# Patient Record
Sex: MALE | Race: WHITE | Employment: FULL TIME | URBAN - METROPOLITAN AREA
[De-identification: names, ages, dates, MRNs, and addresses within clinical notes are randomized per-mention and may not be internally consistent; named-entity substitution may affect disease eponyms.]

---

## 2017-01-19 RX ORDER — INSULIN DETEMIR 100 [IU]/ML
INJECTION, SOLUTION SUBCUTANEOUS
Qty: 15 ML | Refills: 0 | OUTPATIENT
Start: 2017-01-19

## 2017-01-24 ENCOUNTER — PATIENT OUTREACH (OUTPATIENT)
Dept: FAMILY MEDICINE CLINIC | Facility: CLINIC | Age: 37
End: 2017-01-24

## 2017-01-24 DIAGNOSIS — E11.9 CONTROLLED TYPE 2 DIABETES MELLITUS WITHOUT COMPLICATION, WITH LONG-TERM CURRENT USE OF INSULIN (HCC): Primary | ICD-10-CM

## 2017-01-24 DIAGNOSIS — Z79.4 CONTROLLED TYPE 2 DIABETES MELLITUS WITHOUT COMPLICATION, WITH LONG-TERM CURRENT USE OF INSULIN (HCC): Primary | ICD-10-CM

## 2017-07-05 ENCOUNTER — APPOINTMENT (OUTPATIENT)
Dept: LAB | Age: 37
End: 2017-07-05
Attending: EMERGENCY MEDICINE
Payer: COMMERCIAL

## 2017-07-05 DIAGNOSIS — E11.9 CONTROLLED TYPE 2 DIABETES MELLITUS WITHOUT COMPLICATION, WITH LONG-TERM CURRENT USE OF INSULIN (HCC): ICD-10-CM

## 2017-07-05 DIAGNOSIS — Z79.4 CONTROLLED TYPE 2 DIABETES MELLITUS WITHOUT COMPLICATION, WITH LONG-TERM CURRENT USE OF INSULIN (HCC): ICD-10-CM

## 2017-07-05 LAB
ALBUMIN SERPL-MCNC: 4.2 G/DL (ref 3.5–4.8)
ALP LIVER SERPL-CCNC: 85 U/L (ref 45–117)
ALT SERPL-CCNC: 26 U/L (ref 17–63)
AST SERPL-CCNC: 28 U/L (ref 15–41)
BILIRUB SERPL-MCNC: 1.1 MG/DL (ref 0.1–2)
BUN BLD-MCNC: 18 MG/DL (ref 8–20)
CALCIUM BLD-MCNC: 9.2 MG/DL (ref 8.3–10.3)
CHLORIDE: 98 MMOL/L (ref 101–111)
CHOLEST SMN-MCNC: 248 MG/DL (ref ?–200)
CO2: 30 MMOL/L (ref 22–32)
CREAT BLD-MCNC: 1.12 MG/DL (ref 0.7–1.3)
CREAT UR-SCNC: 115 MG/DL
EST. AVERAGE GLUCOSE BLD GHB EST-MCNC: 346 MG/DL (ref 68–126)
GLUCOSE BLD-MCNC: 389 MG/DL (ref 70–99)
HBA1C MFR BLD HPLC: 13.7 % (ref ?–5.7)
HDLC SERPL-MCNC: 41 MG/DL (ref 45–?)
HDLC SERPL: 6.05 {RATIO} (ref ?–4.97)
LDLC SERPL CALC-MCNC: 163 MG/DL (ref ?–130)
M PROTEIN MFR SERPL ELPH: 8.1 G/DL (ref 6.1–8.3)
MICROALBUMIN UR-MCNC: 12.6 MG/DL
MICROALBUMIN/CREAT 24H UR-RTO: 109.6 UG/MG (ref ?–30)
NONHDLC SERPL-MCNC: 207 MG/DL (ref ?–130)
POTASSIUM SERPL-SCNC: 4.8 MMOL/L (ref 3.6–5.1)
SODIUM SERPL-SCNC: 134 MMOL/L (ref 136–144)
TRIGLYCERIDES: 220 MG/DL (ref ?–150)
VLDL: 44 MG/DL (ref 5–40)

## 2017-07-05 PROCEDURE — 82043 UR ALBUMIN QUANTITATIVE: CPT | Performed by: EMERGENCY MEDICINE

## 2017-07-05 PROCEDURE — 82570 ASSAY OF URINE CREATININE: CPT | Performed by: EMERGENCY MEDICINE

## 2017-07-05 PROCEDURE — 80061 LIPID PANEL: CPT | Performed by: EMERGENCY MEDICINE

## 2017-07-05 PROCEDURE — 36415 COLL VENOUS BLD VENIPUNCTURE: CPT | Performed by: EMERGENCY MEDICINE

## 2017-07-05 PROCEDURE — 83036 HEMOGLOBIN GLYCOSYLATED A1C: CPT | Performed by: EMERGENCY MEDICINE

## 2017-07-05 PROCEDURE — 80053 COMPREHEN METABOLIC PANEL: CPT | Performed by: EMERGENCY MEDICINE

## 2017-07-07 ENCOUNTER — TELEPHONE (OUTPATIENT)
Dept: FAMILY MEDICINE CLINIC | Facility: CLINIC | Age: 37
End: 2017-07-07

## 2017-07-07 NOTE — TELEPHONE ENCOUNTER
Left message of high sugar values and need for OV asap. PSR:  Please schedule office visit if patient calls back. Reason is uncontrolled DM.

## 2017-07-07 NOTE — TELEPHONE ENCOUNTER
----- Message from Daniel Garcia MD sent at 7/6/2017  2:01 PM CDT -----  Patient needs OV ASAP for elevated sugars and uncontrolled DM  Pls assist patient with scheduling

## 2017-07-11 NOTE — TELEPHONE ENCOUNTER
I called pt at [phone] and verified 2 patient identifiers: name & . I discussed uncontrolled diabetes. Pt made appt for Mon. I offered sooner appts (we have openings today even) but pt is out of town.

## 2017-07-17 ENCOUNTER — OFFICE VISIT (OUTPATIENT)
Dept: FAMILY MEDICINE CLINIC | Facility: CLINIC | Age: 37
End: 2017-07-17

## 2017-07-17 VITALS
SYSTOLIC BLOOD PRESSURE: 114 MMHG | HEIGHT: 68 IN | DIASTOLIC BLOOD PRESSURE: 92 MMHG | BODY MASS INDEX: 29.25 KG/M2 | RESPIRATION RATE: 14 BRPM | HEART RATE: 106 BPM | TEMPERATURE: 99 F | OXYGEN SATURATION: 99 % | WEIGHT: 193 LBS

## 2017-07-17 DIAGNOSIS — IMO0001 UNCONTROLLED TYPE 2 DIABETES MELLITUS WITHOUT COMPLICATION, WITH LONG-TERM CURRENT USE OF INSULIN: Primary | ICD-10-CM

## 2017-07-17 DIAGNOSIS — Z12.5 PROSTATE CANCER SCREENING: ICD-10-CM

## 2017-07-17 DIAGNOSIS — E78.5 DYSLIPIDEMIA ASSOCIATED WITH TYPE 2 DIABETES MELLITUS (HCC): ICD-10-CM

## 2017-07-17 DIAGNOSIS — E11.69 DYSLIPIDEMIA ASSOCIATED WITH TYPE 2 DIABETES MELLITUS (HCC): ICD-10-CM

## 2017-07-17 LAB
GLUCOSE (URINE DIPSTICK): 500 MG/DL
GLUCOSE BLOOD: 347
MULTISTIX LOT#: ABNORMAL NUMERIC
PH, URINE: 5.5 (ref 4.5–8)
SPECIFIC GRAVITY: 1.01 (ref 1–1.03)
TEST STRIP LOT #: NORMAL NUMERIC
UROBILINOGEN,SEMI-QN: 0.2 MG/DL (ref 0–1.9)

## 2017-07-17 PROCEDURE — 82962 GLUCOSE BLOOD TEST: CPT | Performed by: EMERGENCY MEDICINE

## 2017-07-17 PROCEDURE — 81003 URINALYSIS AUTO W/O SCOPE: CPT | Performed by: EMERGENCY MEDICINE

## 2017-07-17 PROCEDURE — 99214 OFFICE O/P EST MOD 30 MIN: CPT | Performed by: EMERGENCY MEDICINE

## 2017-07-17 RX ORDER — VALSARTAN 80 MG/1
80 TABLET ORAL DAILY
Qty: 30 TABLET | Refills: 1 | Status: SHIPPED | OUTPATIENT
Start: 2017-07-17 | End: 2017-11-17

## 2017-07-17 RX ORDER — EZETIMIBE AND SIMVASTATIN 10; 80 MG/1; MG/1
1 TABLET ORAL
Qty: 30 TABLET | Refills: 1 | Status: SHIPPED | OUTPATIENT
Start: 2017-07-17 | End: 2017-11-17

## 2017-07-17 RX ORDER — BLOOD-GLUCOSE METER
1 EACH MISCELLANEOUS 2 TIMES DAILY
Qty: 1 KIT | Refills: 0 | Status: SHIPPED | OUTPATIENT
Start: 2017-07-17 | End: 2018-02-12

## 2017-07-17 NOTE — PATIENT INSTRUCTIONS
Thank you for choosing [facility]  To Do:  FOR GAGAN Goodman  1. Restart Levimir 10 at bedtime, increase by 2 units every 2-3 days till with morning sugar below 100-150  2. Start Invokanna  3. Follow up in 2 weeks  4.  Blood sugar testing 3 function leading to erectile dysfunction in men and sexual discomfort in women   · Kidney disease (nephropathy) can eventually lead to kidney failure, which may require dialysis or kidney transplant   · Nerve problems (neuropathy), causing pain or loss of

## 2017-07-17 NOTE — PROGRESS NOTES
CHIEF COMPLAINT   Patient presents with: Follow - Up: F/U DM    HISTORY OF PRESENT ILLNESS     Ange Browning is a 42year old year old who presents for recheck of diabetes. Has not been taking Levimir for the past 2-3 months.  Currently on no me lb  08/08/16 : 210 lb  07/21/16 : 212 lb  02/26/15 : 206 lb  12/12/14 : 203 lb  05/19/14 : 198 lb      Current medications:   Current Outpatient Prescriptions:   •  insulin detemir (LEVEMIR FLEXTOUCH) 100 UNIT/ML Subcutaneous Solution Pen-injector, Inject (SGOT) (international units per liter)   Date Value   04/21/2014 20   11/16/2012 33   05/14/2012 44 (H)   ----------  AST (U/L)   Date Value   07/05/2017 28   07/21/2016 28   05/11/2014 18   ----------  ALT (SGPT) (units per liter)   Date Value   04/21/201 type 2 in obese (HCC)     Obesity (BMI 30.0-34. 9)     Vitamin D deficiency     Elevated liver enzymes     Elevated serum GGT level        Current Outpatient Prescriptions on File Prior to Visit:  Insulin Pen Needle (BD PEN NEEDLE ODALIS U/F) 32G X 4 MM Does Pulmonary/Chest: Effort normal and breath sounds normal. No respiratory distress. No wheezes, rhonchi or rales  Abdominal: Soft. Bowel sounds are normal. Non tender, no masses, no organomegaly or hernias. Musculoskeletal: Normal range of motion.  No césar uncontrolled DM and progression of disease.  Explained high risk of complications including CVA, CAD, PVD, ESRD etc.  Will refer to DM clinic  May follow up here in clinic in 2 weeks pending ff up with DM clinic  Start ASA 81 mg daily  DM eye check due  Res Take at appropriate times, Take prescribed dose  Healthy Eating: Evenly spaced carb balanced meals, No skipped meals, Increase fiber, fruits and veggies       LDL:  >100   ACE INH: valsartan   EYE EXAM:  Due, pt encouraged   ASPIRIN Start 81 mg daily   FLU

## 2017-07-19 ENCOUNTER — TELEPHONE (OUTPATIENT)
Dept: FAMILY MEDICINE CLINIC | Facility: CLINIC | Age: 37
End: 2017-07-19

## 2017-07-19 NOTE — TELEPHONE ENCOUNTER
Patient presented to the clinic with a wellness screening form for his insurance, that needs to be completed by his doctor. Copy made, original given to triage. Our copy was placed in fax room in black file box.  LITA was signed by the patient and given to tr

## 2017-08-07 ENCOUNTER — TELEPHONE (OUTPATIENT)
Dept: FAMILY MEDICINE CLINIC | Facility: CLINIC | Age: 37
End: 2017-08-07

## 2017-08-07 NOTE — TELEPHONE ENCOUNTER
Received and completed PA for Invokana rx, via Covermymeds. PA has been approved, case ID # [id number]. Pharmacy has been notified and will notify the patient.    30 DAY SUPPLY WILL COST $50 OUT OF POCKET FOR PT.

## 2017-11-17 ENCOUNTER — OFFICE VISIT (OUTPATIENT)
Dept: FAMILY MEDICINE CLINIC | Facility: CLINIC | Age: 37
End: 2017-11-17

## 2017-11-17 ENCOUNTER — TELEPHONE (OUTPATIENT)
Dept: FAMILY MEDICINE CLINIC | Facility: CLINIC | Age: 37
End: 2017-11-17

## 2017-11-17 VITALS
BODY MASS INDEX: 29.55 KG/M2 | HEART RATE: 100 BPM | RESPIRATION RATE: 16 BRPM | TEMPERATURE: 98 F | WEIGHT: 195 LBS | SYSTOLIC BLOOD PRESSURE: 128 MMHG | DIASTOLIC BLOOD PRESSURE: 76 MMHG | HEIGHT: 68 IN

## 2017-11-17 DIAGNOSIS — Z13.29 SCREENING FOR ENDOCRINE, NUTRITIONAL, METABOLIC AND IMMUNITY DISORDER: ICD-10-CM

## 2017-11-17 DIAGNOSIS — E78.5 DYSLIPIDEMIA ASSOCIATED WITH TYPE 2 DIABETES MELLITUS (HCC): ICD-10-CM

## 2017-11-17 DIAGNOSIS — E78.1 PURE HYPERGLYCERIDEMIA: ICD-10-CM

## 2017-11-17 DIAGNOSIS — Z13.228 SCREENING FOR ENDOCRINE, NUTRITIONAL, METABOLIC AND IMMUNITY DISORDER: ICD-10-CM

## 2017-11-17 DIAGNOSIS — IMO0001 UNCONTROLLED TYPE 2 DIABETES MELLITUS WITHOUT COMPLICATION, WITH LONG-TERM CURRENT USE OF INSULIN: ICD-10-CM

## 2017-11-17 DIAGNOSIS — Z13.0 SCREENING FOR ENDOCRINE, NUTRITIONAL, METABOLIC AND IMMUNITY DISORDER: ICD-10-CM

## 2017-11-17 DIAGNOSIS — Z00.00 ENCOUNTER FOR ANNUAL PHYSICAL EXAM: ICD-10-CM

## 2017-11-17 DIAGNOSIS — Z23 NEED FOR TDAP VACCINATION: Primary | ICD-10-CM

## 2017-11-17 DIAGNOSIS — N52.9 ERECTILE DYSFUNCTION, UNSPECIFIED ERECTILE DYSFUNCTION TYPE: ICD-10-CM

## 2017-11-17 DIAGNOSIS — Z13.21 SCREENING FOR ENDOCRINE, NUTRITIONAL, METABOLIC AND IMMUNITY DISORDER: ICD-10-CM

## 2017-11-17 DIAGNOSIS — E11.69 DYSLIPIDEMIA ASSOCIATED WITH TYPE 2 DIABETES MELLITUS (HCC): ICD-10-CM

## 2017-11-17 DIAGNOSIS — Z23 NEED FOR PROPHYLACTIC VACCINATION AND INOCULATION AGAINST INFLUENZA: ICD-10-CM

## 2017-11-17 PROCEDURE — 90471 IMMUNIZATION ADMIN: CPT | Performed by: EMERGENCY MEDICINE

## 2017-11-17 PROCEDURE — 90472 IMMUNIZATION ADMIN EACH ADD: CPT | Performed by: EMERGENCY MEDICINE

## 2017-11-17 PROCEDURE — 90715 TDAP VACCINE 7 YRS/> IM: CPT | Performed by: EMERGENCY MEDICINE

## 2017-11-17 PROCEDURE — 99395 PREV VISIT EST AGE 18-39: CPT | Performed by: EMERGENCY MEDICINE

## 2017-11-17 PROCEDURE — 90686 IIV4 VACC NO PRSV 0.5 ML IM: CPT | Performed by: EMERGENCY MEDICINE

## 2017-11-17 PROCEDURE — 99213 OFFICE O/P EST LOW 20 MIN: CPT | Performed by: EMERGENCY MEDICINE

## 2017-11-17 RX ORDER — SILDENAFIL 25 MG/1
25 TABLET, FILM COATED ORAL
Qty: 20 TABLET | Refills: 0 | Status: SHIPPED | OUTPATIENT
Start: 2017-11-17 | End: 2017-11-17 | Stop reason: DRUGHIGH

## 2017-11-17 RX ORDER — EZETIMIBE AND SIMVASTATIN 10; 80 MG/1; MG/1
1 TABLET ORAL
Qty: 90 TABLET | Refills: 0 | Status: SHIPPED | OUTPATIENT
Start: 2017-11-17 | End: 2018-10-31

## 2017-11-17 RX ORDER — VALSARTAN 80 MG/1
80 TABLET ORAL DAILY
Qty: 90 TABLET | Refills: 1 | Status: SHIPPED | OUTPATIENT
Start: 2017-11-17 | End: 2018-03-12 | Stop reason: ALTCHOICE

## 2017-11-17 RX ORDER — SILDENAFIL 50 MG/1
50 TABLET, FILM COATED ORAL
Qty: 20 TABLET | Refills: 0 | Status: SHIPPED | OUTPATIENT
Start: 2017-11-17 | End: 2018-12-27

## 2017-11-17 NOTE — TELEPHONE ENCOUNTER
Pt states he was on Viagra 100mg from a previous Dr about a year ago. Dr Purvi Melara prescribed 25mg. He wants to know if he can have a 100mg, or maybe 50mg if not 100mg? Pt states if not that's ok.   No matter what dose we do, he will need a PA so we will

## 2017-11-17 NOTE — TELEPHONE ENCOUNTER
Ok to initiate PA    Ok to Rx Viagra 50 mg once a day As needed, #20 tabs no refills  Pls D/C Viagra 25    If there are samples, pls give him some

## 2017-11-17 NOTE — PATIENT INSTRUCTIONS
Thank you for choosing [facility]  To Do:  FOR GAGAN Mcgrath  1. Follow up on Dec 8  2. Start Trulicity once a week  3. Blood sugar checks 3 times a day before meals, record and bring  4. Restart JMetformin valsartan and Vytorin  5.  Have b least 4 weeks after the first dose   Hepatitis A Men at increased risk for infection – talk with your healthcare provider 2 doses given at least 6 months apart   Hepatitis B Men at increased risk for infection – talk with your healthcare provider 3 doses o who are not up-to-date on their childhood immunizations, should receive all appropriate catch-up vaccines recommended by the CDC. Date Last Reviewed: 2/1/2017  © 4524-5843 [employer]. [address] [facility], [address] [place] [address].  All rights re

## 2017-11-17 NOTE — TELEPHONE ENCOUNTER
New RX sent for 50mg sent and 25mg cancelled. No samples available. Will initiate PA on Mon. Pt aware the process can take days.

## 2017-11-17 NOTE — PROGRESS NOTES
Chief Complaint:   Patient presents with:  School Physical    HPI:   This is a 42year old male who present for a yearly annual exam and DM follow up  DIABETES  Ran out of medication 1 1/2 month ago  Does not do blood sugars  Previously on [employer] week:             Time/duration ________ minutes             Exertion:       stroll      mild      heavy    c. Do you always wear seat belts? YES    d. If over 27years old, have you had your cholesterol level checked in the past five years?  YES        e. differently: 2 CAPSULES DAILY) Disp: 360 Cap Rfl: 1   [DISCONTINUED] valsartan (DIOVAN) 80 MG Oral Tab Take 1 tablet (80 mg total) by mouth daily.  Disp: 30 tablet Rfl: 1   [DISCONTINUED] Ezetimibe-Simvastatin (VYTORIN) 10-80 MG Oral Tab Take 1 tablet by mo diet recommended.    Routine exercise recommended most days during the week. Wear sunscreen - SPF 15 or higher and reapply every 2 hours as needed. Wear seat belts and drive safely.   Schedule regular appointments with dentist.  Schedule yearly eye exam i VACCINE QUAD PRESERVATIVE FREE 0.5 ML    Screening for endocrine, nutritional, metabolic and immunity disorder  -     CBC WITH DIFFERENTIAL WITH PLATELET; Future  -     COMP METABOLIC PANEL (14); Future  -     LIPID PANEL;  Future  -     TSH W REFLEX TO SHANEKA

## 2017-11-20 NOTE — TELEPHONE ENCOUNTER
PA completed on Cover My Meds for patient. SOSA:[id number];KHXYMHE Name:ST: Viagra (sildenafil citrate) PA- AURELIANO;Status:Approved; Coverage Start Date:10/21/2017;Coverage End Date:11/20/2018  Called [facility] and spoke with [facility].   Advised him of above info

## 2017-11-22 ENCOUNTER — LAB ENCOUNTER (OUTPATIENT)
Dept: LAB | Age: 37
End: 2017-11-22
Attending: EMERGENCY MEDICINE
Payer: COMMERCIAL

## 2017-11-22 DIAGNOSIS — E78.1 PURE HYPERGLYCERIDEMIA: ICD-10-CM

## 2017-11-22 DIAGNOSIS — Z13.228 SCREENING FOR ENDOCRINE, NUTRITIONAL, METABOLIC AND IMMUNITY DISORDER: ICD-10-CM

## 2017-11-22 DIAGNOSIS — Z12.5 PROSTATE CANCER SCREENING: ICD-10-CM

## 2017-11-22 DIAGNOSIS — IMO0001 UNCONTROLLED TYPE 2 DIABETES MELLITUS WITHOUT COMPLICATION, WITH LONG-TERM CURRENT USE OF INSULIN: ICD-10-CM

## 2017-11-22 DIAGNOSIS — Z13.21 SCREENING FOR ENDOCRINE, NUTRITIONAL, METABOLIC AND IMMUNITY DISORDER: ICD-10-CM

## 2017-11-22 DIAGNOSIS — Z13.0 SCREENING FOR ENDOCRINE, NUTRITIONAL, METABOLIC AND IMMUNITY DISORDER: ICD-10-CM

## 2017-11-22 DIAGNOSIS — Z13.29 SCREENING FOR ENDOCRINE, NUTRITIONAL, METABOLIC AND IMMUNITY DISORDER: ICD-10-CM

## 2017-11-22 PROCEDURE — 83036 HEMOGLOBIN GLYCOSYLATED A1C: CPT | Performed by: EMERGENCY MEDICINE

## 2017-11-22 PROCEDURE — 80061 LIPID PANEL: CPT | Performed by: EMERGENCY MEDICINE

## 2017-11-22 PROCEDURE — 36415 COLL VENOUS BLD VENIPUNCTURE: CPT | Performed by: EMERGENCY MEDICINE

## 2017-11-22 PROCEDURE — 80050 GENERAL HEALTH PANEL: CPT | Performed by: EMERGENCY MEDICINE

## 2017-11-22 PROCEDURE — 84153 ASSAY OF PSA TOTAL: CPT | Performed by: EMERGENCY MEDICINE

## 2017-11-22 PROCEDURE — 84681 ASSAY OF C-PEPTIDE: CPT | Performed by: EMERGENCY MEDICINE

## 2017-11-29 ENCOUNTER — TELEPHONE (OUTPATIENT)
Dept: FAMILY MEDICINE CLINIC | Facility: CLINIC | Age: 37
End: 2017-11-29

## 2017-11-29 NOTE — TELEPHONE ENCOUNTER
----- Message from Kelly Morfin MD sent at 11/27/2017  6:33 PM CST -----  Hemoglobin A1C is elevated as expected continue present management follow-up in clinic in 2 weeks as directed    Lipids stable, continue with Vytorin  Rest of labs stable.

## 2017-12-08 ENCOUNTER — OFFICE VISIT (OUTPATIENT)
Dept: FAMILY MEDICINE CLINIC | Facility: CLINIC | Age: 37
End: 2017-12-08

## 2017-12-08 VITALS
OXYGEN SATURATION: 98 % | BODY MASS INDEX: 29.86 KG/M2 | DIASTOLIC BLOOD PRESSURE: 82 MMHG | TEMPERATURE: 98 F | SYSTOLIC BLOOD PRESSURE: 114 MMHG | HEART RATE: 104 BPM | RESPIRATION RATE: 12 BRPM | WEIGHT: 197 LBS | HEIGHT: 68 IN

## 2017-12-08 DIAGNOSIS — E78.5 DYSLIPIDEMIA ASSOCIATED WITH TYPE 2 DIABETES MELLITUS (HCC): ICD-10-CM

## 2017-12-08 DIAGNOSIS — IMO0001 UNCONTROLLED TYPE 2 DIABETES MELLITUS WITHOUT COMPLICATION, WITHOUT LONG-TERM CURRENT USE OF INSULIN: Primary | ICD-10-CM

## 2017-12-08 DIAGNOSIS — Z23 NEED FOR PNEUMOCOCCAL VACCINATION: ICD-10-CM

## 2017-12-08 DIAGNOSIS — E11.69 DYSLIPIDEMIA ASSOCIATED WITH TYPE 2 DIABETES MELLITUS (HCC): ICD-10-CM

## 2017-12-08 PROCEDURE — 90471 IMMUNIZATION ADMIN: CPT | Performed by: EMERGENCY MEDICINE

## 2017-12-08 PROCEDURE — 99214 OFFICE O/P EST MOD 30 MIN: CPT | Performed by: EMERGENCY MEDICINE

## 2017-12-08 PROCEDURE — 90732 PPSV23 VACC 2 YRS+ SUBQ/IM: CPT | Performed by: EMERGENCY MEDICINE

## 2017-12-08 NOTE — PATIENT INSTRUCTIONS
Thank you for choosing [address] [place] [address] Group  To Do:  FOR GAGAN Camilo Minium  1. Follow up in 1 month for recheck  2. Need to ff up with a diabetic educator Nick Stewart  3. dont forget about diabetic eye exam, pls have them fax over the report  4.  Incr

## 2017-12-08 NOTE — PROGRESS NOTES
Chief Complaint:   Patient presents with:  Diabetes: f/u dm     HPI:   This is a 42year old male       DIABETES  Started on Trulicity 6.92 once a week. Also on Metformin. Has been doing blood sugars and this AM blood sugar was 166.  Has noticed that blood strip Rfl: 2   KEV MICROLET LANCETS Does not apply Misc 1 lancet by Finger stick route 3 (three) times daily. Use as directed. Disp: 1 Box Rfl: 1     No current facility-administered medications on file prior to visit.     Counseling given: Not Answered Collection Time: 11/22/17  9:26 AM   Result Value Ref Range   Cholesterol, Total 169 <200 mg/dL   Triglycerides 107 <150 mg/dL   HDL Cholesterol 40 (L) >45 mg/dL   LDL Cholesterol 108 <130 mg/dL   VLDL 21 5 - 40 mg/dL   Chol/HDL Ratio 4.23 <4.97   Non HD checks. Encouraged follow-up with diabetic educator for diabetic education.   Will consider adding guardians in the next 2-3 months after we will maximize current medications and after repeat   -     Dulaglutide (TRULICITY) 1.5 IB/2.2GZ Subcutaneous Soluti

## 2018-01-31 ENCOUNTER — PATIENT OUTREACH (OUTPATIENT)
Dept: FAMILY MEDICINE CLINIC | Facility: CLINIC | Age: 38
End: 2018-01-31

## 2018-02-12 ENCOUNTER — APPOINTMENT (OUTPATIENT)
Dept: GENERAL RADIOLOGY | Facility: HOSPITAL | Age: 38
DRG: 623 | End: 2018-02-12
Attending: EMERGENCY MEDICINE
Payer: COMMERCIAL

## 2018-02-12 ENCOUNTER — OFFICE VISIT (OUTPATIENT)
Dept: FAMILY MEDICINE CLINIC | Facility: CLINIC | Age: 38
End: 2018-02-12

## 2018-02-12 ENCOUNTER — HOSPITAL ENCOUNTER (INPATIENT)
Facility: HOSPITAL | Age: 38
LOS: 8 days | Discharge: HOME HEALTH | DRG: 623 | End: 2018-02-20
Attending: EMERGENCY MEDICINE | Admitting: HOSPITALIST
Payer: COMMERCIAL

## 2018-02-12 VITALS
SYSTOLIC BLOOD PRESSURE: 118 MMHG | RESPIRATION RATE: 16 BRPM | WEIGHT: 199 LBS | HEIGHT: 68 IN | HEART RATE: 118 BPM | TEMPERATURE: 98 F | BODY MASS INDEX: 30.16 KG/M2 | DIASTOLIC BLOOD PRESSURE: 72 MMHG

## 2018-02-12 DIAGNOSIS — R73.9 HYPERGLYCEMIA: ICD-10-CM

## 2018-02-12 DIAGNOSIS — L08.9 DIABETIC INFECTION OF LEFT FOOT (HCC): Primary | ICD-10-CM

## 2018-02-12 DIAGNOSIS — Z79.4 TYPE 2 DIABETES MELLITUS WITH COMPLICATION, WITH LONG-TERM CURRENT USE OF INSULIN (HCC): ICD-10-CM

## 2018-02-12 DIAGNOSIS — E11.69 DIABETES MELLITUS TYPE 2 IN OBESE (HCC): Primary | ICD-10-CM

## 2018-02-12 DIAGNOSIS — E66.9 DIABETES MELLITUS TYPE 2 IN OBESE (HCC): Primary | ICD-10-CM

## 2018-02-12 DIAGNOSIS — E66.9 DIABETES MELLITUS TYPE 2 IN OBESE (HCC): ICD-10-CM

## 2018-02-12 DIAGNOSIS — E11.69 DIABETES MELLITUS TYPE 2 IN OBESE (HCC): ICD-10-CM

## 2018-02-12 DIAGNOSIS — E11.8 TYPE 2 DIABETES MELLITUS WITH COMPLICATION, WITH LONG-TERM CURRENT USE OF INSULIN (HCC): ICD-10-CM

## 2018-02-12 DIAGNOSIS — E11.628 DIABETIC INFECTION OF LEFT FOOT (HCC): Primary | ICD-10-CM

## 2018-02-12 DIAGNOSIS — E11.8 TYPE 2 DIABETES MELLITUS WITH COMPLICATION, WITHOUT LONG-TERM CURRENT USE OF INSULIN (HCC): ICD-10-CM

## 2018-02-12 LAB
ALBUMIN SERPL-MCNC: 3.4 G/DL (ref 3.5–4.8)
ALP LIVER SERPL-CCNC: 186 U/L (ref 45–117)
ALT SERPL-CCNC: 35 U/L (ref 17–63)
AST SERPL-CCNC: 41 U/L (ref 15–41)
BASOPHILS # BLD AUTO: 0.02 X10(3) UL (ref 0–0.1)
BASOPHILS NFR BLD AUTO: 0.2 %
BILIRUB SERPL-MCNC: 2.1 MG/DL (ref 0.1–2)
BILIRUB UR QL STRIP.AUTO: NEGATIVE
BUN BLD-MCNC: 15 MG/DL (ref 8–20)
C-REACTIVE PROTEIN: 11 MG/DL (ref ?–1)
CALCIUM BLD-MCNC: 9.2 MG/DL (ref 8.3–10.3)
CARTRIDGE LOT#: 769 NUMERIC
CHLORIDE: 97 MMOL/L (ref 101–111)
CLARITY UR REFRACT.AUTO: CLEAR
CO2: 28 MMOL/L (ref 22–32)
COLOR UR AUTO: YELLOW
CREAT BLD-MCNC: 1.23 MG/DL (ref 0.7–1.3)
EOSINOPHIL # BLD AUTO: 0.03 X10(3) UL (ref 0–0.3)
EOSINOPHIL NFR BLD AUTO: 0.3 %
ERYTHROCYTE [DISTWIDTH] IN BLOOD BY AUTOMATED COUNT: 12.6 % (ref 11.5–16)
EST. AVERAGE GLUCOSE BLD GHB EST-MCNC: 309 MG/DL (ref 68–126)
GLUCOSE BLD-MCNC: 172 MG/DL (ref 65–99)
GLUCOSE BLD-MCNC: 226 MG/DL (ref 65–99)
GLUCOSE BLD-MCNC: 233 MG/DL (ref 65–99)
GLUCOSE BLD-MCNC: 460 MG/DL (ref 65–99)
GLUCOSE BLD-MCNC: 495 MG/DL (ref 70–99)
GLUCOSE BLOOD: 457
GLUCOSE UR STRIP.AUTO-MCNC: >=500 MG/DL
HBA1C MFR BLD HPLC: 12.4 % (ref ?–5.7)
HCT VFR BLD AUTO: 38 % (ref 37–53)
HEMOGLOBIN A1C: 14 % (ref 4.3–5.6)
HGB BLD-MCNC: 13.1 G/DL (ref 13–17)
IMMATURE GRANULOCYTE COUNT: 0.02 X10(3) UL (ref 0–1)
IMMATURE GRANULOCYTE RATIO %: 0.2 %
LACTIC ACID: 1.5 MMOL/L (ref 0.5–2)
LEUKOCYTE ESTERASE UR QL STRIP.AUTO: NEGATIVE
LYMPHOCYTES # BLD AUTO: 1.68 X10(3) UL (ref 0.9–4)
LYMPHOCYTES NFR BLD AUTO: 19.5 %
M PROTEIN MFR SERPL ELPH: 7.9 G/DL (ref 6.1–8.3)
MCH RBC QN AUTO: 30.2 PG (ref 27–33.2)
MCHC RBC AUTO-ENTMCNC: 34.5 G/DL (ref 31–37)
MCV RBC AUTO: 87.6 FL (ref 80–99)
MONOCYTES # BLD AUTO: 0.87 X10(3) UL (ref 0.1–1)
MONOCYTES NFR BLD AUTO: 10.1 %
NEUTROPHIL ABS PRELIM: 5.99 X10 (3) UL (ref 1.3–6.7)
NEUTROPHILS # BLD AUTO: 5.99 X10(3) UL (ref 1.3–6.7)
NEUTROPHILS NFR BLD AUTO: 69.7 %
NITRITE UR QL STRIP.AUTO: NEGATIVE
PH UR STRIP.AUTO: 6 [PH] (ref 4.5–8)
PLATELET # BLD AUTO: 212 10(3)UL (ref 150–450)
POTASSIUM SERPL-SCNC: 4.1 MMOL/L (ref 3.6–5.1)
PROT UR STRIP.AUTO-MCNC: NEGATIVE MG/DL
RBC # BLD AUTO: 4.34 X10(6)UL (ref 4.3–5.7)
RBC UR QL AUTO: NEGATIVE
RED CELL DISTRIBUTION WIDTH-SD: 40.1 FL (ref 35.1–46.3)
SODIUM SERPL-SCNC: 133 MMOL/L (ref 136–144)
SP GR UR STRIP.AUTO: 1.03 (ref 1–1.03)
TEST STRIP LOT #: ABNORMAL NUMERIC
UROBILINOGEN UR STRIP.AUTO-MCNC: 4 MG/DL
VENOUS BASE EXCESS: 1.7
VENOUS BLOOD GAS HCO3: 28.6 MEQ/L (ref 23–27)
VENOUS O2 SAT CALC: 47 % (ref 72–78)
VENOUS O2 SATURATION: 51 % (ref 72–78)
VENOUS PCO2: 54 MM HG (ref 38–50)
VENOUS PH: 7.33 (ref 7.33–7.43)
VENOUS PO2: 28 MM HG (ref 30–50)
WBC # BLD AUTO: 8.6 X10(3) UL (ref 4–13)

## 2018-02-12 PROCEDURE — 82962 GLUCOSE BLOOD TEST: CPT | Performed by: EMERGENCY MEDICINE

## 2018-02-12 PROCEDURE — 99214 OFFICE O/P EST MOD 30 MIN: CPT | Performed by: EMERGENCY MEDICINE

## 2018-02-12 PROCEDURE — 83036 HEMOGLOBIN GLYCOSYLATED A1C: CPT | Performed by: EMERGENCY MEDICINE

## 2018-02-12 PROCEDURE — 73650 X-RAY EXAM OF HEEL: CPT | Performed by: EMERGENCY MEDICINE

## 2018-02-12 PROCEDURE — 99223 1ST HOSP IP/OBS HIGH 75: CPT | Performed by: HOSPITALIST

## 2018-02-12 RX ORDER — LOSARTAN POTASSIUM 50 MG/1
50 TABLET ORAL DAILY
Status: DISCONTINUED | OUTPATIENT
Start: 2018-02-12 | End: 2018-02-20

## 2018-02-12 RX ORDER — INSULIN ASPART 100 [IU]/ML
0.2 INJECTION, SOLUTION INTRAVENOUS; SUBCUTANEOUS ONCE
Status: COMPLETED | OUTPATIENT
Start: 2018-02-12 | End: 2018-02-12

## 2018-02-12 RX ORDER — ENOXAPARIN SODIUM 100 MG/ML
40 INJECTION SUBCUTANEOUS DAILY
Status: DISCONTINUED | OUTPATIENT
Start: 2018-02-12 | End: 2018-02-20

## 2018-02-12 RX ORDER — SODIUM CHLORIDE 9 MG/ML
INJECTION, SOLUTION INTRAVENOUS CONTINUOUS
Status: ACTIVE | OUTPATIENT
Start: 2018-02-12 | End: 2018-02-12

## 2018-02-12 RX ORDER — DEXTROSE MONOHYDRATE 25 G/50ML
50 INJECTION, SOLUTION INTRAVENOUS
Status: DISCONTINUED | OUTPATIENT
Start: 2018-02-12 | End: 2018-02-20

## 2018-02-12 RX ORDER — SODIUM CHLORIDE 9 MG/ML
INJECTION, SOLUTION INTRAVENOUS CONTINUOUS
Status: DISCONTINUED | OUTPATIENT
Start: 2018-02-12 | End: 2018-02-14

## 2018-02-12 RX ORDER — ONDANSETRON 2 MG/ML
4 INJECTION INTRAMUSCULAR; INTRAVENOUS EVERY 4 HOURS PRN
Status: DISCONTINUED | OUTPATIENT
Start: 2018-02-12 | End: 2018-02-20

## 2018-02-12 RX ORDER — ACETAMINOPHEN 325 MG/1
650 TABLET ORAL EVERY 6 HOURS PRN
Status: DISCONTINUED | OUTPATIENT
Start: 2018-02-12 | End: 2018-02-20

## 2018-02-12 NOTE — PLAN OF CARE
Diabetes/Glucose Control    • Glucose maintained within prescribed range Progressing        Patient/Family Goals    • Patient/Family Long Term Goal Progressing    • Patient/Family Short Term Goal Progressing        Admission navigator completed. A/Ox4.  No

## 2018-02-12 NOTE — PROGRESS NOTES
Pharmacy Note: Renal dose adjustment of Unasyn    Octavia Aguilar is a 42year old male who has been prescribed Unasyn 1.5 g IV every 6 hrs.   CrCl is 78 ml/min so the dose has been adjusted  to Unasyn 3 gram IV every 8 hrs (extended interval infusion)

## 2018-02-12 NOTE — PROGRESS NOTES
Chief Complaint:   Patient presents with:  Viral Syndrome: Symptoms started on Saturday     HPI:   This is a 42year old male         NOT FEELING WELL  Started X 3 days. + body aches and fatigue. NO fever. No cough or congestion. Appetite low.  Urine is lloyd 1 tablet (50 mg total) by mouth daily as needed for Erectile Dysfunction.  Disp: 20 tablet Rfl: 0      Counseling given: Not Answered         PROBLEM LIST     Patient Active Problem List:     Encounter for long-term (current) use of other medications     Pu -HEMOGLOBIN A1C   Collection Time: 02/12/18  9:40 AM   Result Value Ref Range   HEMOGLOBIN A1C 14 (A) 4.3 - 5.6 %   Cartridge Lot# [id number] Numeric   Cartridge Expiration Date 8-2,019 Date       ASSESSMENT AND PLAN:   Diagnoses and all orders for this visit:

## 2018-02-12 NOTE — PATIENT INSTRUCTIONS
Thank you for choosing [address] [place] [address] Group  To Do:  FOR GAGAN Manning  1.  Proceed to the ER for further evaluation and work up, R/O DKA           Diabetic Ketoacidosis  Diabetic ketoacidosis (DKA) is a serious problem that can happen in people with ketones)  Over time, these symptoms may happen:  · Dry or flushed skin  · Nausea and vomiting  · Loss of appetite  · Weight loss  · Belly pain  · Trouble breathing  · Trouble thinking or confusion  · Feeling very tired or weak. This can lead to coma.   How DKA  · Have very high blood glucose levels  · Are getting sick with another illness   Date Last Reviewed: 7/1/2016  © 1907-7304 [employer]. [address] [facility], [address] [place] [address]. All rights reserved.  This information is not intended as a

## 2018-02-12 NOTE — ED PROVIDER NOTES
Patient Seen in: [facility] Emergency Department    History   Patient presents with:  Hyperglycemia (metabolic)    Stated Complaint: diabetic, hyperglycemia    HPI    Patient is a 75-year-old male who has a history of diabetes for 10 years.   Patient 16   Ht 172.7 cm (5' 8\")   Wt 88.5 kg   SpO2 97%   BMI 29.65 kg/m²         Physical Exam  GENERAL: Patient resting comfortably on the cart in no acute distress. HEENT: Extraocular muscles intact, pupils equal round reactive to light and accommodation.   Carlos Arce DIFFERENTIAL WITH PLATELET.   Procedure                               Abnormality         Status                     ---------                               -----------         ------                     CBC W/ DIFFERENTIAL[[id number]]

## 2018-02-12 NOTE — H&P
[facility]  History and Physical     Viji Tobin Patient Status:  Inpatient    1980 MRN [id number]   [facility] 5NW-A Attending Inocente Homans, MD   Hosp Day # 0 PCP Gil Johnson MD     Chief Complaint: infection tablet by mouth once daily. Disp: 90 tablet Rfl: 0   MetFORMIN HCl 1000 MG Oral Tab Take 1 tablet (1,000 mg total) by mouth 2 (two) times daily with meals.  Disp: 180 tablet Rfl: 0   Sildenafil Citrate (VIAGRA) 50 MG Oral Tab Take 1 tablet (50 mg total) by uncontrolled DM  3. DM II- hyperglycemic protocol  4. HTN- losartan   5.  DL- cont home meds    Quality:  · DVT Prophylaxis: lovenox   · CODE status: full  · Gibbons: no  Plan of care discussed with patient     Pamela Buck MD  2/12/2018

## 2018-02-12 NOTE — ED NOTES
Into room to collect urine, pt's L foot on chux with bloody bandaid removed, pt stating that he has a wound to l heel after shoveling snow, scabbed wound noted measuring 2cm long by 1cm wide in center area, eye shaped in nature, scabbed ,scaling surroundin

## 2018-02-13 LAB
GLUCOSE BLD-MCNC: 144 MG/DL (ref 65–99)
GLUCOSE BLD-MCNC: 150 MG/DL (ref 65–99)
GLUCOSE BLD-MCNC: 196 MG/DL (ref 65–99)
GLUCOSE BLD-MCNC: 217 MG/DL (ref 65–99)

## 2018-02-13 PROCEDURE — 99232 SBSQ HOSP IP/OBS MODERATE 35: CPT | Performed by: HOSPITALIST

## 2018-02-13 RX ORDER — AMMONIUM LACTATE 12 G/100G
LOTION TOPICAL AS NEEDED
Status: DISCONTINUED | OUTPATIENT
Start: 2018-02-13 | End: 2018-02-20

## 2018-02-13 RX ORDER — AMOXICILLIN AND CLAVULANATE POTASSIUM 875; 125 MG/1; MG/1
1 TABLET, FILM COATED ORAL 2 TIMES DAILY
Qty: 20 TABLET | Refills: 0 | Status: SHIPPED | OUTPATIENT
Start: 2018-02-13 | End: 2018-02-16

## 2018-02-13 NOTE — PLAN OF CARE
AxO x4, glasses. Room air, lungs clear bilaterally, IS. Had fever last NOC of 101.1, tylenol given with relief. Afebrile so far today, will continue to monitor. C/o mild headache this AM, tylenol given with relief. IVF and IV abx. Lovenox SQ.  Left lateral

## 2018-02-13 NOTE — PAYOR COMM NOTE
--------------  ADMISSION REVIEW     Payor: [address]  Subscriber #:  [id number]  Authorization Number: N/A    Admit date: 2/12/18  Admit time: 1       Admitting Physician: Prosper Freire MD  Attending Physician:  Tricia Bhandari MD  Pr are as noted in HPI. Constitutional and vital signs reviewed. All other systems reviewed and negative except as noted above. Physical Exam[MB. 1]   ED Triage Vitals [02/12/18 1040]  BP: 105/68  Pulse: 64  Resp: 16  Temp: (!) 97.4 °F (36.3 °C)  Temp s other components within normal limits   C-REACTIVE PROTEIN - Abnormal; Notable for the following:     C-Reactive Protein 11.00 (*)     All other components within normal limits   POCT GLUCOSE - Abnormal; Notable for the following:     POC Glucose 460 (*) infection    History of Present Illness: Rossy Noel is a 42year old male presents with[MD.1] h/o DM comes in with uncontrolled DM. He also mentions a left foot wound that has been progressing. He denies CP or SOB.  No adb pain, fevers or chills Musculoskeletal: Moves all extremities. Extremities: No edema. No cyanosis. Integument:[MD.1] Left lateral aspect with open wound lesion with erythema, no purulent discharge. [MD.2]   Psychiatric: Appropriate mood and affect.     Diagnostic Data:    Labs Reshma Gray RN      Enoxaparin Sodium (LOVENOX) 40 MG/0.4ML injection 40 mg     Date Action Dose Route User    2/13/2018 0827 Given 40 mg Subcutaneous (Left Lower Abdomen) Jason Claudio RN    2/12/2018 1537 Given 40 mg Subcutaneous (Left Upper Abdomen) Gary CERTIFIED ALONG W/NRD

## 2018-02-13 NOTE — PLAN OF CARE
Diabetes/Glucose Control    • Glucose maintained within prescribed range Progressing        Patient/Family Goals    • Patient/Family Short Term Goal Progressing            Pt received alert and oriented x4, denies any pain, dressing to left lower extremity

## 2018-02-13 NOTE — CONSULTS
[facility]  Report of Inpatient Wound Care Consultation     Mali Tobin Patient Status:  Inpatient    1980 MRN [id number]   [facility] 5NW-A Attending Chrystal Oppenheim, MD   Hosp Day # 1 PCP Rosa Madsen MD     [facility] CREATSERUM  1.23   --    --    --    --    BUN  15   --    --    --    --    GLU  495*   --    --    --    --    CA  9.2   --    --    --    --    ALB  3.4*   --    --    --    --    TP  7.9   --    --    --    --    CRP  11.00*   --    --    --    -- at [facility]. WOUND CARE DISCHARGE RECOMMENDATIONS:   Noted above. Thank you for this consultation and for allowing me to participate in the care of your patient.   Please [place] me at [phone] or page me at [phone] if you have any questio

## 2018-02-13 NOTE — CM/SW NOTE
02/13/18 1000   CM/SW Screening   Referral [address] [facility] staff; Chart review;Nursing rounds   Patient's Mental Status Alert;Oriented   Patient lives with Spouse   Patient Status Prior to Admission   Independent with ADLs and M

## 2018-02-13 NOTE — DIETARY NOTE
Nutrition Short Note    Dietitian consult received for diabetes education. Noted pt hemoglobin A1C 12.4. Reviewed and provided handouts on carbohydrate counting/label reading.  Discussed the role of CHO/protein and fat in BS control, reviewed importance of

## 2018-02-13 NOTE — PROGRESS NOTES
[facility]  Progress Note     Martínez Tobin Patient Status:  Inpatient    1980 MRN [id number]   [facility] 5NW-A Attending Jewel Hayes MD   Hosp Day # 1 PCP Yayo Kapoor MD     Chief Complaint: infection  S: insulin detemir  15 Units Subcutaneous [email or web address]   • ampicillin-sulbactam  3 g Intravenous Q8H     ASSESSMENT / PLAN:   1. Diabetic LE wound infection- low suspicion for osteomyelitis. Xrays unremarkable. Fever 101 last night  1.  IVF  2. IV abx and f/u

## 2018-02-14 LAB
BASOPHILS # BLD AUTO: 0.01 X10(3) UL (ref 0–0.1)
BASOPHILS NFR BLD AUTO: 0.1 %
BUN BLD-MCNC: 7 MG/DL (ref 8–20)
CALCIUM BLD-MCNC: 8.4 MG/DL (ref 8.3–10.3)
CHLORIDE: 106 MMOL/L (ref 101–111)
CO2: 28 MMOL/L (ref 22–32)
CREAT BLD-MCNC: 0.66 MG/DL (ref 0.7–1.3)
EOSINOPHIL # BLD AUTO: 0.07 X10(3) UL (ref 0–0.3)
EOSINOPHIL NFR BLD AUTO: 1 %
ERYTHROCYTE [DISTWIDTH] IN BLOOD BY AUTOMATED COUNT: 12.7 % (ref 11.5–16)
GLUCOSE BLD-MCNC: 123 MG/DL (ref 65–99)
GLUCOSE BLD-MCNC: 126 MG/DL (ref 70–99)
GLUCOSE BLD-MCNC: 127 MG/DL (ref 65–99)
GLUCOSE BLD-MCNC: 146 MG/DL (ref 65–99)
GLUCOSE BLD-MCNC: 175 MG/DL (ref 65–99)
HAV IGM SER QL: 1.8 MG/DL (ref 1.7–3)
HCT VFR BLD AUTO: 32.3 % (ref 37–53)
HGB BLD-MCNC: 11.2 G/DL (ref 13–17)
IMMATURE GRANULOCYTE COUNT: 0.03 X10(3) UL (ref 0–1)
IMMATURE GRANULOCYTE RATIO %: 0.4 %
LYMPHOCYTES # BLD AUTO: 1.29 X10(3) UL (ref 0.9–4)
LYMPHOCYTES NFR BLD AUTO: 18.6 %
MCH RBC QN AUTO: 30.4 PG (ref 27–33.2)
MCHC RBC AUTO-ENTMCNC: 34.7 G/DL (ref 31–37)
MCV RBC AUTO: 87.8 FL (ref 80–99)
MONOCYTES # BLD AUTO: 0.81 X10(3) UL (ref 0.1–1)
MONOCYTES NFR BLD AUTO: 11.7 %
NEUTROPHIL ABS PRELIM: 4.74 X10 (3) UL (ref 1.3–6.7)
NEUTROPHILS # BLD AUTO: 4.74 X10(3) UL (ref 1.3–6.7)
NEUTROPHILS NFR BLD AUTO: 68.2 %
PLATELET # BLD AUTO: 219 10(3)UL (ref 150–450)
POTASSIUM SERPL-SCNC: 3.9 MMOL/L (ref 3.6–5.1)
RBC # BLD AUTO: 3.68 X10(6)UL (ref 4.3–5.7)
RED CELL DISTRIBUTION WIDTH-SD: 40.5 FL (ref 35.1–46.3)
SODIUM SERPL-SCNC: 140 MMOL/L (ref 136–144)
WBC # BLD AUTO: 7 X10(3) UL (ref 4–13)

## 2018-02-14 PROCEDURE — 0JBR0ZZ EXCISION OF LEFT FOOT SUBCUTANEOUS TISSUE AND FASCIA, OPEN APPROACH: ICD-10-PCS | Performed by: PODIATRIST

## 2018-02-14 PROCEDURE — 0QBM0ZX EXCISION OF LEFT TARSAL, OPEN APPROACH, DIAGNOSTIC: ICD-10-PCS | Performed by: PODIATRIST

## 2018-02-14 PROCEDURE — 99233 SBSQ HOSP IP/OBS HIGH 50: CPT | Performed by: HOSPITALIST

## 2018-02-14 RX ORDER — MAGNESIUM OXIDE 400 MG (241.3 MG MAGNESIUM) TABLET
400 TABLET ONCE
Status: COMPLETED | OUTPATIENT
Start: 2018-02-14 | End: 2018-02-14

## 2018-02-14 NOTE — CONSULTS
[facility]  Report of Consultation    Dewey Tobin Patient Status:  Inpatient    1980 MRN [id number]   [facility] 5NW-A Attending Kassy Drake MD   Hosp Day # 2 PCP Nabila Cavanaugh MD     Date of Admission:  2018 mg, 4 mg, Intravenous, Q4H PRN  •  glucose (DEX4) oral liquid 15 g, 15 g, Oral, Q15 Min PRN **OR** Glucose-Vitamin C (DEX-4) 4-0.006 g chewable tab 4 tablet, 4 tablet, Oral, Q15 Min PRN **OR** dextrose 50% injection 50 mL, 50 mL, Intravenous, Q15 Min PRN * medications     Pure hyperglyceridemia     Other and unspecified hyperlipidemia     Type II or unspecified type diabetes mellitus without mention of complication, uncontrolled     Diabetes mellitus type 2 in obese (HCC)     Obesity (BMI 30.0-34. 9)     Berna procedure well with minimal discomfort. He does however have pain on direct palpation of the wound but I did not notice anything that would make me suspicious for an abscess at this time.

## 2018-02-14 NOTE — PLAN OF CARE
AxO x4. Room air, lungs clear. Left lateral foot ulcer, debrided at bedside per podiatry today. No c/o pain except slight headache, tylenol given with relief. Voids. BM today. Up self. Multidisciplinary Discharge Rounds held 2/14/2018.     Treatment team

## 2018-02-14 NOTE — PLAN OF CARE
Diabetes/Glucose Control    • Glucose maintained within prescribed range Progressing        PAIN - ADULT    • Verbalizes/displays adequate comfort level or patient's stated pain goal Progressing        Patient/Family Goals    • Patient/Family [facility]

## 2018-02-14 NOTE — PROGRESS NOTES
[facility]  Progress Note     Arina Tobin Patient Status:  Inpatient    1980 MRN [id number]   [facility] 5NW-A Attending Hazel Ramirez MD   Hosp Day # 2 PCP Maria G George MD     Chief Complaint: Foot ulcer, Neena Units Subcutaneous TID AC and HS   • Insulin Aspart Pen  1-68 Units Subcutaneous TID CC   • insulin detemir  15 Units [email or web address]   • ampicillin-sulbactam  3 g Intravenous Q8H     ASSESSMENT / PLAN:   1.  Left heel diabetic foot ulcer with ce

## 2018-02-14 NOTE — CONSULTS
INFECTIOUS DISEASE CONSULTATION    Betsy Roberts Ervinlizeth Patient Status:  Inpatient    1980 MRN [id number]   [facility] 5NW-A Attending Dalila Ahumada, MD   [facility] Day # 2 PCP Ester Latham 4-0.006 g chewable tab 4 tablet, 4 tablet, Oral, Q15 Min PRN **OR** dextrose 50% injection 50 mL, 50 mL, Intravenous, Q15 Min PRN **OR** glucose (DEX4) oral liquid 30 g, 30 g, Oral, Q15 Min PRN **OR** Glucose-Vitamin C (DEX-4) 4-0.006 g chewable tab 8 tabl --    AST  41   --    ALT  35   --    BILT  2.1*   --    TP  7.9   --            Lab Results  Component Value Date   MG 1.8 02/14/2018   PGLU 123 02/14/2018         Microbiologic Data:   none      Imaging:  Xray noted    Problem list reviewed:  Patient Ac

## 2018-02-15 LAB
GLUCOSE BLD-MCNC: 130 MG/DL (ref 65–99)
GLUCOSE BLD-MCNC: 163 MG/DL (ref 65–99)
GLUCOSE BLD-MCNC: 177 MG/DL (ref 65–99)
GLUCOSE BLD-MCNC: 222 MG/DL (ref 65–99)
HAV IGM SER QL: 2 MG/DL (ref 1.7–3)

## 2018-02-15 PROCEDURE — 99232 SBSQ HOSP IP/OBS MODERATE 35: CPT | Performed by: HOSPITALIST

## 2018-02-15 NOTE — PLAN OF CARE
Diabetes/Glucose Control    • Glucose maintained within prescribed range Progressing        PAIN - ADULT    • Verbalizes/displays adequate comfort level or patient's stated pain goal Progressing        Patient/Family Goals    • Patient/Family [facility]

## 2018-02-15 NOTE — PROGRESS NOTES
02/15/18  1203   BP: 114/82   Pulse:    Resp:    Temp: 98.2 °F (36.8 °C)   Patient was seen resting comfortably in bed heel pain is diminishing.     Objective: Tissue cultures growing out 3+ gram-positive cocci in pairs and 1+ gram-positive rods still has

## 2018-02-15 NOTE — PROGRESS NOTES
[facility]                INFECTIOUS DISEASE PROGRESS NOTE    Ciara Tobin Patient Status:  Inpatient    1980 MRN [id number]   [facility] 5NW-A Attending Johnny Toussaint MD   Hosp Day # 3 PCP Leanna Ordoñez MD Completed Lab Status: Preliminary result Updated: 02/15/18 9265   Specimen: Other from Heel, left     Aerobic Culture Result 2+ growth Staphylococcus aureus (A)     2+ growth Streptococcus agalactiae (Group B beta strep) (A)    Aerobic Smear No WBCs seen

## 2018-02-15 NOTE — CM/SW NOTE
New plan for O/P IV ABX p d/c. Pend cultures. Patient aware of plan, states first choice would be in-home infusion. [facility]. Pend final orders.

## 2018-02-15 NOTE — PROGRESS NOTES
Pt is alert and oriented, left foot is wrapped and boot is on. Pt remains on IV unasyn, encouraged pt to watch diabetes education on-line. Pt denies pain. Lovonex given.  Will continue to monitor

## 2018-02-15 NOTE — PROGRESS NOTES
[facility]  Progress Note     Jenise Tobin Patient Status:  Inpatient    1980 MRN [id number]   [facility] 5NW-A Attending Arturo Gorman MD   Hosp Day # 3 PCP Evie Dorantes MD     Chief Complaint: Foot ulcer, Neena ASSESSMENT / PLAN:   1. Left heel diabetic foot ulcer with cellulitis sp debridement - clinically not felt to have OM or abscess  1. IV abx  2. Follow-up cultures - currently with GPC pairs, GPR  3. ID & Podiatry consult  2.  Diabetes mellitus, type 2,

## 2018-02-16 ENCOUNTER — ANESTHESIA EVENT (OUTPATIENT)
Dept: SURGERY | Facility: HOSPITAL | Age: 38
End: 2018-02-16

## 2018-02-16 LAB
GLUCOSE BLD-MCNC: 138 MG/DL (ref 65–99)
GLUCOSE BLD-MCNC: 159 MG/DL (ref 65–99)
GLUCOSE BLD-MCNC: 189 MG/DL (ref 65–99)
GLUCOSE BLD-MCNC: 203 MG/DL (ref 65–99)

## 2018-02-16 PROCEDURE — 99232 SBSQ HOSP IP/OBS MODERATE 35: CPT | Performed by: HOSPITALIST

## 2018-02-16 RX ORDER — LANCETS
EACH MISCELLANEOUS
Qty: 50 EACH | Refills: 6 | Status: SHIPPED | OUTPATIENT
Start: 2018-02-16 | End: 2018-02-22 | Stop reason: ALTCHOICE

## 2018-02-16 RX ORDER — INSULIN LISPRO 100 [IU]/ML
5 INJECTION, SOLUTION INTRAVENOUS; SUBCUTANEOUS
Qty: 3 ML | Refills: 3 | Status: SHIPPED | OUTPATIENT
Start: 2018-02-16 | End: 2018-03-21

## 2018-02-16 RX ORDER — BLOOD-GLUCOSE METER
EACH MISCELLANEOUS
Qty: 1 KIT | Refills: 0 | Status: SHIPPED | OUTPATIENT
Start: 2018-02-16 | End: 2018-02-22 | Stop reason: ALTCHOICE

## 2018-02-16 NOTE — PROGRESS NOTES
02/16/18  1057   BP: 134/85   Pulse: 96   Resp: 16   Temp: 98.2 °F (36.8 °C)   Patient was seen resting comfortably in bed. I had spoken to the wound care team in particular [facility] and reviewed her note.   There were concerns over increasing erythema

## 2018-02-16 NOTE — PROGRESS NOTES
[facility]                INFECTIOUS DISEASE PROGRESS NOTE    Drea Tobin Patient Status:  Inpatient    1980 MRN [id number]   [facility] 5NW-A Attending Darin Hardy MD   Hosp Day # 4 PCP Sandhya Cordon MD Order Status: Completed Lab Status: Preliminary result Updated: 02/15/18 1430   Specimen: Other from Heel, left     Aerobic Culture Result 2+ growth Staphylococcus aureus (A)     2+ growth Streptococcus agalactiae (Group B beta strep) (A)    Aerobic Smea

## 2018-02-16 NOTE — PROGRESS NOTES
[facility]  Progress Note     Jose Alejandroreld Sadi Tobin Patient Status:  Inpatient    1980 MRN [id number]   [facility] 5NW-A Attending Chay Salter MD   Hosp Day # 4 PCP David Dillon MD     Chief Complaint: Foot ulcer, Neena consult  2. Diabetes mellitus, type 2, was on insulin, started on Trulicity and Metformin January 2018  1. Levemir 15 BID  2. Novolog  5 with meals  3. Correctional scale  4. Accuchecks  5. Outpatient follow-up  3.  Essential hypertension, goal < 130/80, st

## 2018-02-16 NOTE — PLAN OF CARE
Diabetes/Glucose Control    • Glucose maintained within prescribed range Progressing        PAIN - ADULT    • Verbalizes/displays adequate comfort level or patient's stated pain goal Progressing        Patient/Family Goals    • Patient/Family [facility]

## 2018-02-16 NOTE — WOUND PROGRESS NOTE
[facility]  Report of Inpatient Wound Care Progress Note    Alondra Tobin Patient Status:  Inpatient    1980 MRN [id number]   [facility] 5NW-A Attending Mani Godoy MD   Hosp Day # 4 PCP Elias Jimenez MD       SUBJE noted.  Upon removal of the dressing, there are no more bullae noted, wound now with black eschar with surrounding erythremia and warmth, pain with probing to the wound. RN here to observe, she did see the wound yesterday, no erthemia noted.  With leg elev

## 2018-02-16 NOTE — ANESTHESIA PREPROCEDURE EVALUATION
PRE-OP EVALUATION    Patient Name: Nova Tobin    Pre-op Diagnosis: INPT      Procedure(s):  LEFT HEEL DEBRIDEMENT     Surgeon(s) and Role:     * Alexa Alvarez DPM - Primary    Pre-op vitals reviewed.   Temp: 98.2 °F (36.8 °C)  Pulse: 96  Res 100 UNIT/ML flextouch 15 Units 15 Units Subcutaneous [email or web address]   Ampicillin-Sulbactam Sodium (UNASYN) 3 g in sodium chloride 0.9 % 100 mL MBP/ADD-vantage 3 g Intravenous Q8H       Outpatient Medications:    Insulin Lispro (HUMALOG) 100 UNIT/ML Subcutan MG Oral Tab Take 1 tablet (80 mg total) by mouth daily. Disp: 90 tablet Rfl: 1   Ezetimibe-Simvastatin (VYTORIN) 10-80 MG Oral Tab Take 1 tablet by mouth once daily.  Disp: 90 tablet Rfl: 0   MetFORMIN HCl 1000 MG Oral Tab Take 1 tablet (1,000 mg total) by Pulmonary    Pulmonary exam normal.  Breath sounds clear to auscultation bilaterally. Other findings            ASA: 2   Plan: general  NPO status verified and patient meets guidelines. Patient has not taken beta blockers in last 24 hours.

## 2018-02-16 NOTE — PROGRESS NOTES
Pt A/O x 4. Pt on RA, Dressing to left foot C/D/I. IV abts. Boot to L foot, QID accu check, denies pain.  Will monitor

## 2018-02-17 ENCOUNTER — ANESTHESIA (OUTPATIENT)
Dept: SURGERY | Facility: HOSPITAL | Age: 38
End: 2018-02-17

## 2018-02-17 ENCOUNTER — SURGERY (OUTPATIENT)
Age: 38
End: 2018-02-17

## 2018-02-17 LAB
GLUCOSE BLD-MCNC: 137 MG/DL (ref 65–99)
GLUCOSE BLD-MCNC: 147 MG/DL (ref 65–99)
GLUCOSE BLD-MCNC: 152 MG/DL (ref 65–99)
GLUCOSE BLD-MCNC: 179 MG/DL (ref 65–99)

## 2018-02-17 PROCEDURE — 99233 SBSQ HOSP IP/OBS HIGH 50: CPT | Performed by: HOSPITALIST

## 2018-02-17 RX ORDER — BUPIVACAINE HYDROCHLORIDE 5 MG/ML
INJECTION, SOLUTION EPIDURAL; INTRACAUDAL AS NEEDED
Status: DISCONTINUED | OUTPATIENT
Start: 2018-02-17 | End: 2018-02-17 | Stop reason: HOSPADM

## 2018-02-17 RX ORDER — MORPHINE SULFATE 2 MG/ML
2 INJECTION, SOLUTION INTRAMUSCULAR; INTRAVENOUS EVERY 5 MIN PRN
Status: DISCONTINUED | OUTPATIENT
Start: 2018-02-17 | End: 2018-02-17 | Stop reason: HOSPADM

## 2018-02-17 RX ORDER — DEXTROSE MONOHYDRATE 25 G/50ML
50 INJECTION, SOLUTION INTRAVENOUS
Status: DISCONTINUED | OUTPATIENT
Start: 2018-02-17 | End: 2018-02-17 | Stop reason: HOSPADM

## 2018-02-17 RX ORDER — SODIUM CHLORIDE, SODIUM LACTATE, POTASSIUM CHLORIDE, CALCIUM CHLORIDE 600; 310; 30; 20 MG/100ML; MG/100ML; MG/100ML; MG/100ML
INJECTION, SOLUTION INTRAVENOUS CONTINUOUS
Status: DISCONTINUED | OUTPATIENT
Start: 2018-02-17 | End: 2018-02-17 | Stop reason: HOSPADM

## 2018-02-17 RX ORDER — MEPERIDINE HYDROCHLORIDE 25 MG/ML
12.5 INJECTION INTRAMUSCULAR; INTRAVENOUS; SUBCUTANEOUS AS NEEDED
Status: DISCONTINUED | OUTPATIENT
Start: 2018-02-17 | End: 2018-02-17 | Stop reason: HOSPADM

## 2018-02-17 RX ORDER — DIPHENHYDRAMINE HYDROCHLORIDE 50 MG/ML
12.5 INJECTION INTRAMUSCULAR; INTRAVENOUS AS NEEDED
Status: DISCONTINUED | OUTPATIENT
Start: 2018-02-17 | End: 2018-02-17 | Stop reason: HOSPADM

## 2018-02-17 RX ORDER — NALOXONE HYDROCHLORIDE 0.4 MG/ML
80 INJECTION, SOLUTION INTRAMUSCULAR; INTRAVENOUS; SUBCUTANEOUS AS NEEDED
Status: DISCONTINUED | OUTPATIENT
Start: 2018-02-17 | End: 2018-02-17 | Stop reason: HOSPADM

## 2018-02-17 NOTE — BRIEF OP NOTE
Pre-Operative Diagnosis: Necrotic left heel wound with cellulitis     Post-Operative Diagnosis: Necrotic left heel wound with cellulitis possible osteomyelitis lateral plantar calcaneus   Procedure Performed:   Procedure(s):  LEFT HEEL DEBRIDEMENT, bone

## 2018-02-17 NOTE — PROGRESS NOTES
[facility]  Progress Note     Leoncio Tobin Patient Status:  Inpatient    1980 MRN [id number]   [facility] 5NW-A Attending Mook Mcmahon MD   Hosp Day # 5 PCP Hemant Posadas MD     Chief Complaint: Foot ulcer, Neena debridement of necrotic tissue at bedside 2/14 and again 2/17; MSSA and Strep  1. IV abx  2. Await initial sensitivities and repeat cultures  3. Await bone biopsy results  4. ID & Podiatry consult  2.  Diabetes mellitus, type 2, was on insulin, started on T

## 2018-02-17 NOTE — ANESTHESIA POSTPROCEDURE EVALUATION
[address] [place] [address] Patient Status:  Inpatient   Age/Gender 42year old male MRN [id number]   [facility] 5NW-A Attending Stevan Jones MD   Hosp Day # 5 PCP David Dillon MD       Anesthesia Post-op Note    Procedure(

## 2018-02-17 NOTE — PLAN OF CARE
Diabetes/Glucose Control    • Glucose maintained within prescribed range Progressing        PAIN - ADULT    • Verbalizes/displays adequate comfort level or patient's stated pain goal Progressing        Patient/Family Goals    • Patient/Family [facility]

## 2018-02-17 NOTE — PLAN OF CARE
Diabetes/Glucose Control    • Glucose maintained within prescribed range Progressing        PAIN - ADULT    • Verbalizes/displays adequate comfort level or patient's stated pain goal Progressing        Patient/Family Goals    • Patient/Family [facility]

## 2018-02-17 NOTE — PROGRESS NOTES
[facility]                INFECTIOUS DISEASE PROGRESS NOTE    Yahir Tobin Patient Status:  Inpatient    1980 MRN [id number]   [facility] 5NW-A Attending Krista Alvarez MD   Hosp Day # 5 PCP Jimmy Erazo MD Aerobic Bacterial Culture Once [[id number]] (Abnormal) Collected: 02/14/18 1240   Order Status: Completed Lab Status: Final result Updated: 02/17/18 1202   Specimen: Other from Heel, left     Aerobic Culture Result 2+ growth Staphylococcus aureus (A)    Com Specimen: Tissue from Bone    Tissue Aerobic Culture [[id number]] Collected: 02/17/18 7958   Order Status: Sent Lab Status: In process Updated: 02/17/18 1033   Specimen: Tissue from Bone        ASSESSMENT/PLAN:    1.  Infected left heel wound with cellulitis

## 2018-02-18 LAB
BASOPHILS # BLD AUTO: 0.02 X10(3) UL (ref 0–0.1)
BASOPHILS NFR BLD AUTO: 0.3 %
BUN BLD-MCNC: 12 MG/DL (ref 8–20)
CALCIUM BLD-MCNC: 9.2 MG/DL (ref 8.3–10.3)
CHLORIDE: 103 MMOL/L (ref 101–111)
CO2: 30 MMOL/L (ref 22–32)
CREAT BLD-MCNC: 0.84 MG/DL (ref 0.7–1.3)
EOSINOPHIL # BLD AUTO: 0.15 X10(3) UL (ref 0–0.3)
EOSINOPHIL NFR BLD AUTO: 2.3 %
ERYTHROCYTE [DISTWIDTH] IN BLOOD BY AUTOMATED COUNT: 12.5 % (ref 11.5–16)
GLUCOSE BLD-MCNC: 118 MG/DL (ref 65–99)
GLUCOSE BLD-MCNC: 158 MG/DL (ref 65–99)
GLUCOSE BLD-MCNC: 171 MG/DL (ref 65–99)
GLUCOSE BLD-MCNC: 173 MG/DL (ref 70–99)
GLUCOSE BLD-MCNC: 202 MG/DL (ref 65–99)
HCT VFR BLD AUTO: 36.3 % (ref 37–53)
HGB BLD-MCNC: 11.8 G/DL (ref 13–17)
IMMATURE GRANULOCYTE COUNT: 0.06 X10(3) UL (ref 0–1)
IMMATURE GRANULOCYTE RATIO %: 0.9 %
LYMPHOCYTES # BLD AUTO: 1.94 X10(3) UL (ref 0.9–4)
LYMPHOCYTES NFR BLD AUTO: 29.2 %
MCH RBC QN AUTO: 29.5 PG (ref 27–33.2)
MCHC RBC AUTO-ENTMCNC: 32.5 G/DL (ref 31–37)
MCV RBC AUTO: 90.8 FL (ref 80–99)
MONOCYTES # BLD AUTO: 0.79 X10(3) UL (ref 0.1–1)
MONOCYTES NFR BLD AUTO: 11.9 %
NEUTROPHIL ABS PRELIM: 3.69 X10 (3) UL (ref 1.3–6.7)
NEUTROPHILS # BLD AUTO: 3.69 X10(3) UL (ref 1.3–6.7)
NEUTROPHILS NFR BLD AUTO: 55.4 %
PLATELET # BLD AUTO: 282 10(3)UL (ref 150–450)
POTASSIUM SERPL-SCNC: 4.1 MMOL/L (ref 3.6–5.1)
RBC # BLD AUTO: 4 X10(6)UL (ref 4.3–5.7)
RED CELL DISTRIBUTION WIDTH-SD: 41 FL (ref 35.1–46.3)
SODIUM SERPL-SCNC: 139 MMOL/L (ref 136–144)
VANCOMYCIN TROUGH: 3.1 UG/ML (ref 10–20)
WBC # BLD AUTO: 6.7 X10(3) UL (ref 4–13)

## 2018-02-18 PROCEDURE — 99231 SBSQ HOSP IP/OBS SF/LOW 25: CPT | Performed by: HOSPITALIST

## 2018-02-18 NOTE — OPERATIVE REPORT
[address]    PATIENT'S NAME: Nelida Viveros   ATTENDING PHYSICIAN: Jesse Haley. SERGEY Brown OPERATING PHYSICIAN: Julita Nguyen D.P.M.    PATIENT ACCOUNT#:   [de-identified]    LOCATION:  [address] [facility]  MEDICAL RECORD #:   [id number]       DATE Jason Eli the lateral decubitus position right side down. The left foot was then prepped and draped using the usual aseptic technique.   The pneumatic ankle tourniquet, although in place, was not inflated during the course of this procedure so that healthy bleeding

## 2018-02-18 NOTE — PROGRESS NOTES
[facility]  Progress Note     Betsy Tobin Patient Status:  Inpatient    1980 MRN [id number]   [facility] 5NW-A Attending Honey Ramos MD   Hosp Day # 6 PCP Sanya Huang MD     Chief Complaint: Foot ulcer, Neena and Strep  1. IV abx  2. Follow-up cultures and bone biopsy results  3. ID & Podiatry consult  2. Diabetes mellitus, type 2, was on insulin, started on Trulicity and Metformin January 2018  1. Levemir 15 BID  2. Novolog  5 with meals  3.  Correctional scale

## 2018-02-18 NOTE — PLAN OF CARE
Diabetes/Glucose Control    • Glucose maintained within prescribed range Progressing        PAIN - ADULT    • Verbalizes/displays adequate comfort level or patient's stated pain goal Progressing        Patient/Family Goals    • Patient/Family [facility]

## 2018-02-18 NOTE — PLAN OF CARE
AxO x4. Room air, lungs clear bilaterally. Voids. No c/o pain. Afebrile. VSS. IV abx, awaiting tissue cultures from left foot debridement 2/17. Left foot ulcer with debridement done by podiatry 2/17.  Surgical dressing in place, no drainage, no c/o pain, pe

## 2018-02-18 NOTE — PROGRESS NOTES
02/18/18  0432   BP: 131/84   Pulse: 89   Resp: 16   Temp: 98.2 °F (36.8 °C)   Patient was seen resting comfortably in bed. He is now one day status post debridement he has no complaints of pain.     Objective: Dressing is clean and dry there is no strike

## 2018-02-19 LAB
GLUCOSE BLD-MCNC: 172 MG/DL (ref 65–99)
GLUCOSE BLD-MCNC: 182 MG/DL (ref 65–99)
GLUCOSE BLD-MCNC: 203 MG/DL (ref 65–99)
GLUCOSE BLD-MCNC: 244 MG/DL (ref 65–99)

## 2018-02-19 PROCEDURE — B548ZZA ULTRASONOGRAPHY OF SUPERIOR VENA CAVA, GUIDANCE: ICD-10-PCS | Performed by: HOSPITALIST

## 2018-02-19 PROCEDURE — 02HV33Z INSERTION OF INFUSION DEVICE INTO SUPERIOR VENA CAVA, PERCUTANEOUS APPROACH: ICD-10-PCS | Performed by: HOSPITALIST

## 2018-02-19 PROCEDURE — 99233 SBSQ HOSP IP/OBS HIGH 50: CPT | Performed by: HOSPITALIST

## 2018-02-19 PROCEDURE — 99233 SBSQ HOSP IP/OBS HIGH 50: CPT | Performed by: CLINICAL NURSE SPECIALIST

## 2018-02-19 RX ORDER — CEFAZOLIN SODIUM/WATER 2 G/20 ML
2 SYRINGE (ML) INTRAVENOUS EVERY 8 HOURS
Status: DISCONTINUED | OUTPATIENT
Start: 2018-02-19 | End: 2018-02-20

## 2018-02-19 RX ORDER — SODIUM CHLORIDE 0.9 % (FLUSH) 0.9 %
10 SYRINGE (ML) INJECTION AS NEEDED
Status: DISCONTINUED | OUTPATIENT
Start: 2018-02-19 | End: 2018-02-20

## 2018-02-19 NOTE — CM/SW NOTE
Plan for patient to go home with IV ABX per [facility] and [facility] and [facility] Wound Vac order completed.

## 2018-02-19 NOTE — PROGRESS NOTES
Patient was seen resting comfortably in bed no complaints of pain. Patient's vital signs show temperature 97.4, pulse 94 respirations 16, blood pressure 107/84 and SPO2 is 98% the patient's vital signs show that he is stable and afebrile. Objective:  Rin Jarquin

## 2018-02-19 NOTE — PROGRESS NOTES
[facility]  Progress Note     Daniel Tobin Patient Status:  Inpatient    1980 MRN [id number]   [facility] 5NW-A Attending Umberto Mac MD   Hosp Day # 7 PCP Purvi Shi MD     Chief Complaint: Foot ulcer, Neena at bedside 2/14 and again 2/17; MSSA and Strep; Strep in deep culture  1. IV abx per ID - discussed with Dr. Tani Presley, patient will require 6 weeks ov IV abx in setting of exposed bone. Additionally, patient will need wound vac.  Will place order for PICC an

## 2018-02-19 NOTE — PLAN OF CARE
Diabetes/Glucose Control    • Glucose maintained within prescribed range Progressing        PAIN - ADULT    • Verbalizes/displays adequate comfort level or patient's stated pain goal Progressing        Patient/Family Goals    • Patient/Family [facility]

## 2018-02-19 NOTE — WOUND PROGRESS NOTE
[facility]  Report of Inpatient Wound Care Progress Note    Buzz Tobin Patient Status:  Inpatient    1980 MRN [id number]   [facility] 5NW-A Attending Sujata Weinberg MD   Hosp Day # 7 PCP Xiomara Guardado MD       SUBJE concentrated  urine, left heel pain, no injury. FINDINGS:    BONES:  No significant arthropathy or acute abnormality. SOFT TISSUES:  Vascular calcifications. No visible soft tissue swelling.   EFFUSION:  None visible.     =====  CONCLUSION: allowing me to participate in the care of your patient. Please call me at [phone] or page me at [phone] if you have any questions about this consultation and plan of care. If unable to reach me at these, please call the Inpatient Wound Care pager at #[phone].

## 2018-02-19 NOTE — PROGRESS NOTES
[facility]                INFECTIOUS DISEASE PROGRESS NOTE    Georgi Tobin Patient Status:  Inpatient    1980 MRN [id number]   [facility] 5NW-A Attending Luciana Epley, MD   Hosp Day # 7 PCP Abena Trotter MD susceptible Staphylococcus aureus)     Streptococcal infection      ASSESSMENT/PLAN:  1.  SP I/D left heal wound -down to bone  Large open wound, site clean appearing  cx from I/D - staph auureus ox s and group B strep, no anaerobes  Home with Cefaozlin IV

## 2018-02-19 NOTE — HOME CARE LIAISON
Met with pt at the bedside. He is agreeable to [facility] services. Brochure given and pt questions answered. Will follow.  Thank you

## 2018-02-19 NOTE — PHYSICAL THERAPY NOTE
PHYSICAL THERAPY EVALUATION - INPATIENT     Room Number: 522/522-A  Evaluation Date: 2/19/2018  Type of Evaluation: Initial  Physician Order: PT Eval and Treat    Presenting Problem: Diabetic infection of L foot  Reason for Therapy: Mobility Dysfunct within functional limits  · Following Commands:  follows multistep commands consistently  · Motor Planning: intact  · Safety Judgement:  good awareness of safety precautions    RANGE OF MOTION AND STRENGTH ASSESSMENT  Upper extremity ROM and strength are w following stairs and period standing rest breaks during ambulation due to fatigue. Pt returned to bed following session. Discussed use of RW vs crutches, recommending RW at this time for ambulation.    Recommend pt stay on first floor of home, pt report of Visits to Meet Established Goals: 3      CURRENT GOALS        Goal #2 Patient is able to demonstrate transfers EOB to/from Chair/Wheelchair at assistance level: modified independent     Goal #3 Patient is able to ambulate 150' feet with assist device: w

## 2018-02-19 NOTE — CONSULTS
[facility]  Diabetes Clinical Nurse Specialist Consult Note    Alondra Tobin Patient Status:  Inpatient    1980 MRN [id number]   [facility] 5NW-A Attending Mani Godoy MD   Hosp Day # 7 PCP MD Mj Bales they suspect he does have neuropathy, although patient denies it. Currently on 15 units Levemir BID and Novolog 5 units plus correction.  He had been taken off insulin recently by his PCP (\"to try something different\") and was on metformin and Trulici Autoimmune Diabetes of Adults). Dr. Danna Perrin was agreeable, I entered order.     PRESCRIPTION RECOMMENDATIONS:    · [employer] [place]  ·  Levemir and Novolog  · Accu-Chek test strips and lancets    PROVIDER F/U RECOMMENDATIONS:    · Patient's current

## 2018-02-20 VITALS
DIASTOLIC BLOOD PRESSURE: 75 MMHG | OXYGEN SATURATION: 97 % | WEIGHT: 195 LBS | TEMPERATURE: 98 F | SYSTOLIC BLOOD PRESSURE: 116 MMHG | HEIGHT: 68 IN | RESPIRATION RATE: 16 BRPM | HEART RATE: 89 BPM | BODY MASS INDEX: 29.55 KG/M2

## 2018-02-20 LAB
GLUCOSE BLD-MCNC: 177 MG/DL (ref 65–99)
GLUCOSE BLD-MCNC: 203 MG/DL (ref 65–99)

## 2018-02-20 PROCEDURE — 99239 HOSP IP/OBS DSCHRG MGMT >30: CPT | Performed by: HOSPITALIST

## 2018-02-20 NOTE — PROGRESS NOTES
[facility]  Progress Note     Alondra Romaine Muellerel Patient Status:  Inpatient    1980 MRN [id number]   [facility] 5NW-A Attending Radha Milner MD   Hosp Day # 8 PCP Elias Jimenez MD     Chief Complaint: Foot ulcer, Neena necrotic tissue at bedside 2/14 and again 2/17; MSSA and Strep; Strep in deep culture  1. PICC  2. IV abx per ID  3. Wound care/Vac  4. ID & Podiatry consult  2. Diabetes mellitus, type 2, was on insulin, started on Trulicity and Metformin January 2018  1.

## 2018-02-20 NOTE — PHYSICAL THERAPY NOTE
PHYSICAL THERAPY TREATMENT NOTE - INPATIENT    Room Number: 522/522-A     Session: 1   Number of Visits to Meet Established Goals: 3    Presenting Problem: Diabetic infection of L foot     History related to current admission: Pt was admitted from home on lying on back to sitting on the side of the bed?: None   How much help from another person does the patient currently need. ..   -   Moving to and from a bed to a chair (including a wheelchair)?: A Little   -   Need to walk in hospital room?: A Little   - highest functional independence/return to baseline. Pt reports he is planning to remain on main level upon return home.   Recommend home c HHPT  upon [facility] d/c.       DISCHARGE RECOMMENDATIONS  PT Discharge Recommendations: Home with home health P

## 2018-02-20 NOTE — CM/SW NOTE
Discharge home anticipated today after afternoon ABX infusion. FLORIDALMA confirmed with Jillian/[facility] ([phone])  That clinical to be sent and IVABX on track for delivery to pt's home this evening.  FLORIDALMA confirmed with [facility] liaison of discharge today and anticipated

## 2018-02-20 NOTE — PROGRESS NOTES
[facility]                INFECTIOUS DISEASE PROGRESS NOTE    Arina Tobin Patient Status:  Inpatient    1980 MRN [id number]   [facility] 5NW-A Attending Yanira Lugo MD   Hosp Day # 8 PCP Maria G George MD Staphylococcus aureus)     Streptococcal infection      ASSESSMENT/PLAN:  1.  SP I/D left heal wound -down to bone  Large open wound, site clean appearing  cx from I/D - staph auureus ox s and group B strep, no anaerobes  Wound vac in place  picc placed, ho

## 2018-02-20 NOTE — PROGRESS NOTES
NURSING DISCHARGE NOTE    Discharged Home via Wheelchair. Accompanied by Support staff  Belongings Taken by patient/family. Pt discharged home with home wound vac on and was placed by Mook Fan. Discharge instructions given.  IV abx will be delivere

## 2018-02-21 ENCOUNTER — TELEPHONE (OUTPATIENT)
Dept: FAMILY MEDICINE CLINIC | Facility: CLINIC | Age: 38
End: 2018-02-21

## 2018-02-21 ENCOUNTER — PATIENT MESSAGE (OUTPATIENT)
Dept: CASE MANAGEMENT | Age: 38
End: 2018-02-21

## 2018-02-21 ENCOUNTER — PATIENT OUTREACH (OUTPATIENT)
Dept: CASE MANAGEMENT | Age: 38
End: 2018-02-21

## 2018-02-21 DIAGNOSIS — Z02.9 ENCOUNTERS FOR ADMINISTRATIVE PURPOSE: ICD-10-CM

## 2018-02-21 LAB — GLUTAMIC ACID DECARBOXYLASE AB: <5 IU/ML

## 2018-02-21 RX ORDER — BLOOD-GLUCOSE METER
1 EACH MISCELLANEOUS AS NEEDED
Status: ON HOLD | COMMUNITY
End: 2021-01-26

## 2018-02-21 NOTE — CM/SW NOTE
Patient discharged on 2/20/18 as previously planned. 02/21/18 0800   Discharge disposition   Discharged to: Home-Health   Name of [facility] services after discharge DME;Skilled home care; Other (comment)  (Home inf

## 2018-02-21 NOTE — TELEPHONE ENCOUNTER
Luna with [facility] wants to know if Dr. Ann Monroy will be signing orders for the patient for PT and RN.  Please call at [phone]

## 2018-02-22 ENCOUNTER — OFFICE VISIT (OUTPATIENT)
Dept: FAMILY MEDICINE CLINIC | Facility: CLINIC | Age: 38
End: 2018-02-22

## 2018-02-22 ENCOUNTER — HOSPITAL ENCOUNTER (EMERGENCY)
Age: 38
Discharge: HOME | End: 2018-02-22
Attending: EMERGENCY MEDICINE
Payer: COMMERCIAL

## 2018-02-22 VITALS
SYSTOLIC BLOOD PRESSURE: 82 MMHG | DIASTOLIC BLOOD PRESSURE: 60 MMHG | OXYGEN SATURATION: 96 % | RESPIRATION RATE: 15 BRPM | TEMPERATURE: 98 F | HEART RATE: 112 BPM

## 2018-02-22 VITALS
OXYGEN SATURATION: 99 % | SYSTOLIC BLOOD PRESSURE: 127 MMHG | HEIGHT: 68 IN | BODY MASS INDEX: 29.55 KG/M2 | TEMPERATURE: 97 F | HEART RATE: 99 BPM | WEIGHT: 195 LBS | RESPIRATION RATE: 16 BRPM | DIASTOLIC BLOOD PRESSURE: 80 MMHG

## 2018-02-22 DIAGNOSIS — E11.8 TYPE 2 DIABETES MELLITUS WITH COMPLICATION, WITH LONG-TERM CURRENT USE OF INSULIN (HCC): ICD-10-CM

## 2018-02-22 DIAGNOSIS — E10.621 DIABETIC ULCER OF LEFT FOOT ASSOCIATED WITH TYPE 1 DIABETES MELLITUS, UNSPECIFIED PART OF FOOT, UNSPECIFIED ULCER STAGE (HCC): ICD-10-CM

## 2018-02-22 DIAGNOSIS — E11.628 DIABETIC INFECTION OF LEFT FOOT (HCC): ICD-10-CM

## 2018-02-22 DIAGNOSIS — L08.9 DIABETIC INFECTION OF LEFT FOOT (HCC): ICD-10-CM

## 2018-02-22 DIAGNOSIS — L97.529 DIABETIC ULCER OF LEFT FOOT ASSOCIATED WITH TYPE 1 DIABETES MELLITUS, UNSPECIFIED PART OF FOOT, UNSPECIFIED ULCER STAGE (HCC): ICD-10-CM

## 2018-02-22 DIAGNOSIS — I95.9 HYPOTENSION, UNSPECIFIED HYPOTENSION TYPE: Primary | ICD-10-CM

## 2018-02-22 DIAGNOSIS — Z79.4 TYPE 2 DIABETES MELLITUS WITH COMPLICATION, WITH LONG-TERM CURRENT USE OF INSULIN (HCC): ICD-10-CM

## 2018-02-22 LAB
ALBUMIN SERPL-MCNC: 3.3 G/DL (ref 3.5–4.8)
ALP LIVER SERPL-CCNC: 152 U/L (ref 45–117)
ALT SERPL-CCNC: 9 U/L (ref 17–63)
AST SERPL-CCNC: 29 U/L (ref 15–41)
BASOPHILS # BLD AUTO: 0.05 X10(3) UL (ref 0–0.1)
BASOPHILS NFR BLD AUTO: 0.6 %
BILIRUB SERPL-MCNC: 0.6 MG/DL (ref 0.1–2)
BUN BLD-MCNC: 16 MG/DL (ref 8–20)
CALCIUM BLD-MCNC: 9 MG/DL (ref 8.3–10.3)
CHLORIDE: 102 MMOL/L (ref 101–111)
CO2: 28 MMOL/L (ref 22–32)
CREAT BLD-MCNC: 1.01 MG/DL (ref 0.7–1.3)
EOSINOPHIL # BLD AUTO: 0.06 X10(3) UL (ref 0–0.3)
EOSINOPHIL NFR BLD AUTO: 0.7 %
ERYTHROCYTE [DISTWIDTH] IN BLOOD BY AUTOMATED COUNT: 12.8 % (ref 11.5–16)
GLUCOSE BLD-MCNC: 229 MG/DL (ref 70–99)
HCT VFR BLD AUTO: 37.3 % (ref 37–53)
HGB BLD-MCNC: 12.6 G/DL (ref 13–17)
IMMATURE GRANULOCYTE COUNT: 0.03 X10(3) UL (ref 0–1)
IMMATURE GRANULOCYTE RATIO %: 0.4 %
LYMPHOCYTES # BLD AUTO: 2.41 X10(3) UL (ref 0.9–4)
LYMPHOCYTES NFR BLD AUTO: 28.4 %
M PROTEIN MFR SERPL ELPH: 8.8 G/DL (ref 6.1–8.3)
MCH RBC QN AUTO: 30 PG (ref 27–33.2)
MCHC RBC AUTO-ENTMCNC: 33.8 G/DL (ref 31–37)
MCV RBC AUTO: 88.8 FL (ref 80–99)
MONOCYTES # BLD AUTO: 0.57 X10(3) UL (ref 0.1–1)
MONOCYTES NFR BLD AUTO: 6.7 %
NEUTROPHIL ABS PRELIM: 5.38 X10 (3) UL (ref 1.3–6.7)
NEUTROPHILS # BLD AUTO: 5.38 X10(3) UL (ref 1.3–6.7)
NEUTROPHILS NFR BLD AUTO: 63.2 %
PLATELET # BLD AUTO: 355 10(3)UL (ref 150–450)
POTASSIUM SERPL-SCNC: 4.2 MMOL/L (ref 3.6–5.1)
RBC # BLD AUTO: 4.2 X10(6)UL (ref 4.3–5.7)
RED CELL DISTRIBUTION WIDTH-SD: 41.8 FL (ref 35.1–46.3)
SODIUM SERPL-SCNC: 137 MMOL/L (ref 136–144)
WBC # BLD AUTO: 8.5 X10(3) UL (ref 4–13)

## 2018-02-22 PROCEDURE — 85025 COMPLETE CBC W/AUTO DIFF WBC: CPT | Performed by: EMERGENCY MEDICINE

## 2018-02-22 PROCEDURE — 96360 HYDRATION IV INFUSION INIT: CPT

## 2018-02-22 PROCEDURE — 99495 TRANSJ CARE MGMT MOD F2F 14D: CPT | Performed by: EMERGENCY MEDICINE

## 2018-02-22 PROCEDURE — 99284 EMERGENCY DEPT VISIT MOD MDM: CPT

## 2018-02-22 PROCEDURE — 99283 EMERGENCY DEPT VISIT LOW MDM: CPT

## 2018-02-22 PROCEDURE — 80053 COMPREHEN METABOLIC PANEL: CPT | Performed by: EMERGENCY MEDICINE

## 2018-02-22 RX ORDER — INSULIN DETEMIR 100 [IU]/ML
INJECTION, SOLUTION SUBCUTANEOUS
Refills: 3 | COMMUNITY
Start: 2018-02-20 | End: 2018-03-02

## 2018-02-22 NOTE — PROGRESS NOTES
HPI:    Mireya Mercado is a 42year old male here today for hospital follow up. No new symptoms since hospital admission. No new signs or symptoms of infection in left foot. Denies pain and fever.   Has been on home course of antibiotics, has been Does not apply route as needed. Glucose Blood (ONETOUCH TEST) In Vitro Strip 1 strip by In Vitro route 4 (four) times daily. CeFAZolin Sodium Intravenous Solution Inject 2 g into the vein every 8 (eight) hours.    Insulin Lispro (HUMALOG) 100 UNIT/ML [place] stable, energy stable, no sweating  SKIN: left foot  wound  EYES: denies blurred vision or double vision  HEENT: denies nasal congestion, sinus pain or ST  LUNGS: denies shortness of breath with exertion  CARDIOVASCULAR: denies chest pain on exertion or pa with long-term current use of insulin ([facility] [address]   Currently on Levimir. patient to follow-up with diabetic clinic. No orders of the defined types were placed in this encounter.       Meds & Refills for this Visit:    No prescriptions requested or ordered

## 2018-02-22 NOTE — ED PROVIDER NOTES
Patient Seen in: THE [facility] Emergency Department In [place]    History   Patient presents with:  Hypotension (cardiovascular)    Stated Complaint:     HPI    Patient is a 78-year-old male history of diabetes and left foot ulcer comes in emergency room for Device: None (Room air)    Current:/80   Pulse 99   Temp (!) 97.4 °F (36.3 °C) (Oral)   Resp 16   Ht 172.7 cm (5' 8\")   Wt 88.5 kg   SpO2 99%   BMI 29.65 kg/m²         Physical Exam    GENERAL: No acute distress, well appearing and non-toxic, Alert Please view results for these tests on the individual orders.    RAINBOW DRAW BLUE   RAINBOW DRAW LAVENDER   RAINBOW DRAW LIGHT GREEN       ED Course as of Feb 22 1731  ------------------------------------------------------------       MDM

## 2018-02-22 NOTE — PATIENT INSTRUCTIONS
Thank you for choosing [facility]  To Do:  FOR GAGAN Ba  1. Proceed to the ER for further evaluation and treatment for low blood pressure  2. Contoinue follow up with wound clinic  3.  Continue follow up with DM specialist, care  Follow up with your healthcare provider, or as advised.   When to seek medical advice  Call your healthcare provider right away if any of these occur:  · Dizziness, lightheadedness, or fainting  · Black or red color in your stools or vomit  · Shortnes pressure. · Limit your alcohol intake to no more than 2 drinks a day for men and 1 drink a day for women. Alcohol can dehydrate you even further. It can also interfere with the effectiveness of medicines.   · Prevent dehydration by drinking plenty of fluid

## 2018-02-22 NOTE — TELEPHONE ENCOUNTER
Per Dr. Hubert York she will sign orders for home health. Luna with [facility] informed and states understanding.

## 2018-02-22 NOTE — ED INITIAL ASSESSMENT (HPI)
Pt was at a follow up appt for cellulitis next door at dr office and bp was lower than normal. Pt states he is not lightheaded or sob. No chest pain.  bp at dr office was 80/60

## 2018-02-23 ENCOUNTER — TELEPHONE (OUTPATIENT)
Dept: FAMILY MEDICINE CLINIC | Facility: CLINIC | Age: 38
End: 2018-02-23

## 2018-02-23 NOTE — TELEPHONE ENCOUNTER
Paperwork received to be completed for patient's short term disability. Copy to 's folder and copy for triage file.

## 2018-02-26 ENCOUNTER — OFFICE VISIT (OUTPATIENT)
Dept: WOUND CARE | Facility: HOSPITAL | Age: 38
End: 2018-02-26
Attending: PODIATRIST
Payer: COMMERCIAL

## 2018-02-26 DIAGNOSIS — E11.69 DIABETES MELLITUS TYPE 2 IN OBESE (HCC): Primary | ICD-10-CM

## 2018-02-26 DIAGNOSIS — E11.8 DIABETES MELLITUS WITH COMPLICATION (HCC): ICD-10-CM

## 2018-02-26 DIAGNOSIS — E66.9 DIABETES MELLITUS TYPE 2 IN OBESE (HCC): Primary | ICD-10-CM

## 2018-02-26 DIAGNOSIS — L08.9 INFECTION OF SKIN AND SUBCUTANEOUS TISSUE: ICD-10-CM

## 2018-02-26 PROCEDURE — 99214 OFFICE O/P EST MOD 30 MIN: CPT

## 2018-02-26 PROCEDURE — 11042 DBRDMT SUBQ TIS 1ST 20SQCM/<: CPT

## 2018-02-26 PROCEDURE — 97605 NEG PRS WND THER DME<=50SQCM: CPT

## 2018-02-27 NOTE — PAYOR COMM NOTE
--------------  DISCHARGE REVIEW    Payor: [address] [facility]  Subscriber #:  [id number]  Authorization Number: [id number]    Admit date: 2/12/18  Admit time:  8832  Discharge Date: 2/20/2018  4:02 PM     Admitting Physician: Naif Salazar MD  Attend 2. Monitor blood pressure and heart rate  4. Dyslipidemia, goal LDL < 100    History of Present Illness: Fede Caceres is a 42year old male presents with h/o DM comes in with ucontrolled DM.   He also mentions a left foot wound that has been progr glucose once a day in the morning before breakfast If on insulin, test blood glucose 3 times per day before meals  Non-Medicare:  Test blood glucose 4-5 times per day before meals, bedtime and for signs, symptoms of hypoglycemia or as ordered by physician MG/0.5ML Sopn  Commonly known as:  TRULICITY              Where to Get Your Medications      Please  your prescriptions at the location directed by your doctor or nurse    Bring a paper prescription for each of these medications  · ACCU-CHEK FASTCLI

## 2018-03-01 ENCOUNTER — LAB REQUISITION (OUTPATIENT)
Dept: LAB | Facility: HOSPITAL | Age: 38
End: 2018-03-01
Payer: COMMERCIAL

## 2018-03-01 DIAGNOSIS — B95.1 STREPTOCOCCUS, GROUP B, AS THE CAUSE OF DISEASES CLASSIFIED ELSEWHERE: ICD-10-CM

## 2018-03-01 LAB
ALBUMIN SERPL-MCNC: 3.7 G/DL (ref 3.5–4.8)
ALP LIVER SERPL-CCNC: 118 U/L (ref 45–117)
ALT SERPL-CCNC: <6 U/L (ref 17–63)
AST SERPL-CCNC: 28 U/L (ref 15–41)
BASOPHILS # BLD AUTO: 0.03 X10(3) UL (ref 0–0.1)
BASOPHILS NFR BLD AUTO: 0.4 %
BILIRUB SERPL-MCNC: 0.5 MG/DL (ref 0.1–2)
BUN BLD-MCNC: 20 MG/DL (ref 8–20)
C-REACTIVE PROTEIN: <0.29 MG/DL (ref ?–1)
CALCIUM BLD-MCNC: 9.8 MG/DL (ref 8.3–10.3)
CHLORIDE: 103 MMOL/L (ref 101–111)
CO2: 27 MMOL/L (ref 22–32)
CREAT BLD-MCNC: 1.15 MG/DL (ref 0.7–1.3)
EOSINOPHIL # BLD AUTO: 0.08 X10(3) UL (ref 0–0.3)
EOSINOPHIL NFR BLD AUTO: 1.2 %
ERYTHROCYTE [DISTWIDTH] IN BLOOD BY AUTOMATED COUNT: 12.6 % (ref 11.5–16)
GLUCOSE BLD-MCNC: 120 MG/DL (ref 70–99)
HCT VFR BLD AUTO: 38.8 % (ref 37–53)
HGB BLD-MCNC: 13.3 G/DL (ref 13–17)
IMMATURE GRANULOCYTE COUNT: 0.03 X10(3) UL (ref 0–1)
IMMATURE GRANULOCYTE RATIO %: 0.4 %
LYMPHOCYTES # BLD AUTO: 2.31 X10(3) UL (ref 0.9–4)
LYMPHOCYTES NFR BLD AUTO: 33.7 %
M PROTEIN MFR SERPL ELPH: 8.5 G/DL (ref 6.1–8.3)
MCH RBC QN AUTO: 30.7 PG (ref 27–33.2)
MCHC RBC AUTO-ENTMCNC: 34.3 G/DL (ref 31–37)
MCV RBC AUTO: 89.6 FL (ref 80–99)
MONOCYTES # BLD AUTO: 0.44 X10(3) UL (ref 0.1–1)
MONOCYTES NFR BLD AUTO: 6.4 %
NEUTROPHIL ABS PRELIM: 3.96 X10 (3) UL (ref 1.3–6.7)
NEUTROPHILS # BLD AUTO: 3.96 X10(3) UL (ref 1.3–6.7)
NEUTROPHILS NFR BLD AUTO: 57.9 %
PLATELET # BLD AUTO: 255 10(3)UL (ref 150–450)
POTASSIUM SERPL-SCNC: 4.4 MMOL/L (ref 3.6–5.1)
RBC # BLD AUTO: 4.33 X10(6)UL (ref 4.3–5.7)
RED CELL DISTRIBUTION WIDTH-SD: 40.9 FL (ref 35.1–46.3)
SED RATE-ML: 51 MM/HR (ref 0–12)
SODIUM SERPL-SCNC: 139 MMOL/L (ref 136–144)
WBC # BLD AUTO: 6.9 X10(3) UL (ref 4–13)

## 2018-03-01 PROCEDURE — 80053 COMPREHEN METABOLIC PANEL: CPT | Performed by: EMERGENCY MEDICINE

## 2018-03-01 PROCEDURE — 86140 C-REACTIVE PROTEIN: CPT | Performed by: EMERGENCY MEDICINE

## 2018-03-01 PROCEDURE — 85652 RBC SED RATE AUTOMATED: CPT | Performed by: EMERGENCY MEDICINE

## 2018-03-01 PROCEDURE — 85025 COMPLETE CBC W/AUTO DIFF WBC: CPT | Performed by: EMERGENCY MEDICINE

## 2018-03-01 NOTE — PAYOR COMM NOTE
2/19    Filed: 2/19/2018  9:48 AM Date of Service: 2/19/2018  9:45 AM Status: Signed   : Jennifer Gutiérrez MD (Physician)   []Manual[]Template  []Copied     [facility]            Jadon Tobin Patient Status:  Inpatien • ampicillin-sulbactam  3 g Intravenous Q8H      ASSESSMENT / PLAN: 1.    Left heel diabetic foot ulcer with cellulitis and possible osteomyelitis (exposed bone) sp debridement of necrotic tissue at bedside 2/14 and again 2/17; MSSA and Strep; Strep in deep 1. PICC  2. IV abx per ID  3. Wound care/Vac  4. ID & Podiatry consult  2. Diabetes mellitus, type 2, was on insulin, started on Trulicity and Metformin January 2018  1. Change to Levemir 20 BID  2. Novolog  5 with meals  3. Outpatient follow-up  3.  Albino

## 2018-03-01 NOTE — PAYOR COMM NOTE
2/14/ 2018      Filed: 2/14/2018  9:06 AM Date of Service: 2/14/2018  8:58 AM Status: Signed   : Alexia Curling, MD (Physician)   []Manual[]Template  []Copied     [facility]  [facility]            Abby Tobin Patient Status:   In Medications:   • ezetimibe-atorvastatin (VYTORIN 10/20) combination tablet   Oral Nightly   • Losartan Potassium  50 mg Oral Daily   • enoxaparin  40 mg Subcutaneous Daily   • Insulin Aspart Pen  1-10 Units Subcutaneous TID AC and HS   • Insulin Aspart Pen HEENT: Moist mucous membranes. Extraocular muscles are intact. Neck: supple no masses  Respiratory: Clear to auscultation bilaterally. No wheezes. No rhonchi. Cardiovascular: S1, S2.  Regular rate and rhythm. No murmurs.   Abdomen: Soft, nontender, nond Obesity (BMI 30.0-34. 9)     Vitamin D deficiency     Elevated liver enzymes     Elevated serum GGT level     Diabetic infection of left foot (HCC)     Hyperglycemia     Essential hypertension     Dyslipidemia     Hyponatremia        ASSESSMENT/PLAN:  1. Order Status: Completed Lab Status: Preliminary result Updated: 02/14/18 2200   Specimen: Blood from Blood,peripheral       Blood Culture Result No Growth 2 Days   Aerobic Bacterial Culture Once [[id number]] (Abnormal) Collected: 02/14/18 1240   Order Statu Resp:  [15-20] 15  BP: (105-144)/(73-98) 106/73     Physical Exam:    General: No acute distress. Respiratory: Clear to auscultation bilaterally. Cardiovascular: S1, S2. Regular rhythm, regular rate. Abdomen: Soft, nontender, nondistended.   Positive cas 4. Dyslipidemia, goal LDL < 100        2/18    CONTINUING IV ABX    Filed: 2/18/2018  7:42 AM Date of Service: 2/18/2018  7:40 AM Status: Signed   : Elizabeth Snow DPM (Podiatrist)   []Manual[]Template  []Copied    07/82/46  0432   BP: 131/84

## 2018-03-02 ENCOUNTER — OFFICE VISIT (OUTPATIENT)
Dept: ENDOCRINOLOGY CLINIC | Facility: CLINIC | Age: 38
End: 2018-03-02

## 2018-03-02 ENCOUNTER — TELEPHONE (OUTPATIENT)
Dept: FAMILY MEDICINE CLINIC | Facility: CLINIC | Age: 38
End: 2018-03-02

## 2018-03-02 VITALS
TEMPERATURE: 97 F | WEIGHT: 195 LBS | BODY MASS INDEX: 29.55 KG/M2 | HEART RATE: 108 BPM | DIASTOLIC BLOOD PRESSURE: 72 MMHG | SYSTOLIC BLOOD PRESSURE: 108 MMHG | HEIGHT: 68 IN | RESPIRATION RATE: 18 BRPM

## 2018-03-02 DIAGNOSIS — E11.51: Primary | ICD-10-CM

## 2018-03-02 DIAGNOSIS — E11.65: Primary | ICD-10-CM

## 2018-03-02 PROCEDURE — 99214 OFFICE O/P EST MOD 30 MIN: CPT | Performed by: NURSE PRACTITIONER

## 2018-03-02 RX ORDER — BLOOD SUGAR DIAGNOSTIC
STRIP MISCELLANEOUS
Qty: 400 STRIP | Refills: 3 | Status: SHIPPED | OUTPATIENT
Start: 2018-03-02 | End: 2018-10-31

## 2018-03-02 RX ORDER — INSULIN DETEMIR 100 [IU]/ML
INJECTION, SOLUTION SUBCUTANEOUS
Qty: 15 ML | Refills: 3 | COMMUNITY
Start: 2018-03-02 | End: 2018-03-23

## 2018-03-02 RX ORDER — LANCETS 33 GAUGE
1 EACH MISCELLANEOUS
Qty: 4 BOX | Refills: 3 | Status: SHIPPED | OUTPATIENT
Start: 2018-03-02 | End: 2019-03-02

## 2018-03-02 NOTE — TELEPHONE ENCOUNTER
PSR ok to [facility] triage  VM left for patient to call back office to discuss medication change and labs    Notes Recorded by Deneen Dai MD on 3/2/2018 at 12:51 PM CST  Stable labs.

## 2018-03-02 NOTE — PROGRESS NOTES
CC: Patient presents with:  Diabetes: new pt. referred by hospital. pt does have meter. pt was in the hospital was feeling ill and went to the hospital and had an ulcer infection.       HISTORY:  Past Medical History:   Diagnosis Date   • Diabetes ([facility] [address] 10/simvisatin 80  SE denies     ROS:   Constitutional: Negative for fever, chills and fatigue. No distress. Eyes: Negative for visual disturbance.  Last eye exam needs  with Luisa   Respiratory: Negative for cough, chest tightness, shortness of breath and Intravenous Solution, Inject 2 g into the vein every 8 (eight) hours. , Disp: , Rfl:   •  Insulin Lispro (HUMALOG) 100 UNIT/ML Subcutaneous Solution, Inject 5 Units into the skin 3 (three) times daily before meals. , Disp: 3 mL, Rfl: 3  •  Insulin Pen Needle dry. No rash noted. No erythema. No pallor. No open wounds noted. Psychiatric: Normal mood and affect.      Diabetes foot exam:  Couldn't do due to wounds   Assessment and Plan:  Uncontrolled type 2 diabetes mellitus with diabetic peripheral angiopathy wit vaccine: needs   Pneumonia vaccine: needs   Depression screen: up to date     Check glucoses 4 times daily - fasting, premeals and/or bedtime. Call/fax/email glucose logs in 21 days. Return in about 3 weeks (around 3/23/2018) for diabetes.     Pt verbal

## 2018-03-02 NOTE — TELEPHONE ENCOUNTER
Xiomara from [address] [place] [address] Bao Reid called and said she saw the pt for wound care today. She took vitals and said his BP was 98/64 and heart rate was 104. Pt was started on a new med, Losartan.

## 2018-03-02 NOTE — TELEPHONE ENCOUNTER
Per  patient needs to have podiatry or infectious disease (patient is receiving out patient IV antibiotic) fill out disability forms.

## 2018-03-02 NOTE — TELEPHONE ENCOUNTER
I spoke to patient. Advised it would be best to have Dr. Mo Arredondo complete disability forms for patient since Dr. Mo Arredondo is managing his wound. Patient requesting disability forms be faxed to Dr. Mo Arredondo. Kelsey Line faxed as requested.  Copy placed upfront for

## 2018-03-02 NOTE — TELEPHONE ENCOUNTER
Patient has short term disability paperwork to be filled out. According to last OV he is following up w/ . OK to fill out paperwork or should it be done by ?

## 2018-03-02 NOTE — PATIENT INSTRUCTIONS
Increase levemir to 20 units am and 23 units nighttime   Humalog 6 units before meals  Restart trulicity 1.5 mg weekly (if daytime sugars start to drop to low 100's reduce humalog by 2 units)  Continue metformin twice daily with meals   Check sugar before

## 2018-03-02 NOTE — TELEPHONE ENCOUNTER
I spoke to patient. Advised of normal labs and of need to hold BP meds unless BP over 140/90. He states understanding. No further questions.

## 2018-03-02 NOTE — TELEPHONE ENCOUNTER
Please see previous message from patient's home health RN  Spoke w/ Thurl Gosselin who saw patient today. He does not have a fever. He is currently on valsartan 80mg daily. Patient says he was put on this to protect his kidneys, not for hypertension.  Wound does

## 2018-03-05 ENCOUNTER — TELEPHONE (OUTPATIENT)
Dept: FAMILY MEDICINE CLINIC | Facility: CLINIC | Age: 38
End: 2018-03-05

## 2018-03-05 ENCOUNTER — LAB REQUISITION (OUTPATIENT)
Dept: LAB | Facility: HOSPITAL | Age: 38
End: 2018-03-05
Attending: EMERGENCY MEDICINE
Payer: COMMERCIAL

## 2018-03-05 ENCOUNTER — OFFICE VISIT (OUTPATIENT)
Dept: WOUND CARE | Facility: HOSPITAL | Age: 38
End: 2018-03-05
Attending: PODIATRIST
Payer: COMMERCIAL

## 2018-03-05 DIAGNOSIS — E11.69 DIABETES MELLITUS TYPE 2 IN OBESE (HCC): Primary | ICD-10-CM

## 2018-03-05 DIAGNOSIS — E66.9 DIABETES MELLITUS TYPE 2 IN OBESE (HCC): Primary | ICD-10-CM

## 2018-03-05 DIAGNOSIS — E11.8 DIABETES MELLITUS WITH COMPLICATION (HCC): ICD-10-CM

## 2018-03-05 DIAGNOSIS — B95.1 STREPTOCOCCUS, GROUP B, AS THE CAUSE OF DISEASES CLASSIFIED ELSEWHERE: ICD-10-CM

## 2018-03-05 DIAGNOSIS — L08.9 INFECTION OF SKIN AND SUBCUTANEOUS TISSUE: ICD-10-CM

## 2018-03-05 LAB
ALBUMIN SERPL-MCNC: 3.7 G/DL (ref 3.5–4.8)
ALP LIVER SERPL-CCNC: 109 U/L (ref 45–117)
ALT SERPL-CCNC: <6 U/L (ref 17–63)
AST SERPL-CCNC: 18 U/L (ref 15–41)
BASOPHILS # BLD AUTO: 0.04 X10(3) UL (ref 0–0.1)
BASOPHILS NFR BLD AUTO: 0.7 %
BILIRUB SERPL-MCNC: 0.5 MG/DL (ref 0.1–2)
BUN BLD-MCNC: 17 MG/DL (ref 8–20)
C-REACTIVE PROTEIN: <0.29 MG/DL (ref ?–1)
CALCIUM BLD-MCNC: 9.3 MG/DL (ref 8.3–10.3)
CHLORIDE: 106 MMOL/L (ref 101–111)
CO2: 27 MMOL/L (ref 22–32)
CREAT BLD-MCNC: 1.03 MG/DL (ref 0.7–1.3)
EOSINOPHIL # BLD AUTO: 0.1 X10(3) UL (ref 0–0.3)
EOSINOPHIL NFR BLD AUTO: 1.7 %
ERYTHROCYTE [DISTWIDTH] IN BLOOD BY AUTOMATED COUNT: 12.7 % (ref 11.5–16)
GLUCOSE BLD-MCNC: 106 MG/DL (ref 70–99)
HCT VFR BLD AUTO: 37.3 % (ref 37–53)
HGB BLD-MCNC: 12.8 G/DL (ref 13–17)
IMMATURE GRANULOCYTE COUNT: 0.02 X10(3) UL (ref 0–1)
IMMATURE GRANULOCYTE RATIO %: 0.3 %
LYMPHOCYTES # BLD AUTO: 2.1 X10(3) UL (ref 0.9–4)
LYMPHOCYTES NFR BLD AUTO: 35.1 %
M PROTEIN MFR SERPL ELPH: 8.4 G/DL (ref 6.1–8.3)
MCH RBC QN AUTO: 30 PG (ref 27–33.2)
MCHC RBC AUTO-ENTMCNC: 34.3 G/DL (ref 31–37)
MCV RBC AUTO: 87.4 FL (ref 80–99)
MONOCYTES # BLD AUTO: 0.42 X10(3) UL (ref 0.1–1)
MONOCYTES NFR BLD AUTO: 7 %
NEUTROPHIL ABS PRELIM: 3.31 X10 (3) UL (ref 1.3–6.7)
NEUTROPHILS # BLD AUTO: 3.31 X10(3) UL (ref 1.3–6.7)
NEUTROPHILS NFR BLD AUTO: 55.2 %
PLATELET # BLD AUTO: 227 10(3)UL (ref 150–450)
POTASSIUM SERPL-SCNC: 4.2 MMOL/L (ref 3.6–5.1)
RBC # BLD AUTO: 4.27 X10(6)UL (ref 4.3–5.7)
RED CELL DISTRIBUTION WIDTH-SD: 40.1 FL (ref 35.1–46.3)
SED RATE-ML: 36 MM/HR (ref 0–12)
SODIUM SERPL-SCNC: 139 MMOL/L (ref 136–144)
WBC # BLD AUTO: 6 X10(3) UL (ref 4–13)

## 2018-03-05 PROCEDURE — 85652 RBC SED RATE AUTOMATED: CPT | Performed by: EMERGENCY MEDICINE

## 2018-03-05 PROCEDURE — 97605 NEG PRS WND THER DME<=50SQCM: CPT

## 2018-03-05 PROCEDURE — 86140 C-REACTIVE PROTEIN: CPT | Performed by: EMERGENCY MEDICINE

## 2018-03-05 PROCEDURE — 11045 DBRDMT SUBQ TISS EACH ADDL: CPT

## 2018-03-05 PROCEDURE — 85025 COMPLETE CBC W/AUTO DIFF WBC: CPT | Performed by: EMERGENCY MEDICINE

## 2018-03-05 PROCEDURE — 11042 DBRDMT SUBQ TIS 1ST 20SQCM/<: CPT

## 2018-03-05 PROCEDURE — 80053 COMPREHEN METABOLIC PANEL: CPT | Performed by: EMERGENCY MEDICINE

## 2018-03-05 NOTE — TELEPHONE ENCOUNTER
Order form received from [employer] for wound supplies. Form signed by Betty Dean and faxed back. Receipt confirmed. Sent to scan.

## 2018-03-08 ENCOUNTER — TELEPHONE (OUTPATIENT)
Dept: ENDOCRINOLOGY CLINIC | Facility: CLINIC | Age: 38
End: 2018-03-08

## 2018-03-08 DIAGNOSIS — E11.65: ICD-10-CM

## 2018-03-08 DIAGNOSIS — E11.51: ICD-10-CM

## 2018-03-08 NOTE — TELEPHONE ENCOUNTER
Pt called requesting a new Rx for Trulicity be re-sent to the Pharmacy. Rx was sent this morning for a 30 day supply. Insurance requires a 90 day supply.

## 2018-03-09 ENCOUNTER — TELEPHONE (OUTPATIENT)
Dept: FAMILY MEDICINE CLINIC | Facility: CLINIC | Age: 38
End: 2018-03-09

## 2018-03-09 NOTE — TELEPHONE ENCOUNTER
disability paperwork received again from [address] [place] [place] for patient. I faxed it back advising them that we are not filling out paperwork and has been sent to [facility] for completion along w/ his contact information. Receipt confirmed.

## 2018-03-09 NOTE — TELEPHONE ENCOUNTER
Spoke w/ . As long as patient is feeling OK he can see her on Monday. Clarify patient is not taking valsartan. Push fluids. If patient is feeling symptomatic he is to go to Er.      Spoke w/ the home health nurse who did verify that patient is as

## 2018-03-09 NOTE — TELEPHONE ENCOUNTER
Lawanda PETERS is with patient right now. She arrived about noon and patient's systolic was 82. He drank some water and recheck now is 90/62. . He is not dizzy, not light headed. Has been eating and drinking Ok. No fever. Wound does not look worse.  No oth

## 2018-03-12 ENCOUNTER — OFFICE VISIT (OUTPATIENT)
Dept: WOUND CARE | Facility: HOSPITAL | Age: 38
End: 2018-03-12
Attending: PODIATRIST
Payer: COMMERCIAL

## 2018-03-12 ENCOUNTER — HOSPITAL ENCOUNTER (OUTPATIENT)
Dept: GENERAL RADIOLOGY | Age: 38
Discharge: HOME | End: 2018-03-12
Attending: EMERGENCY MEDICINE
Payer: COMMERCIAL

## 2018-03-12 ENCOUNTER — OFFICE VISIT (OUTPATIENT)
Dept: FAMILY MEDICINE CLINIC | Facility: CLINIC | Age: 38
End: 2018-03-12

## 2018-03-12 VITALS
RESPIRATION RATE: 16 BRPM | SYSTOLIC BLOOD PRESSURE: 120 MMHG | HEART RATE: 118 BPM | TEMPERATURE: 98 F | DIASTOLIC BLOOD PRESSURE: 84 MMHG | OXYGEN SATURATION: 98 %

## 2018-03-12 DIAGNOSIS — R00.0 TACHYCARDIA: ICD-10-CM

## 2018-03-12 DIAGNOSIS — E11.69 DIABETES MELLITUS TYPE 2 IN OBESE (HCC): Primary | ICD-10-CM

## 2018-03-12 DIAGNOSIS — I95.9 HYPOTENSION, UNSPECIFIED HYPOTENSION TYPE: Primary | ICD-10-CM

## 2018-03-12 DIAGNOSIS — E11.8 DIABETES MELLITUS WITH COMPLICATION (HCC): ICD-10-CM

## 2018-03-12 DIAGNOSIS — E66.9 DIABETES MELLITUS TYPE 2 IN OBESE (HCC): Primary | ICD-10-CM

## 2018-03-12 DIAGNOSIS — I95.9 HYPOTENSION, UNSPECIFIED HYPOTENSION TYPE: ICD-10-CM

## 2018-03-12 DIAGNOSIS — L08.9 INFECTION OF SKIN AND SUBCUTANEOUS TISSUE: ICD-10-CM

## 2018-03-12 PROCEDURE — 99214 OFFICE O/P EST MOD 30 MIN: CPT | Performed by: EMERGENCY MEDICINE

## 2018-03-12 PROCEDURE — 97605 NEG PRS WND THER DME<=50SQCM: CPT

## 2018-03-12 PROCEDURE — 11045 DBRDMT SUBQ TISS EACH ADDL: CPT

## 2018-03-12 PROCEDURE — 71046 X-RAY EXAM CHEST 2 VIEWS: CPT | Performed by: EMERGENCY MEDICINE

## 2018-03-12 PROCEDURE — 93000 ELECTROCARDIOGRAM COMPLETE: CPT | Performed by: EMERGENCY MEDICINE

## 2018-03-12 PROCEDURE — 11042 DBRDMT SUBQ TIS 1ST 20SQCM/<: CPT

## 2018-03-12 NOTE — PROGRESS NOTES
Chief Complaint:   Patient presents with:  Blood Pressure: Follow up low blood pressure     HPI:   This is a 42year old male     HYPOTENSION  Stopped Diovan 1 week ago. Reported by [facility] with having low blood pressure. Feels well otherwise.  No dizzi times daily before meals. Disp: 15 mL Rfl: 0   Blood Glucose Monitoring Suppl (ONETOUCH ULTRA 2) w/Device Does not apply Kit 1 kit by Does not apply route as needed.  Disp:  Rfl:    Glucose Blood (ONETOUCH TEST) In Vitro Strip 1 strip by In Vitro route 4 (f enzymes     Elevated serum GGT level     Diabetic infection of left foot (HCC)     Hyperglycemia     Essential hypertension     Dyslipidemia     Hyponatremia     MSSA (methicillin susceptible Staphylococcus aureus)     Streptococcal infection        REVIEW mm/Hr   -C-REACTIVE PROTEIN   Collection Time: 03/05/18  8:00 AM   Result Value Ref Range   C-Reactive Protein <0.29 <1.00 mg/dL   -CBC W/ DIFFERENTIAL   Collection Time: 03/05/18  8:00 AM   Result Value Ref Range   WBC 6.0 4.0 - 13.0 x10(3) uL   RBC 4.27

## 2018-03-12 NOTE — PATIENT INSTRUCTIONS
Thank you for choosing [facility]  To Do:  FOR GAGAN Tiki Duran  1. Push fluids and rest  2. Home BP monitoring daily and with [address]  3. Arrange for ultrasound of heart/ 2Decho  4. Chest Xray today  1.

## 2018-03-13 ENCOUNTER — LAB REQUISITION (OUTPATIENT)
Dept: LAB | Facility: HOSPITAL | Age: 38
End: 2018-03-13
Attending: EMERGENCY MEDICINE
Payer: COMMERCIAL

## 2018-03-13 DIAGNOSIS — B95.1 STREPTOCOCCUS, GROUP B, AS THE CAUSE OF DISEASES CLASSIFIED ELSEWHERE: ICD-10-CM

## 2018-03-13 LAB
ALBUMIN SERPL-MCNC: 3.7 G/DL (ref 3.5–4.8)
ALP LIVER SERPL-CCNC: 121 U/L (ref 45–117)
ALT SERPL-CCNC: <6 U/L (ref 17–63)
AST SERPL-CCNC: 26 U/L (ref 15–41)
BASOPHILS # BLD AUTO: 0.04 X10(3) UL (ref 0–0.1)
BASOPHILS NFR BLD AUTO: 0.5 %
BILIRUB SERPL-MCNC: 0.5 MG/DL (ref 0.1–2)
BUN BLD-MCNC: 16 MG/DL (ref 8–20)
C-REACTIVE PROTEIN: 1 MG/DL (ref ?–1)
CALCIUM BLD-MCNC: 9.7 MG/DL (ref 8.3–10.3)
CHLORIDE: 103 MMOL/L (ref 101–111)
CO2: 28 MMOL/L (ref 22–32)
CREAT BLD-MCNC: 1.06 MG/DL (ref 0.7–1.3)
EOSINOPHIL # BLD AUTO: 0.41 X10(3) UL (ref 0–0.3)
EOSINOPHIL NFR BLD AUTO: 5.4 %
ERYTHROCYTE [DISTWIDTH] IN BLOOD BY AUTOMATED COUNT: 12.6 % (ref 11.5–16)
GLUCOSE BLD-MCNC: 96 MG/DL (ref 70–99)
HCT VFR BLD AUTO: 37.7 % (ref 37–53)
HGB BLD-MCNC: 12.9 G/DL (ref 13–17)
IMMATURE GRANULOCYTE COUNT: 0.02 X10(3) UL (ref 0–1)
IMMATURE GRANULOCYTE RATIO %: 0.3 %
LYMPHOCYTES # BLD AUTO: 1.91 X10(3) UL (ref 0.9–4)
LYMPHOCYTES NFR BLD AUTO: 24.9 %
M PROTEIN MFR SERPL ELPH: 8.5 G/DL (ref 6.1–8.3)
MCH RBC QN AUTO: 30.1 PG (ref 27–33.2)
MCHC RBC AUTO-ENTMCNC: 34.2 G/DL (ref 31–37)
MCV RBC AUTO: 88.1 FL (ref 80–99)
MONOCYTES # BLD AUTO: 0.56 X10(3) UL (ref 0.1–1)
MONOCYTES NFR BLD AUTO: 7.3 %
NEUTROPHIL ABS PRELIM: 4.72 X10 (3) UL (ref 1.3–6.7)
NEUTROPHILS # BLD AUTO: 4.72 X10(3) UL (ref 1.3–6.7)
NEUTROPHILS NFR BLD AUTO: 61.6 %
PLATELET # BLD AUTO: 209 10(3)UL (ref 150–450)
POTASSIUM SERPL-SCNC: 4.6 MMOL/L (ref 3.6–5.1)
RBC # BLD AUTO: 4.28 X10(6)UL (ref 4.3–5.7)
RED CELL DISTRIBUTION WIDTH-SD: 41 FL (ref 35.1–46.3)
SED RATE-ML: 51 MM/HR (ref 0–12)
SODIUM SERPL-SCNC: 138 MMOL/L (ref 136–144)
WBC # BLD AUTO: 7.7 X10(3) UL (ref 4–13)

## 2018-03-13 PROCEDURE — 85652 RBC SED RATE AUTOMATED: CPT | Performed by: EMERGENCY MEDICINE

## 2018-03-13 PROCEDURE — 86140 C-REACTIVE PROTEIN: CPT | Performed by: EMERGENCY MEDICINE

## 2018-03-13 PROCEDURE — 80053 COMPREHEN METABOLIC PANEL: CPT | Performed by: EMERGENCY MEDICINE

## 2018-03-13 PROCEDURE — 85025 COMPLETE CBC W/AUTO DIFF WBC: CPT | Performed by: EMERGENCY MEDICINE

## 2018-03-19 ENCOUNTER — TELEPHONE (OUTPATIENT)
Dept: FAMILY MEDICINE CLINIC | Facility: CLINIC | Age: 38
End: 2018-03-19

## 2018-03-19 ENCOUNTER — OFFICE VISIT (OUTPATIENT)
Dept: WOUND CARE | Facility: HOSPITAL | Age: 38
End: 2018-03-19
Attending: PODIATRIST
Payer: COMMERCIAL

## 2018-03-19 DIAGNOSIS — E66.9 DIABETES MELLITUS TYPE 2 IN OBESE (HCC): Primary | ICD-10-CM

## 2018-03-19 DIAGNOSIS — E11.69 DIABETES MELLITUS TYPE 2 IN OBESE (HCC): Primary | ICD-10-CM

## 2018-03-19 DIAGNOSIS — E11.8 DIABETES MELLITUS WITH COMPLICATION (HCC): ICD-10-CM

## 2018-03-19 DIAGNOSIS — L08.9 INFECTION OF SKIN AND SUBCUTANEOUS TISSUE: ICD-10-CM

## 2018-03-19 PROCEDURE — 97605 NEG PRS WND THER DME<=50SQCM: CPT

## 2018-03-19 NOTE — TELEPHONE ENCOUNTER
Form received, completed, and faxed back to [employer] for pt wound supplies. Fax : [phone]    Fax confirmation received.

## 2018-03-21 ENCOUNTER — OFFICE VISIT (OUTPATIENT)
Dept: ENDOCRINOLOGY CLINIC | Facility: CLINIC | Age: 38
End: 2018-03-21

## 2018-03-21 VITALS
TEMPERATURE: 98 F | RESPIRATION RATE: 18 BRPM | SYSTOLIC BLOOD PRESSURE: 110 MMHG | HEIGHT: 68 IN | DIASTOLIC BLOOD PRESSURE: 60 MMHG

## 2018-03-21 DIAGNOSIS — E11.65: Primary | ICD-10-CM

## 2018-03-21 DIAGNOSIS — E11.51: Primary | ICD-10-CM

## 2018-03-21 LAB
CARTRIDGE LOT#: 815 NUMERIC
HEMOGLOBIN A1C: 7.3 % (ref 4.3–5.6)

## 2018-03-21 PROCEDURE — 83036 HEMOGLOBIN GLYCOSYLATED A1C: CPT | Performed by: NURSE PRACTITIONER

## 2018-03-21 PROCEDURE — 99213 OFFICE O/P EST LOW 20 MIN: CPT | Performed by: NURSE PRACTITIONER

## 2018-03-21 RX ORDER — PEN NEEDLE, DIABETIC, SAFETY 30 GX3/16"
NEEDLE, DISPOSABLE MISCELLANEOUS
Refills: 6 | COMMUNITY
Start: 2018-02-20 | End: 2018-04-10

## 2018-03-21 NOTE — PATIENT INSTRUCTIONS
Stop humalog   Start jardiance 10 mg daily for 3 weeks   Send an update in 2 weeks via my chart   Lower levemir to 16 units am and 20 units bedtime   If tolerating will increase jardiance in 3 weeks and determine new levemir dosing   Return in 6 weeks   Yo

## 2018-03-21 NOTE — PROGRESS NOTES
CC: Patient presents with:  Diabetes: follow up. pt did nbring readings.       HISTORY:  Past Medical History:   Diagnosis Date   • Diabetes ([address]    • Elevated liver enzymes 8/30/2016   • Elevated serum GGT level 8/30/2016   • Fatty liver    • HYPERLIPIDEM breath and wheezing. Cardiovascular: Negative for chest pain, palpitations and leg swelling. ASA none  daily. Gastrointestinal: Negative for nausea, vomiting, abdominal pain, diarrhea and abdominal distention.    Genitourinary: Negative for dysuria, disc before meals  Non-Medicare:  Test blood glucose 4-5 times per day before meals, bedtime and for signs, symptoms of hypoglycemia or as ordered by physician, Disp: 50 strip, Rfl: 6  •  LEVEMIR FLEXTOUCH 100 UNIT/ML Subcutaneous Solution Pen-injector, inject mg weeky. Continue metformin 1000 mg bid. see pt instructions. Will increase per instructions Return in 6 weeks.    Educated on: jardiance action, se and timing        Orders Placed This Encounter      Hgb A1C    Meds & Refills for this Visit:    Signed Pre

## 2018-03-22 ENCOUNTER — TELEPHONE (OUTPATIENT)
Dept: FAMILY MEDICINE CLINIC | Facility: CLINIC | Age: 38
End: 2018-03-22

## 2018-03-22 ENCOUNTER — LAB REQUISITION (OUTPATIENT)
Dept: LAB | Age: 38
End: 2018-03-22
Payer: COMMERCIAL

## 2018-03-22 DIAGNOSIS — B95.1 STREPTOCOCCUS, GROUP B, AS THE CAUSE OF DISEASES CLASSIFIED ELSEWHERE: ICD-10-CM

## 2018-03-22 LAB
ALBUMIN SERPL-MCNC: 3.8 G/DL (ref 3.5–4.8)
ALP LIVER SERPL-CCNC: 157 U/L (ref 45–117)
ALT SERPL-CCNC: 9 U/L (ref 17–63)
AST SERPL-CCNC: 20 U/L (ref 15–41)
BASOPHILS # BLD AUTO: 0.06 X10(3) UL (ref 0–0.1)
BASOPHILS NFR BLD AUTO: 0.7 %
BILIRUB SERPL-MCNC: 0.7 MG/DL (ref 0.1–2)
BUN BLD-MCNC: 24 MG/DL (ref 8–20)
C-REACTIVE PROTEIN: 1.04 MG/DL (ref ?–1)
CALCIUM BLD-MCNC: 9.5 MG/DL (ref 8.3–10.3)
CHLORIDE: 103 MMOL/L (ref 101–111)
CO2: 31 MMOL/L (ref 22–32)
CREAT BLD-MCNC: 1.04 MG/DL (ref 0.7–1.3)
EOSINOPHIL # BLD AUTO: 0.66 X10(3) UL (ref 0–0.3)
EOSINOPHIL NFR BLD AUTO: 7.9 %
ERYTHROCYTE [DISTWIDTH] IN BLOOD BY AUTOMATED COUNT: 12.3 % (ref 11.5–16)
GLUCOSE BLD-MCNC: 105 MG/DL (ref 70–99)
HCT VFR BLD AUTO: 38.7 % (ref 37–53)
HGB BLD-MCNC: 12.8 G/DL (ref 13–17)
IMMATURE GRANULOCYTE COUNT: 0.03 X10(3) UL (ref 0–1)
IMMATURE GRANULOCYTE RATIO %: 0.4 %
LYMPHOCYTES # BLD AUTO: 2.08 X10(3) UL (ref 0.9–4)
LYMPHOCYTES NFR BLD AUTO: 24.8 %
M PROTEIN MFR SERPL ELPH: 8.7 G/DL (ref 6.1–8.3)
MCH RBC QN AUTO: 29.6 PG (ref 27–33.2)
MCHC RBC AUTO-ENTMCNC: 33.1 G/DL (ref 31–37)
MCV RBC AUTO: 89.4 FL (ref 80–99)
MONOCYTES # BLD AUTO: 0.5 X10(3) UL (ref 0.1–1)
MONOCYTES NFR BLD AUTO: 6 %
NEUTROPHIL ABS PRELIM: 5.06 X10 (3) UL (ref 1.3–6.7)
NEUTROPHILS # BLD AUTO: 5.06 X10(3) UL (ref 1.3–6.7)
NEUTROPHILS NFR BLD AUTO: 60.2 %
PLATELET # BLD AUTO: 322 10(3)UL (ref 150–450)
POTASSIUM SERPL-SCNC: 4.5 MMOL/L (ref 3.6–5.1)
RBC # BLD AUTO: 4.33 X10(6)UL (ref 4.3–5.7)
RED CELL DISTRIBUTION WIDTH-SD: 39.8 FL (ref 35.1–46.3)
SED RATE-ML: 53 MM/HR (ref 0–12)
SODIUM SERPL-SCNC: 138 MMOL/L (ref 136–144)
WBC # BLD AUTO: 8.4 X10(3) UL (ref 4–13)

## 2018-03-22 PROCEDURE — 86140 C-REACTIVE PROTEIN: CPT | Performed by: INTERNAL MEDICINE

## 2018-03-22 PROCEDURE — 85652 RBC SED RATE AUTOMATED: CPT | Performed by: INTERNAL MEDICINE

## 2018-03-22 PROCEDURE — 85025 COMPLETE CBC W/AUTO DIFF WBC: CPT | Performed by: INTERNAL MEDICINE

## 2018-03-22 PROCEDURE — 80053 COMPREHEN METABOLIC PANEL: CPT | Performed by: INTERNAL MEDICINE

## 2018-03-22 NOTE — TELEPHONE ENCOUNTER
Patients heart rate elevated patient feels completely fine.  Marquisevenkat Epley would like to know if the parameters for his care can be changed or do you want them to keep calling every time his heart rate is elevated

## 2018-03-23 ENCOUNTER — TELEPHONE (OUTPATIENT)
Dept: INTERNAL MEDICINE CLINIC | Facility: CLINIC | Age: 38
End: 2018-03-23

## 2018-03-23 RX ORDER — INSULIN DETEMIR 100 [IU]/ML
INJECTION, SOLUTION SUBCUTANEOUS
Qty: 15 ML | Refills: 3 | COMMUNITY
Start: 2018-03-23 | End: 2018-04-10

## 2018-03-23 NOTE — TELEPHONE ENCOUNTER
Spoke w/  regarding parameters. OK to call physician if HR >120 or SBP <80 unless OR if patient is symptomatic. Patient should follow up in the office in the next 2 weeks.

## 2018-03-23 NOTE — TELEPHONE ENCOUNTER
Call received from [phone] [facility]. She is requesting parameters to call regarding HR. Current . BP 92/62. Patient went to diabetic clinic and his meds were changed.  She needs orders to officially change:  Humalog stopped  Began Jardiance 10mg on

## 2018-03-23 NOTE — TELEPHONE ENCOUNTER
Here is the note from DINAH CRUZ [facility] regarding medication changes.  OV on 3/21/18:    Assessment and Plan:  Uncontrolled type 2 diabetes mellitus with diabetic peripheral angiopathy without gangrene, unspecified long term insulin use status (hcc)  (primary

## 2018-03-23 NOTE — TELEPHONE ENCOUNTER
Instructions         Return in about 6 weeks (around 5/2/2018) for diabetes.    Stop humalog   Start jardiance 10 mg daily for 3 weeks   Send an update in 2 weeks via my chart   Lower levemir to 16 units am and 20 units bedtime   If tolerating will increase

## 2018-03-23 NOTE — TELEPHONE ENCOUNTER
I spoke with home health RN Lennis Epley and notified her of the new parameters and advised her to have patient follow up int he office in the next 2 weeks for recheck.    In regards to medications:  I advised her of what was in the assessment and plan from his O

## 2018-03-23 NOTE — TELEPHONE ENCOUNTER
Spoke with [facility] RN wants to confirm pt. Is taking medication as indicated,   Pt. Currently taking,   Jardiance 10mg 1 tab daily  Levemir 16 units AM and 20 units PM, was asked to stop taking Humalog, please advise.

## 2018-03-23 NOTE — TELEPHONE ENCOUNTER
KATRIN for [facility] RN clarifying med changes, pt is doing correct thing and was in his pt instructions   No further changes   Vandana HARRISON,DESIE

## 2018-03-23 NOTE — TELEPHONE ENCOUNTER
Calling to speak to the nurse to discuss conflicting medication issues. Pt was seen by [facility] and medications do not match what's in the system. Please advise.

## 2018-03-26 ENCOUNTER — OFFICE VISIT (OUTPATIENT)
Dept: WOUND CARE | Facility: HOSPITAL | Age: 38
End: 2018-03-26
Attending: PODIATRIST
Payer: COMMERCIAL

## 2018-03-26 DIAGNOSIS — L08.9 INFECTION OF SKIN AND SUBCUTANEOUS TISSUE: ICD-10-CM

## 2018-03-26 DIAGNOSIS — E11.69 DIABETES MELLITUS TYPE 2 IN OBESE (HCC): Primary | ICD-10-CM

## 2018-03-26 DIAGNOSIS — E11.8 DIABETES MELLITUS WITH COMPLICATION (HCC): ICD-10-CM

## 2018-03-26 DIAGNOSIS — E66.9 DIABETES MELLITUS TYPE 2 IN OBESE (HCC): Primary | ICD-10-CM

## 2018-03-26 PROCEDURE — 97605 NEG PRS WND THER DME<=50SQCM: CPT

## 2018-03-26 PROCEDURE — 11045 DBRDMT SUBQ TISS EACH ADDL: CPT

## 2018-03-26 PROCEDURE — 11042 DBRDMT SUBQ TIS 1ST 20SQCM/<: CPT

## 2018-03-27 ENCOUNTER — HOSPITAL ENCOUNTER (OUTPATIENT)
Dept: CV DIAGNOSTICS | Age: 38
Discharge: HOME | End: 2018-03-27
Attending: EMERGENCY MEDICINE
Payer: COMMERCIAL

## 2018-03-27 ENCOUNTER — LAB REQUISITION (OUTPATIENT)
Dept: LAB | Age: 38
End: 2018-03-27
Attending: INTERNAL MEDICINE
Payer: COMMERCIAL

## 2018-03-27 DIAGNOSIS — B95.61 METHICILLIN SUSCEPTIBLE STAPHYLOCOCCUS AUREUS INFECTION AS THE CAUSE OF DISEASES CLASSIFIED ELSEWHERE: ICD-10-CM

## 2018-03-27 DIAGNOSIS — I10 ESSENTIAL (PRIMARY) HYPERTENSION: ICD-10-CM

## 2018-03-27 DIAGNOSIS — B95.1 STREPTOCOCCUS, GROUP B, AS THE CAUSE OF DISEASES CLASSIFIED ELSEWHERE: ICD-10-CM

## 2018-03-27 DIAGNOSIS — R00.0 TACHYCARDIA: ICD-10-CM

## 2018-03-27 DIAGNOSIS — I95.9 HYPOTENSION, UNSPECIFIED HYPOTENSION TYPE: ICD-10-CM

## 2018-03-27 LAB
ALBUMIN SERPL-MCNC: 4 G/DL (ref 3.5–4.8)
ALP LIVER SERPL-CCNC: 165 U/L (ref 45–117)
ALT SERPL-CCNC: 17 U/L (ref 17–63)
AST SERPL-CCNC: 25 U/L (ref 15–41)
BASOPHILS # BLD AUTO: 0.05 X10(3) UL (ref 0–0.1)
BASOPHILS NFR BLD AUTO: 0.5 %
BILIRUB SERPL-MCNC: 0.6 MG/DL (ref 0.1–2)
BUN BLD-MCNC: 24 MG/DL (ref 8–20)
C-REACTIVE PROTEIN: 1.5 MG/DL (ref ?–1)
CALCIUM BLD-MCNC: 10.2 MG/DL (ref 8.3–10.3)
CHLORIDE: 100 MMOL/L (ref 101–111)
CO2: 29 MMOL/L (ref 22–32)
CREAT BLD-MCNC: 0.95 MG/DL (ref 0.7–1.3)
EOSINOPHIL # BLD AUTO: 0.9 X10(3) UL (ref 0–0.3)
EOSINOPHIL NFR BLD AUTO: 9.5 %
ERYTHROCYTE [DISTWIDTH] IN BLOOD BY AUTOMATED COUNT: 12.6 % (ref 11.5–16)
GLUCOSE BLD-MCNC: 91 MG/DL (ref 70–99)
HCT VFR BLD AUTO: 40.4 % (ref 37–53)
HGB BLD-MCNC: 13.4 G/DL (ref 13–17)
IMMATURE GRANULOCYTE COUNT: 0.02 X10(3) UL (ref 0–1)
IMMATURE GRANULOCYTE RATIO %: 0.2 %
LYMPHOCYTES # BLD AUTO: 2.03 X10(3) UL (ref 0.9–4)
LYMPHOCYTES NFR BLD AUTO: 21.4 %
M PROTEIN MFR SERPL ELPH: 8.2 G/DL (ref 6.1–8.3)
MCH RBC QN AUTO: 30.2 PG (ref 27–33.2)
MCHC RBC AUTO-ENTMCNC: 33.2 G/DL (ref 31–37)
MCV RBC AUTO: 91 FL (ref 80–99)
MONOCYTES # BLD AUTO: 0.63 X10(3) UL (ref 0.1–1)
MONOCYTES NFR BLD AUTO: 6.6 %
NEUTROPHIL ABS PRELIM: 5.87 X10 (3) UL (ref 1.3–6.7)
NEUTROPHILS # BLD AUTO: 5.87 X10(3) UL (ref 1.3–6.7)
NEUTROPHILS NFR BLD AUTO: 61.8 %
PLATELET # BLD AUTO: 302 10(3)UL (ref 150–450)
POTASSIUM SERPL-SCNC: 4.7 MMOL/L (ref 3.6–5.1)
RBC # BLD AUTO: 4.44 X10(6)UL (ref 4.3–5.7)
RED CELL DISTRIBUTION WIDTH-SD: 40.9 FL (ref 35.1–46.3)
SED RATE-ML: 50 MM/HR (ref 0–12)
SODIUM SERPL-SCNC: 137 MMOL/L (ref 136–144)
WBC # BLD AUTO: 9.5 X10(3) UL (ref 4–13)

## 2018-03-27 PROCEDURE — 86140 C-REACTIVE PROTEIN: CPT | Performed by: INTERNAL MEDICINE

## 2018-03-27 PROCEDURE — 93306 TTE W/DOPPLER COMPLETE: CPT | Performed by: EMERGENCY MEDICINE

## 2018-03-27 PROCEDURE — 85652 RBC SED RATE AUTOMATED: CPT | Performed by: INTERNAL MEDICINE

## 2018-03-27 PROCEDURE — 80053 COMPREHEN METABOLIC PANEL: CPT | Performed by: INTERNAL MEDICINE

## 2018-03-27 PROCEDURE — 85025 COMPLETE CBC W/AUTO DIFF WBC: CPT | Performed by: INTERNAL MEDICINE

## 2018-04-02 ENCOUNTER — OFFICE VISIT (OUTPATIENT)
Dept: WOUND CARE | Facility: HOSPITAL | Age: 38
End: 2018-04-02
Attending: PODIATRIST
Payer: COMMERCIAL

## 2018-04-02 ENCOUNTER — TELEPHONE (OUTPATIENT)
Dept: FAMILY MEDICINE CLINIC | Facility: CLINIC | Age: 38
End: 2018-04-02

## 2018-04-02 ENCOUNTER — PRIOR ORIGINAL RECORDS (OUTPATIENT)
Dept: OTHER | Age: 38
End: 2018-04-02

## 2018-04-02 ENCOUNTER — MYAURORA ACCOUNT LINK (OUTPATIENT)
Dept: OTHER | Age: 38
End: 2018-04-02

## 2018-04-02 DIAGNOSIS — E11.69 DIABETES MELLITUS TYPE 2 IN OBESE (HCC): Primary | ICD-10-CM

## 2018-04-02 DIAGNOSIS — E11.8 DIABETES MELLITUS WITH COMPLICATION (HCC): ICD-10-CM

## 2018-04-02 DIAGNOSIS — R00.0 TACHYCARDIA: Primary | ICD-10-CM

## 2018-04-02 DIAGNOSIS — E66.9 DIABETES MELLITUS TYPE 2 IN OBESE (HCC): Primary | ICD-10-CM

## 2018-04-02 DIAGNOSIS — L08.9 INFECTION OF SKIN AND SUBCUTANEOUS TISSUE: ICD-10-CM

## 2018-04-02 PROCEDURE — 97605 NEG PRS WND THER DME<=50SQCM: CPT

## 2018-04-02 PROCEDURE — 11045 DBRDMT SUBQ TISS EACH ADDL: CPT

## 2018-04-02 PROCEDURE — 11042 DBRDMT SUBQ TIS 1ST 20SQCM/<: CPT

## 2018-04-02 NOTE — TELEPHONE ENCOUNTER
----- Message from Leanna Ordoñez MD sent at 4/1/2018  7:52 PM CDT -----  Normal Echo    Pls refer patient to cardiology for evaluation of tachycardia, Dr. Marisa Cardona

## 2018-04-04 ENCOUNTER — TELEPHONE (OUTPATIENT)
Dept: FAMILY MEDICINE CLINIC | Facility: CLINIC | Age: 38
End: 2018-04-04

## 2018-04-04 NOTE — TELEPHONE ENCOUNTER
Tatiana Cook from [place] [address] called stating pt fell late Monday night after standing up and feeling dizzy. Pt states he was able to stand back up without injury, the following morning he checked his glucose receiving a reading of 76.

## 2018-04-06 NOTE — TELEPHONE ENCOUNTER
Spoke with patient. He says he got up in the middle of the night to go to the bathroom. He was finishing up and got real dizzy and \"woke up on the floor\". He does not have any injury. Appointment made for Tuesday.  I advised patient to monitor his BP, blo

## 2018-04-09 ENCOUNTER — MYAURORA ACCOUNT LINK (OUTPATIENT)
Dept: OTHER | Age: 38
End: 2018-04-09

## 2018-04-09 ENCOUNTER — OFFICE VISIT (OUTPATIENT)
Dept: WOUND CARE | Facility: HOSPITAL | Age: 38
End: 2018-04-09
Attending: PODIATRIST
Payer: COMMERCIAL

## 2018-04-09 ENCOUNTER — HOSPITAL ENCOUNTER (OUTPATIENT)
Dept: CARDIOLOGY CLINIC | Facility: HOSPITAL | Age: 38
Discharge: HOME | End: 2018-04-09
Attending: INTERNAL MEDICINE

## 2018-04-09 DIAGNOSIS — I70.222 ATHEROSCLEROSIS OF NATIVE ARTERY OF LEFT LEG WITH REST PAIN (HCC): ICD-10-CM

## 2018-04-09 DIAGNOSIS — E11.8 DIABETES MELLITUS WITH COMPLICATION (HCC): ICD-10-CM

## 2018-04-09 DIAGNOSIS — I70.322 ATHEROSCLEROSIS OF BYPASS GRAFT OF LEFT LOWER EXTREMITY WITH REST PAIN (HCC): ICD-10-CM

## 2018-04-09 DIAGNOSIS — R00.0 TACHYCARDIA: ICD-10-CM

## 2018-04-09 DIAGNOSIS — E11.69 DIABETES MELLITUS TYPE 2 IN OBESE (HCC): Primary | ICD-10-CM

## 2018-04-09 DIAGNOSIS — E66.9 DIABETES MELLITUS TYPE 2 IN OBESE (HCC): Primary | ICD-10-CM

## 2018-04-09 DIAGNOSIS — L08.9 INFECTION OF SKIN AND SUBCUTANEOUS TISSUE: ICD-10-CM

## 2018-04-09 PROCEDURE — 97605 NEG PRS WND THER DME<=50SQCM: CPT

## 2018-04-10 ENCOUNTER — PATIENT MESSAGE (OUTPATIENT)
Dept: ENDOCRINOLOGY CLINIC | Facility: CLINIC | Age: 38
End: 2018-04-10

## 2018-04-10 ENCOUNTER — OFFICE VISIT (OUTPATIENT)
Dept: FAMILY MEDICINE CLINIC | Facility: CLINIC | Age: 38
End: 2018-04-10

## 2018-04-10 VITALS
DIASTOLIC BLOOD PRESSURE: 71 MMHG | OXYGEN SATURATION: 96 % | TEMPERATURE: 98 F | HEART RATE: 122 BPM | SYSTOLIC BLOOD PRESSURE: 100 MMHG | RESPIRATION RATE: 15 BRPM

## 2018-04-10 DIAGNOSIS — R55 SYNCOPE, UNSPECIFIED SYNCOPE TYPE: Primary | ICD-10-CM

## 2018-04-10 DIAGNOSIS — R00.0 TACHYCARDIA: ICD-10-CM

## 2018-04-10 DIAGNOSIS — I95.9 HYPOTENSION, UNSPECIFIED HYPOTENSION TYPE: ICD-10-CM

## 2018-04-10 DIAGNOSIS — E11.51: ICD-10-CM

## 2018-04-10 DIAGNOSIS — E11.65: ICD-10-CM

## 2018-04-10 PROCEDURE — 99214 OFFICE O/P EST MOD 30 MIN: CPT | Performed by: EMERGENCY MEDICINE

## 2018-04-10 RX ORDER — PEN NEEDLE, DIABETIC, SAFETY 30 GX3/16"
NEEDLE, DISPOSABLE MISCELLANEOUS
Qty: 300 EACH | Refills: 3 | Status: SHIPPED | OUTPATIENT
Start: 2018-04-10 | End: 2019-04-23

## 2018-04-10 RX ORDER — INSULIN DETEMIR 100 [IU]/ML
INJECTION, SOLUTION SUBCUTANEOUS
Qty: 15 ML | Refills: 2 | Status: ON HOLD | OUTPATIENT
Start: 2018-04-10 | End: 2018-07-06

## 2018-04-10 NOTE — PATIENT INSTRUCTIONS
Thank you for choosing [address] [place] [address] [facility]  To Do:  FOR GAGAN Vazquez Likes  1. Continue follow up with Cardiology regarding low BP and heaert rate  2. Close blood sugar monitoring and continue follow up with Vandana Roger  3.  Continue follow up with [facility] include:  · Treating the underlying cause. For instance, if a medicine is causing your tachycardia, changing the dosage or stopping the medicine with your doctor’s guidance may correct the problem. · Lifestyle changes.  These include getting enough sleep a tachycardia? These can include:  · Blood clots or stroke  · Heart failure. With this problem, the heart muscle is so weak it no longer pumps blood well. · Sudden cardiac arrest. This is when the heart suddenly stops beating.   When should I call my health until your symptoms get better. · Keep a record of your symptoms and what you were doing when they occurred. Bring the record with you to your next appointment.   · Be aware of how quickly your blood pressure drops when you become dehydrated, spend a lot o

## 2018-04-10 NOTE — PROGRESS NOTES
Chief Complaint:   Patient presents with:  Dizziness: Follow up dizziness and fall     HPI:   This is a 42year old male     FALL  2 weeks ago fell while going to bathroom, felt dizzy and found himslef on the floor.  Checked BS in the AM and it was in the 7 2 g into the vein every 8 (eight) hours.  Disp:  Rfl:    Insulin Pen Needle (NOVOFINE g-NosticsOVER) 30G X 8 MM Does not apply Misc Use a new pen needle with each injection as directed by your doctor Disp: 50 each Rfl: 6   Glucose Blood In Vitro Strip Medicare 96%  Estimated body mass index is 29.65 kg/m² as calculated from the following:    Height as of 3/2/18: 68\". Weight as of 3/2/18: 195 lb. Vital signs reviewed. Appears stated age, well groomed.   GENERAL: well developed, well nourished, well hydrated,

## 2018-04-11 NOTE — TELEPHONE ENCOUNTER
From: Arina Tobin  To: Yobany Hart NP  Sent: 4/10/2018 1:50 PM CDT  Subject: Prescription Question    Limmilyndon Reveal seems to be working and I would like to continue that. Are we going with the higher dose?  I also need a refill on Levemir and the B

## 2018-04-13 ENCOUNTER — HOSPITAL ENCOUNTER (OUTPATIENT)
Dept: MRI IMAGING | Age: 38
Discharge: HOME | End: 2018-04-13
Attending: PODIATRIST
Payer: COMMERCIAL

## 2018-04-13 DIAGNOSIS — E10.621 TYPE I DIABETES MELLITUS WITH FOOT ULCER (HCC): ICD-10-CM

## 2018-04-13 DIAGNOSIS — L97.509 TYPE I DIABETES MELLITUS WITH FOOT ULCER (HCC): ICD-10-CM

## 2018-04-13 DIAGNOSIS — E10.40 TYPE 1 DIABETES MELLITUS WITH DIABETIC NEUROPATHY (HCC): ICD-10-CM

## 2018-04-13 PROCEDURE — A9575 INJ GADOTERATE MEGLUMI 0.1ML: HCPCS | Performed by: PODIATRIST

## 2018-04-13 PROCEDURE — 73723 MRI JOINT LWR EXTR W/O&W/DYE: CPT | Performed by: PODIATRIST

## 2018-04-16 ENCOUNTER — OFFICE VISIT (OUTPATIENT)
Dept: WOUND CARE | Facility: HOSPITAL | Age: 38
End: 2018-04-16
Attending: PODIATRIST
Payer: COMMERCIAL

## 2018-04-16 DIAGNOSIS — T81.89XA NONHEALING SURGICAL WOUND: ICD-10-CM

## 2018-04-16 DIAGNOSIS — E11.69 DIABETES MELLITUS TYPE 2 IN OBESE (HCC): ICD-10-CM

## 2018-04-16 DIAGNOSIS — E66.9 DIABETES MELLITUS TYPE 2 IN OBESE (HCC): ICD-10-CM

## 2018-04-16 DIAGNOSIS — E10.621 TYPE I DIABETES MELLITUS WITH FOOT ULCER (HCC): ICD-10-CM

## 2018-04-16 DIAGNOSIS — E10.40 TYPE 1 DIABETES MELLITUS WITH DIABETIC NEUROPATHY (HCC): Primary | ICD-10-CM

## 2018-04-16 DIAGNOSIS — L97.509 TYPE I DIABETES MELLITUS WITH FOOT ULCER (HCC): ICD-10-CM

## 2018-04-16 PROCEDURE — 87176 TISSUE HOMOGENIZATION CULTR: CPT

## 2018-04-16 PROCEDURE — 87070 CULTURE OTHR SPECIMN AEROBIC: CPT

## 2018-04-16 PROCEDURE — 11042 DBRDMT SUBQ TIS 1ST 20SQCM/<: CPT

## 2018-04-16 PROCEDURE — 97605 NEG PRS WND THER DME<=50SQCM: CPT

## 2018-04-16 PROCEDURE — 87186 SC STD MICRODIL/AGAR DIL: CPT

## 2018-04-16 PROCEDURE — 87077 CULTURE AEROBIC IDENTIFY: CPT

## 2018-04-16 PROCEDURE — 87147 CULTURE TYPE IMMUNOLOGIC: CPT

## 2018-04-16 PROCEDURE — 87205 SMEAR GRAM STAIN: CPT

## 2018-04-16 PROCEDURE — 11045 DBRDMT SUBQ TISS EACH ADDL: CPT

## 2018-04-18 ENCOUNTER — MED REC SCAN ONLY (OUTPATIENT)
Dept: FAMILY MEDICINE CLINIC | Facility: CLINIC | Age: 38
End: 2018-04-18

## 2018-04-23 ENCOUNTER — OFFICE VISIT (OUTPATIENT)
Dept: WOUND CARE | Facility: HOSPITAL | Age: 38
End: 2018-04-23
Attending: PODIATRIST
Payer: COMMERCIAL

## 2018-04-23 DIAGNOSIS — L97.509 TYPE I DIABETES MELLITUS WITH FOOT ULCER (HCC): ICD-10-CM

## 2018-04-23 DIAGNOSIS — E10.621 TYPE I DIABETES MELLITUS WITH FOOT ULCER (HCC): ICD-10-CM

## 2018-04-23 DIAGNOSIS — E66.9 DIABETES MELLITUS TYPE 2 IN OBESE (HCC): ICD-10-CM

## 2018-04-23 DIAGNOSIS — E10.40 TYPE 1 DIABETES MELLITUS WITH DIABETIC NEUROPATHY (HCC): Primary | ICD-10-CM

## 2018-04-23 DIAGNOSIS — E11.69 DIABETES MELLITUS TYPE 2 IN OBESE (HCC): ICD-10-CM

## 2018-04-23 PROCEDURE — 97605 NEG PRS WND THER DME<=50SQCM: CPT

## 2018-04-23 PROCEDURE — 11045 DBRDMT SUBQ TISS EACH ADDL: CPT

## 2018-04-23 PROCEDURE — 11042 DBRDMT SUBQ TIS 1ST 20SQCM/<: CPT

## 2018-04-23 RX ORDER — CEPHALEXIN 500 MG/1
500 CAPSULE ORAL 3 TIMES DAILY
Qty: 90 CAPSULE | Refills: 0 | Status: ON HOLD | OUTPATIENT
Start: 2018-04-23 | End: 2018-05-01

## 2018-04-23 NOTE — PROGRESS NOTES
[facility]                INFECTIOUS DISEASE PROGRESS NOTE    Betsy Tobin Patient Status:  Wound Series    1980 MRN [id number]   Location [address] [facility] Attending Aby Sanchez, TAMMY     PCP Sanya Huang Uncontrolled type 2 diabetes mellitus with diabetic peripheral angiopathy without gangrene ([address]     Diabetes mellitus type 2 in obese (HCC)     Obesity (BMI 30.0-34. 9)     Vitamin D deficiency     Elevated liver enzymes     Elevated serum GGT level

## 2018-04-24 ENCOUNTER — PRIOR ORIGINAL RECORDS (OUTPATIENT)
Dept: OTHER | Age: 38
End: 2018-04-24

## 2018-04-28 ENCOUNTER — ANESTHESIA EVENT (OUTPATIENT)
Dept: SURGERY | Facility: HOSPITAL | Age: 38
End: 2018-04-28

## 2018-04-28 ENCOUNTER — HOSPITAL ENCOUNTER (INPATIENT)
Facility: HOSPITAL | Age: 38
LOS: 4 days | Discharge: HOME HEALTH | DRG: 486 | End: 2018-05-02
Attending: EMERGENCY MEDICINE | Admitting: INTERNAL MEDICINE
Payer: COMMERCIAL

## 2018-04-28 ENCOUNTER — APPOINTMENT (OUTPATIENT)
Dept: GENERAL RADIOLOGY | Age: 38
DRG: 486 | End: 2018-04-28
Attending: EMERGENCY MEDICINE
Payer: COMMERCIAL

## 2018-04-28 DIAGNOSIS — L03.116 CELLULITIS OF LEFT LOWER EXTREMITY: Primary | ICD-10-CM

## 2018-04-28 DIAGNOSIS — M25.562 LEFT KNEE PAIN: ICD-10-CM

## 2018-04-28 PROCEDURE — 99223 1ST HOSP IP/OBS HIGH 75: CPT | Performed by: HOSPITALIST

## 2018-04-28 PROCEDURE — 73590 X-RAY EXAM OF LOWER LEG: CPT | Performed by: EMERGENCY MEDICINE

## 2018-04-28 RX ORDER — ACETAMINOPHEN AND CODEINE PHOSPHATE 300; 30 MG/1; MG/1
1 TABLET ORAL EVERY 4 HOURS PRN
Status: DISCONTINUED | OUTPATIENT
Start: 2018-04-28 | End: 2018-05-02

## 2018-04-28 RX ORDER — EZETIMIBE AND SIMVASTATIN 10; 80 MG/1; MG/1
1 TABLET ORAL
Status: DISCONTINUED | OUTPATIENT
Start: 2018-04-28 | End: 2018-04-28 | Stop reason: SDUPTHER

## 2018-04-28 RX ORDER — ACETAMINOPHEN 325 MG/1
650 TABLET ORAL EVERY 4 HOURS PRN
Status: DISCONTINUED | OUTPATIENT
Start: 2018-04-28 | End: 2018-05-02

## 2018-04-28 RX ORDER — HEPARIN SODIUM 5000 [USP'U]/ML
5000 INJECTION, SOLUTION INTRAVENOUS; SUBCUTANEOUS EVERY 8 HOURS SCHEDULED
Status: DISCONTINUED | OUTPATIENT
Start: 2018-04-28 | End: 2018-04-29

## 2018-04-28 RX ORDER — KETOROLAC TROMETHAMINE 30 MG/ML
30 INJECTION, SOLUTION INTRAMUSCULAR; INTRAVENOUS EVERY 6 HOURS PRN
Status: ACTIVE | OUTPATIENT
Start: 2018-04-28 | End: 2018-04-30

## 2018-04-28 RX ORDER — ACETAMINOPHEN AND CODEINE PHOSPHATE 300; 30 MG/1; MG/1
2 TABLET ORAL EVERY 4 HOURS PRN
Status: DISCONTINUED | OUTPATIENT
Start: 2018-04-28 | End: 2018-05-02

## 2018-04-28 RX ORDER — ONDANSETRON 2 MG/ML
4 INJECTION INTRAMUSCULAR; INTRAVENOUS EVERY 6 HOURS PRN
Status: DISCONTINUED | OUTPATIENT
Start: 2018-04-28 | End: 2018-05-02

## 2018-04-28 RX ORDER — KETOROLAC TROMETHAMINE 30 MG/ML
15 INJECTION, SOLUTION INTRAMUSCULAR; INTRAVENOUS EVERY 6 HOURS PRN
Status: ACTIVE | OUTPATIENT
Start: 2018-04-28 | End: 2018-04-30

## 2018-04-28 RX ORDER — LIDOCAINE HYDROCHLORIDE 10 MG/ML
INJECTION, SOLUTION EPIDURAL; INFILTRATION; INTRACAUDAL; PERINEURAL
Status: DISPENSED
Start: 2018-04-28 | End: 2018-04-29

## 2018-04-28 RX ORDER — DEXTROSE MONOHYDRATE 25 G/50ML
50 INJECTION, SOLUTION INTRAVENOUS
Status: DISCONTINUED | OUTPATIENT
Start: 2018-04-28 | End: 2018-05-02

## 2018-04-28 RX ORDER — SODIUM CHLORIDE 9 MG/ML
INJECTION, SOLUTION INTRAVENOUS CONTINUOUS
Status: DISCONTINUED | OUTPATIENT
Start: 2018-04-28 | End: 2018-04-29

## 2018-04-28 NOTE — CONSULTS
[facility]  Report of Consultation    Issac Tobin Patient Status:  Inpatient    1980 MRN [id number]   [facility] 5NW-A Attending Colton Iraheta MD   Hosp Day # 0 PCP Sarabjit Pack MD     Reason for Consultation:  Lef (TYLENOL) tab 650 mg, 650 mg, Oral, Q4H PRN **OR** Acetaminophen-Codeine #3 (TYLENOL #3) 300-30 MG tab 1 tablet, 1 tablet, Oral, Q4H PRN **OR** Acetaminophen-Codeine #3 (TYLENOL #3) 300-30 MG tab 2 tablet, 2 tablet, Oral, Q4H PRN  •  ondansetron HCl (ZOFRA changes.     Labs:  Recent Labs   Lab  04/28/18   0940   RBC  4.08*   HGB  12.0*   HCT  36.1*   MCV  88.5   MCH  29.4   MCHC  33.2   RDW  12.4   NEPRELIM  8.27*   WBC  11.3   PLT  348.0         Recent Labs   Lab  04/28/18   0940   GLU  191*   BUN  21*   CRE

## 2018-04-28 NOTE — PROGRESS NOTES
NURSING ADMISSION NOTE      Patient admitted via Wheelchair  Oriented to room. Safety precautions initiated. Bed in low position. Call light in reach. ADMITTED TO  FROM [facility] BY CAR ACCOMPANIED BY WIFE FOR LLE CELLULITIS

## 2018-04-28 NOTE — PROGRESS NOTES
[facility] dosing service    Initial Pharmacokinetic Consult for Vancomycin Dosing     Sona Walters is a 34year old male admitted today who is being treated for cellulitis. Pharmacy has been asked to dose Vancomycin by Dr. Pernell Merida.     He has No

## 2018-04-28 NOTE — CONSULTS
[facility]  Report of Consultation    Radhadustin Tobin Patient Status:  Inpatient    1980 MRN [id number]   [facility] 5NW-A Attending Brittani Jimenez MD   Hosp Day # 0 PCP Edgar Cruz MD     Date of Admission:  2018 Facility-Administered Medications:   •  glucose (DEX4) oral liquid 15 g, 15 g, Oral, Q15 Min PRN **OR** Glucose-Vitamin C (DEX-4) 4-0.006 g chewable tab 4 tablet, 4 tablet, Oral, Q15 Min PRN **OR** dextrose 50 % injection 50 mL, 50 mL, Intravenous, Q15 Min joint with palpation and range of motion is not symptomatic. Therefore over the tibial tuberosity if there is an infection it is most likely a superficial abscess.   Vascular: Pulses could not be palpated on the left patient does have a history of good blo to remove the wound VAC. Removing the wound VAC would jeopardize the skin grafts that have been placed there. Patient and nurse acknowledged understanding.

## 2018-04-28 NOTE — ED INITIAL ASSESSMENT (HPI)
Left knee pain since Sunday. Area red and slightly warm to touch.  Pt states he has irritation from using a rolling knee support for his leg

## 2018-04-28 NOTE — ED PROVIDER NOTES
Patient Seen in: [facility] Emergency Department In [place]    History   Patient presents with:  Knee Pain    Stated Complaint: left knee pain     HPI    The patient is a 47-year-old male with a history of diabetes, status post debridement of wound on his Oral  SpO2: 99 %  O2 Device: None (Room air)    Current:/80   Pulse 112   Temp 97.9 °F (36.6 °C) (Oral)   Resp 20   Ht 172.7 cm (5' 8\")   Wt 79.4 kg   SpO2 99%   BMI 26.61 kg/m²         Physical Exam    General: Alert and oriented in no distress.   Zoe Peralta Abnormal; Notable for the following:     Sed Rate 120 (*)     All other components within normal limits   POCT GLUCOSE - Abnormal; Notable for the following:     POC Glucose 131 (*)     All other components within normal limits   CBC W/ DIFFERENTIAL - Abno thickening involving mid pre patellar soft tissues centered over the cortical screw. EFFUSION:  None visible. OTHER:  Negative. CONCLUSION:  BONES:  There is a cortical screw traversing the proximal left tibia diaphysis. There is no acute fracture.

## 2018-04-28 NOTE — CONSULTS
INFECTIOUS DISEASE CONSULT NOTE    Francisca Tobin Patient Status:  Inpatient    1980 MRN [id number]   [facility] 5NW-A Attending Suha Ro MD   Hosp Day # 0 AURELIA Hernandez Min PRN **OR** Glucose-Vitamin C (DEX-4) 4-0.006 g chewable tab 4 tablet, 4 tablet, Oral, Q15 Min PRN **OR** dextrose 50 % injection 50 mL, 50 mL, Intravenous, Q15 Min PRN **OR** glucose (DEX4) oral liquid 30 g, 30 g, Oral, Q15 Min PRN **OR** Glucose-Vitam SpO2 98 %. HEENT: no scleral icterus or conjunctival injection. Moist mucous membranes. Neck: No lymphadenopathy. Supple. Respiratory: Clear to auscultation bilaterally. No wheezes. No rhonchi. Cardiovascular: S1, S2.  Regular rate and rhythm.   No m definite periosteal reaction. SOFT TISSUES:  Marked soft tissue swelling in thickening involving mid pre patellar soft tissues centered over the cortical screw.    Dictated by: Mateo Hendrickson MD on 4/28/2018 at 10:02     Approved by: Mateo Hendrickson, significant tendinosis or tears. LIGAMENTS:  Syndesmotic ligaments, ATF, PTF, CF, deltoid, and spring ligaments are intact without significant sprain. SINUS TARSI:  Relatively normal fat signal.  Cervical and Interosseous ligaments are unremarkable.   PLAN gbs. Will be covered with vanco for now    Case and plan d/w pt. D/w Dr Scarlet Thao. Thank you for allowing me to participate in the care of this patient. Please do not hesitate to call if you have any questions.    I will continue to follow with you and angelina

## 2018-04-28 NOTE — H&P
[facility]  History and Physical     Darreld Sadi Tobin Patient Status:  Inpatient    1980 MRN [id number]   [facility] 5NW-A Attending Jessica Burciaga MD   Hosp Day # 0 PCP David Dillon MD     Chief Complaint: Left l mouth every morning.  Disp: 30 tablet Rfl: 2   BD AUTOSHIELD DUO 30G X 5 MM Does not apply Misc USE A new pen needle WITH EACH injection AS DIRECTED by your doctor Disp: 300 each Rfl: 3   LEVEMIR FLEXTOUCH 100 UNIT/ML Subcutaneous Solution Pen-injector inje Pertinent positives and negatives noted in the HPI.     Physical Exam:    /73 (BP Location: Left arm)   Pulse 108   Temp 99.3 °F (37.4 °C) (Oral)   Resp 20   Ht 5' 8\" (1.727 m)   Wt 175 lb (79.4 kg)   SpO2 98%   BMI 26.61 kg/m²   General: No acute mellitus, type 2 on insulin  1. Levemir  2. Correctional   3. Accuchecks  4. Dyslipidemia, goal LDL < 100  1. Statin  5.  Essential hypertension, goal < 130/80    Quality:  · DVT Prophylaxis: Heparin    Plan of care discussed with patient, family, ID & RN.

## 2018-04-29 ENCOUNTER — ANESTHESIA (OUTPATIENT)
Dept: SURGERY | Facility: HOSPITAL | Age: 38
End: 2018-04-29

## 2018-04-29 ENCOUNTER — APPOINTMENT (OUTPATIENT)
Dept: CT IMAGING | Facility: HOSPITAL | Age: 38
DRG: 486 | End: 2018-04-29
Attending: HOSPITALIST
Payer: COMMERCIAL

## 2018-04-29 ENCOUNTER — SURGERY (OUTPATIENT)
Age: 38
End: 2018-04-29

## 2018-04-29 PROCEDURE — 0SPD04Z REMOVAL OF INTERNAL FIXATION DEVICE FROM LEFT KNEE JOINT, OPEN APPROACH: ICD-10-PCS | Performed by: ORTHOPAEDIC SURGERY

## 2018-04-29 PROCEDURE — 71275 CT ANGIOGRAPHY CHEST: CPT | Performed by: HOSPITALIST

## 2018-04-29 PROCEDURE — 0JDP0ZZ EXTRACTION OF LEFT LOWER LEG SUBCUTANEOUS TISSUE AND FASCIA, OPEN APPROACH: ICD-10-PCS | Performed by: ORTHOPAEDIC SURGERY

## 2018-04-29 PROCEDURE — 0Q9H0ZZ DRAINAGE OF LEFT TIBIA, OPEN APPROACH: ICD-10-PCS | Performed by: ORTHOPAEDIC SURGERY

## 2018-04-29 PROCEDURE — 99233 SBSQ HOSP IP/OBS HIGH 50: CPT | Performed by: HOSPITALIST

## 2018-04-29 RX ORDER — HYDROMORPHONE HYDROCHLORIDE 1 MG/ML
0.4 INJECTION, SOLUTION INTRAMUSCULAR; INTRAVENOUS; SUBCUTANEOUS EVERY 5 MIN PRN
Status: DISCONTINUED | OUTPATIENT
Start: 2018-04-29 | End: 2018-04-29 | Stop reason: HOSPADM

## 2018-04-29 RX ORDER — DOCUSATE SODIUM 100 MG/1
100 CAPSULE, LIQUID FILLED ORAL 2 TIMES DAILY
Status: DISCONTINUED | OUTPATIENT
Start: 2018-04-29 | End: 2018-05-02

## 2018-04-29 RX ORDER — ONDANSETRON 2 MG/ML
4 INJECTION INTRAMUSCULAR; INTRAVENOUS AS NEEDED
Status: DISCONTINUED | OUTPATIENT
Start: 2018-04-29 | End: 2018-04-29 | Stop reason: HOSPADM

## 2018-04-29 RX ORDER — MEPERIDINE HYDROCHLORIDE 25 MG/ML
12.5 INJECTION INTRAMUSCULAR; INTRAVENOUS; SUBCUTANEOUS AS NEEDED
Status: DISCONTINUED | OUTPATIENT
Start: 2018-04-29 | End: 2018-04-29 | Stop reason: HOSPADM

## 2018-04-29 RX ORDER — SODIUM CHLORIDE, SODIUM LACTATE, POTASSIUM CHLORIDE, CALCIUM CHLORIDE 600; 310; 30; 20 MG/100ML; MG/100ML; MG/100ML; MG/100ML
INJECTION, SOLUTION INTRAVENOUS CONTINUOUS
Status: DISCONTINUED | OUTPATIENT
Start: 2018-04-29 | End: 2018-04-29 | Stop reason: HOSPADM

## 2018-04-29 RX ORDER — NALOXONE HYDROCHLORIDE 0.4 MG/ML
80 INJECTION, SOLUTION INTRAMUSCULAR; INTRAVENOUS; SUBCUTANEOUS AS NEEDED
Status: DISCONTINUED | OUTPATIENT
Start: 2018-04-29 | End: 2018-04-29 | Stop reason: HOSPADM

## 2018-04-29 RX ORDER — METOCLOPRAMIDE HYDROCHLORIDE 5 MG/ML
10 INJECTION INTRAMUSCULAR; INTRAVENOUS AS NEEDED
Status: DISCONTINUED | OUTPATIENT
Start: 2018-04-29 | End: 2018-04-29 | Stop reason: HOSPADM

## 2018-04-29 RX ORDER — TEMAZEPAM 15 MG/1
15 CAPSULE ORAL NIGHTLY PRN
Status: DISCONTINUED | OUTPATIENT
Start: 2018-04-29 | End: 2018-05-02

## 2018-04-29 RX ORDER — CEFAZOLIN SODIUM/WATER 2 G/20 ML
2 SYRINGE (ML) INTRAVENOUS EVERY 8 HOURS
Status: COMPLETED | OUTPATIENT
Start: 2018-04-29 | End: 2018-04-30

## 2018-04-29 RX ORDER — DEXTROSE MONOHYDRATE 25 G/50ML
50 INJECTION, SOLUTION INTRAVENOUS
Status: DISCONTINUED | OUTPATIENT
Start: 2018-04-29 | End: 2018-04-29 | Stop reason: HOSPADM

## 2018-04-29 RX ORDER — INSULIN ASPART 100 [IU]/ML
INJECTION, SOLUTION INTRAVENOUS; SUBCUTANEOUS ONCE
Status: DISCONTINUED | OUTPATIENT
Start: 2018-04-29 | End: 2018-04-29 | Stop reason: HOSPADM

## 2018-04-29 RX ORDER — BISACODYL 10 MG
10 SUPPOSITORY, RECTAL RECTAL
Status: DISCONTINUED | OUTPATIENT
Start: 2018-04-29 | End: 2018-05-02

## 2018-04-29 RX ORDER — POLYETHYLENE GLYCOL 3350 17 G/17G
17 POWDER, FOR SOLUTION ORAL DAILY PRN
Status: DISCONTINUED | OUTPATIENT
Start: 2018-04-29 | End: 2018-05-02

## 2018-04-29 RX ORDER — HEPARIN SODIUM 5000 [USP'U]/ML
5000 INJECTION, SOLUTION INTRAVENOUS; SUBCUTANEOUS EVERY 8 HOURS SCHEDULED
Status: DISCONTINUED | OUTPATIENT
Start: 2018-04-30 | End: 2018-05-02

## 2018-04-29 RX ORDER — SODIUM CHLORIDE 9 MG/ML
INJECTION, SOLUTION INTRAVENOUS CONTINUOUS
Status: DISCONTINUED | OUTPATIENT
Start: 2018-04-29 | End: 2018-04-30

## 2018-04-29 NOTE — ANESTHESIA PREPROCEDURE EVALUATION
PRE-OP EVALUATION    Patient Name: Slava Tobin    Pre-op Diagnosis: Left knee pain [M25.562]    Procedure(s):  INCISION AND DRAINAGE LEFT KNEE AND REMOVAL OF SCREW    Surgeon(s) and Role:     Kym Burris MD - Primary    Pre-op vitals reviewed. ezetimibe (ZETIA) 10 mg, atorvastatin (LIPITOR) 40 mg for Vytorin 10-80 ([facility] only)  Oral Daily   Vancomycin HCl (VANCOCIN) 1,250 mg in sodium chloride 0.9 % 500 mL IVPB 15 mg/kg Intravenous Q12H   Lidocaine HCl (PF) (XYLOCAINE) 1 % injection SOLN MetFORMIN HCl 1000 MG Oral Tab Take 1 tablet (1,000 mg total) by mouth 2 (two) times daily with meals. Disp: 180 tablet Rfl: 0       Allergies: Patient has no known allergies. Anesthesia Evaluation    Patient summary reviewed.     Anesthetic Complica 04/28/2018   CA 9.4 04/28/2018            Airway      Mallampati: II  Mouth opening: >3 FB  TM distance: 4 - 6 cm  Neck ROM: full Cardiovascular    Cardiovascular exam normal.         Dental    No notable dental history.          Pulmonary    Pulmonary exam

## 2018-04-29 NOTE — ANESTHESIA POSTPROCEDURE EVALUATION
[address] [place] [address] Patient Status:  Inpatient   Age/Gender 34year old male MRN [id number]   Location [facility] Attending Gaye Marcum MD   Hosp Day # 1 PCP Murray Puentes MD       Anesthesia Post-o

## 2018-04-29 NOTE — BRIEF OP NOTE
Pre-Operative Diagnosis: left knee pretbial abscess.      Post-Operative Diagnosis:same     Procedure Performed:   Procedure(s):  INCISION AND DRAINAGE LEFT KNEE AND REMOVAL OF SCREW    Surgeon(s) and Role:     Greg Richardson MD - Primary    Assistant(s):

## 2018-04-29 NOTE — PROGRESS NOTES
[facility]  Progress Note     Jarret Tobin Patient Status:  Inpatient    1980 MRN [id number]   [facility] 5NW-A Attending Onelia Pike MD   Hosp Day # 1 PCP Gustavo Brooks MD     Chief Complaint: Left knee pain Units Subcutaneous Q8H [facility]   • Insulin Aspart Pen  2-10 Units Subcutaneous TID CC and HS   • insulin detemir  16 Units Subcutaneous Daily   • insulin detemir  20 Units Subcutaneous Nightly   • insulin detemir  10 Units Subcutaneous Once   • ezetimibe-atorv

## 2018-04-29 NOTE — HOME CARE LIAISON
PTNT CURRENT WITH [facility] EOE 6/20/18  WILL NEED ORDER ON OR BEFORE DC    THANKS  Tyson Blanchard

## 2018-04-29 NOTE — PROGRESS NOTES
ASPIRATION OF LEFT PRETIBIAL TUBERCLE WAS PERFORMED BY DR. Jose Campbell . ASPIRATE SENT TO LAB FOR CULTURES AND CELL COUNT AS PER MD ORDER. PROCEDURE WAS TOLERATED WELL BY PT AND LEFT KNEE SITE WITHOUT COMPLICATIONS AND WITH DRY AND INTACT DRESSING. WILL

## 2018-04-29 NOTE — PHYSICAL THERAPY NOTE
Received order for PT eval.  Confirmed with RN that plan is for patient to go to OR today. Will hold PT eval at this time, patient will require new PT eval orders s/p procedure.

## 2018-04-30 ENCOUNTER — APPOINTMENT (OUTPATIENT)
Dept: WOUND CARE | Facility: HOSPITAL | Age: 38
End: 2018-04-30
Attending: PODIATRIST
Payer: COMMERCIAL

## 2018-04-30 ENCOUNTER — PRIOR ORIGINAL RECORDS (OUTPATIENT)
Dept: OTHER | Age: 38
End: 2018-04-30

## 2018-04-30 PROCEDURE — 99233 SBSQ HOSP IP/OBS HIGH 50: CPT | Performed by: HOSPITALIST

## 2018-04-30 NOTE — PROGRESS NOTES
Pt is a 35 y/o male admitted with left leg cellulitis and abscess. s/p I&D of the tibial abscess,POD #1.pt also has the wound vac.per Podiatry \"not to allow anyone to remove the wound VAC.'pt is aware. immobilizer in place. pt feels better today. on iv vanc,d

## 2018-04-30 NOTE — PROGRESS NOTES
Multidisciplinary Discharge Rounds held 4/30/2018. Treatment team members present today include , , Charge Nurse,  Nurse, RT, PT and Pharmacy caring for [employer].      Other care providers present:    Patient Active P

## 2018-04-30 NOTE — PHYSICAL THERAPY NOTE
Attempted x2 this date. Pt with other providers both attempts. Will check back on 5/1 to evaluate as appropriate.

## 2018-04-30 NOTE — PROGRESS NOTES
04/30/18  1155   BP: 123/74   Pulse: 108   Resp: 16   Temp: 98.8 °F (37.1 °C)   Patient was seen resting comfortably in bed pain is better at his knee today but was bad last night complaining of any heel pain. Today the wound VAC will was removed.     Obj

## 2018-04-30 NOTE — CONSULTS
Vancomycin subsequent dosing note from pharmacy:      Patient was in OR when Vancomycin trough was due this evening @17:30. Will reorder trough with AM dose of Vancomycin tomorrow @06:15. Pharmacy will follow up. Thank you. Kiki Portillo Pharm.

## 2018-04-30 NOTE — OPERATIVE REPORT
[facility]    PATIENT'S NAME: Kody Phillips   ATTENDING PHYSICIAN: Lenard Severe. Tailor, M.D. OPERATING PHYSICIAN: Yvan Brooks M.D.    PATIENT ACCOUNT#:   [de-identified]    LOCATION:  [address] [place] [address]  MEDICAL RECORD #:   [id number]       DATE OF BIRTH devitalized-looking tissue. I took the screw out in its entirety. Then, I irrigated this infected area using 6 L of pulse lavage. After 3 L, I debrided further, and I finished off the rest of the case with 3 L more of lavage.  I lightly packed the wound

## 2018-04-30 NOTE — PROGRESS NOTES
[facility]  Progress Note     Jeromy Tobin Patient Status:  Inpatient    1980 MRN [id number]   [facility] 5NW-A Attending Юлия Velasco MD   Hosp Day # 2 PCP Gilma Rondon MD     Chief Complaint: Left knee pain vancomycin  1,500 mg Intravenous Q12H   • Heparin Sodium (Porcine)  5,000 Units Subcutaneous Q8H [facility]   • docusate sodium  100 mg Oral BID   • Insulin Aspart Pen  2-10 Units Subcutaneous TID CC and HS   • insulin detemir  16 Units Subcutaneous Daily   • ins

## 2018-04-30 NOTE — HISTORICAL OFFICE NOTE
Jarvissam Yumiko  : 1980  ACCOUNT:  [id number]  [phone]  PCP: Dr. Awilda George     TODAY'S DATE: 2018  DICTATED BY:  [Dr. Taurus Sainz: [consult.]    HPI:    [On 2018, Phoenix Agarwal a Allergies    MEDICATIONS: Selected prescriptions see below    VITAL SIGNS: [B/P - 122/76 , Pulse - 122, Weight -  178, Height -   68 , BMI - 27.1 ]    CONS: well developed, well nourished. WEIGHT: BMI parameters reviewed and discussed.  HEAD/FACE: no trauma 04/02/18 MetFORMIN HCl         1000MG    one tablet twice daily                   04/02/18 Valsartan             80MG      one tablet daily

## 2018-04-30 NOTE — PROGRESS NOTES
[facility] dosing service    Follow-up Pharmacokinetic Consult for Vancomycin Dosing     Aamir Mendes is a 34year old male admitted on 4/28/18 who is being treated for Left pretibial tissue abscess with hardware involvement s/p I&D on 4/29/18. (based on Trough of 10 ug/mL, pharmacokinetics, weight and renal function)    2. Pharmacy will re-check Vancomycin trough level prior to the 3rd dose. Goal trough level 15-20 ug/mL.     3.  Pharmacy will need BUN/Scr daily while on Vancomycin to assess re

## 2018-04-30 NOTE — PLAN OF CARE
Diabetes/Glucose Control    • Glucose maintained within prescribed range Progressing        MUSCULOSKELETAL - ADULT    • Return mobility to safest level of function Progressing    • Maintain proper alignment of affected body part Progressing        PAIN -

## 2018-04-30 NOTE — PROGRESS NOTES
s/p I&D left pretibia bursa and removal of bone screw    No complaints. Temp:  [98 °F (36.7 °C)-99.4 °F (37.4 °C)] 98.3 °F (36.8 °C)  Pulse:  [104-116] 108  Resp:  [14-18] 16  BP: (102-147)/(64-95) 146/86    No distress.   Left leg in immobilizer and in

## 2018-04-30 NOTE — PLAN OF CARE
Problem: Patient/Family Goals  Goal: Patient/Family Short Term Goal  Patient's Short Term Goal: 4/28 pm- sleep  04/29/2018 - \"GET A SLEEPING PILL SO I CAN SLEEP\"  4/30-resolve infection    Interventions:  4/30-iv abx   - Provide comfort measures and mini

## 2018-04-30 NOTE — PAYOR COMM NOTE
--------------  ADMISSION REVIEW     Payor: [address] [place] [address]  Subscriber #:  [id number]  Authorization Number: N/A    Admit date: 4/28/18  Admit time: 80       Admitting Physician: Marci Darnell MD  Attending Physician:  Morgan Jalloh MD HISTORY      Comment: ACL REPAIR    Review of Systems  Positive for stated complaint: left knee pain   Other systems are as noted in HPI. Constitutional and vital signs reviewed. All other systems reviewed and negative except as noted above.     Physica interaction, cooperative with exam    ED Course     Labs Reviewed   COMP METABOLIC PANEL (14) - Abnormal; Notable for the following:        Result Value    Glucose 191 (*)     BUN 21 (*)     Alkaline Phosphatase 295 (*)     Total Protein 8.9 (*)     Albumi discussed patient with Dr. Jerica Renteria of infectious diseases and Dr. Maddie Breen of general surgery. He was stable for transfer and his wife's vehicle [facility].      Admission disposition: 4/28/2018 10:03 AM  Disposition and Plan   Clinical Impression:  Cellulitis o encounter. Current Outpatient Prescriptions on File Prior to Encounter:  Empagliflozin (JARDIANCE) 25 MG Oral Tab Take 1 tablet by mouth every morning.  Disp: 30 tablet Rfl: 2   BD AUTOSHIELD DUO 30G X 5 MM Does not apply Misc USE A new pen needle WITH EA negatives noted in the HPI. Physical Exam:    /73 (BP Location: Left arm)   Pulse 108   Temp 99.3 °F (37.4 °C) (Oral)   Resp 20   Ht 5' 8\" (1.727 m)   Wt 175 lb (79.4 kg)   SpO2 98%   BMI 26.61 kg/m²    General: No acute distress.  Alert and orien 3. Accuchecks  4. Dyslipidemia, goal LDL < 100  1. Statin  5. Essential hypertension, goal < 130/80  Quality:  · DVT Prophylaxis: Heparin  Plan of care discussed with patient, family, ID & RN.   Danny Ochoa MD  4/28/2018    MEDICATIONS ADMINISTERED IN insulin detemir (LEVEMIR) 100 UNIT/ML flextouch 16 Units     Date Action Dose Route User    4/30/2018 0803 Given 16 Units Subcutaneous (Left Lower Abdomen) Larisa Garcia, RN      insulin detemir (LEVEMIR) 100 UNIT/ML flextouch 20 Units     Date Act

## 2018-04-30 NOTE — CONSULTS
[facility]  Cardiology Consultation    Radha Tobin Patient Status:  Inpatient    1980 MRN [id number]   [facility] 5NW-A Attending Brittani Jimenez MD   Hosp Day # 2 PCP Minor Slaughter MD     [facility] for [facility] SERVICES, [facility] ([facility] Known Allergies    Medications:    Current Facility-Administered Medications:   •  Vancomycin HCl (VANCOCIN) 1,500 mg in sodium chloride 0.9 % 500 mL IVPB, 1,500 mg, Intravenous, Q12H  •  Heparin Sodium (Porcine) 5000 UNIT/ML injection 5,000 Units, 5,000 U (LIPITOR) 40 mg for Vytorin 10-80 (EEH only), , Oral, Daily    Review of Systems:  A comprehensive review of systems was negative if not otherwise mention in above HPI.     /74 (BP Location: Right arm)   Pulse 108   Temp 98.8 °F (37.1 °C) (Oral)   Res function was vigorous. The estimated ejection fraction was 65-70%. No diagnostic evidence for regional wall motion abnormalities.   2. Right ventricle: Systolic function was normal.  3. Pulmonary arteries: Systolic pressure could not be accurately estimated

## 2018-04-30 NOTE — CONSULTS
[facility]  Report of Inpatient Wound Care Consultation    Nazia Ai Muellerlizeth Patient Status:  Inpatient    1980 MRN [id number]   [facility] 5NW-A Attending Romeo Castelalno MD   Hosp Day # 2 PCP Leti Hargrove MD     Reason f --    TP  8.9*   --    --    --    --    --    --    --    ESRML  120*   --    --    --    --    --    --    --    PGLU   --    < >   --    < >  117*   --   120*  215*    < > = values in this interval not displayed.          ASSESSMENT  Left heel Wound V

## 2018-05-01 PROCEDURE — 99232 SBSQ HOSP IP/OBS MODERATE 35: CPT | Performed by: HOSPITALIST

## 2018-05-01 RX ORDER — ACETAMINOPHEN AND CODEINE PHOSPHATE 300; 30 MG/1; MG/1
1 TABLET ORAL EVERY 4 HOURS PRN
Qty: 30 TABLET | Refills: 0 | Status: SHIPPED | OUTPATIENT
Start: 2018-05-01 | End: 2018-09-25

## 2018-05-01 RX ORDER — SODIUM CHLORIDE 0.9 % (FLUSH) 0.9 %
10 SYRINGE (ML) INJECTION AS NEEDED
Status: DISCONTINUED | OUTPATIENT
Start: 2018-05-01 | End: 2018-05-02

## 2018-05-01 RX ORDER — CEFAZOLIN SODIUM/WATER 2 G/20 ML
2 SYRINGE (ML) INTRAVENOUS EVERY 8 HOURS
Status: DISCONTINUED | OUTPATIENT
Start: 2018-05-01 | End: 2018-05-02

## 2018-05-01 RX ORDER — CEFAZOLIN SODIUM/WATER 2 G/20 ML
2 SYRINGE (ML) INTRAVENOUS EVERY 8 HOURS
Qty: 1800 ML | Refills: 1 | Status: SHIPPED | OUTPATIENT
Start: 2018-05-02 | End: 2018-06-12

## 2018-05-01 NOTE — PAYOR COMM NOTE
--------------  CONTINUED STAY REVIEW    Payor: [address] [facility]  Subscriber #:  [id number]  Authorization Number: [id number]    Admit date: 4/28/18  Admit time: 80    Admitting Physician: Roly Mcdonald MD  Attending Physician:  Jenn Lind 1. SP removal of infected bone screw and I/D of pretibial abscess  CX: Staph aureus, ox sensitive  -replace Vancomycin with Cefazolin  PICC line  Dc planning for home ivabx, Cefazolin 6 weeks  SW/case management consult   Jean-Paul Steve MD  [facility] Anticipate home with PICC and IV abx once arranged and cleared by all teams.   PTamaurilio LICEA    MEDICATIONS ADMINISTERED IN LAST 1 DAY:  acetaminophen (TYLENOL) tab 650 mg     Date Action Dose Route User    4/30/2018 1719 Given 650 mg Oral Magda Garcia, 5/1/2018 6446 New Bag 1500 mg Intravenous Jenny Garay RN    4/30/2018 1800 New Bag 1500 mg Intravenous Elsa Velarde RN          FOR CONTINUED REVIEW/APPROVAL OF INPT ADMISSION    PLEASE FAX ALL INPT DAYS AS CERTIFIED ALONG W/NRD

## 2018-05-01 NOTE — PROGRESS NOTES
[facility]                INFECTIOUS DISEASE PROGRESS NOTE     Patient Status:  Inpatient    1980 MRN [id number]   [facility] 5NW-A Attending Liam Zaragoza MD   Hosp Day # 3 PCP Kelly Morfin MD Blood Culture FREQ X 2 [[id number]] Collected: 04/28/18 9950   Order Status: Completed Lab Status: Preliminary result Updated: 04/30/18 1200   Specimen: Blood from Blood,peripheral     Blood Culture Result No Growth 2 Days   Blood Culture FREQ X 2 [[id number] (methicillin susceptible Staphylococcus aureus)     Streptococcal infection     Cellulitis of left lower extremity     Type 2 diabetes mellitus with complication, with long-term current use of insulin (HCC)     Tachycardia      ASSESSMENT/PLAN:  1.

## 2018-05-01 NOTE — PROGRESS NOTES
Pt is a 33 y/o male admitted with left leg cellulitis and abscess. s/p I&D of the tibial abscess,POD #2.pt also has the wound vac.per Podiatry \"not to allow anyone to remove the wound VAC.'pt is aware. immobilizer in place. pt feels better today. on iv ancef.

## 2018-05-01 NOTE — PHYSICAL THERAPY NOTE
PHYSICAL THERAPY QUICK EVALUATION - INPATIENT    Room Number: 520/520-A  Evaluation Date: 5/1/2018  Presenting Problem: LLE cellulitis  Physician Order: PT Eval and Treat    Problem List  Principal Problem:    Cellulitis of left lower extremity  Active P LLE with KEENA boot in place    NEUROLOGICAL FINDINGS                      AM-PAC '6-Clicks' INPATIENT SHORT FORM - BASIC MOBILITY  How much difficulty does the patient currently have. ..  -   Turning over in bed (including adjusting bedclothes, sheets and celluitis. Pertinent comorbidities and personal factors impacting therapy include recent NWB of LLE since previous admit. Functional outcome measures completed include [facility].   The AM-PAC '6-Clicks' Inpatient Basic Mobility Short Form was completed and th

## 2018-05-01 NOTE — PROGRESS NOTES
No major pain    Temp:  [98.3 °F (36.8 °C)-100.1 °F (37.8 °C)] 98.5 °F (36.9 °C)  Pulse:  [101-111] 109  Resp:  [16-20] 18  BP: (117-146)/(74-86) 126/85    No distress. Knee wound looks good. Minimal swelling.   Calf is NT.    s/p left pretibia I&D and sc

## 2018-05-01 NOTE — PROGRESS NOTES
[facility]  Progress Note     Arina Tobin Patient Status:  Inpatient    1980 MRN [id number]   [facility] 5NW-A Attending Yanira Lugo MD   Hosp Day # 3 PCP Maria G George MD     Chief Complaint: Left knee pain Subcutaneous TID CC and HS   • insulin detemir  16 Units Subcutaneous Daily   • insulin detemir  20 Units Subcutaneous Nightly   • insulin detemir  10 Units Subcutaneous Once   • ezetimibe-atorvastatin (VYTORIN 10/80) combination tablet   Oral Daily 28.8   MCHC  32.3  31.9  32.2   RDW  12.3  12.2  12.3   NEPRELIM  5.80  6.36  7.28*   WBC  8.8  9.1  10.2   PLT  284.0  291.0  308.0       Recent Labs   Lab  04/29/18   0610  04/30/18   0615  05/01/18   0612   GLU  166*  115*  148*   BUN  19  14  10   CREA

## 2018-05-01 NOTE — PLAN OF CARE
Problem: Patient/Family Goals  Goal: Patient/Family Short Term Goal  Patient's Short Term Goal: 4/28 pm- sleep  04/29/2018 - \"GET A SLEEPING PILL SO I CAN SLEEP\"  4/30-resolve infection  4/30 PM: get some rest & go home soon  5/-resolve infection  Interv

## 2018-05-01 NOTE — PROGRESS NOTES
Multidisciplinary Discharge Rounds held 5/1/2018. Treatment team members present today include , , Charge Nurse,  Nurse, RT, PT and Pharmacy caring for [employer].      Other care providers present:    Patient Active Pr

## 2018-05-02 VITALS
TEMPERATURE: 99 F | HEIGHT: 68 IN | RESPIRATION RATE: 22 BRPM | WEIGHT: 184.38 LBS | HEART RATE: 93 BPM | DIASTOLIC BLOOD PRESSURE: 85 MMHG | BODY MASS INDEX: 27.94 KG/M2 | SYSTOLIC BLOOD PRESSURE: 124 MMHG | OXYGEN SATURATION: 96 %

## 2018-05-02 PROCEDURE — 99232 SBSQ HOSP IP/OBS MODERATE 35: CPT | Performed by: STUDENT IN AN ORGANIZED HEALTH CARE EDUCATION/TRAINING PROGRAM

## 2018-05-02 PROCEDURE — 99239 HOSP IP/OBS DSCHRG MGMT >30: CPT | Performed by: STUDENT IN AN ORGANIZED HEALTH CARE EDUCATION/TRAINING PROGRAM

## 2018-05-02 NOTE — PROGRESS NOTES
[facility]  Progress Note     Kayleigh Tobin Patient Status:  Inpatient    1980 MRN [id number]   [facility] 5NW-A Attending Jenelle Gutiérrez MD   Hosp Day # 4 PCP Krysten Chris MD     Chief Complaint: Left knee pain detemir  20 Units Subcutaneous Nightly   • insulin detemir  10 Units Subcutaneous Once   • ezetimibe-atorvastatin (VYTORIN 10/80) combination tablet   Oral Daily       ASSESSMENT / PLAN:         1.  Left lower ext pretibial cellulitis with abscess sp I&D an

## 2018-05-02 NOTE — PROGRESS NOTES
NURSING DISCHARGE NOTE    Discharged Home via Wheelchair. Accompanied by Spouse  Belongings Taken by patient/family. Pt discharged home with Right PICC line saline locked.  1630 Ancef dose administered prior to discharge and pt instructed that next

## 2018-05-02 NOTE — PAYOR COMM NOTE
--------------  CONTINUED STAY REVIEW    Payor: [facility]  Subscriber #:  [id number]  Authorization Number: [id number]    Admit date: 4/28/18  Admit time: 80    Admitting Physician: Sarah Gan MD  Attending Physician:  Kelly Martin Dose Route User    5/2/2018 1201 Given 6 Units Subcutaneous (Left Upper Arm) Meredith Watson RN    5/2/2018 7957 Given 2 Units Subcutaneous (Left Upper Arm) Meredith Watson RN    5/1/2018 214 Given 6 Units Subcutaneous (Right Lower Abdomen) Tessie Wheeler RN

## 2018-05-02 NOTE — HOME CARE LIAISON
Met with patient at bedside. Discussed resumption of home care services and that Con Muro RN is scheduled to see patient tomorrow at 8am.  Patient verbalized understanding.

## 2018-05-02 NOTE — PLAN OF CARE
Diabetes/Glucose Control    • Glucose maintained within prescribed range Progressing        Impaired Functional Mobility    • Achieve highest/safest level of mobility/gait Progressing        MUSCULOSKELETAL - ADULT    • Return mobility to safest level of f

## 2018-05-02 NOTE — PROGRESS NOTES
[facility]                INFECTIOUS DISEASE PROGRESS NOTE    Jarret Tobin Patient Status:  Inpatient    1980 MRN [id number]   [facility] 5NW-A Attending Onelia Pike MD   Hosp Day # 4 PCP Gustavo Brooks MD Order Status: Completed Lab Status: Preliminary result Updated: 04/30/18 1200   Specimen: Blood from Blood,peripheral     Blood Culture Result No Growth 2 Days   Body Fluid Cult Aerobic and Anaerobic Once [[id number]] (Abnormal)  Collected: 04/28/18 0644 1. SP removal of infected bone screw and I/D of pretibial abscess  CX: Staph aureus, ox sensitive  Continue cefazolin 6 weeks  Home today      David Curry MD  [facility]  [phone]

## 2018-05-03 ENCOUNTER — PATIENT OUTREACH (OUTPATIENT)
Dept: CASE MANAGEMENT | Age: 38
End: 2018-05-03

## 2018-05-03 ENCOUNTER — TELEPHONE (OUTPATIENT)
Dept: FAMILY MEDICINE CLINIC | Facility: CLINIC | Age: 38
End: 2018-05-03

## 2018-05-03 DIAGNOSIS — Z02.9 ENCOUNTERS FOR ADMINISTRATIVE PURPOSE: ICD-10-CM

## 2018-05-03 NOTE — H&P
[facility]PITAL[facility]  DISCHARGE SUMMARY     Dennissofi Tobin Patient Status:  Inpatient    1980 MRN [id number]   [facility] 5NW-A Attending No att. providers found   Hosp Day # 4 PCP Daniel Garcia MD     Date of Admission:  IV until 6/12/2018. The pt was noted to have sinus tachycardia during hospital stay ; CTA was negative for PE and his TSH was normal. He will have an OP holter placed once infection resolves per cardiology. D/C when cleared by consultants.      Procedures Pen Needle 30G X 8 MM Misc  Commonly known as:  Patrecijared Meredith      Use a new pen needle with each injection as directed by your doctor   Quantity:  50 each  Refills:  6     BD AUTOSHIELD DUO 30G X 5 MM Misc  Generic drug:  Insulin Pen Needle      USE assistant      Vital signs:  Temp:  [98.3 °F (36.8 °C)-98.8 °F (37.1 °C)] 98.8 °F (37.1 °C)  Pulse:  [93] 93  Resp:  [22] 22  BP: (114-124)/(85) 124/85    Physical Exam:    General: No acute distress. Respiratory: Clear to auscultation bilaterally.  No wh

## 2018-05-03 NOTE — PAYOR COMM NOTE
--------------  DISCHARGE REVIEW    Payor: [facility]  Subscriber #:  [id number]  Authorization Number: [id number]    Admit date: 4/28/18  Admit time:  9497  Discharge Date: 5/2/2018  4:44 PM     Admitting Physician: Lisa Miller MD  Atte

## 2018-05-03 NOTE — PROGRESS NOTES
Initial Post Discharge Follow Up   Discharge Date: 5/2/18  Contact Date: 5/3/2018    Consent Verification:  Assessment Completed With: Patient  HIPAA Verified?   Yes    Discharge Dx:  S/p INCISION AND DRAINAGE LEFT KNEE AND REMOVAL OF SCREW on 4/29/18 wi Rfl: 2   BD AUTOSHIELD DUO 30G X 5 MM Does not apply Misc USE A new pen needle WITH EACH injection AS DIRECTED by your doctor Disp: 300 each Rfl: 3   LEVEMIR FLEXTOUCH 100 UNIT/ML Subcutaneous Solution Pen-injector inject 16 UNITS into the skin every am an have any questions about your new medication? No  • Did you  your discharge medications when you left the hospital? Yes  • May I go over your medications with you to make sure we are not missing anything? yes  • Are there any reasons that keep you fr [address] [place] [address] [address]  [phone]          PCP TCM/HFU appointment: scheduled at D/C within 7-14 days  n

## 2018-05-03 NOTE — TELEPHONE ENCOUNTER
Spoke to pt for TCM today. Pt does not have HFU appt scheduled at this time. Pt refused to schedule during TCM call. TCM/HFU appt recommended by 5/9/18 as pt is a high risk for readmission. Please advise.     BOOK BY DATE (last date for TCM): 5/16/18

## 2018-05-03 NOTE — CM/SW NOTE
Patient discharged on 5/2/18 as previously planned. 05/03/18 0800   Discharge disposition   Expected discharge disposition Home-Health   Name of Facillity/Home Care/Hospice Residential   Home services after discharge Skilled home care; Other (comment)

## 2018-05-04 NOTE — WOUND PROGRESS NOTE
[facility]  Report of Inpatient Wound Care Progress Note    Slava Syed Tobin Patient Status:  Inpatient    1980 MRN [id number]   [facility] 5NW-A Attending No att. providers found   Hosp Day # 4 PCP Kaity Rutledge MD     Late entry charting

## 2018-05-05 NOTE — DISCHARGE SUMMARY
[facility] HOSPITALIST  DISCHARGE SUMMARY            James Tobin Patient Status:  Inpatient    1980 MRN [id number]   [facility] 5NW-A Attending No att. providers found   Hosp Day # 4 PCP Jonna Rubin MD      Date of Admission continue ancef IV until 6/12/2018. The pt was noted to have sinus tachycardia during hospital stay ; CTA was negative for PE and his TSH was normal. He will have an OP holter placed once infection resolves per cardiology. D/C when cleared by consultants. Test 4 times daily    Quantity:  400 strip  Refills:  3      Insulin Pen Needle 30G X 8 MM Misc  Commonly known as:  Tommy Florentino       Use a new pen needle with each injection as directed by your doctor    Quantity:  50 each  Refills:  6      BD AUT [address] [facility]  [phone]     In 2 weeks  Dr. Maycol Moreno physician assistant        Vital signs:  Temp:  [98.3 °F (36.8 °C)-98.8 °F (37.1 °C)] 98.8 °F (37.1 °C)  Pulse:  [93] 93  Resp:  [22] 22  BP: (114-124)/(85) 124/85     Physical

## 2018-05-07 ENCOUNTER — OFFICE VISIT (OUTPATIENT)
Dept: WOUND CARE | Facility: HOSPITAL | Age: 38
End: 2018-05-07
Attending: PODIATRIST
Payer: COMMERCIAL

## 2018-05-07 ENCOUNTER — LAB REQUISITION (OUTPATIENT)
Dept: LAB | Age: 38
End: 2018-05-07
Attending: INTERNAL MEDICINE
Payer: COMMERCIAL

## 2018-05-07 DIAGNOSIS — E10.621 TYPE I DIABETES MELLITUS WITH FOOT ULCER (HCC): ICD-10-CM

## 2018-05-07 DIAGNOSIS — I10 ESSENTIAL (PRIMARY) HYPERTENSION: ICD-10-CM

## 2018-05-07 DIAGNOSIS — E10.40 TYPE 1 DIABETES MELLITUS WITH DIABETIC NEUROPATHY (HCC): Primary | ICD-10-CM

## 2018-05-07 DIAGNOSIS — E11.621 TYPE 2 DIABETES MELLITUS WITH FOOT ULCER (CODE) (HCC): ICD-10-CM

## 2018-05-07 DIAGNOSIS — L97.509 TYPE I DIABETES MELLITUS WITH FOOT ULCER (HCC): ICD-10-CM

## 2018-05-07 PROCEDURE — 11045 DBRDMT SUBQ TISS EACH ADDL: CPT

## 2018-05-07 PROCEDURE — 97605 NEG PRS WND THER DME<=50SQCM: CPT

## 2018-05-07 PROCEDURE — 80053 COMPREHEN METABOLIC PANEL: CPT | Performed by: INTERNAL MEDICINE

## 2018-05-07 PROCEDURE — 85025 COMPLETE CBC W/AUTO DIFF WBC: CPT | Performed by: INTERNAL MEDICINE

## 2018-05-07 PROCEDURE — 11042 DBRDMT SUBQ TIS 1ST 20SQCM/<: CPT

## 2018-05-14 ENCOUNTER — MED REC SCAN ONLY (OUTPATIENT)
Dept: FAMILY MEDICINE CLINIC | Facility: CLINIC | Age: 38
End: 2018-05-14

## 2018-05-14 ENCOUNTER — OFFICE VISIT (OUTPATIENT)
Dept: WOUND CARE | Facility: HOSPITAL | Age: 38
End: 2018-05-14
Attending: PODIATRIST
Payer: COMMERCIAL

## 2018-05-14 ENCOUNTER — LAB REQUISITION (OUTPATIENT)
Dept: LAB | Facility: HOSPITAL | Age: 38
End: 2018-05-14
Payer: COMMERCIAL

## 2018-05-14 DIAGNOSIS — I10 ESSENTIAL (PRIMARY) HYPERTENSION: ICD-10-CM

## 2018-05-14 DIAGNOSIS — E10.40 TYPE 1 DIABETES MELLITUS WITH DIABETIC NEUROPATHY (HCC): Primary | ICD-10-CM

## 2018-05-14 DIAGNOSIS — E10.621 TYPE I DIABETES MELLITUS WITH FOOT ULCER (HCC): ICD-10-CM

## 2018-05-14 DIAGNOSIS — E78.5 HYPERLIPIDEMIA: ICD-10-CM

## 2018-05-14 DIAGNOSIS — L97.509 TYPE I DIABETES MELLITUS WITH FOOT ULCER (HCC): ICD-10-CM

## 2018-05-14 DIAGNOSIS — L97.424 NON-PRESSURE CHRONIC ULCER OF LEFT HEEL AND MIDFOOT WITH NECROSIS OF BONE (HCC): ICD-10-CM

## 2018-05-14 PROCEDURE — 85025 COMPLETE CBC W/AUTO DIFF WBC: CPT | Performed by: INTERNAL MEDICINE

## 2018-05-14 PROCEDURE — 80053 COMPREHEN METABOLIC PANEL: CPT | Performed by: INTERNAL MEDICINE

## 2018-05-14 PROCEDURE — 97605 NEG PRS WND THER DME<=50SQCM: CPT

## 2018-05-14 PROCEDURE — 11045 DBRDMT SUBQ TISS EACH ADDL: CPT

## 2018-05-14 PROCEDURE — 11042 DBRDMT SUBQ TIS 1ST 20SQCM/<: CPT

## 2018-05-16 PROBLEM — Z98.890 S/P LEFT KNEE ARTHROSCOPY: Status: ACTIVE | Noted: 2018-05-16

## 2018-05-16 PROBLEM — Z98.890 S/P LEFT KNEE SURGERY: Status: ACTIVE | Noted: 2018-05-16

## 2018-05-21 ENCOUNTER — OFFICE VISIT (OUTPATIENT)
Dept: WOUND CARE | Facility: HOSPITAL | Age: 38
End: 2018-05-21
Attending: PODIATRIST
Payer: COMMERCIAL

## 2018-05-21 ENCOUNTER — LAB REQUISITION (OUTPATIENT)
Dept: LAB | Age: 38
End: 2018-05-21
Attending: INTERNAL MEDICINE
Payer: COMMERCIAL

## 2018-05-21 DIAGNOSIS — E10.40 TYPE 1 DIABETES MELLITUS WITH DIABETIC NEUROPATHY (HCC): Primary | ICD-10-CM

## 2018-05-21 DIAGNOSIS — E11.69 DIABETES MELLITUS TYPE 2 IN OBESE (HCC): ICD-10-CM

## 2018-05-21 DIAGNOSIS — E11.621 TYPE 2 DIABETES MELLITUS WITH FOOT ULCER (CODE) (HCC): ICD-10-CM

## 2018-05-21 DIAGNOSIS — I10 ESSENTIAL (PRIMARY) HYPERTENSION: ICD-10-CM

## 2018-05-21 DIAGNOSIS — IMO0001 UNCONTROLLED TYPE 2 DIABETES MELLITUS WITHOUT COMPLICATION, WITH LONG-TERM CURRENT USE OF INSULIN: ICD-10-CM

## 2018-05-21 DIAGNOSIS — E10.621 TYPE I DIABETES MELLITUS WITH FOOT ULCER (HCC): ICD-10-CM

## 2018-05-21 DIAGNOSIS — L97.424 NON-PRESSURE CHRONIC ULCER OF LEFT HEEL AND MIDFOOT WITH NECROSIS OF BONE (HCC): ICD-10-CM

## 2018-05-21 DIAGNOSIS — E66.9 DIABETES MELLITUS TYPE 2 IN OBESE (HCC): ICD-10-CM

## 2018-05-21 DIAGNOSIS — L97.509 TYPE I DIABETES MELLITUS WITH FOOT ULCER (HCC): ICD-10-CM

## 2018-05-21 PROCEDURE — 97605 NEG PRS WND THER DME<=50SQCM: CPT

## 2018-05-21 PROCEDURE — 80053 COMPREHEN METABOLIC PANEL: CPT | Performed by: INTERNAL MEDICINE

## 2018-05-21 PROCEDURE — 11042 DBRDMT SUBQ TIS 1ST 20SQCM/<: CPT

## 2018-05-21 PROCEDURE — 85025 COMPLETE CBC W/AUTO DIFF WBC: CPT | Performed by: INTERNAL MEDICINE

## 2018-05-21 NOTE — TELEPHONE ENCOUNTER
Pt would like refill for METFORMIN. Pls send to local pharmacy. Pt only has enough through today left.

## 2018-05-30 ENCOUNTER — LAB REQUISITION (OUTPATIENT)
Dept: LAB | Age: 38
End: 2018-05-30
Payer: COMMERCIAL

## 2018-05-30 DIAGNOSIS — I10 ESSENTIAL (PRIMARY) HYPERTENSION: ICD-10-CM

## 2018-05-30 DIAGNOSIS — E11.621 TYPE 2 DIABETES MELLITUS WITH FOOT ULCER (CODE) (HCC): ICD-10-CM

## 2018-05-30 DIAGNOSIS — L97.424 NON-PRESSURE CHRONIC ULCER OF LEFT HEEL AND MIDFOOT WITH NECROSIS OF BONE (HCC): ICD-10-CM

## 2018-05-30 PROCEDURE — 85025 COMPLETE CBC W/AUTO DIFF WBC: CPT | Performed by: INTERNAL MEDICINE

## 2018-05-30 PROCEDURE — 80053 COMPREHEN METABOLIC PANEL: CPT | Performed by: INTERNAL MEDICINE

## 2018-06-01 ENCOUNTER — MED REC SCAN ONLY (OUTPATIENT)
Dept: FAMILY MEDICINE CLINIC | Facility: CLINIC | Age: 38
End: 2018-06-01

## 2018-06-04 ENCOUNTER — TELEPHONE (OUTPATIENT)
Dept: FAMILY MEDICINE CLINIC | Facility: CLINIC | Age: 38
End: 2018-06-04

## 2018-06-04 ENCOUNTER — LAB REQUISITION (OUTPATIENT)
Dept: LAB | Age: 38
End: 2018-06-04
Payer: COMMERCIAL

## 2018-06-04 ENCOUNTER — PRIOR ORIGINAL RECORDS (OUTPATIENT)
Dept: OTHER | Age: 38
End: 2018-06-04

## 2018-06-04 ENCOUNTER — OFFICE VISIT (OUTPATIENT)
Dept: WOUND CARE | Facility: HOSPITAL | Age: 38
End: 2018-06-04
Attending: PODIATRIST
Payer: COMMERCIAL

## 2018-06-04 DIAGNOSIS — E10.621 TYPE I DIABETES MELLITUS WITH FOOT ULCER (HCC): ICD-10-CM

## 2018-06-04 DIAGNOSIS — E78.5 HYPERLIPIDEMIA: ICD-10-CM

## 2018-06-04 DIAGNOSIS — L97.509 TYPE I DIABETES MELLITUS WITH FOOT ULCER (HCC): ICD-10-CM

## 2018-06-04 DIAGNOSIS — T81.89XA NONHEALING SURGICAL WOUND: ICD-10-CM

## 2018-06-04 DIAGNOSIS — L97.424 NON-PRESSURE CHRONIC ULCER OF LEFT HEEL AND MIDFOOT WITH NECROSIS OF BONE (HCC): ICD-10-CM

## 2018-06-04 DIAGNOSIS — I10 ESSENTIAL (PRIMARY) HYPERTENSION: ICD-10-CM

## 2018-06-04 DIAGNOSIS — E10.40 TYPE 1 DIABETES MELLITUS WITH DIABETIC NEUROPATHY (HCC): Primary | ICD-10-CM

## 2018-06-04 PROCEDURE — 97598 DBRDMT OPN WND ADDL 20CM/<: CPT

## 2018-06-04 PROCEDURE — 80053 COMPREHEN METABOLIC PANEL: CPT | Performed by: INTERNAL MEDICINE

## 2018-06-04 PROCEDURE — 85025 COMPLETE CBC W/AUTO DIFF WBC: CPT | Performed by: INTERNAL MEDICINE

## 2018-06-04 PROCEDURE — 97597 DBRDMT OPN WND 1ST 20 CM/<: CPT

## 2018-06-04 PROCEDURE — 97607 NEG PRS WND THR NDME<=50SQCM: CPT

## 2018-06-04 NOTE — TELEPHONE ENCOUNTER
[employer] faxed order form for wound supplies. Form signed and faxed. Confirmation received. Form sent to scan.

## 2018-06-04 NOTE — TELEPHONE ENCOUNTER
ELIZABETH left on Tyra Cook from [facility]'s confidential voicemail advising her OK for 1 additional visit next week.

## 2018-06-07 ENCOUNTER — MED REC SCAN ONLY (OUTPATIENT)
Dept: FAMILY MEDICINE CLINIC | Facility: CLINIC | Age: 38
End: 2018-06-07

## 2018-06-07 ENCOUNTER — MYAURORA ACCOUNT LINK (OUTPATIENT)
Dept: OTHER | Age: 38
End: 2018-06-07

## 2018-06-07 ENCOUNTER — PRIOR ORIGINAL RECORDS (OUTPATIENT)
Dept: OTHER | Age: 38
End: 2018-06-07

## 2018-06-11 ENCOUNTER — OFFICE VISIT (OUTPATIENT)
Dept: WOUND CARE | Facility: HOSPITAL | Age: 38
End: 2018-06-11
Attending: PODIATRIST
Payer: COMMERCIAL

## 2018-06-11 DIAGNOSIS — E10.40 TYPE 1 DIABETES MELLITUS WITH DIABETIC NEUROPATHY (HCC): Primary | ICD-10-CM

## 2018-06-11 DIAGNOSIS — T81.89XA NONHEALING SURGICAL WOUND: ICD-10-CM

## 2018-06-11 LAB
ALBUMIN: 3.7 G/DL
ALKALINE PHOSPHATATE(ALK PHOS): 163 IU/L
BILIRUBIN TOTAL: 0.4 MG/DL
BUN: 29 MG/DL
CALCIUM: 9.4 MG/DL
CHLORIDE: 105 MEQ/L
CREATININE, SERUM: 0.88 MG/DL
GLUCOSE: 82 MG/DL
HEMATOCRIT: 39.6 %
HEMOGLOBIN: 12.7 G/DL
PLATELETS: 198 K/UL
POTASSIUM, SERUM: 4.2 MEQ/L
PROTEIN, TOTAL: 8.3 G/DL
RED BLOOD COUNT: 4.37 X 10-6/U
SGOT (AST): 48 IU/L
SGPT (ALT): 23 IU/L
SODIUM: 142 MEQ/L
THYROID STIMULATING HORMONE: 1.17 MLU/L
WHITE BLOOD COUNT: 5.5 X 10-3/U

## 2018-06-11 PROCEDURE — 11045 DBRDMT SUBQ TISS EACH ADDL: CPT

## 2018-06-11 PROCEDURE — 11042 DBRDMT SUBQ TIS 1ST 20SQCM/<: CPT

## 2018-06-14 ENCOUNTER — OFFICE VISIT (OUTPATIENT)
Dept: WOUND CARE | Facility: HOSPITAL | Age: 38
End: 2018-06-14
Attending: PODIATRIST
Payer: COMMERCIAL

## 2018-06-14 DIAGNOSIS — E10.40 TYPE 1 DIABETES MELLITUS WITH DIABETIC NEUROPATHY, UNSPECIFIED (HCC): ICD-10-CM

## 2018-06-14 DIAGNOSIS — L97.509 TYPE 1 DIABETES MELLITUS WITH FOOT ULCER (HCC): Primary | ICD-10-CM

## 2018-06-14 DIAGNOSIS — E10.621 TYPE 1 DIABETES MELLITUS WITH FOOT ULCER (HCC): Primary | ICD-10-CM

## 2018-06-14 DIAGNOSIS — E10.40 TYPE 1 DIABETES MELLITUS WITH DIABETIC NEUROPATHY (HCC): ICD-10-CM

## 2018-06-14 PROCEDURE — 99214 OFFICE O/P EST MOD 30 MIN: CPT

## 2018-06-14 NOTE — PROGRESS NOTES
Subjective    Chief Complaint  This information was obtained from the patient  Patient is here for a HBO consult    Allergies  NKDA    HPI  This information was obtained from the patient  2/26/2018 patient presents to the clinic now 9 days status post surg 4/2/2018 patient returns to the clinic he has been very compliant. He has had no episodes of fever or chills.  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 Clear bilaterally, free of adventitious sounds. .     Cardiovascular:  Tachycardic, normal rhythm, no murmurs rubs or gallops. . +2 pedal pulses.  No LE pain or edema, Capillary refil Wound #2 Left, Dorsal Foot is an acute Posadas Grade 1 Diabetic Ulcer and has received a status of Not Healed. Initial wound encounter measurements are 1.3cm length x 1cm width x 0.1cm depth, with an area of 1.3 sq cm and a volume of 0.13 cubic cm.  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 The patient verbally acknowledges understanding of all instructions and all questions were answered. Reviewed hospital records to include: Workflow: Diabetes  Perfusion assessed by ultrasound.  - 50-74%

## 2018-06-17 ENCOUNTER — HOSPITAL ENCOUNTER (OUTPATIENT)
Dept: MRI IMAGING | Age: 38
Discharge: HOME | End: 2018-06-17
Attending: PODIATRIST
Payer: COMMERCIAL

## 2018-06-17 DIAGNOSIS — E10.621 TYPE 1 DIABETES MELLITUS WITH FOOT ULCER (HCC): ICD-10-CM

## 2018-06-17 DIAGNOSIS — L97.509 TYPE 1 DIABETES MELLITUS WITH FOOT ULCER (HCC): ICD-10-CM

## 2018-06-17 DIAGNOSIS — E10.40 TYPE 1 DIABETES MELLITUS WITH DIABETIC NEUROPATHY, UNSPECIFIED (HCC): ICD-10-CM

## 2018-06-17 PROCEDURE — 73721 MRI JNT OF LWR EXTRE W/O DYE: CPT | Performed by: PODIATRIST

## 2018-06-18 ENCOUNTER — HOSPITAL ENCOUNTER (INPATIENT)
Facility: HOSPITAL | Age: 38
LOS: 8 days | Discharge: HOME HEALTH | DRG: 629 | End: 2018-06-26
Attending: HOSPITALIST | Admitting: HOSPITALIST
Payer: COMMERCIAL

## 2018-06-18 ENCOUNTER — OFFICE VISIT (OUTPATIENT)
Dept: WOUND CARE | Facility: HOSPITAL | Age: 38
End: 2018-06-18
Attending: PODIATRIST
Payer: COMMERCIAL

## 2018-06-18 DIAGNOSIS — E10.40 TYPE 1 DIABETES MELLITUS WITH DIABETIC NEUROPATHY, UNSPECIFIED (HCC): ICD-10-CM

## 2018-06-18 DIAGNOSIS — L97.509 TYPE 1 DIABETES MELLITUS WITH FOOT ULCER (HCC): ICD-10-CM

## 2018-06-18 DIAGNOSIS — L97.424 ULCER OF LEFT HEEL, WITH NECROSIS OF BONE (HCC): Primary | ICD-10-CM

## 2018-06-18 DIAGNOSIS — E10.621 TYPE 1 DIABETES MELLITUS WITH FOOT ULCER (HCC): ICD-10-CM

## 2018-06-18 PROBLEM — M86.9 OSTEOMYELITIS (HCC): Status: ACTIVE | Noted: 2018-06-18

## 2018-06-18 PROCEDURE — 87186 SC STD MICRODIL/AGAR DIL: CPT

## 2018-06-18 PROCEDURE — 87205 SMEAR GRAM STAIN: CPT

## 2018-06-18 PROCEDURE — 87176 TISSUE HOMOGENIZATION CULTR: CPT

## 2018-06-18 PROCEDURE — 87147 CULTURE TYPE IMMUNOLOGIC: CPT

## 2018-06-18 PROCEDURE — 11047 DBRDMT BONE EACH ADDL: CPT

## 2018-06-18 PROCEDURE — 87070 CULTURE OTHR SPECIMN AEROBIC: CPT

## 2018-06-18 PROCEDURE — 99223 1ST HOSP IP/OBS HIGH 75: CPT | Performed by: HOSPITALIST

## 2018-06-18 PROCEDURE — 11044 DBRDMT BONE 1ST 20 SQ CM/<: CPT

## 2018-06-18 RX ORDER — SODIUM CHLORIDE, SODIUM LACTATE, POTASSIUM CHLORIDE, CALCIUM CHLORIDE 600; 310; 30; 20 MG/100ML; MG/100ML; MG/100ML; MG/100ML
INJECTION, SOLUTION INTRAVENOUS CONTINUOUS
Status: DISCONTINUED | OUTPATIENT
Start: 2018-06-19 | End: 2018-06-26

## 2018-06-18 RX ORDER — ENOXAPARIN SODIUM 100 MG/ML
40 INJECTION SUBCUTANEOUS DAILY
Status: DISCONTINUED | OUTPATIENT
Start: 2018-06-20 | End: 2018-06-22

## 2018-06-18 RX ORDER — METOCLOPRAMIDE HYDROCHLORIDE 5 MG/ML
10 INJECTION INTRAMUSCULAR; INTRAVENOUS EVERY 8 HOURS PRN
Status: DISCONTINUED | OUTPATIENT
Start: 2018-06-18 | End: 2018-06-26

## 2018-06-18 RX ORDER — ONDANSETRON 2 MG/ML
4 INJECTION INTRAMUSCULAR; INTRAVENOUS EVERY 6 HOURS PRN
Status: DISCONTINUED | OUTPATIENT
Start: 2018-06-18 | End: 2018-06-26

## 2018-06-18 RX ORDER — DEXTROSE MONOHYDRATE 25 G/50ML
50 INJECTION, SOLUTION INTRAVENOUS
Status: DISCONTINUED | OUTPATIENT
Start: 2018-06-18 | End: 2018-06-26

## 2018-06-18 RX ORDER — HYDROCODONE BITARTRATE AND ACETAMINOPHEN 5; 325 MG/1; MG/1
1 TABLET ORAL EVERY 6 HOURS PRN
Status: DISCONTINUED | OUTPATIENT
Start: 2018-06-18 | End: 2018-06-26

## 2018-06-18 RX ORDER — TRAZODONE HYDROCHLORIDE 50 MG/1
50 TABLET ORAL NIGHTLY PRN
Status: DISCONTINUED | OUTPATIENT
Start: 2018-06-18 | End: 2018-06-26

## 2018-06-18 RX ORDER — CEFAZOLIN SODIUM/WATER 2 G/20 ML
2 SYRINGE (ML) INTRAVENOUS EVERY 8 HOURS
Status: DISCONTINUED | OUTPATIENT
Start: 2018-06-18 | End: 2018-06-20

## 2018-06-18 RX ORDER — ACETAMINOPHEN 325 MG/1
650 TABLET ORAL EVERY 6 HOURS PRN
Status: DISCONTINUED | OUTPATIENT
Start: 2018-06-18 | End: 2018-06-26

## 2018-06-18 NOTE — HISTORICAL OFFICE NOTE
Awilda Govindlyndon  : 1980  ACCOUNT:  [id number]  [phone]  PCP: Dr. Shelley Puentes     TODAY'S DATE: 2018  DICTATED BY:  HUNTER Ralph]      CHIEF COMPLAINT: [Followup of Atherosclerosis, ext.w/rest pain, left leg prescriptions see below    VITAL SIGNS: [B/P - 92/64 , Pulse - 104, Weight -  170, Height -   68 , BMI - 25.8 ]    CONS: well developed, well nourished. WEIGHT: BMI parameters reviewed and discussed. HEAD/FACE: no trauma and normocephalic.  EYES: conjunctiv Trulicity             8.7YF/5.  as directed                              18 Vytorin               10-80MG   one tablet daily                         ___________________________________________________________________    So Pump  :  CAD. Negative for AAA. SOCIAL HISTORY: SMOKING: Never used tobacco. denies smoking. CAFFEINE: none. ALCOHOL: drinks rarely. EXERCISE: no regular exercise. DIET: diabetic. MARITAL STATUS: .      ALLERGIES: No Known Allergies    MEDICATIONS: Selected primary care doctor    ASSESSMENT:  1. Atherosclerosis, ext.w/rest pain, left leg  2. DM, Type II  3.  Tachycardia      PLAN:  [00]    PRESCRIPTIONS:   04/02/18 Ezetimibe-Simvastatin 10-80MG   one t ablet daily                        04/02/18 MetFORMIN HCl

## 2018-06-18 NOTE — CONSULTS
INFECTIOUS DISEASE CONSULTATION    Betsy Tobin Patient Status:  Inpatient    1980 MRN [id number]   [facility] 3SW-A Attending Tracee Hook MD   Hosp Day # 0 PCP Juan M Latham TraZODone HCl (DESYREL) tab 50 mg, 50 mg, Oral, Nightly PRN  •  glucose (DEX4) oral liquid 15 g, 15 g, Oral, Q15 Min PRN **OR** Glucose-Vitamin C (DEX-4) 4-0.006 g chewable tab 4 tablet, 4 tablet, Oral, Q15 Min PRN **OR** dextrose 50 % injection 50 mL, 50 06/18/18   1250   GLU  98   BUN  29*   CREATSERUM  0.82   GFRAA  128   GFRNAA  111   CA  10.1   NA  138   K  4.5   CL  101   CO2  29.0           Lab Results  Component Value Date   PTT 33.2 06/18/2018   INR 1.08 06/18/2018   PTP 14.4 06/18/2018   PGLU 94

## 2018-06-18 NOTE — H&P
[facility]  History and Physical     Ciara Tobin Patient Status:  Inpatient    1980 MRN [id number]   [facility] 3SW-A Attending Camilo Lopez MD   Hosp Day # 0 PCP Leanna Ordoñez MD     Chief Complaint: osteo 0   Dulaglutide (TRULICITY) 1.5 JL/7.0TX Subcutaneous Solution Pen-injector Inject 1.5 Units into the skin once a week.  EVERY SUNDAY Disp:  Rfl:    BD AUTOSHIELD DUO 30G X 5 MM Does not apply Misc USE A new pen needle WITH EACH injection AS DIRECTED by you lymphadenopathy. No JVD. No carotid bruits. Respiratory: Clear to auscultation bilaterally. No wheezes. No rhonchi. Cardiovascular: S1, S2. Regular rate and rhythm. No murmurs, rubs or gallops. Equal pulses. Chest and Back: No tenderness or deformity.

## 2018-06-18 NOTE — PLAN OF CARE
PT RECD FROM WOUND CLINIC, ALERT ,AWAKE, ORIENTED X4.NO RESP DISTRESS. WOUND RN INFORMED NO NEED TO CHANGED DRG LT HEEL WAS DONE IN CLINIC.  [facility] WAS PAGED FOR ADMISSION. CALL LIGHT W/IN REACH.  INSTRUCTED TO CALL FOR ALL NEEDS AND S

## 2018-06-18 NOTE — CONSULTS
[facility] dosing service    Initial Pharmacokinetic Consult for Vancomycin Dosing     Constance Salcedo is a 34year old male who is being treated for osteomyelitis. Pharmacy has been asked to dose Vancomycin by Dr. Sandrine Colón. ID is on consult. level 15-20 ug/mL. 3.  Pharmacy will need BUN/Scr daily while on Vancomycin to assess renal function. 4.  Pharmacy will follow and monitor renal function changes, toxicity and efficacy. Pharmacy will continue to follow him.   We appreciate the oppo

## 2018-06-19 PROCEDURE — 99232 SBSQ HOSP IP/OBS MODERATE 35: CPT | Performed by: HOSPITALIST

## 2018-06-19 NOTE — CONSULTS
[facility]    Report of Consultation    Nikolay Tobin Patient Status:  Inpatient    1980 MRN [id number]   [facility] 3SW-A Attending Nel Day MD   Hosp Day # 0 PCP Farida Wright MD     Date of Admission:   Glucose-Vitamin C (DEX-4) 4-0.006 g chewable tab 8 tablet, 8 tablet, Oral, Q15 Min PRN  •  [START ON 6/20/2018] Enoxaparin Sodium (LOVENOX) 40 MG/0.4ML injection 40 mg, 40 mg, Subcutaneous, Daily  •  acetaminophen (TYLENOL) tab 650 mg, 650 mg, Oral, Q6H OR 06/18/2018   RBC 4.39 06/18/2018   HGB 12.7 06/18/2018   HCT 38.7 06/18/2018   MCV 88.2 06/18/2018   MCH 28.9 06/18/2018   MCHC 32.8 06/18/2018   RDW 12.9 06/18/2018   .0 06/18/2018       Lab Results  Component Value Date   INR 1.08 06/18/2018

## 2018-06-19 NOTE — PROGRESS NOTES
[facility]                INFECTIOUS DISEASE PROGRESS NOTE    James Tobin Patient Status:  Inpatient    1980 MRN [id number]   [facility] 3SW-A Attending Fredrick Buckner MD   Hosp Day # 1 PCP Jonna Rubin MD angiopathy without gangrene [facility]     Diabetes mellitus type 2 in obese (HCC)     Obesity (BMI 30.0-34. 9)     Vitamin D deficiency     Elevated liver enzymes     Elevated serum GGT level     Diabetic infection of left foot ([facility]     Hyperglycemia     [facility]

## 2018-06-19 NOTE — CM/SW NOTE
Informed by [facility] [phone] that agency is current with pt. Call from May Fort with [facility]/I [phone] that referral from ID received for possible IVAB- she cannot process until orders given. SW following.

## 2018-06-19 NOTE — PROGRESS NOTES
[facility]  Cardiology Progress Note    Subjective:  No chest pain or shortness of breath.     Objective:  BP 90/63 (BP Location: Right arm)   Pulse 114   Temp 98 °F (36.7 °C) (Oral)   Resp 18   Wt 162 lb 11.2 oz (73.8 kg)   SpO2 100%   BMI 24.74 kg/m²

## 2018-06-19 NOTE — PLAN OF CARE
Diabetes/Glucose Control    • Glucose maintained within prescribed range Progressing        Patient/Family Goals    • Patient/Family Long Term Goal Progressing    • Patient/Family Short Term Goal Progressing        SKIN/TISSUE INTEGRITY - ADULT    • Soniai

## 2018-06-19 NOTE — PLAN OF CARE
Diabetes/Glucose Control    • Glucose maintained within prescribed range Progressing        Patient/Family Goals    • Patient/Family Short Term Goal Progressing        SKIN/TISSUE INTEGRITY - ADULT    • Incision(s), wounds(s) or drain site(s) healing witho

## 2018-06-19 NOTE — PROGRESS NOTES
[facility]  Progress note     Rishi Tobin Patient Status:  Inpatient    1980 MRN [id number]   [facility] 3SW-A Attending Elayne Abdul MD   Hosp Day # 1 PCP Murray Puentes MD     Chief Complaint: Wes Plunkett MD

## 2018-06-19 NOTE — CONSULTS
[facility]    Report of Consultation    Pinzon Lucio Tobin Patient Status:  Inpatient    1980 MRN [id number]   [facility] 3SW-A Attending Urbano Suh MD   Hosp Day # 1 PCP Gilma Rondon MD     Date of Admission:  20 DEBRIDE WOUND  9 yo: OTHER SURGICAL HISTORY      Comment: ACL REPAIR  Family History   Problem Relation Age of Onset   • Diabetes Father    • Heart Disorder Father    • Diabetes Mother    • Hypertension Sister       reports that he has never smoked.  He ha bone mostly at the posterior lateral aspect of the calcaneus. Biopsies were taken yesterday. Vascular: he does have pulses I discussed the case with Dr. Sophie Copeland who thinks that it is okay to proceed with surgery.   Neurologic: Patient has diminished pain s complication (HCC)     Tachycardia     S/P left knee arthroscopy     S/P left knee surgery     Osteomyelitis ([address]      Plan:  Today is in the wound care center yesterday I discussed with the patient that at this point in time my recommendation would be to

## 2018-06-19 NOTE — PAYOR COMM NOTE
--------------  ADMISSION REVIEW     Payor: [address]  Subscriber #:  [id number]  Authorization Number: [id number]    Admit date: 6/18/18  Admit time: 1124       Admitting Physician: Camilo Lopez MD  Attending Physician:  Ariel Morrison Current Outpatient Prescriptions on File Prior to Encounter:  Ezetimibe-Simvastatin (VYTORIN) 10-80 MG Oral Tab Take 1 tablet by mouth once daily.  Disp: 90 tablet Rfl: 0   MetFORMIN HCl 1000 MG Oral Tab Take 1 tablet (1,000 mg total) by mouth 2 (two) tavo completed. Pertinent positives and negatives noted in the HPI. Physical Exam:    There were no vitals taken for this visit. General: No acute distress. Alert and oriented x 3. HEENT: Normocephalic atraumatic. Moist mucous membranes. EOM-I. PERRLA. OM  --surgery planned  Per Dr. Jennie Calderon cx already sent in wound clinic  -can start cefazolin for now pending micro  Steven Nguyen MD  [address] [place] [address]  [phone]    Report of Consultation, Cardiology      Reason for Consultatio INPT ADMISSION    PLEASE FAX ALL INPT DAYS AS AUTHORIZED ALONG W/NRD

## 2018-06-20 ENCOUNTER — SURGERY (OUTPATIENT)
Age: 38
End: 2018-06-20

## 2018-06-20 PROCEDURE — 99232 SBSQ HOSP IP/OBS MODERATE 35: CPT | Performed by: HOSPITALIST

## 2018-06-20 PROCEDURE — 02HV33Z INSERTION OF INFUSION DEVICE INTO SUPERIOR VENA CAVA, PERCUTANEOUS APPROACH: ICD-10-PCS | Performed by: INTERNAL MEDICINE

## 2018-06-20 RX ORDER — SODIUM CHLORIDE 0.9 % (FLUSH) 0.9 %
10 SYRINGE (ML) INJECTION AS NEEDED
Status: DISCONTINUED | OUTPATIENT
Start: 2018-06-20 | End: 2018-06-26

## 2018-06-20 NOTE — PLAN OF CARE
Patient/Family Goals    • Patient/Family Long Term Goal Progressing    • Patient/Family Short Term Goal Progressing        SKIN/TISSUE INTEGRITY - ADULT    • Incision(s), wounds(s) or drain site(s) healing without S/S of infection Progressing        Patien

## 2018-06-20 NOTE — PLAN OF CARE
LEFT MESSAGE TO ID RE: MICRO CALLED FROM [place], WOUND CULTURE DONE 6/18 POSITIVE FOR MRSA. WILL PUT ON CONTACT ISOLATION,DR LARRY CALLED BACK INFORMED WITH RESULTS.

## 2018-06-20 NOTE — PROGRESS NOTES
[facility]  Cardiology Progress Note    Jarret Cresencio Muellerlizeth Patient Status:  Inpatient    1980 MRN [id number]   [facility] 3SW-A Attending Zunilda Salgado MD   Hosp Day # 2 PCP Gustavo Brooks MD     Subjective:  No cardiac compla

## 2018-06-20 NOTE — CONSULTS
Vascular Access consult for PICC placement. OK for PICC placement today per Dr Nikolay Aquino. Patient tolerated procedure well.

## 2018-06-20 NOTE — CM/SW NOTE
06/20/18 1100   CM/SW Referral Data   Referral Source Physician   Reason for Referral Discharge planning   Informant Patient;Edward Staff   Pertinent Medical Hx   Primary Care Physician Name GURINDER Melara MD   Patient Info   Patient's Mental Status Alert;

## 2018-06-20 NOTE — PROGRESS NOTES
Left calcaneal osteomyelitis    History of Present Illness:  Sanjana Padron is a a(n) 34year old male. This gentleman had debridement of a heel ulcer in February 17, 2018 at that time there was exposed bone biopsy was negative for osteomyelitis.   Richardson Goodpasture Acetaminophen-Codeine #3 300-30 MG Oral Tab Take 1 tablet by mouth every 4 (four) hours as needed. Disp: 30 tablet Rfl: 0   Dulaglutide (TRULICITY) 1.5 BB/4.8OR Subcutaneous Solution Pen-injector Inject 1.5 Units into the skin once a week.  EVERY SUNDAY D status: Never Smoker    Smokeless tobacco: Never Used    Alcohol use Yes  0.0 oz/week     Comment: rarely    Drug use: No    Sexual activity: Not on file     Other Topics Concern   None on file     Social History Narrative   None on file     Left calc  Lar

## 2018-06-20 NOTE — PROGRESS NOTES
[facility]    Progress Note    Birtha Ray Tobin Patient Status:  Inpatient    1980 MRN [id number]   [facility] 3SW-A Attending Miguel Dacosta MD   Hosp Day # 2 PCP Nabila Cavanaugh MD     History:  Past Medical History:   Stalin Kamara PRN  •  ondansetron HCl (ZOFRAN) injection 4 mg, 4 mg, Intravenous, Q6H PRN  •  Metoclopramide HCl (REGLAN) injection 10 mg, 10 mg, Intravenous, Q8H PRN  •  insulin detemir (LEVEMIR) 100 UNIT/ML flextouch 10 Units, 10 Units, Subcutaneous, Nightly  •  Insul 30.0-34. 9)     Vitamin D deficiency     Elevated liver enzymes     Elevated serum GGT level     Diabetic infection of left foot (HCC)     Hyperglycemia     Essential hypertension     Dyslipidemia     Hyponatremia     MSSA (methicillin susceptible Staphyloc

## 2018-06-20 NOTE — PLAN OF CARE
DR COSTELLO CALLED AND CANCELLED SX FOR TODAY ,RESCHEDULE FOR FRIDay WILL INFORMED PT.  RE ; PLAN. PER MD RESUME LOVENOX INJ AND DIET. CONFIRMED WITH OR STAFF RN

## 2018-06-20 NOTE — PLAN OF CARE
SKIN/TISSUE INTEGRITY - ADULT    • Incision(s), wounds(s) or drain site(s) healing without S/S of infection Not Progressing        ALERT AND ORIENTED X4NO RESP DISTRESS, INSTRUCTED IS USE X10/HR WA AND DB AND C AND TO CALL FOR ALL NEEDS

## 2018-06-20 NOTE — PLAN OF CARE
SKIN/TISSUE INTEGRITY - ADULT    • Incision(s), wounds(s) or drain site(s) healing without S/S of infection Not Progressing        ALERT ,AWAKE, ORIENTED X4/PT AWARE OF PLAN TODAY.  ENC DB AND C AND USE IX 10 /HR WA. TO CALL FOR ALL NEEDS AND ASSISTANCE

## 2018-06-21 ENCOUNTER — ANESTHESIA EVENT (OUTPATIENT)
Dept: SURGERY | Facility: HOSPITAL | Age: 38
DRG: 629 | End: 2018-06-21
Payer: COMMERCIAL

## 2018-06-21 PROCEDURE — 99232 SBSQ HOSP IP/OBS MODERATE 35: CPT | Performed by: HOSPITALIST

## 2018-06-21 NOTE — PROGRESS NOTES
[facility]                INFECTIOUS DISEASE PROGRESS NOTE    Martínez Tobin Patient Status:  Inpatient    1980 MRN [id number]   [facility] 3SW-A Attending Josh Jordan MD   Hosp Day # 3 PCP Yayo Kapoor MD Encounter for long-term (current) use of other medications     Pure hyperglyceridemia     Other and unspecified hyperlipidemia     Uncontrolled type 2 diabetes mellitus with diabetic peripheral angiopathy without gangrene ([facility] [address]     Diabetes mellitus typ

## 2018-06-21 NOTE — PLAN OF CARE
Assumed care of pt at 1500. Pt resting in bed. Wound care RN at bedside changing dressing, wet to dry, does not need dressing changed again before procedure tm per wound RN. Will endorse to [address] Osmin Arriola. VSS. Plan surgery tm at 0700 with Dr. Lucie Moore. VIKTOR LOZAE.  Zulma Zuñiga

## 2018-06-21 NOTE — PROGRESS NOTES
[facility]  Progress Note     Issac Tobin Patient Status:  Inpatient    1980 MRN [id number]   [facility] 3SW-A Attending Hillary Patel MD   Hosp Day # 3 PCP Sarabjit Pack MD     Chief Complaint: left heel pain    S: Friday  2. DMII-insulin ordered; monitor sugars on Levemir 10 units subcu at night and insulin sliding scale- controlled  3. Soft tissue loss at insertion of achilles left ankle; per dr. Rosanna Betancourt  4.  Continue vancomycin 1.25 g every 12 hours and PICC line p

## 2018-06-21 NOTE — PROGRESS NOTES
[facility]  Progress Note     Martínez Tobin Patient Status:  Inpatient    1980 MRN [id number]   [facility] 3SW-A Attending Albina Garcia MD   Hosp Day # 2 PCP Yayo Kapoor MD     Chief Complaint: left foot wound    S podiatry  2. DMII-insulin ordered; monitor sugars on Levemir 10 units subcu at night and insulin sliding scale  3. Soft tissue loss at insertion of achilles left ankle; per dr. Delaney Colvin  4.  Continue vancomycin 1.25 g every 12 hours and PICC line placement

## 2018-06-22 ENCOUNTER — APPOINTMENT (OUTPATIENT)
Dept: GENERAL RADIOLOGY | Facility: HOSPITAL | Age: 38
DRG: 629 | End: 2018-06-22
Attending: PODIATRIST
Payer: COMMERCIAL

## 2018-06-22 ENCOUNTER — SURGERY (OUTPATIENT)
Age: 38
End: 2018-06-22

## 2018-06-22 ENCOUNTER — ANESTHESIA (OUTPATIENT)
Dept: SURGERY | Facility: HOSPITAL | Age: 38
DRG: 629 | End: 2018-06-22
Payer: COMMERCIAL

## 2018-06-22 PROCEDURE — 76001 XR FLUOROSCOPE EXAM >1 HR EXTENSIVE (CPT=76001): CPT | Performed by: PODIATRIST

## 2018-06-22 PROCEDURE — 3E0T3BZ INTRODUCTION OF ANESTHETIC AGENT INTO PERIPHERAL NERVES AND PLEXI, PERCUTANEOUS APPROACH: ICD-10-PCS | Performed by: ANESTHESIOLOGY

## 2018-06-22 PROCEDURE — 99232 SBSQ HOSP IP/OBS MODERATE 35: CPT | Performed by: HOSPITALIST

## 2018-06-22 PROCEDURE — 0QBM0ZZ EXCISION OF LEFT TARSAL, OPEN APPROACH: ICD-10-PCS | Performed by: PODIATRIST

## 2018-06-22 PROCEDURE — 0HRNXK3 REPLACEMENT OF LEFT FOOT SKIN WITH NONAUTOLOGOUS TISSUE SUBSTITUTE, FULL THICKNESS, EXTERNAL APPROACH: ICD-10-PCS | Performed by: PODIATRIST

## 2018-06-22 DEVICE — GRAFT SFT TIS FLOWERPATCH 8X4: Type: IMPLANTABLE DEVICE | Site: HEEL | Status: FUNCTIONAL

## 2018-06-22 RX ORDER — NALOXONE HYDROCHLORIDE 0.4 MG/ML
80 INJECTION, SOLUTION INTRAMUSCULAR; INTRAVENOUS; SUBCUTANEOUS AS NEEDED
Status: DISCONTINUED | OUTPATIENT
Start: 2018-06-22 | End: 2018-06-22 | Stop reason: HOSPADM

## 2018-06-22 RX ORDER — MEPERIDINE HYDROCHLORIDE 25 MG/ML
12.5 INJECTION INTRAMUSCULAR; INTRAVENOUS; SUBCUTANEOUS AS NEEDED
Status: DISCONTINUED | OUTPATIENT
Start: 2018-06-22 | End: 2018-06-22 | Stop reason: HOSPADM

## 2018-06-22 RX ORDER — HYDROMORPHONE HYDROCHLORIDE 1 MG/ML
0.4 INJECTION, SOLUTION INTRAMUSCULAR; INTRAVENOUS; SUBCUTANEOUS EVERY 5 MIN PRN
Status: DISCONTINUED | OUTPATIENT
Start: 2018-06-22 | End: 2018-06-22 | Stop reason: HOSPADM

## 2018-06-22 RX ORDER — METOCLOPRAMIDE HYDROCHLORIDE 5 MG/ML
10 INJECTION INTRAMUSCULAR; INTRAVENOUS AS NEEDED
Status: DISCONTINUED | OUTPATIENT
Start: 2018-06-22 | End: 2018-06-22 | Stop reason: HOSPADM

## 2018-06-22 RX ORDER — SODIUM CHLORIDE, SODIUM LACTATE, POTASSIUM CHLORIDE, CALCIUM CHLORIDE 600; 310; 30; 20 MG/100ML; MG/100ML; MG/100ML; MG/100ML
INJECTION, SOLUTION INTRAVENOUS CONTINUOUS
Status: DISCONTINUED | OUTPATIENT
Start: 2018-06-22 | End: 2018-06-22 | Stop reason: HOSPADM

## 2018-06-22 RX ORDER — HYDROCODONE BITARTRATE AND ACETAMINOPHEN 5; 325 MG/1; MG/1
1 TABLET ORAL AS NEEDED
Status: DISCONTINUED | OUTPATIENT
Start: 2018-06-22 | End: 2018-06-22 | Stop reason: HOSPADM

## 2018-06-22 RX ORDER — HYDROCODONE BITARTRATE AND ACETAMINOPHEN 5; 325 MG/1; MG/1
2 TABLET ORAL AS NEEDED
Status: DISCONTINUED | OUTPATIENT
Start: 2018-06-22 | End: 2018-06-22 | Stop reason: HOSPADM

## 2018-06-22 RX ORDER — DEXTROSE MONOHYDRATE 25 G/50ML
50 INJECTION, SOLUTION INTRAVENOUS
Status: DISCONTINUED | OUTPATIENT
Start: 2018-06-22 | End: 2018-06-22 | Stop reason: HOSPADM

## 2018-06-22 RX ORDER — ONDANSETRON 2 MG/ML
4 INJECTION INTRAMUSCULAR; INTRAVENOUS AS NEEDED
Status: DISCONTINUED | OUTPATIENT
Start: 2018-06-22 | End: 2018-06-22 | Stop reason: HOSPADM

## 2018-06-22 RX ORDER — MIDAZOLAM HYDROCHLORIDE 1 MG/ML
1 INJECTION INTRAMUSCULAR; INTRAVENOUS EVERY 5 MIN PRN
Status: DISCONTINUED | OUTPATIENT
Start: 2018-06-22 | End: 2018-06-22 | Stop reason: HOSPADM

## 2018-06-22 RX ORDER — ENOXAPARIN SODIUM 100 MG/ML
40 INJECTION SUBCUTANEOUS EVERY 24 HOURS
Status: DISCONTINUED | OUTPATIENT
Start: 2018-06-22 | End: 2018-06-26

## 2018-06-22 RX ORDER — DIPHENHYDRAMINE HYDROCHLORIDE 50 MG/ML
12.5 INJECTION INTRAMUSCULAR; INTRAVENOUS AS NEEDED
Status: DISCONTINUED | OUTPATIENT
Start: 2018-06-22 | End: 2018-06-22 | Stop reason: HOSPADM

## 2018-06-22 NOTE — CONSULTS
[facility]  Report of Inpatient Wound Care Consultation     Daniel Tobin Patient Status:  Inpatient    1980 MRN [id number]   [facility] 3SW-A Attending Los Vera MD   Hosp Day # 4 PCP Purvi Shi MD     REASON F --    --    HGB  12.7*   --    --    --    --    HCT  38.7   --    --    --    --    PLT  270.0   --    --    --    --    K  4.5   --    --    --    --    CREATSERUM  0.82   --    --    --    --    BUN  29*   --    --    --    --    GLU  98   --    --    - [address] Cartwright, [address]  [phone]  Spectralink: [phone]  Pager: [phone]  VM: [phone]

## 2018-06-22 NOTE — PROGRESS NOTES
NURSING ADMISSION NOTE      Patient received back from PACU  Oriented to room. Safety precautions initiated. Bed in low position. Call light in reach.

## 2018-06-22 NOTE — PROGRESS NOTES
[facility]  Progress Note     Francisca Tobin Patient Status:  Inpatient    1980 MRN [id number]   [facility] 3SW-A Attending Joaquin Mcadams MD   Hosp Day # 4 PCP Bennett Hernandez MD     Chief Complaint: left heel open wound podiatry today, vac applied  2. DMII-insulin ordered; monitor sugars on Levemir 10 units subcu at night and insulin sliding scale- controlled  3. Soft tissue loss at insertion of achilles left ankle; per dr. Nettles Marker  4.  Continue vancomycin 1.25 g every 12

## 2018-06-22 NOTE — ANESTHESIA POSTPROCEDURE EVALUATION
[address] [place] [address] Patient Status:  Inpatient   Age/Gender 34year old male MRN [id number]   [facility] SURGERY Attending Ulysses Richards MD   Hosp Day # 4 PCP Leti Hargrove MD       Anesthesia Post-op Note    Procedure(s

## 2018-06-22 NOTE — ANESTHESIA PREPROCEDURE EVALUATION
PRE-OP EVALUATION    Patient Name: Yahir Tobin    Pre-op Diagnosis: INPT      Procedure(s):  PARTIAL CALCANECTOMY LEFT FOOT  WITH APPLICATION OF SKIN SUBSTITUTE GRAFT    Surgeon(s) and Role:     * Carly Rizo DPM - Primary    Pre-op vitals Evaluation    Patient summary reviewed.     Anesthetic Complications  (-) history of anesthetic complications         GI/Hepatic/Renal    Negative GI/hepatic/renal ROS.  (-) GERD                           Cardiovascular  Comment: +peripheral vascular diseas block  Comment: Risk and benefits of nerve block explained including but not limited to: nerve damage, neuropathy, bleeding, infection.      Plan/risks discussed with: patient and spouse                Present on Admission:  **None**

## 2018-06-22 NOTE — BRIEF OP NOTE
Pre-Operative Diagnosis: Osteomyelitis left calcaneus     Post-Operative Diagnosis: Osteomyelitis left calcaneus     Procedure Performed:   Procedure(s):  PARTIAL CALCANECTOMY LEFT FOOT  WITH APPLICATION OF SKIN SUBSTITUTE GRAFT (flower patch amniotic memb

## 2018-06-22 NOTE — PAYOR COMM NOTE
--------------  CONTINUED STAY REVIEW    Payor: [facility]  Subscriber #:  [id number]  Authorization Number: [id number]    Admit date: 6/18/18  Admit time: 65    Admitting Physician: Cindy Hannah MD  Attending Physician:  Jenny Daigle MD 6/21/2018 1250 Given 4 Units Subcutaneous (Right Upper Arm) Sil Hesetr RN    6/21/2018 1204 Given 4 Units Subcutaneous (Left Upper Arm) Sil Hester RN      Insulin Aspart Pen (NOVOLOG) 100 UNIT/ML flexpen 1-5 Units     Date Action D

## 2018-06-22 NOTE — CM/SW NOTE
Informed by Matt Ross with wound care that she will arrange for wound vac through [facility] and f/u Monday with  for wound care. SW noted that [facility] [phone] is providing the RN services at home.     Spoke with Dexter Bacon, liaison with [facility]

## 2018-06-22 NOTE — PROGRESS NOTES
[facility]  Progress Note     Jarret Tobin Patient Status:  Inpatient    1980 MRN [id number]   Location [facility] Attending Zunilda Salgado MD   Hosp Day # 4 PCP Gustavo Brooks MD     Chief Complaint: foot wound graft  2. DMII-insulin ordered; monitor sugars on Levemir 10 units subcu at night and insulin sliding scale- controlled  3. Soft tissue loss at insertion of achilles left ankle; per dr. Martha Bishop  4.  Continue vancomycin 1.25 g every 12 hours and PICC line pl

## 2018-06-22 NOTE — PROGRESS NOTES
[facility]                INFECTIOUS DISEASE PROGRESS NOTE    Abby Tobin Patient Status:  Inpatient    1980 MRN [id number]   [facility] 3SW-A Attending La Nena Dotson MD   Hosp Day # 4 PCP Caryn Lara MD Patient Active Problem List:     Encounter for long-term (current) use of other medications     Pure hyperglyceridemia     Other and unspecified hyperlipidemia     Uncontrolled type 2 diabetes mellitus with diabetic peripheral angiopathy without gangrene (

## 2018-06-23 PROCEDURE — 99232 SBSQ HOSP IP/OBS MODERATE 35: CPT | Performed by: HOSPITALIST

## 2018-06-23 NOTE — OPERATIVE REPORT
[facility]    PATIENT'S NAME: Karen Mcadams   ATTENDING PHYSICIAN: Nolan Franklin M.D. OPERATING PHYSICIAN: Lawanda Montalvo D.P.M.    PATIENT ACCOUNT#:   [de-identified]    LOCATION:  3SW-A [facility]  MEDICAL RECORD #:   [id number]       DATE OF GALLITO patient was brought into the operating room with vital signs stable and placed in a supine position on the operating table. The leg block was carried out first by Anesthesia, and then the patient was induced into general anesthesia.   The patient was posit then examined for any remaining nonviable tissue that was not bleeding, which was again excisionally debrided using the same instrumentation. The area was then flushed with 3 L of saline containing 150,000 units of bacitracin.   Primary closure was then ca

## 2018-06-23 NOTE — PROGRESS NOTES
[facility]                INFECTIOUS DISEASE PROGRESS NOTE    Buzz Tobin Patient Status:  Inpatient    1980 MRN [id number]   [facility] 3SW-A Attending Chao Narayan MD   Hosp Day # 5 PCP Xiomara Guardado MD Order Status: Sent Lab Status:  In process Updated: 06/22/18 1056   Specimen: Tissue from Bone    Tissue Aerobic Culture [[id number]] Collected: 06/22/18 1013   Order Status: Completed Lab Status: Preliminary result Updated: 06/23/18 0905   Specimen: Tissue

## 2018-06-23 NOTE — PROGRESS NOTES
06/23/18  0711   BP: 127/84   Pulse: 101   Resp: 20   Temp: 98.6 °F (37 °C)   Patient is postop day #1 not feeling much pain some discomfort is starting to return but not significant.     Objective: Wound VAC is in place vital signs are stable he is [employer] [place]

## 2018-06-23 NOTE — PHYSICAL THERAPY NOTE
Pt order received and chart reviewed.  Pt is her with vac placement with osteomyelitis on the L foot calcaneous s/p Partial calcanectomy of the left foot with application of skin substitute grafts  6/22/18 related to a cellulitis on  a blistered foot while

## 2018-06-23 NOTE — PROGRESS NOTES
Post Op Day 1 Ortho Note    Status Post Nerve Block:  Type of Nerve Block: Left calcanectomy  Single Injection Nerve Block    Post op review: No evidence of immediate block related complications, No paresthesia noted, Able to lift leg(s), Able to plantar f

## 2018-06-23 NOTE — PROGRESS NOTES
[facility]  Progress Note     Arina Tobin Patient Status:  Inpatient    1980 MRN [id number]   [facility] 3SW-A Attending Bee Bourne MD   Hosp Day # 5 PCP Maria G George MD     Chief Complaint: left heel nonhealing for MRSA and  He is s/p calcanectomy  by podiatry today, vac applied, POD#1, toes still numb, no significant bleeding  2. DMII-insulin ordered; monitor sugars on Levemir 15 units subcu bid and insulin sliding scale- controlled  3.  Soft tissue loss at inser

## 2018-06-24 PROCEDURE — 99232 SBSQ HOSP IP/OBS MODERATE 35: CPT | Performed by: HOSPITALIST

## 2018-06-24 RX ORDER — DOCUSATE SODIUM 100 MG/1
100 CAPSULE, LIQUID FILLED ORAL 2 TIMES DAILY
Status: DISCONTINUED | OUTPATIENT
Start: 2018-06-24 | End: 2018-06-26

## 2018-06-24 RX ORDER — POLYETHYLENE GLYCOL 3350 17 G/17G
17 POWDER, FOR SOLUTION ORAL DAILY PRN
Status: DISCONTINUED | OUTPATIENT
Start: 2018-06-24 | End: 2018-06-26

## 2018-06-24 NOTE — PROGRESS NOTES
[facility]  Progress Note     Julius Tobin Patient Status:  Inpatient    1980 MRN [id number]   [facility] 3SW-A Attending Alyssa Moralez MD   Hosp Day # 6 PCP Jose Stewart MD     Chief Complaint: nonhealing heel woun podiatry today, vac applied, POD#2, toes still numb but better, no significant bleeding  2. DMII-insulin ordered; monitor sugars on Levemir 15 units subcu bid and insulin sliding scale- controlled, increase evening dose to 20 units  3.  Soft tissue loss at

## 2018-06-24 NOTE — PROGRESS NOTES
[facility]                INFECTIOUS DISEASE PROGRESS NOTE     Patient Status:  Inpatient    1980 MRN [id number]   [facility] 3SW-A Attending Kulwinder Jimenez MD   Hosp Day # 6 PCP Kelly Morfin MD Anaerobic Culture [[id number]] Collected: 06/22/18 1013   Order Status: Completed Lab Status: Preliminary result Updated: 06/23/18 1230   Specimen: Tissue from Bone     Anaerobic Culture Pending   Tissue Aerobic Culture [[id number]] (Abnormal) Collected: 06/

## 2018-06-25 ENCOUNTER — PRIOR ORIGINAL RECORDS (OUTPATIENT)
Dept: OTHER | Age: 38
End: 2018-06-25

## 2018-06-25 ENCOUNTER — APPOINTMENT (OUTPATIENT)
Dept: WOUND CARE | Facility: HOSPITAL | Age: 38
End: 2018-06-25
Attending: PODIATRIST
Payer: COMMERCIAL

## 2018-06-25 PROCEDURE — 99232 SBSQ HOSP IP/OBS MODERATE 35: CPT | Performed by: HOSPITALIST

## 2018-06-25 RX ORDER — AMMONIUM LACTATE 12 G/100G
LOTION TOPICAL AS NEEDED
Status: DISCONTINUED | OUTPATIENT
Start: 2018-06-25 | End: 2018-06-26

## 2018-06-25 NOTE — PROGRESS NOTES
06/25/18  1200   BP: 99/68   Pulse: 92   Resp: 20   Temp: 98.2 °F (36.8 °C)   Patient was seen resting comfortably in bed. He is getting his wound VAC changed by wound care nursing.     Objective: Pathology still pending, tissue culture showing MRSA and C

## 2018-06-25 NOTE — CM/SW NOTE
Informed by Dimas Ruelas with wound care that pt will need regular wound vac- she has paperwork and was getting 's signature. FLORIDALMA informed her that CARLOS with [facility] was here earlier and aware of pt. He will need info faxed to him.   He is aware that Resident

## 2018-06-25 NOTE — PLAN OF CARE
Diabetes/Glucose Control    • Glucose maintained within prescribed range Adequate for Discharge        PAIN - ADULT    • Verbalizes/displays adequate comfort level or patient's stated pain goal Adequate for Discharge        SKIN/TISSUE INTEGRITY - ADULT

## 2018-06-25 NOTE — PROGRESS NOTES
[facility]  Progress Note     Dewey Tobin Patient Status:  Inpatient    1980 MRN [id number]   [facility] [place] 3SW-A Attending Miguel Dacosta MD   Hosp Day # 7 PCP Nabila Cavanaugh MD     Chief Complaint: left foot nonhealing MRSA  2. s/p calcanectomy  by podiatry, vac applied, POD#3, left foot toes still numb but better, no significant bleeding  3.  DMII-insulin ordered; monitor sugars on Levemir 15 units subcu bid and insulin sliding scale- controlled, increase evening dose to

## 2018-06-25 NOTE — PAYOR COMM NOTE
--------------  CONTINUED STAY REVIEW    Payor: [address]  Subscriber #:  [id number]  Authorization Number: [id number]    Admit date: 6/18/18  Admit time: 65    Admitting Physician: Chao Narayan MD  Attending Physician:  Melania Anderson MD 3. DMII-insulin ordered; monitor sugars on Levemir 15 units subcu bid and insulin sliding scale- controlled, increase evening dose to 20 units  4. Soft tissue loss at insertion of achilles left ankle; per dr. Ayanna Sosa  5.  Continue vancomycin 1.25 g every 12 FOR CONTINUED REVIEW/APPROVAL    PLEASE CONTINUE TO FAX ALL 7 INPT DAYS AS AUTHORIZED ALONG W/NRD

## 2018-06-26 VITALS
TEMPERATURE: 98 F | SYSTOLIC BLOOD PRESSURE: 101 MMHG | RESPIRATION RATE: 18 BRPM | WEIGHT: 162.69 LBS | DIASTOLIC BLOOD PRESSURE: 64 MMHG | OXYGEN SATURATION: 95 % | HEART RATE: 91 BPM | BODY MASS INDEX: 25 KG/M2

## 2018-06-26 PROCEDURE — 99239 HOSP IP/OBS DSCHRG MGMT >30: CPT | Performed by: HOSPITALIST

## 2018-06-26 RX ORDER — ENOXAPARIN SODIUM 100 MG/ML
40 INJECTION SUBCUTANEOUS DAILY
Qty: 7 VIAL | Refills: 0 | Status: SHIPPED | OUTPATIENT
Start: 2018-06-26 | End: 2018-07-11 | Stop reason: ALTCHOICE

## 2018-06-26 NOTE — WOUND PROGRESS NOTE
[facility]  Report of Inpatient Wound Care Progress Note    Jarret Tobin Patient Status:  Inpatient    1980 MRN [id number]   [facility] 3SW-A Attending Zunilda Salgado MD   Hosp Day # 8 PCP Gustavo Brooks MD       [address] anterior lower leg.  +seal at 125mmHg continuous. ABD pad, kerlix. Re-applied PRAFO boot to the LE. Instructed on proper positioning. Dr. Mo Arredondo signed [address] [facility] paperwork. [employer] informed of need for unit for discharge.  also aware.       Bijal Rosado

## 2018-06-26 NOTE — PROGRESS NOTES
Patient is being discharged home with a week of Lovenox injections  Patient and wife watched the video , handout was given and we went over all instructions of administrating the Lovenox.   Patient demonstrated the technic correctly and all questions were a

## 2018-06-26 NOTE — WOUND PROGRESS NOTE
[facility]  Report of Inpatient Wound Care Progress Note    Issac Tobin Patient Status:  Inpatient    1980 MRN [id number]   [facility] 3SW-A Attending Hillary Patel MD   Hosp Day # 8 PCP Sarabjit Pack MD       [address] health RN and continued care 1x/week with Dr. Gerardo Thomas. Orders placed in the computer. Durable Medical Equipment  Offloading/Footwear  Compression:  PRAFO boot in place    All Physical Therapy Wound Goals still in progress:  1.  Maintain optimal wound

## 2018-06-26 NOTE — CM/SW NOTE
[employer] rep here. All needed information provided, including signed paperwork. Fred Gallardo with [address] informed of possible discharge today.

## 2018-06-26 NOTE — PROGRESS NOTES
[facility]  Progress Note     Jeromy Lucio Tobin Patient Status:  Inpatient    1980 MRN [id number]   [facility] 3SW-A Attending Dominique Jacob MD   Hosp Day # 8 PCP Gilma Rondon MD     Chief Complaint: left heel wound    S foot toes still numb but better, no significant bleeding  3. DMII-insulin ordered; monitor sugars on Levemir 15 units subcu bid and insulin sliding scale- controlled, increase evening dose to 20 units  4.  Soft tissue loss at insertion of achilles left ankl

## 2018-06-26 NOTE — PROGRESS NOTES
[facility]                INFECTIOUS DISEASE PROGRESS NOTE    James Tobin Patient Status:  Inpatient    1980 MRN [id number]   [facility] 3SW-A Attending Gris Carlson MD   Hosp Day # 8 PCP Miguel Rendon MD Anaerobic Culture [[id number]] Collected: 06/22/18 1013   Order Status: Completed Lab Status: Preliminary result Updated: 06/23/18 1230   Specimen: Tissue from Bone     Anaerobic Culture Pending   Tissue Aerobic Culture [[id number]] (Abnormal) Collected: 06/

## 2018-06-26 NOTE — DISCHARGE SUMMARY
[facility] PSYCHIATRIC [place] HOSPITALIST  DISCHARGE SUMMARY     Cheryle Boning Dietel Patient Status:  Inpatient    1980 MRN [id number]   [facility] 3SW-A Attending Jadon Paez MD   [facility] Day # 8 PCP Oscar Chavez MD     Date of Admission: 2018  Sherwin application of skin substitute graft on June 22    Incidental or significant findings and recommendations (brief descriptions):   •     Lab/Test results pending at Discharge:   ·     Consultants:  • ID and podiatry    Discharge Medication List:     Sari Persaud ONETOUCH ULTRA 2 w/Device Kit      1 kit by Does not apply route as needed. Refills:  0     Sildenafil Citrate 50 MG Tabs  Commonly known as:  VIAGRA      Take 1 tablet (50 mg total) by mouth daily as needed for Erectile Dysfunction.    Quantity:  20 ta

## 2018-06-26 NOTE — CM/SW NOTE
Spoke with Brooks De La Torre with Dr. Josue Like office/[facility]. She has most recent trough information and current Vanco dosing provided.

## 2018-06-27 ENCOUNTER — PATIENT OUTREACH (OUTPATIENT)
Dept: CASE MANAGEMENT | Age: 38
End: 2018-06-27

## 2018-06-27 DIAGNOSIS — M86.172 OTHER ACUTE OSTEOMYELITIS OF LEFT FOOT (HCC): ICD-10-CM

## 2018-06-27 NOTE — PROGRESS NOTES
Initial Post Discharge Follow Up   Discharge Date: 6/26/18  Contact Date: 6/27/2018    Consent Verification:  Assessment Completed With: Patient  HIPAA Verified?   Yes    Discharge Dx:    Left heel wound + osteomyelitis; S/P Partial calcanectomy left eva no      Medications:     Current Outpatient Prescriptions:  Enoxaparin Sodium 40 MG/0.4ML Subcutaneous Solution Inject 0.4 mL (40 mg total) into the skin daily.  Disp: 7 vial Rfl: 0   Acetaminophen-Codeine #3 300-30 MG Oral Tab Take 1 tablet by mouth every needed for Erectile Dysfunction.  Disp: 20 tablet Rfl: 0     • When you were leaving the hospital were any medication changes discussed with you? yes  • If you were prescribed a new medication:   o Was the new medication’s purpose explained? yes  o Was the Reviewed/scheduled/rescheduled PCP TCM/HFU appointment with pt:  Yes, NCM confirmed appointment on 7/3/18 and changed visit type to TCM HFU      Have you made all of your follow up appointments? no    Is there any reason as to why you cannot make your appo

## 2018-06-27 NOTE — CM/SW NOTE
06/27/18 0800   Discharge disposition   Expected discharge disposition Home-Health   Name of Facillity/Home Care/Hospice [facility]   Additional Home Care/Hospice Provider ([facility])   Home services after discharge Skilled home care; Other (comment)  (i

## 2018-06-29 NOTE — PAYOR COMM NOTE
--------------  DISCHARGE REVIEW    Payor: [facility]  Subscriber #:  [id number]  Authorization Number: [id number]    Admit date: 6/18/18  Admit time:  1124  Discharge Date: 6/26/2018  8:00 PM     Admitting Physician: MD Selena Leal initially started on Ancef pending cultures that came back positive for MRSA and she was switched to vancomycin.   Patient was seen by interventional cardiology as well due to peripheral vascular disease and the initial recommendation was to go ahead with p AUTOCOVER      Use a new pen needle with each injection as directed by your doctor   Quantity:  50 each  Refills:  6     BD AUTOSHIELD DUO 30G X 5 MM Misc  Generic drug:  Insulin Pen Needle      USE A new pen needle WITH EACH injection AS DIRECTED by your Abdomen: Soft, nontender, nondistended. Positive bowel sounds. No rebound or guarding. Neurologic: No focal neurological deficits. Musculoskeletal: Moves all extremities. Extremities: No edema.   ------------------------------------------------------

## 2018-07-02 ENCOUNTER — LAB REQUISITION (OUTPATIENT)
Dept: LAB | Facility: HOSPITAL | Age: 38
End: 2018-07-02
Payer: COMMERCIAL

## 2018-07-02 ENCOUNTER — OFFICE VISIT (OUTPATIENT)
Dept: WOUND CARE | Facility: HOSPITAL | Age: 38
End: 2018-07-02
Attending: PODIATRIST
Payer: COMMERCIAL

## 2018-07-02 DIAGNOSIS — E10.40 TYPE 1 DIABETES MELLITUS WITH DIABETIC NEUROPATHY, UNSPECIFIED (HCC): Primary | ICD-10-CM

## 2018-07-02 DIAGNOSIS — B95.62 METHICILLIN RESISTANT STAPHYLOCOCCUS AUREUS INFECTION AS THE CAUSE OF DISEASES CLASSIFIED ELSEWHERE: ICD-10-CM

## 2018-07-02 DIAGNOSIS — E11.51 TYPE 2 DIABETES MELLITUS WITH DIABETIC PERIPHERAL ANGIOPATHY WITHOUT GANGRENE (HCC): ICD-10-CM

## 2018-07-02 DIAGNOSIS — L97.424 NON-PRESSURE CHRONIC ULCER OF LEFT HEEL AND MIDFOOT WITH NECROSIS OF BONE (HCC): ICD-10-CM

## 2018-07-02 DIAGNOSIS — E10.621 TYPE 1 DIABETES MELLITUS WITH FOOT ULCER (HCC): ICD-10-CM

## 2018-07-02 DIAGNOSIS — L97.509 TYPE 1 DIABETES MELLITUS WITH FOOT ULCER (HCC): ICD-10-CM

## 2018-07-02 DIAGNOSIS — I10 ESSENTIAL (PRIMARY) HYPERTENSION: ICD-10-CM

## 2018-07-02 LAB
ALBUMIN SERPL-MCNC: 3.6 G/DL (ref 3.5–4.8)
ALP LIVER SERPL-CCNC: 146 U/L (ref 45–117)
ALT SERPL-CCNC: 26 U/L (ref 17–63)
AST SERPL-CCNC: 24 U/L (ref 15–41)
BASOPHILS # BLD AUTO: 0.03 X10(3) UL (ref 0–0.1)
BASOPHILS NFR BLD AUTO: 0.5 %
BILIRUB SERPL-MCNC: 0.4 MG/DL (ref 0.1–2)
BUN BLD-MCNC: 23 MG/DL (ref 8–20)
CALCIUM BLD-MCNC: 9.1 MG/DL (ref 8.3–10.3)
CHLORIDE: 108 MMOL/L (ref 101–111)
CO2: 30 MMOL/L (ref 22–32)
CREAT BLD-MCNC: 1.31 MG/DL (ref 0.7–1.3)
EOSINOPHIL # BLD AUTO: 0.12 X10(3) UL (ref 0–0.3)
EOSINOPHIL NFR BLD AUTO: 1.9 %
ERYTHROCYTE [DISTWIDTH] IN BLOOD BY AUTOMATED COUNT: 13 % (ref 11.5–16)
GLUCOSE BLD-MCNC: 94 MG/DL (ref 70–99)
HCT VFR BLD AUTO: 36.8 % (ref 37–53)
HGB BLD-MCNC: 11.7 G/DL (ref 13–17)
IMMATURE GRANULOCYTE COUNT: 0.01 X10(3) UL (ref 0–1)
IMMATURE GRANULOCYTE RATIO %: 0.2 %
LYMPHOCYTES # BLD AUTO: 2.04 X10(3) UL (ref 0.9–4)
LYMPHOCYTES NFR BLD AUTO: 32.7 %
M PROTEIN MFR SERPL ELPH: 8 G/DL (ref 6.1–8.3)
MCH RBC QN AUTO: 27.9 PG (ref 27–33.2)
MCHC RBC AUTO-ENTMCNC: 31.8 G/DL (ref 31–37)
MCV RBC AUTO: 87.6 FL (ref 80–99)
MONOCYTES # BLD AUTO: 0.46 X10(3) UL (ref 0.1–1)
MONOCYTES NFR BLD AUTO: 7.4 %
NEUTROPHIL ABS PRELIM: 3.57 X10 (3) UL (ref 1.3–6.7)
NEUTROPHILS # BLD AUTO: 3.57 X10(3) UL (ref 1.3–6.7)
NEUTROPHILS NFR BLD AUTO: 57.3 %
PLATELET # BLD AUTO: 238 10(3)UL (ref 150–450)
POTASSIUM SERPL-SCNC: 4.5 MMOL/L (ref 3.6–5.1)
RBC # BLD AUTO: 4.2 X10(6)UL (ref 4.3–5.7)
RED CELL DISTRIBUTION WIDTH-SD: 41.2 FL (ref 35.1–46.3)
SED RATE-ML: 40 MM/HR (ref 0–12)
SODIUM SERPL-SCNC: 143 MMOL/L (ref 136–144)
VANCOMYCIN TROUGH: 37.5 UG/ML (ref 10–20)
WBC # BLD AUTO: 6.2 X10(3) UL (ref 4–13)

## 2018-07-02 PROCEDURE — 85652 RBC SED RATE AUTOMATED: CPT | Performed by: EMERGENCY MEDICINE

## 2018-07-02 PROCEDURE — 80053 COMPREHEN METABOLIC PANEL: CPT | Performed by: EMERGENCY MEDICINE

## 2018-07-02 PROCEDURE — 97605 NEG PRS WND THER DME<=50SQCM: CPT

## 2018-07-02 PROCEDURE — 85025 COMPLETE CBC W/AUTO DIFF WBC: CPT | Performed by: EMERGENCY MEDICINE

## 2018-07-02 PROCEDURE — 80202 ASSAY OF VANCOMYCIN: CPT | Performed by: EMERGENCY MEDICINE

## 2018-07-02 PROCEDURE — 99213 OFFICE O/P EST LOW 20 MIN: CPT

## 2018-07-02 NOTE — PROGRESS NOTES
HBO Tech Details  Patient Name: Mir Levy Date: 7/2/2018   Patient Number: [id number] Physician / Rosejared Barnett:  Thaddeus Avalos   Patient YOB: 1980 Facility: [facility] Treatment Course Details  Treatment Course Number:  1   HBO Treatment

## 2018-07-03 ENCOUNTER — OFFICE VISIT (OUTPATIENT)
Dept: WOUND CARE | Facility: HOSPITAL | Age: 38
End: 2018-07-03
Attending: HOSPITALIST
Payer: COMMERCIAL

## 2018-07-03 ENCOUNTER — LAB REQUISITION (OUTPATIENT)
Dept: LAB | Facility: HOSPITAL | Age: 38
End: 2018-07-03
Payer: COMMERCIAL

## 2018-07-03 ENCOUNTER — HOSPITAL ENCOUNTER (OUTPATIENT)
Dept: LAB | Facility: HOSPITAL | Age: 38
Discharge: HOME | End: 2018-07-03
Attending: INTERNAL MEDICINE
Payer: COMMERCIAL

## 2018-07-03 DIAGNOSIS — E10.621 TYPE 1 DIABETES MELLITUS WITH FOOT ULCER (HCC): ICD-10-CM

## 2018-07-03 DIAGNOSIS — B95.62 METHICILLIN RESISTANT STAPHYLOCOCCUS AUREUS INFECTION AS THE CAUSE OF DISEASES CLASSIFIED ELSEWHERE: ICD-10-CM

## 2018-07-03 DIAGNOSIS — E10.10 TYPE 1 DIABETES MELLITUS WITH KETOACIDOSIS AND WITHOUT COMA (HCC): ICD-10-CM

## 2018-07-03 DIAGNOSIS — L97.509 TYPE 1 DIABETES MELLITUS WITH FOOT ULCER (HCC): ICD-10-CM

## 2018-07-03 DIAGNOSIS — E10.40 TYPE 1 DIABETES MELLITUS WITH DIABETIC NEUROPATHY, UNSPECIFIED (HCC): Primary | ICD-10-CM

## 2018-07-03 DIAGNOSIS — L97.424 NON-PRESSURE CHRONIC ULCER OF LEFT HEEL AND MIDFOOT WITH NECROSIS OF BONE (HCC): ICD-10-CM

## 2018-07-03 DIAGNOSIS — M86.172 OSTEOMYELITIS OF ANKLE, LEFT, ACUTE (HCC): ICD-10-CM

## 2018-07-03 LAB
ALBUMIN SERPL-MCNC: 3.8 G/DL (ref 3.5–4.8)
ALP LIVER SERPL-CCNC: 147 U/L (ref 45–117)
ALT SERPL-CCNC: 23 U/L (ref 17–63)
AST SERPL-CCNC: 34 U/L (ref 15–41)
BILIRUB SERPL-MCNC: 0.4 MG/DL (ref 0.1–2)
BUN BLD-MCNC: 35 MG/DL (ref 8–20)
CALCIUM BLD-MCNC: 9.7 MG/DL (ref 8.3–10.3)
CHLORIDE: 104 MMOL/L (ref 101–111)
CO2: 28 MMOL/L (ref 22–32)
CREAT BLD-MCNC: 2.09 MG/DL (ref 0.7–1.3)
GLUCOSE BLD-MCNC: 165 MG/DL (ref 70–99)
M PROTEIN MFR SERPL ELPH: 8.6 G/DL (ref 6.1–8.3)
POTASSIUM SERPL-SCNC: 4.2 MMOL/L (ref 3.6–5.1)
SODIUM SERPL-SCNC: 140 MMOL/L (ref 136–144)
VANCOMYCIN RANDOM: 31 UG/ML
VANCOMYCIN RANDOM: 34.2 UG/ML

## 2018-07-03 PROCEDURE — 80202 ASSAY OF VANCOMYCIN: CPT | Performed by: PODIATRIST

## 2018-07-03 PROCEDURE — 36415 COLL VENOUS BLD VENIPUNCTURE: CPT | Performed by: PODIATRIST

## 2018-07-03 PROCEDURE — 80053 COMPREHEN METABOLIC PANEL: CPT | Performed by: INTERNAL MEDICINE

## 2018-07-03 NOTE — PROGRESS NOTES
HBO Tech Details      Patient Name: John Rivera   Patient Number: [id number]   Patient YOB: 1980      Date: 7/3/2018   Physician / Antelmo Craig: Rosaura Schlatter, [id number] Osmin Galindo: Hector Sales Treatment Course Details          Treatment

## 2018-07-05 ENCOUNTER — HOSPITAL ENCOUNTER (INPATIENT)
Facility: HOSPITAL | Age: 38
LOS: 1 days | Discharge: HOME HEALTH | DRG: 683 | End: 2018-07-06
Attending: HOSPITALIST | Admitting: HOSPITALIST
Payer: COMMERCIAL

## 2018-07-05 ENCOUNTER — TELEPHONE (OUTPATIENT)
Dept: INFECTIOUS DISEASE | Facility: CLINIC | Age: 38
End: 2018-07-05

## 2018-07-05 ENCOUNTER — HOSPITAL ENCOUNTER (OUTPATIENT)
Dept: LAB | Facility: HOSPITAL | Age: 38
Discharge: HOME | End: 2018-07-05
Attending: INTERNAL MEDICINE
Payer: COMMERCIAL

## 2018-07-05 ENCOUNTER — MEDICAL CORRESPONDENCE (OUTPATIENT)
Dept: INFECTIOUS DISEASE | Facility: CLINIC | Age: 38
End: 2018-07-05

## 2018-07-05 ENCOUNTER — OFFICE VISIT (OUTPATIENT)
Dept: WOUND CARE | Facility: HOSPITAL | Age: 38
End: 2018-07-05
Attending: HOSPITALIST
Payer: COMMERCIAL

## 2018-07-05 ENCOUNTER — TELEPHONE (OUTPATIENT)
Dept: FAMILY MEDICINE CLINIC | Facility: CLINIC | Age: 38
End: 2018-07-05

## 2018-07-05 DIAGNOSIS — L97.509 TYPE 1 DIABETES MELLITUS WITH FOOT ULCER (HCC): ICD-10-CM

## 2018-07-05 DIAGNOSIS — N17.9 ACUTE RENAL FAILURE, UNSPECIFIED ACUTE RENAL FAILURE TYPE (HCC): ICD-10-CM

## 2018-07-05 DIAGNOSIS — N17.9 ACUTE RENAL FAILURE, UNSPECIFIED ACUTE RENAL FAILURE TYPE (HCC): Primary | ICD-10-CM

## 2018-07-05 DIAGNOSIS — E10.40 TYPE 1 DIABETES MELLITUS WITH DIABETIC NEUROPATHY, UNSPECIFIED (HCC): Primary | ICD-10-CM

## 2018-07-05 DIAGNOSIS — E11.51: ICD-10-CM

## 2018-07-05 DIAGNOSIS — L97.424 NON-PRESSURE CHRONIC ULCER OF LEFT HEEL AND MIDFOOT WITH NECROSIS OF BONE (HCC): ICD-10-CM

## 2018-07-05 DIAGNOSIS — E11.65: ICD-10-CM

## 2018-07-05 DIAGNOSIS — E10.621 TYPE 1 DIABETES MELLITUS WITH FOOT ULCER (HCC): ICD-10-CM

## 2018-07-05 LAB
BUN BLD-MCNC: 44 MG/DL (ref 8–20)
CALCIUM BLD-MCNC: 9.5 MG/DL (ref 8.3–10.3)
CHLORIDE: 105 MMOL/L (ref 101–111)
CO2: 29 MMOL/L (ref 22–32)
COMPLEMENT C3: 147 MG/DL (ref 90–180)
COMPLEMENT C4: 24.9 MG/DL (ref 10–40)
CREAT BLD-MCNC: 2.4 MG/DL (ref 0.7–1.3)
GLUCOSE BLD-MCNC: 141 MG/DL (ref 65–99)
GLUCOSE BLD-MCNC: 176 MG/DL (ref 65–99)
GLUCOSE BLD-MCNC: 213 MG/DL (ref 70–99)
POTASSIUM SERPL-SCNC: 4.8 MMOL/L (ref 3.6–5.1)
SODIUM SERPL-SCNC: 141 MMOL/L (ref 136–144)
VANCOMYCIN RANDOM: 14.3 UG/ML

## 2018-07-05 PROCEDURE — 80048 BASIC METABOLIC PNL TOTAL CA: CPT

## 2018-07-05 PROCEDURE — 36415 COLL VENOUS BLD VENIPUNCTURE: CPT

## 2018-07-05 PROCEDURE — 99222 1ST HOSP IP/OBS MODERATE 55: CPT | Performed by: INTERNAL MEDICINE

## 2018-07-05 PROCEDURE — 99254 IP/OBS CNSLTJ NEW/EST MOD 60: CPT | Performed by: INTERNAL MEDICINE

## 2018-07-05 PROCEDURE — 80202 ASSAY OF VANCOMYCIN: CPT

## 2018-07-05 RX ORDER — SODIUM CHLORIDE 9 MG/ML
INJECTION, SOLUTION INTRAVENOUS CONTINUOUS
Status: DISCONTINUED | OUTPATIENT
Start: 2018-07-05 | End: 2018-07-05

## 2018-07-05 RX ORDER — ACETAMINOPHEN AND CODEINE PHOSPHATE 300; 30 MG/1; MG/1
1 TABLET ORAL EVERY 4 HOURS PRN
Status: DISCONTINUED | OUTPATIENT
Start: 2018-07-05 | End: 2018-07-06

## 2018-07-05 RX ORDER — SODIUM CHLORIDE 9 MG/ML
INJECTION, SOLUTION INTRAVENOUS CONTINUOUS
Status: DISCONTINUED | OUTPATIENT
Start: 2018-07-05 | End: 2018-07-06

## 2018-07-05 RX ORDER — DOXYCYCLINE HYCLATE 100 MG/1
100 CAPSULE ORAL EVERY 12 HOURS SCHEDULED
Qty: 28 CAPSULE | Refills: 0 | Status: SHIPPED | OUTPATIENT
Start: 2018-07-06 | End: 2018-09-25

## 2018-07-05 RX ORDER — DOXYCYCLINE HYCLATE 100 MG/1
100 CAPSULE ORAL EVERY 12 HOURS SCHEDULED
Status: DISCONTINUED | OUTPATIENT
Start: 2018-07-06 | End: 2018-07-06

## 2018-07-05 RX ORDER — HEPARIN SODIUM 5000 [USP'U]/ML
5000 INJECTION, SOLUTION INTRAVENOUS; SUBCUTANEOUS EVERY 12 HOURS SCHEDULED
Status: DISCONTINUED | OUTPATIENT
Start: 2018-07-05 | End: 2018-07-06

## 2018-07-05 RX ORDER — DEXTROSE MONOHYDRATE 25 G/50ML
50 INJECTION, SOLUTION INTRAVENOUS
Status: DISCONTINUED | OUTPATIENT
Start: 2018-07-05 | End: 2018-07-06

## 2018-07-05 RX ORDER — ACETAMINOPHEN 325 MG/1
650 TABLET ORAL EVERY 6 HOURS PRN
Status: DISCONTINUED | OUTPATIENT
Start: 2018-07-05 | End: 2018-07-06

## 2018-07-05 NOTE — CONSULTS
[facility]  Report of Consultation    Rishi Tobin Patient Status:  Inpatient    1980 MRN [id number]   [facility] 5NW-A Attending Cristin Tsai [address] Day # 0 PCP Murray Puentes MD     Reason for [facility] SOB/cough/hemoptysis  Denies abd or flank pain  Denies N/V/D  Denies change in urinary habits or gross hematuria  Denies LE edema  Denies skin rashes/myalgias/arthralgias      Physical Exam:   BP 99/67 (BP Location: Left arm)   Temp 98.1 °F (36.7 °C) (Oral Value Ref Range Status   07/05/2017 109.6 (H) <=30.0 ug/mg Final   07/21/2016 8.1 <=30.0 ug/mg Final   ----------    Recent Labs   Lab  07/02/18   0630   WBC  6.2   HGB  11.7*   MCV  87.6   PLT  238.0       Recent Labs   Lab  07/02/18   0630  07/03/18   15

## 2018-07-05 NOTE — PAYOR COMM NOTE
--------------  CONTINUED STAY REVIEW    Payor: [facility]  Subscriber #:  [id number]  Authorization Number: [id number]    Admit date: 6/18/18  Admit time: 65    Admitting Physician: Rigo Minor MD  Attending Physician:  No att. provider · Will sign off at this time    HUNTER Herrera  6/20/2018  12:45 PM    Plan: At this point time I told the patient that the graft material is already going to probably be shipped tomorrow there may be a slight delay but we should have it for Friday.   Pro 2.  Portion of the calcaneus to microbiology for aerobic anaerobic culture and sensitivity although recently a specimen taken in the wound care center is growing out MRSA     Estimated Blood Loss: Blood Output: 40 mL (6/22/2018 10:34 AM)  Dictation Number: MEDICATIONS ADMINISTERED IN LAST 1 DAY:  lactated ringers infusion Order Report Continuous 06/19/18 06/26/18     insulin detemir (LEVEMIR) 100 UNIT/ML flextouch 15 Units Order Report Every 12 hours 06/23/18 06/25/18   insulin detemir (LEVEMIR) 100 UNIT/ML Glucose-Vitamin C (DEX-4) 4-0.006 g chewable tab 8 tablet - Every 15 min PRN 06/22/18 06/22/18   lactated ringers infusion Order Report Continuous 06/22/18 06/22/18   HYDROcodone-acetaminophen (NORCO) 5-325 MG per tab 1 tablet - As needed 06/22/18 06/22/18

## 2018-07-05 NOTE — PROGRESS NOTES
[facility]                INFECTIOUS DISEASE PROGRESS NOTE    Ciara Tobin Patient Status:  Inpatient    1980 MRN [id number]   [facility] 5NW-A Attending Velma [place] Day # 0 PCP Leanna Ordoñez, Surgery    Received:            06/22/2018 10:28 AM           Pathologist:           Arnold Goldberg MD                                                              Specimens:   A) - Bone, POSTERIOR CALCANEOUS FOR OSTEOMYELITIS:  PLEASE CHECK PAVAN MARGIN FOR calcanectomy 6/22/18 , cx MRSA  POD#13  ---path showing clear resection margins  Vancomycin held 7/2 -due to high levels, level today is 14  --------continue to hold vancomycin given ARF  Switch to PO doxy in am, given clear margins    2.  ARF/suspected Marleny Yepez

## 2018-07-05 NOTE — CONSULTS
Labs received creat from 7/3 was 2.09, vancomycin was held since 7/2 -to have labs repeated today, renal consult, and possible admission if not improved

## 2018-07-05 NOTE — TELEPHONE ENCOUNTER
Wound care supply order form signed by Yamileth Dumont signed and faxed to [employer] Copy sent to scan.

## 2018-07-05 NOTE — H&P
[facility] HOSPITALIST                                                               History & Physical         Martínez Tobin Patient Status:  Inpatient    1980 MRN [id number]   [facility] 5NW-A Attending Travis Seth MD   [address] [facility] 40 MG/0.4ML Subcutaneous Solution Inject 0.4 mL (40 mg total) into the skin daily. Disp: 7 vial Rfl: 0    Acetaminophen-Codeine #3 300-30 MG Oral Tab Take 1 tablet by mouth every 4 (four) hours as needed.  Disp: 30 tablet Rfl: 0 Taking   Dulaglutide (Daniel Mancini mouth daily as needed for Erectile Dysfunction. Disp: 20 tablet Rfl: 0 Taking       Review of Systems:  A comprehensive 14 point review of systems was completed. Pertinent positives and negatives noted in the the HPI.     Physical Exam:     Vital signs: Bl follow-up and for hyperbaric treatments-ID on consult. Wound care consult  3. Insulin-dependent diabetes mellitus type 2–hyperglycemia protocol.   Patient states his blood sugar was running low at home and recently his Levemir was decreased to 10 units sub

## 2018-07-05 NOTE — PROGRESS NOTES
[facility] Tech Details  Patient Name: Azra Arredondo  Patient Number: [id number]  Patient YOB: 1980  Date: 7/5/2018  Facility: [address] [place] [address] Treatment Course Details  Treatment Course Number: 1  Indication: Diabetic wound(s) of the lower extremit

## 2018-07-05 NOTE — PROGRESS NOTES
NURSING ADMISSION NOTE      Patient admitted via Wheelchair  Oriented to room. Safety precautions initiated. Bed in low position. Call light in reach. Admission navigator complete. Pt a/ox4. Tolerating RA with spo2 wdl. Glasses.  Up with crutches

## 2018-07-06 ENCOUNTER — APPOINTMENT (OUTPATIENT)
Dept: WOUND CARE | Facility: HOSPITAL | Age: 38
End: 2018-07-06
Payer: COMMERCIAL

## 2018-07-06 VITALS
TEMPERATURE: 98 F | RESPIRATION RATE: 18 BRPM | BODY MASS INDEX: 24 KG/M2 | SYSTOLIC BLOOD PRESSURE: 117 MMHG | WEIGHT: 160 LBS | HEART RATE: 91 BPM | OXYGEN SATURATION: 100 % | DIASTOLIC BLOOD PRESSURE: 78 MMHG

## 2018-07-06 LAB
ALBUMIN SERPL-MCNC: 3.4 G/DL (ref 3.5–4.8)
ALP LIVER SERPL-CCNC: 123 U/L (ref 45–117)
ALT SERPL-CCNC: 19 U/L (ref 17–63)
AST SERPL-CCNC: 20 U/L (ref 15–41)
BASOPHILS # BLD AUTO: 0.01 X10(3) UL (ref 0–0.1)
BASOPHILS NFR BLD AUTO: 0.2 %
BILIRUB SERPL-MCNC: 0.5 MG/DL (ref 0.1–2)
BILIRUB UR QL STRIP.AUTO: NEGATIVE
BUN BLD-MCNC: 40 MG/DL (ref 8–20)
CALCIUM BLD-MCNC: 9.3 MG/DL (ref 8.3–10.3)
CHLORIDE: 108 MMOL/L (ref 101–111)
CLARITY UR REFRACT.AUTO: CLEAR
CO2: 26 MMOL/L (ref 22–32)
CREAT BLD-MCNC: 2.05 MG/DL (ref 0.7–1.3)
CREAT UR-SCNC: 52.5 MG/DL
EOSINOPHIL # BLD AUTO: 0.12 X10(3) UL (ref 0–0.3)
EOSINOPHIL NFR BLD AUTO: 2 %
ERYTHROCYTE [DISTWIDTH] IN BLOOD BY AUTOMATED COUNT: 13.4 % (ref 11.5–16)
EST. AVERAGE GLUCOSE BLD GHB EST-MCNC: 123 MG/DL (ref 68–126)
GLUCOSE BLD-MCNC: 127 MG/DL (ref 70–99)
GLUCOSE BLD-MCNC: 141 MG/DL (ref 65–99)
GLUCOSE UR STRIP.AUTO-MCNC: >=500 MG/DL
HAV IGM SER QL: 2.1 MG/DL (ref 1.8–2.5)
HBA1C MFR BLD HPLC: 5.9 % (ref ?–5.7)
HCT VFR BLD AUTO: 31.2 % (ref 37–53)
HGB BLD-MCNC: 10.2 G/DL (ref 13–17)
IMMATURE GRANULOCYTE COUNT: 0.01 X10(3) UL (ref 0–1)
IMMATURE GRANULOCYTE RATIO %: 0.2 %
KETONES UR STRIP.AUTO-MCNC: NEGATIVE MG/DL
LEUKOCYTE ESTERASE UR QL STRIP.AUTO: NEGATIVE
LYMPHOCYTES # BLD AUTO: 1.88 X10(3) UL (ref 0.9–4)
LYMPHOCYTES NFR BLD AUTO: 31.1 %
M PROTEIN MFR SERPL ELPH: 7.3 G/DL (ref 6.1–8.3)
MCH RBC QN AUTO: 28.5 PG (ref 27–33.2)
MCHC RBC AUTO-ENTMCNC: 32.7 G/DL (ref 31–37)
MCV RBC AUTO: 87.2 FL (ref 80–99)
MONOCYTES # BLD AUTO: 0.41 X10(3) UL (ref 0.1–1)
MONOCYTES NFR BLD AUTO: 6.8 %
NEUTROPHIL ABS PRELIM: 3.61 X10 (3) UL (ref 1.3–6.7)
NEUTROPHILS # BLD AUTO: 3.61 X10(3) UL (ref 1.3–6.7)
NEUTROPHILS NFR BLD AUTO: 59.7 %
NITRITE UR QL STRIP.AUTO: NEGATIVE
PH UR STRIP.AUTO: 5 [PH] (ref 4.5–8)
PLATELET # BLD AUTO: 175 10(3)UL (ref 150–450)
POTASSIUM SERPL-SCNC: 4.5 MMOL/L (ref 3.6–5.1)
PROT UR STRIP.AUTO-MCNC: NEGATIVE MG/DL
PROT UR-MCNC: 9.6 MG/DL
RBC # BLD AUTO: 3.58 X10(6)UL (ref 4.3–5.7)
RBC UR QL AUTO: NEGATIVE
RED CELL DISTRIBUTION WIDTH-SD: 41.9 FL (ref 35.1–46.3)
SODIUM SERPL-SCNC: 142 MMOL/L (ref 136–144)
SODIUM SERPL-SCNC: 89 MMOL/L
SP GR UR STRIP.AUTO: 1.01 (ref 1–1.03)
URINE PROTEIN/CREATININE RATIO, RANDOM: 0.18
UROBILINOGEN UR STRIP.AUTO-MCNC: <2 MG/DL
WBC # BLD AUTO: 6 X10(3) UL (ref 4–13)

## 2018-07-06 PROCEDURE — 99232 SBSQ HOSP IP/OBS MODERATE 35: CPT | Performed by: INTERNAL MEDICINE

## 2018-07-06 PROCEDURE — 99238 HOSP IP/OBS DSCHRG MGMT 30/<: CPT | Performed by: INTERNAL MEDICINE

## 2018-07-06 RX ORDER — INSULIN DETEMIR 100 [IU]/ML
INJECTION, SOLUTION SUBCUTANEOUS
Qty: 15 ML | Refills: 2 | Status: SHIPPED | COMMUNITY
Start: 2018-07-06 | End: 2018-10-31

## 2018-07-06 NOTE — CM/SW NOTE
Met with pt who is a 33 y/o man admitted for acute kidney injury. Pt was recently in the hospital 6/18-6/26 and discharged to home with [facility] for IV abx and KCI wound vac.   Pt stated that he received [place] [id number] services but was recently disch situation prior to admission? Home with family   Did you have a follow-up appointment scheduled at time of discharge? Yes   ----F/U appt: Did you go to that appointment? Yes   ---- F/U appt: How many days after discharge was follow-up appointment?  0-7 days

## 2018-07-06 NOTE — PROGRESS NOTES
NURSING DISCHARGE NOTE    Discharged Home via Wheelchair. Accompanied by Family member  Belongings Taken by patient/family. Pt is assessed and ready for discharge. PICC Line removed, tip intact. Wound care changed dressing.  Instructions gone over

## 2018-07-06 NOTE — PROGRESS NOTES
[facility]  Nephrology Progress Note    Prharjinder Tobin Patient Status:  Inpatient    1980 MRN [id number]   [facility] 5NW-A Attending Otto Stovall MD   Hosp Day # 1 PCP Blanche Arriola MD       SUBJECTIVE:  Feels good to 2.1   GLU  94  165*  213*  127*       Recent Labs   Lab  07/02/18   0630  07/03/18   1526  07/06/18   0450   ALT  26  23  19   AST  24  34  20   ALB  3.6  3.8  3.4*       Recent Labs   Lab  07/05/18   1711  07/05/18   2120  07/06/18   0743   PGLU  141*  17

## 2018-07-06 NOTE — HOME CARE LIAISON
Patient current with [facility] for RN services. Will continue to follow. Please obtain resumption orders prior to discharge. Thank you.

## 2018-07-06 NOTE — DISCHARGE SUMMARY
[facility]  Discharge Summary    Dewey Tobin Patient Status:  Inpatient    1980 MRN [id number]   [facility] 5NW-A Attending Calista De Paz MD   [facility] Day # 1 PCP Nabila Cavanaugh MD     Date of Admission: 2018    Date mellitus and blood sugar control on lower dose of Levemir 10 units subcu daily. Continued on home medications for hyperlipidemia. Patient improved clinically and creatinine improving. Patient tolerating diet with no nausea vomiting or diarrhea.   Wendelyn Moritz Glucose Blood Strp      Medicare Only: If on oral medications, test blood glucose once a day in the morning before breakfast If on insulin, test blood glucose 3 times per day before meals  Non-Medicare:  Test blood glucose 4-5 times per day before meals, [address]    In 1 week      Noe Verdin [address]  14 [facility]  [phone]      as directed    [address] [facility]  [address] [place] [address]  [phone]    Charbel jean

## 2018-07-06 NOTE — PAYOR COMM NOTE
--------------  ADMISSION REVIEW     Payor: [address]  Subscriber #:  [id number]  Authorization Number: [id number]    Admit date: 7/5/18  Admit time: 1401 [facility]       Admitting Physician: Tino Narvaez DO  Attending Physician:  Luis Carlos Garcia Comment: ACL REPAIR  No date: TOTAL KNEE REPLACEMENT    Family history:  Family History   Problem Relation Age of Onset   • Diabetes Father    • Heart Disorder Father    • Diabetes Mother    • Hypertension Sister      Allergies:  No Known Allergies    Ho glucose 3 times per day before mealsNon-Medicare:  Test blood glucose 4-5 times per day before meals, bedtime and for signs, symptoms of hypoglycemia or as ordered by physician Disp: 50 strip Rfl: 6 Taking   Ezetimibe-Simvastatin (VYTORIN) 10-80 MG Oral Ta kidney injury– IV fluids. Follow BMP in a.m.   2. 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 Ayanna Sosa and on long-term IV antibiotics IV vancomycin per ID and follows up with wound clinic for wound VAC follow-up and for hyper closely  #2. MRSA heel infection- s/p recent calcanectomy. Wound vac in place. vanco on hold. abx per ID     D/W Reny Gupta/Sam  D/W pt and family at . [address]  7/5/2018  3:36 PM               INFECTIOUS DISEASE PROGRESS NOTE  Admitted (none) Intravenous Pretty Medina RN    7/5/2018 1615 New Bag (none) Intravenous Roxane Milian RN          REVIEWER COMMENTS:     FOR REVIEW/APPROVAL OF INPT ADMISSION    PLEASE FAX ALL INPT DAYS AS AUTHORIZED ALONG W/NRD

## 2018-07-06 NOTE — PROGRESS NOTES
[facility]  Progress Note    Ciara Tobin Patient Status:  Inpatient    1980 MRN [id number]   [facility] 5NW-A Attending Tiny Joaquin MD   Hosp Day # 1 PCP Leanna Ordoñez MD     CC: Abnormal labs with acute kidney inj 07/06/2018    07/06/2018   CO2 26.0 07/06/2018    07/06/2018   CA 9.3 07/06/2018   ALB 3.4 07/06/2018   ALKPHO 123 07/06/2018   BILT 0.5 07/06/2018   TP 7.3 07/06/2018   AST 20 07/06/2018   ALT 19 07/06/2018   MG 2.1 07/06/2018   PGLU 141 07/0 for hyperbaric treatments-ID on consult. Wound care consult. Discussed with ID who  changed IV antibiotics to oral doxycycline today and at the time of discharge  3. Insulin-dependent diabetes mellitus type 2–hyperglycemia protocol.   Patient states his b

## 2018-07-07 NOTE — CM/SW NOTE
Patient discharged on 7/6/18 with no further needs identified.        07/07/18 0800   Discharge disposition   Expected discharge disposition Home or Self   Name of [address]   Outpatient services (wound care clinic)   Discharge transp

## 2018-07-09 ENCOUNTER — HOSPITAL ENCOUNTER (OUTPATIENT)
Dept: LAB | Facility: HOSPITAL | Age: 38
Discharge: HOME | End: 2018-07-09
Attending: INTERNAL MEDICINE
Payer: COMMERCIAL

## 2018-07-09 ENCOUNTER — PATIENT OUTREACH (OUTPATIENT)
Dept: CASE MANAGEMENT | Age: 38
End: 2018-07-09

## 2018-07-09 ENCOUNTER — OFFICE VISIT (OUTPATIENT)
Dept: WOUND CARE | Facility: HOSPITAL | Age: 38
End: 2018-07-09
Attending: PODIATRIST
Payer: COMMERCIAL

## 2018-07-09 DIAGNOSIS — E10.40 TYPE 1 DIABETES MELLITUS WITH DIABETIC NEUROPATHY, UNSPECIFIED (HCC): Primary | ICD-10-CM

## 2018-07-09 DIAGNOSIS — L97.424 NON-PRESSURE CHRONIC ULCER OF LEFT HEEL AND MIDFOOT WITH NECROSIS OF BONE (HCC): ICD-10-CM

## 2018-07-09 DIAGNOSIS — E11.65: ICD-10-CM

## 2018-07-09 DIAGNOSIS — E11.51: ICD-10-CM

## 2018-07-09 DIAGNOSIS — E10.621 TYPE 1 DIABETES MELLITUS WITH FOOT ULCER (HCC): ICD-10-CM

## 2018-07-09 DIAGNOSIS — L97.509 TYPE 1 DIABETES MELLITUS WITH FOOT ULCER (HCC): ICD-10-CM

## 2018-07-09 LAB
BUN BLD-MCNC: 44 MG/DL (ref 8–20)
CALCIUM BLD-MCNC: 9.6 MG/DL (ref 8.3–10.3)
CHLORIDE: 105 MMOL/L (ref 101–111)
CO2: 26 MMOL/L (ref 22–32)
CREAT BLD-MCNC: 2.48 MG/DL (ref 0.7–1.3)
GLUCOSE BLD-MCNC: 136 MG/DL (ref 70–99)
POTASSIUM SERPL-SCNC: 4.5 MMOL/L (ref 3.6–5.1)
SODIUM SERPL-SCNC: 139 MMOL/L (ref 136–144)

## 2018-07-09 PROCEDURE — 80048 BASIC METABOLIC PNL TOTAL CA: CPT | Performed by: INTERNAL MEDICINE

## 2018-07-09 PROCEDURE — 97605 NEG PRS WND THER DME<=50SQCM: CPT

## 2018-07-09 PROCEDURE — 36415 COLL VENOUS BLD VENIPUNCTURE: CPT | Performed by: INTERNAL MEDICINE

## 2018-07-09 NOTE — PROGRESS NOTES
HBO Tech Details      Patient Name: Gurvinder Hassan   Patient Number: [id number]   Patient YOB: 1980      Date: 7/9/2018   Physician / Marti Ear:  Minda Ricci [facility] 51: [facility] Treatment Course Details          Treatment Cou

## 2018-07-10 ENCOUNTER — OFFICE VISIT (OUTPATIENT)
Dept: WOUND CARE | Facility: HOSPITAL | Age: 38
End: 2018-07-10
Attending: HOSPITALIST
Payer: COMMERCIAL

## 2018-07-10 DIAGNOSIS — L97.424 NON-PRESSURE CHRONIC ULCER OF LEFT HEEL AND MIDFOOT WITH NECROSIS OF BONE (HCC): ICD-10-CM

## 2018-07-10 DIAGNOSIS — E10.40 TYPE 1 DIABETES MELLITUS WITH DIABETIC NEUROPATHY, UNSPECIFIED (HCC): Primary | ICD-10-CM

## 2018-07-10 DIAGNOSIS — L97.509 TYPE 1 DIABETES MELLITUS WITH FOOT ULCER (HCC): ICD-10-CM

## 2018-07-10 DIAGNOSIS — E10.621 TYPE 1 DIABETES MELLITUS WITH FOOT ULCER (HCC): ICD-10-CM

## 2018-07-10 NOTE — PROGRESS NOTES
HBO Tech Details  Patient Name: Aureliano Gonzalez  Patient Number: [id number]  Patient YOB: 1980  Date: 7/10/2018  Physician / Ame :  Deborah Hernandez  Facility: [facility]  HBO Treatment Course Details  Treatment Course Number: 1  Indication: Neil Manning

## 2018-07-10 NOTE — PROGRESS NOTES
Initial Post Discharge Follow Up   Discharge Date: 7/6/18  Contact Date: 7/9/2018    Consent Verification:  Assessment Completed With: Patient  HIPAA Verified?   Yes     Patient currently in TCM  Episode, HFU only-No TCM    Discharge Dx:    JESSICA, Left wallace mg total) into the skin daily. Disp: 7 vial Rfl: 0   Acetaminophen-Codeine #3 300-30 MG Oral Tab Take 1 tablet by mouth every 4 (four) hours as needed.  Disp: 30 tablet Rfl: 0   Dulaglutide (TRULICITY) 1.5 QP/8.3GP Subcutaneous Solution Pen-injector Inject discussed? yes  o Do you have any questions about your new medication? No  • Did you  your discharge medications when you left the hospital? Yes  • May I go over your medications with you to make sure we are not missing anything? yes  • Are there any [address])    Jul 23, 2018 12:30 PM CDT [place] [facility] 2 with [address])    Jul 24, 2018 12:30 PM CDT [facility] 2 with Elmer Tidwell [facility])    Aug 13, 2018 12:30 PM CDT [place] [facility] 2 with [address])        [address]  [address] [facility].  [address] [place] [address] Str

## 2018-07-11 ENCOUNTER — OFFICE VISIT (OUTPATIENT)
Dept: NEPHROLOGY | Facility: CLINIC | Age: 38
End: 2018-07-11

## 2018-07-11 ENCOUNTER — APPOINTMENT (OUTPATIENT)
Dept: HEMATOLOGY/ONCOLOGY | Facility: HOSPITAL | Age: 38
End: 2018-07-11
Attending: INTERNAL MEDICINE
Payer: COMMERCIAL

## 2018-07-11 ENCOUNTER — OFFICE VISIT (OUTPATIENT)
Dept: WOUND CARE | Facility: HOSPITAL | Age: 38
End: 2018-07-11
Attending: INTERNAL MEDICINE
Payer: COMMERCIAL

## 2018-07-11 VITALS — DIASTOLIC BLOOD PRESSURE: 71 MMHG | SYSTOLIC BLOOD PRESSURE: 86 MMHG

## 2018-07-11 DIAGNOSIS — L97.424 NON-PRESSURE CHRONIC ULCER OF LEFT HEEL AND MIDFOOT WITH NECROSIS OF BONE (HCC): ICD-10-CM

## 2018-07-11 DIAGNOSIS — E10.621 TYPE 1 DIABETES MELLITUS WITH FOOT ULCER (HCC): ICD-10-CM

## 2018-07-11 DIAGNOSIS — E10.40 TYPE 1 DIABETES MELLITUS WITH DIABETIC NEUROPATHY, UNSPECIFIED (HCC): Primary | ICD-10-CM

## 2018-07-11 DIAGNOSIS — N17.9 AKI (ACUTE KIDNEY INJURY) (HCC): Primary | ICD-10-CM

## 2018-07-11 DIAGNOSIS — L97.509 TYPE 1 DIABETES MELLITUS WITH FOOT ULCER (HCC): ICD-10-CM

## 2018-07-11 DIAGNOSIS — A49.02 MRSA INFECTION: ICD-10-CM

## 2018-07-11 PROCEDURE — 99214 OFFICE O/P EST MOD 30 MIN: CPT | Performed by: INTERNAL MEDICINE

## 2018-07-11 PROCEDURE — 97605 NEG PRS WND THER DME<=50SQCM: CPT

## 2018-07-11 NOTE — PROGRESS NOTES
Nephrology Progress Note      Hong Dover is a 34year old male.     HPI:   Patient presents with:  Kidney Problem      Mr. Ursula Justice was seen in the nephrology clinic today in follow-up for management of recently diagnosed acute kidney injury thought Problem Relation Age of Onset   • Diabetes Father    • Heart Disorder Father    • Diabetes Mother    • Hypertension Sister       Social History: Smoking status: Never Smoker                                                              Smokeless tobacco: by your doctor Disp: 50 each Rfl: 6   Glucose Blood In Vitro Strip Medicare Only:If on oral medications, test blood glucose once a day in the morning before breakfastIf on insulin, test blood glucose 3 times per day before mealsNon-Medicare:  Test blood gl ALKPHO 123 (H) 07/06/2018   AST 20 07/06/2018   ALT 19 07/06/2018   BILT 0.5 07/06/2018   TP 7.3 07/06/2018   ALB 3.4 (L) 07/06/2018   AGRATIO 1.5 04/21/2014    07/09/2018   K 4.5 07/09/2018    07/09/2018   CO2 26.0 07/09/2018       Lab Resul creatinine to continue to improve after the vancomycin was discontinued and he was placed on oral doxycycline.   However his creatinine was worse on July 9 and the etiology is not entirely clear although with ongoing modest hypotension may be a matter of de

## 2018-07-11 NOTE — PROGRESS NOTES
hospitals Tech Details  Patient Name: Umang Tsai  Patient Number: [id number]  Patient YOB: 1980  Date: 7/11/2018  Physician / Jeancarlos Del Rosario: Stephany Sida: [id number] [facility] Treatment Course Details  Treatment Course Number: 1  Indication:

## 2018-07-12 ENCOUNTER — OFFICE VISIT (OUTPATIENT)
Dept: WOUND CARE | Facility: HOSPITAL | Age: 38
End: 2018-07-12
Attending: HOSPITALIST
Payer: COMMERCIAL

## 2018-07-12 ENCOUNTER — APPOINTMENT (OUTPATIENT)
Dept: LAB | Age: 38
End: 2018-07-12
Attending: INTERNAL MEDICINE
Payer: COMMERCIAL

## 2018-07-12 ENCOUNTER — OFFICE VISIT (OUTPATIENT)
Dept: FAMILY MEDICINE CLINIC | Facility: CLINIC | Age: 38
End: 2018-07-12

## 2018-07-12 VITALS
DIASTOLIC BLOOD PRESSURE: 68 MMHG | OXYGEN SATURATION: 99 % | RESPIRATION RATE: 14 BRPM | HEART RATE: 113 BPM | SYSTOLIC BLOOD PRESSURE: 100 MMHG | BODY MASS INDEX: 26 KG/M2 | WEIGHT: 170 LBS

## 2018-07-12 DIAGNOSIS — A49.02 MRSA INFECTION: ICD-10-CM

## 2018-07-12 DIAGNOSIS — L97.424 NON-PRESSURE CHRONIC ULCER OF LEFT HEEL AND MIDFOOT WITH NECROSIS OF BONE (HCC): ICD-10-CM

## 2018-07-12 DIAGNOSIS — E10.40 TYPE 1 DIABETES MELLITUS WITH DIABETIC NEUROPATHY, UNSPECIFIED (HCC): Primary | ICD-10-CM

## 2018-07-12 DIAGNOSIS — L97.509 TYPE 1 DIABETES MELLITUS WITH FOOT ULCER (HCC): ICD-10-CM

## 2018-07-12 DIAGNOSIS — N17.9 AKI (ACUTE KIDNEY INJURY) (HCC): ICD-10-CM

## 2018-07-12 DIAGNOSIS — N17.9 AKI (ACUTE KIDNEY INJURY) (HCC): Primary | ICD-10-CM

## 2018-07-12 DIAGNOSIS — E10.621 TYPE 1 DIABETES MELLITUS WITH FOOT ULCER (HCC): ICD-10-CM

## 2018-07-12 DIAGNOSIS — R00.0 TACHYCARDIA: ICD-10-CM

## 2018-07-12 DIAGNOSIS — A49.01 MSSA (METHICILLIN SUSCEPTIBLE STAPHYLOCOCCUS AUREUS): ICD-10-CM

## 2018-07-12 DIAGNOSIS — M86.172 OTHER ACUTE OSTEOMYELITIS OF LEFT FOOT (HCC): ICD-10-CM

## 2018-07-12 LAB
BILIRUB UR QL STRIP.AUTO: NEGATIVE
BUN BLD-MCNC: 49 MG/DL (ref 8–20)
CALCIUM BLD-MCNC: 9.8 MG/DL (ref 8.3–10.3)
CHLORIDE: 104 MMOL/L (ref 101–111)
CLARITY UR REFRACT.AUTO: CLEAR
CO2: 24 MMOL/L (ref 22–32)
COLOR UR AUTO: YELLOW
CREAT BLD-MCNC: 2.67 MG/DL (ref 0.7–1.3)
CREAT UR-SCNC: 102 MG/DL
GLUCOSE BLD-MCNC: 116 MG/DL (ref 70–99)
GLUCOSE UR STRIP.AUTO-MCNC: >=500 MG/DL
KETONES UR STRIP.AUTO-MCNC: NEGATIVE MG/DL
LEUKOCYTE ESTERASE UR QL STRIP.AUTO: NEGATIVE
MICROALBUMIN UR-MCNC: 0.88 MG/DL
MICROALBUMIN/CREAT 24H UR-RTO: 8.6 UG/MG (ref ?–30)
NITRITE UR QL STRIP.AUTO: NEGATIVE
PH UR STRIP.AUTO: 5 [PH] (ref 4.5–8)
POTASSIUM SERPL-SCNC: 4.8 MMOL/L (ref 3.6–5.1)
PROT UR STRIP.AUTO-MCNC: NEGATIVE MG/DL
RBC UR QL AUTO: NEGATIVE
SODIUM SERPL-SCNC: 140 MMOL/L (ref 136–144)
SP GR UR STRIP.AUTO: 1.01 (ref 1–1.03)
UROBILINOGEN UR STRIP.AUTO-MCNC: <2 MG/DL

## 2018-07-12 PROCEDURE — 82570 ASSAY OF URINE CREATININE: CPT | Performed by: INTERNAL MEDICINE

## 2018-07-12 PROCEDURE — 36415 COLL VENOUS BLD VENIPUNCTURE: CPT | Performed by: INTERNAL MEDICINE

## 2018-07-12 PROCEDURE — 81003 URINALYSIS AUTO W/O SCOPE: CPT | Performed by: INTERNAL MEDICINE

## 2018-07-12 PROCEDURE — 99496 TRANSJ CARE MGMT HIGH F2F 7D: CPT | Performed by: EMERGENCY MEDICINE

## 2018-07-12 PROCEDURE — 1111F DSCHRG MED/CURRENT MED MERGE: CPT | Performed by: EMERGENCY MEDICINE

## 2018-07-12 PROCEDURE — 82043 UR ALBUMIN QUANTITATIVE: CPT | Performed by: INTERNAL MEDICINE

## 2018-07-12 PROCEDURE — 80048 BASIC METABOLIC PNL TOTAL CA: CPT | Performed by: INTERNAL MEDICINE

## 2018-07-12 NOTE — PROGRESS NOTES
Header Image    CurrentOld Version  HBO Tech Details  Patient Name: Andrea Viera  Patient Number: [id number]  Patient YOB: 1980  Date: 7/12/2018  Physician / [address]: Marisa Mayes, [address]  [address]: [id number] [facility] Treatment Course Details  Tr

## 2018-07-12 NOTE — PATIENT INSTRUCTIONS
Thank you for choosing [facility]  To Do:  FOR GAGAN Ramirez    · Follow up in 2 months for diabetic check  · STOP jardiance  · Continue follow up with cardiology, have holter monitor done  · Continue follow up with  Nephrology as directed working. Ask your provider how much water you can safely drink each day. · Potassium. In advanced kidney disease, your potassium level can go dangerously high. This affects your heart. It can cause an irregular heartbeat (arrhythmia).  Ask your provider or weight for no reason? · Do cuts and bruises heal slowly? · Do you have numbness or tingling in your fingers or toes? · Do you have blurry vision?   What puts you at risk? People of all backgrounds can get diabetes.  More often, though, it affects [place]

## 2018-07-12 NOTE — PROGRESS NOTES
HPI:    Mireya Mercado is a 34year old male here today for hospital follow up. He was discharged from Inpatient hospital, [facility] to Home   Admit Date: 7/5/2018  Discharge Date:7/6/2018    1. Acute kidney injury  2.  Left heel MRSA osteomye Acetaminophen-Codeine #3 300-30 MG Oral Tab Take 1 tablet by mouth every 4 (four) hours as needed. Dulaglutide (TRULICITY) 1.5 JN/3.7QB Subcutaneous Solution Pen-injector Inject 1.5 Units into the skin once a week.  EVERY SUNDAY   BD AUTOSHIELD DUO 30G foot surgery. He family history includes Diabetes in his father and mother; Heart Disorder in his father; Hypertension in his sister. He  reports that he has never smoked. He has never used smokeless tobacco. He reports that he drinks alcohol.  He repo [facility])   All recent laboratories reviewed. Medication list reviewed. Patient recently started jardiance again when he returned home. Will discontinue Jardiance. Follow up with nephrology as scheduled  .   Other acute osteomyelitis of left foot ([address]   Howard Ochoa

## 2018-07-13 ENCOUNTER — TELEPHONE (OUTPATIENT)
Dept: NEPHROLOGY | Facility: CLINIC | Age: 38
End: 2018-07-13

## 2018-07-13 ENCOUNTER — OFFICE VISIT (OUTPATIENT)
Dept: WOUND CARE | Facility: HOSPITAL | Age: 38
End: 2018-07-13
Attending: INTERNAL MEDICINE
Payer: COMMERCIAL

## 2018-07-13 DIAGNOSIS — N17.9 AKI (ACUTE KIDNEY INJURY) (HCC): Primary | ICD-10-CM

## 2018-07-13 DIAGNOSIS — E10.621 TYPE 1 DIABETES MELLITUS WITH FOOT ULCER (HCC): ICD-10-CM

## 2018-07-13 DIAGNOSIS — E10.40 TYPE 1 DIABETES MELLITUS WITH DIABETIC NEUROPATHY, UNSPECIFIED (HCC): Primary | ICD-10-CM

## 2018-07-13 DIAGNOSIS — L97.509 TYPE 1 DIABETES MELLITUS WITH FOOT ULCER (HCC): ICD-10-CM

## 2018-07-13 DIAGNOSIS — L97.424 NON-PRESSURE CHRONIC ULCER OF LEFT HEEL AND MIDFOOT WITH NECROSIS OF BONE (HCC): ICD-10-CM

## 2018-07-13 PROCEDURE — 97605 NEG PRS WND THER DME<=50SQCM: CPT

## 2018-07-13 NOTE — PROGRESS NOTES
Patient Name: Octavio Reyes  Patient Number: [id number]  Patient YOB: 1980  Date: 7/13/2018  Physician / [facility]: Jimbo Flores: [id number] [facility] Treatment Course Details  Treatment Course Number: 1  Indication: Diabetic wound(

## 2018-07-13 NOTE — TELEPHONE ENCOUNTER
I spoke with pt today, creat up again today. Urinalysis remains bland. He stopped Jardiance today.   Will repeat BMP on Monday

## 2018-07-16 ENCOUNTER — MED REC SCAN ONLY (OUTPATIENT)
Dept: FAMILY MEDICINE CLINIC | Facility: CLINIC | Age: 38
End: 2018-07-16

## 2018-07-16 ENCOUNTER — OFFICE VISIT (OUTPATIENT)
Dept: WOUND CARE | Facility: HOSPITAL | Age: 38
End: 2018-07-16
Attending: PODIATRIST
Payer: COMMERCIAL

## 2018-07-16 DIAGNOSIS — L97.424 NON-PRESSURE CHRONIC ULCER OF LEFT HEEL AND MIDFOOT WITH NECROSIS OF BONE (HCC): ICD-10-CM

## 2018-07-16 DIAGNOSIS — E10.40 TYPE 1 DIABETES MELLITUS WITH DIABETIC NEUROPATHY, UNSPECIFIED (HCC): Primary | ICD-10-CM

## 2018-07-16 DIAGNOSIS — E10.621 TYPE 1 DIABETES MELLITUS WITH FOOT ULCER (HCC): ICD-10-CM

## 2018-07-16 DIAGNOSIS — L97.509 TYPE 1 DIABETES MELLITUS WITH FOOT ULCER (HCC): ICD-10-CM

## 2018-07-16 PROCEDURE — 97607 NEG PRS WND THR NDME<=50SQCM: CPT

## 2018-07-16 NOTE — PROGRESS NOTES
HBO Tech Details      Patient Name: Hi Lay   Patient Number: [id number]   Patient YOB: 1980      Date: 7/16/2018   CNA/LAXMI/CMA: Bennett Botello   Physician / Extender: Hi Barragan [facility] St: [facility] Treatment Course

## 2018-07-17 ENCOUNTER — TELEPHONE (OUTPATIENT)
Dept: NEPHROLOGY | Facility: CLINIC | Age: 38
End: 2018-07-17

## 2018-07-17 ENCOUNTER — OFFICE VISIT (OUTPATIENT)
Dept: WOUND CARE | Facility: HOSPITAL | Age: 38
End: 2018-07-17
Attending: HOSPITALIST
Payer: COMMERCIAL

## 2018-07-17 ENCOUNTER — HOSPITAL ENCOUNTER (OUTPATIENT)
Dept: LAB | Facility: HOSPITAL | Age: 38
Discharge: HOME | End: 2018-07-17
Attending: INTERNAL MEDICINE
Payer: COMMERCIAL

## 2018-07-17 DIAGNOSIS — E10.40 TYPE 1 DIABETES MELLITUS WITH DIABETIC NEUROPATHY, UNSPECIFIED (HCC): Primary | ICD-10-CM

## 2018-07-17 DIAGNOSIS — L97.509 TYPE 1 DIABETES MELLITUS WITH FOOT ULCER (HCC): ICD-10-CM

## 2018-07-17 DIAGNOSIS — E10.621 TYPE 1 DIABETES MELLITUS WITH FOOT ULCER (HCC): ICD-10-CM

## 2018-07-17 DIAGNOSIS — N17.9 AKI (ACUTE KIDNEY INJURY) (HCC): Primary | ICD-10-CM

## 2018-07-17 DIAGNOSIS — N17.9 AKI (ACUTE KIDNEY INJURY) (HCC): ICD-10-CM

## 2018-07-17 LAB
BUN BLD-MCNC: 45 MG/DL (ref 8–20)
CALCIUM BLD-MCNC: 9.8 MG/DL (ref 8.3–10.3)
CHLORIDE: 108 MMOL/L (ref 101–111)
CO2: 26 MMOL/L (ref 22–32)
CREAT BLD-MCNC: 2.47 MG/DL (ref 0.7–1.3)
GLUCOSE BLD-MCNC: 138 MG/DL (ref 70–99)
POTASSIUM SERPL-SCNC: 5.3 MMOL/L (ref 3.6–5.1)
SODIUM SERPL-SCNC: 143 MMOL/L (ref 136–144)

## 2018-07-17 PROCEDURE — 80048 BASIC METABOLIC PNL TOTAL CA: CPT

## 2018-07-17 PROCEDURE — 36415 COLL VENOUS BLD VENIPUNCTURE: CPT

## 2018-07-17 NOTE — TELEPHONE ENCOUNTER
I called pt today, creat better at 2.47 mg/dl. Will repeat in one week. Pt reports that he feels well.   BP stable

## 2018-07-17 NOTE — PROGRESS NOTES
\[facility]\"" Tech Details  Patient Name: Elias Ahn  Patient Number: [id number]  Patient YOB: 1980  Date: 7/17/2018  Physician / Britton Phan: Georgina Clark: [id number] [facility] Treatment Course Details  Treatment Course Number: 1  Indication: Diego Muniz

## 2018-07-18 ENCOUNTER — APPOINTMENT (OUTPATIENT)
Dept: HEMATOLOGY/ONCOLOGY | Facility: HOSPITAL | Age: 38
End: 2018-07-18
Attending: INTERNAL MEDICINE
Payer: COMMERCIAL

## 2018-07-18 ENCOUNTER — OFFICE VISIT (OUTPATIENT)
Dept: WOUND CARE | Facility: HOSPITAL | Age: 38
End: 2018-07-18
Attending: INTERNAL MEDICINE
Payer: COMMERCIAL

## 2018-07-18 DIAGNOSIS — E10.621 TYPE 1 DIABETES MELLITUS WITH FOOT ULCER (HCC): ICD-10-CM

## 2018-07-18 DIAGNOSIS — L97.424 NON-PRESSURE CHRONIC ULCER OF LEFT HEEL AND MIDFOOT WITH NECROSIS OF BONE (HCC): ICD-10-CM

## 2018-07-18 DIAGNOSIS — L97.509 TYPE 1 DIABETES MELLITUS WITH FOOT ULCER (HCC): ICD-10-CM

## 2018-07-18 DIAGNOSIS — E10.40 TYPE 1 DIABETES MELLITUS WITH DIABETIC NEUROPATHY, UNSPECIFIED (HCC): Primary | ICD-10-CM

## 2018-07-18 NOTE — PROGRESS NOTES
HBO Tech Details      Patient Name: Sofia Wilkerson   Patient Number: [id number]   Patient YOB: 1980      Date: 7/18/2018   Physician / Delisa Vasquez: Nori Staley, [facility]: [facility] Treatment Course Details          Treatment

## 2018-07-19 ENCOUNTER — OFFICE VISIT (OUTPATIENT)
Dept: WOUND CARE | Facility: HOSPITAL | Age: 38
End: 2018-07-19
Attending: HOSPITALIST
Payer: COMMERCIAL

## 2018-07-19 DIAGNOSIS — L97.424 NON-PRESSURE CHRONIC ULCER OF LEFT HEEL AND MIDFOOT WITH NECROSIS OF BONE (HCC): ICD-10-CM

## 2018-07-19 DIAGNOSIS — E10.40 TYPE 1 DIABETES MELLITUS WITH DIABETIC NEUROPATHY, UNSPECIFIED (HCC): Primary | ICD-10-CM

## 2018-07-19 PROCEDURE — 97607 NEG PRS WND THR NDME<=50SQCM: CPT

## 2018-07-19 NOTE — PROGRESS NOTES
HBO Tech Details      Patient Name: Aureliano Gonzalez   Patient Number: [id number]   Patient YOB: 1980      Date: 7/19/2018   Physician / Ame : [address]: [facility] Treatment Course Details          Treatment Co

## 2018-07-20 ENCOUNTER — OFFICE VISIT (OUTPATIENT)
Dept: WOUND CARE | Facility: HOSPITAL | Age: 38
End: 2018-07-20
Attending: INTERNAL MEDICINE
Payer: COMMERCIAL

## 2018-07-20 DIAGNOSIS — L97.424 NON-PRESSURE CHRONIC ULCER OF LEFT HEEL AND MIDFOOT WITH NECROSIS OF BONE (HCC): ICD-10-CM

## 2018-07-20 DIAGNOSIS — E10.621 TYPE 1 DIABETES MELLITUS WITH FOOT ULCER (HCC): ICD-10-CM

## 2018-07-20 DIAGNOSIS — L97.509 TYPE 1 DIABETES MELLITUS WITH FOOT ULCER (HCC): ICD-10-CM

## 2018-07-20 DIAGNOSIS — E10.40 TYPE 1 DIABETES MELLITUS WITH DIABETIC NEUROPATHY, UNSPECIFIED (HCC): Primary | ICD-10-CM

## 2018-07-20 NOTE — PROGRESS NOTES
HBO Tech Details      Patient Name: Jensen Polanco   Patient Number: [id number]   Patient YOB: 1980      Date: 7/20/2018   Physician / Juancarlos Salvage: Blas Saenz, [address] [place] [address] Street: [facility] Treatment Course Details          Treatment

## 2018-07-23 ENCOUNTER — TELEPHONE (OUTPATIENT)
Dept: NEPHROLOGY | Facility: CLINIC | Age: 38
End: 2018-07-23

## 2018-07-23 ENCOUNTER — TELEPHONE (OUTPATIENT)
Dept: PODIATRY CLINIC | Facility: CLINIC | Age: 38
End: 2018-07-23

## 2018-07-23 ENCOUNTER — OFFICE VISIT (OUTPATIENT)
Dept: WOUND CARE | Facility: HOSPITAL | Age: 38
End: 2018-07-23
Attending: HOSPITALIST
Payer: COMMERCIAL

## 2018-07-23 ENCOUNTER — HOSPITAL ENCOUNTER (OUTPATIENT)
Dept: LAB | Facility: HOSPITAL | Age: 38
Discharge: HOME | End: 2018-07-23
Attending: INTERNAL MEDICINE
Payer: COMMERCIAL

## 2018-07-23 DIAGNOSIS — L97.509 TYPE 1 DIABETES MELLITUS WITH FOOT ULCER (HCC): ICD-10-CM

## 2018-07-23 DIAGNOSIS — L97.424 NON-PRESSURE CHRONIC ULCER OF LEFT HEEL AND MIDFOOT WITH NECROSIS OF BONE (HCC): ICD-10-CM

## 2018-07-23 DIAGNOSIS — E10.40 TYPE 1 DIABETES MELLITUS WITH DIABETIC NEUROPATHY, UNSPECIFIED (HCC): Primary | ICD-10-CM

## 2018-07-23 DIAGNOSIS — N17.9 AKI (ACUTE KIDNEY INJURY) (HCC): Primary | ICD-10-CM

## 2018-07-23 DIAGNOSIS — N17.9 AKI (ACUTE KIDNEY INJURY) (HCC): ICD-10-CM

## 2018-07-23 DIAGNOSIS — E10.621 TYPE 1 DIABETES MELLITUS WITH FOOT ULCER (HCC): ICD-10-CM

## 2018-07-23 LAB
ANION GAP SERPL CALC-SCNC: 8 MMOL/L (ref 0–18)
BUN BLD-MCNC: 43 MG/DL (ref 8–20)
BUN/CREAT SERPL: 18.9 (ref 10–20)
CALCIUM BLD-MCNC: 9.7 MG/DL (ref 8.3–10.3)
CHLORIDE SERPL-SCNC: 109 MMOL/L (ref 101–111)
CO2 SERPL-SCNC: 24 MMOL/L (ref 22–32)
CREAT BLD-MCNC: 2.27 MG/DL (ref 0.7–1.3)
GLUCOSE BLD-MCNC: 206 MG/DL (ref 70–99)
OSMOLALITY SERPL CALC.SUM OF ELEC: 309 MOSM/KG (ref 275–295)
POTASSIUM SERPL-SCNC: 5.3 MMOL/L (ref 3.6–5.1)
SODIUM SERPL-SCNC: 141 MMOL/L (ref 136–144)

## 2018-07-23 PROCEDURE — 36415 COLL VENOUS BLD VENIPUNCTURE: CPT

## 2018-07-23 PROCEDURE — 80048 BASIC METABOLIC PNL TOTAL CA: CPT

## 2018-07-23 PROCEDURE — 97607 NEG PRS WND THR NDME<=50SQCM: CPT

## 2018-07-23 NOTE — TELEPHONE ENCOUNTER
[place] from [facility]. Received wound care orders today. Caller have questions. kci wound vac or reba vac.   Call to advise

## 2018-07-24 ENCOUNTER — OFFICE VISIT (OUTPATIENT)
Dept: WOUND CARE | Facility: HOSPITAL | Age: 38
End: 2018-07-24
Attending: HOSPITALIST
Payer: COMMERCIAL

## 2018-07-24 DIAGNOSIS — L97.509 TYPE 1 DIABETES MELLITUS WITH FOOT ULCER (HCC): ICD-10-CM

## 2018-07-24 DIAGNOSIS — L97.424 NON-PRESSURE CHRONIC ULCER OF LEFT HEEL AND MIDFOOT WITH NECROSIS OF BONE (HCC): ICD-10-CM

## 2018-07-24 DIAGNOSIS — E10.621 TYPE 1 DIABETES MELLITUS WITH FOOT ULCER (HCC): ICD-10-CM

## 2018-07-24 DIAGNOSIS — E10.40 TYPE 1 DIABETES MELLITUS WITH DIABETIC NEUROPATHY, UNSPECIFIED (HCC): Primary | ICD-10-CM

## 2018-07-24 NOTE — TELEPHONE ENCOUNTER
I have contacted my  Uma Hassan from the wound care clinic she is going to call and clarify the orders thank you

## 2018-07-24 NOTE — PROGRESS NOTES
[facility] Tech Details  Patient Name: Hayley Man  Patient Number: [id number]  Patient YOB: 1980  Date: 7/24/2018  Physician / Nithya Course: Tailor [address] : [id number] [facility] Treatment Course Details  Treatment Course Number: 1  Indication: Tanya Sommers

## 2018-07-25 ENCOUNTER — TELEPHONE (OUTPATIENT)
Dept: FAMILY MEDICINE CLINIC | Facility: CLINIC | Age: 38
End: 2018-07-25

## 2018-07-25 ENCOUNTER — OFFICE VISIT (OUTPATIENT)
Dept: WOUND CARE | Facility: HOSPITAL | Age: 38
End: 2018-07-25
Attending: INTERNAL MEDICINE
Payer: COMMERCIAL

## 2018-07-25 ENCOUNTER — APPOINTMENT (OUTPATIENT)
Dept: HEMATOLOGY/ONCOLOGY | Facility: HOSPITAL | Age: 38
End: 2018-07-25
Attending: INTERNAL MEDICINE
Payer: COMMERCIAL

## 2018-07-25 DIAGNOSIS — E10.621 TYPE 1 DIABETES MELLITUS WITH FOOT ULCER (HCC): ICD-10-CM

## 2018-07-25 DIAGNOSIS — E10.40 TYPE 1 DIABETES MELLITUS WITH DIABETIC NEUROPATHY, UNSPECIFIED (HCC): Primary | ICD-10-CM

## 2018-07-25 DIAGNOSIS — L97.424 NON-PRESSURE CHRONIC ULCER OF LEFT HEEL AND MIDFOOT WITH NECROSIS OF BONE (HCC): ICD-10-CM

## 2018-07-25 DIAGNOSIS — L97.509 TYPE 1 DIABETES MELLITUS WITH FOOT ULCER (HCC): ICD-10-CM

## 2018-07-25 NOTE — TELEPHONE ENCOUNTER
Patient spouse came into the office with a [facility] Screening Form\" that is needing to be filled out. The patient spouse also did sign an LITA. She does not need an original back.  If she needs it later on then she will come to the office to pick

## 2018-07-25 NOTE — PROGRESS NOTES
HBO Tech Details      Patient Name: Marleny Joaquin   Patient Number: [id number]   Patient YOB: 1980      Date: 7/25/2018   Physician / Benson Cap: Marco Nolen, [address] [place] [address] Street: [facility] Treatment Course Details          Treatment

## 2018-07-25 NOTE — TELEPHONE ENCOUNTER
Wilbert Oneal w/ Magdalena Muro called to request orders for [phone] and Discharge be signed through online portal. Please call at [phone] if any questions.

## 2018-07-26 ENCOUNTER — OFFICE VISIT (OUTPATIENT)
Dept: WOUND CARE | Facility: HOSPITAL | Age: 38
End: 2018-07-26
Attending: HOSPITALIST
Payer: COMMERCIAL

## 2018-07-26 DIAGNOSIS — L97.424 NON-PRESSURE CHRONIC ULCER OF LEFT HEEL AND MIDFOOT WITH NECROSIS OF BONE (HCC): ICD-10-CM

## 2018-07-26 DIAGNOSIS — E10.40 TYPE 1 DIABETES MELLITUS WITH DIABETIC NEUROPATHY, UNSPECIFIED (HCC): Primary | ICD-10-CM

## 2018-07-26 DIAGNOSIS — L97.509 TYPE 1 DIABETES MELLITUS WITH FOOT ULCER (HCC): ICD-10-CM

## 2018-07-26 DIAGNOSIS — E10.621 TYPE 1 DIABETES MELLITUS WITH FOOT ULCER (HCC): ICD-10-CM

## 2018-07-26 PROCEDURE — 99213 OFFICE O/P EST LOW 20 MIN: CPT

## 2018-07-26 NOTE — PROGRESS NOTES
[facility] Tech Details  Patient Name: Erika Pacheco  Patient Number: [id number]  Patient YOB: 1980  Date: 7/26/2018  Physician / Kaylee Garcia: Teresita Menard, [address] [facility]: [id number] [facility] Treatment Course Details  Treatment Course Number: 1  Total Treatmen

## 2018-07-27 ENCOUNTER — APPOINTMENT (OUTPATIENT)
Dept: WOUND CARE | Facility: HOSPITAL | Age: 38
End: 2018-07-27
Payer: COMMERCIAL

## 2018-07-30 ENCOUNTER — APPOINTMENT (OUTPATIENT)
Dept: WOUND CARE | Facility: HOSPITAL | Age: 38
End: 2018-07-30
Payer: COMMERCIAL

## 2018-07-31 ENCOUNTER — APPOINTMENT (OUTPATIENT)
Dept: WOUND CARE | Facility: HOSPITAL | Age: 38
End: 2018-07-31
Payer: COMMERCIAL

## 2018-08-01 ENCOUNTER — APPOINTMENT (OUTPATIENT)
Dept: HEMATOLOGY/ONCOLOGY | Facility: HOSPITAL | Age: 38
End: 2018-08-01
Attending: INTERNAL MEDICINE
Payer: COMMERCIAL

## 2018-08-01 ENCOUNTER — APPOINTMENT (OUTPATIENT)
Dept: WOUND CARE | Facility: HOSPITAL | Age: 38
End: 2018-08-01
Payer: COMMERCIAL

## 2018-08-02 ENCOUNTER — APPOINTMENT (OUTPATIENT)
Dept: WOUND CARE | Facility: HOSPITAL | Age: 38
End: 2018-08-02
Payer: COMMERCIAL

## 2018-08-03 ENCOUNTER — APPOINTMENT (OUTPATIENT)
Dept: WOUND CARE | Facility: HOSPITAL | Age: 38
End: 2018-08-03
Payer: COMMERCIAL

## 2018-08-06 ENCOUNTER — OFFICE VISIT (OUTPATIENT)
Dept: WOUND CARE | Facility: HOSPITAL | Age: 38
End: 2018-08-06
Attending: PODIATRIST
Payer: COMMERCIAL

## 2018-08-06 DIAGNOSIS — E10.621 TYPE 1 DIABETES MELLITUS WITH FOOT ULCER (HCC): ICD-10-CM

## 2018-08-06 DIAGNOSIS — E10.40 TYPE 1 DIABETES MELLITUS WITH DIABETIC NEUROPATHY, UNSPECIFIED (HCC): Primary | ICD-10-CM

## 2018-08-06 DIAGNOSIS — L97.424 NON-PRESSURE CHRONIC ULCER OF LEFT HEEL AND MIDFOOT WITH NECROSIS OF BONE (HCC): ICD-10-CM

## 2018-08-06 DIAGNOSIS — L97.509 TYPE 1 DIABETES MELLITUS WITH FOOT ULCER (HCC): ICD-10-CM

## 2018-08-06 PROCEDURE — 11042 DBRDMT SUBQ TIS 1ST 20SQCM/<: CPT

## 2018-08-06 PROCEDURE — 97607 NEG PRS WND THR NDME<=50SQCM: CPT

## 2018-08-06 NOTE — PROGRESS NOTES
[facility] Tech Details  Patient Name: Hillary Virk  Patient Number: [id number]  Patient YOB: 1980  Date: 8/6/2018  Physician / Katie [place]: Sidra Flores 32: [id number] [facility] Treatment Course Details  Treatment Course Number: 1  Total Treat

## 2018-08-07 ENCOUNTER — OFFICE VISIT (OUTPATIENT)
Dept: WOUND CARE | Facility: HOSPITAL | Age: 38
End: 2018-08-07
Attending: HOSPITALIST
Payer: COMMERCIAL

## 2018-08-07 ENCOUNTER — TELEPHONE (OUTPATIENT)
Dept: PODIATRY CLINIC | Facility: CLINIC | Age: 38
End: 2018-08-07

## 2018-08-07 DIAGNOSIS — L97.509 TYPE 1 DIABETES MELLITUS WITH FOOT ULCER (HCC): ICD-10-CM

## 2018-08-07 DIAGNOSIS — L97.424 NON-PRESSURE CHRONIC ULCER OF LEFT HEEL AND MIDFOOT WITH NECROSIS OF BONE (HCC): ICD-10-CM

## 2018-08-07 DIAGNOSIS — E10.40 TYPE 1 DIABETES MELLITUS WITH DIABETIC NEUROPATHY, UNSPECIFIED (HCC): Primary | ICD-10-CM

## 2018-08-07 DIAGNOSIS — E10.621 TYPE 1 DIABETES MELLITUS WITH FOOT ULCER (HCC): ICD-10-CM

## 2018-08-07 NOTE — PROGRESS NOTES
HBO Tech Details      Patient Name: Octavio Reyes   Patient Number: [id number]   Patient YOB: 1980      Date: 8/7/2018   Physician / [place]: Petra Damon, [address] [place]: Hector Sales Treatment Course Details          Treatment

## 2018-08-07 NOTE — TELEPHONE ENCOUNTER
Antoine Benedict from [address] [place] [address] called,  Asking for measurements for wound vac for insurance. Thank you.

## 2018-08-08 ENCOUNTER — OFFICE VISIT (OUTPATIENT)
Dept: WOUND CARE | Facility: HOSPITAL | Age: 38
End: 2018-08-08
Attending: INTERNAL MEDICINE
Payer: COMMERCIAL

## 2018-08-08 DIAGNOSIS — E10.621 TYPE 1 DIABETES MELLITUS WITH FOOT ULCER (HCC): ICD-10-CM

## 2018-08-08 DIAGNOSIS — E10.40 TYPE 1 DIABETES MELLITUS WITH DIABETIC NEUROPATHY, UNSPECIFIED (HCC): Primary | ICD-10-CM

## 2018-08-08 DIAGNOSIS — L97.509 TYPE 1 DIABETES MELLITUS WITH FOOT ULCER (HCC): ICD-10-CM

## 2018-08-08 DIAGNOSIS — L97.424 NON-PRESSURE CHRONIC ULCER OF LEFT HEEL AND MIDFOOT WITH NECROSIS OF BONE (HCC): ICD-10-CM

## 2018-08-08 NOTE — PROGRESS NOTES
[facility] Tech Details  Patient Name: Elias Ahn  Patient Number: [id number]  Patient YOB: 1980  Date: 8/8/2018  Physician / Britton Phan: Jos Guerrier, [address] [id number] [facility] Treatment Course Details  Treatment Course Number: 1  Total Treatm

## 2018-08-09 ENCOUNTER — OFFICE VISIT (OUTPATIENT)
Dept: WOUND CARE | Facility: HOSPITAL | Age: 38
End: 2018-08-09
Attending: HOSPITALIST
Payer: COMMERCIAL

## 2018-08-09 DIAGNOSIS — E10.621 TYPE 1 DIABETES MELLITUS WITH FOOT ULCER (HCC): ICD-10-CM

## 2018-08-09 DIAGNOSIS — L97.424 NON-PRESSURE CHRONIC ULCER OF LEFT HEEL AND MIDFOOT WITH NECROSIS OF BONE (HCC): ICD-10-CM

## 2018-08-09 DIAGNOSIS — E10.40 TYPE 1 DIABETES MELLITUS WITH DIABETIC NEUROPATHY, UNSPECIFIED (HCC): Primary | ICD-10-CM

## 2018-08-09 DIAGNOSIS — L97.509 TYPE 1 DIABETES MELLITUS WITH FOOT ULCER (HCC): ICD-10-CM

## 2018-08-09 NOTE — PROGRESS NOTES
HBO Tech Details  Patient Name: Jensen Polanco  Patient Number: [id number]  Patient YOB: 1980  Date: 8/9/2018  Physician / Juancarlos Salvage: Zana Carrillo, [address] [facility]: [address] [facility] Treatment Course Details  Treatment Course Number: 1  Total Treatment

## 2018-08-10 ENCOUNTER — OFFICE VISIT (OUTPATIENT)
Dept: WOUND CARE | Facility: HOSPITAL | Age: 38
End: 2018-08-10
Attending: INTERNAL MEDICINE
Payer: COMMERCIAL

## 2018-08-10 DIAGNOSIS — E10.621 TYPE 1 DIABETES MELLITUS WITH FOOT ULCER (HCC): ICD-10-CM

## 2018-08-10 DIAGNOSIS — L97.424 NON-PRESSURE CHRONIC ULCER OF LEFT HEEL AND MIDFOOT WITH NECROSIS OF BONE (HCC): ICD-10-CM

## 2018-08-10 DIAGNOSIS — L97.509 TYPE 1 DIABETES MELLITUS WITH FOOT ULCER (HCC): ICD-10-CM

## 2018-08-10 DIAGNOSIS — E10.40 TYPE 1 DIABETES MELLITUS WITH DIABETIC NEUROPATHY, UNSPECIFIED (HCC): Primary | ICD-10-CM

## 2018-08-10 NOTE — PROGRESS NOTES
[facility] Tech Details  Patient Name: Jamaal Tejada  Patient Number: [id number]  Patient YOB: 1980  Date: 8/10/2018  Physician / Raymundo Renteria: Lidia Alonso: [address] [facility] Treatment Course Details  Treatment Course Number: 1  Total Treat

## 2018-08-13 ENCOUNTER — OFFICE VISIT (OUTPATIENT)
Dept: WOUND CARE | Facility: HOSPITAL | Age: 38
End: 2018-08-13
Attending: HOSPITALIST
Payer: COMMERCIAL

## 2018-08-13 DIAGNOSIS — E10.621 TYPE 1 DIABETES MELLITUS WITH FOOT ULCER (HCC): ICD-10-CM

## 2018-08-13 DIAGNOSIS — L97.509 TYPE 1 DIABETES MELLITUS WITH FOOT ULCER (HCC): ICD-10-CM

## 2018-08-13 DIAGNOSIS — L97.424 NON-PRESSURE CHRONIC ULCER OF LEFT HEEL AND MIDFOOT WITH NECROSIS OF BONE (HCC): ICD-10-CM

## 2018-08-13 DIAGNOSIS — E10.40 TYPE 1 DIABETES MELLITUS WITH DIABETIC NEUROPATHY, UNSPECIFIED (HCC): Primary | ICD-10-CM

## 2018-08-13 PROCEDURE — 97607 NEG PRS WND THR NDME<=50SQCM: CPT

## 2018-08-13 NOTE — PROGRESS NOTES
[facility] Tech Details      Patient Name: Asia Garcia   Patient Number: [id number]   Patient YOB: 1980      Date: 8/13/2018   LIZZA/LAXMI/CMA: Cornel Madison   Physician / Dilia Flatness:  Minda Ricci [facility] 51: [facility] Treatment Course

## 2018-08-14 ENCOUNTER — HOSPITAL ENCOUNTER (OUTPATIENT)
Dept: LAB | Facility: HOSPITAL | Age: 38
Discharge: HOME | End: 2018-08-14
Attending: INTERNAL MEDICINE
Payer: COMMERCIAL

## 2018-08-14 ENCOUNTER — OFFICE VISIT (OUTPATIENT)
Dept: WOUND CARE | Facility: HOSPITAL | Age: 38
End: 2018-08-14
Attending: HOSPITALIST
Payer: COMMERCIAL

## 2018-08-14 DIAGNOSIS — E10.40 TYPE 1 DIABETES MELLITUS WITH DIABETIC NEUROPATHY, UNSPECIFIED (HCC): Primary | ICD-10-CM

## 2018-08-14 DIAGNOSIS — N17.9 AKI (ACUTE KIDNEY INJURY) (HCC): ICD-10-CM

## 2018-08-14 DIAGNOSIS — L97.509 TYPE 1 DIABETES MELLITUS WITH FOOT ULCER (HCC): ICD-10-CM

## 2018-08-14 DIAGNOSIS — E10.621 TYPE 1 DIABETES MELLITUS WITH FOOT ULCER (HCC): ICD-10-CM

## 2018-08-14 LAB
ANION GAP SERPL CALC-SCNC: 6 MMOL/L (ref 0–18)
BUN BLD-MCNC: 39 MG/DL (ref 8–20)
BUN/CREAT SERPL: 19 (ref 10–20)
CALCIUM BLD-MCNC: 9.8 MG/DL (ref 8.3–10.3)
CHLORIDE SERPL-SCNC: 109 MMOL/L (ref 101–111)
CO2 SERPL-SCNC: 25 MMOL/L (ref 22–32)
CREAT BLD-MCNC: 2.05 MG/DL (ref 0.7–1.3)
GLUCOSE BLD-MCNC: 173 MG/DL (ref 70–99)
OSMOLALITY SERPL CALC.SUM OF ELEC: 304 MOSM/KG (ref 275–295)
POTASSIUM SERPL-SCNC: 5.3 MMOL/L (ref 3.6–5.1)
SODIUM SERPL-SCNC: 140 MMOL/L (ref 136–144)

## 2018-08-14 PROCEDURE — 80048 BASIC METABOLIC PNL TOTAL CA: CPT

## 2018-08-14 PROCEDURE — 36415 COLL VENOUS BLD VENIPUNCTURE: CPT

## 2018-08-14 NOTE — PROGRESS NOTES
[facility] Tech Details  Patient Name: Hi Lay  Patient Number: [id number]  Patient YOB: 1980  Date: 8/14/2018  Physician / George Echolsy:  Bianca We[facility]  Facility: [address] [place] [address] Treatment Course Details  Treatment Course Number: 1  Total Treatmen

## 2018-08-15 ENCOUNTER — OFFICE VISIT (OUTPATIENT)
Dept: WOUND CARE | Facility: HOSPITAL | Age: 38
End: 2018-08-15
Attending: HOSPITALIST
Payer: COMMERCIAL

## 2018-08-15 DIAGNOSIS — E10.40 TYPE 1 DIABETES MELLITUS WITH DIABETIC NEUROPATHY, UNSPECIFIED (HCC): Primary | ICD-10-CM

## 2018-08-15 DIAGNOSIS — L97.509 TYPE 1 DIABETES MELLITUS WITH FOOT ULCER (HCC): ICD-10-CM

## 2018-08-15 DIAGNOSIS — E10.621 TYPE 1 DIABETES MELLITUS WITH FOOT ULCER (HCC): ICD-10-CM

## 2018-08-15 NOTE — PROGRESS NOTES
HBO Tech Details  Patient Name: Julio C Harley  Patient Number: [id number]  Patient YOB: 1980  Date: 8/15/2018  Physician / Joselito Mccoy: Neeraj Miranday: [id number] [facility] Treatment Course Details  Treatment Course Number: 1  Total Benedict

## 2018-08-16 ENCOUNTER — OFFICE VISIT (OUTPATIENT)
Dept: WOUND CARE | Facility: HOSPITAL | Age: 38
End: 2018-08-16
Attending: HOSPITALIST
Payer: COMMERCIAL

## 2018-08-16 DIAGNOSIS — E10.621 TYPE 1 DIABETES MELLITUS WITH FOOT ULCER (HCC): Primary | ICD-10-CM

## 2018-08-16 DIAGNOSIS — L97.424 NON-PRESSURE CHRONIC ULCER OF LEFT HEEL AND MIDFOOT WITH NECROSIS OF BONE (HCC): ICD-10-CM

## 2018-08-16 DIAGNOSIS — L97.509 TYPE 1 DIABETES MELLITUS WITH FOOT ULCER (HCC): Primary | ICD-10-CM

## 2018-08-16 NOTE — PROGRESS NOTES
HBO Tech Details      Patient Name: John Rivera   Patient Number: [id number]   Patient YOB: 1980      Date: 8/16/2018   CNA/LAXMI/REBECCA: Donnamaria Prader   Physician / Antelmo Smart: Timmy Brown [address]: [facility] Treatment Course

## 2018-08-17 ENCOUNTER — OFFICE VISIT (OUTPATIENT)
Dept: WOUND CARE | Facility: HOSPITAL | Age: 38
End: 2018-08-17
Attending: INTERNAL MEDICINE
Payer: COMMERCIAL

## 2018-08-17 DIAGNOSIS — L97.424 NON-PRESSURE CHRONIC ULCER OF LEFT HEEL AND MIDFOOT WITH NECROSIS OF BONE (HCC): Primary | ICD-10-CM

## 2018-08-17 DIAGNOSIS — E10.40 TYPE 1 DIABETES MELLITUS WITH DIABETIC NEUROPATHY, UNSPECIFIED (HCC): ICD-10-CM

## 2018-08-17 DIAGNOSIS — E10.621 TYPE 1 DIABETES MELLITUS WITH FOOT ULCER (HCC): ICD-10-CM

## 2018-08-17 DIAGNOSIS — L97.509 TYPE 1 DIABETES MELLITUS WITH FOOT ULCER (HCC): ICD-10-CM

## 2018-08-17 NOTE — PROGRESS NOTES
HBO Tech Details      Patient Name: Umang Tsai   Patient Number: [id number]   Patient YOB: 1980      Date: 8/17/2018   Physician / Jeancarlos Del Rosario: Sherwin Ramirez, [address]: [facility] Treatment Course Details          Treatment

## 2018-08-20 ENCOUNTER — OFFICE VISIT (OUTPATIENT)
Dept: WOUND CARE | Facility: HOSPITAL | Age: 38
End: 2018-08-20
Attending: HOSPITALIST
Payer: COMMERCIAL

## 2018-08-20 DIAGNOSIS — L97.509 TYPE 1 DIABETES MELLITUS WITH FOOT ULCER (HCC): ICD-10-CM

## 2018-08-20 DIAGNOSIS — L97.424 NON-PRESSURE CHRONIC ULCER OF LEFT HEEL AND MIDFOOT WITH NECROSIS OF BONE (HCC): Primary | ICD-10-CM

## 2018-08-20 DIAGNOSIS — E10.621 TYPE 1 DIABETES MELLITUS WITH FOOT ULCER (HCC): ICD-10-CM

## 2018-08-20 PROCEDURE — 97607 NEG PRS WND THR NDME<=50SQCM: CPT

## 2018-08-20 NOTE — PROGRESS NOTES
Patient Name: Gurvinder Hassan  Patient Number: [id number]  Patient YOB: 1980  Date: 8/20/2018  Physician / Marti Ear: Jefe Hinkle: [id number] [facility] Treatment Course Details  Treatment Course Number: 1  Total Treatments Ordered: 36  Indic

## 2018-08-21 ENCOUNTER — OFFICE VISIT (OUTPATIENT)
Dept: WOUND CARE | Facility: HOSPITAL | Age: 38
End: 2018-08-21
Attending: HOSPITALIST
Payer: COMMERCIAL

## 2018-08-21 DIAGNOSIS — L97.509 TYPE 1 DIABETES MELLITUS WITH FOOT ULCER (HCC): ICD-10-CM

## 2018-08-21 DIAGNOSIS — L97.424 NON-PRESSURE CHRONIC ULCER OF LEFT HEEL AND MIDFOOT WITH NECROSIS OF BONE (HCC): Primary | ICD-10-CM

## 2018-08-21 DIAGNOSIS — E10.621 TYPE 1 DIABETES MELLITUS WITH FOOT ULCER (HCC): ICD-10-CM

## 2018-08-21 NOTE — PROGRESS NOTES
[facility] Tech Details  Patient Name: Alisa Lindo  Patient Number: [id number]  Patient YOB: 1980  Date: 8/21/2018  Physician / Kaci Galicia: Nitesh Guzmán, [address]: [address] [facility] Treatment Course Details  Treatment Course Number: 1  Total Treatments

## 2018-08-22 ENCOUNTER — OFFICE VISIT (OUTPATIENT)
Dept: WOUND CARE | Facility: HOSPITAL | Age: 38
End: 2018-08-22
Attending: INTERNAL MEDICINE
Payer: COMMERCIAL

## 2018-08-22 DIAGNOSIS — E10.621 TYPE 1 DIABETES MELLITUS WITH FOOT ULCER (HCC): ICD-10-CM

## 2018-08-22 DIAGNOSIS — L97.509 TYPE 1 DIABETES MELLITUS WITH FOOT ULCER (HCC): ICD-10-CM

## 2018-08-22 DIAGNOSIS — L97.424 NON-PRESSURE CHRONIC ULCER OF LEFT HEEL AND MIDFOOT WITH NECROSIS OF BONE (HCC): Primary | ICD-10-CM

## 2018-08-22 NOTE — PROGRESS NOTES
[facility] Tech Details      Patient Name: Alisa Lindo   Patient Number: [id number]   Patient YOB: 1980      Date: 8/22/2018   Physician / Kaci Galicia: Viv Roque, [address] Hector Sales Treatment Course Details          Treatment

## 2018-08-23 ENCOUNTER — OFFICE VISIT (OUTPATIENT)
Dept: WOUND CARE | Facility: HOSPITAL | Age: 38
End: 2018-08-23
Attending: NURSE PRACTITIONER
Payer: COMMERCIAL

## 2018-08-23 DIAGNOSIS — L97.509 TYPE 1 DIABETES MELLITUS WITH FOOT ULCER (HCC): ICD-10-CM

## 2018-08-23 DIAGNOSIS — E10.621 TYPE 1 DIABETES MELLITUS WITH FOOT ULCER (HCC): ICD-10-CM

## 2018-08-23 DIAGNOSIS — L97.424 NON-PRESSURE CHRONIC ULCER OF LEFT HEEL AND MIDFOOT WITH NECROSIS OF BONE (HCC): Primary | ICD-10-CM

## 2018-08-23 NOTE — PROGRESS NOTES
[facility] Tech Details  Patient Name: Tanner Kwan  Patient Number: [id number]  Patient YOB: 1980  Date: 8/23/2018  Physician / Colby Tomlinson: Gosia Platt, [address] [facility]: [address] [facility] Treatment Course Details  Treatment Course Number: 1  Total Treatmen

## 2018-08-24 ENCOUNTER — OFFICE VISIT (OUTPATIENT)
Dept: WOUND CARE | Facility: HOSPITAL | Age: 38
End: 2018-08-24
Attending: INTERNAL MEDICINE
Payer: COMMERCIAL

## 2018-08-24 DIAGNOSIS — E10.621 TYPE 1 DIABETES MELLITUS WITH FOOT ULCER (HCC): ICD-10-CM

## 2018-08-24 DIAGNOSIS — L97.509 TYPE 1 DIABETES MELLITUS WITH FOOT ULCER (HCC): ICD-10-CM

## 2018-08-24 DIAGNOSIS — L97.424 NON-PRESSURE CHRONIC ULCER OF LEFT HEEL AND MIDFOOT WITH NECROSIS OF BONE (HCC): Primary | ICD-10-CM

## 2018-08-24 NOTE — PROGRESS NOTES
HBO Tech Details      Patient Name: Hattie Castellanos   Patient Number: [id number]   Patient YOB: 1980      Date: 8/24/2018   Physician / Corey Solorio: Gagandeep Fulton, [address] [place] [address] Street: [facility] Treatment Course Details          Treatment

## 2018-08-27 ENCOUNTER — OFFICE VISIT (OUTPATIENT)
Dept: WOUND CARE | Facility: HOSPITAL | Age: 38
End: 2018-08-27
Attending: PODIATRIST
Payer: COMMERCIAL

## 2018-08-27 DIAGNOSIS — L97.424 NON-PRESSURE CHRONIC ULCER OF LEFT HEEL AND MIDFOOT WITH NECROSIS OF BONE (HCC): Primary | ICD-10-CM

## 2018-08-27 DIAGNOSIS — E10.621 TYPE 1 DIABETES MELLITUS WITH FOOT ULCER (HCC): ICD-10-CM

## 2018-08-27 DIAGNOSIS — L97.509 TYPE 1 DIABETES MELLITUS WITH FOOT ULCER (HCC): ICD-10-CM

## 2018-08-27 PROCEDURE — 97607 NEG PRS WND THR NDME<=50SQCM: CPT

## 2018-08-27 NOTE — PROGRESS NOTES
HBO Tech Details      Patient Name: Sergei Larose   Patient Number: [id number]   Patient YOB: 1980      Date: 8/27/2018   TAYLOR/LAXMI/REBECCA: Emelia Padilla   Physician / Jones Silva:  Minda Ricci [facility] 51: [facility] Treatment Course

## 2018-08-28 ENCOUNTER — OFFICE VISIT (OUTPATIENT)
Dept: WOUND CARE | Facility: HOSPITAL | Age: 38
End: 2018-08-28
Attending: INTERNAL MEDICINE
Payer: COMMERCIAL

## 2018-08-28 DIAGNOSIS — IMO0001 UNCONTROLLED TYPE 2 DIABETES MELLITUS WITHOUT COMPLICATION, WITH LONG-TERM CURRENT USE OF INSULIN: ICD-10-CM

## 2018-08-28 DIAGNOSIS — E11.51: ICD-10-CM

## 2018-08-28 DIAGNOSIS — E11.65: ICD-10-CM

## 2018-08-28 DIAGNOSIS — E10.40 TYPE 1 DIABETES MELLITUS WITH DIABETIC NEUROPATHY, UNSPECIFIED (HCC): ICD-10-CM

## 2018-08-28 DIAGNOSIS — E10.621 TYPE 1 DIABETES MELLITUS WITH FOOT ULCER (HCC): ICD-10-CM

## 2018-08-28 DIAGNOSIS — L97.509 TYPE 1 DIABETES MELLITUS WITH FOOT ULCER (HCC): ICD-10-CM

## 2018-08-28 DIAGNOSIS — L97.424 NON-PRESSURE CHRONIC ULCER OF LEFT HEEL AND MIDFOOT WITH NECROSIS OF BONE (HCC): Primary | ICD-10-CM

## 2018-08-28 NOTE — PROGRESS NOTES
HBO Tech Details  Patient Name: Haider New  Patient Number: [id number]  Patient YOB: 1980  Date: 8/28/2018  LIZZA/LAXMI/REBECCA: Mana Brandon  Physician / Misbah Nagel:  Josh Jordan  Facility: [address] [place] [address] Treatment Course Details  Treatment Cou

## 2018-08-29 ENCOUNTER — OFFICE VISIT (OUTPATIENT)
Dept: WOUND CARE | Facility: HOSPITAL | Age: 38
End: 2018-08-29
Attending: INTERNAL MEDICINE
Payer: COMMERCIAL

## 2018-08-29 DIAGNOSIS — L97.509 TYPE 1 DIABETES MELLITUS WITH FOOT ULCER (HCC): ICD-10-CM

## 2018-08-29 DIAGNOSIS — E10.621 TYPE 1 DIABETES MELLITUS WITH FOOT ULCER (HCC): ICD-10-CM

## 2018-08-29 DIAGNOSIS — L97.424 NON-PRESSURE CHRONIC ULCER OF LEFT HEEL AND MIDFOOT WITH NECROSIS OF BONE (HCC): Primary | ICD-10-CM

## 2018-08-29 DIAGNOSIS — E10.40 TYPE 1 DIABETES MELLITUS WITH DIABETIC NEUROPATHY, UNSPECIFIED (HCC): ICD-10-CM

## 2018-08-29 NOTE — TELEPHONE ENCOUNTER
Last Office Visit: 3-21-18 with SC for diabetes  Last Rx Filled:   Jardiance 4-18-18 30 tabs with 2 refills  Metformin- historical  Last Labs: 8-14-18 BMP. 7-6-18 hga1c.   Future Appointment: none    Per protocol to provider    Please schedule an appt for d

## 2018-08-29 NOTE — PROGRESS NOTES
HBO Tech Details      Patient Name: Melissa Barbosa   Patient Number: [id number]   Patient YOB: 1980      Date: 8/29/2018   Physician / Kana Conner: Thiago Wright, [address]: [facility] Treatment Course Details          Treatment

## 2018-08-29 NOTE — TELEPHONE ENCOUNTER
[phone]  Called pt to inform, per AS, to F/U w/ PCP as jardiance and metformin both contraindicated with his kidney function as is right now. Pt verbalized understanding and agreed with POC.

## 2018-08-29 NOTE — TELEPHONE ENCOUNTER
Pt needs to f/u with pcp; as jardiance and metformin both contraindicated with his kidney function as is rigfht now

## 2018-08-30 ENCOUNTER — OFFICE VISIT (OUTPATIENT)
Dept: WOUND CARE | Facility: HOSPITAL | Age: 38
End: 2018-08-30
Attending: INTERNAL MEDICINE
Payer: COMMERCIAL

## 2018-08-30 DIAGNOSIS — L97.424 NON-PRESSURE CHRONIC ULCER OF LEFT HEEL AND MIDFOOT WITH NECROSIS OF BONE (HCC): Primary | ICD-10-CM

## 2018-08-30 DIAGNOSIS — L97.509 TYPE 1 DIABETES MELLITUS WITH FOOT ULCER (HCC): ICD-10-CM

## 2018-08-30 DIAGNOSIS — E10.621 TYPE 1 DIABETES MELLITUS WITH FOOT ULCER (HCC): ICD-10-CM

## 2018-08-30 NOTE — PROGRESS NOTES
\[facility]\"" Tech Details      Patient Name: Mir Levy   Patient Number: [id number]   Patient YOB: 1980      Date: 8/30/2018   Physician / Rose Anibal: Terra Guerra y: [address] [facility]

## 2018-08-31 ENCOUNTER — MED REC SCAN ONLY (OUTPATIENT)
Dept: FAMILY MEDICINE CLINIC | Facility: CLINIC | Age: 38
End: 2018-08-31

## 2018-08-31 ENCOUNTER — OFFICE VISIT (OUTPATIENT)
Dept: WOUND CARE | Facility: HOSPITAL | Age: 38
End: 2018-08-31
Attending: INTERNAL MEDICINE
Payer: COMMERCIAL

## 2018-08-31 DIAGNOSIS — L97.509 TYPE 1 DIABETES MELLITUS WITH FOOT ULCER (HCC): ICD-10-CM

## 2018-08-31 DIAGNOSIS — E10.621 TYPE 1 DIABETES MELLITUS WITH FOOT ULCER (HCC): ICD-10-CM

## 2018-08-31 DIAGNOSIS — L97.424 NON-PRESSURE CHRONIC ULCER OF LEFT HEEL AND MIDFOOT WITH NECROSIS OF BONE (HCC): Primary | ICD-10-CM

## 2018-08-31 DIAGNOSIS — E10.40 TYPE 1 DIABETES MELLITUS WITH DIABETIC NEUROPATHY, UNSPECIFIED (HCC): ICD-10-CM

## 2018-09-04 ENCOUNTER — OFFICE VISIT (OUTPATIENT)
Dept: WOUND CARE | Facility: HOSPITAL | Age: 38
End: 2018-09-04
Attending: INTERNAL MEDICINE
Payer: COMMERCIAL

## 2018-09-04 DIAGNOSIS — L97.424 NON-PRESSURE CHRONIC ULCER OF LEFT HEEL AND MIDFOOT WITH NECROSIS OF BONE (HCC): Primary | ICD-10-CM

## 2018-09-04 DIAGNOSIS — L97.509 TYPE 1 DIABETES MELLITUS WITH FOOT ULCER (HCC): ICD-10-CM

## 2018-09-04 DIAGNOSIS — E10.621 TYPE 1 DIABETES MELLITUS WITH FOOT ULCER (HCC): ICD-10-CM

## 2018-09-04 NOTE — PROGRESS NOTES
HBO Tech Details  Patient Name: Radha Oneil  Patient Number: [id number]  Patient YOB: 1980  Date: 9/4/2018  CNA/LAXMI/CMA: Lin Jiménez  Physician / Radha Hooks: Emil Read, [address] [place] [address] Street: [address] [place] [address] Treatment Course Details  Treatment Cours

## 2018-09-05 ENCOUNTER — OFFICE VISIT (OUTPATIENT)
Dept: WOUND CARE | Facility: HOSPITAL | Age: 38
End: 2018-09-05
Attending: INTERNAL MEDICINE
Payer: COMMERCIAL

## 2018-09-05 DIAGNOSIS — L97.509 TYPE 1 DIABETES MELLITUS WITH FOOT ULCER (HCC): ICD-10-CM

## 2018-09-05 DIAGNOSIS — E10.40 TYPE 1 DIABETES MELLITUS WITH DIABETIC NEUROPATHY, UNSPECIFIED (HCC): ICD-10-CM

## 2018-09-05 DIAGNOSIS — L97.424 NON-PRESSURE CHRONIC ULCER OF LEFT HEEL AND MIDFOOT WITH NECROSIS OF BONE (HCC): Primary | ICD-10-CM

## 2018-09-05 DIAGNOSIS — E10.621 TYPE 1 DIABETES MELLITUS WITH FOOT ULCER (HCC): ICD-10-CM

## 2018-09-05 NOTE — PROGRESS NOTES
HBO Tech Details      Patient Name: Fatou Andrews   Patient Number: [id number]   Patient YOB: 1980      Date: 9/5/2018   Physician / Anibal Class: Gunnar Pressley, [address] [place] [address] Street: [facility] Treatment Course Details          Treatment

## 2018-09-06 ENCOUNTER — OFFICE VISIT (OUTPATIENT)
Dept: WOUND CARE | Facility: HOSPITAL | Age: 38
End: 2018-09-06
Attending: HOSPITALIST
Payer: COMMERCIAL

## 2018-09-06 DIAGNOSIS — E10.621 TYPE 1 DIABETES MELLITUS WITH FOOT ULCER (HCC): ICD-10-CM

## 2018-09-06 DIAGNOSIS — L97.424 NON-PRESSURE CHRONIC ULCER OF LEFT HEEL AND MIDFOOT WITH NECROSIS OF BONE (HCC): Primary | ICD-10-CM

## 2018-09-06 DIAGNOSIS — L97.509 TYPE 1 DIABETES MELLITUS WITH FOOT ULCER (HCC): ICD-10-CM

## 2018-09-06 DIAGNOSIS — E10.40 TYPE 1 DIABETES MELLITUS WITH DIABETIC NEUROPATHY, UNSPECIFIED (HCC): ICD-10-CM

## 2018-09-06 PROCEDURE — 97607 NEG PRS WND THR NDME<=50SQCM: CPT

## 2018-09-06 NOTE — PROGRESS NOTES
Subjective    Chief Complaint  This information was obtained from the patient  Patient is here for a follow up visit for left heel wound. He has no pain or complaint with wound.     Allergies  NKDA    HPI  This information was obtained from the patient  2/2 4/2/2018 patient returns to the clinic he has been very compliant. He has had no episodes of fever or chills.  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 No sign of infection noted patient can go on his cruise will teach his wife how to do dressing changes prior to leaving.   8/6/201 Neurological: Abnormal Gait, One-sided weakness, Memory Loss  Psychiatric: Anxiety, Claustrophobia, Depression    Additional Information  Medication reconciliation completed at today's visit. : Yes        Objective    Constitutional  BP slightly Elevated, Measured straight; NOT on angle. Bridged.         Assessment          Plan    Wound Orders:  Wound #3 Left Heel     Wound Cleansing & Dressings  Cleanse with saline or wound cleanser  Collagen - endoform  Kerlix  Other: - AFSANEH vac  Change dressing every: -

## 2018-09-06 NOTE — PROGRESS NOTES
Header Image    CurrentOld Version  [facility] Tech Details  Patient Name: Pacheco Svetlana  Patient Number: [id number]  Patient YOB: 1980  Date: 9/6/2018  Physician / Galien Conquest: Hector Beasley, [address]: [address] [facility] Treatment Course Details  Benedict

## 2018-09-07 ENCOUNTER — OFFICE VISIT (OUTPATIENT)
Dept: WOUND CARE | Facility: HOSPITAL | Age: 38
End: 2018-09-07
Attending: INTERNAL MEDICINE
Payer: COMMERCIAL

## 2018-09-07 DIAGNOSIS — L97.424 NON-PRESSURE CHRONIC ULCER OF LEFT HEEL AND MIDFOOT WITH NECROSIS OF BONE (HCC): Primary | ICD-10-CM

## 2018-09-07 DIAGNOSIS — E10.40 TYPE 1 DIABETES MELLITUS WITH DIABETIC NEUROPATHY, UNSPECIFIED (HCC): ICD-10-CM

## 2018-09-07 DIAGNOSIS — L97.509 TYPE 1 DIABETES MELLITUS WITH FOOT ULCER (HCC): ICD-10-CM

## 2018-09-07 DIAGNOSIS — E10.621 TYPE 1 DIABETES MELLITUS WITH FOOT ULCER (HCC): ICD-10-CM

## 2018-09-07 NOTE — PROGRESS NOTES
HBO Tech Details      Patient Name: Haider New   Patient Number: [id number]   Patient YOB: 1980      Date: 9/7/2018   Physician / Misbah Nagel: Naomie Poe, [address]: [facility] Treatment Course Details          Treatment lower extremities          Ordering Physician:  Lexa Ross    HBO Treatment Start Date:  7/2/2018             HBO Treatment Details        Treatment Number:  40    Treatment Length:  120 (minutes)    Chamber Type:  [address]

## 2018-09-10 ENCOUNTER — OFFICE VISIT (OUTPATIENT)
Dept: WOUND CARE | Facility: HOSPITAL | Age: 38
End: 2018-09-10
Attending: PODIATRIST
Payer: COMMERCIAL

## 2018-09-10 DIAGNOSIS — L97.424 NON-PRESSURE CHRONIC ULCER OF LEFT HEEL AND MIDFOOT WITH NECROSIS OF BONE (HCC): Primary | ICD-10-CM

## 2018-09-10 DIAGNOSIS — E10.621 TYPE 1 DIABETES MELLITUS WITH FOOT ULCER (HCC): ICD-10-CM

## 2018-09-10 DIAGNOSIS — E10.40 TYPE 1 DIABETES MELLITUS WITH DIABETIC NEUROPATHY, UNSPECIFIED (HCC): ICD-10-CM

## 2018-09-10 DIAGNOSIS — L97.509 TYPE 1 DIABETES MELLITUS WITH FOOT ULCER (HCC): ICD-10-CM

## 2018-09-10 PROCEDURE — 99213 OFFICE O/P EST LOW 20 MIN: CPT

## 2018-09-11 ENCOUNTER — APPOINTMENT (OUTPATIENT)
Dept: WOUND CARE | Facility: HOSPITAL | Age: 38
End: 2018-09-11
Payer: COMMERCIAL

## 2018-09-11 ENCOUNTER — OFFICE VISIT (OUTPATIENT)
Dept: PODIATRY CLINIC | Facility: CLINIC | Age: 38
End: 2018-09-11
Payer: COMMERCIAL

## 2018-09-11 DIAGNOSIS — M86.172 ACUTE OSTEOMYELITIS OF LEFT CALCANEUS (HCC): Primary | ICD-10-CM

## 2018-09-11 PROCEDURE — 99024 POSTOP FOLLOW-UP VISIT: CPT | Performed by: PODIATRIST

## 2018-09-11 PROCEDURE — L3260 AMBULATORY SURGICAL BOOT EAC: HCPCS | Performed by: PODIATRIST

## 2018-09-13 NOTE — PROGRESS NOTES
Ed Rico is a 34year old male. Patient presents with:  Wound Recheck: Pt is here for a wound check to left heel and mid foot. Pt gets daily wound care. Denies any pain . Pt in a spuuortive AFO and using crutches NWB. Pabol Sarmiento  Pt's blood sugar this am (Bernis Freeze) In Vitro Strip Test 4 times daily Disp: 400 strip Rfl: 3   ONETOUCH DELICA LANCETS 43T Does not apply Misc 1 lancet by Finger stick route 4 (four) times daily before meals and nightly.  Disp: 4 Box Rfl: 3   Blood Glucose Monitoring Suppl (O worry: Not on file      Food insecurity - inability: Not on file      Transportation needs - medical: Not on file      Transportation needs - non-medical: Not on file    Occupational History      Not on file    Tobacco Use      Smoking status: Never Smoker to get it appropriately filled out. The patient indicates understanding of these issues and agrees to the plan. Return in about 2 weeks (around 9/25/2018).     Poonam Treviño DPM  9/13/2018

## 2018-09-17 ENCOUNTER — APPOINTMENT (OUTPATIENT)
Dept: WOUND CARE | Facility: HOSPITAL | Age: 38
End: 2018-09-17
Payer: COMMERCIAL

## 2018-09-25 ENCOUNTER — OFFICE VISIT (OUTPATIENT)
Dept: PODIATRY CLINIC | Facility: CLINIC | Age: 38
End: 2018-09-25
Payer: COMMERCIAL

## 2018-09-25 DIAGNOSIS — M86.172 ACUTE OSTEOMYELITIS OF LEFT CALCANEUS (HCC): Primary | ICD-10-CM

## 2018-09-25 PROCEDURE — 99213 OFFICE O/P EST LOW 20 MIN: CPT | Performed by: PODIATRIST

## 2018-09-26 ENCOUNTER — OFFICE VISIT (OUTPATIENT)
Dept: PHYSICAL THERAPY | Age: 38
End: 2018-09-26
Attending: PODIATRIST
Payer: COMMERCIAL

## 2018-09-26 DIAGNOSIS — M86.172 ACUTE OSTEOMYELITIS OF LEFT CALCANEUS (HCC): ICD-10-CM

## 2018-09-26 PROCEDURE — 97110 THERAPEUTIC EXERCISES: CPT

## 2018-09-26 PROCEDURE — 97162 PT EVAL MOD COMPLEX 30 MIN: CPT

## 2018-09-26 NOTE — PROGRESS NOTES
Kaiser Castañeda is a 34year old male. Patient presents with:  Wound Recheck: left foot -- Denies any drainage. Wearing CAM boot and limiting ambulation to 30 -40 steps per day. Denies any fever or chills. BG was 147 this AM. Rates pain 0/10. High cholesterol  No date: HYPERLIPIDEMIA  No date: Hyperlipidemia  No date: HYPERTRIGLYCERIDEMIA  No date: OBESITY  No date: Pneumonia due to organism   Past Surgical History:  6/22/2018: CALCANEAL OSTEOTOMY; Left      Comment:  Performed by Bakari Rizo rashes  RESPIRATORY: denies shortness of breath with exertion  CARDIOVASCULAR: denies chest pain on exertion  GI: denies abdominal pain and denies heartburn  NEURO: denies headaches    EXAM:   There were no vitals taken for this visit.   Physical Exam  GENE

## 2018-09-26 NOTE — PROGRESS NOTES
INITIAL EVALUATION:   Referring Physician: Dr. Liliana Joaquin  Diagnosis: Acute osteomyelitis of left calcaneus [facility]) (B04.728)    Date of Service: 9/26/2018     PATIENT SUMMARY/ASSESSMENT   Armand Hernandez is a 34year old y/o male who presents to therapy the left LE with a step to pattern with no complaints of pain    ROM: Ankle AROM:   Dorsiflexion -8   Plantarflexion 35  Inversion 3  Eversion 5    Strength/MMT: Hip flexors 4/5 left, 5/5 right, Quadriceps 5/5, Hamstrings 5/5, Ankle dorsiflexors 3+/5, Ankl required: Yes  I certify the need for these services furnished under this plan of treatment and while under my care.     X___________________________________________________ Date____________________    Certification From: 3/04/5815  To:12/25/2018

## 2018-09-28 ENCOUNTER — OFFICE VISIT (OUTPATIENT)
Dept: PHYSICAL THERAPY | Age: 38
End: 2018-09-28
Attending: PODIATRIST
Payer: COMMERCIAL

## 2018-09-28 PROCEDURE — 97110 THERAPEUTIC EXERCISES: CPT

## 2018-09-28 NOTE — PROGRESS NOTES
Dx: Acute osteomyelitis of left calcaneus ([facility] (X05.170)         Authorized # of Visits:  12  Fall Risk: standard         Precautions: n/a             Subjective:    The patient reports he's been working on walking with the cane and is doing well  [employer]

## 2018-10-03 ENCOUNTER — OFFICE VISIT (OUTPATIENT)
Dept: PHYSICAL THERAPY | Age: 38
End: 2018-10-03
Attending: PODIATRIST
Payer: COMMERCIAL

## 2018-10-03 PROCEDURE — 97110 THERAPEUTIC EXERCISES: CPT

## 2018-10-03 NOTE — PROGRESS NOTES
Dx: [facility]el[facility] left calcaneus [facility] (P30.436)         Authorized # of Visits:  12  Fall Risk: standard         Precautions: n/a             Subjective:    The patient reports he has continued to do well overall with no significant complaints of

## 2018-10-05 ENCOUNTER — OFFICE VISIT (OUTPATIENT)
Dept: PHYSICAL THERAPY | Age: 38
End: 2018-10-05
Attending: PODIATRIST
Payer: COMMERCIAL

## 2018-10-05 PROCEDURE — 97110 THERAPEUTIC EXERCISES: CPT

## 2018-10-05 NOTE — PROGRESS NOTES
Dx: [facility]el[facility] left calcaneus [facility] (S93.138)         Authorized # of Visits:  12  Fall Risk: standard         Precautions: n/a             Subjective:    The patient reports he has continued to do well overall with no significant complaints of

## 2018-10-09 ENCOUNTER — OFFICE VISIT (OUTPATIENT)
Dept: PODIATRY CLINIC | Facility: CLINIC | Age: 38
End: 2018-10-09
Payer: COMMERCIAL

## 2018-10-09 ENCOUNTER — OFFICE VISIT (OUTPATIENT)
Dept: PHYSICAL THERAPY | Age: 38
End: 2018-10-09
Attending: PODIATRIST
Payer: COMMERCIAL

## 2018-10-09 ENCOUNTER — HOSPITAL ENCOUNTER (OUTPATIENT)
Dept: GENERAL RADIOLOGY | Age: 38
Discharge: HOME | End: 2018-10-09
Attending: PODIATRIST
Payer: COMMERCIAL

## 2018-10-09 DIAGNOSIS — M86.172 ACUTE OSTEOMYELITIS OF LEFT CALCANEUS (HCC): ICD-10-CM

## 2018-10-09 DIAGNOSIS — M21.372 LEFT FOOT DROP: Primary | ICD-10-CM

## 2018-10-09 PROCEDURE — 97110 THERAPEUTIC EXERCISES: CPT

## 2018-10-09 PROCEDURE — 99213 OFFICE O/P EST LOW 20 MIN: CPT | Performed by: PODIATRIST

## 2018-10-09 PROCEDURE — 73630 X-RAY EXAM OF FOOT: CPT | Performed by: PODIATRIST

## 2018-10-09 NOTE — PROGRESS NOTES
Mary Catherine is a 34year old male. Patient presents with:  Wound Recheck: Pt is here for a follow up on the wound to his left foot. Pt is in a tall cam walker. Wondering if he can walk on it without the boot. Denies any drainage . Denies any pain. • High cholesterol    • HYPERLIPIDEMIA    • Hyperlipidemia    • HYPERTRIGLYCERIDEMIA    • OBESITY    • Pneumonia due to organism       Past Surgical History:   Procedure Laterality Date   • CALCANEAL OSTEOTOMY Left 6/22/2018    Performed by Bakari Rizo breath with exertion  CARDIOVASCULAR: denies chest pain on exertion  GI: denies abdominal pain and denies heartburn  NEURO: denies headaches    EXAM:   There were no vitals taken for this visit.   Physical Exam  GENERAL: well developed, well nourished, in n

## 2018-10-09 NOTE — PROGRESS NOTES
Dx: Acute osteomyelitis of left calcaneus ([facility] (U78.288)         Authorized # of Visits:  12  Fall Risk: standard         Precautions: n/a             Subjective:    The patient reports he sees the doctor today and should be able to begin wearing the AFO

## 2018-10-10 ENCOUNTER — OFFICE VISIT (OUTPATIENT)
Dept: PHYSICAL THERAPY | Age: 38
End: 2018-10-10
Attending: PODIATRIST
Payer: COMMERCIAL

## 2018-10-10 PROCEDURE — 97140 MANUAL THERAPY 1/> REGIONS: CPT

## 2018-10-10 PROCEDURE — 97110 THERAPEUTIC EXERCISES: CPT

## 2018-10-10 NOTE — PROGRESS NOTES
Dx: [facility]el[facility] left calcaneus [facility] (J37.797)         Authorized # of Visits:  12  Fall Risk: standard         Precautions: n/a             Subjective:    The patient reports he is wearing the AFO today and it feels ok  Objective:   See flow she

## 2018-10-17 ENCOUNTER — OFFICE VISIT (OUTPATIENT)
Dept: PHYSICAL THERAPY | Age: 38
End: 2018-10-17
Attending: PODIATRIST
Payer: COMMERCIAL

## 2018-10-17 PROCEDURE — 97112 NEUROMUSCULAR REEDUCATION: CPT

## 2018-10-17 PROCEDURE — 97110 THERAPEUTIC EXERCISES: CPT

## 2018-10-17 NOTE — PROGRESS NOTES
Dx: [facility]el[facility] calcaneus [facility] (D08.489)         Authorized # of Visits:  12  Fall Risk: standard         Precautions: n/a             Subjective:    The patient reports he's been wearing his AFO and he was able to wear it most of the day y Rockerboard AP/Lateral rocking, balance Stance on foam with AP/Lateral weight shifting        4 inch step FSU Rockerboard AP/Lateral rocking, balance    HEP: SLR x 4 planes, Ankle  Circles    Charges:  TherEx x 2; Neuro Re-Ed x 1       Total Timed Treatment

## 2018-10-18 ENCOUNTER — TELEPHONE (OUTPATIENT)
Dept: ADMINISTRATIVE | Age: 38
End: 2018-10-18

## 2018-10-19 ENCOUNTER — OFFICE VISIT (OUTPATIENT)
Dept: PHYSICAL THERAPY | Age: 38
End: 2018-10-19
Attending: PODIATRIST
Payer: COMMERCIAL

## 2018-10-19 PROCEDURE — 97110 THERAPEUTIC EXERCISES: CPT

## 2018-10-19 PROCEDURE — 97112 NEUROMUSCULAR REEDUCATION: CPT

## 2018-10-19 NOTE — PROGRESS NOTES
Dx: Acute osteomyelitis of left calcaneus ([facility] (U59.399)         Authorized # of Visits:  12  Fall Risk: standard         Precautions: n/a             Subjective:    The patient reports he's been wearing his AFO and he was able to wear it most of the day y Seated heel/toe raises -   Reviewed HEP PROM left foot/ankle as tolerated - Reviewed HEP Neuro Re-Ed Neuro Re-Ed Neuro Re-Ed       Rockerboard AP/Lateral rocking, balance Stance on foam with AP/Lateral weight shifting Stance on foam with AP/lateral weight

## 2018-10-19 NOTE — TELEPHONE ENCOUNTER
[facility] form received in LITA for Dr. Tani Presley. Logged for processing. LITA packet emailed to pt '[email or web address]'.  NK

## 2018-10-23 ENCOUNTER — TELEPHONE (OUTPATIENT)
Dept: INTERNAL MEDICINE CLINIC | Facility: CLINIC | Age: 38
End: 2018-10-23

## 2018-10-23 ENCOUNTER — OFFICE VISIT (OUTPATIENT)
Dept: PODIATRY CLINIC | Facility: CLINIC | Age: 38
End: 2018-10-23
Payer: COMMERCIAL

## 2018-10-23 DIAGNOSIS — M21.372 LEFT FOOT DROP: Primary | ICD-10-CM

## 2018-10-23 PROCEDURE — 99213 OFFICE O/P EST LOW 20 MIN: CPT | Performed by: PODIATRIST

## 2018-10-23 NOTE — TELEPHONE ENCOUNTER
Patient completed form completion request and authorization to use or disclose protected health info. Pt paid $25 fee.  Pt requested forms to be faxed to [facility]

## 2018-10-24 ENCOUNTER — OFFICE VISIT (OUTPATIENT)
Dept: PHYSICAL THERAPY | Age: 38
End: 2018-10-24
Attending: PODIATRIST
Payer: COMMERCIAL

## 2018-10-24 PROCEDURE — 97112 NEUROMUSCULAR REEDUCATION: CPT

## 2018-10-24 PROCEDURE — 97110 THERAPEUTIC EXERCISES: CPT

## 2018-10-24 NOTE — PROGRESS NOTES
Dx: Acute osteomyelitis of left calcaneus ([facility] (V90.291)         Authorized # of Visits:  12  Fall Risk: standard         Precautions: n/a             Subjective:    The patient reports he is returning to work over the next couple of weeks and has been doi AP/Lateral rocking and balance Rockerboard AP/Lateral rocking and balance x 5 min      FSU/LSU on to Airex cushion -   HEP: SLR x 4 planes, Ankle  Circles    Charges:  TherEx x 2; Neuro Re-Ed x 1       Total Timed Treatment: 40 min  Total Treatment Time: 40

## 2018-10-28 NOTE — PROGRESS NOTES
Sergei Dominguez is a 34year old male. Patient presents with: Follow - Up: left foot  denies pain         HPI:   Patient returns to the clinic today he has no complaints of pain. Allergies: Patient has no known allergies.      Current Outpatient Medi Paxton Saha DPM at [facility] MAIN OR   • CHOLECYSTECTOMY     • DEBRIDE WOUND     • EXTREMITY LOWER IRRIGATION & DEBRIDEMENT Left 2/17/2018    Performed by Arcadio Treviño DPM at [facility] MAIN OR   • FOOT SURGERY     • KNEE ARTHROTOMY Left 4/29/2018    Performed by Alfie Murray no apparent distress  EXTREMITIES:   Posterior aspect of the patient's left heel was examined it remains healed without sores or ulcerations. ASSESSMENT AND PLAN:   Diagnoses and all orders for this visit:    Left foot drop        Plan:  Today dispensed a

## 2018-10-30 ENCOUNTER — OFFICE VISIT (OUTPATIENT)
Dept: PHYSICAL THERAPY | Age: 38
End: 2018-10-30
Attending: PODIATRIST
Payer: COMMERCIAL

## 2018-10-30 PROCEDURE — 97112 NEUROMUSCULAR REEDUCATION: CPT

## 2018-10-30 PROCEDURE — 97110 THERAPEUTIC EXERCISES: CPT

## 2018-10-30 NOTE — PROGRESS NOTES
Dx: Acute osteomyelitis of left calcaneus ([facility] (H82.514)         Authorized # of Visits:  12  Fall Risk: standard         Precautions: n/a             Subjective:    The patient reports he has continued to do well overall   Objective:   See flow sheet  ROM with AP/lateral weight shifting SLS on foam with 1 finger assist R/L x 5 min SLS on foam with 1 finger assist R/L x 4 min    4 inch step FSU Rockerboard AP/Lateral rocking, balance Rockerboard AP/Lateral rocking and balance Rockerboard AP/Lateral rocking a

## 2018-10-31 ENCOUNTER — APPOINTMENT (OUTPATIENT)
Dept: LAB | Age: 38
End: 2018-10-31
Attending: EMERGENCY MEDICINE
Payer: COMMERCIAL

## 2018-10-31 ENCOUNTER — OFFICE VISIT (OUTPATIENT)
Dept: FAMILY MEDICINE CLINIC | Facility: CLINIC | Age: 38
End: 2018-10-31
Payer: COMMERCIAL

## 2018-10-31 VITALS
OXYGEN SATURATION: 98 % | WEIGHT: 186 LBS | HEART RATE: 100 BPM | BODY MASS INDEX: 28.19 KG/M2 | TEMPERATURE: 98 F | SYSTOLIC BLOOD PRESSURE: 134 MMHG | HEIGHT: 68 IN | DIASTOLIC BLOOD PRESSURE: 80 MMHG | RESPIRATION RATE: 16 BRPM

## 2018-10-31 DIAGNOSIS — E11.69 DYSLIPIDEMIA ASSOCIATED WITH TYPE 2 DIABETES MELLITUS (HCC): ICD-10-CM

## 2018-10-31 DIAGNOSIS — IMO0001 UNCONTROLLED TYPE 2 DIABETES MELLITUS WITHOUT COMPLICATION, WITH LONG-TERM CURRENT USE OF INSULIN: ICD-10-CM

## 2018-10-31 DIAGNOSIS — E78.1 PURE HYPERGLYCERIDEMIA: ICD-10-CM

## 2018-10-31 DIAGNOSIS — Z23 NEED FOR VACCINATION: ICD-10-CM

## 2018-10-31 DIAGNOSIS — E11.65 UNCONTROLLED TYPE 2 DIABETES MELLITUS WITH DIABETIC PERIPHERAL ANGIOPATHY WITHOUT GANGRENE (HCC): ICD-10-CM

## 2018-10-31 DIAGNOSIS — E78.5 DYSLIPIDEMIA ASSOCIATED WITH TYPE 2 DIABETES MELLITUS (HCC): ICD-10-CM

## 2018-10-31 DIAGNOSIS — E11.51 UNCONTROLLED TYPE 2 DIABETES MELLITUS WITH DIABETIC PERIPHERAL ANGIOPATHY WITHOUT GANGRENE (HCC): Primary | ICD-10-CM

## 2018-10-31 DIAGNOSIS — E11.65 UNCONTROLLED TYPE 2 DIABETES MELLITUS WITH DIABETIC PERIPHERAL ANGIOPATHY WITHOUT GANGRENE (HCC): Primary | ICD-10-CM

## 2018-10-31 DIAGNOSIS — E11.51 UNCONTROLLED TYPE 2 DIABETES MELLITUS WITH DIABETIC PERIPHERAL ANGIOPATHY WITHOUT GANGRENE (HCC): ICD-10-CM

## 2018-10-31 DIAGNOSIS — E11.319 DIABETIC RETINOPATHY ASSOCIATED WITH CONTROLLED TYPE 2 DIABETES MELLITUS (HCC): ICD-10-CM

## 2018-10-31 DIAGNOSIS — N17.9 AKI (ACUTE KIDNEY INJURY) (HCC): ICD-10-CM

## 2018-10-31 PROCEDURE — 80053 COMPREHEN METABOLIC PANEL: CPT | Performed by: EMERGENCY MEDICINE

## 2018-10-31 PROCEDURE — 83036 HEMOGLOBIN GLYCOSYLATED A1C: CPT | Performed by: EMERGENCY MEDICINE

## 2018-10-31 PROCEDURE — 36415 COLL VENOUS BLD VENIPUNCTURE: CPT | Performed by: EMERGENCY MEDICINE

## 2018-10-31 PROCEDURE — 90686 IIV4 VACC NO PRSV 0.5 ML IM: CPT | Performed by: EMERGENCY MEDICINE

## 2018-10-31 PROCEDURE — 90471 IMMUNIZATION ADMIN: CPT | Performed by: EMERGENCY MEDICINE

## 2018-10-31 PROCEDURE — 82570 ASSAY OF URINE CREATININE: CPT | Performed by: EMERGENCY MEDICINE

## 2018-10-31 PROCEDURE — 82043 UR ALBUMIN QUANTITATIVE: CPT | Performed by: EMERGENCY MEDICINE

## 2018-10-31 PROCEDURE — 99214 OFFICE O/P EST MOD 30 MIN: CPT | Performed by: EMERGENCY MEDICINE

## 2018-10-31 RX ORDER — BLOOD SUGAR DIAGNOSTIC
STRIP MISCELLANEOUS
Qty: 400 STRIP | Refills: 3 | Status: SHIPPED | OUTPATIENT
Start: 2018-10-31 | End: 2019-10-31

## 2018-10-31 RX ORDER — EZETIMIBE AND SIMVASTATIN 10; 80 MG/1; MG/1
1 TABLET ORAL
Qty: 90 TABLET | Refills: 0 | Status: SHIPPED | OUTPATIENT
Start: 2018-10-31 | End: 2019-01-23

## 2018-10-31 RX ORDER — INSULIN DETEMIR 100 [IU]/ML
INJECTION, SOLUTION SUBCUTANEOUS
Qty: 15 ML | Refills: 2 | Status: SHIPPED | OUTPATIENT
Start: 2018-10-31 | End: 2021-01-15

## 2018-10-31 NOTE — PATIENT INSTRUCTIONS
Thank you for choosing [facility]  To Do:  FOR GAGAN SIMMS    · Restart Levimir 15 units twice a day  · Restart Metformin  · Restart Trulicity  · Follow up in 1 month for recheck  · Call if with uncontrolled blood sugars  · Have blood te level.  Did you know? Even though carbohydrates raise blood sugar, it’s best to have some in every meal. They are an important part of a healthy diet. Fat  Fat is an energy source that can be stored until needed. Fat does not raise blood sugar.  However, protein is found in fish, poultry, meat, cheese, milk, and eggs. These contain cholesterol and can be high in saturated fat. Aim for lean, lower-fat choices. Date Last Reviewed: 3/1/2016  © 0025-7481 [employer] [address].  [address] Solano meals 4 to 5 hours apart, with snacks in between. · Limit alcohol. It raises blood sugar levels. Drink water or calorie-free diet drinks that use safe sweeteners. · Eat less fat to help lower your risk of heart disease.  Use nonfat or low-fat dairy produc clouding of the eye’s lens (a cataract).  Eye problems can eventually lead to irreversible blindness.   · Tooth and gum problems (periodontal disease), causing loss of teeth and bone  · Blood vessel (vascular) disease leading to circulation problems, heart healthy over time. The hemoglobin A1C (or glycated hemoglobin) test can help. This test measures your average blood sugar level over a few months. A higher A1C result means that you have a higher risk of developing complications.   The A1C test  The A1C is can damage areas of the retina, causing blurry, distorted vision. What causes diabetic retinopathy? Diabetes is the cause of this eye disease. Over time, diabetes makes blood vessels weaken all over the body, including in the eyes.  Poor blood sugar con

## 2018-10-31 NOTE — PROGRESS NOTES
Chief Complaint:   Patient presents with: Follow - Up    HPI:   This is a 34year old male       FOLLOW  UP DIABETES  Not taking all DM meds due to lack of refills. Blood sugars a little high at 175. Has gained a little weight.   Has not had any hypoglycem lancet by Finger stick route 4 (four) times daily before meals and nightly. Disp: 4 Box Rfl: 3   Blood Glucose Monitoring Suppl (ONETOUCH ULTRA 2) w/Device Does not apply Kit 1 kit by Does not apply route as needed.  Disp:  Rfl:    Glucose Blood (Vernard Day T S/P left knee surgery     Osteomyelitis (HCC)     JESSICA (acute kidney injury) ([facility]     MRSA (methicillin resistant Staphylococcus aureus) infection        REVIEW OF SYSTEMS:   Review of systems significant for elevated blood sugars.   The rest of the review Value Ref Range    HgbA1C 6.5 (H) <5.7 %    Estimated Average Glucose 140 (H) 68 - 126 mg/dL   MICROALB/CREAT RATIO, RANDOM URINE    Collection Time: 10/31/18 11:55 AM   Result Value Ref Range    Microalbumin, Urine 0.91 mg/dL    Creatinine Ur Random 132. 0 injury) ([facility]  -     NEPHROLOGY - INTERNAL            PATIENT INSTRUCTIONS:    · Restart Levimir 15 units twice a day  · Restart Metformin  · Restart Trulicity  · Follow up in 1 month for recheck  · Call if with uncontrolled blood sugars  · Have blood test

## 2018-11-02 ENCOUNTER — OFFICE VISIT (OUTPATIENT)
Dept: PHYSICAL THERAPY | Age: 38
End: 2018-11-02
Attending: PODIATRIST
Payer: COMMERCIAL

## 2018-11-02 PROCEDURE — 97110 THERAPEUTIC EXERCISES: CPT

## 2018-11-02 PROCEDURE — 97112 NEUROMUSCULAR REEDUCATION: CPT

## 2018-11-02 NOTE — PROGRESS NOTES
Dx: Acute osteomyelitis of left calcaneus ([facility] (K45.177)         Authorized # of Visits:  12  Fall Risk: standard         Precautions: n/a             Subjective:    The patient reports he went to work this week and has been doing well with no significant

## 2018-11-07 NOTE — TELEPHONE ENCOUNTER
Yes I do support his disability from that time.   If you have any questions please contact me thank you

## 2018-11-07 NOTE — TELEPHONE ENCOUNTER
Dr. Almonte Cordial form pending in Forms dept. Do you support pt's disabiltiy from 2/20/18 til 10/21/18? He returned to work on 10/22/18. Please advise.     Thank you,  [facility]

## 2018-11-12 ENCOUNTER — APPOINTMENT (OUTPATIENT)
Dept: PHYSICAL THERAPY | Age: 38
End: 2018-11-12
Attending: PODIATRIST
Payer: COMMERCIAL

## 2018-11-12 ENCOUNTER — TELEPHONE (OUTPATIENT)
Dept: PHYSICAL THERAPY | Age: 38
End: 2018-11-12

## 2018-11-12 ENCOUNTER — OFFICE VISIT (OUTPATIENT)
Dept: NEPHROLOGY | Facility: CLINIC | Age: 38
End: 2018-11-12
Payer: COMMERCIAL

## 2018-11-12 VITALS — BODY MASS INDEX: 29 KG/M2 | WEIGHT: 188 LBS | SYSTOLIC BLOOD PRESSURE: 150 MMHG | DIASTOLIC BLOOD PRESSURE: 90 MMHG

## 2018-11-12 DIAGNOSIS — N17.9 AKI (ACUTE KIDNEY INJURY) (HCC): Primary | ICD-10-CM

## 2018-11-12 PROCEDURE — 99214 OFFICE O/P EST MOD 30 MIN: CPT | Performed by: INTERNAL MEDICINE

## 2018-11-12 NOTE — PROGRESS NOTES
Nephrology Progress Note      Luisa Murray is a 34year old male.     HPI:   Patient presents with:  Acute Renal Injury      Mr. Joshua Byrd was seen in the nephrology clinic today in follow-up for management of acute kidney injury thought to be primaril Medications (Active prior to today's visit):    Current Outpatient Medications:  MetFORMIN HCl 1000 MG Oral Tab Take 1 tablet (1,000 mg total) by mouth 2 (two) times daily with meals.  Disp: 180 tablet Rfl: 0   LEVEMIR FLEXTOUCH 100 UNIT/ML Subcutaneous oz  05/02/18 : 184 lb 6.4 oz      General: Alert and oriented in no apparent distress.   HEENT: No scleral icterus, MMM  Neck: Supple, no ITZ or thyromegaly  Cardiac: Regular rate and rhythm, S1, S2 normal, no murmur or rub  Lungs: Clear without wheezes, ra 07/12/2018    NITRITE Negative 07/12/2018    LEUUR Negative 07/12/2018    NMIC Microscopic not indicated 07/12/2018    WBCUR None Seen 07/06/2018    RBCUR None Seen 07/06/2018    EPIUR None Seen 07/06/2018    BACUR None Seen 07/06/2018     ASSESSMENT/PLAN:

## 2018-11-16 ENCOUNTER — APPOINTMENT (OUTPATIENT)
Dept: PHYSICAL THERAPY | Age: 38
End: 2018-11-16
Attending: PODIATRIST
Payer: COMMERCIAL

## 2018-11-16 NOTE — TELEPHONE ENCOUNTER
[facility] rep called saying form never received. Re-faxed just forms today to [phone].

## 2018-11-17 ENCOUNTER — OFFICE VISIT (OUTPATIENT)
Dept: PODIATRY CLINIC | Facility: CLINIC | Age: 38
End: 2018-11-17
Payer: COMMERCIAL

## 2018-11-17 DIAGNOSIS — L97.421 SKIN ULCER OF LEFT HEEL, LIMITED TO BREAKDOWN OF SKIN (HCC): ICD-10-CM

## 2018-11-17 DIAGNOSIS — M21.372 LEFT FOOT DROP: Primary | ICD-10-CM

## 2018-11-17 PROCEDURE — 99213 OFFICE O/P EST LOW 20 MIN: CPT | Performed by: PODIATRIST

## 2018-11-17 RX ORDER — CEFADROXIL 500 MG/1
500 CAPSULE ORAL 2 TIMES DAILY
Qty: 30 CAPSULE | Refills: 0 | Status: SHIPPED | OUTPATIENT
Start: 2018-11-17 | End: 2019-02-19 | Stop reason: ALTCHOICE

## 2018-11-20 ENCOUNTER — OFFICE VISIT (OUTPATIENT)
Dept: PODIATRY CLINIC | Facility: CLINIC | Age: 38
End: 2018-11-20
Payer: COMMERCIAL

## 2018-11-20 ENCOUNTER — TELEPHONE (OUTPATIENT)
Dept: PODIATRY CLINIC | Facility: CLINIC | Age: 38
End: 2018-11-20

## 2018-11-20 ENCOUNTER — HOSPITAL ENCOUNTER (OUTPATIENT)
Dept: GENERAL RADIOLOGY | Age: 38
Discharge: HOME | End: 2018-11-20
Attending: PODIATRIST
Payer: COMMERCIAL

## 2018-11-20 DIAGNOSIS — L97.421 SKIN ULCER OF LEFT HEEL, LIMITED TO BREAKDOWN OF SKIN (HCC): ICD-10-CM

## 2018-11-20 DIAGNOSIS — E11.65 UNCONTROLLED TYPE 2 DIABETES MELLITUS WITH DIABETIC PERIPHERAL ANGIOPATHY WITHOUT GANGRENE (HCC): ICD-10-CM

## 2018-11-20 DIAGNOSIS — E11.51 UNCONTROLLED TYPE 2 DIABETES MELLITUS WITH DIABETIC PERIPHERAL ANGIOPATHY WITHOUT GANGRENE (HCC): ICD-10-CM

## 2018-11-20 DIAGNOSIS — L97.421 SKIN ULCER OF LEFT HEEL, LIMITED TO BREAKDOWN OF SKIN (HCC): Primary | ICD-10-CM

## 2018-11-20 PROCEDURE — 99213 OFFICE O/P EST LOW 20 MIN: CPT | Performed by: PODIATRIST

## 2018-11-20 PROCEDURE — 73650 X-RAY EXAM OF HEEL: CPT | Performed by: PODIATRIST

## 2018-11-20 NOTE — TELEPHONE ENCOUNTER
Order form for hydrofera blue ready care and endoform collagen faxed to [facility] along with face sheet, physician orders

## 2018-11-20 NOTE — PROGRESS NOTES
Mylene Duarte is a 34year old male. Patient presents with:   Follow - Up: left heel wound 3.5 cm diameter with 0.1 depth, pink granulation tissue noted to base with moderate drainage         HPI:   Patient returns to the clinic for checkup on his he HYPERLIPIDEMIA    • Hyperlipidemia    • HYPERTRIGLYCERIDEMIA    • OBESITY    • Pneumonia due to organism       Past Surgical History:   Procedure Laterality Date   • CALCANEAL OSTEOTOMY Left 6/22/2018    Performed by Keyla Bartlett DPM at [address] [place] [address] HEEL (CALCANEUS) (MIN 2 VIEWS), LEFT (CPT=73650); Future    Uncontrolled type 2 diabetes mellitus with diabetic peripheral angiopathy without gangrene ([facility] [address]        Plan:  At this point time evaluated the patient and the ulceration is smaller superficial no

## 2018-11-23 NOTE — PROGRESS NOTES
Shirin Phillips is a 34year old male. Patient presents with: Follow - Up: Left foot drop -- States foot has a wound that he noticed yesterday. States he noticed some blood. No Abx taken. BG not taken today. Denies fever and pain.          HPI:   [facility] • Elevated liver enzymes 8/30/2016   • Elevated serum GGT level 8/30/2016   • High cholesterol    • HYPERLIPIDEMIA    • Hyperlipidemia    • HYPERTRIGLYCERIDEMIA    • OBESITY    • Pneumonia due to organism       Past Surgical History:   Procedure Michelle Lundberg base.  ASSESSMENT AND PLAN:   Diagnoses and all orders for this visit:    Left foot drop    Skin ulcer of left heel, limited to breakdown of skin ([facility] [address]    Other orders  -     Cefadroxil 500 MG Oral Cap;  Take 1 capsule (500 mg total) by mouth 2 (two) times

## 2018-11-27 ENCOUNTER — OFFICE VISIT (OUTPATIENT)
Dept: PODIATRY CLINIC | Facility: CLINIC | Age: 38
End: 2018-11-27
Payer: COMMERCIAL

## 2018-11-27 DIAGNOSIS — L97.421 SKIN ULCER OF LEFT HEEL, LIMITED TO BREAKDOWN OF SKIN (HCC): Primary | ICD-10-CM

## 2018-11-27 DIAGNOSIS — E11.65 UNCONTROLLED TYPE 2 DIABETES MELLITUS WITH DIABETIC PERIPHERAL ANGIOPATHY WITHOUT GANGRENE (HCC): ICD-10-CM

## 2018-11-27 DIAGNOSIS — E11.51 UNCONTROLLED TYPE 2 DIABETES MELLITUS WITH DIABETIC PERIPHERAL ANGIOPATHY WITHOUT GANGRENE (HCC): ICD-10-CM

## 2018-11-27 PROCEDURE — 99213 OFFICE O/P EST LOW 20 MIN: CPT | Performed by: PODIATRIST

## 2018-11-30 NOTE — PROGRESS NOTES
Richelle Jimenez is a 34year old male. Patient presents with: Follow - Up: left foot         HPI:   This pleasant gentleman presents to the clinic for follow-up on his left heel ulcer. Allergies: Patient has no known allergies.      Current Outpatien • Pneumonia due to organism       Past Surgical History:   Procedure Laterality Date   • CALCANEAL OSTEOTOMY Left 6/22/2018    Performed by Ania Santiago DPM at [facility] OR   • CHOLECYSTECTOMY     • DEBRIDE WOUND     • EXTREMITY LOWER IRRIGATION & D calcanectomy left foot    ASSESSMENT AND PLAN:   Diagnoses and all orders for this visit:    Skin ulcer of left heel, limited to breakdown of skin ([facility] [address]    Uncontrolled type 2 diabetes mellitus with diabetic peripheral angiopathy without gangrene ([facility] [address]

## 2018-12-03 ENCOUNTER — OFFICE VISIT (OUTPATIENT)
Dept: FAMILY MEDICINE CLINIC | Facility: CLINIC | Age: 38
End: 2018-12-03
Payer: COMMERCIAL

## 2018-12-03 VITALS
HEART RATE: 107 BPM | RESPIRATION RATE: 17 BRPM | DIASTOLIC BLOOD PRESSURE: 80 MMHG | OXYGEN SATURATION: 98 % | SYSTOLIC BLOOD PRESSURE: 128 MMHG

## 2018-12-03 DIAGNOSIS — E11.9 CONTROLLED TYPE 2 DIABETES MELLITUS WITHOUT COMPLICATION, WITH LONG-TERM CURRENT USE OF INSULIN (HCC): Primary | ICD-10-CM

## 2018-12-03 DIAGNOSIS — E11.69 HYPERLIPIDEMIA ASSOCIATED WITH TYPE 2 DIABETES MELLITUS (HCC): ICD-10-CM

## 2018-12-03 DIAGNOSIS — Z79.4 CONTROLLED TYPE 2 DIABETES MELLITUS WITHOUT COMPLICATION, WITH LONG-TERM CURRENT USE OF INSULIN (HCC): Primary | ICD-10-CM

## 2018-12-03 DIAGNOSIS — E78.5 HYPERLIPIDEMIA ASSOCIATED WITH TYPE 2 DIABETES MELLITUS (HCC): ICD-10-CM

## 2018-12-03 PROCEDURE — 99213 OFFICE O/P EST LOW 20 MIN: CPT | Performed by: EMERGENCY MEDICINE

## 2018-12-03 NOTE — PATIENT INSTRUCTIONS
Thank you for choosing [facility]  To Do:  FOR GAGAN [facility]    · Continue with all meds.  Continue with all meds for now  · Ok to follow up End of January, sooner if with any uncontroled sugars  · Have blood tests done before next visit another dose of fast-acting sugar. If you aren’t better after that, seek medical help.   · Once your blood sugar is back in the normal range, have a healthy snack   Too much glucose  If your glucose is too high, you may feel thirsty, weak, dizzy, or nauseat enough blood. If you are unable to get enough blood, hold your hand down at your side and gently shake it. · Place a drop of blood on the test trip according to your meter’s instructions. · Read and record your results.   Too little glucose  If your gluco through diet, exercise, and medicines.  Follow a strict diet for diabetes, exercise regularly, and take medicines as directed. By managing diabetes, you can maintain a healthy blood sugar (blood glucose) level and slow or prevent kidney damage. Test your bl [address]. All rights reserved. This information is not intended as a substitute for professional medical care. Always follow your healthcare professional's instructions.         Diabetes: Living Your Life    Having diabetes may mean changes at work and in your [place] , [place], [address] [place]. All rights reserved. This information is not intended as a substitute for professional medical care. Always follow your healthcare professional's instructions.

## 2018-12-03 NOTE — PROGRESS NOTES
Chief Complaint:   Patient presents with:  Lab Results: F/u     HPI:   This is a 34year old male       FOLLOW UP DIABETES    Currently on trulicity 1.5 mg weekly, Levimir 15 u BID, Metformin 1000 mg BID. Blood sugars well controlled.  No hypoglycemic episo Subcutaneous Solution Pen-injector inject 15 UNITS into the skin sub cutanoeusly daily  after hyperbaric session done Disp: 15 mL Rfl: 2   Glucose Blood (ONETOUCH VERIO) In Vitro Strip Test 4 times daily Disp: 400 strip Rfl: 3   Ezetimibe-Simvastatin (VYTO diabetes mellitus without complication (HCC)     Tachycardia     S/P left knee arthroscopy     S/P left knee surgery     Osteomyelitis (HCC)     JESSICA (acute kidney injury) ([facility] [address]     MRSA (methicillin resistant Staphylococcus aureus) infection            REV

## 2018-12-04 ENCOUNTER — OFFICE VISIT (OUTPATIENT)
Dept: PODIATRY CLINIC | Facility: CLINIC | Age: 38
End: 2018-12-04
Payer: COMMERCIAL

## 2018-12-04 DIAGNOSIS — L97.421 SKIN ULCER OF LEFT HEEL, LIMITED TO BREAKDOWN OF SKIN (HCC): Primary | ICD-10-CM

## 2018-12-04 DIAGNOSIS — M21.372 LEFT FOOT DROP: ICD-10-CM

## 2018-12-04 PROCEDURE — 99213 OFFICE O/P EST LOW 20 MIN: CPT | Performed by: PODIATRIST

## 2018-12-08 NOTE — PROGRESS NOTES
Keith Edgar is a 34year old male. Patient presents with:  Diabetic Ulcer: left heel -- Denies any pain, drainage, redness or swelling, per pt. Denies any fever or chills.  BG was 115 this AM        HPI:   Patient returns to the clinic for checkup cholesterol    • HYPERLIPIDEMIA    • Hyperlipidemia    • HYPERTRIGLYCERIDEMIA    • OBESITY    • Pneumonia due to organism       Past Surgical History:   Procedure Laterality Date   • CALCANEAL OSTEOTOMY Left 6/22/2018    Performed by SHREYAS Mitchell Redressed today with Hydrofera Blue ready. Advised patient at this time to continue with all local wound care all physical restrictions as previously documented he can try walking in the black cam boot. I will see him in follow-up in 2 weeks.     The manohar

## 2018-12-18 ENCOUNTER — OFFICE VISIT (OUTPATIENT)
Dept: PODIATRY CLINIC | Facility: CLINIC | Age: 38
End: 2018-12-18
Payer: COMMERCIAL

## 2018-12-18 DIAGNOSIS — M21.372 LEFT FOOT DROP: ICD-10-CM

## 2018-12-18 DIAGNOSIS — L97.421 SKIN ULCER OF LEFT HEEL, LIMITED TO BREAKDOWN OF SKIN (HCC): Primary | ICD-10-CM

## 2018-12-18 DIAGNOSIS — E11.65 UNCONTROLLED TYPE 2 DIABETES MELLITUS WITH DIABETIC PERIPHERAL ANGIOPATHY WITHOUT GANGRENE (HCC): ICD-10-CM

## 2018-12-18 DIAGNOSIS — E11.51 UNCONTROLLED TYPE 2 DIABETES MELLITUS WITH DIABETIC PERIPHERAL ANGIOPATHY WITHOUT GANGRENE (HCC): ICD-10-CM

## 2018-12-18 PROCEDURE — 99213 OFFICE O/P EST LOW 20 MIN: CPT | Performed by: PODIATRIST

## 2018-12-23 NOTE — PROGRESS NOTES
Donny Martinez is a 34year old male. Patient presents with:  Diabetic Ulcer: Pt is here for a wound check. Ulcer to left heel . Pt denies any pain. Per pt's wife there is wound improvment. Blood sugar 124 this am. Pt sees Dr. Gena Green for Diabetes. Erectile Dysfunction.  Disp: 20 tablet Rfl: 0      Past Medical History:   Diagnosis Date   • Diabetes ([facility] [address]    • Elevated liver enzymes 8/30/2016   • Elevated serum GGT level 8/30/2016   • High cholesterol    • HYPERLIPIDEMIA    • Hyperlipidemia    • HYPER area is smaller than last time it is circumferential in size does not probe deeply to bone his red granular base and measures 0.4 cm in diameter.   ASSESSMENT AND PLAN:   Diagnoses and all orders for this visit:    Skin ulcer of left heel, limited to breakd

## 2018-12-26 DIAGNOSIS — IMO0001 UNCONTROLLED TYPE 2 DIABETES MELLITUS WITHOUT COMPLICATION, WITHOUT LONG-TERM CURRENT USE OF INSULIN: ICD-10-CM

## 2018-12-26 RX ORDER — DULAGLUTIDE 1.5 MG/.5ML
INJECTION, SOLUTION SUBCUTANEOUS
Qty: 2 ML | Refills: 0 | Status: SHIPPED | OUTPATIENT
Start: 2018-12-26 | End: 2019-01-03

## 2018-12-27 NOTE — TELEPHONE ENCOUNTER
Medication(s) to Refill:   Requested Prescriptions     Pending Prescriptions Disp Refills   • VIAGRA 50 MG Oral Tab [Pharmacy Med Name: VIAGRA 50MG TABLETS] 20 tablet 0     Sig: TAKE 1 TABLET BY MOUTH DAILY AS NEEDED FOR ERECTILE DYSFUNCTION         Reason

## 2018-12-28 RX ORDER — SILDENAFIL CITRATE 50 MG
TABLET ORAL
Qty: 20 TABLET | Refills: 0 | Status: SHIPPED | OUTPATIENT
Start: 2018-12-28 | End: 2019-01-04

## 2019-01-01 ENCOUNTER — EXTERNAL RECORD (OUTPATIENT)
Dept: CARDIOLOGY | Age: 39
End: 2019-01-01

## 2019-01-03 ENCOUNTER — OFFICE VISIT (OUTPATIENT)
Dept: PODIATRY CLINIC | Facility: CLINIC | Age: 39
End: 2019-01-03
Payer: COMMERCIAL

## 2019-01-03 ENCOUNTER — TELEPHONE (OUTPATIENT)
Dept: FAMILY MEDICINE CLINIC | Facility: CLINIC | Age: 39
End: 2019-01-03

## 2019-01-03 DIAGNOSIS — L97.421 SKIN ULCER OF LEFT HEEL, LIMITED TO BREAKDOWN OF SKIN (HCC): Primary | ICD-10-CM

## 2019-01-03 DIAGNOSIS — IMO0001 UNCONTROLLED TYPE 2 DIABETES MELLITUS WITHOUT COMPLICATION, WITHOUT LONG-TERM CURRENT USE OF INSULIN: ICD-10-CM

## 2019-01-03 DIAGNOSIS — M21.372 LEFT FOOT DROP: ICD-10-CM

## 2019-01-03 DIAGNOSIS — E11.51 UNCONTROLLED TYPE 2 DIABETES MELLITUS WITH DIABETIC PERIPHERAL ANGIOPATHY WITHOUT GANGRENE (HCC): ICD-10-CM

## 2019-01-03 DIAGNOSIS — E11.65 UNCONTROLLED TYPE 2 DIABETES MELLITUS WITH DIABETIC PERIPHERAL ANGIOPATHY WITHOUT GANGRENE (HCC): ICD-10-CM

## 2019-01-03 PROCEDURE — 99213 OFFICE O/P EST LOW 20 MIN: CPT | Performed by: PODIATRIST

## 2019-01-03 NOTE — TELEPHONE ENCOUNTER
PA required for brand name Viagra. Does patient require brand name or is it OK to prescribe generic?   LOV 12/3/18

## 2019-01-04 RX ORDER — SILDENAFIL CITRATE 20 MG/1
20 TABLET ORAL
Qty: 20 TABLET | Refills: 0 | Status: SHIPPED | OUTPATIENT
Start: 2019-01-04 | End: 2019-03-20

## 2019-01-06 NOTE — PROGRESS NOTES
Octavia Aguilar is a 34year old male. Patient presents with:  Diabetic Ulcer: Pt is here for a wound check to left heel diabetic ulcer. Denies any pain. AM blood sugar 130. Pt wearing artem cam walker.          HPI:   Patient returns to clinic for check dysfunction). Disp: 20 tablet Rfl: 0   Dulaglutide (TRULICITY) 1.5 TI/9.1TL Subcutaneous Solution Pen-injector Inject 1.5 mg as directed once a week.  Disp: 12 pen Rfl: 0      Past Medical History:   Diagnosis Date   • Diabetes ([facility] [address]    • Elevated liver enz in no apparent distress  EXTREMITIES:   The left lateral heel ulcer appears to be improved it is very superficial much smaller has mild hyperkeratosis. The area was trimmed and debrided and there are no signs of infection on the left heel at this time.   A

## 2019-01-07 ENCOUNTER — TELEPHONE (OUTPATIENT)
Dept: FAMILY MEDICINE CLINIC | Facility: CLINIC | Age: 39
End: 2019-01-07

## 2019-01-14 NOTE — TELEPHONE ENCOUNTER
Fax received from [facility] [address] [place] [address],[address], they are unable to locate patient's pharmacy coverage. I spoke with Wily Rutledge at [facility] [address] [place] [address],[address] who did confirm this.  I called [facility] and they confirmed the patient's prescription benefits is through [employer] p

## 2019-01-17 ENCOUNTER — OFFICE VISIT (OUTPATIENT)
Dept: PODIATRY CLINIC | Facility: CLINIC | Age: 39
End: 2019-01-17
Payer: COMMERCIAL

## 2019-01-17 DIAGNOSIS — E11.65 UNCONTROLLED TYPE 2 DIABETES MELLITUS WITH DIABETIC PERIPHERAL ANGIOPATHY WITHOUT GANGRENE (HCC): ICD-10-CM

## 2019-01-17 DIAGNOSIS — L97.421 SKIN ULCER OF LEFT HEEL, LIMITED TO BREAKDOWN OF SKIN (HCC): Primary | ICD-10-CM

## 2019-01-17 DIAGNOSIS — E11.51 UNCONTROLLED TYPE 2 DIABETES MELLITUS WITH DIABETIC PERIPHERAL ANGIOPATHY WITHOUT GANGRENE (HCC): ICD-10-CM

## 2019-01-17 DIAGNOSIS — M21.372 LEFT FOOT DROP: ICD-10-CM

## 2019-01-17 PROCEDURE — 99213 OFFICE O/P EST LOW 20 MIN: CPT | Performed by: PODIATRIST

## 2019-01-21 NOTE — PROGRESS NOTES
Keith Edgar is a 34year old male. Patient presents with:  Diabetic Foot Care: Pt is here for a diabetic foot assessment and wound check. Diabetic ulcer to left foot. Denies any pain. Denies any drainage.  Pt's blood sugar today 138  Diabetic Ulcer not apply Kit 1 kit by Does not apply route as needed. Disp:  Rfl:    Glucose Blood (ONETOUCH TEST) In Vitro Strip 1 strip by In Vitro route 4 (four) times daily.  Disp:  Rfl:       Past Medical History:   Diagnosis Date   • Diabetes ([facility] [address]    • Elevated arely nourished, in no apparent distress  EXTREMITIES:   The ulcer of the lateral heel is resolved. There are no further ulcerations remaining.   ASSESSMENT AND PLAN:   Diagnoses and all orders for this visit:    Skin ulcer of left heel, limited to breakdown of

## 2019-01-23 DIAGNOSIS — E11.51 UNCONTROLLED TYPE 2 DIABETES MELLITUS WITH DIABETIC PERIPHERAL ANGIOPATHY WITHOUT GANGRENE (HCC): ICD-10-CM

## 2019-01-23 DIAGNOSIS — E78.5 DYSLIPIDEMIA ASSOCIATED WITH TYPE 2 DIABETES MELLITUS (HCC): ICD-10-CM

## 2019-01-23 DIAGNOSIS — E11.69 DYSLIPIDEMIA ASSOCIATED WITH TYPE 2 DIABETES MELLITUS (HCC): ICD-10-CM

## 2019-01-23 DIAGNOSIS — IMO0001 UNCONTROLLED TYPE 2 DIABETES MELLITUS WITHOUT COMPLICATION, WITH LONG-TERM CURRENT USE OF INSULIN: ICD-10-CM

## 2019-01-23 DIAGNOSIS — E78.1 PURE HYPERGLYCERIDEMIA: ICD-10-CM

## 2019-01-23 DIAGNOSIS — E11.65 UNCONTROLLED TYPE 2 DIABETES MELLITUS WITH DIABETIC PERIPHERAL ANGIOPATHY WITHOUT GANGRENE (HCC): ICD-10-CM

## 2019-01-24 NOTE — TELEPHONE ENCOUNTER
Medication(s) to Refill:   Requested Prescriptions     Pending Prescriptions Disp Refills   • METFORMIN HCL 1000 MG Oral Tab [Pharmacy Med Name: METFORMIN 1000MG TABLETS] 180 tablet 0     Sig: TAKE 1 TABLET BY MOUTH TWICE DAILY WITH MEALS   • EZETIMIBE-SIM

## 2019-01-25 RX ORDER — EZETIMIBE AND SIMVASTATIN 10; 80 MG/1; MG/1
TABLET ORAL
Qty: 90 TABLET | Refills: 0 | Status: SHIPPED | OUTPATIENT
Start: 2019-01-25 | End: 2021-04-17

## 2019-02-05 ENCOUNTER — OFFICE VISIT (OUTPATIENT)
Dept: PODIATRY CLINIC | Facility: CLINIC | Age: 39
End: 2019-02-05
Payer: COMMERCIAL

## 2019-02-05 DIAGNOSIS — M21.372 LEFT FOOT DROP: ICD-10-CM

## 2019-02-05 DIAGNOSIS — L97.421 SKIN ULCER OF LEFT HEEL, LIMITED TO BREAKDOWN OF SKIN (HCC): Primary | ICD-10-CM

## 2019-02-05 PROCEDURE — 99213 OFFICE O/P EST LOW 20 MIN: CPT | Performed by: PODIATRIST

## 2019-02-05 NOTE — PROGRESS NOTES
Leo Buckner is a 34year old male. Patient presents with:  Diabetic Foot Care: Pt is here for a diabetic foot assessment and wound care to left heel. AM blood sugar 141. Denies any pain.   Diabetic Ulcer        HPI:   Patient returns to the clinic Rfl:    Glucose Blood (ONETOUCH TEST) In Vitro Strip 1 strip by In Vitro route 4 (four) times daily.  Disp:  Rfl:       Past Medical History:   Diagnosis Date   • Diabetes ([facility] [address]    • Elevated liver enzymes 8/30/2016   • Elevated serum GGT level 8/30/2016 lateral plantar aspect of the heel was evaluated. And he has an ulceration now measuring about 1.5 cm in length 1 cm in width. Has a granular base there is no exposed bone. There is a slight epithelial bridge in the middle.   No purulent drainage no soniya

## 2019-02-19 ENCOUNTER — OFFICE VISIT (OUTPATIENT)
Dept: PODIATRY CLINIC | Facility: CLINIC | Age: 39
End: 2019-02-19
Payer: COMMERCIAL

## 2019-02-19 ENCOUNTER — TELEPHONE (OUTPATIENT)
Dept: PODIATRY CLINIC | Facility: CLINIC | Age: 39
End: 2019-02-19

## 2019-02-19 DIAGNOSIS — L97.421 SKIN ULCER OF LEFT HEEL, LIMITED TO BREAKDOWN OF SKIN (HCC): Primary | ICD-10-CM

## 2019-02-19 DIAGNOSIS — E11.51 UNCONTROLLED TYPE 2 DIABETES MELLITUS WITH DIABETIC PERIPHERAL ANGIOPATHY WITHOUT GANGRENE (HCC): ICD-10-CM

## 2019-02-19 DIAGNOSIS — M21.372 LEFT FOOT DROP: ICD-10-CM

## 2019-02-19 DIAGNOSIS — E11.65 UNCONTROLLED TYPE 2 DIABETES MELLITUS WITH DIABETIC PERIPHERAL ANGIOPATHY WITHOUT GANGRENE (HCC): ICD-10-CM

## 2019-02-19 PROCEDURE — 99213 OFFICE O/P EST LOW 20 MIN: CPT | Performed by: PODIATRIST

## 2019-02-19 NOTE — PROGRESS NOTES
Darrell Monreal is a 34year old male. Patient presents with:  Diabetic Ulcer: left heel -- Denies any pain. Drainage is minimal and red. Denies any fever.  BG was 135 this AM.         HPI:   Patient was seen he has seen Dr. Roshni Gill for second by In Vitro route 4 (four) times daily.  Disp:  Rfl:       Past Medical History:   Diagnosis Date   • [facility]    • Elevated liver enzymes 8/30/2016   • Elevated serum GGT level 8/30/2016   • High cholesterol    • HYPERLIPIDEMIA    • Hyperlipidemia plantar heel is almost completely healed he is still in the cam boot. There is a little localized erythema edema. But there is no sign of any active or spreading infection however to rule out cellulitis we will get a CT scan.   ASSESSMENT AND PLAN:   Jesica

## 2019-02-19 NOTE — TELEPHONE ENCOUNTER
Dr Sakina Deleon ordered pt CT of ankle. Pt has [facility] PPO insurance and will need PA. Informed pt that a nurse will do this for him and to not schedule until he has heard from nurse. Please call pt at [phone] when this is completed.  Per pt, okay to leave det

## 2019-02-19 NOTE — TELEPHONE ENCOUNTER
Pt procedure/Testing: CT left ankle  Pt insurance/number to contact: [employer]  Dx: K83.873  CPT: 74468  Indication for test: infection  Procedure scheduled date: not scheduled yet  Procedure scheduled where: Approved for [address] [place] [address]  Pt authorization number: [id number]

## 2019-02-21 ENCOUNTER — HOSPITAL ENCOUNTER (OUTPATIENT)
Dept: CT IMAGING | Age: 39
Discharge: HOME | End: 2019-02-21
Attending: PODIATRIST
Payer: COMMERCIAL

## 2019-02-21 DIAGNOSIS — L97.421 SKIN ULCER OF LEFT HEEL, LIMITED TO BREAKDOWN OF SKIN (HCC): ICD-10-CM

## 2019-02-21 PROCEDURE — 73700 CT LOWER EXTREMITY W/O DYE: CPT | Performed by: PODIATRIST

## 2019-02-21 PROCEDURE — 76377 3D RENDER W/INTRP POSTPROCES: CPT | Performed by: PODIATRIST

## 2019-02-28 VITALS
BODY MASS INDEX: 25.76 KG/M2 | WEIGHT: 170 LBS | DIASTOLIC BLOOD PRESSURE: 64 MMHG | HEIGHT: 68 IN | SYSTOLIC BLOOD PRESSURE: 92 MMHG | HEART RATE: 104 BPM

## 2019-02-28 VITALS
HEART RATE: 122 BPM | HEIGHT: 68 IN | WEIGHT: 178 LBS | DIASTOLIC BLOOD PRESSURE: 76 MMHG | BODY MASS INDEX: 26.98 KG/M2 | SYSTOLIC BLOOD PRESSURE: 122 MMHG

## 2019-03-05 ENCOUNTER — TELEPHONE (OUTPATIENT)
Dept: PODIATRY CLINIC | Facility: CLINIC | Age: 39
End: 2019-03-05

## 2019-03-05 ENCOUNTER — OFFICE VISIT (OUTPATIENT)
Dept: PODIATRY CLINIC | Facility: CLINIC | Age: 39
End: 2019-03-05
Payer: COMMERCIAL

## 2019-03-05 ENCOUNTER — TELEPHONE (OUTPATIENT)
Dept: CARDIOLOGY | Age: 39
End: 2019-03-05

## 2019-03-05 DIAGNOSIS — L97.429 HEEL ULCERATION, LEFT, WITH UNSPECIFIED SEVERITY (HCC): Primary | ICD-10-CM

## 2019-03-05 PROCEDURE — 99213 OFFICE O/P EST LOW 20 MIN: CPT | Performed by: PODIATRIST

## 2019-03-05 RX ORDER — AMOXICILLIN AND CLAVULANATE POTASSIUM 875; 125 MG/1; MG/1
1 TABLET, FILM COATED ORAL 2 TIMES DAILY
Qty: 30 TABLET | Refills: 0 | Status: SHIPPED | OUTPATIENT
Start: 2019-03-05 | End: 2019-03-20

## 2019-03-05 NOTE — TELEPHONE ENCOUNTER
Per Dr. Elier Villafuerte, call pt to see if he can be seen earlier at 11:00 today in [facility]. Spoke to pt and he will see WMN at 11:00 AM today in [facility].

## 2019-03-05 NOTE — TELEPHONE ENCOUNTER
Call to [facility]. Cardiologist is Dr. Booker Lombard from [facility]. [phone] call to office spoke to Esteban. Trying to get pt in sooner. Kirsty Perez is sending message to medical assistant for moving appointment closer. REquested call back.

## 2019-03-05 NOTE — TELEPHONE ENCOUNTER
Pt calling to let Dr know he cannot get in to see cardiologist until 4/3- asking if that is too long to wait

## 2019-03-06 ENCOUNTER — TELEPHONE (OUTPATIENT)
Dept: CARDIOLOGY | Age: 39
End: 2019-03-06

## 2019-03-06 NOTE — TELEPHONE ENCOUNTER
S/w Claudine from [facility] and got pt scheduled for 3/8/19 @ 1030am. Called pt and LMOM per appt on 3/8 and to call Cardio office if any issues with  Appt. CB if any q's.   WMN notified of appt w/ cardio and he think pt may need possible angiogram through car

## 2019-03-07 RX ORDER — EZETIMIBE AND SIMVASTATIN 10; 80 MG/1; MG/1
1 TABLET ORAL DAILY
COMMUNITY
Start: 2018-04-02

## 2019-03-07 NOTE — PROGRESS NOTES
Shirin Phillips is a 34year old male. Patient presents with:  Test Results: CT left ankle         HPI:   Patient returns to the clinic to review his CT results.   At today's visit reviewed nurse's history as taken above, allergies medications and medi • HYPERLIPIDEMIA    • Hyperlipidemia    • HYPERTRIGLYCERIDEMIA    • OBESITY    • Pneumonia due to organism       Past Surgical History:   Procedure Laterality Date   • CALCANEAL OSTEOTOMY Left 6/22/2018    Performed by Samina Haile DPM at [facility] MAIN drainage there on physical exam culture and sensitivity was taken.   ASSESSMENT AND PLAN:   Diagnoses and all orders for this visit:    Heel ulceration, left, with unspecified severity ([address]  -     AEROBIC BACTERIAL CULTURE; Future    Other orders  -     Am

## 2019-03-08 ENCOUNTER — OFFICE VISIT (OUTPATIENT)
Dept: CARDIOLOGY | Age: 39
End: 2019-03-08

## 2019-03-08 ENCOUNTER — HOSPITAL ENCOUNTER (OUTPATIENT)
Dept: LAB | Facility: HOSPITAL | Age: 39
Discharge: HOME | End: 2019-03-08
Attending: INTERNAL MEDICINE
Payer: COMMERCIAL

## 2019-03-08 VITALS
DIASTOLIC BLOOD PRESSURE: 72 MMHG | SYSTOLIC BLOOD PRESSURE: 110 MMHG | BODY MASS INDEX: 30.31 KG/M2 | HEIGHT: 68 IN | HEART RATE: 104 BPM | WEIGHT: 200 LBS

## 2019-03-08 DIAGNOSIS — R00.0 TACHYCARDIA: ICD-10-CM

## 2019-03-08 DIAGNOSIS — N17.9 AKI (ACUTE KIDNEY INJURY) (HCC): ICD-10-CM

## 2019-03-08 DIAGNOSIS — I70.422: ICD-10-CM

## 2019-03-08 DIAGNOSIS — Z79.4 CONTROLLED TYPE 2 DIABETES MELLITUS WITH CHRONIC KIDNEY DISEASE, WITH LONG-TERM CURRENT USE OF INSULIN, UNSPECIFIED CKD STAGE (CMD): ICD-10-CM

## 2019-03-08 DIAGNOSIS — E11.22 CONTROLLED TYPE 2 DIABETES MELLITUS WITH CHRONIC KIDNEY DISEASE, WITH LONG-TERM CURRENT USE OF INSULIN, UNSPECIFIED CKD STAGE (CMD): ICD-10-CM

## 2019-03-08 DIAGNOSIS — I73.9 PAD (PERIPHERAL ARTERY DISEASE) (CMD): Primary | ICD-10-CM

## 2019-03-08 PROBLEM — E11.29 DM (DIABETES MELLITUS) TYPE II CONTROLLED WITH RENAL MANIFESTATION (CMD): Status: ACTIVE | Noted: 2018-04-02

## 2019-03-08 LAB
ANION GAP SERPL CALC-SCNC: 4 MMOL/L (ref 0–18)
BUN BLD-MCNC: 23 MG/DL (ref 7–18)
BUN/CREAT SERPL: 11.4 (ref 10–20)
CALCIUM BLD-MCNC: 9.2 MG/DL (ref 8.5–10.1)
CHLORIDE SERPL-SCNC: 111 MMOL/L (ref 98–107)
CO2 SERPL-SCNC: 28 MMOL/L (ref 21–32)
CREAT BLD-MCNC: 2.02 MG/DL (ref 0.7–1.3)
GLUCOSE BLD-MCNC: 109 MG/DL (ref 70–99)
OSMOLALITY SERPL CALC.SUM OF ELEC: 300 MOSM/KG (ref 275–295)
POTASSIUM SERPL-SCNC: 5.4 MMOL/L (ref 3.5–5.1)
SODIUM SERPL-SCNC: 143 MMOL/L (ref 136–145)

## 2019-03-08 PROCEDURE — 80048 BASIC METABOLIC PNL TOTAL CA: CPT

## 2019-03-08 PROCEDURE — 3074F SYST BP LT 130 MM HG: CPT | Performed by: INTERNAL MEDICINE

## 2019-03-08 PROCEDURE — 3078F DIAST BP <80 MM HG: CPT | Performed by: INTERNAL MEDICINE

## 2019-03-08 PROCEDURE — 36415 COLL VENOUS BLD VENIPUNCTURE: CPT

## 2019-03-08 PROCEDURE — 99214 OFFICE O/P EST MOD 30 MIN: CPT | Performed by: INTERNAL MEDICINE

## 2019-03-08 RX ORDER — AMOXICILLIN AND CLAVULANATE POTASSIUM 875; 125 MG/1; MG/1
1 TABLET, FILM COATED ORAL 2 TIMES DAILY
COMMUNITY
Start: 2019-03-05 | End: 2019-09-13 | Stop reason: CLARIF

## 2019-03-08 RX ORDER — SILDENAFIL CITRATE 20 MG/1
20 TABLET ORAL PRN
COMMUNITY
Start: 2019-01-04 | End: 2019-09-13 | Stop reason: CLARIF

## 2019-03-08 ASSESSMENT — ENCOUNTER SYMPTOMS
WEIGHT LOSS: 0
FEVER: 0
SUSPICIOUS LESIONS: 0
BRUISES/BLEEDS EASILY: 0
COUGH: 0
CHILLS: 0
WEIGHT GAIN: 0
HEMOPTYSIS: 0
ALLERGIC/IMMUNOLOGIC COMMENTS: NO NEW FOOD ALLERGIES
HEMATOCHEZIA: 0

## 2019-03-11 ENCOUNTER — HOSPITAL ENCOUNTER (OUTPATIENT)
Dept: ULTRASOUND IMAGING | Age: 39
Discharge: HOME | End: 2019-03-11
Attending: INTERNAL MEDICINE
Payer: COMMERCIAL

## 2019-03-11 ENCOUNTER — OFFICE VISIT (OUTPATIENT)
Dept: NEPHROLOGY | Facility: CLINIC | Age: 39
End: 2019-03-11
Payer: COMMERCIAL

## 2019-03-11 VITALS — WEIGHT: 200 LBS | DIASTOLIC BLOOD PRESSURE: 78 MMHG | BODY MASS INDEX: 30 KG/M2 | SYSTOLIC BLOOD PRESSURE: 116 MMHG

## 2019-03-11 DIAGNOSIS — I73.9 PAD (PERIPHERAL ARTERY DISEASE) (HCC): ICD-10-CM

## 2019-03-11 DIAGNOSIS — N18.30 CKD (CHRONIC KIDNEY DISEASE) STAGE 3, GFR 30-59 ML/MIN (HCC): Primary | ICD-10-CM

## 2019-03-11 PROCEDURE — 93970 EXTREMITY STUDY: CPT | Performed by: INTERNAL MEDICINE

## 2019-03-11 PROCEDURE — 99214 OFFICE O/P EST MOD 30 MIN: CPT | Performed by: INTERNAL MEDICINE

## 2019-03-11 NOTE — PROGRESS NOTES
Nephrology Progress Note      Hermila Melendez is a 34year old male. HPI:   Patient presents with:   Other: JESSICA      Mr. Gill Merlin was seen in the neurology clinic today in follow-up for management of presumed chronic kidney disease stage III related t TABLET BY MOUTH TWICE DAILY WITH MEALS Disp: 180 tablet Rfl: 0   EZETIMIBE-SIMVASTATIN 10-80 MG Oral Tab TAKE 1 TABLET BY MOUTH EVERY DAY Disp: 90 tablet Rfl: 0   Sildenafil Citrate 20 MG Oral Tab Take 1 tablet (20 mg total) by mouth daily as needed (for e normal, no murmur or rub  Lungs: Clear without wheezes, rales, rhonchi.     Extremities: Left foot is in a walking boot   neurologic: Alert and oriented, normal affect, cranial nerves grossly intact, moving all extremities  Skin: Warm and dry, no rashes 07/06/2018     ASSESSMENT/PLAN:     #1.  Chronic kidney disease stage III-at this point it would appear that Mr. Raquel Mujica has chronic kidney disease given the fact that he has not had any ongoing significant improvement in renal function over the past 9 sophia

## 2019-03-12 ENCOUNTER — OFFICE VISIT (OUTPATIENT)
Dept: PODIATRY CLINIC | Facility: CLINIC | Age: 39
End: 2019-03-12
Payer: COMMERCIAL

## 2019-03-12 DIAGNOSIS — L97.429 HEEL ULCERATION, LEFT, WITH UNSPECIFIED SEVERITY (HCC): Primary | ICD-10-CM

## 2019-03-12 DIAGNOSIS — I73.89 OTHER SPECIFIED PERIPHERAL VASCULAR DISEASES (HCC): ICD-10-CM

## 2019-03-12 DIAGNOSIS — E11.65 UNCONTROLLED TYPE 2 DIABETES MELLITUS WITH DIABETIC PERIPHERAL ANGIOPATHY WITHOUT GANGRENE (HCC): ICD-10-CM

## 2019-03-12 DIAGNOSIS — E11.51 UNCONTROLLED TYPE 2 DIABETES MELLITUS WITH DIABETIC PERIPHERAL ANGIOPATHY WITHOUT GANGRENE (HCC): ICD-10-CM

## 2019-03-12 PROCEDURE — 99213 OFFICE O/P EST LOW 20 MIN: CPT | Performed by: PODIATRIST

## 2019-03-12 NOTE — PROGRESS NOTES
Guadalupe Collins is a 34year old male. Patient presents with:  Diabetic Foot Care: Pt is here for a follow up on diabetic foot assessment and ulcer care. Diabetic foot ulcer to left heel. Denies any foot pain. Denies any draiange from wound.  Denies an Suppl (ONETOUCH ULTRA 2) w/Device Does not apply Kit 1 kit by Does not apply route as needed. Disp:  Rfl:    Glucose Blood (ONETOUCH TEST) In Vitro Strip 1 strip by In Vitro route 4 (four) times daily.  Disp:  Rfl:       Past Medical History:   Diagnosis Da no vitals taken for this visit. Physical Exam  GENERAL: well developed, well nourished, in no apparent distress  EXTREMITIES:   1. Integument: Ulceration of his left heel and heel. 2. Vascular: Again he is weak pulses   3. Neurologic: Patient has   4.  [place]

## 2019-03-13 ENCOUNTER — HOSPITAL ENCOUNTER (OUTPATIENT)
Dept: ULTRASOUND IMAGING | Age: 39
Discharge: HOME | End: 2019-03-13
Attending: PODIATRIST
Payer: COMMERCIAL

## 2019-03-13 DIAGNOSIS — L97.429 HEEL ULCERATION, LEFT, WITH UNSPECIFIED SEVERITY (HCC): ICD-10-CM

## 2019-03-13 DIAGNOSIS — I73.89 OTHER SPECIFIED PERIPHERAL VASCULAR DISEASES (HCC): ICD-10-CM

## 2019-03-13 PROCEDURE — 93926 LOWER EXTREMITY STUDY: CPT | Performed by: PODIATRIST

## 2019-03-19 ENCOUNTER — OFFICE VISIT (OUTPATIENT)
Dept: PODIATRY CLINIC | Facility: CLINIC | Age: 39
End: 2019-03-19
Payer: COMMERCIAL

## 2019-03-19 ENCOUNTER — TELEPHONE (OUTPATIENT)
Dept: CARDIOLOGY | Age: 39
End: 2019-03-19

## 2019-03-19 DIAGNOSIS — E11.65 UNCONTROLLED TYPE 2 DIABETES MELLITUS WITH DIABETIC PERIPHERAL ANGIOPATHY WITHOUT GANGRENE (HCC): ICD-10-CM

## 2019-03-19 DIAGNOSIS — M86.172 ACUTE OSTEOMYELITIS OF LEFT CALCANEUS (HCC): ICD-10-CM

## 2019-03-19 DIAGNOSIS — I73.9 PERIPHERAL ARTERIAL DISEASE (CMD): Primary | ICD-10-CM

## 2019-03-19 DIAGNOSIS — I73.89 OTHER SPECIFIED PERIPHERAL VASCULAR DISEASES (HCC): ICD-10-CM

## 2019-03-19 DIAGNOSIS — E11.51 UNCONTROLLED TYPE 2 DIABETES MELLITUS WITH DIABETIC PERIPHERAL ANGIOPATHY WITHOUT GANGRENE (HCC): ICD-10-CM

## 2019-03-19 DIAGNOSIS — L97.429 HEEL ULCERATION, LEFT, WITH UNSPECIFIED SEVERITY (HCC): Primary | ICD-10-CM

## 2019-03-19 DIAGNOSIS — L97.421 SKIN ULCER OF LEFT HEEL, LIMITED TO BREAKDOWN OF SKIN (HCC): ICD-10-CM

## 2019-03-19 DIAGNOSIS — M21.372 LEFT FOOT DROP: ICD-10-CM

## 2019-03-19 PROCEDURE — 99213 OFFICE O/P EST LOW 20 MIN: CPT | Performed by: PODIATRIST

## 2019-03-19 NOTE — PROGRESS NOTES
Mireya Mercado is a 34year old male. Patient presents with:  Diabetic Ulcer: Left heel -- Denies any pain or drainage. BG was not taken this AM.         HPI:   Patient returns the clinic today his wife is present.   He is not complaining of pain or d Past Medical History:   Diagnosis Date   • [facility]    • Elevated liver enzymes 8/30/2016   • Elevated serum GGT level 8/30/2016   • High cholesterol    • HYPERLIPIDEMIA    • Hyperlipidemia    • HYPERTRIGLYCERIDEMIA    • OBESITY    • Pneumonia hyperkeratosis there is no breakdown of the skin no ulcer present. 2. Vascular: Patient has minimally palpable pulses.   I reviewed his Doppler studies with him showing that there is triphasic waveforms down to the heel for the PT but not the dorsalis ped

## 2019-03-20 ENCOUNTER — TELEPHONE (OUTPATIENT)
Dept: CARDIOLOGY | Age: 39
End: 2019-03-20

## 2019-03-20 ENCOUNTER — TELEPHONE (OUTPATIENT)
Dept: NEPHROLOGY | Facility: CLINIC | Age: 39
End: 2019-03-20

## 2019-03-20 ENCOUNTER — OFFICE VISIT (OUTPATIENT)
Dept: SURGERY | Facility: CLINIC | Age: 39
End: 2019-03-20
Payer: COMMERCIAL

## 2019-03-20 ENCOUNTER — TELEPHONE (OUTPATIENT)
Dept: PODIATRY CLINIC | Facility: CLINIC | Age: 39
End: 2019-03-20

## 2019-03-20 VITALS
SYSTOLIC BLOOD PRESSURE: 117 MMHG | HEIGHT: 68 IN | HEART RATE: 112 BPM | BODY MASS INDEX: 30.31 KG/M2 | WEIGHT: 200 LBS | DIASTOLIC BLOOD PRESSURE: 82 MMHG

## 2019-03-20 DIAGNOSIS — N52.9 ERECTILE DYSFUNCTION, UNSPECIFIED ERECTILE DYSFUNCTION TYPE: Primary | ICD-10-CM

## 2019-03-20 PROCEDURE — 99244 OFF/OP CNSLTJ NEW/EST MOD 40: CPT | Performed by: NURSE PRACTITIONER

## 2019-03-20 PROCEDURE — 99212 OFFICE O/P EST SF 10 MIN: CPT | Performed by: NURSE PRACTITIONER

## 2019-03-20 RX ORDER — SILDENAFIL 100 MG/1
100 TABLET, FILM COATED ORAL
Qty: 15 TABLET | Refills: 3 | Status: SHIPPED | OUTPATIENT
Start: 2019-03-20 | End: 2019-04-09

## 2019-03-20 NOTE — TELEPHONE ENCOUNTER
Mr. Ashley Abarca has PV angiogram scheduled for tomorrow. He has CKD III and should receive NS at 125 cc/hr beginning one hour prior to the procedure and finishing one hour post-procedure.

## 2019-03-20 NOTE — PROGRESS NOTES
HPI:    Patient ID: Keith Edgar is a 34year old male. HPI     Patient is a 34year old male who presents to the clinic for a consult regarding erectile dysfunction. Past medical history of diabetes and HL.       Patient reports a long standing MOUTH EVERY DAY Disp: 90 tablet Rfl: 0   LEVEMIR FLEXTOUCH 100 UNIT/ML Subcutaneous Solution Pen-injector inject 15 UNITS into the skin sub cutanoeusly daily  after hyperbaric session done (Patient taking differently: Inject 20 Units into the skin 2 (two) oz      Comment: rarely    Drug use: No       PHYSICAL EXAM:    Physical Exam   Constitutional: He appears well-nourished. No distress. HENT:   Head: Normocephalic. Eyes: Conjunctivae are normal.   Neck: Normal range of motion.    Cardiovascular: Normal

## 2019-03-20 NOTE — TELEPHONE ENCOUNTER
Call to melba. He wants to make sure the surgery is on for Friday with Dr. Doroteo Umaña.  Tasking to [employer]

## 2019-03-21 ENCOUNTER — HOSPITAL ENCOUNTER (OUTPATIENT)
Dept: INTERVENTIONAL RADIOLOGY/VASCULAR | Facility: HOSPITAL | Age: 39
Discharge: HOME | End: 2019-03-21
Attending: INTERNAL MEDICINE | Admitting: INTERNAL MEDICINE
Payer: COMMERCIAL

## 2019-03-21 VITALS
DIASTOLIC BLOOD PRESSURE: 91 MMHG | BODY MASS INDEX: 28.79 KG/M2 | RESPIRATION RATE: 14 BRPM | HEIGHT: 68 IN | TEMPERATURE: 97 F | HEART RATE: 99 BPM | WEIGHT: 190 LBS | SYSTOLIC BLOOD PRESSURE: 137 MMHG | OXYGEN SATURATION: 100 %

## 2019-03-21 DIAGNOSIS — I73.9 PAD (PERIPHERAL ARTERY DISEASE) (HCC): ICD-10-CM

## 2019-03-21 PROCEDURE — 76937 US GUIDE VASCULAR ACCESS: CPT

## 2019-03-21 PROCEDURE — 99152 MOD SED SAME PHYS/QHP 5/>YRS: CPT

## 2019-03-21 PROCEDURE — 96361 HYDRATE IV INFUSION ADD-ON: CPT

## 2019-03-21 PROCEDURE — 36415 COLL VENOUS BLD VENIPUNCTURE: CPT

## 2019-03-21 PROCEDURE — 80048 BASIC METABOLIC PNL TOTAL CA: CPT | Performed by: INTERNAL MEDICINE

## 2019-03-21 PROCEDURE — 36247 INS CATH ABD/L-EXT ART 3RD: CPT

## 2019-03-21 PROCEDURE — 75710 ARTERY X-RAYS ARM/LEG: CPT

## 2019-03-21 PROCEDURE — B41GYZZ FLUOROSCOPY OF LEFT LOWER EXTREMITY ARTERIES USING OTHER CONTRAST: ICD-10-PCS | Performed by: INTERNAL MEDICINE

## 2019-03-21 PROCEDURE — 82962 GLUCOSE BLOOD TEST: CPT

## 2019-03-21 PROCEDURE — 85025 COMPLETE CBC W/AUTO DIFF WBC: CPT | Performed by: INTERNAL MEDICINE

## 2019-03-21 PROCEDURE — 96360 HYDRATION IV INFUSION INIT: CPT

## 2019-03-21 RX ORDER — ASPIRIN 81 MG/1
TABLET, CHEWABLE ORAL
Status: COMPLETED
Start: 2019-03-21 | End: 2019-03-21

## 2019-03-21 RX ORDER — HEPARIN SODIUM 5000 [USP'U]/ML
INJECTION, SOLUTION INTRAVENOUS; SUBCUTANEOUS
Status: COMPLETED
Start: 2019-03-21 | End: 2019-03-21

## 2019-03-21 RX ORDER — LIDOCAINE HYDROCHLORIDE 10 MG/ML
INJECTION, SOLUTION EPIDURAL; INFILTRATION; INTRACAUDAL; PERINEURAL
Status: COMPLETED
Start: 2019-03-21 | End: 2019-03-21

## 2019-03-21 RX ORDER — SODIUM CHLORIDE 9 MG/ML
INJECTION, SOLUTION INTRAVENOUS CONTINUOUS
Status: DISCONTINUED | OUTPATIENT
Start: 2019-03-21 | End: 2019-03-21

## 2019-03-21 RX ORDER — MIDAZOLAM HYDROCHLORIDE 1 MG/ML
INJECTION INTRAMUSCULAR; INTRAVENOUS
Status: COMPLETED
Start: 2019-03-21 | End: 2019-03-21

## 2019-03-21 RX ADMIN — SODIUM CHLORIDE: 9 INJECTION, SOLUTION INTRAVENOUS at 10:30:00

## 2019-03-21 RX ADMIN — ASPIRIN 324 MG: 81 TABLET, CHEWABLE ORAL at 11:10:00

## 2019-03-21 NOTE — PROGRESS NOTES
Received patient from cath lab s/p PV angio with Dr. Duncan Stockton; a/ox4; hemodynamically stable/neurologically intact; denies pain; right femoral sheath initially in place; Pulled by Trevor HOFFMAN and manual pressure held per protocol; Right groin site remain

## 2019-03-21 NOTE — PROGRESS NOTES
Assumed care at 16:30. .. R groin remains soft, no evidence of bleeding/hematoma, dressing CDI. Pt in stable condition, VS WNL, denies any pain/discomfort, tolerated PO well; groin remains stable after ambulation. IV removed, fully intact.  Pt being discharg

## 2019-03-22 NOTE — PROCEDURES
[facility]    PATIENT'S NAME: Selvin Logan   ATTENDING PHYSICIAN: Jamie Barr M.D. OPERATING PHYSICIAN: Jamie Barr M.D.    PATIENT ACCOUNT#:   [de-identified]    LOCATION:  [address] [place] [address] Dr [facility] ED  MEDICAL RECORD #:   [id number] the dorsalis pedis. The arch is present, yet somewhat incomplete. There is flow noted into the heel via the dorsalis pedis. The peroneal artery is patent but becomes atretic by the time it gets to the ankle.   The posterior tibial is patent all the way d

## 2019-03-26 ENCOUNTER — TELEPHONE (OUTPATIENT)
Dept: FAMILY MEDICINE CLINIC | Facility: CLINIC | Age: 39
End: 2019-03-26

## 2019-03-26 ENCOUNTER — TELEPHONE (OUTPATIENT)
Dept: PODIATRY CLINIC | Facility: CLINIC | Age: 39
End: 2019-03-26

## 2019-03-26 NOTE — TELEPHONE ENCOUNTER
Called and spoke to pt surgery is scheduled at [address] [place] [address] on 03/29/19. Pt's PO appointment with Jasmin Garcia is scheduled on 04/04. Pt informed sx center will call the day before surgery with a time to arrive and all other instructions.   All questions answ
Depression    GERD (gastroesophageal reflux disease)    Hyperlipidemia    Hypertension

## 2019-03-26 NOTE — TELEPHONE ENCOUNTER
Request for H&P received as the patient will be undergoing a surgery with Dr. Tho Blanco which will be on 3/29. Reached out to the patient and scheduled a pre-op appointment for tomorrow.   Pink sheet completed and forwarded to Dr. Ariel Celis.

## 2019-03-27 ENCOUNTER — APPOINTMENT (OUTPATIENT)
Dept: LAB | Age: 39
End: 2019-03-27
Payer: COMMERCIAL

## 2019-03-27 ENCOUNTER — OFFICE VISIT (OUTPATIENT)
Dept: FAMILY MEDICINE CLINIC | Facility: CLINIC | Age: 39
End: 2019-03-27
Payer: COMMERCIAL

## 2019-03-27 ENCOUNTER — MED REC SCAN ONLY (OUTPATIENT)
Dept: FAMILY MEDICINE CLINIC | Facility: CLINIC | Age: 39
End: 2019-03-27

## 2019-03-27 ENCOUNTER — TELEPHONE (OUTPATIENT)
Dept: PODIATRY CLINIC | Facility: CLINIC | Age: 39
End: 2019-03-27

## 2019-03-27 ENCOUNTER — LABORATORY ENCOUNTER (OUTPATIENT)
Dept: LAB | Age: 39
End: 2019-03-27
Payer: COMMERCIAL

## 2019-03-27 ENCOUNTER — ANESTHESIA EVENT (OUTPATIENT)
Dept: SURGERY | Facility: HOSPITAL | Age: 39
End: 2019-03-27
Payer: COMMERCIAL

## 2019-03-27 VITALS
HEART RATE: 102 BPM | RESPIRATION RATE: 16 BRPM | DIASTOLIC BLOOD PRESSURE: 80 MMHG | SYSTOLIC BLOOD PRESSURE: 118 MMHG | TEMPERATURE: 98 F

## 2019-03-27 DIAGNOSIS — E66.9 DIABETES MELLITUS TYPE 2 IN OBESE (HCC): ICD-10-CM

## 2019-03-27 DIAGNOSIS — E78.1 PURE HYPERGLYCERIDEMIA: ICD-10-CM

## 2019-03-27 DIAGNOSIS — N52.9 ERECTILE DYSFUNCTION, UNSPECIFIED ERECTILE DYSFUNCTION TYPE: ICD-10-CM

## 2019-03-27 DIAGNOSIS — L08.9 DIABETIC INFECTION OF LEFT FOOT (HCC): Primary | ICD-10-CM

## 2019-03-27 DIAGNOSIS — E66.9 OBESITY (BMI 30.0-34.9): ICD-10-CM

## 2019-03-27 DIAGNOSIS — I10 ESSENTIAL HYPERTENSION: ICD-10-CM

## 2019-03-27 DIAGNOSIS — R74.8 ELEVATED LIVER ENZYMES: ICD-10-CM

## 2019-03-27 DIAGNOSIS — A49.02 MRSA (METHICILLIN RESISTANT STAPHYLOCOCCUS AUREUS) INFECTION: ICD-10-CM

## 2019-03-27 DIAGNOSIS — E55.9 VITAMIN D DEFICIENCY: ICD-10-CM

## 2019-03-27 DIAGNOSIS — E11.69 DIABETES MELLITUS TYPE 2 IN OBESE (HCC): ICD-10-CM

## 2019-03-27 DIAGNOSIS — E78.5 DYSLIPIDEMIA: ICD-10-CM

## 2019-03-27 DIAGNOSIS — M86.172 OTHER ACUTE OSTEOMYELITIS OF LEFT FOOT (HCC): ICD-10-CM

## 2019-03-27 DIAGNOSIS — E11.9 CONTROLLED TYPE 2 DIABETES MELLITUS WITHOUT COMPLICATION, WITH LONG-TERM CURRENT USE OF INSULIN (HCC): ICD-10-CM

## 2019-03-27 DIAGNOSIS — E11.628 DIABETIC INFECTION OF LEFT FOOT (HCC): Primary | ICD-10-CM

## 2019-03-27 DIAGNOSIS — I73.9 PAD (PERIPHERAL ARTERY DISEASE) (HCC): ICD-10-CM

## 2019-03-27 DIAGNOSIS — Z79.4 CONTROLLED TYPE 2 DIABETES MELLITUS WITHOUT COMPLICATION, WITH LONG-TERM CURRENT USE OF INSULIN (HCC): ICD-10-CM

## 2019-03-27 PROBLEM — N17.9 AKI (ACUTE KIDNEY INJURY) (HCC): Status: RESOLVED | Noted: 2018-07-05 | Resolved: 2019-03-27

## 2019-03-27 PROBLEM — L03.116 CELLULITIS OF LEFT LOWER EXTREMITY: Status: RESOLVED | Noted: 2018-04-28 | Resolved: 2019-03-27

## 2019-03-27 PROBLEM — N17.9 AKI (ACUTE KIDNEY INJURY): Status: RESOLVED | Noted: 2018-07-05 | Resolved: 2019-03-27

## 2019-03-27 LAB
ALBUMIN SERPL-MCNC: 4.3 G/DL (ref 3.4–5)
ALBUMIN/GLOB SERPL: 1.2 {RATIO} (ref 1–2)
ALP LIVER SERPL-CCNC: 79 U/L (ref 45–117)
ALT SERPL-CCNC: 33 U/L (ref 16–61)
ANION GAP SERPL CALC-SCNC: 6 MMOL/L (ref 0–18)
AST SERPL-CCNC: 38 U/L (ref 15–37)
ATRIAL RATE: 97 BPM
BASOPHILS # BLD AUTO: 0.03 X10(3) UL (ref 0–0.2)
BASOPHILS NFR BLD AUTO: 0.5 %
BILIRUB SERPL-MCNC: 0.6 MG/DL (ref 0.1–2)
BUN BLD-MCNC: 33 MG/DL (ref 7–18)
BUN/CREAT SERPL: 15.6 (ref 10–20)
CALCIUM BLD-MCNC: 9.1 MG/DL (ref 8.5–10.1)
CHLORIDE SERPL-SCNC: 112 MMOL/L (ref 98–107)
CHOLEST SMN-MCNC: 104 MG/DL (ref ?–200)
CO2 SERPL-SCNC: 24 MMOL/L (ref 21–32)
CREAT BLD-MCNC: 2.12 MG/DL (ref 0.7–1.3)
CREAT UR-SCNC: 104 MG/DL
DEPRECATED RDW RBC AUTO: 44.1 FL (ref 35.1–46.3)
EOSINOPHIL # BLD AUTO: 0.46 X10(3) UL (ref 0–0.7)
EOSINOPHIL NFR BLD AUTO: 8 %
ERYTHROCYTE [DISTWIDTH] IN BLOOD BY AUTOMATED COUNT: 13.1 % (ref 11–15)
EST. AVERAGE GLUCOSE BLD GHB EST-MCNC: 169 MG/DL (ref 68–126)
GLOBULIN PLAS-MCNC: 3.7 G/DL (ref 2.8–4.4)
GLUCOSE BLD-MCNC: 128 MG/DL (ref 70–99)
HBA1C MFR BLD HPLC: 7.5 % (ref ?–5.7)
HCT VFR BLD AUTO: 32.5 % (ref 39–53)
HDLC SERPL-MCNC: 35 MG/DL (ref 40–59)
HGB BLD-MCNC: 10.6 G/DL (ref 13–17.5)
IMM GRANULOCYTES # BLD AUTO: 0.01 X10(3) UL (ref 0–1)
IMM GRANULOCYTES NFR BLD: 0.2 %
LDLC SERPL CALC-MCNC: 53 MG/DL (ref ?–100)
LYMPHOCYTES # BLD AUTO: 1.88 X10(3) UL (ref 1–4)
LYMPHOCYTES NFR BLD AUTO: 32.7 %
M PROTEIN MFR SERPL ELPH: 8 G/DL (ref 6.4–8.2)
MCH RBC QN AUTO: 30.3 PG (ref 26–34)
MCHC RBC AUTO-ENTMCNC: 32.6 G/DL (ref 31–37)
MCV RBC AUTO: 92.9 FL (ref 80–100)
MICROALBUMIN UR-MCNC: 0.81 MG/DL
MICROALBUMIN/CREAT 24H UR-RTO: 7.8 UG/MG (ref ?–30)
MONOCYTES # BLD AUTO: 0.42 X10(3) UL (ref 0.1–1)
MONOCYTES NFR BLD AUTO: 7.3 %
NEUTROPHILS # BLD AUTO: 2.95 X10 (3) UL (ref 1.5–7.7)
NEUTROPHILS # BLD AUTO: 2.95 X10(3) UL (ref 1.5–7.7)
NEUTROPHILS NFR BLD AUTO: 51.3 %
NONHDLC SERPL-MCNC: 69 MG/DL (ref ?–130)
OSMOLALITY SERPL CALC.SUM OF ELEC: 303 MOSM/KG (ref 275–295)
P AXIS: 29 DEGREES
P-R INTERVAL: 158 MS
PLATELET # BLD AUTO: 200 10(3)UL (ref 150–450)
POTASSIUM SERPL-SCNC: 5.4 MMOL/L (ref 3.5–5.1)
Q-T INTERVAL: 346 MS
QRS DURATION: 96 MS
QTC CALCULATION (BEZET): 439 MS
R AXIS: -2 DEGREES
RBC # BLD AUTO: 3.5 X10(6)UL (ref 4.3–5.7)
SODIUM SERPL-SCNC: 142 MMOL/L (ref 136–145)
T AXIS: 43 DEGREES
TRIGL SERPL-MCNC: 78 MG/DL (ref 30–149)
VENTRICULAR RATE: 97 BPM
VLDLC SERPL CALC-MCNC: 16 MG/DL (ref 0–30)
WBC # BLD AUTO: 5.8 X10(3) UL (ref 4–11)

## 2019-03-27 PROCEDURE — 85025 COMPLETE CBC W/AUTO DIFF WBC: CPT

## 2019-03-27 PROCEDURE — 93010 ELECTROCARDIOGRAM REPORT: CPT | Performed by: INTERNAL MEDICINE

## 2019-03-27 PROCEDURE — 80061 LIPID PANEL: CPT

## 2019-03-27 PROCEDURE — 93005 ELECTROCARDIOGRAM TRACING: CPT

## 2019-03-27 PROCEDURE — 80053 COMPREHEN METABOLIC PANEL: CPT

## 2019-03-27 PROCEDURE — 84402 ASSAY OF FREE TESTOSTERONE: CPT

## 2019-03-27 PROCEDURE — 84403 ASSAY OF TOTAL TESTOSTERONE: CPT

## 2019-03-27 PROCEDURE — 99244 OFF/OP CNSLTJ NEW/EST MOD 40: CPT | Performed by: FAMILY MEDICINE

## 2019-03-27 PROCEDURE — 82570 ASSAY OF URINE CREATININE: CPT

## 2019-03-27 PROCEDURE — 36415 COLL VENOUS BLD VENIPUNCTURE: CPT

## 2019-03-27 PROCEDURE — 82043 UR ALBUMIN QUANTITATIVE: CPT

## 2019-03-27 PROCEDURE — 83036 HEMOGLOBIN GLYCOSYLATED A1C: CPT

## 2019-03-27 NOTE — PROGRESS NOTES
Octavia Aguilar is a 34year old male who presents for a pre-operative physical exam.   HPI related to surgery:   Octavia Aguilar is presenting for surgery per request of Dr. Jacinda Ricardo scheduled for a left calcaneal exostectomy procedure to Performed by Rowan Boast, MD at [facility] MAIN OR     No Known Allergies    Current Outpatient Medications:  Dulaglutide (TRULICITY) 1.5 ZF/2.5CV Subcutaneous Solution Pen-injector Inject 1.5 mg into the skin once a week.  Disp:  Rfl:    Sildenafil Citrate 100 MG or hypoactivity, good energy level. HEMATOLOGY: no bruising or bleeding  ENDOCRINE: no excessive thirst or urination; ALLERGY/IMM. : no food or seasonal allergies     PHYSICAL EXAM:   /80   Pulse 102   Temp 97.6 °F (36.4 °C) (Oral)   Resp 16    Vit

## 2019-03-27 NOTE — TELEPHONE ENCOUNTER
Per  pt needs pre op history as well as a physical as well as a 24hr update. Pt is scheduled for surgery Friday 3/29 at [facility].

## 2019-03-27 NOTE — TELEPHONE ENCOUNTER
Spoke to marsha at Dr. Sen Crump office. Discussed the needs of a H&P within 24 hours of surgery. According to [facility] that Dr. Sen Crump spoke to Dr. Rosanna Betancourt about clearance . Discussed the needs to have in writing pt is clear for surgery.    Gemma Conti

## 2019-03-27 NOTE — TELEPHONE ENCOUNTER
Pt is scheduled for preop H&P today @ 1pm w/ Dr. Sadi Coulter- please clarify what 24 hr update means?

## 2019-03-27 NOTE — TELEPHONE ENCOUNTER
According to [place] rules the H&P has to be updated saying that there are no significant changes within 24 hours prior to the surgery.   All Dr. Ludy Terrazas needs to do is to put a note in which is sometime after 7 AM on Thursday stating that there are no key

## 2019-03-28 ENCOUNTER — TELEPHONE (OUTPATIENT)
Dept: PODIATRY CLINIC | Facility: CLINIC | Age: 39
End: 2019-03-28

## 2019-03-28 ENCOUNTER — TELEPHONE (OUTPATIENT)
Dept: CARDIOLOGY | Age: 39
End: 2019-03-28

## 2019-03-29 ENCOUNTER — APPOINTMENT (OUTPATIENT)
Dept: GENERAL RADIOLOGY | Facility: HOSPITAL | Age: 39
End: 2019-03-29
Attending: PODIATRIST
Payer: COMMERCIAL

## 2019-03-29 ENCOUNTER — ANESTHESIA (OUTPATIENT)
Dept: SURGERY | Facility: HOSPITAL | Age: 39
End: 2019-03-29
Payer: COMMERCIAL

## 2019-03-29 ENCOUNTER — HOSPITAL ENCOUNTER (OUTPATIENT)
Facility: HOSPITAL | Age: 39
Setting detail: HOSPITAL OUTPATIENT SURGERY
Discharge: HOME | End: 2019-03-29
Attending: PODIATRIST | Admitting: PODIATRIST
Payer: COMMERCIAL

## 2019-03-29 ENCOUNTER — TELEPHONE (OUTPATIENT)
Dept: FAMILY MEDICINE CLINIC | Facility: CLINIC | Age: 39
End: 2019-03-29

## 2019-03-29 VITALS
RESPIRATION RATE: 16 BRPM | BODY MASS INDEX: 30.31 KG/M2 | WEIGHT: 200 LBS | TEMPERATURE: 98 F | OXYGEN SATURATION: 100 % | SYSTOLIC BLOOD PRESSURE: 138 MMHG | DIASTOLIC BLOOD PRESSURE: 100 MMHG | HEIGHT: 68 IN | HEART RATE: 92 BPM

## 2019-03-29 DIAGNOSIS — E11.69 DIABETES MELLITUS TYPE 2 IN OBESE (HCC): ICD-10-CM

## 2019-03-29 DIAGNOSIS — A49.02 MRSA (METHICILLIN RESISTANT STAPHYLOCOCCUS AUREUS) INFECTION: Primary | ICD-10-CM

## 2019-03-29 DIAGNOSIS — E66.9 DIABETES MELLITUS TYPE 2 IN OBESE (HCC): ICD-10-CM

## 2019-03-29 DIAGNOSIS — E87.5 SERUM POTASSIUM ELEVATED: Primary | ICD-10-CM

## 2019-03-29 PROCEDURE — 76942 ECHO GUIDE FOR BIOPSY: CPT | Performed by: PODIATRIST

## 2019-03-29 PROCEDURE — 76000 FLUOROSCOPY <1 HR PHYS/QHP: CPT | Performed by: PODIATRIST

## 2019-03-29 PROCEDURE — 88305 TISSUE EXAM BY PATHOLOGIST: CPT | Performed by: PODIATRIST

## 2019-03-29 PROCEDURE — 0QBM0ZZ EXCISION OF LEFT TARSAL, OPEN APPROACH: ICD-10-PCS | Performed by: PODIATRIST

## 2019-03-29 PROCEDURE — 88311 DECALCIFY TISSUE: CPT | Performed by: PODIATRIST

## 2019-03-29 PROCEDURE — 82962 GLUCOSE BLOOD TEST: CPT

## 2019-03-29 RX ORDER — HYDROMORPHONE HYDROCHLORIDE 1 MG/ML
0.4 INJECTION, SOLUTION INTRAMUSCULAR; INTRAVENOUS; SUBCUTANEOUS EVERY 5 MIN PRN
Status: DISCONTINUED | OUTPATIENT
Start: 2019-03-29 | End: 2019-03-29

## 2019-03-29 RX ORDER — SODIUM CHLORIDE, SODIUM LACTATE, POTASSIUM CHLORIDE, CALCIUM CHLORIDE 600; 310; 30; 20 MG/100ML; MG/100ML; MG/100ML; MG/100ML
INJECTION, SOLUTION INTRAVENOUS CONTINUOUS
Status: DISCONTINUED | OUTPATIENT
Start: 2019-03-29 | End: 2019-03-29

## 2019-03-29 RX ORDER — DEXAMETHASONE SODIUM PHOSPHATE 4 MG/ML
4 VIAL (ML) INJECTION AS NEEDED
Status: DISCONTINUED | OUTPATIENT
Start: 2019-03-29 | End: 2019-03-29

## 2019-03-29 RX ORDER — NALOXONE HYDROCHLORIDE 0.4 MG/ML
80 INJECTION, SOLUTION INTRAMUSCULAR; INTRAVENOUS; SUBCUTANEOUS AS NEEDED
Status: DISCONTINUED | OUTPATIENT
Start: 2019-03-29 | End: 2019-03-29

## 2019-03-29 RX ORDER — ACETAMINOPHEN 500 MG
1000 TABLET ORAL ONCE
Status: DISCONTINUED | OUTPATIENT
Start: 2019-03-29 | End: 2019-03-29

## 2019-03-29 RX ORDER — HYDROCODONE BITARTRATE AND ACETAMINOPHEN 5; 325 MG/1; MG/1
1 TABLET ORAL AS NEEDED
Status: DISCONTINUED | OUTPATIENT
Start: 2019-03-29 | End: 2019-03-29

## 2019-03-29 RX ORDER — CEFAZOLIN SODIUM/WATER 2 G/20 ML
2 SYRINGE (ML) INTRAVENOUS ONCE
Status: COMPLETED | OUTPATIENT
Start: 2019-03-29 | End: 2019-03-29

## 2019-03-29 RX ORDER — ONDANSETRON 2 MG/ML
4 INJECTION INTRAMUSCULAR; INTRAVENOUS AS NEEDED
Status: DISCONTINUED | OUTPATIENT
Start: 2019-03-29 | End: 2019-03-29

## 2019-03-29 RX ORDER — METOCLOPRAMIDE HYDROCHLORIDE 5 MG/ML
10 INJECTION INTRAMUSCULAR; INTRAVENOUS AS NEEDED
Status: DISCONTINUED | OUTPATIENT
Start: 2019-03-29 | End: 2019-03-29

## 2019-03-29 RX ORDER — HYDROCODONE BITARTRATE AND ACETAMINOPHEN 5; 325 MG/1; MG/1
2 TABLET ORAL AS NEEDED
Status: DISCONTINUED | OUTPATIENT
Start: 2019-03-29 | End: 2019-03-29

## 2019-03-29 RX ORDER — MEPERIDINE HYDROCHLORIDE 25 MG/ML
12.5 INJECTION INTRAMUSCULAR; INTRAVENOUS; SUBCUTANEOUS AS NEEDED
Status: DISCONTINUED | OUTPATIENT
Start: 2019-03-29 | End: 2019-03-29

## 2019-03-29 RX ORDER — DEXTROSE MONOHYDRATE 25 G/50ML
50 INJECTION, SOLUTION INTRAVENOUS
Status: DISCONTINUED | OUTPATIENT
Start: 2019-03-29 | End: 2019-03-29

## 2019-03-29 RX ORDER — MIDAZOLAM HYDROCHLORIDE 1 MG/ML
1 INJECTION INTRAMUSCULAR; INTRAVENOUS EVERY 5 MIN PRN
Status: DISCONTINUED | OUTPATIENT
Start: 2019-03-29 | End: 2019-03-29

## 2019-03-29 RX ORDER — DEXTROSE MONOHYDRATE 25 G/50ML
50 INJECTION, SOLUTION INTRAVENOUS
Status: DISCONTINUED | OUTPATIENT
Start: 2019-03-29 | End: 2019-03-29 | Stop reason: HOSPADM

## 2019-03-29 RX ORDER — LABETALOL HYDROCHLORIDE 5 MG/ML
5 INJECTION, SOLUTION INTRAVENOUS EVERY 5 MIN PRN
Status: DISCONTINUED | OUTPATIENT
Start: 2019-03-29 | End: 2019-03-29

## 2019-03-29 RX ORDER — ACETAMINOPHEN 500 MG
1000 TABLET ORAL ONCE
COMMUNITY
End: 2019-07-02

## 2019-03-29 RX ORDER — INSULIN ASPART 100 [IU]/ML
INJECTION, SOLUTION INTRAVENOUS; SUBCUTANEOUS ONCE
Status: DISCONTINUED | OUTPATIENT
Start: 2019-03-29 | End: 2019-03-29

## 2019-03-29 RX ORDER — ACETAMINOPHEN 500 MG
1000 TABLET ORAL ONCE AS NEEDED
Status: DISCONTINUED | OUTPATIENT
Start: 2019-03-29 | End: 2019-03-29

## 2019-03-29 NOTE — ANESTHESIA PREPROCEDURE EVALUATION
PRE-OP EVALUATION    Patient Name: Ciara Urbina Ervinlizeth    Pre-op Diagnosis: calcaneal hyperostosis    Procedure(s):  LEFT CALCANEAL EXOSTECTOMY    Surgeon(s) and Role:     * Екатерина Rizo DPM - Primary    Pre-op vitals reviewed.   Temp: 98.2 °F (36.8 allergies. Anesthesia Evaluation    Patient summary reviewed.     Anesthetic Complications  (-) history of anesthetic complications         GI/Hepatic/Renal    Negative GI/hepatic/renal ROS.  (-) GERD                           Cardiovascular  Comment: opening: >3 FB  TM distance: > 6 cm  Neck ROM: full Cardiovascular      Rhythm: regular  Rate: normal     Dental             Pulmonary      Breath sounds clear to auscultation bilaterally.                Other findings            ASA: 2   Plan: MAC  NPO sta

## 2019-03-29 NOTE — TELEPHONE ENCOUNTER
Left detailed VM for pt regarding information below. Instructed pt to call to schedule an OV with PCP, as well as come into lab tomorrow to recheck K (pt provided with lab hours). Will jillian for follow up to assure pt received information.

## 2019-03-29 NOTE — BRIEF OP NOTE
Pre-Operative Diagnosis: calcaneal hyperostosis, left foot     Post-Operative Diagnosis: calcaneal hyperostosis , left foot     Procedure Performed:   Procedure(s):  LEFT CALCANEAL EXOSTECTOMY    Surgeon(s) and Role:     * Dakota Rizo, TAMMY - Primary

## 2019-03-29 NOTE — TELEPHONE ENCOUNTER
----- Message from Gil Johnson MD sent at 3/29/2019 11:10 AM CDT -----  HBA1C trending upwards  Pls make sure pt follows up in the next few weeks  Pt due for OV for DM    K elevated, Pls repeat BMP in 1-2 days, could be hemolysis

## 2019-04-01 NOTE — OPERATIVE REPORT
[facility]    PATIENT'S NAME: Upper Tractlizeth Parrish   ATTENDING PHYSICIAN: David Rose D.P.M. OPERATING PHYSICIAN: David Rose D.P.M.    PATIENT ACCOUNT#:   [de-identified]    LOCATION:  PREOPASCC EH PRE ASCC 11 EDWP 10  MEDICAL RECORD #:   [id number] sharp and blunt technique. Superficial veins were ligated, cauterized, and retracted as necessary. The periosteal incision was then made the full length of the skin incision.   The periosteal structures were denuded slightly dorsally for closure and then

## 2019-04-01 NOTE — TELEPHONE ENCOUNTER
XIOMARA Spoke with patient. I scheduled him an OV for his DM next week 4/9/19. Patient aware of elevated potassium level. He denies taking any oral potassium. He had surgery this past Friday and is on crutches.  He says he is going for follow up on his foot thi

## 2019-04-03 ENCOUNTER — APPOINTMENT (OUTPATIENT)
Dept: CARDIOLOGY | Age: 39
End: 2019-04-03

## 2019-04-03 NOTE — ANESTHESIA POSTPROCEDURE EVALUATION
1 [facility] Patient Status:  Hospital Outpatient Surgery   Age/Gender 34year old male MRN [id number]   Location [address] [place] [address] Attending No att. providers found   [facility] Day # 0 PCP Bennett Hernandez MD

## 2019-04-04 ENCOUNTER — OFFICE VISIT (OUTPATIENT)
Dept: PODIATRY CLINIC | Facility: CLINIC | Age: 39
End: 2019-04-04
Payer: COMMERCIAL

## 2019-04-04 DIAGNOSIS — E11.65 UNCONTROLLED TYPE 2 DIABETES MELLITUS WITH DIABETIC PERIPHERAL ANGIOPATHY WITHOUT GANGRENE (HCC): ICD-10-CM

## 2019-04-04 DIAGNOSIS — E11.51 UNCONTROLLED TYPE 2 DIABETES MELLITUS WITH DIABETIC PERIPHERAL ANGIOPATHY WITHOUT GANGRENE (HCC): ICD-10-CM

## 2019-04-04 DIAGNOSIS — L97.429 HEEL ULCERATION, LEFT, WITH UNSPECIFIED SEVERITY (HCC): Primary | ICD-10-CM

## 2019-04-04 DIAGNOSIS — L97.421 SKIN ULCER OF LEFT HEEL, LIMITED TO BREAKDOWN OF SKIN (HCC): ICD-10-CM

## 2019-04-04 DIAGNOSIS — Z98.890 POST-OPERATIVE STATE: ICD-10-CM

## 2019-04-04 PROCEDURE — 99213 OFFICE O/P EST LOW 20 MIN: CPT | Performed by: PODIATRIST

## 2019-04-04 RX ORDER — AMOXICILLIN AND CLAVULANATE POTASSIUM 500; 125 MG/1; MG/1
1 TABLET, FILM COATED ORAL 2 TIMES DAILY
Refills: 0 | COMMUNITY
Start: 2019-03-29 | End: 2019-04-09 | Stop reason: ALTCHOICE

## 2019-04-04 RX ORDER — TRAMADOL HYDROCHLORIDE 50 MG/1
TABLET ORAL
Refills: 0 | COMMUNITY
Start: 2019-03-29 | End: 2019-05-02 | Stop reason: ALTCHOICE

## 2019-04-04 NOTE — PROGRESS NOTES
Timothy Murray is a 34year old male. Patient presents with:  Post-Op: s/p left calcaneal exostectomy -- Sx was on 03/29/19. Denies any foot pain, calf pain or fever. Took last dose of oral abx today.          HPI:   Patient was seen at today's office Vitro Strip 1 strip by In Vitro route 4 (four) times daily.  Disp:  Rfl:    traMADol HCl 50 MG Oral Tab TAKE ONE TABLET BY MOUTH EVERY 4 TO 6 HOURS AS NEEDED FOR PAIN Disp:  Rfl: 0      Past Medical History:   Diagnosis Date   • Diabetes ([facility] [address]    • Elevated pain and denies heartburn  NEURO: denies headaches    EXAM:   There were no vitals taken for this visit. Physical Exam  GENERAL: well developed, well nourished, in no apparent distress  EXTREMITIES:   1.  Integument: Line on the lateral aspect of his left

## 2019-04-06 NOTE — H&P
3/8/2019    [address]  [facility]  Clinic Note    Mell David Tobin  : 1980  PCP: Melinda Meyers MD    Reason for Visit:    Chief Complaint   Patient presents with   • Follow-up     History of Pres 1000 MG tablet 1 tablet 2 times daily. • ezetimibe-simvastatin (VYTORIN) 10-80 MG per tablet 1 tablet daily.    • insulin detemir (LEVEMIR FLEXTOUCH) 100 UNIT/ML pen-injector as directed   • dulaglutide (TRULICITY) 1.5 UF/9.4ZA pen-injector as directed autologous vein bypass graft(s) of the extremities with rest pain, left leg (CMS/[facility])   3. Tachycardia   4.  Controlled type 2 diabetes mellitus with chronic kidney disease, with long-term current use of insulin, unspecified CKD stage (CMS/[facility])         Plan

## 2019-04-07 ENCOUNTER — MED REC SCAN ONLY (OUTPATIENT)
Dept: FAMILY MEDICINE CLINIC | Facility: CLINIC | Age: 39
End: 2019-04-07

## 2019-04-09 ENCOUNTER — OFFICE VISIT (OUTPATIENT)
Dept: PODIATRY CLINIC | Facility: CLINIC | Age: 39
End: 2019-04-09
Payer: COMMERCIAL

## 2019-04-09 ENCOUNTER — OFFICE VISIT (OUTPATIENT)
Dept: SURGERY | Facility: CLINIC | Age: 39
End: 2019-04-09
Payer: COMMERCIAL

## 2019-04-09 VITALS
HEART RATE: 118 BPM | WEIGHT: 190 LBS | BODY MASS INDEX: 28.79 KG/M2 | SYSTOLIC BLOOD PRESSURE: 131 MMHG | DIASTOLIC BLOOD PRESSURE: 88 MMHG | TEMPERATURE: 98 F | HEIGHT: 68 IN

## 2019-04-09 DIAGNOSIS — L97.429 HEEL ULCERATION, LEFT, WITH UNSPECIFIED SEVERITY (HCC): ICD-10-CM

## 2019-04-09 DIAGNOSIS — Z98.890 POST-OPERATIVE STATE: Primary | ICD-10-CM

## 2019-04-09 DIAGNOSIS — I73.89 OTHER SPECIFIED PERIPHERAL VASCULAR DISEASES (HCC): ICD-10-CM

## 2019-04-09 DIAGNOSIS — E11.51 UNCONTROLLED TYPE 2 DIABETES MELLITUS WITH DIABETIC PERIPHERAL ANGIOPATHY WITHOUT GANGRENE (HCC): ICD-10-CM

## 2019-04-09 DIAGNOSIS — N52.8 OTHER MALE ERECTILE DYSFUNCTION: Primary | ICD-10-CM

## 2019-04-09 DIAGNOSIS — E11.65 UNCONTROLLED TYPE 2 DIABETES MELLITUS WITH DIABETIC PERIPHERAL ANGIOPATHY WITHOUT GANGRENE (HCC): ICD-10-CM

## 2019-04-09 DIAGNOSIS — M86.172 ACUTE OSTEOMYELITIS OF LEFT CALCANEUS (HCC): ICD-10-CM

## 2019-04-09 DIAGNOSIS — M21.372 LEFT FOOT DROP: ICD-10-CM

## 2019-04-09 DIAGNOSIS — L97.421 SKIN ULCER OF LEFT HEEL, LIMITED TO BREAKDOWN OF SKIN (HCC): ICD-10-CM

## 2019-04-09 PROCEDURE — 99024 POSTOP FOLLOW-UP VISIT: CPT | Performed by: PODIATRIST

## 2019-04-09 PROCEDURE — 99213 OFFICE O/P EST LOW 20 MIN: CPT | Performed by: NURSE PRACTITIONER

## 2019-04-09 PROCEDURE — 99212 OFFICE O/P EST SF 10 MIN: CPT | Performed by: NURSE PRACTITIONER

## 2019-04-09 RX ORDER — TADALAFIL 20 MG/1
20 TABLET ORAL
Qty: 5 TABLET | Refills: 5 | Status: SHIPPED | OUTPATIENT
Start: 2019-04-09 | End: 2019-06-06 | Stop reason: ALTCHOICE

## 2019-04-09 NOTE — PROGRESS NOTES
HPI:    Patient ID: Mary Catherine is a 34year old male. HPI     Patient is a 34year old male who presents to the clinic today for a follow up regarding erectile dysfunction.     3/20/2019 - patient seen for consult regarding erectile dysfunction done (Patient taking differently: Inject 20 Units into the skin 2 (two) times daily.  inject 15 UNITS into the skin sub cutanoeusly daily  after hyperbaric session done ) Disp: 15 mL Rfl: 2   Glucose Blood (ONETOUCH VERIO) In Vitro Strip Test 4 times daily Exam   Constitutional: He is oriented to person, place, and time. He appears well-nourished. No distress. HENT:   Head: Normocephalic. Eyes: Conjunctivae are normal.   Neck: Normal range of motion. Cardiovascular: Normal rate.    Pulmonary/Chest: Effo

## 2019-04-15 NOTE — PROGRESS NOTES
Fede Caceres is a 37year old male. Patient presents with:  Post-Op: S/P LEFT CALCANEAL EXOSTECTOMY -- Sx on 03/29/19. Denies any foot pain, calf pain, or fever.          HPI:   Patient returns to the clinic at this time there are no complaints she Disp:  Rfl: 0      Past Medical History:   Diagnosis Date   • Diabetes ([facility] [address]    • Elevated liver enzymes 8/30/2016   • Elevated serum GGT level 8/30/2016   • High cholesterol    • HYPERLIPIDEMIA    • Hyperlipidemia    • HYPERTRIGLYCERIDEMIA    • OBESITY apparent distress  EXTREMITIES:   1. Integument: Incision line on his left heel is healing uneventfully there is no sign of infection no sign of dehiscence. 2. Vascular: The patient has weak pulses on his left lower extremity   3.  Neurologic: Patient has

## 2019-04-18 ENCOUNTER — OFFICE VISIT (OUTPATIENT)
Dept: PODIATRY CLINIC | Facility: CLINIC | Age: 39
End: 2019-04-18
Payer: COMMERCIAL

## 2019-04-18 DIAGNOSIS — Z98.890 POST-OPERATIVE STATE: Primary | ICD-10-CM

## 2019-04-18 DIAGNOSIS — M21.372 LEFT FOOT DROP: ICD-10-CM

## 2019-04-18 DIAGNOSIS — I73.89 OTHER SPECIFIED PERIPHERAL VASCULAR DISEASES (HCC): ICD-10-CM

## 2019-04-18 PROCEDURE — 99024 POSTOP FOLLOW-UP VISIT: CPT | Performed by: PODIATRIST

## 2019-04-23 DIAGNOSIS — E11.51 UNCONTROLLED TYPE 2 DIABETES MELLITUS WITH DIABETIC PERIPHERAL ANGIOPATHY WITHOUT GANGRENE (HCC): ICD-10-CM

## 2019-04-23 DIAGNOSIS — E11.65 UNCONTROLLED TYPE 2 DIABETES MELLITUS WITH DIABETIC PERIPHERAL ANGIOPATHY WITHOUT GANGRENE (HCC): ICD-10-CM

## 2019-04-24 RX ORDER — PEN NEEDLE, DIABETIC, SAFETY 30 GX3/16"
NEEDLE, DISPOSABLE MISCELLANEOUS
Qty: 1 BOX | Refills: 2 | Status: SHIPPED | OUTPATIENT
Start: 2019-04-24 | End: 2021-02-13

## 2019-04-25 NOTE — PROGRESS NOTES
Fede Caceres is a 37year old male. Patient presents with:  Post-Op: S/P left calcaneal exostectomy -- Sx on 03/29/19. Denies fever or calf pain or foot pain.  BG was 119 this AM.         HPI:   Patient returns to clinic now slightly over 3 weeks st INJECTION AS DIRECTED Disp: 1 Box Rfl: 2      Past Medical History:   Diagnosis Date   • Diabetes ([facility] [address]    • Elevated liver enzymes 8/30/2016   • Elevated serum GGT level 8/30/2016   • High cholesterol    • HYPERLIPIDEMIA    • Hyperlipidemia    • HYPERTRIG developed, well nourished, in no apparent distress  EXTREMITIES:   1.  Integument: Skin in the left heel was examined the hyperkeratosis is loosening the incision line is well coapted the sutures removed there is no dehiscence no erythema no edema no purule

## 2019-05-02 ENCOUNTER — OFFICE VISIT (OUTPATIENT)
Dept: PODIATRY CLINIC | Facility: CLINIC | Age: 39
End: 2019-05-02
Payer: COMMERCIAL

## 2019-05-02 DIAGNOSIS — Z98.890 POST-OPERATIVE STATE: Primary | ICD-10-CM

## 2019-05-02 PROCEDURE — 99024 POSTOP FOLLOW-UP VISIT: CPT | Performed by: PODIATRIST

## 2019-05-06 NOTE — PROGRESS NOTES
Sanjana Padron is a 37year old male. Patient presents with:  Post-Op: s/p  left calcaneal exostectomy -- Sx on 03/29/19. Denies any pain, fever, calf pain, or fever.  BG was 119 this AM.         HPI:   Patient returns to clinic no complaints of pain enzymes 8/30/2016   • Elevated serum GGT level 8/30/2016   • High cholesterol    • HYPERLIPIDEMIA    • Hyperlipidemia    • HYPERTRIGLYCERIDEMIA    • OBESITY    • Pneumonia due to organism    • Visual impairment     GLASSES      Past Surgical History:   Pro dry and supple the surgical incision remains well-healed mild hyperkeratosis which is loosening is noted. 2. Vascular: Patient has weakened pulses on the left lower extremity   3. Neurologic: Patient has diminished pain sensation left foot   4.  [address]

## 2019-05-07 ENCOUNTER — OFFICE VISIT (OUTPATIENT)
Dept: SURGERY | Facility: CLINIC | Age: 39
End: 2019-05-07
Payer: COMMERCIAL

## 2019-05-07 VITALS
BODY MASS INDEX: 28.79 KG/M2 | HEART RATE: 99 BPM | TEMPERATURE: 98 F | SYSTOLIC BLOOD PRESSURE: 134 MMHG | WEIGHT: 190 LBS | RESPIRATION RATE: 16 BRPM | DIASTOLIC BLOOD PRESSURE: 87 MMHG | HEIGHT: 68 IN

## 2019-05-07 DIAGNOSIS — N18.30 STAGE 3 CHRONIC KIDNEY DISEASE (HCC): ICD-10-CM

## 2019-05-07 DIAGNOSIS — N40.1 BENIGN PROSTATIC HYPERPLASIA WITH NOCTURIA: ICD-10-CM

## 2019-05-07 DIAGNOSIS — R35.1 NOCTURIA: ICD-10-CM

## 2019-05-07 DIAGNOSIS — N52.01 ERECTILE DYSFUNCTION DUE TO ARTERIAL INSUFFICIENCY: Primary | ICD-10-CM

## 2019-05-07 DIAGNOSIS — R35.1 BENIGN PROSTATIC HYPERPLASIA WITH NOCTURIA: ICD-10-CM

## 2019-05-07 PROCEDURE — 99215 OFFICE O/P EST HI 40 MIN: CPT | Performed by: UROLOGY

## 2019-05-07 PROCEDURE — 99212 OFFICE O/P EST SF 10 MIN: CPT | Performed by: UROLOGY

## 2019-05-07 NOTE — PATIENT INSTRUCTIONS
Froilan Ballesteros M.D.      1.   Please purchase Caverject 20 mcg impulse kit at your pharmacy;    2. Please bring Caverject 20 mcg impulse kit to morning visit with me--I will teach you how to self administer it.   Do not give

## 2019-05-07 NOTE — PROGRESS NOTES
Gena Moreno is a 37year old male. HPI:   Patient presents with:  Erectile Dysfuntion    History provided by patient. Former patient of ARNOL Lundy. Erectile Dysfunction  Problem started 2016 gradually.  Patient is  with a (Nephrology); chronic kidney disease stage III related to acute tubular necrosis from vancomycin toxicity; not had any ongoing significant improvement in renal function over the past 9 months; severe enough acute kidney injury related to ATN that he develo History: Social History    Tobacco Use      Smoking status: Never Smoker      Smokeless tobacco: Never Used    Alcohol use:  Yes      Alcohol/week: 0.0 - 1.2 oz    Drug use: No       Medications (Active prior to today's visit):    Current Outpatient Medicat diarrhea and vomiting    Neurological:  Negative for gait disturbance    Endocrine:  Negative for abnormal sleep patterns, increased activity, polydipsia and polyphagia    Allergic/Immuno:  Negative for environmental allergies and food allergies  Cardiovas 300-890);  Creatinine = 2.12, GFR = 38  7/12/2018 UA Glucose >=500; microscopic not indicated      UROLOGICAL IMAGING   None      I spent 40  minutes with patient, and majority of this time was spent discussing treatment options, answering questions about t seeing me first.        3.  Kidney ultrasound to investigate your chronic kidney disease stage III---to make sure that there is no urology component to it. We will let you know the results.       Fany Gomez is a very pleasant patient and I  thank you for send

## 2019-06-06 ENCOUNTER — OFFICE VISIT (OUTPATIENT)
Dept: SURGERY | Facility: CLINIC | Age: 39
End: 2019-06-06
Payer: COMMERCIAL

## 2019-06-06 VITALS
HEART RATE: 97 BPM | HEIGHT: 68 IN | BODY MASS INDEX: 28.79 KG/M2 | RESPIRATION RATE: 16 BRPM | SYSTOLIC BLOOD PRESSURE: 142 MMHG | WEIGHT: 190 LBS | DIASTOLIC BLOOD PRESSURE: 84 MMHG | TEMPERATURE: 98 F

## 2019-06-06 DIAGNOSIS — R35.1 BENIGN PROSTATIC HYPERPLASIA WITH NOCTURIA: ICD-10-CM

## 2019-06-06 DIAGNOSIS — N52.01 ERECTILE DYSFUNCTION DUE TO ARTERIAL INSUFFICIENCY: Primary | ICD-10-CM

## 2019-06-06 DIAGNOSIS — N40.1 BENIGN PROSTATIC HYPERPLASIA WITH NOCTURIA: ICD-10-CM

## 2019-06-06 DIAGNOSIS — N18.30 STAGE 3 CHRONIC KIDNEY DISEASE (HCC): ICD-10-CM

## 2019-06-06 DIAGNOSIS — R35.1 NOCTURIA: ICD-10-CM

## 2019-06-06 PROCEDURE — 99214 OFFICE O/P EST MOD 30 MIN: CPT | Performed by: UROLOGY

## 2019-06-06 PROCEDURE — 99212 OFFICE O/P EST SF 10 MIN: CPT | Performed by: UROLOGY

## 2019-06-06 NOTE — PATIENT INSTRUCTIONS
Christi Drake M.D.      1.      You may try to have sexual activity this morning if you wish      2.        The next time you have sexual activity, please self administer 20  micrograms of Caverject intracavernosally (needle visit in 6 months.     11.  Please follow-up with your primary physician concerning your elevated blood pressure

## 2019-06-06 NOTE — PROGRESS NOTES
HPI:    Patient ID: Cristy Aldana is a 37year old male. HPI  Erectile Dysfunction  Chronic. Problem started 2016 gradually. Patient has tried Viagra 50 mg PO an hour before sexual activity; this worked for a time but eventually stopped working. speech difficulty. Psychiatric/Behavioral: The patient is not nervous/anxious.             Current Outpatient Medications:  Alprostadil, Vasodilator, (CAVERJECT IMPULSE) 20 MCG Intracavernosal Kit Use as directed for the next 3 months; never self administ Left     DONE TWICE   • CALCANEAL OSTEOTOMY Left 3/29/2019    Performed by Beronica Kumar DPM at [place] [address] Left 6/22/2018    Performed by Beronica Kumar DPM at [facility] MAIN OR   • CHOLECYSTECTOMY     • DEBRIDE WOUND Left     H Free testosterone = 86.9 (NL ), Total testosterone = 387 (-890);  Creatinine = 2.12, GFR = 38  7/12/2018 UA Glucose >=500; microscopic not indicated      UROLOGICAL IMAGING   None     I spent a total of 25  minutes with patient face to face, and achieve intromission. I then reevaluated patient again after 25 minutes and the results were the same--a partial erection that was too bendable to be effective for successful intercourse. After 30 minutes of cell stimulation, had patient stop doing so. You may try to have sexual activity this morning if you wish      2.        The next time you have sexual activity, please self administer 20  micrograms of Caverject intracavernosally (needle perpendicular to the surface of the side of the midshaft of the physician concerning your elevated blood pressure        No orders of the defined types were placed in this encounter.       Meds This Visit:  Requested Prescriptions     Signed Prescriptions Disp Refills   • Alprostadil, Vasodilator, (CAVERJECT IMPULSE) 20

## 2019-06-27 ENCOUNTER — PATIENT MESSAGE (OUTPATIENT)
Dept: SURGERY | Facility: CLINIC | Age: 39
End: 2019-06-27

## 2019-06-28 NOTE — TELEPHONE ENCOUNTER
[place],  Please check your health plan as to whether or not they cover Caverject or Edex (both prostaglandin E1 FDA approved injectable for erectile dysfunction). The price that you \"it seems unreasonably high so make sure that there is not a mistake.

## 2019-06-28 NOTE — TELEPHONE ENCOUNTER
Scarlett Mccann RN 6/27/2019 4:03 PM CDT        ----- Message -----  From: Uzair Tobin  Sent: 6/27/2019 3:53 PM  To: [facility] Clinical Staff  Subject: Prescription Question     The full 20 dose of the caverject was not successful.  It was a simil

## 2019-07-02 ENCOUNTER — TELEPHONE (OUTPATIENT)
Dept: PODIATRY CLINIC | Facility: CLINIC | Age: 39
End: 2019-07-02

## 2019-07-02 ENCOUNTER — OFFICE VISIT (OUTPATIENT)
Dept: PODIATRY CLINIC | Facility: CLINIC | Age: 39
End: 2019-07-02
Payer: COMMERCIAL

## 2019-07-02 DIAGNOSIS — I73.89 OTHER SPECIFIED PERIPHERAL VASCULAR DISEASES (HCC): ICD-10-CM

## 2019-07-02 DIAGNOSIS — M21.372 LEFT FOOT DROP: ICD-10-CM

## 2019-07-02 DIAGNOSIS — Z98.890 POST-OPERATIVE STATE: Primary | ICD-10-CM

## 2019-07-02 DIAGNOSIS — L97.421 SKIN ULCER OF LEFT HEEL, LIMITED TO BREAKDOWN OF SKIN (HCC): ICD-10-CM

## 2019-07-02 PROCEDURE — 99213 OFFICE O/P EST LOW 20 MIN: CPT | Performed by: PODIATRIST

## 2019-07-02 NOTE — TELEPHONE ENCOUNTER
Pt came to office today to see [facility]. Pt wanting to know how long he would need to wear the brace on his foot. Informed pt that I will send this message to [facility] and we will call pt at [phone]. Per pt, okay to leave a detailed message.      -- Dr. Martha Bishop,

## 2019-07-03 NOTE — TELEPHONE ENCOUNTER
The brace should probably be worn for life because it will protect the heel and I will prevent his foot from slapping down and causing ulcerations in other area

## 2019-07-03 NOTE — PROGRESS NOTES
Kenna Dos Santos is a 37year old male. Patient presents with:  Post-Op: sx 3/29/19 L calcaneal exostectomy. LOV 5/2/19 - pt states his foot is good \"as far as I can tell, no problems\" pt denies pain.          HPI:   Patient returns to the clinic for liver enzymes 8/30/2016   • Elevated serum GGT level 8/30/2016   • High cholesterol    • HYPERLIPIDEMIA    • Hyperlipidemia    • HYPERTRIGLYCERIDEMIA    • OBESITY    • Pneumonia due to organism    • Visual impairment     GLASSES      Past Surgical History: warm dry and supple nails have mild thickening associated with onychomycosis. Left heel appears to be well-healed at this time there is no further ulceration there is a slight bony prominence but is not open it is not erythematous edematous or warm.    2.

## 2019-08-05 ENCOUNTER — OFFICE VISIT (OUTPATIENT)
Dept: PODIATRY CLINIC | Facility: CLINIC | Age: 39
End: 2019-08-05
Payer: COMMERCIAL

## 2019-08-05 DIAGNOSIS — E11.65 UNCONTROLLED TYPE 2 DIABETES MELLITUS WITH DIABETIC PERIPHERAL ANGIOPATHY WITHOUT GANGRENE (HCC): ICD-10-CM

## 2019-08-05 DIAGNOSIS — L97.421 SKIN ULCER OF LEFT HEEL, LIMITED TO BREAKDOWN OF SKIN (HCC): Primary | ICD-10-CM

## 2019-08-05 DIAGNOSIS — E11.51 UNCONTROLLED TYPE 2 DIABETES MELLITUS WITH DIABETIC PERIPHERAL ANGIOPATHY WITHOUT GANGRENE (HCC): ICD-10-CM

## 2019-08-05 PROCEDURE — 99213 OFFICE O/P EST LOW 20 MIN: CPT | Performed by: PODIATRIST

## 2019-08-05 RX ORDER — SULFAMETHOXAZOLE AND TRIMETHOPRIM 800; 160 MG/1; MG/1
TABLET ORAL
Refills: 1 | COMMUNITY
Start: 2019-07-29 | End: 2019-08-19 | Stop reason: ALTCHOICE

## 2019-08-05 NOTE — PROGRESS NOTES
Armand Hernandez is a 37year old male. Patient presents with:  Post-Op: LOV 7/2/19, sx 3/29/19 left calcaneal exostectomy. pt wearing brace and using crutches. pt denies pain. wound on left heel, pt states it is healing.  pt denies purulent drainage, s/ times daily Disp: 400 strip Rfl: 3   Blood Glucose Monitoring Suppl (ONETOUCH ULTRA 2) w/Device Does not apply Kit 1 kit by Does not apply route as needed.  Disp:  Rfl:    Glucose Blood (ONETOUCH TEST) In Vitro Strip 1 strip by In Vitro route 4 (four) times denies shortness of breath with exertion  CARDIOVASCULAR: denies chest pain on exertion  GI: denies abdominal pain and denies heartburn  NEURO: denies headaches    EXAM:   There were no vitals taken for this visit.   Physical Exam  GENERAL: well developed,

## 2019-08-09 DIAGNOSIS — E11.51 UNCONTROLLED TYPE 2 DIABETES MELLITUS WITH DIABETIC PERIPHERAL ANGIOPATHY WITHOUT GANGRENE (HCC): ICD-10-CM

## 2019-08-09 DIAGNOSIS — E11.65 UNCONTROLLED TYPE 2 DIABETES MELLITUS WITH DIABETIC PERIPHERAL ANGIOPATHY WITHOUT GANGRENE (HCC): ICD-10-CM

## 2019-08-19 ENCOUNTER — OFFICE VISIT (OUTPATIENT)
Dept: PODIATRY CLINIC | Facility: CLINIC | Age: 39
End: 2019-08-19
Payer: COMMERCIAL

## 2019-08-19 DIAGNOSIS — E11.628 DIABETIC INFECTION OF LEFT FOOT (HCC): Primary | ICD-10-CM

## 2019-08-19 DIAGNOSIS — L08.9 DIABETIC INFECTION OF LEFT FOOT (HCC): Primary | ICD-10-CM

## 2019-08-19 DIAGNOSIS — E08.40 DIABETES DUE TO UNDERLYING CONDITION W DIABETIC NEUROP, UNSP (HCC): ICD-10-CM

## 2019-08-19 PROCEDURE — 99213 OFFICE O/P EST LOW 20 MIN: CPT | Performed by: PODIATRIST

## 2019-08-19 NOTE — PROGRESS NOTES
Sanjana Padron is a 37year old male. Patient presents with:  Diabetic Ulcer: FBS this am 137, LOV with Dr Riana Zhang on 3-27-19, A1C was 7.5 on 3-27-19. Patient denies any pain or swelling.  He states that there was a little discharge on the dressing, b 400 strip Rfl: 3   Blood Glucose Monitoring Suppl (ONETOUCH ULTRA 2) w/Device Does not apply Kit 1 kit by Does not apply route as needed. Disp:  Rfl:    Glucose Blood (ONETOUCH TEST) In Vitro Strip 1 strip by In Vitro route 4 (four) times daily.  Disp:  Rfl breath with exertion  CARDIOVASCULAR: denies chest pain on exertion  GI: denies abdominal pain and denies heartburn  NEURO: denies headaches; [place] Angus nylon filament test  MUSCULO: denies arthritis, back pain: Acknowledges history of osteomyelitis

## 2019-08-20 ENCOUNTER — OFFICE VISIT (OUTPATIENT)
Dept: PODIATRY CLINIC | Facility: CLINIC | Age: 39
End: 2019-08-20
Payer: COMMERCIAL

## 2019-08-20 DIAGNOSIS — E11.65 UNCONTROLLED TYPE 2 DIABETES MELLITUS WITH DIABETIC PERIPHERAL ANGIOPATHY WITHOUT GANGRENE (HCC): ICD-10-CM

## 2019-08-20 DIAGNOSIS — L97.421 SKIN ULCER OF LEFT HEEL, LIMITED TO BREAKDOWN OF SKIN (HCC): Primary | ICD-10-CM

## 2019-08-20 DIAGNOSIS — E11.51 UNCONTROLLED TYPE 2 DIABETES MELLITUS WITH DIABETIC PERIPHERAL ANGIOPATHY WITHOUT GANGRENE (HCC): ICD-10-CM

## 2019-08-20 PROCEDURE — 99213 OFFICE O/P EST LOW 20 MIN: CPT | Performed by: PODIATRIST

## 2019-08-20 NOTE — PROGRESS NOTES
Gregory Pelaez is a 37year old male. Patient presents with:  Post-Op: LOV 8/5/19, sx 3/29/19 left calcaneal exostectomy. pt wearing brace using crutches. pt denies pain. pt has wound on left heel. pt saw Dr Chica Jarquin in office yesterday.          HPI: Strip 1 strip by In Vitro route 4 (four) times daily.  Disp:  Rfl:       Past Medical History:   Diagnosis Date   • [facility]    • Elevated liver enzymes 8/30/2016   • Elevated serum GGT level 8/30/2016   • High cholesterol    • HYPERLIPIDEMIA    • Hyp visit.  Physical Exam  GENERAL: well developed, well nourished, in no apparent distress  EXTREMITIES:   1. Integument: The skin on the left heel was examined it is warm dry supple there is lucent skin which was trimmed using a 15 blade.   He has an ulcerati

## 2019-08-29 ENCOUNTER — OFFICE VISIT (OUTPATIENT)
Dept: PODIATRY CLINIC | Facility: CLINIC | Age: 39
End: 2019-08-29
Payer: COMMERCIAL

## 2019-08-29 DIAGNOSIS — L97.421 SKIN ULCER OF LEFT HEEL, LIMITED TO BREAKDOWN OF SKIN (HCC): Primary | ICD-10-CM

## 2019-08-29 DIAGNOSIS — E11.51 UNCONTROLLED TYPE 2 DIABETES MELLITUS WITH DIABETIC PERIPHERAL ANGIOPATHY WITHOUT GANGRENE (HCC): ICD-10-CM

## 2019-08-29 DIAGNOSIS — E11.65 UNCONTROLLED TYPE 2 DIABETES MELLITUS WITH DIABETIC PERIPHERAL ANGIOPATHY WITHOUT GANGRENE (HCC): ICD-10-CM

## 2019-08-29 PROCEDURE — 99213 OFFICE O/P EST LOW 20 MIN: CPT | Performed by: PODIATRIST

## 2019-09-01 NOTE — PROGRESS NOTES
Delisa Villa is a 37year old male. Patient presents with: Follow - Up: LOV 8/20/19, sx 3/29/19 L calceneal exostectomy. pt wearing brace, using crutches. pt saw orthotist since [address] and said that changes were made. Wound: left heel.  pt with no n strip Rfl: 3   Blood Glucose Monitoring Suppl (ONETOUCH ULTRA 2) w/Device Does not apply Kit 1 kit by Does not apply route as needed. Disp:  Rfl:    Glucose Blood (ONETOUCH TEST) In Vitro Strip 1 strip by In Vitro route 4 (four) times daily.  Disp:  Rfl: breath with exertion  CARDIOVASCULAR: denies chest pain on exertion  GI: denies abdominal pain and denies heartburn  NEURO: denies headaches    EXAM:   There were no vitals taken for this visit.   Physical Exam  GENERAL: well developed, well nourished, in n

## 2019-09-12 ENCOUNTER — OFFICE VISIT (OUTPATIENT)
Dept: PODIATRY CLINIC | Facility: CLINIC | Age: 39
End: 2019-09-12
Payer: COMMERCIAL

## 2019-09-12 DIAGNOSIS — E11.65 UNCONTROLLED TYPE 2 DIABETES MELLITUS WITH DIABETIC PERIPHERAL ANGIOPATHY WITHOUT GANGRENE (HCC): ICD-10-CM

## 2019-09-12 DIAGNOSIS — L97.421 SKIN ULCER OF LEFT HEEL, LIMITED TO BREAKDOWN OF SKIN (HCC): Primary | ICD-10-CM

## 2019-09-12 DIAGNOSIS — E11.51 UNCONTROLLED TYPE 2 DIABETES MELLITUS WITH DIABETIC PERIPHERAL ANGIOPATHY WITHOUT GANGRENE (HCC): ICD-10-CM

## 2019-09-12 PROCEDURE — 99213 OFFICE O/P EST LOW 20 MIN: CPT | Performed by: PODIATRIST

## 2019-09-12 NOTE — PROGRESS NOTES
Kaiser Castañeda is a 37year old male. Patient presents with:  Post-Op: sx 3/29/19, left calcaneal exostectomy - pt ambulating in athletic shoes without crutches. pt wearing AFO on left foot/ankle.    Wound: Left heel wound - BG this am: 138. no pain, In Vitro Strip 1 strip by In Vitro route 4 (four) times daily.  Disp:  Rfl:       Past Medical History:   Diagnosis Date   • Diabetes ([facility] [address]    • Elevated liver enzymes 8/30/2016   • Elevated serum GGT level 8/30/2016   • High cholesterol    • HYPERLIPIDEMIA this visit. Physical Exam  GENERAL: well developed, well nourished, in no apparent distress  EXTREMITIES:   1.  Integument: The skin on the left foot was examined it is warm and dry there is an ulceration on the plantar surface of the heel which was measur

## 2019-09-13 ENCOUNTER — OFFICE VISIT (OUTPATIENT)
Dept: CARDIOLOGY | Age: 39
End: 2019-09-13

## 2019-09-13 ENCOUNTER — HOSPITAL ENCOUNTER (OUTPATIENT)
Dept: LAB | Facility: HOSPITAL | Age: 39
Discharge: HOME | End: 2019-09-13
Attending: INTERNAL MEDICINE
Payer: COMMERCIAL

## 2019-09-13 VITALS
BODY MASS INDEX: 31.98 KG/M2 | SYSTOLIC BLOOD PRESSURE: 122 MMHG | HEART RATE: 104 BPM | DIASTOLIC BLOOD PRESSURE: 64 MMHG | HEIGHT: 68 IN | WEIGHT: 211 LBS

## 2019-09-13 DIAGNOSIS — E11.22 CONTROLLED TYPE 2 DIABETES MELLITUS WITH CHRONIC KIDNEY DISEASE, WITH LONG-TERM CURRENT USE OF INSULIN, UNSPECIFIED CKD STAGE (CMD): ICD-10-CM

## 2019-09-13 DIAGNOSIS — N18.30 CKD (CHRONIC KIDNEY DISEASE) STAGE 3, GFR 30-59 ML/MIN (HCC): ICD-10-CM

## 2019-09-13 DIAGNOSIS — R00.0 TACHYCARDIA: ICD-10-CM

## 2019-09-13 DIAGNOSIS — Z79.4 CONTROLLED TYPE 2 DIABETES MELLITUS WITH CHRONIC KIDNEY DISEASE, WITH LONG-TERM CURRENT USE OF INSULIN, UNSPECIFIED CKD STAGE (CMD): ICD-10-CM

## 2019-09-13 DIAGNOSIS — I73.9 PAD (PERIPHERAL ARTERY DISEASE) (CMD): Primary | ICD-10-CM

## 2019-09-13 LAB
ANION GAP SERPL CALC-SCNC: 5 MMOL/L (ref 0–18)
BUN BLD-MCNC: 34 MG/DL (ref 7–18)
BUN/CREAT SERPL: 16.7 (ref 10–20)
CALCIUM BLD-MCNC: 9.5 MG/DL (ref 8.5–10.1)
CHLORIDE SERPL-SCNC: 109 MMOL/L (ref 98–112)
CO2 SERPL-SCNC: 25 MMOL/L (ref 21–32)
CREAT BLD-MCNC: 2.04 MG/DL (ref 0.7–1.3)
GLUCOSE BLD-MCNC: 109 MG/DL (ref 70–99)
OSMOLALITY SERPL CALC.SUM OF ELEC: 296 MOSM/KG (ref 275–295)
POTASSIUM SERPL-SCNC: 5.1 MMOL/L (ref 3.5–5.1)
SODIUM SERPL-SCNC: 139 MMOL/L (ref 136–145)

## 2019-09-13 PROCEDURE — 93000 ELECTROCARDIOGRAM COMPLETE: CPT | Performed by: INTERNAL MEDICINE

## 2019-09-13 PROCEDURE — 80048 BASIC METABOLIC PNL TOTAL CA: CPT

## 2019-09-13 PROCEDURE — 99214 OFFICE O/P EST MOD 30 MIN: CPT | Performed by: INTERNAL MEDICINE

## 2019-09-13 PROCEDURE — 3078F DIAST BP <80 MM HG: CPT | Performed by: INTERNAL MEDICINE

## 2019-09-13 PROCEDURE — 36415 COLL VENOUS BLD VENIPUNCTURE: CPT

## 2019-09-13 PROCEDURE — 3074F SYST BP LT 130 MM HG: CPT | Performed by: INTERNAL MEDICINE

## 2019-09-13 ASSESSMENT — ENCOUNTER SYMPTOMS
HEMATOCHEZIA: 0
COUGH: 0
FEVER: 0
CHILLS: 0
SUSPICIOUS LESIONS: 0
WEIGHT GAIN: 0
BRUISES/BLEEDS EASILY: 0
ALLERGIC/IMMUNOLOGIC COMMENTS: NO NEW FOOD ALLERGIES
WEIGHT LOSS: 0
HEMOPTYSIS: 0

## 2019-09-13 ASSESSMENT — PATIENT HEALTH QUESTIONNAIRE - PHQ9
2. FEELING DOWN, DEPRESSED OR HOPELESS: NOT AT ALL
SUM OF ALL RESPONSES TO PHQ9 QUESTIONS 1 AND 2: 0
SUM OF ALL RESPONSES TO PHQ9 QUESTIONS 1 AND 2: 0
1. LITTLE INTEREST OR PLEASURE IN DOING THINGS: NOT AT ALL

## 2019-09-16 ENCOUNTER — OFFICE VISIT (OUTPATIENT)
Dept: NEPHROLOGY | Facility: CLINIC | Age: 39
End: 2019-09-16
Payer: COMMERCIAL

## 2019-09-16 VITALS — WEIGHT: 209 LBS | BODY MASS INDEX: 32 KG/M2 | SYSTOLIC BLOOD PRESSURE: 120 MMHG | DIASTOLIC BLOOD PRESSURE: 78 MMHG

## 2019-09-16 DIAGNOSIS — N18.30 CKD (CHRONIC KIDNEY DISEASE) STAGE 3, GFR 30-59 ML/MIN (HCC): Primary | ICD-10-CM

## 2019-09-16 PROCEDURE — 99214 OFFICE O/P EST MOD 30 MIN: CPT | Performed by: INTERNAL MEDICINE

## 2019-09-16 NOTE — PROGRESS NOTES
Nephrology Progress Note      Marlon Hoffmann is a 37year old male.     HPI:   Patient presents with:  Chronic Kidney Disease      Mr. Darwin Orozco was seen in the nephrology clinic today in follow-up for management of chronic kidney disease stage III relat Outpatient Medications:  METFORMIN HCL 1000 MG Oral Tab TAKE 1 TABLET BY MOUTH TWICE DAILY WITH MEALS Disp: 180 tablet Rfl: 0   Dulaglutide (TRULICITY) 1.5 DU/0.4UC Subcutaneous Solution Pen-injector Inject 1.5 mg into the skin once a week.  Disp:  Rfl: oriented in no apparent distress. HEENT: No scleral icterus, MMM  Neck: Supple, no ITZ or thyromegaly  Cardiac: Regular rate and rhythm, S1, S2 normal, no murmur or rub  Lungs: Clear without wheezes, rales, rhonchi.     Abdomen: Soft, non-tender. + bowel s NITRITE Negative 07/12/2018    LEUUR Negative 07/12/2018    NMIC Microscopic not indicated 07/12/2018    WBCUR None Seen 07/06/2018    RBCUR None Seen 07/06/2018    EPIUR None Seen 07/06/2018    BACUR None Seen 07/06/2018     ASSESSMENT/PLAN:     #1.

## 2019-09-18 ENCOUNTER — TELEPHONE (OUTPATIENT)
Dept: PODIATRY CLINIC | Facility: CLINIC | Age: 39
End: 2019-09-18

## 2019-09-18 NOTE — TELEPHONE ENCOUNTER
Received fax for STD forms @ [address] Ayanna Sosa. Company name [employer]. No HIPAA, no fee collected, forwarded to Forms Dept.

## 2019-09-25 NOTE — TELEPHONE ENCOUNTER
9/18/19 - Disab update form recvd. Sent [employer] message for missing HIPAA/Fee. Logged for processing.

## 2019-09-26 ENCOUNTER — OFFICE VISIT (OUTPATIENT)
Dept: PODIATRY CLINIC | Facility: CLINIC | Age: 39
End: 2019-09-26
Payer: COMMERCIAL

## 2019-09-26 DIAGNOSIS — L97.421 SKIN ULCER OF LEFT HEEL, LIMITED TO BREAKDOWN OF SKIN (HCC): Primary | ICD-10-CM

## 2019-09-26 DIAGNOSIS — M21.372 LEFT FOOT DROP: ICD-10-CM

## 2019-09-26 PROCEDURE — 99213 OFFICE O/P EST LOW 20 MIN: CPT | Performed by: PODIATRIST

## 2019-09-27 NOTE — PROGRESS NOTES
Cristy Aldana is a 37year old male. Patient presents with:  Diabetic Ulcer: left heel. pt wearing AFO. pt is hoping to be cleared to go back to work today, no restrictions. pt denies pain or other complaints. bandaid for dressing.          HPI:   Pa Past Medical History:   Diagnosis Date   • [facility]    • Elevated liver enzymes 8/30/2016   • Elevated serum GGT level 8/30/2016   • High cholesterol    • HYPERLIPIDEMIA    • Hyperlipidemia    • HYPERTRIGLYCERIDEMIA    • OBESITY    • Pneumonia due apparent distress  EXTREMITIES:   There are no changes in the patient's neurovascular status noted on the left lower extremity. Patient has weakly palpable pulses has diminished pedal sensorium.   Checkup on the ulceration shows that the ulceration is now

## 2019-10-02 NOTE — TELEPHONE ENCOUNTER
Verified with pharmcy pt never picked up the caverject 20mcg. Used previously and pt reports this dose did not work. PVK please advise.

## 2019-10-04 NOTE — TELEPHONE ENCOUNTER
Dr. Ayanna Sosa,    Please sign off on form:  -Highlight the patient and hit \"Chart\" button. -In Chart Review, w/in the Encounter tab - click 1 time on the Telephone call encounter for 9/18/19.  Scroll down the telephone encounter.  -Click \"scan on\" blue H

## 2019-10-08 NOTE — TELEPHONE ENCOUNTER
Faxed Disab update to [facility] - [phone]. Mailed copy to pt. Notified via [address] [place] [address] [address].

## 2019-10-10 ENCOUNTER — OFFICE VISIT (OUTPATIENT)
Dept: PODIATRY CLINIC | Facility: CLINIC | Age: 39
End: 2019-10-10
Payer: COMMERCIAL

## 2019-10-10 DIAGNOSIS — L97.421 SKIN ULCER OF LEFT HEEL, LIMITED TO BREAKDOWN OF SKIN (HCC): Primary | ICD-10-CM

## 2019-10-10 DIAGNOSIS — E08.40 DIABETES DUE TO UNDERLYING CONDITION W DIABETIC NEUROP, UNSP (HCC): ICD-10-CM

## 2019-10-10 PROCEDURE — 99213 OFFICE O/P EST LOW 20 MIN: CPT | Performed by: PODIATRIST

## 2019-10-11 NOTE — PROGRESS NOTES
Darrell Monreal is a 37year old male. Patient presents with:  Diabetic Ulcer: left heel -- States site is looking a little better. Denies any drainage, swelling or redness or fever.  BG was 125 this AM.         HPI:   This patient presents for follow- liver enzymes 8/30/2016   • Elevated serum GGT level 8/30/2016   • High cholesterol    • HYPERLIPIDEMIA    • Hyperlipidemia    • HYPERTRIGLYCERIDEMIA    • OBESITY    • Pneumonia due to organism    • Visual impairment     GLASSES      Past Surgical History: foot was examined as white macerated hyperkeratosis around the periphery. After debridement there is no exposed bone fat or underlying structure.    The ulceration is measured as 1.4 cm in length 0.6 cm in width and after debridement was only 0.1 cm in dep

## 2019-10-14 NOTE — TELEPHONE ENCOUNTER
Urology nurse Octavia Kebede,  Please call patient or contact him by \"my chart\";   Let him know that since Caverject 20 mcg (alprostadil) was insufficient to give him a firm erection, other alternative would be to go with EDEX, also alprostadil but they have a 40 m

## 2019-10-17 ENCOUNTER — TELEPHONE (OUTPATIENT)
Dept: ORTHOPEDICS CLINIC | Facility: CLINIC | Age: 39
End: 2019-10-17

## 2019-10-17 NOTE — TELEPHONE ENCOUNTER
Spoke to pt and rescheduled appt to 10/24/19 at 10:30AM in [facility] site with Sallie. Pt verbalized understanding.

## 2019-10-17 NOTE — TELEPHONE ENCOUNTER
Pt. has a sched f/up appt. for 10/24/19 at 8:45am, but is not able to make time, can pt. be seen btwn. 9am-12noon? Pt. Was informed that Dr is full, but pt. States that Dr told him to speak to nurse if he has a problem with his appt.

## 2019-10-18 NOTE — TELEPHONE ENCOUNTER
Dr. Nelida Olson, patient would prefer Edex dose 30. Requesting 90 day supply. I pended the correct pharmacy, however I apologize I was unable to pend the prescription fully for you as I am unfamiliar with its dosing, qty, and if syringes are also needed.

## 2019-10-18 NOTE — TELEPHONE ENCOUNTER
PVK message sent to the patient. Will await response to see if he has further questions/wants an office visit.

## 2019-10-22 ENCOUNTER — TELEPHONE (OUTPATIENT)
Dept: SURGERY | Facility: CLINIC | Age: 39
End: 2019-10-22

## 2019-10-22 NOTE — TELEPHONE ENCOUNTER
Dr. Keith Mondragon, pt contacted the office about a change to Edex, see his msg below and advise:   Tasked to [address], [place] 4 days ago         Dr. Florina Orellana, patient would prefer Edex dose 30. Requesting 90 day supply.  I pended the correct pharmacy, [facility]

## 2019-10-23 NOTE — TELEPHONE ENCOUNTER
Martínez Carvajal,  Could you please pend to me medication following specifications from patient–––    Dr. Jefferson Poster, pt contacted the office about a change to Edex, see his msg below and advise:   Tasked to [address] RN 4 days ago         Dr. Angel Ferrera, patient

## 2019-10-24 ENCOUNTER — OFFICE VISIT (OUTPATIENT)
Dept: PODIATRY CLINIC | Facility: CLINIC | Age: 39
End: 2019-10-24
Payer: COMMERCIAL

## 2019-10-24 DIAGNOSIS — L97.421 SKIN ULCER OF LEFT HEEL, LIMITED TO BREAKDOWN OF SKIN (HCC): ICD-10-CM

## 2019-10-24 DIAGNOSIS — L97.429 HEEL ULCERATION, LEFT, WITH UNSPECIFIED SEVERITY (HCC): Primary | ICD-10-CM

## 2019-10-24 DIAGNOSIS — I73.89 OTHER SPECIFIED PERIPHERAL VASCULAR DISEASES (HCC): ICD-10-CM

## 2019-10-24 PROCEDURE — 99213 OFFICE O/P EST LOW 20 MIN: CPT | Performed by: PODIATRIST

## 2019-10-25 NOTE — PROGRESS NOTES
Delisa Villa is a 37year old male. Patient presents with:  Diabetic Ulcer: left heel. pt to office with left afo. bg this am 137. pt with large band aid on wound. pt denies drainage, s/s infection, fever, calf pain. pt denies pain.          HPI: not apply route as needed. , Disp: , Rfl:   Glucose Blood (ONETOUCH TEST) In Vitro Strip, 1 strip by In Vitro route 4 (four) times daily. , Disp: , Rfl:        Past Medical History:   Diagnosis Date   • [facility]    • Elevated liver enzymes 8/30/2016 heartburn  NEURO: denies headaches    EXAM:   There were no vitals taken for this visit. Physical Exam  GENERAL: well developed, well nourished, in no apparent distress  EXTREMITIES:   1.  Integument: Skin of the left foot was examined sites from his previ

## 2019-10-30 ENCOUNTER — PATIENT MESSAGE (OUTPATIENT)
Dept: SURGERY | Facility: CLINIC | Age: 39
End: 2019-10-30

## 2019-10-31 NOTE — TELEPHONE ENCOUNTER
Jimmey Brunner, RN 10/30/2019 3:32 PM CDT      ----- Message -----  From: Radha Tobin  Sent: 10/30/2019 2:14 PM CDT  To: [facility] Clinical Staff  Subject: Prescription Question     I tried the 30 mcg dose for the EDEX and it was unsuccessful as

## 2019-10-31 NOTE — TELEPHONE ENCOUNTER
Urology telephone call  I called and spoke to patient this morning; he experienced a partial erection with Edex 30 mcg; he achieved tumescence and partial upward elevation of the penis for too  soft for successful penetration or successful intercourse.   Pa

## 2019-11-07 ENCOUNTER — OFFICE VISIT (OUTPATIENT)
Dept: PODIATRY CLINIC | Facility: CLINIC | Age: 39
End: 2019-11-07
Payer: COMMERCIAL

## 2019-11-07 DIAGNOSIS — E11.65 UNCONTROLLED TYPE 2 DIABETES MELLITUS WITH DIABETIC PERIPHERAL ANGIOPATHY WITHOUT GANGRENE (HCC): ICD-10-CM

## 2019-11-07 DIAGNOSIS — L97.421 SKIN ULCER OF LEFT HEEL, LIMITED TO BREAKDOWN OF SKIN (HCC): Primary | ICD-10-CM

## 2019-11-07 DIAGNOSIS — E11.51 UNCONTROLLED TYPE 2 DIABETES MELLITUS WITH DIABETIC PERIPHERAL ANGIOPATHY WITHOUT GANGRENE (HCC): ICD-10-CM

## 2019-11-07 PROCEDURE — 99213 OFFICE O/P EST LOW 20 MIN: CPT | Performed by: PODIATRIST

## 2019-11-07 NOTE — PROGRESS NOTES
Fede Caceres is a 37year old male. Patient presents with:  Diabetic Ulcer: LOV10/24/19, left heel. pt to office wearing left afo. bg this am: 135 pt with large band aid to wound. pt thinks the wound is healing well.  pt denies drainage, s/s infecti Past Medical History:   Diagnosis Date   • Diabetes ([facility]    • Elevated liver enzymes 8/30/2016   • Elevated serum GGT level 8/30/2016   • High cholesterol    • HYPERLIPIDEMIA    • Hyperlipidemia    • HYPERTRIGLYCERIDEMIA    • OBESITY    • Pneumonia due apparent distress  EXTREMITIES:   1. Integument: The skin on the left foot is warm dry and supple there is an ulceration measuring 0.8 cm in length by 0.2 cm in width with a 0.1 cm depth on the plantar heel is very superficial and it is improved.   There ar

## 2019-11-21 ENCOUNTER — OFFICE VISIT (OUTPATIENT)
Dept: PODIATRY CLINIC | Facility: CLINIC | Age: 39
End: 2019-11-21
Payer: COMMERCIAL

## 2019-11-21 DIAGNOSIS — L97.421 SKIN ULCER OF LEFT HEEL, LIMITED TO BREAKDOWN OF SKIN (HCC): Primary | ICD-10-CM

## 2019-11-21 PROCEDURE — 99213 OFFICE O/P EST LOW 20 MIN: CPT | Performed by: PODIATRIST

## 2019-11-25 NOTE — PROGRESS NOTES
Elba Goncalves is a 37year old male. Patient presents with:  Diabetic Ulcer: LOV 11/7/19. left heel. pt of office wearing left afo. large bandaid over wound. bg this am: 138. pt denies pain, s/s infection, fever, calf pain, drainage.  pt thinks the w (four) times daily.         Past Medical History:   Diagnosis Date   • Diabetes ([address]    • Elevated liver enzymes 8/30/2016   • Elevated serum GGT level 8/30/2016   • High cholesterol    • HYPERLIPIDEMIA    • Hyperlipidemia    • HYPERTRIGLYCERIDEMIA    • OB well nourished, in no apparent distress  EXTREMITIES:   1. Integument: The skin on the left heel was examined the ulceration is almost completely healed at this time. 2. Vascular: Patient again has weakened pulses   3.  Neurologic: Patient has diminished

## 2019-12-09 ENCOUNTER — OFFICE VISIT (OUTPATIENT)
Dept: SURGERY | Facility: CLINIC | Age: 39
End: 2019-12-09
Payer: COMMERCIAL

## 2019-12-09 VITALS
HEIGHT: 68 IN | WEIGHT: 200 LBS | DIASTOLIC BLOOD PRESSURE: 90 MMHG | BODY MASS INDEX: 30.31 KG/M2 | RESPIRATION RATE: 16 BRPM | HEART RATE: 101 BPM | SYSTOLIC BLOOD PRESSURE: 152 MMHG

## 2019-12-09 DIAGNOSIS — N52.01 ERECTILE DYSFUNCTION DUE TO ARTERIAL INSUFFICIENCY: Primary | ICD-10-CM

## 2019-12-09 DIAGNOSIS — R35.1 NOCTURIA: ICD-10-CM

## 2019-12-09 DIAGNOSIS — N40.1 BENIGN PROSTATIC HYPERPLASIA WITH NOCTURIA: ICD-10-CM

## 2019-12-09 DIAGNOSIS — N18.30 STAGE 3 CHRONIC KIDNEY DISEASE (HCC): ICD-10-CM

## 2019-12-09 DIAGNOSIS — R35.1 BENIGN PROSTATIC HYPERPLASIA WITH NOCTURIA: ICD-10-CM

## 2019-12-09 PROCEDURE — 99214 OFFICE O/P EST MOD 30 MIN: CPT | Performed by: UROLOGY

## 2019-12-09 NOTE — PROGRESS NOTES
HPI:    Patient ID: Cristy Aldana is a 37year old male. HPI     Erectile Dysfunction  Chronic. Problem started 2016 gradually.  Patient has tried Viagra 50 mg PO an hour before sexual activity; this worked for a time but eventually stopped workin ARNOL Niño; erectile dysfunction; patient seen for consult regarding ED; previously treated effectively with Viagra 50 mg PO as needed, effectiveness has decreased; reports normal libido; testosterone levels normal; trial of Viagra 100 mg; history o Vasodilator, (CAVERJECT) 40 MCG Intracavernosal Recon Soln 30 mcg by Intracavernosal route once a week for 3 doses.  3 each 11   • METFORMIN HCL 1000 MG Oral Tab TAKE 1 TABLET BY MOUTH TWICE DAILY WITH MEALS 180 tablet 0   • mupirocin 2 % External Ointment TAMMY CALLEJAS at [facility] MAIN OR   • CHOLECYSTECTOMY     • DEBRIDE WOUND Left     HEEL   • EXTREMITY LOWER IRRIGATION & DEBRIDEMENT Left 2/17/2018    Performed by Elie Avila DPM at [facility] MAIN OR   • FOOT SURGERY     • KNEE ARTHROTOMY Left 4/29/2018    Performed respiratory distress. Genitourinary:    Genitourinary Comments: Patient politely declined LAURA today. On 5/7/2019 LAURA, prostate 1+ enlarged, no palpable nodules or indurations. Musculoskeletal: Normal range of motion.      Neurological: He is alert and o Caverject 20 mcg was working better than the Edex 40 mcg. He states that the Caverject 20 mcg was giving him a partial erection, but not enough to achieve intromission.  I fully explained to patient the benefits, risks, complications, side effects, reasons with the following:       Treatment Plan & Patient Instructions    1.       The next time you have sexual activity, please self administer 30  micrograms of Caverject intracavernosally (needle perpendicular to the surface of the side of the midshaft of the complete urinalysis 1--10 days before visit.     10.  Please follow-up with your primary physician concerning your elevated blood pressure      Orders Placed This Encounter      Urinalysis, Routine- Future      Meds This Visit:  Requested Prescriptions

## 2019-12-09 NOTE — PATIENT INSTRUCTIONS
Danitza Junior M.D.        1.       .       The next time you have sexual activity, please self administer 30  micrograms of Caverject intracavernosally (needle perpendicular to the surface of the side of the midshaft of the penis urinalysis 1--10 days before visit.     10.  Please follow-up with your primary physician concerning your elevated blood pressure

## 2019-12-13 ENCOUNTER — OFFICE VISIT (OUTPATIENT)
Dept: PODIATRY CLINIC | Facility: CLINIC | Age: 39
End: 2019-12-13
Payer: COMMERCIAL

## 2019-12-13 DIAGNOSIS — E11.51 UNCONTROLLED TYPE 2 DIABETES MELLITUS WITH DIABETIC PERIPHERAL ANGIOPATHY WITHOUT GANGRENE (HCC): ICD-10-CM

## 2019-12-13 DIAGNOSIS — E11.65 UNCONTROLLED TYPE 2 DIABETES MELLITUS WITH DIABETIC PERIPHERAL ANGIOPATHY WITHOUT GANGRENE (HCC): ICD-10-CM

## 2019-12-13 DIAGNOSIS — L97.421 SKIN ULCER OF LEFT HEEL, LIMITED TO BREAKDOWN OF SKIN (HCC): Primary | ICD-10-CM

## 2019-12-13 PROCEDURE — 99213 OFFICE O/P EST LOW 20 MIN: CPT | Performed by: PODIATRIST

## 2019-12-20 NOTE — PROGRESS NOTES
Tory Johnson is a 37year old male. Patient presents with:  Diabetic Ulcer: LOV 11/21/19. left heel ulcer. bg this am: 143. pt arrives to office in diabetic shoes, wearing left afo. pt using large bandaid. scant drainage.  pt denies pain        HPI: cutanoeusly daily  after hyperbaric session done ) 15 mL 2   • Blood Glucose Monitoring Suppl (ONETOUCH ULTRA 2) w/Device Does not apply Kit 1 kit by Does not apply route as needed.      • Glucose Blood (ONETOUCH TEST) In Vitro Strip 1 strip by In Vitro rou exertion  CARDIOVASCULAR: denies chest pain on exertion  GI: denies abdominal pain and denies heartburn  NEURO: denies headaches    EXAM:   There were no vitals taken for this visit.   GENERAL: well developed, well nourished, in no apparent distress  EXTREM

## 2020-01-03 ENCOUNTER — OFFICE VISIT (OUTPATIENT)
Dept: PODIATRY CLINIC | Facility: CLINIC | Age: 40
End: 2020-01-03
Payer: COMMERCIAL

## 2020-01-03 DIAGNOSIS — I73.89 OTHER SPECIFIED PERIPHERAL VASCULAR DISEASES (HCC): ICD-10-CM

## 2020-01-03 DIAGNOSIS — E11.51 UNCONTROLLED TYPE 2 DIABETES MELLITUS WITH DIABETIC PERIPHERAL ANGIOPATHY WITHOUT GANGRENE (HCC): Primary | ICD-10-CM

## 2020-01-03 DIAGNOSIS — E11.65 UNCONTROLLED TYPE 2 DIABETES MELLITUS WITH DIABETIC PERIPHERAL ANGIOPATHY WITHOUT GANGRENE (HCC): Primary | ICD-10-CM

## 2020-01-03 DIAGNOSIS — M21.372 LEFT FOOT DROP: ICD-10-CM

## 2020-01-03 PROCEDURE — 99213 OFFICE O/P EST LOW 20 MIN: CPT | Performed by: PODIATRIST

## 2020-01-06 NOTE — PROGRESS NOTES
Ange Browning is a 37year old male. Patient presents with:  Diabetic Ulcer: LOV 12/13/19. left heel ulcer. bg this am: 141.  pt arrives to office wearing diabetic shoes, and left afo. pt using a large bandaid, denies drainage (pt states that he has inject 15 UNITS into the skin sub cutanoeusly daily  after hyperbaric session done ) 15 mL 2   • Blood Glucose Monitoring Suppl (ONETOUCH ULTRA 2) w/Device Does not apply Kit 1 kit by Does not apply route as needed.      • Glucose Blood (ONETOUCH TEST) In V denies shortness of breath with exertion  CARDIOVASCULAR: denies chest pain on exertion  GI: denies abdominal pain and denies heartburn  NEURO: denies headaches    EXAM:   There were no vitals taken for this visit.   GENERAL: well developed, well nourished,

## 2020-01-08 ENCOUNTER — TELEPHONE (OUTPATIENT)
Dept: SURGERY | Facility: CLINIC | Age: 40
End: 2020-01-08

## 2020-01-08 NOTE — TELEPHONE ENCOUNTER
From: Nova Tobin  To: Melissa Grimaldo MD  Sent: 1/6/2020 11:32 AM CST  Subject: Prescription Question    You gave me the preacription for 3 caverject 30 doses. Rosetta Ochoa at that dose, the pharmacies I have checked will only do 8 or 12 doses.  Th

## 2020-01-08 NOTE — TELEPHONE ENCOUNTER
Urology nurse Tabatha Huerta,  Please call patient and give him information on the vacuum erection device that we are presently prescribing for patients. If patient would like a prescription, please give me the form to sign.   Remind patient that if he chooses the v

## 2020-01-09 NOTE — TELEPHONE ENCOUNTER
S/W pt and informed him of LES's msg belowand I told pt that I will ask REECEK to sign the script and we will contact our rep at [address] [place] [address] to see when he could possible come out to our office to instruct the pt on use.  I told him that to my knowledg

## 2020-01-09 NOTE — TELEPHONE ENCOUNTER
S/w pt and informed him that the script requires his signature and he may need to stop in the office to sign it. He asked if I could email the script to him and he could then send it back to me.  I told him that I will find out if I can do this and if not I

## 2020-02-07 ENCOUNTER — OFFICE VISIT (OUTPATIENT)
Dept: PODIATRY CLINIC | Facility: CLINIC | Age: 40
End: 2020-02-07
Payer: COMMERCIAL

## 2020-02-07 DIAGNOSIS — E11.51 UNCONTROLLED TYPE 2 DIABETES MELLITUS WITH DIABETIC PERIPHERAL ANGIOPATHY WITHOUT GANGRENE (HCC): Primary | ICD-10-CM

## 2020-02-07 DIAGNOSIS — L97.421 SKIN ULCER OF LEFT HEEL, LIMITED TO BREAKDOWN OF SKIN (HCC): ICD-10-CM

## 2020-02-07 DIAGNOSIS — E11.65 UNCONTROLLED TYPE 2 DIABETES MELLITUS WITH DIABETIC PERIPHERAL ANGIOPATHY WITHOUT GANGRENE (HCC): Primary | ICD-10-CM

## 2020-02-07 DIAGNOSIS — I73.89 OTHER SPECIFIED PERIPHERAL VASCULAR DISEASES (HCC): ICD-10-CM

## 2020-02-07 PROCEDURE — 99213 OFFICE O/P EST LOW 20 MIN: CPT | Performed by: PODIATRIST

## 2020-02-10 NOTE — PROGRESS NOTES
Tory Johnson is a 37year old male. Patient presents with:  Diabetic Ulcer: LOV 1/3/20. left heel ulcer. bg this am: 145. pt arrives to office ambulating with athletic shoes. pt dressing wound with large bandaid.  pt has been working and walking at (ONETOUCH ULTRA 2) w/Device Does not apply Kit 1 kit by Does not apply route as needed. • Glucose Blood (ONETOUCH TEST) In Vitro Strip 1 strip by In Vitro route 4 (four) times daily.         Past Medical History:   Diagnosis Date   • Diabetes ([facility]    • heartburn  NEURO: denies headaches    EXAM:   There were no vitals taken for this visit. GENERAL: well developed, well nourished, in no apparent distress  EXTREMITIES:   1. Integument: The skin on the left heel was evaluated.   He has hyperkeratosis which

## 2020-02-11 ENCOUNTER — TELEPHONE (OUTPATIENT)
Dept: SURGERY | Facility: CLINIC | Age: 40
End: 2020-02-11

## 2020-02-11 NOTE — TELEPHONE ENCOUNTER
S/W pt and told him that I can put him on the list for the rep from [employer] to work with him on use of the VAD for 3/4/202 between the hours of 10 am and 2 pm. I faxed the signed script with attached face sheet, copies of the ins cards and LOV note to [employer] at  [phone] and received a fax confirmation. I also emailed the rep at [facility] that I did so.

## 2020-03-13 ENCOUNTER — OFFICE VISIT (OUTPATIENT)
Dept: PODIATRY CLINIC | Facility: CLINIC | Age: 40
End: 2020-03-13
Payer: COMMERCIAL

## 2020-03-13 DIAGNOSIS — E11.51 UNCONTROLLED TYPE 2 DIABETES MELLITUS WITH DIABETIC PERIPHERAL ANGIOPATHY WITHOUT GANGRENE (HCC): Primary | ICD-10-CM

## 2020-03-13 DIAGNOSIS — I73.89 OTHER SPECIFIED PERIPHERAL VASCULAR DISEASES (HCC): ICD-10-CM

## 2020-03-13 DIAGNOSIS — L84 CALLUS OF FOOT: ICD-10-CM

## 2020-03-13 DIAGNOSIS — E11.65 UNCONTROLLED TYPE 2 DIABETES MELLITUS WITH DIABETIC PERIPHERAL ANGIOPATHY WITHOUT GANGRENE (HCC): Primary | ICD-10-CM

## 2020-03-13 PROCEDURE — 99213 OFFICE O/P EST LOW 20 MIN: CPT | Performed by: PODIATRIST

## 2020-03-16 NOTE — PROGRESS NOTES
Mónica Higgins is a 37year old male. Patient presents with:  Diabetic Ulcer: LOV 2/7/20. left heel ulcer. pt to office wearing athletic shoes. pt feels foot is OK.  pt denies pain        HPI:   Patient returns to the clinic for a checkup on the heel w/Device Does not apply Kit 1 kit by Does not apply route as needed. • Glucose Blood (ONETOUCH TEST) In Vitro Strip 1 strip by In Vitro route 4 (four) times daily.         Past Medical History:   Diagnosis Date   • Diabetes ([facility]    • Elevated liver enz headaches    EXAM:   There were no vitals taken for this visit. GENERAL: well developed, well nourished, in no apparent distress  EXTREMITIES:   1.  Integument: The skin was evaluated is warm, dry and supple there is hyperkeratosis at the plantar left heel

## 2020-03-22 ENCOUNTER — TELEPHONE (OUTPATIENT)
Dept: NEPHROLOGY | Facility: CLINIC | Age: 40
End: 2020-03-22

## 2020-03-22 NOTE — TELEPHONE ENCOUNTER
Called pt today and left VM. Advised appt tomorrow is cancelled. He should have labs done and we can discuss results.

## 2020-03-25 NOTE — PLAN OF CARE
[facility] Daily Progress Note      Admit Date:  3/13/2020    No chief complaint on file. Subjective:  Mr. Peggy Johnson denies exertional chest pain, SOB/BARBER, PND, palpitations, light-headedness, or edema. Incision CDI. Pain well controlled. Objective:   BP (!) 113/56   Pulse 74   Temp 98 °F (36.7 °C) (Core)   Resp 14   Ht 5' 7.5\" (1.715 m)   Wt 171 lb 15.3 oz (78 kg)   SpO2 94%   BMI 26.54 kg/m²       Intake/Output Summary (Last 24 hours) at 3/25/2020 1432  Last data filed at 3/25/2020 1425  Gross per 24 hour   Intake 1758 ml   Output 2525 ml   Net -767 ml       TELEMETRY: Sinus     Physical Exam:  General:  Awake, alert, oriented x 3, NAD  Skin:  Warm and dry  Neck:  JVD flat. [place] in place  Chest:  normal air entry.  Sternal would CDI, healing well  Cardiovascular:  RRR S1S2, no S3, no mrmr/rub  Abdomen:  Soft, ND, NT, No HSM  Extremities:  No edema    Medications:    albumin human  25 g Intravenous Q8H    sodium chloride flush  10 mL Intravenous 2 times per day    acetaminophen  1,000 mg Oral Q6H    pantoprazole  40 mg Oral Daily    metoprolol tartrate  12.5 mg Oral BID    furosemide  20 mg Intravenous BID    magnesium oxide  400 mg Oral BID    mupirocin   Nasal BID    nitroGLYCERIN  1 patch Transdermal Daily    potassium chloride  10 mEq Oral TID WC    aspirin  325 mg Oral Daily    insulin lispro  0-12 Units Subcutaneous TID WC    insulin lispro  0-6 Units Subcutaneous Nightly    ceFAZolin (ANCEF) IVPB  2 g Intravenous Q8H    sennosides-docusate sodium  1 tablet Oral BID    [START ON 3/27/2020] losartan  25 mg Oral Lunch    fondaparinux  2.5 mg Subcutaneous Daily    insulin glargine  0.15 Units/kg Subcutaneous Nightly    [START ON 3/26/2020] insulin lispro  0.08 Units/kg Subcutaneous TID WC      DOPamine 3 mcg/kg/min (03/25/20 1257)    insulin 1.3 Units/hr (03/25/20 1425)    dextrose       sodium chloride flush, potassium chloride, oxyCODONE **OR** oxyCODONE, DR COSTELLO WAS PAGED,NO PLAN FOR SX TODAY MARIMAR 1700 MARIMAR.  PT MADE AWARE morphine, diphenhydrAMINE, zolpidem, ondansetron, metoclopramide, albuterol sulfate HFA, albumin human, DOPamine, furosemide, glucose, dextrose, glucagon (rDNA), dextrose, magnesium hydroxide, polyethylene glycol    Lab Data:  CBC:   Recent Labs     03/24/20  0522  03/24/20  1313  03/24/20  1556 03/25/20  0009 03/25/20  0522   WBC 9.9  --  22.9*  --   --   --  21.3*   HGB 14.0   < > 13.9   < > 12.1* 11.6* 11.6*   HCT 41.1  --  42.0  --   --   --  34.7*   MCV 88.6  --  89.8  --   --   --  90.0     --  222  --   --   --  228    < > = values in this interval not displayed. BMP:   Recent Labs     03/24/20  0522 03/24/20  1313 03/25/20  0522    145 144   K 3.9 3.8 4.7    112* 111*   CO2 26 24 23   BUN 18 15 15   CREATININE 0.6* 0.6* 0.7*     LIVER PROFILE: No results for input(s): AST, ALT, LIPASE, BILIDIR, BILITOT, ALKPHOS in the last 72 hours. Invalid input(s): AMYLASE,  ALB  PT/INR:   Recent Labs     03/23/20  1700   PROTIME 13.5*   INR 1.16*     APTT:   Recent Labs     03/23/20  1700   APTT 35.6     BNP:  No results for input(s): BNP in the last 72 hours. IMAGING:     Assessment/Plan:  Principal Problem:    NSTEMI (non-ST elevated myocardial infarction) ([facility]  Plan: Severe MVD. Acute RCA occlusion. S/P CABG X 3/JORGE ALBERTO - 3/24. POD 1. Wean off gtts. Remove chest tubes and munoz per surgery. Cont lasix, lopressor, lisinopril, aspirin, statin    Acute systolic (congestive) heart failure ([facility]  Plan: bb, acei. lvef 40%.              Lance Chaparro MD 3/25/2020 2:32 PM

## 2020-04-29 ENCOUNTER — TELEPHONE (OUTPATIENT)
Dept: FAMILY MEDICINE CLINIC | Facility: CLINIC | Age: 40
End: 2020-04-29

## 2020-05-22 ENCOUNTER — OFFICE VISIT (OUTPATIENT)
Dept: PODIATRY CLINIC | Facility: CLINIC | Age: 40
End: 2020-05-22
Payer: COMMERCIAL

## 2020-05-22 VITALS — TEMPERATURE: 99 F

## 2020-05-22 DIAGNOSIS — E11.51 UNCONTROLLED TYPE 2 DIABETES MELLITUS WITH DIABETIC PERIPHERAL ANGIOPATHY WITHOUT GANGRENE (HCC): Primary | ICD-10-CM

## 2020-05-22 DIAGNOSIS — E11.65 UNCONTROLLED TYPE 2 DIABETES MELLITUS WITH DIABETIC PERIPHERAL ANGIOPATHY WITHOUT GANGRENE (HCC): Primary | ICD-10-CM

## 2020-05-22 DIAGNOSIS — I73.89 OTHER SPECIFIED PERIPHERAL VASCULAR DISEASES (HCC): ICD-10-CM

## 2020-05-22 DIAGNOSIS — L84 CALLUS OF FOOT: ICD-10-CM

## 2020-05-22 PROCEDURE — 99213 OFFICE O/P EST LOW 20 MIN: CPT | Performed by: PODIATRIST

## 2020-05-26 NOTE — PROGRESS NOTES
Darrell Monreal is a 41year old male. Patient presents with:  Diabetic Ulcer: Did not check BS this AM - left heel ulcer check - got a red spot on 2nd toe left foot from wearing new shoes - denies any pain.         HPI:   Patient returns to the clinic by Does not apply route as needed. • Glucose Blood (ONETOUCH TEST) In Vitro Strip 1 strip by In Vitro route 4 (four) times daily.         Past Medical History:   Diagnosis Date   • Diabetes ([facility]    • Elevated liver enzymes 8/30/2016   • Elevated serum (37.4 °C) (Oral)   GENERAL: well developed, well nourished, in no apparent distress  EXTREMITIES:   1.  Integument: Incision line from his previous calcanectomy is well-healed there is no sign of infection he has a mild hyperkeratosis on the plantar heel wh

## 2020-06-17 ENCOUNTER — TELEPHONE (OUTPATIENT)
Dept: NEPHROLOGY | Facility: CLINIC | Age: 40
End: 2020-06-17

## 2020-06-17 NOTE — TELEPHONE ENCOUNTER
Called pt to offer appt to replace COVID cancellation with Dr. David Xie. I spoke with pt. He is arranging his work schedule. He will call back to make an appt.

## 2020-07-17 ENCOUNTER — OFFICE VISIT (OUTPATIENT)
Dept: PODIATRY CLINIC | Facility: CLINIC | Age: 40
End: 2020-07-17
Payer: COMMERCIAL

## 2020-07-17 DIAGNOSIS — E08.40 DIABETES DUE TO UNDERLYING CONDITION W DIABETIC NEUROP, UNSP (HCC): ICD-10-CM

## 2020-07-17 DIAGNOSIS — L84 CALLUS OF FOOT: Primary | ICD-10-CM

## 2020-07-17 PROCEDURE — 99213 OFFICE O/P EST LOW 20 MIN: CPT | Performed by: PODIATRIST

## 2020-07-19 NOTE — PROGRESS NOTES
Octavia Aguilar is a 41year old male. Patient presents with:  Diabetic Ulcer: BS this  - ulcer, left heel - denies any pain.         HPI:   Patient returns to the clinic at this point in time says the bottom of his heel looks very good does not Glucose Blood (ONETOUCH TEST) In Vitro Strip 1 strip by In Vitro route 4 (four) times daily.         Past Medical History:   Diagnosis Date   • Diabetes ([facility]    • Elevated liver enzymes 8/30/2016   • Elevated serum GGT level 8/30/2016   • High cholesterol well developed, well nourished, in no apparent distress  EXTREMITIES:   1.  Integument: Examination shows that the patient has just a small area on the outside plantar lateral aspect of the heel where there is a skin irritation there is no ulceration there

## 2020-08-28 ENCOUNTER — OFFICE VISIT (OUTPATIENT)
Dept: PODIATRY CLINIC | Facility: CLINIC | Age: 40
End: 2020-08-28
Payer: COMMERCIAL

## 2020-08-28 DIAGNOSIS — L84 CALLUS OF FOOT: Primary | ICD-10-CM

## 2020-08-28 DIAGNOSIS — I73.89 OTHER SPECIFIED PERIPHERAL VASCULAR DISEASES (HCC): ICD-10-CM

## 2020-08-28 DIAGNOSIS — M21.372 LEFT FOOT DROP: ICD-10-CM

## 2020-08-28 PROCEDURE — 99213 OFFICE O/P EST LOW 20 MIN: CPT | Performed by: PODIATRIST

## 2020-08-30 NOTE — PROGRESS NOTES
Gregory Pelaez is a 41year old male. Patient presents with:  Callus: left heel -- Site is closed and dry. Denies any pain. BG was 147 this AM        HPI:   Patient returns to the clinic today for checkup on his left heel. Is not having any pain.   A times daily.      • mupirocin 2 % External Ointment MAIKEL AA D  1      Past Medical History:   Diagnosis Date   • Diabetes ([facility]    • Elevated liver enzymes 8/30/2016   • Elevated serum GGT level 8/30/2016   • High cholesterol    • HYPERLIPIDEMIA    • Hyperli in no apparent distress  EXTREMITIES:   1. Integument: The skin on the left foot was examined it is warm and dry is a small dried scab at the lateral plantar aspect of his partial calcanectomy. There is no sign of infection noted. 2. Vascular:  The patie

## 2020-10-16 ENCOUNTER — OFFICE VISIT (OUTPATIENT)
Dept: PODIATRY CLINIC | Facility: CLINIC | Age: 40
End: 2020-10-16
Payer: COMMERCIAL

## 2020-10-16 DIAGNOSIS — E11.51 UNCONTROLLED TYPE 2 DIABETES MELLITUS WITH DIABETIC PERIPHERAL ANGIOPATHY WITHOUT GANGRENE (HCC): ICD-10-CM

## 2020-10-16 DIAGNOSIS — E11.65 UNCONTROLLED TYPE 2 DIABETES MELLITUS WITH DIABETIC PERIPHERAL ANGIOPATHY WITHOUT GANGRENE (HCC): ICD-10-CM

## 2020-10-16 DIAGNOSIS — E08.40 DIABETES DUE TO UNDERLYING CONDITION W DIABETIC NEUROP, UNSP (HCC): ICD-10-CM

## 2020-10-16 DIAGNOSIS — M21.372 LEFT FOOT DROP: ICD-10-CM

## 2020-10-16 DIAGNOSIS — L84 CALLUS OF FOOT: Primary | ICD-10-CM

## 2020-10-16 DIAGNOSIS — I73.89 OTHER SPECIFIED PERIPHERAL VASCULAR DISEASES (HCC): ICD-10-CM

## 2020-10-16 PROCEDURE — 99212 OFFICE O/P EST SF 10 MIN: CPT | Performed by: PODIATRIST

## 2020-10-16 NOTE — PROGRESS NOTES
Mary Catherine is a 41year old male. Patient presents with:  Diabetic Foot Care: BS this  - left foot drop and FU on callus. HPI:   Patient returns to the clinic today for checkup on his left heel. Is not having any pain.   At today's v strip by In Vitro route 4 (four) times daily.         Past Medical History:   Diagnosis Date   • Diabetes ([address]    • Elevated liver enzymes 8/30/2016   • Elevated serum GGT level 8/30/2016   • High cholesterol    • HYPERLIPIDEMIA    • Hyperlipidemia    • HY distress  EXTREMITIES:   1. Integument: The skin on the left foot was examined it is warm and dry is a small dried scab at the lateral plantar aspect of his partial calcanectomy. There is no sign of infection noted. 2. Vascular:  The patient has palpable

## 2020-11-25 ENCOUNTER — OFFICE VISIT (OUTPATIENT)
Dept: PODIATRY CLINIC | Facility: CLINIC | Age: 40
End: 2020-11-25
Payer: COMMERCIAL

## 2020-11-25 DIAGNOSIS — E11.65 UNCONTROLLED TYPE 2 DIABETES MELLITUS WITH DIABETIC PERIPHERAL ANGIOPATHY WITHOUT GANGRENE (HCC): ICD-10-CM

## 2020-11-25 DIAGNOSIS — I73.89 OTHER SPECIFIED PERIPHERAL VASCULAR DISEASES (HCC): ICD-10-CM

## 2020-11-25 DIAGNOSIS — L84 CALLUS OF FOOT: Primary | ICD-10-CM

## 2020-11-25 DIAGNOSIS — M21.372 LEFT FOOT DROP: ICD-10-CM

## 2020-11-25 DIAGNOSIS — B35.1 ONYCHOMYCOSIS: ICD-10-CM

## 2020-11-25 DIAGNOSIS — E11.51 UNCONTROLLED TYPE 2 DIABETES MELLITUS WITH DIABETIC PERIPHERAL ANGIOPATHY WITHOUT GANGRENE (HCC): ICD-10-CM

## 2020-11-25 PROCEDURE — 99072 ADDL SUPL MATRL&STAF TM PHE: CPT | Performed by: PODIATRIST

## 2020-11-25 PROCEDURE — 99213 OFFICE O/P EST LOW 20 MIN: CPT | Performed by: PODIATRIST

## 2020-11-29 NOTE — PROGRESS NOTES
Sanjana Padron is a 41year old male. Patient presents with:  Diabetic Ulcer: Left foot f/u - states he is good, no drainage, no pain - this AM his [id number]        HPI:   Patient returns to the clinic today for checkup on his left heel.   Is not having TEST) In Vitro Strip 1 strip by In Vitro route 4 (four) times daily.         Past Medical History:   Diagnosis Date   • Diabetes ([facility]    • Elevated liver enzymes 8/30/2016   • Elevated serum GGT level 8/30/2016   • High cholesterol    • HYPERLIPIDEMIA    • nourished, in no apparent distress  EXTREMITIES:   1. Integument: The skin on the left foot was examined it is warm and dry is a small dried scab at the lateral plantar aspect of his partial calcanectomy. There is no sign of infection noted.   His toenails

## 2021-01-08 ENCOUNTER — OFFICE VISIT (OUTPATIENT)
Dept: PODIATRY CLINIC | Facility: CLINIC | Age: 41
End: 2021-01-08
Payer: COMMERCIAL

## 2021-01-08 VITALS — HEIGHT: 68 IN | BODY MASS INDEX: 30.31 KG/M2 | WEIGHT: 200 LBS

## 2021-01-08 DIAGNOSIS — I73.89 OTHER SPECIFIED PERIPHERAL VASCULAR DISEASES (HCC): Primary | ICD-10-CM

## 2021-01-08 DIAGNOSIS — M21.372 LEFT FOOT DROP: ICD-10-CM

## 2021-01-08 DIAGNOSIS — B35.1 ONYCHOMYCOSIS: ICD-10-CM

## 2021-01-08 DIAGNOSIS — L84 CALLUS OF FOOT: ICD-10-CM

## 2021-01-08 PROCEDURE — 3008F BODY MASS INDEX DOCD: CPT | Performed by: PODIATRIST

## 2021-01-08 PROCEDURE — 99213 OFFICE O/P EST LOW 20 MIN: CPT | Performed by: PODIATRIST

## 2021-01-10 NOTE — PROGRESS NOTES
Kaiser Castañeda is a 41year old male. Patient presents with:   Follow - Up: diabetic ulcer of left foot ,no change seems healed ,denies pain         HPI:   Patient returns to the clinic at this point in time says the bottom of his heel looks very good needed. • Glucose Blood (ONETOUCH TEST) In Vitro Strip 1 strip by In Vitro route 4 (four) times daily.         Past Medical History:   Diagnosis Date   • Diabetes ([facility]    • Elevated liver enzymes 8/30/2016   • Elevated serum GGT level 8/30/2016   • Hig kg)   BMI 30.41 kg/m²   GENERAL: well developed, well nourished, in no apparent distress  EXTREMITIES:   1.  Integument: Examination shows that the patient has just a small area on the outside plantar lateral aspect of the heel where there is a skin irritat

## 2021-01-15 ENCOUNTER — OFFICE VISIT (OUTPATIENT)
Dept: FAMILY MEDICINE CLINIC | Facility: CLINIC | Age: 41
End: 2021-01-15
Payer: COMMERCIAL

## 2021-01-15 VITALS
TEMPERATURE: 98 F | BODY MASS INDEX: 32.89 KG/M2 | OXYGEN SATURATION: 99 % | WEIGHT: 217 LBS | HEART RATE: 100 BPM | HEIGHT: 68 IN | RESPIRATION RATE: 16 BRPM | DIASTOLIC BLOOD PRESSURE: 110 MMHG | SYSTOLIC BLOOD PRESSURE: 190 MMHG

## 2021-01-15 DIAGNOSIS — E11.65 UNCONTROLLED TYPE 2 DIABETES MELLITUS WITH HYPERGLYCEMIA (HCC): ICD-10-CM

## 2021-01-15 DIAGNOSIS — Z23 NEED FOR INFLUENZA VACCINATION: ICD-10-CM

## 2021-01-15 DIAGNOSIS — I10 ESSENTIAL HYPERTENSION: ICD-10-CM

## 2021-01-15 DIAGNOSIS — E11.51 UNCONTROLLED TYPE 2 DIABETES MELLITUS WITH DIABETIC PERIPHERAL ANGIOPATHY WITHOUT GANGRENE (HCC): ICD-10-CM

## 2021-01-15 DIAGNOSIS — E78.5 DYSLIPIDEMIA: ICD-10-CM

## 2021-01-15 DIAGNOSIS — E78.2 MIXED HYPERLIPIDEMIA: ICD-10-CM

## 2021-01-15 DIAGNOSIS — E11.65 UNCONTROLLED TYPE 2 DIABETES MELLITUS WITH DIABETIC PERIPHERAL ANGIOPATHY WITHOUT GANGRENE (HCC): ICD-10-CM

## 2021-01-15 DIAGNOSIS — E66.9 DIABETES MELLITUS TYPE 2 IN OBESE (HCC): Primary | ICD-10-CM

## 2021-01-15 DIAGNOSIS — I16.0 HYPERTENSIVE URGENCY: ICD-10-CM

## 2021-01-15 DIAGNOSIS — E66.9 OBESITY (BMI 30.0-34.9): ICD-10-CM

## 2021-01-15 DIAGNOSIS — E11.69 DIABETES MELLITUS TYPE 2 IN OBESE (HCC): Primary | ICD-10-CM

## 2021-01-15 PROBLEM — I73.9 PAD (PERIPHERAL ARTERY DISEASE) (HCC): Status: ACTIVE | Noted: 2021-01-15

## 2021-01-15 PROBLEM — N52.1 ERECTILE DISORDER DUE TO MEDICAL CONDITION IN MALE: Status: ACTIVE | Noted: 2021-01-15

## 2021-01-15 PROBLEM — E11.29 DM (DIABETES MELLITUS) TYPE II CONTROLLED WITH RENAL MANIFESTATION (HCC): Status: ACTIVE | Noted: 2018-04-02

## 2021-01-15 PROBLEM — I73.9 PAD (PERIPHERAL ARTERY DISEASE): Status: ACTIVE | Noted: 2021-01-15

## 2021-01-15 PROBLEM — I70.422: Status: ACTIVE | Noted: 2018-04-02

## 2021-01-15 LAB
CARTRIDGE LOT#: 723 NUMERIC
HEMOGLOBIN A1C: 14 % (ref 4.3–5.6)

## 2021-01-15 PROCEDURE — 3008F BODY MASS INDEX DOCD: CPT | Performed by: FAMILY MEDICINE

## 2021-01-15 PROCEDURE — 3046F HEMOGLOBIN A1C LEVEL >9.0%: CPT | Performed by: FAMILY MEDICINE

## 2021-01-15 PROCEDURE — 83036 HEMOGLOBIN GLYCOSYLATED A1C: CPT | Performed by: FAMILY MEDICINE

## 2021-01-15 PROCEDURE — 99215 OFFICE O/P EST HI 40 MIN: CPT | Performed by: FAMILY MEDICINE

## 2021-01-15 PROCEDURE — 90686 IIV4 VACC NO PRSV 0.5 ML IM: CPT | Performed by: FAMILY MEDICINE

## 2021-01-15 PROCEDURE — 3080F DIAST BP >= 90 MM HG: CPT | Performed by: FAMILY MEDICINE

## 2021-01-15 PROCEDURE — 3077F SYST BP >= 140 MM HG: CPT | Performed by: FAMILY MEDICINE

## 2021-01-15 PROCEDURE — 90471 IMMUNIZATION ADMIN: CPT | Performed by: FAMILY MEDICINE

## 2021-01-15 RX ORDER — ROSUVASTATIN CALCIUM 10 MG/1
10 TABLET, COATED ORAL DAILY
Qty: 90 TABLET | Refills: 3 | Status: CANCELLED | OUTPATIENT
Start: 2021-01-15

## 2021-01-15 RX ORDER — LISINOPRIL 40 MG/1
40 TABLET ORAL DAILY
Qty: 90 TABLET | Refills: 1 | Status: SHIPPED | OUTPATIENT
Start: 2021-01-15 | End: 2021-04-15

## 2021-01-15 RX ORDER — INSULIN DETEMIR 100 [IU]/ML
13 INJECTION, SOLUTION SUBCUTANEOUS EVERY 12 HOURS
Qty: 23.4 ML | Refills: 2 | Status: ON HOLD | OUTPATIENT
Start: 2021-01-15 | End: 2021-01-26

## 2021-01-15 RX ORDER — EZETIMIBE AND SIMVASTATIN 10; 80 MG/1; MG/1
1 TABLET ORAL NIGHTLY
Qty: 90 TABLET | Refills: 0 | Status: SHIPPED | OUTPATIENT
Start: 2021-01-15 | End: 2021-01-29

## 2021-01-15 RX ORDER — DULAGLUTIDE 1.5 MG/.5ML
1.5 INJECTION, SOLUTION SUBCUTANEOUS WEEKLY
Qty: 0.5 ML | Refills: 2 | Status: ON HOLD | OUTPATIENT
Start: 2021-01-15 | End: 2021-01-26

## 2021-01-15 RX ORDER — DULAGLUTIDE 1.5 MG/.5ML
1.5 INJECTION, SOLUTION SUBCUTANEOUS WEEKLY
Refills: 0 | Status: CANCELLED | OUTPATIENT
Start: 2021-01-15

## 2021-01-15 RX ORDER — ASPIRIN 81 MG/1
81 TABLET ORAL DAILY
Qty: 1 TABLET | Refills: 0 | Status: SHIPPED | OUTPATIENT
Start: 2021-01-15 | End: 2021-04-17

## 2021-01-15 NOTE — PATIENT INSTRUCTIONS
Understanding Type 2 Diabetes   When your body is working normally, the food you eat is digested and used as fuel. This fuel gives energy to the body’s cells. When you have diabetes, the fuel can’t enter the cells.  If not treated, diabetes can cause se risk of being disabled or dying from some conditions. These include heart attack, heart failure, or stroke. They may also have problems in their eyes, kidneys, and nerves. These problems are from injury to small blood vessels.   Sania last reviewed this

## 2021-01-15 NOTE — PROGRESS NOTES
Patient presents with:  Establish Care: New to PCP- medications in med list is what he used to take but has been on any medications in a few months. Diabetes: Type 2 DM- due for A1c in office.  He has a scheduled appt with [facility] in Feb.2021- he wi asked about meal planning, he reported none. He rarely participates in exercise. An ACE inhibitor/angiotensin II receptor blocker is not being taken. He sees a podiatrist (hx of left foot osteomyelitis- s/p surgery. seeing podiatry every 6 weeks). Eye exam chills and fatigue. No distress. HENT: Negative for hearing loss, congestion, sore throat   Eyes: Negative for pain and visual disturbance. Respiratory: Negative for cough, chest tightness, shortness of breath and wheezing.     Cardiovascular: Negative f Performed by Debbi Wen DPM at [facility] MAIN OR   • CHOLECYSTECTOMY     • DEBRIDE WOUND Left     HEEL   • EXTREMITY LOWER IRRIGATION & DEBRIDEMENT Left 2/17/2018    Performed by Debbi Wen DPM at [facility] MAIN OR   • FOOT SURGERY     • [address] [place] [address] not taking: Reported on 1/15/2021 ) 5 each 11   • METFORMIN HCL 1000 MG Oral Tab TAKE 1 TABLET BY MOUTH TWICE DAILY WITH MEALS (Patient not taking: Reported on 1/15/2021) 180 tablet 0   • BD AUTOSHIELD DUO 30G X 5 MM Does not apply Misc USE NEW PEN NEEDLE murmur, rubs or gallops. Pulmonary/Chest: Effort normal and breath sounds normal. No respiratory distress. No wheezes, rhonchi or rales  Abdominal: Soft. Bowel sounds are normal. Non tender, no masses, no organomegaly or hernias.   Musculoskeletal: Normal total) by mouth daily. Dispense: 1 tablet;  Refill: 0  - DME DIABETIC TEST STRIPS AND SUPPLIES  - OP REFERRAL INADEQUATE GLYCEMIC CONTROL/CHANGE TREATMENT 3 HRS  - OP REFERRAL INADEQUATE GLYCEMIC CONTROL/CHANGE TREATMENT 3 HRS  - SITagliptin Phosphate 100 PANEL; Future  - COMP METABOLIC PANEL (14); Future  - TSH W REFLEX TO FREE T4; Future  - CBC WITH DIFFERENTIAL WITH PLATELET; Future  - Ezetimibe-Simvastatin 10-80 MG Oral Tab; Take 1 tablet by mouth nightly. Dispense: 90 tablet; Refill: 0    5.  Uncontrol Flulaval 6 months and older 0.5 ml Quad PF [83297]      Meds & Refills for this Visit:  Requested Prescriptions     Signed Prescriptions Disp Refills   • aspirin (ASPIRIN 81) 81 MG Oral Tab EC 1 tablet 0     Sig: Take 1 tablet (81 mg total) by mouth daily. use it as fuel. Glucose needs the help of a hormone called insulin to enter the cells. Insulin is made in the pancreas. It is sent into the bloodstream. This is a response to glucose in the blood. Think of insulin as a key.  When insulin reaches a cell, it questions were answered and the patient understands the plan.

## 2021-01-25 ENCOUNTER — LAB ENCOUNTER (OUTPATIENT)
Dept: LAB | Age: 41
End: 2021-01-25
Attending: FAMILY MEDICINE
Payer: COMMERCIAL

## 2021-01-25 ENCOUNTER — HOSPITAL ENCOUNTER (OUTPATIENT)
Facility: HOSPITAL | Age: 41
Setting detail: OBSERVATION
Discharge: HOME | End: 2021-01-26
Attending: EMERGENCY MEDICINE | Admitting: HOSPITALIST
Payer: COMMERCIAL

## 2021-01-25 ENCOUNTER — TELEPHONE (OUTPATIENT)
Dept: FAMILY MEDICINE CLINIC | Facility: CLINIC | Age: 41
End: 2021-01-25

## 2021-01-25 DIAGNOSIS — R73.9 HYPERGLYCEMIA: Primary | ICD-10-CM

## 2021-01-25 DIAGNOSIS — I10 ESSENTIAL HYPERTENSION: ICD-10-CM

## 2021-01-25 DIAGNOSIS — E66.9 DIABETES MELLITUS TYPE 2 IN OBESE (HCC): ICD-10-CM

## 2021-01-25 DIAGNOSIS — E11.65 UNCONTROLLED TYPE 2 DIABETES MELLITUS WITH HYPERGLYCEMIA (HCC): ICD-10-CM

## 2021-01-25 DIAGNOSIS — E11.69 DIABETES MELLITUS TYPE 2 IN OBESE (HCC): ICD-10-CM

## 2021-01-25 DIAGNOSIS — I15.9 SECONDARY HYPERTENSION: ICD-10-CM

## 2021-01-25 DIAGNOSIS — E78.5 DYSLIPIDEMIA: ICD-10-CM

## 2021-01-25 DIAGNOSIS — E87.2 RESPIRATORY ACIDOSIS: ICD-10-CM

## 2021-01-25 PROBLEM — E87.1 HYPONATREMIA: Status: ACTIVE | Noted: 2021-01-25

## 2021-01-25 PROBLEM — E87.29 RESPIRATORY ACIDOSIS: Status: ACTIVE | Noted: 2021-01-25

## 2021-01-25 LAB
ACETONE: NEGATIVE
ALBUMIN SERPL-MCNC: 3.5 G/DL (ref 3.4–5)
ALBUMIN SERPL-MCNC: 3.8 G/DL (ref 3.4–5)
ALBUMIN/GLOB SERPL: 0.9 {RATIO} (ref 1–2)
ALBUMIN/GLOB SERPL: 0.9 {RATIO} (ref 1–2)
ALP LIVER SERPL-CCNC: 130 U/L
ALP LIVER SERPL-CCNC: 141 U/L
ALT SERPL-CCNC: 24 U/L
ALT SERPL-CCNC: 25 U/L
ANION GAP SERPL CALC-SCNC: 5 MMOL/L (ref 0–18)
ANION GAP SERPL CALC-SCNC: 6 MMOL/L (ref 0–18)
AST SERPL-CCNC: 32 U/L (ref 15–37)
AST SERPL-CCNC: 43 U/L (ref 15–37)
BASOPHILS # BLD AUTO: 0.03 X10(3) UL (ref 0–0.2)
BASOPHILS # BLD AUTO: 0.05 X10(3) UL (ref 0–0.2)
BASOPHILS NFR BLD AUTO: 0.5 %
BASOPHILS NFR BLD AUTO: 0.7 %
BILIRUB SERPL-MCNC: 0.6 MG/DL (ref 0.1–2)
BILIRUB SERPL-MCNC: 0.6 MG/DL (ref 0.1–2)
BUN BLD-MCNC: 43 MG/DL (ref 7–18)
BUN BLD-MCNC: 48 MG/DL (ref 7–18)
BUN/CREAT SERPL: 15.8 (ref 10–20)
BUN/CREAT SERPL: 16.8 (ref 10–20)
CALCIUM BLD-MCNC: 9.1 MG/DL (ref 8.5–10.1)
CALCIUM BLD-MCNC: 9.3 MG/DL (ref 8.5–10.1)
CHLORIDE SERPL-SCNC: 104 MMOL/L (ref 98–112)
CHLORIDE SERPL-SCNC: 106 MMOL/L (ref 98–112)
CHOLEST SMN-MCNC: 162 MG/DL (ref ?–200)
CO2 SERPL-SCNC: 22 MMOL/L (ref 21–32)
CO2 SERPL-SCNC: 23 MMOL/L (ref 21–32)
CREAT BLD-MCNC: 2.73 MG/DL
CREAT BLD-MCNC: 2.86 MG/DL
CREAT UR-SCNC: 71.7 MG/DL
DEPRECATED RDW RBC AUTO: 39.5 FL (ref 35.1–46.3)
DEPRECATED RDW RBC AUTO: 41.6 FL (ref 35.1–46.3)
EOSINOPHIL # BLD AUTO: 0.22 X10(3) UL (ref 0–0.7)
EOSINOPHIL # BLD AUTO: 0.33 X10(3) UL (ref 0–0.7)
EOSINOPHIL NFR BLD AUTO: 3.8 %
EOSINOPHIL NFR BLD AUTO: 4.5 %
ERYTHROCYTE [DISTWIDTH] IN BLOOD BY AUTOMATED COUNT: 12.8 % (ref 11–15)
ERYTHROCYTE [DISTWIDTH] IN BLOOD BY AUTOMATED COUNT: 12.9 % (ref 11–15)
GLOBULIN PLAS-MCNC: 4.1 G/DL (ref 2.8–4.4)
GLOBULIN PLAS-MCNC: 4.4 G/DL (ref 2.8–4.4)
GLUCOSE BLD-MCNC: 236 MG/DL (ref 70–99)
GLUCOSE BLD-MCNC: 370 MG/DL (ref 70–99)
GLUCOSE BLD-MCNC: 476 MG/DL (ref 70–99)
GLUCOSE BLD-MCNC: 476 MG/DL (ref 70–99)
GLUCOSE BLD-MCNC: 516 MG/DL (ref 70–99)
HCT VFR BLD AUTO: 37 %
HCT VFR BLD AUTO: 37.9 %
HDLC SERPL-MCNC: 26 MG/DL (ref 40–59)
HGB BLD-MCNC: 12.4 G/DL
HGB BLD-MCNC: 12.6 G/DL
IMM GRANULOCYTES # BLD AUTO: 0.02 X10(3) UL (ref 0–1)
IMM GRANULOCYTES # BLD AUTO: 0.04 X10(3) UL (ref 0–1)
IMM GRANULOCYTES NFR BLD: 0.3 %
IMM GRANULOCYTES NFR BLD: 0.7 %
LDLC SERPL CALC-MCNC: 95 MG/DL (ref ?–100)
LYMPHOCYTES # BLD AUTO: 1.3 X10(3) UL (ref 1–4)
LYMPHOCYTES # BLD AUTO: 1.8 X10(3) UL (ref 1–4)
LYMPHOCYTES NFR BLD AUTO: 22.3 %
LYMPHOCYTES NFR BLD AUTO: 24.6 %
M PROTEIN MFR SERPL ELPH: 7.6 G/DL (ref 6.4–8.2)
M PROTEIN MFR SERPL ELPH: 8.2 G/DL (ref 6.4–8.2)
MCH RBC QN AUTO: 29.3 PG (ref 26–34)
MCH RBC QN AUTO: 29.3 PG (ref 26–34)
MCHC RBC AUTO-ENTMCNC: 32.7 G/DL (ref 31–37)
MCHC RBC AUTO-ENTMCNC: 34.1 G/DL (ref 31–37)
MCV RBC AUTO: 86 FL
MCV RBC AUTO: 89.6 FL
MICROALBUMIN UR-MCNC: 2.53 MG/DL
MICROALBUMIN/CREAT 24H UR-RTO: 35.3 UG/MG (ref ?–30)
MONOCYTES # BLD AUTO: 0.43 X10(3) UL (ref 0.1–1)
MONOCYTES # BLD AUTO: 0.68 X10(3) UL (ref 0.1–1)
MONOCYTES NFR BLD AUTO: 7.4 %
MONOCYTES NFR BLD AUTO: 9.3 %
NEUTROPHILS # BLD AUTO: 3.8 X10 (3) UL (ref 1.5–7.7)
NEUTROPHILS # BLD AUTO: 3.8 X10(3) UL (ref 1.5–7.7)
NEUTROPHILS # BLD AUTO: 4.45 X10 (3) UL (ref 1.5–7.7)
NEUTROPHILS # BLD AUTO: 4.45 X10(3) UL (ref 1.5–7.7)
NEUTROPHILS NFR BLD AUTO: 60.6 %
NEUTROPHILS NFR BLD AUTO: 65.3 %
NONHDLC SERPL-MCNC: 136 MG/DL (ref ?–130)
OSMOLALITY SERPL CALC.SUM OF ELEC: 308 MOSM/KG (ref 275–295)
OSMOLALITY SERPL CALC.SUM OF ELEC: 312 MOSM/KG (ref 275–295)
PATIENT FASTING Y/N/NP: YES
PATIENT FASTING Y/N/NP: YES
PLATELET # BLD AUTO: 180 10(3)UL (ref 150–450)
PLATELET # BLD AUTO: 197 10(3)UL (ref 150–450)
POTASSIUM SERPL-SCNC: 4.4 MMOL/L (ref 3.5–5.1)
POTASSIUM SERPL-SCNC: 5 MMOL/L (ref 3.5–5.1)
RBC # BLD AUTO: 4.23 X10(6)UL
RBC # BLD AUTO: 4.3 X10(6)UL
SARS-COV-2 RNA RESP QL NAA+PROBE: NOT DETECTED
SODIUM SERPL-SCNC: 132 MMOL/L (ref 136–145)
SODIUM SERPL-SCNC: 134 MMOL/L (ref 136–145)
TRIGL SERPL-MCNC: 203 MG/DL (ref 30–149)
TSI SER-ACNC: 2.4 MIU/ML (ref 0.36–3.74)
VENOUS BASE EXCESS: -4.1
VENOUS BLOOD GAS HCO3: 22.8 MEQ/L (ref 23–27)
VENOUS O2 SAT CALC: 38 % (ref 72–78)
VENOUS O2 SATURATION: 46 % (ref 72–78)
VENOUS PCO2: 49 MM HG (ref 38–50)
VENOUS PH: 7.28 (ref 7.33–7.43)
VENOUS PO2: 26 MM HG (ref 30–50)
VLDLC SERPL CALC-MCNC: 41 MG/DL (ref 0–30)
WBC # BLD AUTO: 5.8 X10(3) UL (ref 4–11)
WBC # BLD AUTO: 7.3 X10(3) UL (ref 4–11)

## 2021-01-25 PROCEDURE — 82043 UR ALBUMIN QUANTITATIVE: CPT | Performed by: FAMILY MEDICINE

## 2021-01-25 PROCEDURE — 80061 LIPID PANEL: CPT | Performed by: FAMILY MEDICINE

## 2021-01-25 PROCEDURE — 3060F POS MICROALBUMINURIA REV: CPT | Performed by: FAMILY MEDICINE

## 2021-01-25 PROCEDURE — 82570 ASSAY OF URINE CREATININE: CPT | Performed by: FAMILY MEDICINE

## 2021-01-25 PROCEDURE — 99220 INITIAL OBSERVATION CARE,LEVL III: CPT | Performed by: HOSPITALIST

## 2021-01-25 PROCEDURE — 36415 COLL VENOUS BLD VENIPUNCTURE: CPT | Performed by: FAMILY MEDICINE

## 2021-01-25 PROCEDURE — 80050 GENERAL HEALTH PANEL: CPT | Performed by: FAMILY MEDICINE

## 2021-01-25 RX ORDER — ONDANSETRON 2 MG/ML
4 INJECTION INTRAMUSCULAR; INTRAVENOUS EVERY 4 HOURS PRN
Status: DISCONTINUED | OUTPATIENT
Start: 2021-01-25 | End: 2021-01-25

## 2021-01-25 RX ORDER — BISACODYL 10 MG
10 SUPPOSITORY, RECTAL RECTAL
Status: DISCONTINUED | OUTPATIENT
Start: 2021-01-25 | End: 2021-01-26

## 2021-01-25 RX ORDER — ACETAMINOPHEN 325 MG/1
650 TABLET ORAL EVERY 6 HOURS PRN
Status: DISCONTINUED | OUTPATIENT
Start: 2021-01-25 | End: 2021-01-26

## 2021-01-25 RX ORDER — ASPIRIN 81 MG/1
81 TABLET ORAL DAILY
Status: DISCONTINUED | OUTPATIENT
Start: 2021-01-26 | End: 2021-01-26

## 2021-01-25 RX ORDER — DEXTROSE MONOHYDRATE 25 G/50ML
50 INJECTION, SOLUTION INTRAVENOUS
Status: DISCONTINUED | OUTPATIENT
Start: 2021-01-25 | End: 2021-01-26

## 2021-01-25 RX ORDER — HEPARIN SODIUM 5000 [USP'U]/ML
5000 INJECTION, SOLUTION INTRAVENOUS; SUBCUTANEOUS EVERY 8 HOURS SCHEDULED
Status: DISCONTINUED | OUTPATIENT
Start: 2021-01-25 | End: 2021-01-26

## 2021-01-25 RX ORDER — POLYETHYLENE GLYCOL 3350 17 G/17G
17 POWDER, FOR SOLUTION ORAL DAILY PRN
Status: DISCONTINUED | OUTPATIENT
Start: 2021-01-25 | End: 2021-01-26

## 2021-01-25 RX ORDER — INSULIN ASPART 100 [IU]/ML
0.15 INJECTION, SOLUTION INTRAVENOUS; SUBCUTANEOUS ONCE
Status: COMPLETED | OUTPATIENT
Start: 2021-01-25 | End: 2021-01-25

## 2021-01-25 RX ORDER — SODIUM CHLORIDE 9 MG/ML
INJECTION, SOLUTION INTRAVENOUS CONTINUOUS
Status: DISCONTINUED | OUTPATIENT
Start: 2021-01-25 | End: 2021-01-26

## 2021-01-25 RX ORDER — SODIUM CHLORIDE 9 MG/ML
INJECTION, SOLUTION INTRAVENOUS CONTINUOUS
Status: ACTIVE | OUTPATIENT
Start: 2021-01-25 | End: 2021-01-25

## 2021-01-25 RX ORDER — ONDANSETRON 2 MG/ML
4 INJECTION INTRAMUSCULAR; INTRAVENOUS EVERY 6 HOURS PRN
Status: DISCONTINUED | OUTPATIENT
Start: 2021-01-25 | End: 2021-01-26

## 2021-01-25 RX ORDER — SODIUM PHOSPHATE, DIBASIC AND SODIUM PHOSPHATE, MONOBASIC 7; 19 G/133ML; G/133ML
1 ENEMA RECTAL ONCE AS NEEDED
Status: DISCONTINUED | OUTPATIENT
Start: 2021-01-25 | End: 2021-01-26

## 2021-01-25 RX ORDER — LISINOPRIL 40 MG/1
40 TABLET ORAL DAILY
Status: DISCONTINUED | OUTPATIENT
Start: 2021-01-26 | End: 2021-01-26

## 2021-01-25 RX ORDER — PROCHLORPERAZINE EDISYLATE 5 MG/ML
5 INJECTION INTRAMUSCULAR; INTRAVENOUS EVERY 8 HOURS PRN
Status: DISCONTINUED | OUTPATIENT
Start: 2021-01-25 | End: 2021-01-26

## 2021-01-25 RX ORDER — DOCUSATE SODIUM 100 MG/1
100 CAPSULE, LIQUID FILLED ORAL 2 TIMES DAILY
Status: DISCONTINUED | OUTPATIENT
Start: 2021-01-25 | End: 2021-01-26

## 2021-01-25 RX ORDER — LABETALOL HYDROCHLORIDE 5 MG/ML
20 INJECTION, SOLUTION INTRAVENOUS ONCE
Status: COMPLETED | OUTPATIENT
Start: 2021-01-25 | End: 2021-01-25

## 2021-01-25 NOTE — ED PROVIDER NOTES
Patient Seen in: [facility] Emergency Department      History   Patient presents with:  Hyperglycemia    Stated Complaint: hyperglycemia    HPI/Subjective:   HPI    This is is a 42-year-old male who arrived here with elevated blood sugar.   Denies an noted above.     Physical Exam     ED Triage Vitals   BP 01/25/21 1629 (!) 176/117   Pulse 01/25/21 1629 104   Resp 01/25/21 1629 20   Temp 01/25/21 1629 99 °F (37.2 °C)   Temp src 01/25/21 1629 Temporal   SpO2 01/25/21 1629 97 %   O2 Device 01/25/21 2030 N Abnormal; Notable for the following components:    Glucose 139 (*)     Chloride 114 (*)     BUN 38 (*)     Creatinine 2.55 (*)     Calcium, Total 8.3 (*)     Calculated Osmolality 305 (*)     GFR, Non- 30 (*)     GFR, -American 34 (* PLATELET.   Procedure                               Abnormality         Status                     ---------                               -----------         ------                     CBC W/ DIFFERENTIAL[[id number]]          Abnormal            Final resul pm    Follow-up:  Kenzie Macias, RN, CDE  [address]  [phone]      1/27/2021 1230 PM. Bring meter with you.     GLO [address]/ [address]  [phone]    Go to  1/29/2021

## 2021-01-25 NOTE — TELEPHONE ENCOUNTER
Received call from [facility] regarding CMP results done today.     Component   Ref Range & Units 1/25/21  8:08 AM   Glucose   70 - 99 mg/dL 476High Panic       Pt has a1c done on 1/15/21 at 14    Discussed with Dr. Miguel Tabor - send to ER for eval.

## 2021-01-25 NOTE — ED INITIAL ASSESSMENT (HPI)
Patient presents with hyperglycemia. Patient reports he stopped taking his Levemir and Chelsea Bhavani \"for no good reason\" approximately 3 months ago and his PMD ordered outpatient labs today.   Patient was instructed to go to the ER due to glucose in the 400

## 2021-01-25 NOTE — TELEPHONE ENCOUNTER
Spoke to pt, informed of lab results and MD recommendations to go to ER for eval. Pt verbalized understanding and agreement. States he will try to get someone to come over to take care of his kids and go to [facility] ER in [place].

## 2021-01-26 VITALS
SYSTOLIC BLOOD PRESSURE: 143 MMHG | TEMPERATURE: 99 F | DIASTOLIC BLOOD PRESSURE: 97 MMHG | HEART RATE: 101 BPM | OXYGEN SATURATION: 97 % | RESPIRATION RATE: 18 BRPM | HEIGHT: 68 IN | BODY MASS INDEX: 31.83 KG/M2 | WEIGHT: 210 LBS

## 2021-01-26 LAB
ANION GAP SERPL CALC-SCNC: 6 MMOL/L (ref 0–18)
ATRIAL RATE: 84 BPM
BASOPHILS # BLD AUTO: 0.04 X10(3) UL (ref 0–0.2)
BASOPHILS NFR BLD AUTO: 0.7 %
BUN BLD-MCNC: 38 MG/DL (ref 7–18)
BUN/CREAT SERPL: 14.9 (ref 10–20)
CALCIUM BLD-MCNC: 8.3 MG/DL (ref 8.5–10.1)
CHLORIDE SERPL-SCNC: 114 MMOL/L (ref 98–112)
CO2 SERPL-SCNC: 22 MMOL/L (ref 21–32)
CREAT BLD-MCNC: 2.55 MG/DL
DEPRECATED RDW RBC AUTO: 42.1 FL (ref 35.1–46.3)
EOSINOPHIL # BLD AUTO: 0.27 X10(3) UL (ref 0–0.7)
EOSINOPHIL NFR BLD AUTO: 4.5 %
ERYTHROCYTE [DISTWIDTH] IN BLOOD BY AUTOMATED COUNT: 13 % (ref 11–15)
GLUCOSE BLD-MCNC: 139 MG/DL (ref 70–99)
GLUCOSE BLD-MCNC: 148 MG/DL (ref 70–99)
GLUCOSE BLD-MCNC: 176 MG/DL (ref 70–99)
HCT VFR BLD AUTO: 33.1 %
HGB BLD-MCNC: 11 G/DL
IMM GRANULOCYTES # BLD AUTO: 0.01 X10(3) UL (ref 0–1)
IMM GRANULOCYTES NFR BLD: 0.2 %
LYMPHOCYTES # BLD AUTO: 1.76 X10(3) UL (ref 1–4)
LYMPHOCYTES NFR BLD AUTO: 29.6 %
MCH RBC QN AUTO: 29.6 PG (ref 26–34)
MCHC RBC AUTO-ENTMCNC: 33.2 G/DL (ref 31–37)
MCV RBC AUTO: 89 FL
MONOCYTES # BLD AUTO: 0.41 X10(3) UL (ref 0.1–1)
MONOCYTES NFR BLD AUTO: 6.9 %
NEUTROPHILS # BLD AUTO: 3.46 X10 (3) UL (ref 1.5–7.7)
NEUTROPHILS # BLD AUTO: 3.46 X10(3) UL (ref 1.5–7.7)
NEUTROPHILS NFR BLD AUTO: 58.1 %
OSMOLALITY SERPL CALC.SUM OF ELEC: 305 MOSM/KG (ref 275–295)
P AXIS: 40 DEGREES
P-R INTERVAL: 166 MS
PLATELET # BLD AUTO: 164 10(3)UL (ref 150–450)
POTASSIUM SERPL-SCNC: 4 MMOL/L (ref 3.5–5.1)
Q-T INTERVAL: 394 MS
QRS DURATION: 100 MS
QTC CALCULATION (BEZET): 465 MS
R AXIS: 21 DEGREES
RBC # BLD AUTO: 3.72 X10(6)UL
SODIUM SERPL-SCNC: 142 MMOL/L (ref 136–145)
T AXIS: 32 DEGREES
VENTRICULAR RATE: 84 BPM
WBC # BLD AUTO: 6 X10(3) UL (ref 4–11)

## 2021-01-26 PROCEDURE — 99217 OBSERVATION CARE DISCHARGE: CPT | Performed by: HOSPITALIST

## 2021-01-26 PROCEDURE — 99226 SUBSEQUENT OBSERVATION CARE: CPT | Performed by: CLINICAL NURSE SPECIALIST

## 2021-01-26 RX ORDER — BLOOD SUGAR DIAGNOSTIC
STRIP MISCELLANEOUS
Qty: 100 STRIP | Refills: 6 | Status: ON HOLD | OUTPATIENT
Start: 2021-01-26 | End: 2021-10-13

## 2021-01-26 RX ORDER — BLOOD-GLUCOSE METER
EACH MISCELLANEOUS
Qty: 1 KIT | Refills: 0 | Status: ON HOLD | OUTPATIENT
Start: 2021-01-26 | End: 2021-10-13

## 2021-01-26 RX ORDER — INSULIN DETEMIR 100 [IU]/ML
30 INJECTION, SOLUTION SUBCUTANEOUS DAILY
Qty: 15 ML | Refills: 0 | Status: SHIPPED | COMMUNITY
Start: 2021-01-26 | End: 2021-04-17

## 2021-01-26 NOTE — PLAN OF CARE
Pt. A&O x4. VSS. Afebrile. QID accuchecks; insulin given per MAR. Diabetic educational videos shown. Call light within reach. Will continue to monitor. 1400: Pt. Ok to discharge per all MD's. Discharge instructions given. Pt. Verbalized understanding.  A

## 2021-01-26 NOTE — PROGRESS NOTES
[facility]  Progress Note     Hiral Tobin Patient Status:  Observation    1980 MRN [id number]   [facility] 4NW-A Attending Radha Webber MD   Hosp Day # 0 PCP Juan Luis Anders DO     Chief Complaint: uncontrolled D Kinase  No results for input(s): CK in the last 168 hours. Inflammatory Markers  No results for input(s): CRP, VANCE, LDH, DDIMER in the last 168 hours. Imaging: Imaging data reviewed in Epic.   Medications:   • insulin detemir  30 Units Subcutaneous Alejandra Ramonita

## 2021-01-26 NOTE — H&P
[facility]  History and Physical     Cheryle Boning Dietel Patient Status:  Observation    1980 MRN [id number]   [facility] 4NW-A Attending Hussain Harmon MD   Hosp Day # 0 PCP Awais Salgado DO     Chief Complaint: Hypergly Sister         Allergies: No Known Allergies    Medications:  No current facility-administered medications on file prior to encounter. •  aspirin (ASPIRIN 81) 81 MG Oral Tab EC, Take 1 tablet (81 mg total) by mouth daily. , Disp: 1 tablet, Rfl: 0    • Pen-injector, Inject 1.5 mg into the skin once a week., Disp: , Rfl:     •  EZETIMIBE-SIMVASTATIN 10-80 MG Oral Tab, TAKE 1 TABLET BY MOUTH EVERY DAY, Disp: 90 tablet, Rfl: 0    •  Blood Glucose Monitoring Suppl (ONETOUCH ULTRA 2) w/Device Does not apply K Clearance: 34.1 mL/min (A) (based on SCr of 2.73 mg/dL (H)). No results for input(s): PTP, INR in the last 168 hours. No results for input(s): TROP, CK in the last 168 hours. Imaging: Imaging data reviewed in Epic. ASSESSMENT / PLAN:     1.  U

## 2021-01-26 NOTE — CONSULTS
[facility]      Endocrinology Consultation    Yahir Purcell Ervinlizeth Patient Status:  Observation    1980 MRN [id number]   [facility] 4NW-A Attending Kenneth Hooks MD   Hosp Day # 0 PCP Maggy Cuellar DO     Reason for [facility] hyperbaric session done, Disp: 23.4 mL, Rfl: 2, 1/25/2021 at Unknown time    •  [DISCONTINUED] Dulaglutide (TRULICITY) 1.5 IR/0.5QH Subcutaneous Solution Pen-injector, Inject 1.5 mg into the skin once a week., Disp: 0.5 mL, Rfl: 2, 1/24/2021    •  Blood Pr • HYPERTRIGLYCERIDEMIA    • OBESITY    • Pneumonia due to organism    • Visual impairment     GLASSES     Past Surgical History:   Procedure Laterality Date   • ARTHROTOMY/EXPLORE/TREAT KNEE JOINT Left     DONE TWICE   • CALCANEAL OSTEOTOMY Left 3/29/20 GM/118ML enema 133 mL, 1 enema, Rectal, Once PRN  •  glucose (DEX4) oral liquid 15 g, 15 g, Oral, Q15 Min PRN **OR** Glucose-Vitamin C (DEX-4) chewable tab 4 tablet, 4 tablet, Oral, Q15 Min PRN **OR** dextrose 50 % injection 50 mL, 50 mL, Intravenous, Q15 every morning   Resume metformin 1000 mg twice daily after breakfast and after dinner  Continue trulicity 1.5 mg / week   Add novolog based on pre-meal glucose using scale below  Make sure to check glucoses before meals     Pre-meal glucose   Under 140 - n

## 2021-01-26 NOTE — PROGRESS NOTES
NURSING ADMISSION NOTE      Patient admitted via Cart  Oriented to room. Safety precautions initiated. Bed in low position. Call light in reach. Pt admitted from ED. Pt alert and oriented x4. VSS, afebrile. Denies pain, SOB or nausea.  Accu check

## 2021-01-26 NOTE — DIETARY NOTE
[facility]    NUTRITION INITIAL ASSESSMENT    Pt does not meet malnutrition criteria.     NUTRITION DIAGNOSIS/PROBLEM:    Altered nutrition-related lab values related to endocrine dysfunction, DM med noncompliance, and excessive CHO consumption as evid (200 lb)  03/20/19 : 86.2 kg (190 lb)    NUTRITION:  Diet: Carb Controlled 1800 kcal/60 gm CHO per meal  Oral Supplements: no need at this time    FOOD/NUTRITION RELATED HISTORY:  Appetite: Good  Intake: 100%-1x  Intake Meeting Needs: Yes  Food Allergies:

## 2021-01-26 NOTE — CONSULTS
[facility]  Diabetes Clinical Nurse Specialist Consult Note    Uzair Tobin Patient Status:  Observation    1980 MRN [id number]   [facility] 4NW-A Attending Ross Tariq MD   Hosp Day # 0 PCP Naomie Hines DO     Re Nate, who he likes. He did not have a glucometer, so obviously he was not testing his blood glucose, I gave him a new one, he said he knows how to use.  Per Ashia Flaherty, he will go home on insulin, so I gave him some BD pen needles, explained he has to take has appointment already set up with Quentin Newberry on 1/27/at 1230 in [place].      PRESCRIPTION RECOMMENDATIONS:    · Eloxx of [place]/Prime    Novolog, Levemir, Lantus, Tresiba   Contour Next glucometer, OR Onetouch Verio lancets, test

## 2021-01-27 ENCOUNTER — PATIENT OUTREACH (OUTPATIENT)
Dept: CASE MANAGEMENT | Age: 41
End: 2021-01-27

## 2021-01-27 ENCOUNTER — NURSE ONLY (OUTPATIENT)
Dept: ENDOCRINOLOGY CLINIC | Facility: CLINIC | Age: 41
End: 2021-01-27
Payer: COMMERCIAL

## 2021-01-27 VITALS — BODY MASS INDEX: 33 KG/M2 | WEIGHT: 214.13 LBS

## 2021-01-27 DIAGNOSIS — R73.9 HYPERGLYCEMIA: ICD-10-CM

## 2021-01-27 DIAGNOSIS — E11.65 UNCONTROLLED TYPE 2 DIABETES MELLITUS WITH HYPERGLYCEMIA (HCC): ICD-10-CM

## 2021-01-27 DIAGNOSIS — Z02.9 ENCOUNTERS FOR UNSPECIFIED ADMINISTRATIVE PURPOSE: ICD-10-CM

## 2021-01-27 PROCEDURE — G0108 DIAB MANAGE TRN  PER INDIV: HCPCS | Performed by: DIETITIAN, REGISTERED

## 2021-01-27 RX ORDER — INSULIN ASPART 100 [IU]/ML
INJECTION, SOLUTION INTRAVENOUS; SUBCUTANEOUS
Refills: 0 | Status: CANCELLED | OUTPATIENT
Start: 2021-01-27

## 2021-01-27 NOTE — DISCHARGE SUMMARY
[facility]  DISCHARGE SUMMARY     James Tobin Patient Status:  Observation    1980 MRN [id number]   [facility] 4NW-A Attending No att. providers found   Hosp Day # 0 PCP Lisa Damian DO     Date of Admission:  the skin 4 (four) times daily before meals and nightly. Quantity: 15 mL  Refills: 0     metFORMIN HCl 1000 MG Tabs  Commonly known as: GLUCOPHAGE      Take 1 tablet (1,000 mg total) by mouth 2 (two) times daily.    Quantity: 180 tablet  Refills: 0     One known as: VYTORIN      TAKE 1 TABLET BY MOUTH EVERY DAY   Quantity: 90 tablet  Refills: 0     Ezetimibe-Simvastatin 10-80 MG Tabs  Commonly known as: VYTORIN      Take 1 tablet by mouth nightly.    Quantity: 90 tablet  Refills: 0     lisinopril 40 MG Tabs

## 2021-01-27 NOTE — PROGRESS NOTES
Initial Post Discharge Follow Up   Discharge Date: 1/26/21  Contact Date: 1/27/2021    Consent Verification:  Assessment Completed With: Patient  HIPAA Verified? Yes    Discharge Dx:  1. Uncontrolled DM and was off meds. A1c 14   1.  Endocrine consulted daily. 1 tablet 0   • lisinopril 40 MG Oral Tab Take 1 tablet (40 mg total) by mouth daily. 90 tablet 1   • Dulaglutide (TRULICITY) 1.5 UJ/2.2RT Subcutaneous Solution Pen-injector Inject 1.5 mg into the skin once a week.      • EZETIMIBE-SIMVASTATIN 10-80 M you  your discharge medications when you left the hospital? Yes, except for Novolog, per patient, being held up by insurance and he is unsure when it will be ready for   • May I go over your medications with you to make sure we are not missin Reviewed/scheduled/rescheduled PCP TCM/HFU appointment with pt:  Yes      Have you made all of your follow up appointments? yes    Is there any reason as to why you cannot make your appointments?    No     NCM Reviewed upcoming Specialist Appt with patient

## 2021-01-29 ENCOUNTER — OFFICE VISIT (OUTPATIENT)
Dept: FAMILY MEDICINE CLINIC | Facility: CLINIC | Age: 41
End: 2021-01-29
Payer: COMMERCIAL

## 2021-01-29 ENCOUNTER — TELEPHONE (OUTPATIENT)
Dept: FAMILY MEDICINE CLINIC | Facility: CLINIC | Age: 41
End: 2021-01-29

## 2021-01-29 VITALS
SYSTOLIC BLOOD PRESSURE: 152 MMHG | DIASTOLIC BLOOD PRESSURE: 90 MMHG | BODY MASS INDEX: 32.13 KG/M2 | RESPIRATION RATE: 16 BRPM | HEART RATE: 102 BPM | TEMPERATURE: 99 F | HEIGHT: 68 IN | WEIGHT: 212 LBS

## 2021-01-29 DIAGNOSIS — Z09 HOSPITAL DISCHARGE FOLLOW-UP: Primary | ICD-10-CM

## 2021-01-29 DIAGNOSIS — E11.65 UNCONTROLLED TYPE 2 DIABETES MELLITUS WITH DIABETIC PERIPHERAL ANGIOPATHY WITHOUT GANGRENE (HCC): ICD-10-CM

## 2021-01-29 DIAGNOSIS — E11.51 UNCONTROLLED TYPE 2 DIABETES MELLITUS WITH DIABETIC PERIPHERAL ANGIOPATHY WITHOUT GANGRENE (HCC): ICD-10-CM

## 2021-01-29 DIAGNOSIS — E66.9 DIABETES MELLITUS TYPE 2 IN OBESE (HCC): ICD-10-CM

## 2021-01-29 DIAGNOSIS — I10 ESSENTIAL HYPERTENSION: ICD-10-CM

## 2021-01-29 DIAGNOSIS — I15.9 SECONDARY HYPERTENSION: ICD-10-CM

## 2021-01-29 DIAGNOSIS — E78.5 DYSLIPIDEMIA: ICD-10-CM

## 2021-01-29 DIAGNOSIS — E11.69 DIABETES MELLITUS TYPE 2 IN OBESE (HCC): ICD-10-CM

## 2021-01-29 PROCEDURE — 99496 TRANSJ CARE MGMT HIGH F2F 7D: CPT | Performed by: FAMILY MEDICINE

## 2021-01-29 PROCEDURE — 3008F BODY MASS INDEX DOCD: CPT | Performed by: FAMILY MEDICINE

## 2021-01-29 PROCEDURE — 3080F DIAST BP >= 90 MM HG: CPT | Performed by: FAMILY MEDICINE

## 2021-01-29 PROCEDURE — 3077F SYST BP >= 140 MM HG: CPT | Performed by: FAMILY MEDICINE

## 2021-01-29 RX ORDER — AMLODIPINE BESYLATE 5 MG/1
5 TABLET ORAL DAILY
Qty: 90 TABLET | Refills: 3 | Status: SHIPPED | OUTPATIENT
Start: 2021-01-29 | End: 2021-04-17

## 2021-01-29 RX ORDER — INSULIN LISPRO 100 [IU]/ML
INJECTION, SOLUTION INTRAVENOUS; SUBCUTANEOUS
COMMUNITY
Start: 2021-01-28 | End: 2021-04-17

## 2021-01-29 NOTE — PATIENT INSTRUCTIONS
Diabetes with High Blood Sugar   You have been treated for high blood sugar (hyperglycemia). This may be because of an infection or other illness. Or it may be from eating too many sweets or starches.  Or it may be from not taking enough insulin or other · Shortness of breath  · Chest pain  · Weakness of an arm, leg, or one side of the face  · Sudden trouble with speech or vision  Sania last reviewed this educational content on 9/1/2018  © 9458-0780 [employer].  [address] [facility] · Take your blood pressure medicine exactly as directed. Don’t skip doses. Missing doses can cause your blood pressure to get out of control. · If you do miss a dose (or doses) check with your healthcare provider about what to do.   · Don't take medicines · Break the smoking habit. Enroll in a stop-smoking program to improve your chances of success. Ask your healthcare provider about programs and medicines to help you stop smoking.   · Limit drinks that contain caffeine such as coffee, black or green tea, an

## 2021-01-29 NOTE — TELEPHONE ENCOUNTER
Pharmacy faxed letter stating that plasma concentrations and pharmacologic effects of simvastatin may be increased by amlodipine. The daily dose of simvastatin should not exceed 20mg in patients receiving amlodipine according to official package labeling.

## 2021-01-29 NOTE — PROGRESS NOTES
HPI:    Sanjana Padron is a 41year old male here today for hospital follow up.    He was discharged from Inpatient hospital, [facility] to Home   Admission Date: 1/25/21   Discharge Date: 1/26/21  Hospital Discharge Diagnoses (since 12/30/2020 Patient has high BP today. Mónica Higgins is a 41year old male who presents for follow-up of elevated blood pressure. He is not exercising and is not adherent to a low-salt diet. Blood pressure is not well controlled at home.  Cardiac symptoms: n Inject 1.5 mg into the skin once a week.     •  EZETIMIBE-SIMVASTATIN 10-80 MG Oral Tab, TAKE 1 TABLET BY MOUTH EVERY DAY    •  Insulin Aspart Pen 100 UNIT/ML Subcutaneous Solution Pen-injector, Inject 4-20 Units into the skin 4 (four) times daily before me Ht 5' 8\" (1.727 m)   Wt 212 lb (96.2 kg)   BMI 32.23 kg/m²    GENERAL: well developed, well nourished, in no apparent distress  SKIN: no rashes, no suspicious lesions  HEENT: atraumatic, normocephalic, ears and throat are clear  EYES: MAXIMILIAN JAMES, conj above   Other orders  -     Cancel: OPHTHALMOLOGY - INTERNAL        No orders of the defined types were placed in this encounter.       Meds & Refills for this Visit:  Requested Prescriptions     Signed Prescriptions Disp Refills   • amLODIPine Besylate 5 M

## 2021-02-01 RX ORDER — ROSUVASTATIN CALCIUM 20 MG/1
20 TABLET, COATED ORAL NIGHTLY
Qty: 30 TABLET | Refills: 2 | Status: SHIPPED | OUTPATIENT
Start: 2021-02-01 | End: 2021-02-13 | Stop reason: ALTCHOICE

## 2021-02-01 NOTE — PROGRESS NOTES
Lupillo January Crista  : 1980 was seen for Diabetic Education Follow up:    Date: 2021  Referring Provider: Dr.M Wilfrid Burk  Start time: 12:30pm  End time: 1:30pm    Assessment:     Assessment: Wt 214 lb 1.6 oz   BMI 32.55 kg/m²      HEMOGLOBIN A1C activity on BG discussed. Reviewed when to call MD and benefits of goal setting.     Monitoring  Reinforced glucose monitoring schedules: 3 times per day before meals;may need to add checking at bedtime    Taking Medication  Reviewed medication's  use, act

## 2021-02-13 ENCOUNTER — OFFICE VISIT (OUTPATIENT)
Dept: FAMILY MEDICINE CLINIC | Facility: CLINIC | Age: 41
End: 2021-02-13
Payer: COMMERCIAL

## 2021-02-13 VITALS
BODY MASS INDEX: 31.52 KG/M2 | TEMPERATURE: 98 F | HEART RATE: 110 BPM | SYSTOLIC BLOOD PRESSURE: 124 MMHG | HEIGHT: 68 IN | WEIGHT: 208 LBS | RESPIRATION RATE: 16 BRPM | DIASTOLIC BLOOD PRESSURE: 70 MMHG | OXYGEN SATURATION: 98 %

## 2021-02-13 DIAGNOSIS — Z00.00 WELL ADULT EXAM: Primary | ICD-10-CM

## 2021-02-13 DIAGNOSIS — E66.9 OBESITY (BMI 30.0-34.9): ICD-10-CM

## 2021-02-13 DIAGNOSIS — E11.51 UNCONTROLLED TYPE 2 DIABETES MELLITUS WITH DIABETIC PERIPHERAL ANGIOPATHY WITHOUT GANGRENE (HCC): ICD-10-CM

## 2021-02-13 DIAGNOSIS — E78.2 MIXED HYPERLIPIDEMIA: ICD-10-CM

## 2021-02-13 DIAGNOSIS — I10 ESSENTIAL HYPERTENSION: ICD-10-CM

## 2021-02-13 DIAGNOSIS — E11.65 UNCONTROLLED TYPE 2 DIABETES MELLITUS WITH DIABETIC PERIPHERAL ANGIOPATHY WITHOUT GANGRENE (HCC): ICD-10-CM

## 2021-02-13 DIAGNOSIS — E66.9 DIABETES MELLITUS TYPE 2 IN OBESE (HCC): ICD-10-CM

## 2021-02-13 DIAGNOSIS — E11.69 DIABETES MELLITUS TYPE 2 IN OBESE (HCC): ICD-10-CM

## 2021-02-13 PROCEDURE — 3078F DIAST BP <80 MM HG: CPT | Performed by: FAMILY MEDICINE

## 2021-02-13 PROCEDURE — 99396 PREV VISIT EST AGE 40-64: CPT | Performed by: FAMILY MEDICINE

## 2021-02-13 PROCEDURE — 3008F BODY MASS INDEX DOCD: CPT | Performed by: FAMILY MEDICINE

## 2021-02-13 PROCEDURE — 3074F SYST BP LT 130 MM HG: CPT | Performed by: FAMILY MEDICINE

## 2021-02-13 RX ORDER — PEN NEEDLE, DIABETIC, SAFETY 30 GX3/16"
NEEDLE, DISPOSABLE MISCELLANEOUS
Qty: 1 BOX | Refills: 2 | Status: ON HOLD | OUTPATIENT
Start: 2021-02-13 | End: 2021-10-13

## 2021-02-13 NOTE — PROGRESS NOTES
HPI:   Subjective Patient presents with:  Blood Pressure: 2 week follow up   Physical     Sergei Dominguez is a 41year old male who presents for follow-up of elevated blood pressure. He is on lisinopril 40mg and was started on almlodipine.  He saw  CHOLECYSTECTOMY     • DEBRIDE WOUND Left     HEEL   • EXTREMITY LOWER IRRIGATION & DEBRIDEMENT Left 2/17/2018    Performed by Mendoza Conti DPM at [facility]   • FOOT SURGERY     • KNEE ARTHROTOMY Left 4/29/2018    Performed by iDnesh Mendiola MD at [facility] Visual impairment. His family history includes Diabetes in his father and mother; Heart Disorder in his father; Hypertension in his sister. He  reports that he has never smoked. He has never used smokeless tobacco. He reports current alcohol use.  He re week.    •  EZETIMIBE-SIMVASTATIN 10-80 MG Oral Tab, TAKE 1 TABLET BY MOUTH EVERY DAY    No current facility-administered medications on file prior to visit.      CBC  (most recent labs)   Lab Results   Component Value Date/Time    WBC 6.0 01/26/2021 06:44 Negative for color change and rash. Neurological: Negative for dizziness, syncope, weakness, numbness, tingling and headaches. Hematological: Negative for adenopathy. Does not bruise/bleed easily.    Psychiatric/Behavioral: The patient is not nervous/an Misc; USE NEW PEN NEEDLE WITH EACH INJECTION AS DIRECTED  Dispense: 1 Box;  Refill: 2    Recommendations are: continue present meds, lose weight by increased dietary compliance and exercise,check feet daily and continue on levemir, humalog, trulicity, metfo

## 2021-02-16 ENCOUNTER — TELEPHONE (OUTPATIENT)
Dept: FAMILY MEDICINE CLINIC | Facility: CLINIC | Age: 41
End: 2021-02-16

## 2021-02-19 ENCOUNTER — OFFICE VISIT (OUTPATIENT)
Dept: PODIATRY CLINIC | Facility: CLINIC | Age: 41
End: 2021-02-19
Payer: COMMERCIAL

## 2021-02-19 VITALS — WEIGHT: 200 LBS | HEIGHT: 68 IN | BODY MASS INDEX: 30.31 KG/M2

## 2021-02-19 DIAGNOSIS — I73.89 OTHER SPECIFIED PERIPHERAL VASCULAR DISEASES (HCC): Primary | ICD-10-CM

## 2021-02-19 DIAGNOSIS — L84 CALLUS OF FOOT: ICD-10-CM

## 2021-02-19 PROCEDURE — 99212 OFFICE O/P EST SF 10 MIN: CPT | Performed by: PODIATRIST

## 2021-02-19 PROCEDURE — 3008F BODY MASS INDEX DOCD: CPT | Performed by: PODIATRIST

## 2021-02-21 NOTE — PROGRESS NOTES
Mónica Higgins is a 41year old male. Patient presents with: Follow - Up: ulcer left foot        HPI:   Patient returns to the clinic at this point in time says the bottom of his heel looks very good does not notice any ulcerations or sores.   At tod medication for penile self injection therapy for erectile dysfunction 5 each 11   • Needle, Disp, (BD DISP NEEDLE) 30G X 1\" Does not apply Misc Use for self administration of intracavernosal penile injection therapy of Caverject for erectile dysfunction 5 Comment: socially      Drug use: No    Other Topics      Concerns:          REVIEW OF SYSTEMS:   Today reviewed systens as documented below  GENERAL HEALTH: feels well otherwise  SKIN: Refer to exam below  RESPIRATORY: denies shortness of breath with exert

## 2021-03-15 ENCOUNTER — TELEPHONE (OUTPATIENT)
Dept: FAMILY MEDICINE CLINIC | Facility: CLINIC | Age: 41
End: 2021-03-15

## 2021-03-15 NOTE — TELEPHONE ENCOUNTER
Received diabetic eye exam report from [address] [place] [address] . Pt completed eye exam on 3/12/21. Severe diabetic retinopathy OU. Flow sheet updated in patients chart. Report sent to scan.

## 2021-03-18 ENCOUNTER — PATIENT MESSAGE (OUTPATIENT)
Dept: TELEHEALTH | Age: 41
End: 2021-03-18

## 2021-03-25 ENCOUNTER — TELEPHONE (OUTPATIENT)
Dept: FAMILY MEDICINE CLINIC | Facility: CLINIC | Age: 41
End: 2021-03-25

## 2021-03-25 DIAGNOSIS — E11.69 DIABETES MELLITUS TYPE 2 IN OBESE (HCC): Primary | ICD-10-CM

## 2021-03-25 DIAGNOSIS — E87.1 LOW SODIUM LEVELS: ICD-10-CM

## 2021-03-25 DIAGNOSIS — E66.9 DIABETES MELLITUS TYPE 2 IN OBESE (HCC): Primary | ICD-10-CM

## 2021-03-27 ENCOUNTER — IMMUNIZATION (OUTPATIENT)
Dept: LAB | Age: 41
End: 2021-03-27
Attending: HOSPITALIST
Payer: COMMERCIAL

## 2021-03-27 DIAGNOSIS — Z23 NEED FOR VACCINATION: Primary | ICD-10-CM

## 2021-03-27 PROCEDURE — 0001A SARSCOV2 VAC 30MCG/0.3ML IM: CPT

## 2021-04-13 ENCOUNTER — TELEPHONE (OUTPATIENT)
Dept: FAMILY MEDICINE CLINIC | Facility: CLINIC | Age: 41
End: 2021-04-13

## 2021-04-13 ENCOUNTER — LAB ENCOUNTER (OUTPATIENT)
Dept: LAB | Age: 41
End: 2021-04-13
Attending: FAMILY MEDICINE
Payer: COMMERCIAL

## 2021-04-13 ENCOUNTER — OFFICE VISIT (OUTPATIENT)
Dept: PODIATRY CLINIC | Facility: CLINIC | Age: 41
End: 2021-04-13
Payer: COMMERCIAL

## 2021-04-13 ENCOUNTER — HOSPITAL ENCOUNTER (EMERGENCY)
Facility: HOSPITAL | Age: 41
Discharge: HOME | End: 2021-04-13
Attending: EMERGENCY MEDICINE
Payer: COMMERCIAL

## 2021-04-13 VITALS
TEMPERATURE: 99 F | RESPIRATION RATE: 17 BRPM | DIASTOLIC BLOOD PRESSURE: 91 MMHG | OXYGEN SATURATION: 100 % | BODY MASS INDEX: 29.03 KG/M2 | WEIGHT: 185 LBS | HEART RATE: 99 BPM | HEIGHT: 67 IN | SYSTOLIC BLOOD PRESSURE: 162 MMHG

## 2021-04-13 DIAGNOSIS — E78.2 MIXED HYPERLIPIDEMIA: ICD-10-CM

## 2021-04-13 DIAGNOSIS — E66.9 DIABETES MELLITUS TYPE 2 IN OBESE (HCC): ICD-10-CM

## 2021-04-13 DIAGNOSIS — E11.69 DIABETES MELLITUS TYPE 2 IN OBESE (HCC): ICD-10-CM

## 2021-04-13 DIAGNOSIS — E87.5 HYPERKALEMIA: Primary | ICD-10-CM

## 2021-04-13 DIAGNOSIS — L84 CALLUS OF FOOT: ICD-10-CM

## 2021-04-13 DIAGNOSIS — I73.89 OTHER SPECIFIED PERIPHERAL VASCULAR DISEASES (HCC): Primary | ICD-10-CM

## 2021-04-13 DIAGNOSIS — E87.1 LOW SODIUM LEVELS: ICD-10-CM

## 2021-04-13 DIAGNOSIS — N28.9 RENAL INSUFFICIENCY: ICD-10-CM

## 2021-04-13 PROCEDURE — 83036 HEMOGLOBIN GLYCOSYLATED A1C: CPT | Performed by: FAMILY MEDICINE

## 2021-04-13 PROCEDURE — 99284 EMERGENCY DEPT VISIT MOD MDM: CPT | Performed by: EMERGENCY MEDICINE

## 2021-04-13 PROCEDURE — 96360 HYDRATION IV INFUSION INIT: CPT | Performed by: EMERGENCY MEDICINE

## 2021-04-13 PROCEDURE — 93005 ELECTROCARDIOGRAM TRACING: CPT

## 2021-04-13 PROCEDURE — 80061 LIPID PANEL: CPT | Performed by: FAMILY MEDICINE

## 2021-04-13 PROCEDURE — 99212 OFFICE O/P EST SF 10 MIN: CPT | Performed by: PODIATRIST

## 2021-04-13 PROCEDURE — 85025 COMPLETE CBC W/AUTO DIFF WBC: CPT | Performed by: EMERGENCY MEDICINE

## 2021-04-13 PROCEDURE — 36415 COLL VENOUS BLD VENIPUNCTURE: CPT | Performed by: FAMILY MEDICINE

## 2021-04-13 PROCEDURE — 93010 ELECTROCARDIOGRAM REPORT: CPT | Performed by: EMERGENCY MEDICINE

## 2021-04-13 PROCEDURE — 80053 COMPREHEN METABOLIC PANEL: CPT | Performed by: FAMILY MEDICINE

## 2021-04-13 PROCEDURE — 80053 COMPREHEN METABOLIC PANEL: CPT | Performed by: EMERGENCY MEDICINE

## 2021-04-13 NOTE — PROGRESS NOTES
Darrell Monreal is a 41year old male.  Patient presents with:  Diabetic Ulcer: Left foot f/u - states he fine - no drainage, no pain - this AM his BS=75        HPI:   Patient returns to the clinic for evaluation management of heel callus on the left f Monitoring (BLOOD PRESSURE KIT) Does not apply Device 1 Units by Does not apply route daily.  1 Device 0   • Syringe/Needle, Disp, 18G X 1-1/2\" 3 ML Does not apply Misc To draw up Caverject medication for penile self injection therapy for erectile dysfunct Alcohol use:  Yes        Alcohol/week: 0.0 - 2.0 standard drinks        Comment: socially      Drug use: No    Other Topics      Concerns:          REVIEW OF SYSTEMS:   Today reviewed systens as documented below  GENERAL HEALTH: feels well otherwise  SKIN:

## 2021-04-13 NOTE — TELEPHONE ENCOUNTER
Called pt to inform him we were notified that his potassium level was critically high on the labs he had drawn today and Dr. Ramírez Wetzel reviewed his other lab results and his kidney function is also elevated.  Informed pt these results are concerning and

## 2021-04-13 NOTE — ED PROVIDER NOTES
Patient Seen in: [facility] Emergency Department      History   Patient presents with:  Abnormal Result    Stated Complaint: K 6.1 today, renal function labs off    HPI/Subjective:   HPI    26-year-old male presents emergency department who apparent reviewed. All other systems reviewed and negative except as noted above.     Physical Exam     ED Triage Vitals [04/13/21 1733]   BP (!) 157/92   Pulse 103   Resp 18   Temp 98.5 °F (36.9 °C)   Temp src Oral   SpO2 100 %   O2 Device        Current:BP Eligha Inavale GFR, Non- 31 (*)     GFR, -American 36 (*)     AST 50 (*)     All other components within normal limits   CBC W/ DIFFERENTIAL - Abnormal; Notable for the following components:    RBC 3.37 (*)     HGB 10.2 (*)     HCT 32.4 (*) the appropriate physician. Patient verbalizes and agrees with plan. Patient discharged in good condition. This note was prepared using [employer] voice recognition dictation software. As a result errors may occur.  When identified these errors ha

## 2021-04-13 NOTE — TELEPHONE ENCOUNTER
Per Dr. Yesi Pedersen she reviewed pt's critical lab result and pt's other labs, pt's kidney function is also elevated. Dr. Yesi Pedersen recommends pt go to the ER for further evaluation.

## 2021-04-13 NOTE — TELEPHONE ENCOUNTER
Arnulfo from [facility] with critical lab result on pt:    Potassium   3.5 - 5.1 mmol/L 6.1High Panic

## 2021-04-17 ENCOUNTER — OFFICE VISIT (OUTPATIENT)
Dept: FAMILY MEDICINE CLINIC | Facility: CLINIC | Age: 41
End: 2021-04-17
Payer: COMMERCIAL

## 2021-04-17 ENCOUNTER — IMMUNIZATION (OUTPATIENT)
Dept: LAB | Age: 41
End: 2021-04-17
Attending: HOSPITALIST
Payer: COMMERCIAL

## 2021-04-17 VITALS
BODY MASS INDEX: 30.13 KG/M2 | OXYGEN SATURATION: 99 % | HEART RATE: 100 BPM | TEMPERATURE: 98 F | HEIGHT: 67 IN | RESPIRATION RATE: 16 BRPM | WEIGHT: 192 LBS | SYSTOLIC BLOOD PRESSURE: 132 MMHG | DIASTOLIC BLOOD PRESSURE: 76 MMHG

## 2021-04-17 DIAGNOSIS — Z23 NEED FOR VACCINATION: Primary | ICD-10-CM

## 2021-04-17 DIAGNOSIS — E11.69 DIABETES MELLITUS TYPE 2 IN OBESE (HCC): ICD-10-CM

## 2021-04-17 DIAGNOSIS — E66.9 DIABETES MELLITUS TYPE 2 IN OBESE (HCC): ICD-10-CM

## 2021-04-17 DIAGNOSIS — E78.1 PURE HYPERGLYCERIDEMIA: ICD-10-CM

## 2021-04-17 DIAGNOSIS — Z00.00 ROUTINE ADULT HEALTH MAINTENANCE: ICD-10-CM

## 2021-04-17 DIAGNOSIS — I10 ESSENTIAL HYPERTENSION: ICD-10-CM

## 2021-04-17 DIAGNOSIS — E11.22 CONTROLLED TYPE 2 DIABETES MELLITUS WITH STAGE 3 CHRONIC KIDNEY DISEASE, WITH LONG-TERM CURRENT USE OF INSULIN (HCC): Primary | ICD-10-CM

## 2021-04-17 DIAGNOSIS — Z79.4 CONTROLLED TYPE 2 DIABETES MELLITUS WITH STAGE 3 CHRONIC KIDNEY DISEASE, WITH LONG-TERM CURRENT USE OF INSULIN (HCC): Primary | ICD-10-CM

## 2021-04-17 DIAGNOSIS — E11.69 DYSLIPIDEMIA ASSOCIATED WITH TYPE 2 DIABETES MELLITUS (HCC): ICD-10-CM

## 2021-04-17 DIAGNOSIS — N18.30 CONTROLLED TYPE 2 DIABETES MELLITUS WITH STAGE 3 CHRONIC KIDNEY DISEASE, WITH LONG-TERM CURRENT USE OF INSULIN (HCC): Primary | ICD-10-CM

## 2021-04-17 DIAGNOSIS — E87.5 ACUTE HYPERKALEMIA: ICD-10-CM

## 2021-04-17 DIAGNOSIS — E78.2 MIXED HYPERLIPIDEMIA: ICD-10-CM

## 2021-04-17 DIAGNOSIS — E11.65 UNCONTROLLED TYPE 2 DIABETES MELLITUS WITH HYPERGLYCEMIA (HCC): ICD-10-CM

## 2021-04-17 DIAGNOSIS — E78.5 DYSLIPIDEMIA ASSOCIATED WITH TYPE 2 DIABETES MELLITUS (HCC): ICD-10-CM

## 2021-04-17 PROCEDURE — 3075F SYST BP GE 130 - 139MM HG: CPT | Performed by: FAMILY MEDICINE

## 2021-04-17 PROCEDURE — 0002A SARSCOV2 VAC 30MCG/0.3ML IM: CPT

## 2021-04-17 PROCEDURE — 99215 OFFICE O/P EST HI 40 MIN: CPT | Performed by: FAMILY MEDICINE

## 2021-04-17 PROCEDURE — 3078F DIAST BP <80 MM HG: CPT | Performed by: FAMILY MEDICINE

## 2021-04-17 PROCEDURE — 3008F BODY MASS INDEX DOCD: CPT | Performed by: FAMILY MEDICINE

## 2021-04-17 RX ORDER — LISINOPRIL 20 MG/1
20 TABLET ORAL DAILY
Qty: 90 TABLET | Refills: 2 | Status: SHIPPED | OUTPATIENT
Start: 2021-04-17 | End: 2021-04-17

## 2021-04-17 RX ORDER — INSULIN LISPRO 100 [IU]/ML
INJECTION, SOLUTION INTRAVENOUS; SUBCUTANEOUS
Qty: 3 ML | Refills: 2 | Status: SHIPPED | OUTPATIENT
Start: 2021-04-17 | End: 2022-02-01

## 2021-04-17 RX ORDER — ASPIRIN 81 MG/1
81 TABLET ORAL DAILY
Qty: 90 TABLET | Refills: 0 | Status: SHIPPED | OUTPATIENT
Start: 2021-04-17 | End: 2021-04-17

## 2021-04-17 RX ORDER — INSULIN DETEMIR 100 [IU]/ML
30 INJECTION, SOLUTION SUBCUTANEOUS DAILY
Qty: 15 ML | Refills: 0 | Status: SHIPPED | OUTPATIENT
Start: 2021-04-17 | End: 2021-07-23

## 2021-04-17 RX ORDER — AMLODIPINE BESYLATE AND BENAZEPRIL HYDROCHLORIDE 10; 20 MG/1; MG/1
1 CAPSULE ORAL DAILY
Qty: 90 CAPSULE | Refills: 1 | Status: SHIPPED | OUTPATIENT
Start: 2021-04-17 | End: 2021-07-16

## 2021-04-17 RX ORDER — AMLODIPINE BESYLATE 5 MG/1
5 TABLET ORAL DAILY
Qty: 90 TABLET | Refills: 3 | Status: SHIPPED | OUTPATIENT
Start: 2021-04-17 | End: 2021-04-17

## 2021-04-17 RX ORDER — ASPIRIN 81 MG/1
81 TABLET ORAL DAILY
Qty: 90 TABLET | Refills: 3 | Status: SHIPPED | OUTPATIENT
Start: 2021-04-17 | End: 2021-07-16

## 2021-04-17 RX ORDER — DULAGLUTIDE 1.5 MG/.5ML
1.5 INJECTION, SOLUTION SUBCUTANEOUS WEEKLY
Qty: 12 PEN | Refills: 1 | Status: SHIPPED | OUTPATIENT
Start: 2021-04-17 | End: 2021-06-11

## 2021-04-17 RX ORDER — EZETIMIBE AND SIMVASTATIN 10; 80 MG/1; MG/1
1 TABLET ORAL DAILY
Qty: 90 TABLET | Refills: 3 | Status: SHIPPED | OUTPATIENT
Start: 2021-04-17 | End: 2021-10-22

## 2021-04-17 NOTE — PATIENT INSTRUCTIONS
Managing Type 1 Diabetes    Diabetes is a lifelong (chronic) condition. Managing your diabetes means making some changes that may be hard. Your healthcare provider, nurse, diabetes educator, and others can help you.    Managing type 1 diabetes means tashia or activity on most days. The 30 minutes doesn't need to happen all at once. Exercising for 10-minute periods during the day works just fine.    Monitor your blood sugar  Your healthcare provider will give you instructions about checking your blood sugar at will have a physical exam. This includes checking your feet. Your provider will also check your blood pressure and weight. · Other exams. Also have complete eye, foot, and dental exams at least once a year.  Always take your shoes off at each visit so your 6/1/2019  © 1187-7083 [employer]. All rights reserved. This information is not intended as a substitute for professional medical care. Always follow your healthcare professional's instructions.

## 2021-04-17 NOTE — PROGRESS NOTES
Patient presents with:  Diabetes: Dm management- completed labs and DM eye exam is UTD      HPI:  Patient presenting today for DM follow up. He has a hx of HTN, insulin dependent T2DM, osteomyelitis of left foot s/p surgical repair, PAD, HLD.      He has a no weakness and no weight loss. Symptoms are improving. Diabetic complications include nephropathy. Pertinent negatives for diabetic complications include no autonomic neuropathy, CVA, heart disease, peripheral neuropathy, PVD or retinopathy.  Risk factors episode started more than 1 year ago. The problem is controlled. Recent lipid tests were reviewed and are normal. Exacerbating diseases include chronic renal disease, diabetes and obesity.  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 90 tablet 3   • amLODIPine Besy-Benazepril HCl 10-20 MG Oral Cap Take 1 capsule by mouth daily.  90 capsule 1   • BD AUTOSHIELD DUO 30G X 5 MM Does not apply Misc USE NEW PEN NEEDLE distress. HEENT:  Normocephalic and atraumatic. Hearing and tympanic membranes normal.  Nose normal. Oropharynx is clear and moist.   Eyes: Conjunctivae and EOM are normal. PERRLA. No scleral icterus. Neck: Normal range of motion. Neck supple.  Normal c Instructed and emphasized medication compliance. - HEMOGLOBIN A1C; Future  - LEVEMIR FLEXTOUCH 100 UNIT/ML Subcutaneous Solution Pen-injector; Inject 30 Units into the skin daily.   Dispense: 15 mL; Refill: 0  - HUMALOG KWIKPEN 100 UNIT/ML Subcutaneous [E]      Comp Metabolic Panel (14) [E]      Lipid Panel [E]      CMP in 3 months      Lipid in 3 months      Hemoglobin A1C in 3 months      Microalb/Creat Ratio, in 3 months      Meds & Refills for this Visit:  Requested Prescriptions     Signed Prescript There is also insulin that is a combination of more than 1 type of insulin. Your healthcare provider, nurse, or a diabetes educator can help you with injections. Make sure you use insulin as directed by your provider.  He or she may change the type, gertrude blood sugar more often when you are sick. For example, when you have a cold or the flu. If your blood sugar levels are often too high or too low, your provider may advise changes to your diet or activity level. He or she may also adjust your medicine. increases the chance that you will have complications from diabetes. Ask your provider about ways to quit. Also don't use e-cigarettes or vaping products. · Vaccines. Get a yearly flu shot.  And ask your provider about vaccines to prevent pneumonia, hepati

## 2021-04-23 ENCOUNTER — LAB ENCOUNTER (OUTPATIENT)
Dept: LAB | Age: 41
End: 2021-04-23
Attending: FAMILY MEDICINE
Payer: COMMERCIAL

## 2021-04-23 ENCOUNTER — TELEPHONE (OUTPATIENT)
Dept: FAMILY MEDICINE CLINIC | Facility: CLINIC | Age: 41
End: 2021-04-23

## 2021-04-23 DIAGNOSIS — Z79.4 CONTROLLED TYPE 2 DIABETES MELLITUS WITH STAGE 3 CHRONIC KIDNEY DISEASE, WITH LONG-TERM CURRENT USE OF INSULIN (HCC): ICD-10-CM

## 2021-04-23 DIAGNOSIS — E11.22 CONTROLLED TYPE 2 DIABETES MELLITUS WITH STAGE 3 CHRONIC KIDNEY DISEASE, WITH LONG-TERM CURRENT USE OF INSULIN (HCC): ICD-10-CM

## 2021-04-23 DIAGNOSIS — N18.30 CONTROLLED TYPE 2 DIABETES MELLITUS WITH STAGE 3 CHRONIC KIDNEY DISEASE, WITH LONG-TERM CURRENT USE OF INSULIN (HCC): ICD-10-CM

## 2021-04-23 PROCEDURE — 80053 COMPREHEN METABOLIC PANEL: CPT | Performed by: FAMILY MEDICINE

## 2021-04-23 NOTE — TELEPHONE ENCOUNTER
Alin from the lab called regarding critical high potassium of 6.3. Dr. Susan Kaplan aware of critical result, see result note with review and recommendation, pt also aware.

## 2021-04-23 NOTE — PROGRESS NOTES
Potassium possibly due to hemolysis. I did order Kayexalate for him to take for 3 days. have him recheck his BMP tomorrow.   Please call them today and let them know

## 2021-04-24 ENCOUNTER — TELEPHONE (OUTPATIENT)
Dept: FAMILY MEDICINE CLINIC | Facility: CLINIC | Age: 41
End: 2021-04-24

## 2021-04-24 ENCOUNTER — LAB ENCOUNTER (OUTPATIENT)
Dept: LAB | Age: 41
End: 2021-04-24
Attending: FAMILY MEDICINE
Payer: COMMERCIAL

## 2021-04-24 ENCOUNTER — MOBILE ENCOUNTER (OUTPATIENT)
Dept: FAMILY MEDICINE CLINIC | Facility: CLINIC | Age: 41
End: 2021-04-24

## 2021-04-24 DIAGNOSIS — E87.5 HYPERKALEMIA: ICD-10-CM

## 2021-04-24 PROCEDURE — 80048 BASIC METABOLIC PNL TOTAL CA: CPT | Performed by: FAMILY MEDICINE

## 2021-04-25 NOTE — PROGRESS NOTES
I called patient myself after  called me for elevated potassium. It was 6.2 patient denies fatigue, dizziness, chest pain, shortness of breath, palpitations, nausea, vomiting, diarrhea. Has had 1 dose of Kayexalate as ordered by Dr. Aixa Rivera.

## 2021-04-27 RX ORDER — SODIUM POLYSTYRENE SULFONATE 15 G/60ML
15 SUSPENSION ORAL; RECTAL DAILY
Qty: 420 ML | Refills: 0 | Status: SHIPPED | OUTPATIENT
Start: 2021-04-27 | End: 2021-05-04

## 2021-05-08 ENCOUNTER — LAB ENCOUNTER (OUTPATIENT)
Dept: LAB | Age: 41
End: 2021-05-08
Attending: FAMILY MEDICINE
Payer: COMMERCIAL

## 2021-05-08 DIAGNOSIS — E11.69 DIABETES MELLITUS TYPE 2 IN OBESE (HCC): ICD-10-CM

## 2021-05-08 DIAGNOSIS — E66.9 DIABETES MELLITUS TYPE 2 IN OBESE (HCC): ICD-10-CM

## 2021-05-08 PROCEDURE — 80053 COMPREHEN METABOLIC PANEL: CPT | Performed by: FAMILY MEDICINE

## 2021-05-24 ENCOUNTER — OFFICE VISIT (OUTPATIENT)
Dept: PODIATRY CLINIC | Facility: CLINIC | Age: 41
End: 2021-05-24
Payer: COMMERCIAL

## 2021-05-24 ENCOUNTER — OFFICE VISIT (OUTPATIENT)
Dept: NEPHROLOGY | Facility: CLINIC | Age: 41
End: 2021-05-24
Payer: COMMERCIAL

## 2021-05-24 VITALS — DIASTOLIC BLOOD PRESSURE: 60 MMHG | BODY MASS INDEX: 29 KG/M2 | WEIGHT: 182.81 LBS | SYSTOLIC BLOOD PRESSURE: 92 MMHG

## 2021-05-24 DIAGNOSIS — E11.51 UNCONTROLLED TYPE 2 DIABETES MELLITUS WITH DIABETIC PERIPHERAL ANGIOPATHY WITHOUT GANGRENE (HCC): ICD-10-CM

## 2021-05-24 DIAGNOSIS — B35.1 ONYCHOMYCOSIS: ICD-10-CM

## 2021-05-24 DIAGNOSIS — L84 CALLUS OF FOOT: ICD-10-CM

## 2021-05-24 DIAGNOSIS — E11.65 UNCONTROLLED TYPE 2 DIABETES MELLITUS WITH DIABETIC PERIPHERAL ANGIOPATHY WITHOUT GANGRENE (HCC): ICD-10-CM

## 2021-05-24 DIAGNOSIS — I10 ESSENTIAL HYPERTENSION: ICD-10-CM

## 2021-05-24 DIAGNOSIS — I73.89 OTHER SPECIFIED PERIPHERAL VASCULAR DISEASES (HCC): Primary | ICD-10-CM

## 2021-05-24 DIAGNOSIS — N18.30 STAGE 3 CHRONIC KIDNEY DISEASE, UNSPECIFIED WHETHER STAGE 3A OR 3B CKD (HCC): Primary | ICD-10-CM

## 2021-05-24 PROCEDURE — 3074F SYST BP LT 130 MM HG: CPT | Performed by: INTERNAL MEDICINE

## 2021-05-24 PROCEDURE — 99214 OFFICE O/P EST MOD 30 MIN: CPT | Performed by: INTERNAL MEDICINE

## 2021-05-24 PROCEDURE — 99213 OFFICE O/P EST LOW 20 MIN: CPT | Performed by: PODIATRIST

## 2021-05-24 PROCEDURE — 3078F DIAST BP <80 MM HG: CPT | Performed by: INTERNAL MEDICINE

## 2021-05-24 NOTE — PROGRESS NOTES
Nephrology Progress Note      Luisa Murray is a 35year old male.     HPI:   Patient presents with:  Chronic Kidney Disease      Zaid Asencio was seen in the nephrology clinic today in follow-up for management of chronic kidney disease stage III r Inject 30 Units into the skin daily. 15 mL 0   • HUMALOG KWIKPEN 100 UNIT/ML Subcutaneous Solution Pen-injector SLIDING SCALE 3 mL 2   • Dulaglutide (TRULICITY) 1.5 UO/7.4HW Subcutaneous Solution Pen-injector Inject 1.5 mg into the skin once a week.  12 pen Encounters:  05/24/21 : 182 lb 12.8 oz (82.9 kg)  04/17/21 : 192 lb (87.1 kg)  04/13/21 : 185 lb (83.9 kg)  02/22/21 : 206 lb (93.4 kg)  02/19/21 : 200 lb (90.7 kg)  02/13/21 : 208 lb (94.3 kg)      General: Alert and oriented in no apparent distress.   PAT KETUR Negative 07/12/2018    BLOODURINE Negative 07/12/2018    PHURINE 5.0 07/12/2018    PROUR Negative 07/12/2018    UROBILINOGEN <2.0 07/12/2018    NITRITE Negative 07/12/2018    LEUUR Negative 07/12/2018    NMIC Microscopic not indicated 07/12/2018    W

## 2021-05-24 NOTE — PROGRESS NOTES
Malena Lua is a 35year old male. Patient presents with:  Diabetic Foot Care: BS this  - Last A1C of 6.2 on 4/13/21 - routine care. HPI:   Returns to the clinic he is switched to wearing a hightop athletic shoe.   He is here for santos 0   • Syringe/Needle, Disp, 18G X 1-1/2\" 3 ML Does not apply Misc To draw up Caverject medication for penile self injection therapy for erectile dysfunction 5 each 11   • Needle, Disp, (BD DISP NEEDLE) 30G X 1\" Does not apply Misc Use for self administra distress  EXTREMITIES:   1. Integument: The skin on both feet was examined its warm and dry is a little cooler on the right than the left. 2. Vascular: The patient has very weak pulses on both lower extremities but relates no history of claudication.    3

## 2021-06-07 ENCOUNTER — TELEPHONE (OUTPATIENT)
Dept: FAMILY MEDICINE CLINIC | Facility: CLINIC | Age: 41
End: 2021-06-07

## 2021-06-07 NOTE — TELEPHONE ENCOUNTER
Future Appointments   Date Time Provider Fiordaliza Shena   6/11/2021 10:30 AM Marianne Quinones, DO [facility] [address] [facility] [address]     Pt scheduled in person appt on mychart for nausea. Okay to be seen in office? Please advise.

## 2021-06-07 NOTE — TELEPHONE ENCOUNTER
Spoke to patient. Has had intermittent nausea for past month. Episodes are random. Happens at least once a week. Has had diarrhea for past month. Vomits once a week in the morning. No changes in diet.    Denies fever, body aches, ST, cough, chest

## 2021-06-10 ENCOUNTER — TELEPHONE (OUTPATIENT)
Dept: FAMILY MEDICINE CLINIC | Facility: CLINIC | Age: 41
End: 2021-06-10

## 2021-06-10 NOTE — TELEPHONE ENCOUNTER
Received diabetic eye exam report from [place] [id number]. Pt completed eye exam on 6/4/21. Severe diabetic retinopathy. Flow sheet updated in patients chart. Report sent to scan.

## 2021-06-11 ENCOUNTER — OFFICE VISIT (OUTPATIENT)
Dept: FAMILY MEDICINE CLINIC | Facility: CLINIC | Age: 41
End: 2021-06-11
Payer: COMMERCIAL

## 2021-06-11 VITALS
RESPIRATION RATE: 16 BRPM | WEIGHT: 178 LBS | SYSTOLIC BLOOD PRESSURE: 120 MMHG | BODY MASS INDEX: 27.94 KG/M2 | DIASTOLIC BLOOD PRESSURE: 70 MMHG | HEIGHT: 67 IN | OXYGEN SATURATION: 99 % | HEART RATE: 74 BPM | TEMPERATURE: 98 F

## 2021-06-11 DIAGNOSIS — E11.69 DIABETES MELLITUS TYPE 2 IN OBESE (HCC): ICD-10-CM

## 2021-06-11 DIAGNOSIS — R11.0 NAUSEA: Primary | ICD-10-CM

## 2021-06-11 DIAGNOSIS — R19.5 LOOSE STOOLS: ICD-10-CM

## 2021-06-11 DIAGNOSIS — E66.9 DIABETES MELLITUS TYPE 2 IN OBESE (HCC): ICD-10-CM

## 2021-06-11 PROCEDURE — 3074F SYST BP LT 130 MM HG: CPT | Performed by: FAMILY MEDICINE

## 2021-06-11 PROCEDURE — 3008F BODY MASS INDEX DOCD: CPT | Performed by: FAMILY MEDICINE

## 2021-06-11 PROCEDURE — 3078F DIAST BP <80 MM HG: CPT | Performed by: FAMILY MEDICINE

## 2021-06-11 PROCEDURE — 99213 OFFICE O/P EST LOW 20 MIN: CPT | Performed by: FAMILY MEDICINE

## 2021-06-11 RX ORDER — DULAGLUTIDE 0.75 MG/.5ML
0.75 INJECTION, SOLUTION SUBCUTANEOUS WEEKLY
Qty: 0.5 ML | Refills: 3 | Status: SHIPPED | OUTPATIENT
Start: 2021-06-11 | End: 2021-09-09

## 2021-06-11 NOTE — PROGRESS NOTES
Patient presents with:  Nausea: at least once a week-       HPI:  34 y/o male with hx of osteomyelitis, T2DM well controlled, presenting with nausea. patient reports that for the last two months he has been having nausea 1-2 times per week.    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 90 tablet 3   • aspirin (ASPIRIN 81) 81 MG Oral Tab EC Take 1 tablet (81 mg total) by mouth daily.  90 tablet 3   • amLODIPine Besy-Benazepril HCl 10-20 MG Oral Cap Take 1 capsule by mout SpO2 99%   BMI 27.88 kg/m²   Constitutional: Oriented to person, place, and time. No distress. HEENT:  Normocephalic and atraumatic. Eyes: Conjunctivae and EOM are normal. PERRLA. No scleral icterus.    Cardiovascular: Normal rate, regular rhythm and i Subcutaneous Solution Pen-injector; Inject 0.75 mg into the skin once a week. Dispense: 0.5 mL; Refill: 3    No orders of the defined types were placed in this encounter.       Meds & Refills for this Visit:  Requested Prescriptions     Signed Prescription

## 2021-06-28 ENCOUNTER — HOSPITAL ENCOUNTER (OUTPATIENT)
Dept: GENERAL RADIOLOGY | Facility: HOSPITAL | Age: 41
Discharge: HOME | End: 2021-06-28
Attending: PODIATRIST
Payer: COMMERCIAL

## 2021-06-28 ENCOUNTER — OFFICE VISIT (OUTPATIENT)
Dept: PODIATRY CLINIC | Facility: CLINIC | Age: 41
End: 2021-06-28
Payer: COMMERCIAL

## 2021-06-28 DIAGNOSIS — E11.65 UNCONTROLLED TYPE 2 DIABETES MELLITUS WITH DIABETIC PERIPHERAL ANGIOPATHY WITHOUT GANGRENE (HCC): ICD-10-CM

## 2021-06-28 DIAGNOSIS — I73.89 OTHER SPECIFIED PERIPHERAL VASCULAR DISEASES (HCC): ICD-10-CM

## 2021-06-28 DIAGNOSIS — L97.421 HEEL ULCER, LEFT, LIMITED TO BREAKDOWN OF SKIN (HCC): ICD-10-CM

## 2021-06-28 DIAGNOSIS — I73.89 OTHER SPECIFIED PERIPHERAL VASCULAR DISEASES (HCC): Primary | ICD-10-CM

## 2021-06-28 DIAGNOSIS — L97.429 HEEL ULCERATION, LEFT, WITH UNSPECIFIED SEVERITY (HCC): ICD-10-CM

## 2021-06-28 DIAGNOSIS — E11.51 UNCONTROLLED TYPE 2 DIABETES MELLITUS WITH DIABETIC PERIPHERAL ANGIOPATHY WITHOUT GANGRENE (HCC): ICD-10-CM

## 2021-06-28 PROCEDURE — 99213 OFFICE O/P EST LOW 20 MIN: CPT | Performed by: PODIATRIST

## 2021-06-28 PROCEDURE — 73650 X-RAY EXAM OF HEEL: CPT | Performed by: PODIATRIST

## 2021-06-28 RX ORDER — AMOXICILLIN AND CLAVULANATE POTASSIUM 875; 125 MG/1; MG/1
1 TABLET, FILM COATED ORAL 2 TIMES DAILY
Qty: 30 TABLET | Refills: 0 | Status: SHIPPED | OUTPATIENT
Start: 2021-06-28 | End: 2021-07-23 | Stop reason: ALTCHOICE

## 2021-06-28 NOTE — PROGRESS NOTES
Donny Martinez is a 35year old male. Patient presents with:  Diabetic Ulcer: FBS this am was 129. Last week while patient was on vacation in [place], Monday noticed blood on towel. Patient used a scooter and stayed off of his foot.  He flew in yes day 100 each 6   • Glucose Blood (ONETOUCH VERIO) In Vitro Strip Use one test strips for each blood glucose test, max 4 per day 100 strip 6   • Blood Pressure Monitoring (BLOOD PRESSURE KIT) Does not apply Device 1 Units by Does not apply route daily.  1 De breath with exertion  CARDIOVASCULAR: denies chest pain on exertion  GI: denies abdominal pain and denies heartburn  NEURO: denies headaches    EXAM:   There were no vitals taken for this visit.   GENERAL: well developed, well nourished, in no apparent dist

## 2021-07-02 ENCOUNTER — OFFICE VISIT (OUTPATIENT)
Dept: WOUND CARE | Facility: HOSPITAL | Age: 41
End: 2021-07-02
Attending: NURSE PRACTITIONER
Payer: COMMERCIAL

## 2021-07-02 VITALS
BODY MASS INDEX: 26.52 KG/M2 | DIASTOLIC BLOOD PRESSURE: 91 MMHG | WEIGHT: 175 LBS | HEIGHT: 68 IN | TEMPERATURE: 97 F | RESPIRATION RATE: 14 BRPM | HEART RATE: 101 BPM | SYSTOLIC BLOOD PRESSURE: 138 MMHG

## 2021-07-02 DIAGNOSIS — Z79.4 CURRENT USE OF INSULIN (HCC): ICD-10-CM

## 2021-07-02 DIAGNOSIS — E11.621 DIABETIC ULCER OF OTHER PART OF LEFT FOOT ASSOCIATED WITH TYPE 2 DIABETES MELLITUS, WITH FAT LAYER EXPOSED (HCC): Primary | ICD-10-CM

## 2021-07-02 DIAGNOSIS — L97.522 DIABETIC ULCER OF OTHER PART OF LEFT FOOT ASSOCIATED WITH TYPE 2 DIABETES MELLITUS, WITH FAT LAYER EXPOSED (HCC): Primary | ICD-10-CM

## 2021-07-02 PROBLEM — L97.509 DIABETIC FOOT ULCER (HCC): Status: ACTIVE | Noted: 2021-07-02

## 2021-07-02 LAB — GLUCOSE BLD-MCNC: 116 MG/DL (ref 70–99)

## 2021-07-02 PROCEDURE — 97598 DBRDMT OPN WND ADDL 20CM/<: CPT | Performed by: NURSE PRACTITIONER

## 2021-07-02 PROCEDURE — 99215 OFFICE O/P EST HI 40 MIN: CPT | Performed by: NURSE PRACTITIONER

## 2021-07-02 PROCEDURE — 97597 DBRDMT OPN WND 1ST 20 CM/<: CPT | Performed by: NURSE PRACTITIONER

## 2021-07-02 NOTE — PROGRESS NOTES
Patient ID: Donny Martinez is a 35year old male.     Debridement   Wound 07/02/21 #1 Diabetic Ulcer Foot Left;Plantar    Consent obtained? verbal  Consent given by: patient    Performed by: provider  Debridement type: selective    Pre-debridement dale

## 2021-07-02 NOTE — PROGRESS NOTES
Weekly Wound Education Note    Teaching Provided To: Patient  Training Topics: Discharge instructions;Diabetes; Compression;Cleasing and general instructions;Dressing;Off-loading  Training Method: Explain/Verbal  Training Response: Patient responds and unde

## 2021-07-02 NOTE — PROGRESS NOTES
CHIEF COMPLAINT:     Patient presents with:  Wound Care: Patients is here for a initial visit for left plantar foot. Patients stated started with a blister last week. Wife stated been applying neosporin to the wound and wrap with ace bandage.        HPI: (TRULICITY) 7.20 FC/0.0NT Subcutaneous Solution Pen-injector, Inject 0.75 mg into the skin once a week., Disp: 0.5 mL, Rfl: 3  •  LEVEMIR FLEXTOUCH 100 UNIT/ML Subcutaneous Solution Pen-injector, Inject 30 Units into the skin daily. , Disp: 15 mL, Rfl: 0  • Systems     HISTORY:     Past Medical History:   Diagnosis Date   • Diabetes ([facility] [address]    • Elevated liver enzymes 8/30/2016   • Elevated serum GGT level 8/30/2016   • High cholesterol    • HYPERLIPIDEMIA    • Hyperlipidemia    • HYPERTRIGLYCERIDEMIA    • OBES regular, without murmur or gallop. dp palpable bilaterally. Left with pulsatile signals at the dp/pt/distal hallux/distal 5th digit and plantar distal to the ulceration. Extremities free of varicosities, edema. Capillary refill < 3 seconds.  Digits are war shoe;Peg liner   Wound Bed Granulation (%) 75 %   Wound Bed Slough (%) 25 %   Margins Well-defined edges   Posadas Scale Grade 4       Active Orders   Date Order Priority Status Authorizing Provider   07/02/21 2014 Debridement Routine Active Dennise Sloan, agrees to plan. Return in about 4 days (around 7/6/2021) for Cast placement next week (RN/Dennise) and then 48 hours later visit (RN/Dennise). .    I spent  40   minutes with the patient + selective debridement on first visit.  This time included:    prepar sprouts, cabbage  8. Vitamin A: Dark green, leafy vegetables, orange or yellow vegetables, cantaloupe, fortified dairy products, liver, fortified cereals  9. Zinc: Fortified cereals, red meats, seafood  10.  Consider supplementing with Adalberto by [facility]

## 2021-07-02 NOTE — PATIENT INSTRUCTIONS
Return in about 4 days (around 7/6/2021) for Cast placement next week (RN/Dennise) and then 48 hours later visit (RN/Dennise).       Overall Plan:  1) strict offloading with cast  2) once healed (close to being healed) will give referral for [facility] Si[facility]/CHRIST following: • Increase in redness • Red \"streaks\" from wound • Increase in swelling • Fever • Unusual odor • Change in the amount of wound drainage     Should you experience any significant changes in your wound(s) or have any questions regarding your delvin

## 2021-07-06 ENCOUNTER — OFFICE VISIT (OUTPATIENT)
Dept: WOUND CARE | Facility: HOSPITAL | Age: 41
End: 2021-07-06
Attending: NURSE PRACTITIONER
Payer: COMMERCIAL

## 2021-07-06 VITALS
TEMPERATURE: 98 F | RESPIRATION RATE: 14 BRPM | DIASTOLIC BLOOD PRESSURE: 83 MMHG | HEART RATE: 105 BPM | SYSTOLIC BLOOD PRESSURE: 137 MMHG

## 2021-07-06 DIAGNOSIS — E11.621 DIABETIC ULCER OF OTHER PART OF LEFT FOOT ASSOCIATED WITH TYPE 2 DIABETES MELLITUS, WITH FAT LAYER EXPOSED (HCC): ICD-10-CM

## 2021-07-06 DIAGNOSIS — L97.522 DIABETIC ULCER OF OTHER PART OF LEFT FOOT ASSOCIATED WITH TYPE 2 DIABETES MELLITUS, WITH FAT LAYER EXPOSED (HCC): ICD-10-CM

## 2021-07-06 PROCEDURE — 82962 GLUCOSE BLOOD TEST: CPT

## 2021-07-06 PROCEDURE — 29445 APPL RIGID TOT CNTC LEG CAST: CPT

## 2021-07-06 NOTE — PROGRESS NOTES
Patient ID: Agne Browning is a 35year old male.     Cast application   Date/Time: 7/6/2021 9:44 AM   Performed by: ARNOL Dennis   Authorized by: ARNOL Dennis   Consent given by: patient  Timeout: Immediately prior to procedure a time

## 2021-07-06 NOTE — PROGRESS NOTES
Luisa Murray is an 35year old male. Patient presents with:  Wound Care: Patient is here for a nurse visit for a left foot wound.       /83   Pulse 105   Temp 97.9 °F (36.6 °C)   Resp 14     Wound 07/02/21 #1 Diabetic Ulcer Foot Left;Planta

## 2021-07-07 LAB — GLUCOSE BLD-MCNC: 124 MG/DL (ref 70–99)

## 2021-07-08 ENCOUNTER — OFFICE VISIT (OUTPATIENT)
Dept: WOUND CARE | Facility: HOSPITAL | Age: 41
End: 2021-07-08
Attending: NURSE PRACTITIONER
Payer: COMMERCIAL

## 2021-07-08 VITALS
RESPIRATION RATE: 16 BRPM | HEART RATE: 102 BPM | DIASTOLIC BLOOD PRESSURE: 81 MMHG | TEMPERATURE: 98 F | SYSTOLIC BLOOD PRESSURE: 131 MMHG

## 2021-07-08 DIAGNOSIS — L97.522 DIABETIC ULCER OF OTHER PART OF LEFT FOOT ASSOCIATED WITH TYPE 2 DIABETES MELLITUS, WITH FAT LAYER EXPOSED (HCC): Primary | ICD-10-CM

## 2021-07-08 DIAGNOSIS — E11.621 DIABETIC ULCER OF OTHER PART OF LEFT FOOT ASSOCIATED WITH TYPE 2 DIABETES MELLITUS, WITH FAT LAYER EXPOSED (HCC): Primary | ICD-10-CM

## 2021-07-08 DIAGNOSIS — Z79.4 CURRENT USE OF INSULIN (HCC): ICD-10-CM

## 2021-07-08 LAB — GLUCOSE BLD-MCNC: 111 MG/DL (ref 70–99)

## 2021-07-08 PROCEDURE — 99214 OFFICE O/P EST MOD 30 MIN: CPT | Performed by: NURSE PRACTITIONER

## 2021-07-08 NOTE — PATIENT INSTRUCTIONS
Please return on:  July 13 with Dennise  July 20 with Dr. Eugenie Reno  July 27 with Dr. Eugenie Reno  Aug 3 with Dennise    Overall Plan:  1) strict offloading with cast  2) Bring the brace on Tuesday so we can assess if you need to get in with [facility] and cameron/Ursula can't fight infection.     Concerns:  Signs of infection may include the following: • Increase in redness • Red \"streaks\" from wound • Increase in swelling • Fever • Unusual odor • Change in the amount of wound drainage     Should you experience any signi

## 2021-07-08 NOTE — PROGRESS NOTES
CHIEF COMPLAINT:     Patient presents with:  Wound Care: Patient is here for a wound care follow up.  He denies any pain or concerns with the cast.      HPI:   Information obtained from patient, chart and spouse  7-2-21 INITIAL  Patient is a 36 yo male with then make an appointment to f/u with Dr. Shaheen Alfonso the following weeks for close monitoring while I am not in clinic. Patient is agreeable to plan.     MEDICATIONS:     Current Outpatient Medications:   •  Amoxicillin-Pot Clavulanate (AUGMENTIN) 875-125 MG Or PRESSURE KIT) Does not apply Device, 1 Units by Does not apply route daily. , Disp: 1 Device, Rfl: 0  •  Syringe/Needle, Disp, 18G X 1-1/2\" 3 ML Does not apply Misc, To draw up Caverject medication for penile self injection therapy for erectile dysfunction the achilles and part of calcaneous has been removed with callus posterior to the ulceration. Skin hydration wnl. + sparse hairgrowth on legs.   Musculoskeletal:  Gait and station stable   Integumentary:  refer to wound characteristics and images   Psychiat Job Quinones, ARNOL   07/02/21 0907 Debridement Routine Completed Dennise Sloan APRN            ASSESSMENT AND PLAN:      1. Diabetic ulcer of other part of left foot associated with type 2 diabetes mellitus, with fat layer exposed ([facility] [address]    2.  Current use of ins on the followin. Protein: Meats, beans, eggs, milk and yogurt particularly [place] yogurt), tofu, soy nuts, soy protein products (Follow the protein handout in your welcome folder)  7.  Vitamin C: Citrus fruits and juices, strawberries, tomatoes, tomato

## 2021-07-08 NOTE — PROGRESS NOTES
Patient ID: Reuben Norman is a 35year old male.     Cast application   Date/Time: 7/8/2021 4:50 PM   Performed by: ARNOL Rivera   Authorized by: ARNOL Rivera   Consent given by: patient  Procedure  Immobilization: cast  Cast type: to

## 2021-07-08 NOTE — PROGRESS NOTES
Weekly Wound Education Note    Teaching Provided To: Patient  Training Topics: Cleasing and general instructions;Dressing; Discharge instructions; Off-loading;Signs and symptoms of infection;Nutrition  Training Method: Explain/Verbal  Training Response: Nadege Hoang

## 2021-07-13 ENCOUNTER — OFFICE VISIT (OUTPATIENT)
Dept: WOUND CARE | Facility: HOSPITAL | Age: 41
End: 2021-07-13
Attending: NURSE PRACTITIONER
Payer: COMMERCIAL

## 2021-07-13 VITALS
HEART RATE: 121 BPM | SYSTOLIC BLOOD PRESSURE: 93 MMHG | TEMPERATURE: 97 F | DIASTOLIC BLOOD PRESSURE: 66 MMHG | RESPIRATION RATE: 16 BRPM

## 2021-07-13 DIAGNOSIS — L97.522 DIABETIC ULCER OF OTHER PART OF LEFT FOOT ASSOCIATED WITH TYPE 2 DIABETES MELLITUS, WITH FAT LAYER EXPOSED (HCC): Primary | ICD-10-CM

## 2021-07-13 DIAGNOSIS — E11.621 DIABETIC ULCER OF OTHER PART OF LEFT FOOT ASSOCIATED WITH TYPE 2 DIABETES MELLITUS, WITH FAT LAYER EXPOSED (HCC): Primary | ICD-10-CM

## 2021-07-13 DIAGNOSIS — Z79.4 CURRENT USE OF INSULIN (HCC): ICD-10-CM

## 2021-07-13 LAB — GLUCOSE BLD-MCNC: 114 MG/DL (ref 70–99)

## 2021-07-13 PROCEDURE — 99213 OFFICE O/P EST LOW 20 MIN: CPT | Performed by: NURSE PRACTITIONER

## 2021-07-13 NOTE — PROGRESS NOTES
CHIEF COMPLAINT:     Patient presents with:  Wound Care: Patients is here for a follow up.  Patients stated no issues with the cast       HPI:   Information obtained from patient, chart and spouse  7-2-21 INITIAL  Patient is a 34 yo male with diabetes, htn, appointment to f/u with Dr. Maren Jean the following weeks for close monitoring while I am not in clinic. Patient is agreeable to plan. 7-13-21 patient returns today.  Wound is smaller, he brought his afo with and he has had it for 3 years so I am recommendin Does not apply Misc, USE NEW PEN NEEDLE WITH EACH INJECTION AS DIRECTED, Disp: 1 Box, Rfl: 2  •  Blood Glucose Monitoring Suppl ([address]) w/Device Does not apply Kit, Use with each blood glucose test., Disp: 1 kit, Rfl: 0  •  OneTouch De age, well groomed. Constitutional:  Bp wnl for patient. Pulse Regular and wnl for patient. Respirations easy and unlabored. Temperature wnl. Weight normal for height. Appearance neat and clean. Appears in no acute distress.  Well nourished and well devel Wound Granulation Tissue Firm;Pink Spongy;Pink   Wound Bed Granulation (%) 75 % 45 %   Wound Bed Epithelium (%) — 5 %   Wound Bed Slough (%) 25 % 50 %   Wound Odor None None   Shape — hypergranulated   Posadas Scale Grade 4 Grade 4   Shoe Type Lisbet post op discard into plastic bag and place into trash. 3. Cleanse the wound with Normal Saline or specified wound cleanser prior to applying a clean dressing using gauze sponges, not tissues or cotton balls.    4. Pat dry using gauze sponges, not tissue or cotton treatment plan has been discussed at length between you and your provider. Follow all instructions carefully, it is very important.  If you do not follow all instructions you are at risk of your wound not healing, infection, possible loss of limb and even l

## 2021-07-13 NOTE — PROGRESS NOTES
Patient ID: Malena Lua is a 35year old male.     Cast application   Date/Time: 7/13/2021 10:43 AM   Performed by: ARNOL De Oliveira   Authorized by: ARNOL De Oliveira   Consent given by: patient  Timeout: Immediately prior to procedure a ti

## 2021-07-13 NOTE — PROGRESS NOTES
Weekly Wound Education Note    Teaching Provided To: Patient  Training Topics: Cleasing and general instructions;Dressing; Discharge instructions; Off-loading;Signs and symptoms of infection  Training Method: Explain/Verbal  Training Response: Patient Jagdeep Owens

## 2021-07-13 NOTE — PATIENT INSTRUCTIONS
Please return on:  July 20 with Dr. Kirstin Adams  July 27 with Dr. Kirstin Adams  Aug 3 with [facility]  Aug 10 with Dennise    Overall Plan:  1) strict offloading with cast  2) Make an appointment with Ursula/Jane and Berta to get a new afo constructed.  (may need to have packets/day. you can order online at [facility] or [facility]    If your blood sugar is consistently elevated your body can't heal and can't fight infection.     Concerns:  Signs of infection may include the following: • Increase in redness • Red \"streaks\" from w

## 2021-07-20 ENCOUNTER — OFFICE VISIT (OUTPATIENT)
Dept: WOUND CARE | Facility: HOSPITAL | Age: 41
End: 2021-07-20
Attending: FAMILY MEDICINE
Payer: COMMERCIAL

## 2021-07-20 VITALS
SYSTOLIC BLOOD PRESSURE: 144 MMHG | RESPIRATION RATE: 14 BRPM | TEMPERATURE: 98 F | HEART RATE: 103 BPM | DIASTOLIC BLOOD PRESSURE: 89 MMHG

## 2021-07-20 DIAGNOSIS — L97.522 DIABETIC ULCER OF OTHER PART OF LEFT FOOT ASSOCIATED WITH TYPE 2 DIABETES MELLITUS, WITH FAT LAYER EXPOSED (HCC): Primary | ICD-10-CM

## 2021-07-20 DIAGNOSIS — E11.621 DIABETIC ULCER OF OTHER PART OF LEFT FOOT ASSOCIATED WITH TYPE 2 DIABETES MELLITUS, WITH FAT LAYER EXPOSED (HCC): Primary | ICD-10-CM

## 2021-07-20 LAB — GLUCOSE BLD-MCNC: 175 MG/DL (ref 70–99)

## 2021-07-20 PROCEDURE — 99214 OFFICE O/P EST MOD 30 MIN: CPT | Performed by: FAMILY MEDICINE

## 2021-07-20 NOTE — PROGRESS NOTES
Weekly Wound Education Note    Teaching Provided To: Patient  Training Topics: Dressing;Cleasing and general instructions; Discharge instructions; Off-loading  Training Method: Explain/Verbal  Training Response: Patient responds and understands        Notes:

## 2021-07-20 NOTE — PROGRESS NOTES
Patient ID: Leo Buckner is a 35year old male.     Cast application   Date/Time: 7/20/2021 10:24 AM   Performed by: Safia Heath MD   Authorized by: Safia Heath MD   Injury  Location details: left foot  Procedure  Immobilization: cast  Thaddeus

## 2021-07-20 NOTE — PATIENT INSTRUCTIONS
PATIENT INSTRUCTIONS      FOR ESTHER Soni 1980    DATE OF SERVICE: 2021        Please return on:     with Dr. Yaneth Pierre  Aug 3 with [facility]  Aug 10 with Dennise       APPLY DRESSING: endoform, transfer x 2, kerramax x2, abd pad in swelling • Fever • Unusual odor • Change in the amount of wound drainage      Should you experience any significant changes in your wound(s) or have any questions regarding your home care instructions please contact the wound center [facility] @

## 2021-07-20 NOTE — PROGRESS NOTES
Patient presents with:  Wound Care: Patient is here for a follow up visit for a left foot wound. Patient states he finished the antibiotics on Saturday. HPI:     Patient is here for follow-up of diabetic heel ulcer on the left heel.   He was in total Needle, Disp, (BD DISP NEEDLE) 30G X 1\" Does not apply Misc, Use for self administration of intracavernosal penile injection therapy of Caverject for erectile dysfunction, Disp: 5 each, Rfl: 11  •  Amoxicillin-Pot Clavulanate (AUGMENTIN) 875-125 MG Oral asymmetry, normal excursion. GI:  bowel sound positive in all four quadrants, abdomen is soft, non-tender, non-distended, no hepatosplenomegaly, no abnormal aortic pulsation.      Calf  Point of Measurement - Left Calf: 32        Calf Left cm[de-identified] 33 Debridement Routine Completed Dennise Sloan APRN          ASSESSMENT AND PLAN:      1.  Diabetic ulcer of other part of left foot associated with type 2 diabetes mellitus, with fat layer exposed ([address]  - Cast application      Clinically the wound appears

## 2021-07-21 ENCOUNTER — LAB ENCOUNTER (OUTPATIENT)
Dept: LAB | Age: 41
End: 2021-07-21
Attending: FAMILY MEDICINE
Payer: COMMERCIAL

## 2021-07-21 DIAGNOSIS — E11.69 DIABETES MELLITUS TYPE 2 IN OBESE (HCC): ICD-10-CM

## 2021-07-21 DIAGNOSIS — E66.9 DIABETES MELLITUS TYPE 2 IN OBESE (HCC): ICD-10-CM

## 2021-07-21 LAB
CHOLEST SMN-MCNC: 95 MG/DL (ref ?–200)
CREAT UR-SCNC: 54.7 MG/DL
EST. AVERAGE GLUCOSE BLD GHB EST-MCNC: 108 MG/DL (ref 68–126)
HBA1C MFR BLD HPLC: 5.4 % (ref ?–5.7)
HDLC SERPL-MCNC: 30 MG/DL (ref 40–59)
LDLC SERPL CALC-MCNC: 50 MG/DL (ref ?–100)
MICROALBUMIN UR-MCNC: 1.15 MG/DL
MICROALBUMIN/CREAT 24H UR-RTO: 21 UG/MG (ref ?–30)
NONHDLC SERPL-MCNC: 65 MG/DL (ref ?–130)
PATIENT FASTING Y/N/NP: YES
TRIGL SERPL-MCNC: 68 MG/DL (ref 30–149)
VLDLC SERPL CALC-MCNC: 10 MG/DL (ref 0–30)

## 2021-07-21 PROCEDURE — 80061 LIPID PANEL: CPT | Performed by: FAMILY MEDICINE

## 2021-07-21 PROCEDURE — 83036 HEMOGLOBIN GLYCOSYLATED A1C: CPT | Performed by: FAMILY MEDICINE

## 2021-07-21 PROCEDURE — 82043 UR ALBUMIN QUANTITATIVE: CPT | Performed by: FAMILY MEDICINE

## 2021-07-21 PROCEDURE — 82570 ASSAY OF URINE CREATININE: CPT | Performed by: FAMILY MEDICINE

## 2021-07-21 PROCEDURE — 3061F NEG MICROALBUMINURIA REV: CPT | Performed by: FAMILY MEDICINE

## 2021-07-23 ENCOUNTER — OFFICE VISIT (OUTPATIENT)
Dept: FAMILY MEDICINE CLINIC | Facility: CLINIC | Age: 41
End: 2021-07-23
Payer: COMMERCIAL

## 2021-07-23 VITALS
BODY MASS INDEX: 29.19 KG/M2 | SYSTOLIC BLOOD PRESSURE: 132 MMHG | HEIGHT: 67 IN | OXYGEN SATURATION: 98 % | DIASTOLIC BLOOD PRESSURE: 84 MMHG | WEIGHT: 186 LBS | HEART RATE: 74 BPM | RESPIRATION RATE: 16 BRPM | TEMPERATURE: 98 F

## 2021-07-23 DIAGNOSIS — E11.69 DIABETES MELLITUS TYPE 2 IN OBESE (HCC): ICD-10-CM

## 2021-07-23 DIAGNOSIS — N18.30 CONTROLLED TYPE 2 DIABETES MELLITUS WITH STAGE 3 CHRONIC KIDNEY DISEASE, WITH LONG-TERM CURRENT USE OF INSULIN (HCC): ICD-10-CM

## 2021-07-23 DIAGNOSIS — Z79.4 CONTROLLED TYPE 2 DIABETES MELLITUS WITH STAGE 3 CHRONIC KIDNEY DISEASE, WITH LONG-TERM CURRENT USE OF INSULIN (HCC): ICD-10-CM

## 2021-07-23 DIAGNOSIS — E66.9 DIABETES MELLITUS TYPE 2 IN OBESE (HCC): ICD-10-CM

## 2021-07-23 DIAGNOSIS — E78.5 DYSLIPIDEMIA: Primary | ICD-10-CM

## 2021-07-23 DIAGNOSIS — I10 ESSENTIAL HYPERTENSION: ICD-10-CM

## 2021-07-23 DIAGNOSIS — E11.22 CONTROLLED TYPE 2 DIABETES MELLITUS WITH STAGE 3 CHRONIC KIDNEY DISEASE, WITH LONG-TERM CURRENT USE OF INSULIN (HCC): ICD-10-CM

## 2021-07-23 DIAGNOSIS — E78.2 MIXED HYPERLIPIDEMIA: ICD-10-CM

## 2021-07-23 PROCEDURE — 3079F DIAST BP 80-89 MM HG: CPT | Performed by: FAMILY MEDICINE

## 2021-07-23 PROCEDURE — 3008F BODY MASS INDEX DOCD: CPT | Performed by: FAMILY MEDICINE

## 2021-07-23 PROCEDURE — 99215 OFFICE O/P EST HI 40 MIN: CPT | Performed by: FAMILY MEDICINE

## 2021-07-23 PROCEDURE — 3075F SYST BP GE 130 - 139MM HG: CPT | Performed by: FAMILY MEDICINE

## 2021-07-23 RX ORDER — INSULIN DETEMIR 100 [IU]/ML
25 INJECTION, SOLUTION SUBCUTANEOUS DAILY
Qty: 22.5 ML | Refills: 1 | Status: SHIPPED | OUTPATIENT
Start: 2021-07-23 | End: 2021-10-19 | Stop reason: CLARIF

## 2021-07-23 RX ORDER — AMLODIPINE BESYLATE AND BENAZEPRIL HYDROCHLORIDE 10; 20 MG/1; MG/1
1 CAPSULE ORAL DAILY
Qty: 90 CAPSULE | Refills: 5 | Status: SHIPPED | OUTPATIENT
Start: 2021-07-23 | End: 2021-10-14

## 2021-07-23 NOTE — PROGRESS NOTES
Patient presents with:  Diabetes      HPI:  About 3 weeks ago he developed a blister on the left plantar- he is now wearing cast. He has imaging done that confirmed it is not osteomyelitis. Patient sees wound care. He is also seeing podiatry.    This is hi include diabetes mellitus, dyslipidemia, family history, male sex, hypertension and obesity. Current diabetic treatment includes insulin injections (trulicity, humalog mealtime ISS, Levemir ). He is compliant with treatment all of the time.  He is following Factors aggravating his hyperlipidemia include fatty foods. Pertinent negatives include no chest pain, focal sensory loss, focal weakness, leg pain, myalgias or shortness of breath. Current antihyperlipidemic treatment includes ezetimibe and statins.  The milo with renal manifestation (HCC)     Hyponatremia     Hyperglycemia     Respiratory acidosis     Secondary hypertension     Uncontrolled type 2 diabetes mellitus with hyperglycemia ([address]     Diabetic foot ulcer ([facility] [address]      Past Medical History:   Diagnosis Da Suppl (PosibaTOUCH VERIO FLEX SYSTEM) w/Device Does not apply Kit Use with each blood glucose test. 1 kit 0   • OneTouch Delica Lancets Fine Does not apply Misc Use one lancet with each blood glucose test max 4 per day 100 each 6   • Glucose Blood (ONETOUCH V distal pulses. No murmur, rubs or gallops. Pulmonary/Chest: Effort normal and breath sounds normal. No respiratory distress. No wheezes, rhonchi or rales  Abdominal: Soft. Bowel sounds are normal. Non tender, no masses, no organomegaly or hernias.   Psyc 3 months      Meds & Refills for this Visit:  Requested Prescriptions     Signed Prescriptions Disp Refills   • LEVEMIR FLEXTOUCH 100 UNIT/ML Subcutaneous Solution Pen-injector 22.5 mL 1     Sig: Inject 25 Units into the skin daily.    • amLODIPine Tanesha-Ton Being a smoker puts you at higher risk. · Other health problems. Some health problems are linked with type 2 diabetes. These include polycystic ovary syndrome, patches of darker skin (acanthosis nigricans) , or being born at a low birth weight.  Type 2 aixa (OGTT). For this test, your glucose level is measured before and then after 2 hours after you drink a sugary drink. This shows how well your body processes glucose. A result of 200 mg/dL or higher after 2 hours means you have diabetes.   · Random glucose te more medicines to improve your blood sugar control. · Taking insulin. If oral medicines don’t work well for you, you may need to inject insulin into your body. · Getting blood tests. You will need to have your A1C level checked several times a year.  Expe the reason for your visit and what you want to happen. · Before your visit, write down questions you want answered. · Bring someone with you to help you ask questions and remember what your provider tells you.   · At the visit, write down the name of a ne

## 2021-07-26 NOTE — PATIENT INSTRUCTIONS
Type 2 Diabetes  What is type 2 diabetes? Type 2 diabetes is when your body can’t make enough insulin, or use it well. Insulin helps your cells use sugar (glucose) for energy. Without insulin, glucose builds up in your blood.  This leads to high blood keller diabetes?   Symptoms may include:  · Frequent bladder infections  · Skin infections that don't heal easily  · Feeling very thirsty  · Peeing often  · Weight loss  · Blurred vision  · Nausea and vomiting  · Feeling very weak and tired  · Irritability and moo hyperglycemia, you will need to have 2 abnormal test results from the same sample or in 2 separate test samples to be diagnosed. For example: a fasting plasma glucose greater than 126 and an A1C greater than 6.5% from the same sample.    How is type 2 diabe Routine healthcare. Keep all appointments. This is so your healthcare provider can track your diabetes. You will also need to check your feet daily. This is to look for injuries, sores, or infection. These can lead to severe foot problems.   What are possi how it will help you. Also know what the side effects are. · Ask if your condition can be treated in other ways. · Know why a test or procedure is recommended and what the results could mean.   · Know what to expect if you do not take the medicine or have

## 2021-07-27 ENCOUNTER — OFFICE VISIT (OUTPATIENT)
Dept: WOUND CARE | Facility: HOSPITAL | Age: 41
End: 2021-07-27
Attending: FAMILY MEDICINE
Payer: COMMERCIAL

## 2021-07-27 VITALS
TEMPERATURE: 98 F | HEART RATE: 106 BPM | DIASTOLIC BLOOD PRESSURE: 87 MMHG | RESPIRATION RATE: 14 BRPM | SYSTOLIC BLOOD PRESSURE: 145 MMHG

## 2021-07-27 DIAGNOSIS — L97.522 DIABETIC ULCER OF OTHER PART OF LEFT FOOT ASSOCIATED WITH TYPE 2 DIABETES MELLITUS, WITH FAT LAYER EXPOSED (HCC): Primary | ICD-10-CM

## 2021-07-27 DIAGNOSIS — E11.621 DIABETIC ULCER OF OTHER PART OF LEFT FOOT ASSOCIATED WITH TYPE 2 DIABETES MELLITUS, WITH FAT LAYER EXPOSED (HCC): Primary | ICD-10-CM

## 2021-07-27 LAB — GLUCOSE BLD-MCNC: 129 MG/DL (ref 70–99)

## 2021-07-27 PROCEDURE — 99214 OFFICE O/P EST MOD 30 MIN: CPT | Performed by: FAMILY MEDICINE

## 2021-07-27 NOTE — PROGRESS NOTES
Patient presents with:  Wound Care: Patient is here for a follow up visit for a left foot wound. Will be going to [facility] after this visit for shoe fitting. HPI:     Patient is here for follow-up of left heel wound.   He is in total contact ca not apply route daily. , Disp: 1 Device, Rfl: 0  •  Syringe/Needle, Disp, 18G X 1-1/2\" 3 ML Does not apply Misc, To draw up Caverject medication for penile self injection therapy for erectile dysfunction, Disp: 5 each, Rfl: 11  •  Needle, Disp, (BD DISP NE cm[de-identified] 21.5                Foot                            Wound 07/02/21 #1 Diabetic Ulcer Foot Left;Plantar (Active)   Date First Assessed/Time First Assessed: 07/02/21 0805    Wound Number (Wound Clinic Only): #1  Primary Wound Type: Diabetic Ulcer  Locat mellitus, with fat layer exposed ([address]  - OP WOUND DRESSING    Clinically the wound appears to be improving.   We will switch to silver alginate and continue with Kerramax and total contact cast.  The wound bed was stimulated with a curette and there was pi

## 2021-07-27 NOTE — PROGRESS NOTES
Weekly Wound Education Note    Teaching Provided To: Patient  Training Topics: Dressing;Cleasing and general instructions; Discharge instructions  Training Method: Explain/Verbal  Training Response: Patient responds and understands        Notes: Pt going to

## 2021-07-27 NOTE — PATIENT INSTRUCTIONS
PATIENT INSTRUCTIONS      FOR ESTHER Keith 1980    DATE OF SERVICE: 2021        Follow up appointments:     Aug 3 with [facility]  Aug 10 with Dennise        APPLY DRESSING: endoform, silver alginate, kerramax x2, abd pad     We have a swelling • Fever • Unusual odor • Change in the amount of wound drainage      Should you experience any significant changes in your wound(s) or have any questions regarding your home care instructions please contact the wound center [facility] @ [phone]

## 2021-07-28 ENCOUNTER — OFFICE VISIT (OUTPATIENT)
Dept: WOUND CARE | Facility: HOSPITAL | Age: 41
End: 2021-07-28
Attending: FAMILY MEDICINE
Payer: COMMERCIAL

## 2021-07-28 VITALS
SYSTOLIC BLOOD PRESSURE: 123 MMHG | RESPIRATION RATE: 18 BRPM | DIASTOLIC BLOOD PRESSURE: 83 MMHG | TEMPERATURE: 97 F | HEART RATE: 97 BPM

## 2021-07-28 DIAGNOSIS — E11.621 DIABETIC ULCER OF OTHER PART OF LEFT FOOT ASSOCIATED WITH TYPE 2 DIABETES MELLITUS, WITH FAT LAYER EXPOSED (HCC): Primary | ICD-10-CM

## 2021-07-28 DIAGNOSIS — L97.522 DIABETIC ULCER OF OTHER PART OF LEFT FOOT ASSOCIATED WITH TYPE 2 DIABETES MELLITUS, WITH FAT LAYER EXPOSED (HCC): Primary | ICD-10-CM

## 2021-07-28 LAB — GLUCOSE BLD-MCNC: 104 MG/DL (ref 70–99)

## 2021-07-28 PROCEDURE — 29445 APPL RIGID TOT CNTC LEG CAST: CPT

## 2021-07-28 PROCEDURE — 99214 OFFICE O/P EST MOD 30 MIN: CPT

## 2021-07-28 PROCEDURE — 82962 GLUCOSE BLOOD TEST: CPT

## 2021-07-28 NOTE — PROGRESS NOTES
Patient ID: Tory Johnson is a 35year old male.     Cast application   Date/Time: 7/28/2021 7:55 AM   Performed by: Ranjit Del Angel MD   Authorized by: Ranjit Del Angel MD   Consent given by: patient  Timeout: Immediately prior to procedure a time

## 2021-07-28 NOTE — PROGRESS NOTES
Patient presents with:  Wound Care: Pt here for nurse visit. Pt to be placed in TCC. Current Outpatient Medications:   •  LEVEMIR FLEXTOUCH 100 UNIT/ML Subcutaneous Solution Pen-injector, Inject 25 Units into the skin daily. , Disp: 22.5 mL, Rfl: 1 Past medical, surgical, family and social history updated where appropriate. PHYSICAL EXAM:   /83   Pulse 97   Temp 97.3 °F (36.3 °C)   Resp 18        Vital signs reviewed.       Calf  Point of Measurement - Left Calf: 32        Calf Left cm[de-identified] 33 ARNOL   07/02/21 0907 Debridement Routine Completed Dennise Sloan APRN          ASSESSMENT AND PLAN:        Risks, benefits, and alternatives of current treatment plan discussed in detail. Questions and concerns addressed.  Red flags to RTC or ED reviewed

## 2021-08-03 ENCOUNTER — OFFICE VISIT (OUTPATIENT)
Dept: WOUND CARE | Facility: HOSPITAL | Age: 41
End: 2021-08-03
Attending: NURSE PRACTITIONER
Payer: COMMERCIAL

## 2021-08-03 VITALS
HEART RATE: 101 BPM | RESPIRATION RATE: 16 BRPM | TEMPERATURE: 98 F | SYSTOLIC BLOOD PRESSURE: 125 MMHG | DIASTOLIC BLOOD PRESSURE: 86 MMHG

## 2021-08-03 DIAGNOSIS — L97.522 DIABETIC ULCER OF OTHER PART OF LEFT FOOT ASSOCIATED WITH TYPE 2 DIABETES MELLITUS, WITH FAT LAYER EXPOSED (HCC): ICD-10-CM

## 2021-08-03 DIAGNOSIS — E11.621 DIABETIC ULCER OF OTHER PART OF LEFT FOOT ASSOCIATED WITH TYPE 2 DIABETES MELLITUS, WITH FAT LAYER EXPOSED (HCC): ICD-10-CM

## 2021-08-03 DIAGNOSIS — Z79.4 CURRENT USE OF INSULIN (HCC): Primary | ICD-10-CM

## 2021-08-03 LAB — GLUCOSE BLD-MCNC: 169 MG/DL (ref 70–99)

## 2021-08-03 PROCEDURE — 99214 OFFICE O/P EST MOD 30 MIN: CPT | Performed by: NURSE PRACTITIONER

## 2021-08-03 NOTE — PATIENT INSTRUCTIONS
Please return on:  Return in about 1 week (around 8/10/2021). Will need weekly appointments for the next month.     Overall Plan:  1) continue with strict offloading with cast  2) AFO is on order/been fitted with francesca and cameron  3) post healing check w • Increase in redness • Red \"streaks\" from wound • Increase in swelling • Fever • Unusual odor • Change in the amount of wound drainage     Should you experience any significant changes in your wound(s) or have any questions regarding your home care inst

## 2021-08-03 NOTE — PROGRESS NOTES
Patient ID: Tory Johnson is a 35year old male.     Cast application   Date/Time: 8/3/2021 11:17 AM   Performed by: ARNOL Goznalez   Authorized by: ARNOL Gonzalez   Consent given by: patient  Procedure  Immobilization: cast  Cast type: t

## 2021-08-03 NOTE — PROGRESS NOTES
CHIEF COMPLAINT:     Patient presents with:  Wound Care: Patients is here for a follow up.  Patients has no issues with the cast. drain thru the cast       HPI:   Information obtained from patient, chart and spouse  7-2-21 INITIAL  Patient is a 34 yo male w then make an appointment to f/u with Dr. Aditi Sinclair the following weeks for close monitoring while I am not in clinic. Patient is agreeable to plan. 7-13-21 patient returns today.  Wound is smaller, he brought his afo with and he has had it for 3 years so I a 0.5 mL, Rfl: 3  •  HUMALOG KWIKPEN 100 UNIT/ML Subcutaneous Solution Pen-injector, SLIDING SCALE, Disp: 3 mL, Rfl: 2  •  Ezetimibe-Simvastatin 10-80 MG Oral Tab, Take 1 tablet by mouth daily. , Disp: 90 tablet, Rfl: 3  •  BD AUTOSHIELD DUO 30G X 5 MM Does n 05/08/2021    A1C 5.4 07/21/2021         POC Glucose   Date Value Ref Range Status   08/03/2021 169 (H) 70 - 99 mg/dL Final   07/28/2021 104 (H) 70 - 99 mg/dL Final   07/27/2021 129 (H) 70 - 99 mg/dL Final       Vital signs reviewed. Appears stated age, wel 4.2 cm^2   Wound Depth (cm) 0.1 cm 0.1 cm   Wound Volume (cm^3) 2.2 cm^3 0.42 cm^3   Wound Healing % — 81   Margins Well-defined edges Well-defined edges   Dagmar-wound Assessment Dry; Other (Comment);Edema; Maceration Callous; Moist;Maceration;Edema   Wound Gr history, performing a medically appropriate examination and/or evaluation, counseling and educating the patient, documenting in the record. DISCHARGE:      Patient Instructions   Please return on:  Return in about 1 week (around 8/10/2021).   Will need w online at [facility] or [facility]    If your blood sugar is consistently elevated your body can't heal and can't fight infection.     Concerns:  Signs of infection may include the following: • Increase in redness • Red \"streaks\" from wound • Increase in swelling

## 2021-08-10 ENCOUNTER — OFFICE VISIT (OUTPATIENT)
Dept: WOUND CARE | Facility: HOSPITAL | Age: 41
End: 2021-08-10
Attending: NURSE PRACTITIONER
Payer: COMMERCIAL

## 2021-08-10 VITALS
SYSTOLIC BLOOD PRESSURE: 116 MMHG | RESPIRATION RATE: 16 BRPM | TEMPERATURE: 98 F | DIASTOLIC BLOOD PRESSURE: 83 MMHG | HEART RATE: 105 BPM

## 2021-08-10 DIAGNOSIS — L97.522 DIABETIC ULCER OF OTHER PART OF LEFT FOOT ASSOCIATED WITH TYPE 2 DIABETES MELLITUS, WITH FAT LAYER EXPOSED (HCC): Primary | ICD-10-CM

## 2021-08-10 DIAGNOSIS — Z79.4 CURRENT USE OF INSULIN (HCC): ICD-10-CM

## 2021-08-10 DIAGNOSIS — E11.621 DIABETIC ULCER OF OTHER PART OF LEFT FOOT ASSOCIATED WITH TYPE 2 DIABETES MELLITUS, WITH FAT LAYER EXPOSED (HCC): Primary | ICD-10-CM

## 2021-08-10 LAB — GLUCOSE BLD-MCNC: 123 MG/DL (ref 70–99)

## 2021-08-10 PROCEDURE — 99214 OFFICE O/P EST MOD 30 MIN: CPT | Performed by: NURSE PRACTITIONER

## 2021-08-10 NOTE — PROGRESS NOTES
Weekly Wound Education Note    Teaching Provided To: Patient  Training Topics: Cleasing and general instructions;Dressing; Discharge instructions; Off-loading;Signs and symptoms of infection  Training Method: Explain/Verbal  Training Response: Chavo Esparza

## 2021-08-10 NOTE — PATIENT INSTRUCTIONS
Please return on:  Return in about 1 week (around 8/17/2021).     Overall Plan:   1) continue with strict offloading with cast  2) AFO is on order/been fitted with francesca and cameron  3) post healing check with Dr. Robert Green for other options that are not as • Increase in swelling • Fever • Unusual odor • Change in the amount of wound drainage     Should you experience any significant changes in your wound(s) or have any questions regarding your home care instructions please contact the wound center [facility]

## 2021-08-10 NOTE — PROGRESS NOTES
CHIEF COMPLAINT:     Patient presents with: Follow - Up: Pt is here for a wound care follow up for his left leg , denies any pain at this time.        HPI:   Information obtained from patient, chart and spouse  7-2-21 INITIAL  Patient is a 36 yo male with then make an appointment to f/u with Dr. Aditi Sinclair the following weeks for close monitoring while I am not in clinic. Patient is agreeable to plan. 7-13-21 patient returns today.  Wound is smaller, he brought his afo with and he has had it for 3 years so I a Take 1 capsule by mouth daily. , Disp: 90 capsule, Rfl: 5  •  metFORMIN HCl 500 MG Oral Tab, Take 1 tablet (500 mg total) by mouth 2 (two) times daily with meals. , Disp: 180 tablet, Rfl: 0  •  Dulaglutide (TRULICITY) 2.49 KG/1.6PL Subcutaneous Solution Pen- Temp 97.5 °F (36.4 °C)   Resp 16    Estimated body mass index is 29.13 kg/m² as calculated from the following:    Height as of 7/23/21: 67\". Weight as of 7/23/21: 186 lb (84.4 kg).      Lab Results   Component Value Date    BUN 27 (H) 05/08/2021    CREA Left;Plantar      Assessments 7/2/2021  8:08 AM 8/10/2021  9:50 AM   Wound Image        Drainage Amount Large Moderate   Drainage Description Serosanguineous; Other (Comment) Serosanguineous   Treatments — Cleansed;Vashe   Wound Length (cm) 5 cm 1.5 cm   Wo 1. Diabetic ulcer of other part of left foot associated with type 2 diabetes mellitus, with fat layer exposed ([address]    2. Current use of insulin (HCC)      Risks, benefits, and alternatives of current treatment plan discussed in detail.   Questions and conc folder)  7. Vitamin C: Citrus fruits and juices, strawberries, tomatoes, tomato juice, peppers, baked potatoes, spinach, broccoli, cauliflower, [place] sprouts, cabbage  8.  Vitamin A: Dark green, leafy vegetables, orange or yellow vegetables, cantaloupe,

## 2021-08-10 NOTE — PROGRESS NOTES
Patient ID: Armand Hernandez is a 35year old male.     Cast application   Date/Time: 8/10/2021 10:39 AM   Performed by: ARNOL Finn   Authorized by: ARNOL Finn   Consent given by: patient  Injury  Location details: left foot  Procedu

## 2021-08-17 ENCOUNTER — OFFICE VISIT (OUTPATIENT)
Dept: WOUND CARE | Facility: HOSPITAL | Age: 41
End: 2021-08-17
Attending: NURSE PRACTITIONER
Payer: COMMERCIAL

## 2021-08-17 VITALS
RESPIRATION RATE: 18 BRPM | SYSTOLIC BLOOD PRESSURE: 117 MMHG | HEART RATE: 106 BPM | TEMPERATURE: 98 F | DIASTOLIC BLOOD PRESSURE: 77 MMHG

## 2021-08-17 DIAGNOSIS — Z79.4 CURRENT USE OF INSULIN (HCC): ICD-10-CM

## 2021-08-17 DIAGNOSIS — E11.621 DIABETIC ULCER OF OTHER PART OF LEFT FOOT ASSOCIATED WITH TYPE 2 DIABETES MELLITUS, WITH FAT LAYER EXPOSED (HCC): Primary | ICD-10-CM

## 2021-08-17 DIAGNOSIS — L97.522 DIABETIC ULCER OF OTHER PART OF LEFT FOOT ASSOCIATED WITH TYPE 2 DIABETES MELLITUS, WITH FAT LAYER EXPOSED (HCC): Primary | ICD-10-CM

## 2021-08-17 LAB — GLUCOSE BLD-MCNC: 129 MG/DL (ref 70–99)

## 2021-08-17 PROCEDURE — 99213 OFFICE O/P EST LOW 20 MIN: CPT | Performed by: NURSE PRACTITIONER

## 2021-08-17 NOTE — PROGRESS NOTES
Patient ID: Richelle Jimenez is a 35year old male.     Cast application   Date/Time: 8/17/2021 10:19 AM   Performed by: ARNOL Morel   Authorized by: ARNOL Morel   Consent given by: patient  Procedure  Immobilization: cast  Cast type:

## 2021-08-17 NOTE — PATIENT INSTRUCTIONS
Please return on:  1 week  Overall Plan:   1) continue with strict offloading with cast  2) AFO is on order/been fitted with francesca, and cameron  3) post healing check with Dr. Martinez Gonzalez for other options that are not as \"cumbersome\" ie an ankle [place] bra • Unusual odor • Change in the amount of wound drainage     Should you experience any significant changes in your wound(s) or have any questions regarding your home care instructions please contact the wound center [facility] @ [phone] If after r

## 2021-08-17 NOTE — PROGRESS NOTES
CHIEF COMPLAINT:     Patient presents with:  Wound Care: Patient is here for a follow up visit for left foot ulcer.       HPI:   Information obtained from patient, chart and spouse  7-2-21 INITIAL  Patient is a 36 yo male with diabetes, htn, dyslipidemia, a Tuesday due to drainage and then make an appointment to f/u with Dr. Fercho Julian the following weeks for close monitoring while I am not in clinic. Patient is agreeable to plan. 7-13-21 patient returns today.  Wound is smaller, he brought his afo with and he h wound is essentially the same size. I did silver nitrate today and will return to the transfer dressing to manage drainage better. Patient is agreeable to plan.     MEDICATIONS:     Current Outpatient Medications:   •  LEVEMIR FLEXTOUCH 100 UNIT/ML [place] [facility] erectile dysfunction, Disp: 5 each, Rfl: 11  ALLERGIES:   No Known Allergies     REVIEW OF SYSTEMS:     This information was obtained from the patient/family and chart. See HPI for pertinent positives, otherwise 10 pt ROS negative.   Review of Systems Calf: 32        Calf Left cm[de-identified] 31.1          Ankle  Point of Measurement - Left Ankle: 10        Left Ankle cm[de-identified] 20.8                WOUND ASSESSMENT:       Wound 07/02/21 #1 Diabetic Ulcer Foot Left;Plantar (Active)   Date First Assessed/Time First Assess 78/08/17 9365 Cast application Routine Completed Sabra Holstein, MD   13/17/33 3546 Cast application Routine Discontinued Sabra Holstein, MD   33/04/09 5770 Cast application Routine Completed ARNOL Gong   89/49/26 7891 Cast application Routine If the cast is on your right foot do NOT drive while in cast. Do not get cast wet. If foot becomes painful or numb please call the wound clinic or report to the nearest emergency room to have cast removed.     Nutrition and blood sugar control:  Focus on th

## 2021-08-19 ENCOUNTER — TELEPHONE (OUTPATIENT)
Dept: FAMILY MEDICINE CLINIC | Facility: CLINIC | Age: 41
End: 2021-08-19

## 2021-08-19 NOTE — TELEPHONE ENCOUNTER
Received diabetic eye exam report from [address] [place] [address]. Pt completed eye exam on 8/11/21. Severe Nonproliferactive diabetic retinopathy. Flow sheet updated in patients chart. Report sent to scan.

## 2021-08-24 ENCOUNTER — OFFICE VISIT (OUTPATIENT)
Dept: WOUND CARE | Facility: HOSPITAL | Age: 41
End: 2021-08-24
Attending: NURSE PRACTITIONER
Payer: COMMERCIAL

## 2021-08-24 VITALS
DIASTOLIC BLOOD PRESSURE: 82 MMHG | SYSTOLIC BLOOD PRESSURE: 127 MMHG | HEART RATE: 106 BPM | RESPIRATION RATE: 16 BRPM | TEMPERATURE: 98 F

## 2021-08-24 DIAGNOSIS — L97.522 DIABETIC ULCER OF OTHER PART OF LEFT FOOT ASSOCIATED WITH TYPE 2 DIABETES MELLITUS, WITH FAT LAYER EXPOSED (HCC): Primary | ICD-10-CM

## 2021-08-24 DIAGNOSIS — E11.621 DIABETIC ULCER OF OTHER PART OF LEFT FOOT ASSOCIATED WITH TYPE 2 DIABETES MELLITUS, WITH FAT LAYER EXPOSED (HCC): Primary | ICD-10-CM

## 2021-08-24 DIAGNOSIS — Z79.4 CURRENT USE OF INSULIN (HCC): ICD-10-CM

## 2021-08-24 LAB — GLUCOSE BLD-MCNC: 120 MG/DL (ref 70–99)

## 2021-08-24 PROCEDURE — 99213 OFFICE O/P EST LOW 20 MIN: CPT | Performed by: NURSE PRACTITIONER

## 2021-08-24 NOTE — PROGRESS NOTES
Weekly Wound Education Note    Teaching Provided To: Patient  Training Topics: Cleasing and general instructions; Discharge instructions;Dressing;Edema control;Nutrition;Off-loading;Signs and symptoms of infection  Training Method: Explain/Verbal  Training

## 2021-08-24 NOTE — PROGRESS NOTES
Patient ID: Shirin Phillips is a 35year old male.     Cast application   Date/Time: 8/24/2021 10:02 AM   Performed by: ARNOL Johnson   Authorized by: ARNOL Johnson   Consent given by: patient  Injury  Location details: left foot  Procedu

## 2021-08-24 NOTE — PROGRESS NOTES
CHIEF COMPLAINT:     Patient presents with:  Wound Care: Patient is here for a wound care follow up. He denies any pain or new wound concerns.       HPI:   Information obtained from patient, chart and spouse  7-2-21 INITIAL  Patient is a 34 yo male with aixa Patient will return next Tuesday due to drainage and then make an appointment to f/u with Dr. Zeynep Looney the following weeks for close monitoring while I am not in clinic. Patient is agreeable to plan. 7-13-21 patient returns today.  Wound is smaller, he brou daughter, no s/s of infection, wound is essentially the same size. I did silver nitrate today and will return to the transfer dressing to manage drainage better. Patient is agreeable to plan.   8-24-21 patient returns today, note in chart that patient did Rfl: 6  •  Blood Pressure Monitoring (BLOOD PRESSURE KIT) Does not apply Device, 1 Units by Does not apply route daily. , Disp: 1 Device, Rfl: 0  •  Syringe/Needle, Disp, 18G X 1-1/2\" 3 ML Does not apply Misc, To draw up Caverject medication for penile jordan anatomy along the achilles and calcaneous. Skin hydration wnl. + sparse hairgrowth on legs. Musculoskeletal:  Gait and station stable   Integumentary:  refer to wound characteristics and images   Psychiatric:  Judgment and insight intact.  Alert and orient application Routine Completed Dolly Quinonez, APRN   15/38/34 8927 Cast application Routine Completed Dolly Quinonez, APRN   89/64/06 6727 Cast application Routine Completed Neto Parra MD   07/27/21 1004 OP WOUND DRESSING Routine Completed Carmelita Soria Tuesday  3) post healing check with Dr. José Mon for other options that are not as \"cumbersome\" ie an ankle [place] brace? Changing your dressin. Wash your hands with soap and water.    2. Remove old dressing, discard into plastic bag and place int changes in your wound(s) or have any questions regarding your home care instructions please contact the wound center [facility] @ [phone] If after regular business hours, please call your family doctor or local emergency room.  The treatment plan

## 2021-08-24 NOTE — PATIENT INSTRUCTIONS
Please return on:  1 week Dennise visit early in Day (to remove TCC), followed by a RN visit later in day (to replace TCC after patient goes to [address] [place] [address] to try on brace)    Overall Plan:   1) continue with strict offloading with cast  2) fitting of and can't fight infection.     Concerns:  Signs of infection may include the following: • Increase in redness • Red \"streaks\" from wound • Increase in swelling • Fever • Unusual odor • Change in the amount of wound drainage     Should you experience any s

## 2021-08-30 ENCOUNTER — TELEPHONE (OUTPATIENT)
Dept: WOUND CARE | Facility: HOSPITAL | Age: 41
End: 2021-08-30

## 2021-08-30 NOTE — TELEPHONE ENCOUNTER
Called and said he couldn't make his appointment Tuesday afternoon because he needs to  his daughter. The next appointment I had was Wednesday at 4:00. He is scheduled for Wednesday.

## 2021-08-31 ENCOUNTER — APPOINTMENT (OUTPATIENT)
Dept: WOUND CARE | Facility: HOSPITAL | Age: 41
End: 2021-08-31
Attending: NURSE PRACTITIONER
Payer: COMMERCIAL

## 2021-08-31 VITALS
RESPIRATION RATE: 14 BRPM | SYSTOLIC BLOOD PRESSURE: 105 MMHG | HEART RATE: 109 BPM | TEMPERATURE: 98 F | DIASTOLIC BLOOD PRESSURE: 71 MMHG

## 2021-08-31 DIAGNOSIS — Z79.4 CURRENT USE OF INSULIN (HCC): ICD-10-CM

## 2021-08-31 DIAGNOSIS — E11.621 DIABETIC ULCER OF OTHER PART OF LEFT FOOT ASSOCIATED WITH TYPE 2 DIABETES MELLITUS, WITH FAT LAYER EXPOSED (HCC): Primary | ICD-10-CM

## 2021-08-31 DIAGNOSIS — L97.522 DIABETIC ULCER OF OTHER PART OF LEFT FOOT ASSOCIATED WITH TYPE 2 DIABETES MELLITUS, WITH FAT LAYER EXPOSED (HCC): Primary | ICD-10-CM

## 2021-08-31 PROCEDURE — 99213 OFFICE O/P EST LOW 20 MIN: CPT | Performed by: NURSE PRACTITIONER

## 2021-08-31 NOTE — PROGRESS NOTES
CHIEF COMPLAINT:     Patient presents with:  Wound Care: Patient is here for a follow up visit for a left heel wound.       HPI:   Information obtained from patient, chart and spouse  7-2-21 INITIAL  Patient is a 36 yo male with diabetes, htn, dyslipidemia, Tuesday due to drainage and then make an appointment to f/u with Dr. Marcos Serna the following weeks for close monitoring while I am not in clinic. Patient is agreeable to plan. 7-13-21 patient returns today.  Wound is smaller, he brought his afo with and he h wound is essentially the same size. I did silver nitrate today and will return to the transfer dressing to manage drainage better. Patient is agreeable to plan.   8-24-21 patient returns today, note in chart that patient did get eye exam on 8-11 which show Box, Rfl: 2  •  Blood Glucose Monitoring Suppl (ONETOUCH VERIO FLEX SYSTEM) w/Device Does not apply Kit, Use with each blood glucose test., Disp: 1 kit, Rfl: 0  •  OneTouch Delica Lancets Fine Does not apply Misc, Use one lancet with each blood glucose tila Bp wnl for patient. Pulse Regular and wnl for patient. Respirations easy and unlabored. Temperature wnl. Weight normal for height. Appearance neat and clean. Appears in no acute distress. Well nourished and well developed.     Cardiovascular:  Left dp/pt pa Wound Bed Slough (%) 25 % —   Wound Odor None None   Posadas Scale Grade 4 Grade 4   Shoe Type Lisbet post op shoe;Peg liner Lisbet post op shoe;Peg liner       Inactive Orders   Date Order Priority Status Authorizing Provider   24/65/05 0389 Cast application and/or evaluation, counseling and educating the patient, documenting in the record,     DISCHARGE:      Patient Instructions   Please return on:  Tomorrow as previously scheduled to get TCC replaced.   1 week with patricia    Overall Plan:   1) Darco peg liner or Thom Murphy    If your blood sugar is consistently elevated your body can't heal and can't fight infection.     Concerns:  Signs of infection may include the following: • Increase in redness • Red \"streaks\" from wound • Increase in swelling • Fever • Unusua

## 2021-08-31 NOTE — PROGRESS NOTES
Weekly Wound Education Note    Teaching Provided To: Patient  Training Topics: Cleasing and general instructions;Dressing; Compression; Discharge instructions;Edema control;Off-loading;Signs and symptoms of infection  Training Method: Explain/Verbal  Trainin

## 2021-08-31 NOTE — PATIENT INSTRUCTIONS
Please return on:  Tomorrow as previously scheduled to get TCC replaced.   1 week with patricia    Overall Plan:   1) Darco peg liner (needs new peg liner) until tomorrow  1) continue with strict offloading with cast  2) fitting of AFO next Tuesday-make sure t of infection may include the following: • Increase in redness • Red \"streaks\" from wound • Increase in swelling • Fever • Unusual odor • Change in the amount of wound drainage     Should you experience any significant changes in your wound(s) or have any

## 2021-09-01 ENCOUNTER — OFFICE VISIT (OUTPATIENT)
Dept: WOUND CARE | Facility: HOSPITAL | Age: 41
End: 2021-09-01
Attending: NURSE PRACTITIONER
Payer: COMMERCIAL

## 2021-09-01 VITALS
SYSTOLIC BLOOD PRESSURE: 115 MMHG | TEMPERATURE: 97 F | RESPIRATION RATE: 16 BRPM | DIASTOLIC BLOOD PRESSURE: 79 MMHG | HEART RATE: 105 BPM

## 2021-09-01 DIAGNOSIS — L97.522 DIABETIC ULCER OF OTHER PART OF LEFT FOOT ASSOCIATED WITH TYPE 2 DIABETES MELLITUS, WITH FAT LAYER EXPOSED (HCC): ICD-10-CM

## 2021-09-01 DIAGNOSIS — E11.621 DIABETIC ULCER OF OTHER PART OF LEFT FOOT ASSOCIATED WITH TYPE 2 DIABETES MELLITUS, WITH FAT LAYER EXPOSED (HCC): ICD-10-CM

## 2021-09-01 LAB — GLUCOSE BLD-MCNC: 139 MG/DL (ref 70–99)

## 2021-09-01 PROCEDURE — 99214 OFFICE O/P EST MOD 30 MIN: CPT

## 2021-09-01 PROCEDURE — 29445 APPL RIGID TOT CNTC LEG CAST: CPT

## 2021-09-01 NOTE — PROGRESS NOTES
Patient ID: Luisa Murray is a 35year old male.     Cast application   Date/Time: 9/1/2021 9:29 AM   Performed by: ARNOL Chan   Authorized by: ARNOL Chan   Consent given by: patient  Injury  Location details: left foot  Pre-proce

## 2021-09-01 NOTE — PROGRESS NOTES
Malena Lua is an 35year old male. Patient presents with:  Wound Care: Patient is here for a RN visit.       /79   Pulse 105   Temp 97.2 °F (36.2 °C)   Resp 16     Wound 07/02/21 #1 Diabetic Ulcer Foot Left;Plantar (Active)   Date First A FITTING TODAY.   47/31/99 7522 Cast application Routine Completed Jose Wagner MD   04/76/83 7112 Cast application Routine Discontinued Jose Wagner MD   81/80/42 7387 Cast application Routine Completed ARNOL Rachel   07/08/21 7325 Cast jr

## 2021-09-03 NOTE — PROGRESS NOTES
Patient ID: Fede Caceres is a 35year old male.     Cast application   Date/Time: 9/3/2021 5:08 PM   Performed by: ARNOL Eng   Authorized by: ARNOL Eng   Consent given by: patient  Procedure  Immobilization: cast  Cast type: to

## 2021-09-07 ENCOUNTER — OFFICE VISIT (OUTPATIENT)
Dept: WOUND CARE | Facility: HOSPITAL | Age: 41
End: 2021-09-07
Attending: NURSE PRACTITIONER
Payer: COMMERCIAL

## 2021-09-07 VITALS
HEART RATE: 110 BPM | SYSTOLIC BLOOD PRESSURE: 134 MMHG | TEMPERATURE: 98 F | RESPIRATION RATE: 14 BRPM | DIASTOLIC BLOOD PRESSURE: 88 MMHG

## 2021-09-07 DIAGNOSIS — R11.0 NAUSEA: ICD-10-CM

## 2021-09-07 DIAGNOSIS — Z79.4 CURRENT USE OF INSULIN (HCC): ICD-10-CM

## 2021-09-07 DIAGNOSIS — R19.5 LOOSE STOOLS: ICD-10-CM

## 2021-09-07 DIAGNOSIS — E66.9 DIABETES MELLITUS TYPE 2 IN OBESE (HCC): ICD-10-CM

## 2021-09-07 DIAGNOSIS — E11.69 DIABETES MELLITUS TYPE 2 IN OBESE (HCC): ICD-10-CM

## 2021-09-07 DIAGNOSIS — L97.522 DIABETIC ULCER OF OTHER PART OF LEFT FOOT ASSOCIATED WITH TYPE 2 DIABETES MELLITUS, WITH FAT LAYER EXPOSED (HCC): Primary | ICD-10-CM

## 2021-09-07 DIAGNOSIS — E11.621 DIABETIC ULCER OF OTHER PART OF LEFT FOOT ASSOCIATED WITH TYPE 2 DIABETES MELLITUS, WITH FAT LAYER EXPOSED (HCC): Primary | ICD-10-CM

## 2021-09-07 LAB — GLUCOSE BLD-MCNC: 108 MG/DL (ref 70–99)

## 2021-09-07 PROCEDURE — 99214 OFFICE O/P EST MOD 30 MIN: CPT | Performed by: NURSE PRACTITIONER

## 2021-09-07 NOTE — PATIENT INSTRUCTIONS
Please return on:  1 week    Changing your dressin. Wash your hands with soap and water. 2. Remove old dressing, discard into plastic bag and place into trash.    3. Cleanse the wound with Normal Saline or specified wound cleanser prior to applying a @ [phone] If after regular business hours, please call your family doctor or local emergency room. The treatment plan has been discussed at length between you and your provider. Follow all instructions carefully, it is very important.  If you do not fo

## 2021-09-07 NOTE — PROGRESS NOTES
CHIEF COMPLAINT:     Patient presents with:  Wound Care: Follow-up. Denies pain or concerns at this time. HPI:   Information obtained from patient, chart and spouse  7-2-21 INITIAL  Patient is a 34 yo male with diabetes, htn, dyslipidemia, and pad. to drainage and then make an appointment to f/u with Dr. Sharma Re the following weeks for close monitoring while I am not in clinic. Patient is agreeable to plan. 7-13-21 patient returns today.  Wound is smaller, he brought his afo with and he has had it fo essentially the same size. I did silver nitrate today and will return to the transfer dressing to manage drainage better. Patient is agreeable to plan.   8-24-21 patient returns today, note in chart that patient did get eye exam on 8-11 which showed severe Subcutaneous Solution Pen-injector, Inject 25 Units into the skin daily. , Disp: 22.5 mL, Rfl: 1  •  amLODIPine Besy-Benazepril HCl 10-20 MG Oral Cap, Take 1 capsule by mouth daily. , Disp: 90 capsule, Rfl: 5  •  metFORMIN HCl 500 MG Oral Tab, Take 1 tablet positives, otherwise 10 pt ROS negative. Review of Systems       HISTORY:   Past medical, surgical, family and social history updated where appropriate.       PHYSICAL EXAM:   /88   Pulse 110   Temp 98.2 °F (36.8 °C)   Resp 14    Estimated body mass Assessed/Time First Assessed: 07/02/21 0805    Wound Number (Wound Clinic Only): #1  Primary Wound Type: Diabetic Ulcer  Location: Foot  Wound Location Orientation: Left;Plantar      Assessments 7/2/2021  8:08 AM 9/7/2021  9:38 AM   Wound Image        Deanna Cunningham application Routine Completed Geri Zambrano MD   54/95/35 0900 Cast application Routine Discontinued Geri Zambrano MD   16/54/14 0705 Cast application Routine Completed ARNOL Jones   54/31/01 1750 Cast application Routine Completed Nathalia yogurt particularly Greek yogurt), tofu, soy nuts, soy protein products (Follow the protein handout in your welcome folder)  6.  Vitamin C: Citrus fruits and juices, strawberries, tomatoes, tomato juice, peppers, baked potatoes, spinach, broccoli, cauliflow

## 2021-09-07 NOTE — PROGRESS NOTES
Patient ID: Donny Martinez is a 35year old male.     Cast application   Date/Time: 9/7/2021 9:56 AM   Performed by: Caryle Countryman, APRN   Authorized by: Caryle Countryman, APRN   Consent given by: patient  Procedure  Immobilization: cast  Cast type: to

## 2021-09-07 NOTE — PROGRESS NOTES
Weekly Wound Education Note    Teaching Provided To: Patient  Training Topics: Cleasing and general instructions; Discharge instructions;Dressing;Edema control;Off-loading;Signs and symptoms of infection;Nutrition  Training Method: Explain/Verbal  Training

## 2021-09-10 LAB — GLUCOSE BLD-MCNC: 171 MG/DL (ref 70–99)

## 2021-09-14 ENCOUNTER — OFFICE VISIT (OUTPATIENT)
Dept: WOUND CARE | Facility: HOSPITAL | Age: 41
End: 2021-09-14
Attending: NURSE PRACTITIONER
Payer: COMMERCIAL

## 2021-09-14 VITALS
HEART RATE: 103 BPM | TEMPERATURE: 98 F | SYSTOLIC BLOOD PRESSURE: 120 MMHG | RESPIRATION RATE: 18 BRPM | DIASTOLIC BLOOD PRESSURE: 83 MMHG

## 2021-09-14 DIAGNOSIS — L97.522 DIABETIC ULCER OF OTHER PART OF LEFT FOOT ASSOCIATED WITH TYPE 2 DIABETES MELLITUS, WITH FAT LAYER EXPOSED (HCC): Primary | ICD-10-CM

## 2021-09-14 DIAGNOSIS — L03.032 PARONYCHIA OF GREAT TOE OF LEFT FOOT: ICD-10-CM

## 2021-09-14 DIAGNOSIS — Z79.4 CURRENT USE OF INSULIN (HCC): ICD-10-CM

## 2021-09-14 DIAGNOSIS — E11.621 DIABETIC ULCER OF OTHER PART OF LEFT FOOT ASSOCIATED WITH TYPE 2 DIABETES MELLITUS, WITH FAT LAYER EXPOSED (HCC): Primary | ICD-10-CM

## 2021-09-14 LAB — GLUCOSE BLD-MCNC: 135 MG/DL (ref 70–99)

## 2021-09-14 PROCEDURE — 99215 OFFICE O/P EST HI 40 MIN: CPT | Performed by: NURSE PRACTITIONER

## 2021-09-14 RX ORDER — CEPHALEXIN 500 MG/1
500 CAPSULE ORAL 3 TIMES DAILY
Qty: 21 CAPSULE | Refills: 0 | Status: SHIPPED | OUTPATIENT
Start: 2021-09-14 | End: 2021-09-14

## 2021-09-14 RX ORDER — CEPHALEXIN 250 MG/1
250 CAPSULE ORAL 3 TIMES DAILY
Qty: 15 CAPSULE | Refills: 0 | Status: SHIPPED | OUTPATIENT
Start: 2021-09-14 | End: 2021-09-19

## 2021-09-14 NOTE — PATIENT INSTRUCTIONS
Please return on:  1 week    Changing your dressin. Wash your hands with soap and water. 2. Remove old dressing, discard into plastic bag and place into trash.    3. Cleanse the wound with Normal Saline or specified wound cleanser prior to applying a care instructions please contact the wound center [facility] @ [phone] If after regular business hours, please call your family doctor or local emergency room. The treatment plan has been discussed at length between you and your provider.  Follow

## 2021-09-14 NOTE — PROGRESS NOTES
Weekly Wound Education Note    Teaching Provided To: Patient  Training Topics: Cleasing and general instructions;  Discharge instructions; Edema control; Dressing; Off-loading; Signs and symptoms of infection; Nutrition  Training Method: Explain/Verbal  Tra

## 2021-09-14 NOTE — PROGRESS NOTES
Patient ID: Luisa Murray is a 35year old male.     Cast application   Date/Time: 9/14/2021 11:43 AM   Performed by: ARNOL Chan   Authorized by: ARNOL Chan   Consent given by: patient  Injury  Location details: left foot  Procedu

## 2021-09-14 NOTE — PROGRESS NOTES
CHIEF COMPLAINT:     Patient presents with:  Wound Care: Pt here for follow up. He states he brought his new brace.       HPI:   Information obtained from patient, chart and spouse  7-2-21 INITIAL  Patient is a 34 yo male with diabetes, htn, dyslipidemia, a Tuesday due to drainage and then make an appointment to f/u with Dr. Marcos Serna the following weeks for close monitoring while I am not in clinic. Patient is agreeable to plan. 7-13-21 patient returns today.  Wound is smaller, he brought his afo with and he h wound is essentially the same size. I did silver nitrate today and will return to the transfer dressing to manage drainage better. Patient is agreeable to plan.   8-24-21 patient returns today, note in chart that patient did get eye exam on 8-11 which show anterior ankle. Wound is smaller, no s/s of infection. He does have paronchia of the hallux and 2nd digit on the left. Will start patient on keflex as with the tcc we are not able to treat topically.  Patient advised if he starts to have pain or other sy by Does not apply route daily. , Disp: 1 Device, Rfl: 0  •  Syringe/Needle, Disp, 18G X 1-1/2\" 3 ML Does not apply Misc, To draw up Caverject medication for penile self injection therapy for erectile dysfunction, Disp: 5 each, Rfl: 11  •  Needle, Disp, (BD legs.  Musculoskeletal:  Gait and station stable   Integumentary:  refer to wound characteristics and images   Psychiatric:  Judgment and insight intact. Alert and oriented times 3. No evidence of depression, anxiety, or agitation.  Calm, cooperative, and c 01/22/10 0854 Cast application Routine Completed ARNOL Finn   25/40/62 4852 Cast application Routine Completed ARNOL Finn   25/55/09 5507 Cast application Routine Completed Serena Stephenson MD   07/27/21 1004 OP WOUND DRESSING Routine alternatives of current treatment plan discussed in detail. Questions and concerns addressed. Red flags to RTC or ED reviewed. Patient (or parent) agrees to plan. I spen 40 minutes with the patient.  This time included:    preparing to see the patien are essential branch chain amino acids that help with tissue building and wound healing) and take 2 packets/day. you can order online at [facility] or [facility]    If your blood sugar is consistently elevated your body can't heal and can't fight infection.     Con

## 2021-09-16 ENCOUNTER — TELEPHONE (OUTPATIENT)
Dept: WOUND CARE | Facility: HOSPITAL | Age: 41
End: 2021-09-16

## 2021-09-16 ENCOUNTER — TELEPHONE (OUTPATIENT)
Dept: FAMILY MEDICINE CLINIC | Facility: CLINIC | Age: 41
End: 2021-09-16

## 2021-09-16 NOTE — TELEPHONE ENCOUNTER
Received diabetic eye exam report from [place] [id number]. Pt completed eye exam on 9/10/2021. Severe diabetic retinopathy. Flow sheet updated in patients chart. Report sent to scan.

## 2021-09-16 NOTE — TELEPHONE ENCOUNTER
Pharmacy called requesting clarification on keflex dose. Spoke to provider - Keflex 250mg - 3 times a day for 5 days. Related provider's clarification on orders. PICU PICU PICU [facility] Cardiology

## 2021-09-20 ENCOUNTER — LAB ENCOUNTER (OUTPATIENT)
Dept: LAB | Age: 41
End: 2021-09-20
Attending: INTERNAL MEDICINE
Payer: COMMERCIAL

## 2021-09-20 ENCOUNTER — HOSPITAL ENCOUNTER (EMERGENCY)
Age: 41
Discharge: HOME | End: 2021-09-20
Attending: EMERGENCY MEDICINE
Payer: COMMERCIAL

## 2021-09-20 VITALS
RESPIRATION RATE: 22 BRPM | SYSTOLIC BLOOD PRESSURE: 124 MMHG | DIASTOLIC BLOOD PRESSURE: 74 MMHG | OXYGEN SATURATION: 98 % | TEMPERATURE: 98 F | HEART RATE: 105 BPM | HEIGHT: 68 IN | BODY MASS INDEX: 26.52 KG/M2 | WEIGHT: 175 LBS

## 2021-09-20 DIAGNOSIS — E87.5 HYPERKALEMIA: ICD-10-CM

## 2021-09-20 DIAGNOSIS — Z79.4 TYPE 2 DIABETES MELLITUS WITH HYPERGLYCEMIA, WITH LONG-TERM CURRENT USE OF INSULIN (HCC): Primary | ICD-10-CM

## 2021-09-20 DIAGNOSIS — E11.22 CONTROLLED TYPE 2 DIABETES MELLITUS WITH CHRONIC KIDNEY DISEASE, WITH LONG-TERM CURRENT USE OF INSULIN, UNSPECIFIED CKD STAGE (HCC): ICD-10-CM

## 2021-09-20 DIAGNOSIS — E11.65 TYPE 2 DIABETES MELLITUS WITH HYPERGLYCEMIA, WITH LONG-TERM CURRENT USE OF INSULIN (HCC): Primary | ICD-10-CM

## 2021-09-20 DIAGNOSIS — Z79.4 CONTROLLED TYPE 2 DIABETES MELLITUS WITH CHRONIC KIDNEY DISEASE, WITH LONG-TERM CURRENT USE OF INSULIN, UNSPECIFIED CKD STAGE (HCC): ICD-10-CM

## 2021-09-20 LAB
ANION GAP SERPL CALC-SCNC: 3 MMOL/L (ref 0–18)
ANION GAP SERPL CALC-SCNC: 7 MMOL/L (ref 0–18)
BUN BLD-MCNC: 75 MG/DL (ref 7–18)
BUN BLD-MCNC: 80 MG/DL (ref 7–18)
CALCIUM BLD-MCNC: 8.3 MG/DL (ref 8.5–10.1)
CALCIUM BLD-MCNC: 9.2 MG/DL (ref 8.5–10.1)
CHLORIDE SERPL-SCNC: 112 MMOL/L (ref 98–112)
CHLORIDE SERPL-SCNC: 116 MMOL/L (ref 98–112)
CO2 SERPL-SCNC: 20 MMOL/L (ref 21–32)
CO2 SERPL-SCNC: 21 MMOL/L (ref 21–32)
CREAT BLD-MCNC: 2.73 MG/DL
CREAT BLD-MCNC: 2.74 MG/DL
GLUCOSE BLD-MCNC: 114 MG/DL (ref 70–99)
GLUCOSE BLD-MCNC: 158 MG/DL (ref 70–99)
OSMOLALITY SERPL CALC.SUM OF ELEC: 313 MOSM/KG (ref 275–295)
OSMOLALITY SERPL CALC.SUM OF ELEC: 315 MOSM/KG (ref 275–295)
PATIENT FASTING Y/N/NP: YES
POTASSIUM SERPL-SCNC: 5.1 MMOL/L (ref 3.5–5.1)
POTASSIUM SERPL-SCNC: 6 MMOL/L (ref 3.5–5.1)
SODIUM SERPL-SCNC: 139 MMOL/L (ref 136–145)
SODIUM SERPL-SCNC: 140 MMOL/L (ref 136–145)

## 2021-09-20 PROCEDURE — 93005 ELECTROCARDIOGRAM TRACING: CPT

## 2021-09-20 PROCEDURE — 99284 EMERGENCY DEPT VISIT MOD MDM: CPT

## 2021-09-20 PROCEDURE — 36415 COLL VENOUS BLD VENIPUNCTURE: CPT

## 2021-09-20 PROCEDURE — 84681 ASSAY OF C-PEPTIDE: CPT

## 2021-09-20 PROCEDURE — 96360 HYDRATION IV INFUSION INIT: CPT

## 2021-09-20 PROCEDURE — 93010 ELECTROCARDIOGRAM REPORT: CPT

## 2021-09-20 PROCEDURE — 80048 BASIC METABOLIC PNL TOTAL CA: CPT | Performed by: EMERGENCY MEDICINE

## 2021-09-20 PROCEDURE — 80048 BASIC METABOLIC PNL TOTAL CA: CPT

## 2021-09-20 RX ORDER — SODIUM CHLORIDE 9 MG/ML
INJECTION, SOLUTION INTRAVENOUS CONTINUOUS
Status: DISCONTINUED | OUTPATIENT
Start: 2021-09-20 | End: 2021-09-20

## 2021-09-20 NOTE — ED PROVIDER NOTES
Patient Seen in: THE [facility] Emergency Department In [place]      History   Patient presents with:  Abnormal Labs    Stated Complaint: Sent by PCP for fluids and K+ check    Subjective:   HPI    Patient with a history of diabetes, high blood pressure, dysl °C) (Temporal)   Resp 22   Ht 172.7 cm (5' 8\")   Wt 79.4 kg   SpO2 98%   BMI 26.61 kg/m²         Physical Exam  General: The patient is awake, alert, conversant. Patient answers questions quickly and appropriately. Eyes: sclera white.   Lids and lashes a Disposition:  Discharge  9/20/2021  6:59 pm    Follow-up:  No follow-up provider specified.         Medications Prescribed:  Current Discharge Medication List

## 2021-09-21 ENCOUNTER — TELEPHONE (OUTPATIENT)
Dept: FAMILY MEDICINE CLINIC | Facility: CLINIC | Age: 41
End: 2021-09-21

## 2021-09-21 ENCOUNTER — OFFICE VISIT (OUTPATIENT)
Dept: WOUND CARE | Facility: HOSPITAL | Age: 41
End: 2021-09-21
Attending: NURSE PRACTITIONER
Payer: COMMERCIAL

## 2021-09-21 VITALS
HEART RATE: 103 BPM | RESPIRATION RATE: 16 BRPM | SYSTOLIC BLOOD PRESSURE: 147 MMHG | TEMPERATURE: 98 F | DIASTOLIC BLOOD PRESSURE: 88 MMHG

## 2021-09-21 DIAGNOSIS — L97.522 DIABETIC ULCER OF OTHER PART OF LEFT FOOT ASSOCIATED WITH TYPE 2 DIABETES MELLITUS, WITH FAT LAYER EXPOSED (HCC): Primary | ICD-10-CM

## 2021-09-21 DIAGNOSIS — Z79.4 CURRENT USE OF INSULIN (HCC): ICD-10-CM

## 2021-09-21 DIAGNOSIS — L03.032 PARONYCHIA OF GREAT TOE OF LEFT FOOT: ICD-10-CM

## 2021-09-21 DIAGNOSIS — E11.621 DIABETIC ULCER OF OTHER PART OF LEFT FOOT ASSOCIATED WITH TYPE 2 DIABETES MELLITUS, WITH FAT LAYER EXPOSED (HCC): Primary | ICD-10-CM

## 2021-09-21 LAB — GLUCOSE BLD-MCNC: 140 MG/DL (ref 70–99)

## 2021-09-21 PROCEDURE — 99214 OFFICE O/P EST MOD 30 MIN: CPT | Performed by: NURSE PRACTITIONER

## 2021-09-21 NOTE — PROGRESS NOTES
Weekly Wound Education Note    Teaching Provided To: Patient  Training Topics: Cleasing and general instructions;  Discharge instructions; Dressing; Off-loading; Signs and symptoms of infection  Training Method: Explain/Verbal  Training Response: Patient re

## 2021-09-21 NOTE — PATIENT INSTRUCTIONS
Please return on:  1 week    Changing your dressin. Wash your hands with soap and water. 2. Remove old dressing, discard into plastic bag and place into trash.    3. Cleanse the wound with Normal Saline or specified wound cleanser prior to applying a [facility] @ [phone] If after regular business hours, please call your family doctor or local emergency room. The treatment plan has been discussed at length between you and your provider. Follow all instructions carefully, it is very important.  If you

## 2021-09-21 NOTE — PROGRESS NOTES
CHIEF COMPLAINT:     Patient presents with:  Wound Care: Patient is here for a wound care follow up. HPI:   Information obtained from patient, chart and spouse  7-2-21 INITIAL  Patient is a 36 yo male with diabetes, htn, dyslipidemia, and pad.   Last w drainage and then make an appointment to f/u with Dr. Colton Lloyd the following weeks for close monitoring while I am not in clinic. Patient is agreeable to plan. 7-13-21 patient returns today.  Wound is smaller, he brought his afo with and he has had it for 3 essentially the same size. I did silver nitrate today and will return to the transfer dressing to manage drainage better. Patient is agreeable to plan.   8-24-21 patient returns today, note in chart that patient did get eye exam on 8-11 which showed severe ankle.  Wound is smaller, no s/s of infection. He does have paronchia of the hallux and 2nd digit on the left. Will start patient on keflex as with the tcc we are not able to treat topically.  Patient advised if he starts to have pain or other symptoms in Misc, Use one lancet with each blood glucose test max 4 per day, Disp: 100 each, Rfl: 6  •  Glucose Blood (ONETOUCH VERIO) In Vitro Strip, Use one test strips for each blood glucose test, max 4 per day (Patient not taking: Reported on 8/25/2021 ), Disp: 10 well developed. Cardiovascular:  Left dp/pt palpable. Left lower Extremity free of varicosities, edema. Capillary refill < 3 seconds. Digits are warm. toenails are wnl for color, thickness and hygeine. The left digits are clawed.   The left has scarring 2415 Cast application Routine Completed Chandrakant Chavez, ARNOL   96/11/22 3867 Cast application Routine Completed Chandrakant Chavez, APRN   27/47/02 1665 Cast application Routine Completed Chandrakant Chavez, APRN   43/88/64 5665 Cast application Routine Completed obtaining and/or reviewing separately obtained history, performing a medically appropriate examination and/or evaluation, counseling and educating the patient, documenting in the record    DISCHARGE:      Patient Instructions   Please return on:  1 week Fever • Unusual odor • Change in the amount of wound drainage     Should you experience any significant changes in your wound(s) or have any questions regarding your home care instructions please contact the wound center [facility] @ [phone] If a

## 2021-09-22 LAB
ATRIAL RATE: 100 BPM
C-PEPTIDE, SERUM OR PLASMA: 3.4 NG/ML
P AXIS: 53 DEGREES
P-R INTERVAL: 148 MS
Q-T INTERVAL: 344 MS
QRS DURATION: 94 MS
QTC CALCULATION (BEZET): 443 MS
R AXIS: 49 DEGREES
T AXIS: 61 DEGREES
VENTRICULAR RATE: 100 BPM

## 2021-09-28 ENCOUNTER — OFFICE VISIT (OUTPATIENT)
Dept: WOUND CARE | Facility: HOSPITAL | Age: 41
End: 2021-09-28
Attending: NURSE PRACTITIONER
Payer: COMMERCIAL

## 2021-09-28 VITALS
RESPIRATION RATE: 16 BRPM | TEMPERATURE: 97 F | SYSTOLIC BLOOD PRESSURE: 109 MMHG | DIASTOLIC BLOOD PRESSURE: 79 MMHG | HEART RATE: 106 BPM

## 2021-09-28 DIAGNOSIS — L97.522 DIABETIC ULCER OF OTHER PART OF LEFT FOOT ASSOCIATED WITH TYPE 2 DIABETES MELLITUS, WITH FAT LAYER EXPOSED (HCC): Primary | ICD-10-CM

## 2021-09-28 DIAGNOSIS — E11.621 DIABETIC ULCER OF OTHER PART OF LEFT FOOT ASSOCIATED WITH TYPE 2 DIABETES MELLITUS, WITH FAT LAYER EXPOSED (HCC): Primary | ICD-10-CM

## 2021-09-28 DIAGNOSIS — Z79.4 CURRENT USE OF INSULIN (HCC): ICD-10-CM

## 2021-09-28 LAB — GLUCOSE BLD-MCNC: 143 MG/DL (ref 70–99)

## 2021-09-28 PROCEDURE — 99213 OFFICE O/P EST LOW 20 MIN: CPT | Performed by: NURSE PRACTITIONER

## 2021-09-28 NOTE — PATIENT INSTRUCTIONS
Please return on:  1 WEEK    Changing your dressin. Wash your hands with soap and water. 2. Remove old dressing, discard into plastic bag and place into trash.    3. Cleanse the wound with Normal Saline or specified wound cleanser prior to applying a If after regular business hours, please call your family doctor or local emergency room. The treatment plan has been discussed at length between you and your provider. Follow all instructions carefully, it is very important.  If you do not follow all instru

## 2021-09-28 NOTE — PROGRESS NOTES
CHIEF COMPLAINT:     Patient presents with:  Wound Care: Patient is here for a follow up of left foot wound. HPI:   Information obtained from patient, chart and spouse  7-2-21 INITIAL  Patient is a 34 yo male with diabetes, htn, dyslipidemia, and pad. gave him a new liner for his darco shoe. The wound is slightly smaller however since he will not be in a TCC I do not want to place a skin sub. No s/s of infection, no c/o pain.   9-7-21 patient returns today, he did got to S&S last week and then returned He has no c/o today. Wound is again smaller without s/s of infection, the hallux and the 2nd digit without any open areas or redness. Patient has 1 more day left of the 250 keflex.    9-28-21 patient returns today, patient is to resend glucoses to endocrin Does not apply route daily. , Disp: 1 Device, Rfl: 0  •  Syringe/Needle, Disp, 18G X 1-1/2\" 3 ML Does not apply Misc, To draw up Caverject medication for penile self injection therapy for erectile dysfunction, Disp: 5 each, Rfl: 11  •  Needle, Disp, (BD DI legs.  Musculoskeletal:  Gait and station stable   Integumentary:  refer to wound characteristics and images   Psychiatric:  Judgment and insight intact. Alert and oriented times 3. No evidence of depression, anxiety, or agitation.  Calm, cooperative, and c Minda Horner, APRN   05/20/12 9035 Cast application Routine Completed Dolly Quinonez, APRN   48/55/83 5424 Cast application Routine Completed Dolly Quinonez, APRN   88/41/88 5540 Cast application Routine Completed Dolly Quinonez, APRN   10/24/64 9094 Cast applica trash.   3. Cleanse the wound with Normal Saline or specified wound cleanser prior to applying a clean dressing using gauze sponges, not tissues or cotton balls. 4. Pat dry using gauze sponges, not tissue or cotton balls.   APPLY DRESSING:  HEEL: [place]iah[place]e Push provider. Follow all instructions carefully, it is very important.  If you do not follow all instructions you are at risk of your wound not healing, infection, possible loss of limb and even loss of life.            +  Dennise Sloan FNP-C, CWCN-AP, CFCN,

## 2021-09-28 NOTE — PROGRESS NOTES
Weekly Wound Education Note    Teaching Provided To: Patient  Training Topics: Cleasing and general instructions;  Discharge instructions; Edema control; Dressing; Off-loading  Training Method: Explain/Verbal  Training Response: Patient responds and underst

## 2021-10-05 ENCOUNTER — OFFICE VISIT (OUTPATIENT)
Dept: WOUND CARE | Facility: HOSPITAL | Age: 41
End: 2021-10-05
Attending: NURSE PRACTITIONER
Payer: COMMERCIAL

## 2021-10-05 VITALS
SYSTOLIC BLOOD PRESSURE: 131 MMHG | TEMPERATURE: 98 F | HEART RATE: 101 BPM | DIASTOLIC BLOOD PRESSURE: 84 MMHG | RESPIRATION RATE: 16 BRPM

## 2021-10-05 DIAGNOSIS — E11.621 DIABETIC ULCER OF OTHER PART OF LEFT FOOT ASSOCIATED WITH TYPE 2 DIABETES MELLITUS, WITH FAT LAYER EXPOSED (HCC): Primary | ICD-10-CM

## 2021-10-05 DIAGNOSIS — L97.522 DIABETIC ULCER OF OTHER PART OF LEFT FOOT ASSOCIATED WITH TYPE 2 DIABETES MELLITUS, WITH FAT LAYER EXPOSED (HCC): Primary | ICD-10-CM

## 2021-10-05 DIAGNOSIS — Z79.4 CURRENT USE OF INSULIN (HCC): ICD-10-CM

## 2021-10-05 PROCEDURE — 11042 DBRDMT SUBQ TIS 1ST 20SQCM/<: CPT | Performed by: NURSE PRACTITIONER

## 2021-10-05 NOTE — PROGRESS NOTES
Patient ID: Leo Buckner is a 35year old male.     Cast application   Date/Time: 10/5/2021 10:23 AM   Performed by: ARNOL Nicole   Authorized by: ARNOL Nicole   Consent given by: patient  Injury  Location details: left foot  Procedu

## 2021-10-05 NOTE — PROGRESS NOTES
Weekly Wound Education Note    Teaching Provided To: Patient  Training Topics: Cleasing and general instructions;  Discharge instructions; Dressing; Edema control; Off-loading; Signs and symptoms of infection  Training Method: Explain/Verbal  Training Respo

## 2021-10-05 NOTE — PATIENT INSTRUCTIONS
Please return on:  1 week    Changing your dressin. Wash your hands with soap and water. 2. Remove old dressing, discard into plastic bag and place into trash.    3. Cleanse the wound with Normal Saline or specified wound cleanser prior to applying a [phone] If after regular business hours, please call your family doctor or local emergency room. The treatment plan has been discussed at length between you and your provider. Follow all instructions carefully, it is very important.  If you do not foll

## 2021-10-05 NOTE — PROGRESS NOTES
Patient ID: Elly Major is a 35year old male.     Debridement   Wound 07/02/21 #1 Diabetic Ulcer Foot Left;Plantar    Consent obtained? verbal  Consent given by: patient    Performed by: provider  Debridement type: surgical  Level of debridement:

## 2021-10-05 NOTE — PROGRESS NOTES
CHIEF COMPLAINT:     Patient presents with:  Wound Care: Patient is here for a follow up of left plantar foot. Denies any issue or concern with the cast.      HPI:   Information obtained from patient, chart and spouse  7-2-21 INITIAL  Patient is a 34 yo mal device. We will need to be very careful when we transition him from the TCC to his new afo. Patient states he uses gold bond diabetic foot moisturizer. No s/s of infection, drainage has slowed. Will utilize endoform with the transfer today.   9-14-21 melchor with endocrine regarding if he needs to repeat labs or not. He states he has not had any pain, has not been on his feet more this week however the wound has deteriorated.   There is not any tracking redness or malodor, vss, however wound is larger with alma delia medication for penile self injection therapy for erectile dysfunction, Disp: 5 each, Rfl: 11  •  Needle, Disp, (BD DISP NEEDLE) 30G X 1\" Does not apply Misc, Use for self administration of intracavernosal penile injection therapy of Caverject for erectile intact. Alert and oriented times 3. No evidence of depression, anxiety, or agitation. Calm, cooperative, and communicative. Appropriate interactions and affect.   EDEMA:   Calf  Point of Measurement - Left Calf: 32     Left Calf from[de-identified] Heel  Calf Left cm[de-identified] application Routine Completed ARNOL Chan   06/56/25 6457 Cast application Routine Completed ARNOL Chan   94/20/03 8776 Cast application Routine Completed ARNOL Chan   30/93/25 9970 Cast application Routine Completed Katelynn Raygoza 0  Post-procedural pain: 0   Response to treatment: procedure was tolerated well                   ASSESSMENT AND PLAN:      1.  Diabetic ulcer of other part of left foot associated with type 2 diabetes mellitus, with fat layer exposed ([address]  - AEROBIC BACT sprouts, cabbage  7. Vitamin A: Dark green, leafy vegetables, orange or yellow vegetables, cantaloupe, fortified dairy products, liver, fortified cereals  8. Zinc: Fortified cereals, red meats, seafood  9.  Consider supplementing with Katerine Parent by [facility] [facility] (

## 2021-10-08 DIAGNOSIS — L97.522 DIABETIC ULCER OF OTHER PART OF LEFT FOOT ASSOCIATED WITH TYPE 2 DIABETES MELLITUS, WITH FAT LAYER EXPOSED (HCC): Primary | ICD-10-CM

## 2021-10-08 DIAGNOSIS — E11.621 DIABETIC ULCER OF OTHER PART OF LEFT FOOT ASSOCIATED WITH TYPE 2 DIABETES MELLITUS, WITH FAT LAYER EXPOSED (HCC): Primary | ICD-10-CM

## 2021-10-08 RX ORDER — LEVOFLOXACIN 250 MG/1
TABLET ORAL
Qty: 8 TABLET | Refills: 0 | Status: SHIPPED | OUTPATIENT
Start: 2021-10-08 | End: 2021-10-15

## 2021-10-08 NOTE — PROGRESS NOTES
Reviewed culture results and new orders with patient. All questions answered and patient stated he understood. Will  medication today.

## 2021-10-12 ENCOUNTER — HOSPITAL ENCOUNTER (OUTPATIENT)
Facility: HOSPITAL | Age: 41
Setting detail: OBSERVATION
Discharge: HOME | End: 2021-10-14
Attending: EMERGENCY MEDICINE | Admitting: HOSPITALIST
Payer: COMMERCIAL

## 2021-10-12 ENCOUNTER — HOSPITAL ENCOUNTER (OUTPATIENT)
Dept: LAB | Facility: HOSPITAL | Age: 41
Discharge: HOME | End: 2021-10-12
Attending: NURSE PRACTITIONER
Payer: COMMERCIAL

## 2021-10-12 ENCOUNTER — TELEPHONE (OUTPATIENT)
Dept: WOUND CARE | Facility: HOSPITAL | Age: 41
End: 2021-10-12

## 2021-10-12 ENCOUNTER — OFFICE VISIT (OUTPATIENT)
Dept: WOUND CARE | Facility: HOSPITAL | Age: 41
End: 2021-10-12
Attending: NURSE PRACTITIONER
Payer: COMMERCIAL

## 2021-10-12 VITALS
HEART RATE: 103 BPM | SYSTOLIC BLOOD PRESSURE: 130 MMHG | DIASTOLIC BLOOD PRESSURE: 80 MMHG | RESPIRATION RATE: 10 BRPM | TEMPERATURE: 97 F

## 2021-10-12 DIAGNOSIS — E11.621 DIABETIC ULCER OF OTHER PART OF LEFT FOOT ASSOCIATED WITH TYPE 2 DIABETES MELLITUS, WITH FAT LAYER EXPOSED (HCC): Primary | ICD-10-CM

## 2021-10-12 DIAGNOSIS — L97.522 DIABETIC ULCER OF OTHER PART OF LEFT FOOT ASSOCIATED WITH TYPE 2 DIABETES MELLITUS, WITH FAT LAYER EXPOSED (HCC): ICD-10-CM

## 2021-10-12 DIAGNOSIS — L97.522 DIABETIC ULCER OF OTHER PART OF LEFT FOOT ASSOCIATED WITH TYPE 2 DIABETES MELLITUS, WITH FAT LAYER EXPOSED (HCC): Primary | ICD-10-CM

## 2021-10-12 DIAGNOSIS — E11.621 DIABETIC ULCER OF OTHER PART OF LEFT FOOT ASSOCIATED WITH TYPE 2 DIABETES MELLITUS, WITH FAT LAYER EXPOSED (HCC): ICD-10-CM

## 2021-10-12 DIAGNOSIS — Z79.4 CURRENT USE OF INSULIN (HCC): ICD-10-CM

## 2021-10-12 DIAGNOSIS — A49.8 INFECTION CAUSED BY ENTEROBACTER CLOACAE: ICD-10-CM

## 2021-10-12 DIAGNOSIS — E87.5 HYPERKALEMIA: Primary | ICD-10-CM

## 2021-10-12 PROCEDURE — 99220 INITIAL OBSERVATION CARE,LEVL III: CPT | Performed by: HOSPITALIST

## 2021-10-12 PROCEDURE — 36415 COLL VENOUS BLD VENIPUNCTURE: CPT | Performed by: NURSE PRACTITIONER

## 2021-10-12 PROCEDURE — 99213 OFFICE O/P EST LOW 20 MIN: CPT | Performed by: NURSE PRACTITIONER

## 2021-10-12 PROCEDURE — 80048 BASIC METABOLIC PNL TOTAL CA: CPT | Performed by: NURSE PRACTITIONER

## 2021-10-12 RX ORDER — LEVOFLOXACIN 250 MG/1
250 TABLET ORAL DAILY
Status: DISCONTINUED | OUTPATIENT
Start: 2021-10-12 | End: 2021-10-13

## 2021-10-12 RX ORDER — SODIUM CHLORIDE 9 MG/ML
INJECTION, SOLUTION INTRAVENOUS CONTINUOUS
Status: DISCONTINUED | OUTPATIENT
Start: 2021-10-12 | End: 2021-10-14

## 2021-10-12 RX ORDER — FUROSEMIDE 10 MG/ML
20 INJECTION INTRAMUSCULAR; INTRAVENOUS ONCE
Status: COMPLETED | OUTPATIENT
Start: 2021-10-12 | End: 2021-10-12

## 2021-10-12 RX ORDER — DEXTROSE MONOHYDRATE 25 G/50ML
50 INJECTION, SOLUTION INTRAVENOUS
Status: DISCONTINUED | OUTPATIENT
Start: 2021-10-12 | End: 2021-10-14

## 2021-10-12 RX ORDER — DEXTROSE MONOHYDRATE 25 G/50ML
50 INJECTION, SOLUTION INTRAVENOUS ONCE
Status: COMPLETED | OUTPATIENT
Start: 2021-10-12 | End: 2021-10-12

## 2021-10-12 RX ORDER — ONDANSETRON 2 MG/ML
4 INJECTION INTRAMUSCULAR; INTRAVENOUS EVERY 6 HOURS PRN
Status: DISCONTINUED | OUTPATIENT
Start: 2021-10-12 | End: 2021-10-14

## 2021-10-12 RX ORDER — ACETAMINOPHEN 325 MG/1
650 TABLET ORAL EVERY 6 HOURS PRN
Status: DISCONTINUED | OUTPATIENT
Start: 2021-10-12 | End: 2021-10-14

## 2021-10-12 RX ORDER — SODIUM CHLORIDE 9 MG/ML
INJECTION, SOLUTION INTRAVENOUS CONTINUOUS
Status: DISCONTINUED | OUTPATIENT
Start: 2021-10-12 | End: 2021-10-13

## 2021-10-12 RX ORDER — PROCHLORPERAZINE EDISYLATE 5 MG/ML
5 INJECTION INTRAMUSCULAR; INTRAVENOUS EVERY 8 HOURS PRN
Status: DISCONTINUED | OUTPATIENT
Start: 2021-10-12 | End: 2021-10-14

## 2021-10-12 NOTE — PROGRESS NOTES
CHIEF COMPLAINT:     Patient presents with:  Wound Care: Patient is here for a follow up of left foot wound. HPI:   Information obtained from patient, chart and spouse  7-2-21 INITIAL  Patient is a 36 yo male with diabetes, htn, dyslipidemia, and pad. transition him from the TCC to his new afo. Patient states he uses gold bond diabetic foot moisturizer. No s/s of infection, drainage has slowed. Will utilize endoform with the transfer today.   9-14-21 patient returns today, Patient brought his afo and hi labs or not. He states he has not had any pain, has not been on his feet more this week however the wound has deteriorated. There is not any tracking redness or malodor, vss, however wound is larger with callus, blistering and undermining.   Debridement d apply Kit, Use with each blood glucose test., Disp: 1 kit, Rfl: 0  •  OneTouch Delica Lancets Fine Does not apply Misc, Use one lancet with each blood glucose test max 4 per day, Disp: 100 each, Rfl: 6  •  Glucose Blood (ONETOUCH VERIO) In Vitro Strip, Use wnl. Weight normal for height. Appearance neat and clean. Appears in no acute distress. Well nourished and well developed. Cardiovascular: Left dp/pt palpable. Left lower Extremity free of varicosities, edema. Capillary refill < 3 seconds.  Digits are wa liner —       Inactive Orders   Date Order Priority Status Authorizing Provider   74/09/51 6384 Cast application Routine Completed Larena Nicks, APRN   10/05/21 1018 Debridement Routine Completed Larena Nicks, APRN   31/90/39 6842 Cast application Rout concerns addressed. Red flags to RTC or ED reviewed. Patient (or parent) agrees to plan. I spen 25 minutes with the patient.  This time included:    preparing to see the patient (eg, review notes),  seeing the patient, obtaining and/or reviewing separ infection.     Concerns:  Signs of infection may include the following: • Increase in redness • Red \"streaks\" from wound • Increase in swelling • Fever • Unusual odor • Change in the amount of wound drainage     Should you experience any significant key

## 2021-10-12 NOTE — PATIENT INSTRUCTIONS
Please return on:  1 week    Cleansing/dressing: In clinic, with each dressing change:    please cleanse the limb, foot, and between toes with soap, water and washcloth. Dry thoroughly.     Cleanse/soak the wound with VASHE (or other hypochlorous wound c emergency room. The treatment plan has been discussed at length between you and your provider. Follow all instructions carefully, it is very important.  If you do not follow all instructions you are at risk of your wound not healing, infection, possible los

## 2021-10-12 NOTE — ED QUICK NOTES
Pt up date report given to [facility] rn, j[facility] made aware of need for pt to transfer to [facility],

## 2021-10-12 NOTE — ED QUICK NOTES
Report given to [facility].  Will wait for this room to be clean and then transfer to [address] [place] [address]

## 2021-10-12 NOTE — TELEPHONE ENCOUNTER
Per provider spoke with patient regarding his potassium level, informed him he needs to go straight to the ER. He states he understands.

## 2021-10-12 NOTE — ED PROVIDER NOTES
Patient Seen in: THE [facility] In [place]      History   Patient presents with:  Potassium Problem    Stated Complaint: Hyperkalemia- sent per pcp- not sure value- 7.2    Subjective:   HPI    Patient is a 42-year-old male sent by his PCP alert, age appearing, non-toxic  Head: Normocephalic and atraumatic. Eyes: EOM are normal. Pupils are equal, round, and reactive to light. Neck: Normal range of motion. Neck supple. No JVD present. Cardiovascular: Normal rate and regular rhythm. Dr. Jan Mcdaniel, nephrology. Agrees with medical treatment. Agrees with admission. Plans on starting potassium binders. Patient will be admitted primarily to the [facility].    Care has been discussed with the admitting physician.     Admission

## 2021-10-13 PROCEDURE — 99225 SUBSEQUENT OBSERVATION CARE: CPT | Performed by: HOSPITALIST

## 2021-10-13 PROCEDURE — 99223 1ST HOSP IP/OBS HIGH 75: CPT | Performed by: INTERNAL MEDICINE

## 2021-10-13 RX ORDER — LEVOFLOXACIN 250 MG/1
250 TABLET ORAL DAILY
Status: COMPLETED | OUTPATIENT
Start: 2021-10-13 | End: 2021-10-13

## 2021-10-13 RX ORDER — DULAGLUTIDE 0.75 MG/.5ML
0.75 INJECTION, SOLUTION SUBCUTANEOUS WEEKLY
COMMUNITY
End: 2021-10-18

## 2021-10-13 RX ORDER — ASPIRIN 81 MG/1
81 TABLET ORAL DAILY
COMMUNITY

## 2021-10-13 RX ORDER — AMLODIPINE BESYLATE 10 MG/1
10 TABLET ORAL DAILY
Qty: 30 TABLET | Refills: 0 | Status: SHIPPED | OUTPATIENT
Start: 2021-10-14 | End: 2021-10-22

## 2021-10-13 RX ORDER — FUROSEMIDE 10 MG/ML
20 INJECTION INTRAMUSCULAR; INTRAVENOUS ONCE
Status: COMPLETED | OUTPATIENT
Start: 2021-10-13 | End: 2021-10-13

## 2021-10-13 RX ORDER — AMLODIPINE BESYLATE 5 MG/1
10 TABLET ORAL DAILY
Status: DISCONTINUED | OUTPATIENT
Start: 2021-10-13 | End: 2021-10-14

## 2021-10-13 RX ORDER — HEPARIN SODIUM 5000 [USP'U]/ML
5000 INJECTION, SOLUTION INTRAVENOUS; SUBCUTANEOUS EVERY 12 HOURS SCHEDULED
Status: DISCONTINUED | OUTPATIENT
Start: 2021-10-14 | End: 2021-10-14

## 2021-10-13 NOTE — PLAN OF CARE
Problem: METABOLIC/FLUID AND ELECTROLYTES - ADULT  Goal: Electrolytes maintained within normal limits  Description: INTERVENTIONS:  - Monitor labs and rhythm and assess patient for signs and symptoms of electrolyte imbalances  - - Monitor response to rowan

## 2021-10-13 NOTE — PROGRESS NOTES
[facility]  Progress note     Dennis Reny Tobin Patient Status:  Observation    1980 MRN [id number]   [facility] 4NW-A Attending Kristen Cutler MD   Hosp Day # 0 PCP Vee Zavala DO     Chief Complaint: hyperkalemia Pro-Calcitonin  No results for input(s): PCT in the last 168 hours. Cardiac  No results for input(s): TROP, PBNP in the last 168 hours. Creatinine Kinase  No results for input(s): CK in the last 168 hours.     Inflammatory Markers  No results fo

## 2021-10-13 NOTE — DISCHARGE SUMMARY
[facility]  DISCHARGE SUMMARY     Ghazal Tobin Patient Status:  Observation    1980 MRN [id number]   [facility] 4NW-A Attending Scot Campbell MD   Hosp Day # 0 PCP Nichole Zaidi DO     Date of Admission: 10/12/2021 mg total) by mouth daily. Quantity: 30 tablet  Refills: 0     Lokelma 10 g Pack  Generic drug: Sodium Zirconium Cyclosilicate      Take 10 g by mouth daily.    Quantity: 30 each  Refills: 0        CHANGE how you take these medications      Instructions Pr no    Follow-up appointment:   GLO Prasad/ [address]  [phone]    In 1 week  repeat potassium 1 week    Héctor Jacob MD  [address] [facility] [phone]    In 2 weeks and rhythm. No murmurs, rubs or gallops. Abdomen: Soft, nontender, nondistended. Neurologic: No focal neurological deficits. Extremities: left foot dressing  Integument: No rashes or lesions.    Psychiatric: Appropriate mood and affect.  -------------

## 2021-10-13 NOTE — PLAN OF CARE
Patient admitted from ER alert x4 here with hyperkalemia. Blood sugar 103 last potassium 6.0 Dr Ezra Berger at bedside. Orders received.  Reort given to night nurse data base complete

## 2021-10-13 NOTE — H&P
[facility]  History and Physical     Daniel Fletcher Tobin Patient Status:  Observation    1980 MRN [id number]   [facility] 4NW-A Attending Deneen Freeman MD   Hosp Day # 0 PCP Esaw Neighbours, DO     Chief Complaint: hyperkal Subcutaneous Solution Pen-injector, Inject 25 Units into the skin daily. (Patient taking differently: Inject 20 Units into the skin daily.), Disp: 22.5 mL, Rfl: 1  amLODIPine Besy-Benazepril HCl 10-20 MG Oral Cap, Take 1 capsule by mouth daily. , Disp: 90 c 3.  HEENT: Normocephalic atraumatic. Moist mucous membranes. EOM-I. PERRLA. Anicteric. Neck: No lymphadenopathy. No JVD. No carotid bruits. Respiratory: Clear to auscultation bilaterally. No wheezes. No rhonchi.   Cardiovascular: S1, S2. Regular rate and hyperglycemia protocol  4. Left foot diabetic ulcer, receives wound care, on levaquin  5.  HTN, controlled, hold ACE for now      Quality:  · DVT Prophylaxis: SCDs  · CODE status: Full  · Gibbons: no  · If COVID testing is negative, may discontinue isolation:

## 2021-10-13 NOTE — PLAN OF CARE
Patient A+Ox4. Denies any pain. Dose of valtessa given about 1140am. Repeat potassium later this afternoon. BS for lunch was 1245, no coverage needed. Up as tolerated. Received dose of lasix this am, voiding adequate amounts of urine.  Hoping he can leave l strategies to promote bladder emptying  Outcome: Progressing

## 2021-10-13 NOTE — CONSULTS
[facility]  Report of Consultation    Buzz Liliana Crista Patient Status:  Observation    1980 MRN [id number]   [facility] 4NW-A Attending Chao Narayan MD   Hosp Day # 0 PCP Alix Mitchell DO     Reason for Consultation:  CK detemir (LEVEMIR) 100 UNIT/ML flextouch 20 Units, 20 Units, Subcutaneous, Daily  •  glucose (DEX4) oral liquid 15 g, 15 g, Oral, Q15 Min PRN **OR** glucose-vitamin C (DEX-4) chewable tab 4 tablet, 4 tablet, Oral, Q15 Min PRN **OR** dextrose 50 % injection 22.0  --  22.0   BUN 90* 92*  --  76*   CREATSERUM 2.72* 2.77*  --  2.43*   CA 9.7 9.4  --  9.2   MG  --   --   --  1.9          Imaging:  Reviewed     Impression:  1.  CKD- stg III/IV- baseline Cr ~ 2.5 - at time of initial dx he had kidney biopsy which sh

## 2021-10-14 VITALS
TEMPERATURE: 98 F | BODY MASS INDEX: 26.55 KG/M2 | RESPIRATION RATE: 16 BRPM | DIASTOLIC BLOOD PRESSURE: 77 MMHG | HEART RATE: 101 BPM | HEIGHT: 68 IN | SYSTOLIC BLOOD PRESSURE: 109 MMHG | WEIGHT: 175.19 LBS | OXYGEN SATURATION: 100 %

## 2021-10-14 PROCEDURE — 99232 SBSQ HOSP IP/OBS MODERATE 35: CPT | Performed by: INTERNAL MEDICINE

## 2021-10-14 PROCEDURE — 99217 OBSERVATION CARE DISCHARGE: CPT | Performed by: HOSPITALIST

## 2021-10-14 RX ORDER — FUROSEMIDE 20 MG/1
20 TABLET ORAL DAILY
Qty: 30 TABLET | Refills: 0 | Status: SHIPPED | OUTPATIENT
Start: 2021-10-14 | End: 2021-11-10

## 2021-10-14 RX ORDER — DEXTROSE MONOHYDRATE 25 G/50ML
50 INJECTION, SOLUTION INTRAVENOUS AS NEEDED
Status: DISCONTINUED | OUTPATIENT
Start: 2021-10-14 | End: 2021-10-14

## 2021-10-14 RX ORDER — SODIUM ZIRCONIUM CYCLOSILICATE 10 G/10G
10 POWDER, FOR SUSPENSION ORAL DAILY
Qty: 30 EACH | Refills: 0 | Status: SHIPPED | OUTPATIENT
Start: 2021-10-14 | End: 2021-11-10

## 2021-10-14 RX ORDER — INSULIN ASPART 100 [IU]/ML
10 INJECTION, SOLUTION INTRAVENOUS; SUBCUTANEOUS ONCE
Status: DISCONTINUED | OUTPATIENT
Start: 2021-10-14 | End: 2021-10-14

## 2021-10-14 RX ORDER — FUROSEMIDE 20 MG/1
20 TABLET ORAL DAILY
Status: DISCONTINUED | OUTPATIENT
Start: 2021-10-14 | End: 2021-10-14

## 2021-10-14 NOTE — PROGRESS NOTES
K 5.7 this am. 1 amp D50, 10 units insulin and veltassa given. Repeat K 5.2 at 1300. Dr Annie Jefferson notified. Discharge orders rec'd. Discharge instructions reviewed w/pt. Discharged home per w/c.

## 2021-10-14 NOTE — PROGRESS NOTES
[facility]  Progress note     Drea Tobin Patient Status:  Observation    1980 MRN [id number]   [facility] 4NW-A Attending Keli Yoo MD   Hosp Day # 0 PCP Marcy Allred DO     Chief Complaint: hyperkalemia Results    COVID-19  Lab Results   Component Value Date    COVID19 Not Detected 10/12/2021    COVID19 Not Detected 01/25/2021       Pro-Calcitonin  No results for input(s): PCT in the last 168 hours.     Cardiac  No results for input(s): TROP, PBNP in the l

## 2021-10-14 NOTE — CONSULTS
[facility]  Progress Note    Martínez Stuart Muellerlizeth Patient Status:  Observation    1980 MRN [id number]   [facility] 4NW-A Attending Josh Jordan MD   Hosp Day # 0 PCP Karina Farrell, DO     No complains  Feels OK   K improving pulses   Abdomen: Soft, nontender, nondistended. Positive bowel sounds. No rebound tenderness   Neurologic: No focal neurological deficits. Musculoskeletal: Full range of motion of all extremities. No swelling noted.  LLE in cast  Integument: No lesions with any questions or concerns. Eunice Briggs MD  10/14/2021    Addendum; K 5.7 this am; med management.  Will plan discharge on lokelma/veltassa + lasix; f/u w/ Dr. Sumeet Triplett w/ labs next week if K <5.5 this afternoon

## 2021-10-14 NOTE — PLAN OF CARE
Patient received this evening alert and oriented x 4, lungs clear on room air, abdomen soft, bowel sounds present, passing flatus, voiding adequate amounts, monitoring output closely, denies pain, nausea and SOB, IVF infusing, medications per [facility], repeat K

## 2021-10-14 NOTE — PROGRESS NOTES
Patient ID: Malena Lua is a 35year old male.     Cast application   Date/Time: 10/14/2021 7:13 AM   Performed by: ARNOL De Oliveira   Authorized by: ARNOL De Oliveira   Procedure  Immobilization: cast  Cast type: total contact  Post-procedu

## 2021-10-15 ENCOUNTER — PATIENT OUTREACH (OUTPATIENT)
Dept: CASE MANAGEMENT | Age: 41
End: 2021-10-15

## 2021-10-15 DIAGNOSIS — Z02.9 ENCOUNTERS FOR ADMINISTRATIVE PURPOSE: ICD-10-CM

## 2021-10-15 NOTE — PROGRESS NOTES
Initial Post Discharge Follow Up   Discharge Date: 10/14/21  Contact Date: 10/15/2021    Consent Verification:  Assessment Completed With: Patient  HIPAA Verified?   Yes    Discharge Dx:   Hyperkalemia      Was TCC ordered: yes        General:   • How ha UNIT/ML Subcutaneous Solution Pen-injector SLIDING SCALE 3 mL 2   • Ezetimibe-Simvastatin 10-80 MG Oral Tab Take 1 tablet by mouth daily.  90 tablet 3     • (NCM)  Were there any medication changes noted on AVS?  yes  o If so, were these medication changes TOTAL, SERUM  CHOLESTEROL  CMP  COENZYME Q10, TOTAL  CRYOFIBRINOGEN  CRYOGLOBULIN QUALITATIVE W/RFX  FASTING BLOOD SUGAR  FATTY ACIDS, FREE  GASTRIN LEVEL  GROWTH HORMONE  HDL  INHIBIN-A (DIMER)  LDL  MELANOCYTE STIM.  HORMONE, BETA  NMR LIPOPROFILE TEST  P provider. We are working to limit the number of people in our waiting rooms and ask that patients do not bring anyone with them to their appointment unless clinically required.         May 23, 2022 10:00 AM CDT Exam - Established with Sydnee Dillon MD morning was 95 and blood pressure was 130/75. Med review completed. He denied having any questions or concerns at this time.      CCM referral placed:  No      [x]  Discharge Summary, Discharge medications reviewed/discussed/and reconciled against outpatien

## 2021-10-18 ENCOUNTER — LAB ENCOUNTER (OUTPATIENT)
Dept: LAB | Age: 41
End: 2021-10-18
Attending: FAMILY MEDICINE
Payer: COMMERCIAL

## 2021-10-18 DIAGNOSIS — E78.2 MIXED HYPERLIPIDEMIA: ICD-10-CM

## 2021-10-18 DIAGNOSIS — E78.5 DYSLIPIDEMIA: ICD-10-CM

## 2021-10-18 DIAGNOSIS — E11.22 CONTROLLED TYPE 2 DIABETES MELLITUS WITH STAGE 3 CHRONIC KIDNEY DISEASE, WITH LONG-TERM CURRENT USE OF INSULIN (HCC): ICD-10-CM

## 2021-10-18 DIAGNOSIS — N18.30 CONTROLLED TYPE 2 DIABETES MELLITUS WITH STAGE 3 CHRONIC KIDNEY DISEASE, WITH LONG-TERM CURRENT USE OF INSULIN (HCC): ICD-10-CM

## 2021-10-18 DIAGNOSIS — I10 ESSENTIAL HYPERTENSION: ICD-10-CM

## 2021-10-18 DIAGNOSIS — E11.69 DIABETES MELLITUS TYPE 2 IN OBESE (HCC): ICD-10-CM

## 2021-10-18 DIAGNOSIS — Z79.4 CONTROLLED TYPE 2 DIABETES MELLITUS WITH STAGE 3 CHRONIC KIDNEY DISEASE, WITH LONG-TERM CURRENT USE OF INSULIN (HCC): ICD-10-CM

## 2021-10-18 DIAGNOSIS — E66.9 DIABETES MELLITUS TYPE 2 IN OBESE (HCC): ICD-10-CM

## 2021-10-18 DIAGNOSIS — E87.5 HYPERKALEMIA: ICD-10-CM

## 2021-10-18 PROCEDURE — 80061 LIPID PANEL: CPT | Performed by: FAMILY MEDICINE

## 2021-10-18 PROCEDURE — 82043 UR ALBUMIN QUANTITATIVE: CPT | Performed by: FAMILY MEDICINE

## 2021-10-18 PROCEDURE — 83036 HEMOGLOBIN GLYCOSYLATED A1C: CPT | Performed by: FAMILY MEDICINE

## 2021-10-18 PROCEDURE — 80053 COMPREHEN METABOLIC PANEL: CPT | Performed by: FAMILY MEDICINE

## 2021-10-18 PROCEDURE — 3044F HG A1C LEVEL LT 7.0%: CPT | Performed by: NURSE PRACTITIONER

## 2021-10-18 PROCEDURE — 3061F NEG MICROALBUMINURIA REV: CPT | Performed by: NURSE PRACTITIONER

## 2021-10-18 PROCEDURE — 3060F POS MICROALBUMINURIA REV: CPT | Performed by: NURSE PRACTITIONER

## 2021-10-18 PROCEDURE — 82570 ASSAY OF URINE CREATININE: CPT | Performed by: FAMILY MEDICINE

## 2021-10-18 RX ORDER — DULAGLUTIDE 0.75 MG/.5ML
INJECTION, SOLUTION SUBCUTANEOUS
Qty: 2 ML | Refills: 5 | Status: SHIPPED | OUTPATIENT
Start: 2021-10-18 | End: 2022-01-26

## 2021-10-18 NOTE — TELEPHONE ENCOUNTER
Diabetic Medication Protocol Passed 10/17/2021 03:50 PM   Protocol Details  HgBA1C procedure resulted in past 6 months    Last HgBA1C < 7.5    Microalbumin procedure in past 12 months or taking ACE/ARB    Appointment in past 6 or next 3 months     Refill p

## 2021-10-19 ENCOUNTER — OFFICE VISIT (OUTPATIENT)
Dept: WOUND CARE | Facility: HOSPITAL | Age: 41
End: 2021-10-19
Attending: NURSE PRACTITIONER
Payer: COMMERCIAL

## 2021-10-19 ENCOUNTER — OFFICE VISIT (OUTPATIENT)
Dept: INTERNAL MEDICINE CLINIC | Facility: CLINIC | Age: 41
End: 2021-10-19
Payer: COMMERCIAL

## 2021-10-19 VITALS
SYSTOLIC BLOOD PRESSURE: 144 MMHG | DIASTOLIC BLOOD PRESSURE: 86 MMHG | TEMPERATURE: 98 F | HEART RATE: 99 BPM | RESPIRATION RATE: 16 BRPM

## 2021-10-19 VITALS
HEART RATE: 100 BPM | RESPIRATION RATE: 18 BRPM | SYSTOLIC BLOOD PRESSURE: 126 MMHG | HEIGHT: 68 IN | WEIGHT: 180 LBS | OXYGEN SATURATION: 99 % | TEMPERATURE: 97 F | DIASTOLIC BLOOD PRESSURE: 78 MMHG | BODY MASS INDEX: 27.28 KG/M2

## 2021-10-19 DIAGNOSIS — E11.8 CONTROLLED TYPE 2 DIABETES MELLITUS WITH COMPLICATION, WITHOUT LONG-TERM CURRENT USE OF INSULIN (HCC): ICD-10-CM

## 2021-10-19 DIAGNOSIS — L97.522 DIABETIC ULCER OF OTHER PART OF LEFT FOOT ASSOCIATED WITH TYPE 2 DIABETES MELLITUS, WITH FAT LAYER EXPOSED (HCC): ICD-10-CM

## 2021-10-19 DIAGNOSIS — I10 ESSENTIAL HYPERTENSION: ICD-10-CM

## 2021-10-19 DIAGNOSIS — E87.5 HYPERKALEMIA: Primary | ICD-10-CM

## 2021-10-19 DIAGNOSIS — L97.522 DIABETIC ULCER OF OTHER PART OF LEFT FOOT ASSOCIATED WITH TYPE 2 DIABETES MELLITUS, WITH FAT LAYER EXPOSED (HCC): Primary | ICD-10-CM

## 2021-10-19 DIAGNOSIS — Z79.4 CURRENT USE OF INSULIN (HCC): ICD-10-CM

## 2021-10-19 DIAGNOSIS — E11.621 DIABETIC ULCER OF OTHER PART OF LEFT FOOT ASSOCIATED WITH TYPE 2 DIABETES MELLITUS, WITH FAT LAYER EXPOSED (HCC): ICD-10-CM

## 2021-10-19 DIAGNOSIS — A49.8 INFECTION CAUSED BY ENTEROBACTER CLOACAE: ICD-10-CM

## 2021-10-19 DIAGNOSIS — E11.621 DIABETIC ULCER OF OTHER PART OF LEFT FOOT ASSOCIATED WITH TYPE 2 DIABETES MELLITUS, WITH FAT LAYER EXPOSED (HCC): Primary | ICD-10-CM

## 2021-10-19 DIAGNOSIS — N18.4 STAGE 4 CHRONIC KIDNEY DISEASE (HCC): ICD-10-CM

## 2021-10-19 PROCEDURE — 99495 TRANSJ CARE MGMT MOD F2F 14D: CPT | Performed by: NURSE PRACTITIONER

## 2021-10-19 PROCEDURE — 3079F DIAST BP 80-89 MM HG: CPT | Performed by: NURSE PRACTITIONER

## 2021-10-19 PROCEDURE — 3074F SYST BP LT 130 MM HG: CPT | Performed by: NURSE PRACTITIONER

## 2021-10-19 PROCEDURE — 97597 DBRDMT OPN WND 1ST 20 CM/<: CPT | Performed by: NURSE PRACTITIONER

## 2021-10-19 PROCEDURE — 3008F BODY MASS INDEX DOCD: CPT | Performed by: NURSE PRACTITIONER

## 2021-10-19 PROCEDURE — 3078F DIAST BP <80 MM HG: CPT | Performed by: NURSE PRACTITIONER

## 2021-10-19 PROCEDURE — 3077F SYST BP >= 140 MM HG: CPT | Performed by: NURSE PRACTITIONER

## 2021-10-19 RX ORDER — INSULIN DETEMIR 100 [IU]/ML
20 INJECTION, SOLUTION SUBCUTANEOUS DAILY
COMMUNITY
End: 2021-10-22

## 2021-10-19 NOTE — PATIENT INSTRUCTIONS
Please return on:  1 week  Schedule MRI  Patient discharge and wound care instructions  10/19/2021    Cleansing/dressing: In clinic, with each dressing change:    please cleanse the limb, foot, and between toes with soap, water and washcloth.    Dry thorou after regular business hours, please call your family doctor or local emergency room. The treatment plan has been discussed at length between you and your provider. Follow all instructions carefully, it is very important.  If you do not follow all instructi

## 2021-10-19 NOTE — PROGRESS NOTES
Patient ID: Tory Johnson is a 35year old male.     Debridement   Wound 07/02/21 #1 Diabetic Ulcer Foot Left;Plantar    Consent obtained? verbal  Consent given by: patient    Performed by: provider  Debridement type: selective    Pre-debridement dale

## 2021-10-19 NOTE — PROGRESS NOTES
CHIEF COMPLAINT:     Patient presents with:  Wound Care: Patient is here for a wound care follow up. He denies any pain.        HPI:   Information obtained from patient, chart and spouse  7-2-21 INITIAL  Patient is a 34 yo male with diabetes, htn, dyslipide careful when we transition him from the TCC to his new afo. Patient states he uses gold bond diabetic foot moisturizer. No s/s of infection, drainage has slowed. Will utilize endoform with the transfer today.   9-14-21 patient returns today, Patient ruben needs to repeat labs or not. He states he has not had any pain, has not been on his feet more this week however the wound has deteriorated.   There is not any tracking redness or malodor, vss, however wound is larger with callus, blistering and undermining Solution Pen-injector, ADMINISTER 0.75 MG UNDER THE SKIN 1 TIME A WEEK, Disp: 2 mL, Rfl: 5  •  furosemide 20 MG Oral Tab, Take 1 tablet (20 mg total) by mouth daily. , Disp: 30 tablet, Rfl: 0  •  Sodium Zirconium Cyclosilicate (LOKELMA) 10 g Oral Powd Pack, normal for height. Appearance neat and clean. Appears in no acute distress. Well nourished and well developed. Cardiovascular: Left dp/pt palpable. Left lower Extremity free of varicosities, edema. Capillary refill < 3 seconds. Digits are warm.  toenails Routine Completed Larena Nicks, APRN   76/30/28 8001 Cast application Routine Completed Larena Nicks, APRN   10/05/21 1018 Debridement Routine Completed Larena Nicks, APRN   29/29/92 1459 Cast application Routine Completed Larena Nicks, APRN   09/0 0.9  Width (cm): 0.5  Depth (cm): 0.1  Percent debrided: 75%  Surface Area (cm^2): 0.45  Area debrided (cm^2): 0.34  Volume (cm^3): 0.05       Devitalized tissue debrided: biofilm and callus  Instrument(s) utilized: blade and forceps  Bleeding: small  Hemo report to the nearest emergency room to have cast removed. Nutrition and blood sugar control:  Focus on the followin.  Protein: Meats, beans, eggs, milk and yogurt particularly [place] yogurt), tofu, soy nuts, soy protein products (Follow the protein

## 2021-10-19 NOTE — PROGRESS NOTES
Patient ID: Rossy Noel is a 35year old male.     Cast application   Date/Time: 10/19/2021 12:24 PM   Performed by: ARNOL Mayes   Authorized by: ARNOL Mayes   Consent given by: patient  Injury  Location details: left foot  Proced

## 2021-10-19 NOTE — PROGRESS NOTES
[facility] PHARMACIST MEDICATION RECONCILIATION        Kenna Dos Santos MRN [id number]    1980 PCP Aurelia Martin DO       Comments: Medication history completed by the [address] [place] [address] Pharmacist with the patient in the parameters, and potential side effects of medications. Patient confirmed understanding.      Thank you,    Fredo Valdez, PharmD, 10/19/2021, 8:32 AM  [facility]

## 2021-10-19 NOTE — PROGRESS NOTES
Weekly Wound Education Note    Teaching Provided To: Patient  Training Topics: Cleasing and general instructions; Discharge instructions;Dressing;Off-loading  Training Method: Explain/Verbal  Training Response: Patient responds and understands            No

## 2021-10-19 NOTE — PROGRESS NOTES
[facility] 6      HISTORY   CHIEF COMPLAINT: Milagros Motnejo, diabetic foot ulcer left foot, HTN, and DMII.     HPI: Sona Walters is a 35year old male here today for hospital follow up for Hyperkale detemir (LEVEMIR FLEXTOUCH) 100 UNIT/ML Subcutaneous Solution Pen-injector, Inject 20 Units into the skin daily. , Disp: , Rfl:   Nutritional Supplements (Brigitte Hy OR), Take by mouth As Directed.  For wound healing, Disp: , Rfl:   TRULICITY 7.63 MG/5.0SG Subcut Use      Vaping Use: Never used    Alcohol use:  Yes      Alcohol/week: 0.0 - 2.0 standard drinks      Comment: socially    Drug use: No       Imaging & Consults:        Lab Results   Component Value Date/Time    WBC 6.4 04/13/2021 08:33 AM    HGB 10.2 (L) nourished, in no apparent distress, good historian  INTEGUMENTARY: warm, dry, no rashes, cast to LLE  HEENT: atraumatic, normocephalic, sclera white, conjunctivae pink  NECK: supple  CHEST: no chest tenderness  LUNGS: clear to auscultation bilaterally, no hyper/hypotension      Orders Placed This Encounter      insulin detemir (LEVEMIR FLEXTOUCH) 100 UNIT/ML Subcutaneous Solution Pen-injector          Sig: Inject 20 Units into the skin daily.       Nutritional Supplements (ALLIE OR)          Sig: Take by richard documents  ? Reviewed need for follow-up on pending tests, need for follow-up on diagnostic tests and need for follow-up on treatments  ? specialists already aware of assumption/resumption of care  ?  Education given to patient on self-management, independe

## 2021-10-20 ENCOUNTER — TELEPHONE (OUTPATIENT)
Dept: NEPHROLOGY | Facility: CLINIC | Age: 41
End: 2021-10-20

## 2021-10-21 PROBLEM — E11.8 CONTROLLED TYPE 2 DIABETES MELLITUS WITH COMPLICATION, WITHOUT LONG-TERM CURRENT USE OF INSULIN (HCC): Status: ACTIVE | Noted: 2021-10-21

## 2021-10-22 ENCOUNTER — OFFICE VISIT (OUTPATIENT)
Dept: FAMILY MEDICINE CLINIC | Facility: CLINIC | Age: 41
End: 2021-10-22
Payer: COMMERCIAL

## 2021-10-22 VITALS
WEIGHT: 178 LBS | HEART RATE: 74 BPM | DIASTOLIC BLOOD PRESSURE: 80 MMHG | OXYGEN SATURATION: 99 % | HEIGHT: 68 IN | RESPIRATION RATE: 16 BRPM | TEMPERATURE: 98 F | BODY MASS INDEX: 26.98 KG/M2 | SYSTOLIC BLOOD PRESSURE: 132 MMHG

## 2021-10-22 DIAGNOSIS — E11.69 DYSLIPIDEMIA ASSOCIATED WITH TYPE 2 DIABETES MELLITUS (HCC): ICD-10-CM

## 2021-10-22 DIAGNOSIS — Z23 NEED FOR VACCINATION: ICD-10-CM

## 2021-10-22 DIAGNOSIS — E78.1 PURE HYPERGLYCERIDEMIA: ICD-10-CM

## 2021-10-22 DIAGNOSIS — E11.8 CONTROLLED TYPE 2 DIABETES MELLITUS WITH COMPLICATION, WITHOUT LONG-TERM CURRENT USE OF INSULIN (HCC): Primary | ICD-10-CM

## 2021-10-22 DIAGNOSIS — E78.5 DYSLIPIDEMIA: ICD-10-CM

## 2021-10-22 DIAGNOSIS — I10 ESSENTIAL HYPERTENSION: ICD-10-CM

## 2021-10-22 DIAGNOSIS — E78.2 MIXED HYPERLIPIDEMIA: ICD-10-CM

## 2021-10-22 DIAGNOSIS — E78.5 DYSLIPIDEMIA ASSOCIATED WITH TYPE 2 DIABETES MELLITUS (HCC): ICD-10-CM

## 2021-10-22 PROCEDURE — 90686 IIV4 VACC NO PRSV 0.5 ML IM: CPT | Performed by: FAMILY MEDICINE

## 2021-10-22 PROCEDURE — 3079F DIAST BP 80-89 MM HG: CPT | Performed by: FAMILY MEDICINE

## 2021-10-22 PROCEDURE — 3008F BODY MASS INDEX DOCD: CPT | Performed by: FAMILY MEDICINE

## 2021-10-22 PROCEDURE — 3075F SYST BP GE 130 - 139MM HG: CPT | Performed by: FAMILY MEDICINE

## 2021-10-22 PROCEDURE — 99215 OFFICE O/P EST HI 40 MIN: CPT | Performed by: FAMILY MEDICINE

## 2021-10-22 PROCEDURE — 90471 IMMUNIZATION ADMIN: CPT | Performed by: FAMILY MEDICINE

## 2021-10-22 RX ORDER — INSULIN DETEMIR 100 [IU]/ML
15 INJECTION, SOLUTION SUBCUTANEOUS DAILY
Qty: 3 ML | Refills: 5 | Status: SHIPPED | OUTPATIENT
Start: 2021-10-22 | End: 2022-01-20

## 2021-10-22 RX ORDER — EZETIMIBE AND SIMVASTATIN 10; 80 MG/1; MG/1
1 TABLET ORAL DAILY
Qty: 90 TABLET | Refills: 3 | Status: SHIPPED | OUTPATIENT
Start: 2021-10-22

## 2021-10-22 RX ORDER — AMLODIPINE BESYLATE 10 MG/1
10 TABLET ORAL DAILY
Qty: 90 TABLET | Refills: 5 | Status: SHIPPED | OUTPATIENT
Start: 2021-10-22 | End: 2022-01-20

## 2021-10-22 NOTE — PROGRESS NOTES
Patient presents with:  Diabetes: DM management  Immunization/Injection: flu shot- consent signed      HPI:  He has cast left foot for plantar ulcer. He is seeing wound care and podiatry. He is getting weekly cast change. Will be getting MRI.  He is getting decreasing steadily. His breakfast blood glucose range is generally <70 mg/dl. An ACE inhibitor/angiotensin II receptor blocker is being taken. He sees a podiatrist (hx of left foot osteomyelitis- s/p surgery. .Eye exam is current.    Hypertension  This is fever, chills and fatigue. No distress. HENT: Negative for hearing loss, congestion, sore throat   Eyes: Negative for pain and visual disturbance. Respiratory: Negative for cough, chest tightness, shortness of breath and wheezing.     Cardiovascular: Neg GLASSES     Past Surgical History:   Procedure Laterality Date   • ARTHROTOMY/EXPLORE/TREAT KNEE JOINT Left     DONE TWICE   • CHOLECYSTECTOMY     • DEBRIDE WOUND Left     HEEL   • FOOT SURGERY       Family History   Problem Relation Age of Onset   • Diabe 10/31/2018  01/15/2021                            10/22/2021      Influenza             11/12/2010  10/28/2011  12/06/2012      Influenza Vaccine, Preserv Free                          11/21/2012      Pneumovax 23          12/08/2017      TDAP nephro next month     Mixed hyperlipidemia  Continue on ASA, statin and ezetimibe   Lipid panel much better. Needs improvement on HDL       Dyslipidemia associated with type 2 diabetes mellitus (HCC)  - Ezetimibe-Simvastatin 10-80 MG Oral Tab;  Take 1 table FLEXTOUCH) 100 UNIT/ML Subcutaneous Solution Pen-injector 3 mL 5     Sig: Inject 15 Units into the skin daily. • ezetimibe-simvastatin 10-80 MG Oral Tab 90 tablet 3     Sig: Take 1 tablet by mouth daily.    • amLODIPine 10 MG Oral Tab 90 tablet 5     Sig: your blood sugar, have meals and snacks with:   · Vegetables  · Fruits  · Lean meats or other healthy proteins  · Whole grains  · Low-fat or nonfat dairy products    Be physically active  Being active helps your body use insulin to turn food into energy. ketosis. Ask your provider, nurse, or diabetes educator for more information. Take care of yourself  When you have diabetes, you may be more likely to develop other health problems. They include foot, eye, heart, nerve, and kidney problems.  By [employer] more   Know where you can get help. You can try the following:  · Support. Ask family and friends to support your efforts to take care of yourself. Or look for a diabetes support group locally or on the Internet. (Check the Connect with Others on www. diabe

## 2021-10-23 ENCOUNTER — HOSPITAL ENCOUNTER (OUTPATIENT)
Dept: MRI IMAGING | Age: 41
Discharge: HOME | End: 2021-10-23
Attending: NURSE PRACTITIONER
Payer: COMMERCIAL

## 2021-10-23 DIAGNOSIS — E11.621 DIABETIC ULCER OF OTHER PART OF LEFT FOOT ASSOCIATED WITH TYPE 2 DIABETES MELLITUS, WITH FAT LAYER EXPOSED (HCC): ICD-10-CM

## 2021-10-23 DIAGNOSIS — L97.522 DIABETIC ULCER OF OTHER PART OF LEFT FOOT ASSOCIATED WITH TYPE 2 DIABETES MELLITUS, WITH FAT LAYER EXPOSED (HCC): ICD-10-CM

## 2021-10-23 DIAGNOSIS — Z79.4 CURRENT USE OF INSULIN (HCC): ICD-10-CM

## 2021-10-23 PROCEDURE — 73721 MRI JNT OF LWR EXTRE W/O DYE: CPT | Performed by: NURSE PRACTITIONER

## 2021-10-24 NOTE — PATIENT INSTRUCTIONS
Managing Type 1 Diabetes    Diabetes is a lifelong (chronic) condition. Managing your diabetes means making some changes that may be hard. Your healthcare provider, nurse, diabetes educator, and others can help you.    Managing type 1 diabetes means tashia or activity on most days. The 30 minutes doesn't need to happen all at once. Exercising for 10-minute periods during the day works just fine.    Monitor your blood sugar  Your healthcare provider will give you instructions about checking your blood sugar at will have a physical exam. This includes checking your feet. Your provider will also check your blood pressure and weight. · Other exams. Also have complete eye, foot, and dental exams at least once a year.  Always take your shoes off at each visit so your 6/1/2019  © 3159-3207 [employer]. All rights reserved. This information is not intended as a substitute for professional medical care. Always follow your healthcare professional's instructions.

## 2021-10-26 ENCOUNTER — OFFICE VISIT (OUTPATIENT)
Dept: WOUND CARE | Facility: HOSPITAL | Age: 41
End: 2021-10-26
Attending: NURSE PRACTITIONER
Payer: COMMERCIAL

## 2021-10-26 VITALS
HEART RATE: 104 BPM | RESPIRATION RATE: 16 BRPM | SYSTOLIC BLOOD PRESSURE: 136 MMHG | DIASTOLIC BLOOD PRESSURE: 79 MMHG | TEMPERATURE: 99 F

## 2021-10-26 DIAGNOSIS — L97.522 DIABETIC ULCER OF OTHER PART OF LEFT FOOT ASSOCIATED WITH TYPE 2 DIABETES MELLITUS, WITH FAT LAYER EXPOSED (HCC): Primary | ICD-10-CM

## 2021-10-26 DIAGNOSIS — Z79.4 CURRENT USE OF INSULIN (HCC): ICD-10-CM

## 2021-10-26 DIAGNOSIS — E11.621 DIABETIC ULCER OF OTHER PART OF LEFT FOOT ASSOCIATED WITH TYPE 2 DIABETES MELLITUS, WITH FAT LAYER EXPOSED (HCC): Primary | ICD-10-CM

## 2021-10-26 PROCEDURE — 99214 OFFICE O/P EST MOD 30 MIN: CPT | Performed by: NURSE PRACTITIONER

## 2021-10-26 NOTE — PROGRESS NOTES
Patient ID: Darrell Monreal is a 35year old male.     Cast application   Date/Time: 10/26/2021 9:58 AM   Performed by: ARNOL Robert   Authorized by: ARNOL Robert   Consent given by: patient  Procedure  Immobilization: cast  Cast type:

## 2021-10-26 NOTE — PROGRESS NOTES
CHIEF COMPLAINT:     Patient presents with:  Wound Care: Patient is here for a wound care follow up. He denies any current pain or new wound concerns.        HPI:   Information obtained from patient, chart and spouse  7-2-21 INITIAL  Patient is a 34 yo male symptoms to call clinic.   10-12-21 patient returns today, his culture did grow out enterobacter and staph, due to the deterioration from last week I started him on po levaquin to cover both.  Patient is to get labs drawn today, he has tolerated the abx thu tablet (500 mg total) by mouth 2 (two) times daily with meals. , Disp: 180 tablet, Rfl: 1  •  insulin detemir (LEVEMIR FLEXTOUCH) 100 UNIT/ML Subcutaneous Solution Pen-injector, Inject 15 Units into the skin daily. , Disp: 3 mL, Rfl: 5  •  ezetimibe-simvasta reviewed. Appears stated age, well groomed. Constitutional: Bp wnl for patient. Pulse Regular and wnl for patient. Respirations easy and unlabored. Temperature wnl. Weight normal for height. Appearance neat and clean. Appears in no acute distress.  Well n Firm;Pink Spongy;Pink   Wound Bed Granulation (%) 75 % 75 %   Wound Bed Slough (%) 25 % 25 %   Wound Odor None None   Posadas Scale Grade 4 Grade 4   Shoe Type Lisbet post op shoe;Peg liner TCC total contact cast       Inactive Orders   Date Order Priority S Genet Gamez, ARNOL   07/02/21 0907 Debridement Routine Completed Dennise Sloan APRN           ASSESSMENT AND PLAN:      1. Diabetic ulcer of other part of left foot associated with type 2 diabetes mellitus, with fat layer exposed ([facility] [address]    2.  Current use of insu fortified dairy products, liver, fortified cereals  4. Zinc: Fortified cereals, red meats, seafood  5.  Consider supplementing with Adalberto by [employer] (These are essential branch chain amino acids that help with tissue building and wound healing) and take

## 2021-10-26 NOTE — PROGRESS NOTES
Weekly Wound Education Note    Teaching Provided To: Patient  Training Topics: Cleasing and general instructions; Compression; Discharge instructions;Dressing;Edema control;Off-loading  Training Method: Explain/Verbal  Training Response: Patient responds and

## 2021-10-26 NOTE — PATIENT INSTRUCTIONS
Please return on:  1 week  Patient discharge and wound care instructions  10/26/2021      Cleansing/dressing: In clinic, with each dressing change:    please cleanse the limb, foot, and between toes with soap, water and washcloth. Dry thoroughly.     Daniel hours, please call your family doctor or local emergency room. The treatment plan has been discussed at length between you and your provider. Follow all instructions carefully, it is very important.  If you do not follow all instructions you are at risk of

## 2021-10-28 ENCOUNTER — OFFICE VISIT (OUTPATIENT)
Dept: NEPHROLOGY | Facility: CLINIC | Age: 41
End: 2021-10-28
Payer: COMMERCIAL

## 2021-10-28 VITALS
BODY MASS INDEX: 27.6 KG/M2 | DIASTOLIC BLOOD PRESSURE: 72 MMHG | OXYGEN SATURATION: 100 % | RESPIRATION RATE: 16 BRPM | SYSTOLIC BLOOD PRESSURE: 118 MMHG | WEIGHT: 182.13 LBS | HEART RATE: 101 BPM | HEIGHT: 68 IN

## 2021-10-28 DIAGNOSIS — I10 ESSENTIAL HYPERTENSION: ICD-10-CM

## 2021-10-28 DIAGNOSIS — N18.4 CKD (CHRONIC KIDNEY DISEASE) STAGE 4, GFR 15-29 ML/MIN (HCC): Primary | ICD-10-CM

## 2021-10-28 DIAGNOSIS — E87.5 HYPERKALEMIA: ICD-10-CM

## 2021-10-28 PROCEDURE — 3078F DIAST BP <80 MM HG: CPT | Performed by: INTERNAL MEDICINE

## 2021-10-28 PROCEDURE — 99214 OFFICE O/P EST MOD 30 MIN: CPT | Performed by: INTERNAL MEDICINE

## 2021-10-28 PROCEDURE — 3008F BODY MASS INDEX DOCD: CPT | Performed by: INTERNAL MEDICINE

## 2021-10-28 PROCEDURE — 3074F SYST BP LT 130 MM HG: CPT | Performed by: INTERNAL MEDICINE

## 2021-10-28 NOTE — PROGRESS NOTES
Nephrology Progress Note      Armand Hernandez is a 35year old male.     HPI:   Patient presents with:  Hospital F/U: CKD, HTN, and elevated K       was seen in the nephrology clinic today in follow-up for management of chronic kidney disease (Active prior to today's visit):  Current Outpatient Medications   Medication Sig Dispense Refill   • metFORMIN 500 MG Oral Tab Take 1 tablet (500 mg total) by mouth 2 (two) times daily with meals.  180 tablet 1   • insulin detemir (LEVEMIR FLEXTOUCH) 100 U rhythm, S1, S2 normal, no murmur or rub  Lungs: Clear without wheezes, rales, rhonchi. Extremities: Without clubbing, cyanosis or edema.   Boot and dressing in place left foot  Neurologic: Alert and oriented, normal affect, cranial nerves grossly intact, LEONEL None Seen 07/06/2018    NORIS None Seen 07/06/2018     ASSESSMENT/PLAN:     #1.  Chronic kidney disease stage III-this has been longstanding since renal biopsy in 2018 or so and at that time he was found to have severe ATN and I suspect the [facility]

## 2021-11-02 ENCOUNTER — OFFICE VISIT (OUTPATIENT)
Dept: WOUND CARE | Facility: HOSPITAL | Age: 41
End: 2021-11-02
Attending: NURSE PRACTITIONER
Payer: COMMERCIAL

## 2021-11-02 VITALS
RESPIRATION RATE: 14 BRPM | DIASTOLIC BLOOD PRESSURE: 84 MMHG | TEMPERATURE: 98 F | SYSTOLIC BLOOD PRESSURE: 137 MMHG | HEART RATE: 103 BPM

## 2021-11-02 DIAGNOSIS — L97.522 DIABETIC ULCER OF OTHER PART OF LEFT FOOT ASSOCIATED WITH TYPE 2 DIABETES MELLITUS, WITH FAT LAYER EXPOSED (HCC): Primary | ICD-10-CM

## 2021-11-02 DIAGNOSIS — E11.621 DIABETIC ULCER OF OTHER PART OF LEFT FOOT ASSOCIATED WITH TYPE 2 DIABETES MELLITUS, WITH FAT LAYER EXPOSED (HCC): Primary | ICD-10-CM

## 2021-11-02 DIAGNOSIS — Z79.4 CURRENT USE OF INSULIN (HCC): ICD-10-CM

## 2021-11-02 PROCEDURE — 11042 DBRDMT SUBQ TIS 1ST 20SQCM/<: CPT | Performed by: NURSE PRACTITIONER

## 2021-11-02 NOTE — PATIENT INSTRUCTIONS
Please return on:  1 week with Dennise. Make an appointment with Dr. Kristopher Avitia for his next available appointment on a Monday    Patient discharge and wound care instructions  11/2/2021      Cleansing/dressing:   In clinic, with each dressing change:    please c please contact the wound center [facility] @ [phone] If after regular business hours, please call your family doctor or local emergency room. The treatment plan has been discussed at length between you and your provider.  Follow all instructions c

## 2021-11-02 NOTE — PROGRESS NOTES
Weekly Wound Education Note    Teaching Provided To: Patient  Training Topics: Cleasing and general instructions; Discharge instructions;Dressing;Edema control;Off-loading  Training Method: Explain/Verbal  Training Response: Patient responds and understands

## 2021-11-02 NOTE — PROGRESS NOTES
CHIEF COMPLAINT:     Patient presents with:  Wound Care: Patient is here for a follow up visit for a left heel wound.       HPI:   Information obtained from patient, chart and spouse  7-2-21 INITIAL  Patient is a 36 yo male with diabetes, htn, dyslipidemia, 10-12-21 patient returns today, his culture did grow out enterobacter and staph, due to the deterioration from last week I started him on po levaquin to cover both.  Patient is to get labs drawn today, he has tolerated the abx thus far and has just 3 days there is a blister in the periwound with serous fluid beneath. This was debrided again.   I d/w patient that since this is continually happening and mri negative for osteo, I would like him to see Dr. David Waters partner that comes to wound clinic, Dr. Iván Krause HPI for pertinent positives, otherwise 10 pt ROS negative. Review of Systems       HISTORY:   Past medical, surgical, family and social history updated where appropriate.       PHYSICAL EXAM:   /84   Pulse 103   Temp 98.3 °F (36.8 °C)   Resp 14    Es Date First Assessed/Time First Assessed: 07/02/21 0805    Wound Number (Wound Clinic Only): #1  Primary Wound Type: Diabetic Ulcer  Location: Foot  Wound Location Orientation: Left;Plantar      Assessments 7/2/2021  8:08 AM 11/2/2021  9:27 AM   Wound [place] 96/64/54 9745 Cast application Routine Completed ARNOL Dennis   14/69/69 5418 Cast application Routine Completed ARNOL Dennis   77/02/77 8493 Cast application Routine Completed ARNOL Dennis   36/46/18 5947 Cast application Routine other part of left foot associated with type 2 diabetes mellitus, with fat layer exposed ([address]    2. Current use of insulin (HCC)      Risks, benefits, and alternatives of current treatment plan discussed in detail. Questions and concerns addressed.  Red f fortified cereals  4. Zinc: Fortified cereals, red meats, seafood  5. Consider supplementing with Adalberto by [employer] (These are essential branch chain amino acids that help with tissue building and wound healing) and take 2 packets/day.  you can order onl

## 2021-11-02 NOTE — PROGRESS NOTES
Patient ID: Hong Dover is a 35year old male.     Cast application   Date/Time: 11/2/2021 11:30 AM   Performed by: ARNOL Guevara   Authorized by: ARNOL Guevara   Consent given by: patient  Injury  Location details: left foot  Procedu

## 2021-11-02 NOTE — PROGRESS NOTES
Patient ID: Mary Catherine is a 35year old male.     Debridement   Wound 07/02/21 #1 Diabetic Ulcer Foot Left;Plantar    Consent obtained? verbal  Consent given by: patient    Performed by: provider  Debridement type: surgical  Level of debridement:

## 2021-11-09 ENCOUNTER — OFFICE VISIT (OUTPATIENT)
Dept: WOUND CARE | Facility: HOSPITAL | Age: 41
End: 2021-11-09
Attending: NURSE PRACTITIONER
Payer: COMMERCIAL

## 2021-11-09 VITALS
DIASTOLIC BLOOD PRESSURE: 93 MMHG | HEART RATE: 98 BPM | TEMPERATURE: 98 F | SYSTOLIC BLOOD PRESSURE: 136 MMHG | RESPIRATION RATE: 18 BRPM

## 2021-11-09 DIAGNOSIS — E11.621 DIABETIC ULCER OF OTHER PART OF LEFT FOOT ASSOCIATED WITH TYPE 2 DIABETES MELLITUS, WITH FAT LAYER EXPOSED (HCC): Primary | ICD-10-CM

## 2021-11-09 DIAGNOSIS — L97.522 DIABETIC ULCER OF OTHER PART OF LEFT FOOT ASSOCIATED WITH TYPE 2 DIABETES MELLITUS, WITH FAT LAYER EXPOSED (HCC): Primary | ICD-10-CM

## 2021-11-09 DIAGNOSIS — Z79.4 CURRENT USE OF INSULIN (HCC): ICD-10-CM

## 2021-11-09 PROCEDURE — 99214 OFFICE O/P EST MOD 30 MIN: CPT | Performed by: NURSE PRACTITIONER

## 2021-11-09 NOTE — PROGRESS NOTES
CHIEF COMPLAINT:     Patient presents with:  Wound Care: Patient is here for a follow up of left foot wound. HPI:   Information obtained from patient, chart and spouse  7-2-21 INITIAL  Patient is a 36 yo male with diabetes, htn, dyslipidemia, and pad. patient returns today, his culture did grow out enterobacter and staph, due to the deterioration from last week I started him on po levaquin to cover both.  Patient is to get labs drawn today, he has tolerated the abx thus far and has just 3 days left no si blister in the periwound with serous fluid beneath. This was debrided again.   I d/w patient that since this is continually happening and mri negative for osteo, I would like him to see Dr. Lala First partner that comes to wound clinic, Dr. Merrill Anderson, for an op (20 mg total) by mouth daily. , Disp: 30 tablet, Rfl: 0  •  Sodium Zirconium Cyclosilicate (LOKELMA) 10 g Oral Powd Pack, Take 10 g by mouth daily. , Disp: 30 each, Rfl: 0  •  aspirin 81 MG Oral Tab EC, Take 81 mg by mouth daily. , Disp: , Rfl:   •  HUMALOG K characteristics and images   Psychiatric:  Judgment and insight intact. Alert and oriented times 3. No evidence of depression, anxiety, or agitation. Calm, cooperative, and communicative. Appropriate interactions and affect.   EDEMA:   [place] [address] Routine Completed Rafia Trevino, APRN   10/05/21 1018 Debridement Routine Completed Rafia Trevino, APRN   24/60/79 1711 Cast application Routine Completed Rafia Trevino, APRN   52/44/36 8090 Cast application Routine Completed Rafia Trevino, APRN   09/0 preparing to see the patient (eg, review notes),  seeing the patient, obtaining and/or reviewing separately obtained history, performing a medically appropriate examination and/or evaluation, counseling and educating the patient, documenting in the record. Should you experience any significant changes in your wound(s) or have any questions regarding your home care instructions please contact the wound center [facility] @ [phone] If after regular business hours, please call your family doctor or

## 2021-11-09 NOTE — PATIENT INSTRUCTIONS
1 week with Dennise. Monday, Nov 22 Dr. Denis Gorman    Patient discharge and wound care instructions  11/9/2021        Cleansing/dressing: In clinic, with each dressing change:    please cleanse the limb, foot, and between toes with soap, water and washcloth. discussed at length between you and your provider. Follow all instructions carefully, it is very important. If you do not follow all instructions you are at risk of your wound not healing, infection, possible loss of limb and even loss of life.

## 2021-11-10 RX ORDER — SODIUM ZIRCONIUM CYCLOSILICATE 10 G/10G
POWDER, FOR SUSPENSION ORAL
Qty: 30 EACH | Refills: 0 | Status: SHIPPED | OUTPATIENT
Start: 2021-11-10 | End: 2021-12-13

## 2021-11-10 RX ORDER — FUROSEMIDE 20 MG/1
TABLET ORAL
Qty: 90 TABLET | Refills: 1 | Status: SHIPPED | OUTPATIENT
Start: 2021-11-10

## 2021-11-16 ENCOUNTER — OFFICE VISIT (OUTPATIENT)
Dept: WOUND CARE | Facility: HOSPITAL | Age: 41
End: 2021-11-16
Attending: NURSE PRACTITIONER
Payer: COMMERCIAL

## 2021-11-16 VITALS
HEART RATE: 99 BPM | TEMPERATURE: 98 F | RESPIRATION RATE: 14 BRPM | SYSTOLIC BLOOD PRESSURE: 131 MMHG | DIASTOLIC BLOOD PRESSURE: 85 MMHG

## 2021-11-16 DIAGNOSIS — L97.522 DIABETIC ULCER OF OTHER PART OF LEFT FOOT ASSOCIATED WITH TYPE 2 DIABETES MELLITUS, WITH FAT LAYER EXPOSED (HCC): Primary | ICD-10-CM

## 2021-11-16 DIAGNOSIS — Z79.4 CURRENT USE OF INSULIN (HCC): ICD-10-CM

## 2021-11-16 DIAGNOSIS — E11.621 DIABETIC ULCER OF OTHER PART OF LEFT FOOT ASSOCIATED WITH TYPE 2 DIABETES MELLITUS, WITH FAT LAYER EXPOSED (HCC): Primary | ICD-10-CM

## 2021-11-16 PROCEDURE — 97597 DBRDMT OPN WND 1ST 20 CM/<: CPT | Performed by: NURSE PRACTITIONER

## 2021-11-16 NOTE — PROGRESS NOTES
Patient ID: Rossy Noel is a 35year old male.     Debridement   Wound 07/02/21 #1 Diabetic Ulcer Foot Left;Plantar    Consent obtained? verbal  Consent given by: patient    Performed by: provider  Debridement type: selective    Pre-debridement dale

## 2021-11-16 NOTE — PROGRESS NOTES
CHIEF COMPLAINT:     Patient presents with:  Wound Care: Patient is here for a follow up visit for a left heel wound.   Arrives with drainage noted on outside of cast.      HPI:   Information obtained from patient, chart and spouse  7-2-21 INITIAL  Patient podiatry (dr Anne-Marie Bolanos or his partner dr. Blanco Olmos in clinic) patient is agreeable. 11-2-21 patient returns today, he followed up with nephrology last week for his ckd and hyperkalemia.  Today again there is a blister in the periwound with serous fluid avila Outpatient Medications:   •  LOKELMA 10 g Oral Powd Pack, TAKE ONE PACKET BY MOUTH DAILY AS DIRECTED, Disp: 30 each, Rfl: 0  •  FUROSEMIDE 20 MG Oral Tab, TAKE 1 TABLET(20 MG) BY MOUTH DAILY, Disp: 90 tablet, Rfl: 1  •  metFORMIN 500 MG Oral Tab, Take 1 ta (H) 70 - 99 mg/dL Final       Vital signs reviewed. Appears stated age, well groomed. Constitutional:  Bp wnl for patient. Pulse Regular and wnl for patient. Respirations easy and unlabored. Temperature wnl. Weight normal for height.  Appearance neat and (Comment);Edema; Maceration Callous; Maceration;Moist   Wound Granulation Tissue Firm;Pink Pink;Spongy   Wound Bed Granulation (%) 75 % 100 %   Wound Bed Slough (%) 25 % —   Wound Odor None None   Posadas Scale Grade 4 Grade 4   Shoe Type Lisbet post op shoe;P TOMORROW.  PATIENT GETTING ORTHOTICS FITTING TODAY.   53/20/25 0885 Cast application Routine Completed Isaac Thompson MD   61/86/97 1060 Cast application Routine Discontinued Isaac Thompson MD   83/90/99 7665 Cast application Routine Completed Nathalia debridement only. DISCHARGE:      Patient Instructions   Please return on: Monday with Dr. Brian Sim  Tuesday Nov 30 with Dennise    Patient discharge and wound care instructions  11/16/2021  Slava Tobin          Cleansing/dressing:   In clinic, with hours, please call your family doctor or local emergency room. The treatment plan has been discussed at length between you and your provider. Follow all instructions carefully, it is very important.  If you do not follow all instructions you are at risk of

## 2021-11-16 NOTE — PROGRESS NOTES
Patient ID: Constance Salcedo is a 35year old male.     Cast application   Date/Time: 11/16/2021 12:34 PM   Performed by: Fredrich Gowers, APRN   Authorized by: Fredrich Gowers, APRN   Consent given by: patient  Procedure  Immobilization: cast  Cast type:

## 2021-11-16 NOTE — PATIENT INSTRUCTIONS
Please return on: Monday with Dr. John Martini  Tuesday Nov 30 with Dennise    Patient discharge and wound care instructions  11/16/2021  Jenise Tobin          Cleansing/dressing:   In clinic, with each dressing change:    please cleanse the limb, foot, and room. The treatment plan has been discussed at length between you and your provider. Follow all instructions carefully, it is very important.  If you do not follow all instructions you are at risk of your wound not healing, infection, possible loss of limb

## 2021-11-22 ENCOUNTER — OFFICE VISIT (OUTPATIENT)
Dept: WOUND CARE | Facility: HOSPITAL | Age: 41
End: 2021-11-22
Attending: STUDENT IN AN ORGANIZED HEALTH CARE EDUCATION/TRAINING PROGRAM
Payer: COMMERCIAL

## 2021-11-22 VITALS
SYSTOLIC BLOOD PRESSURE: 151 MMHG | TEMPERATURE: 98 F | DIASTOLIC BLOOD PRESSURE: 94 MMHG | HEART RATE: 102 BPM | RESPIRATION RATE: 16 BRPM

## 2021-11-22 DIAGNOSIS — E11.621 DIABETIC ULCER OF OTHER PART OF LEFT FOOT ASSOCIATED WITH TYPE 2 DIABETES MELLITUS, WITH FAT LAYER EXPOSED (HCC): ICD-10-CM

## 2021-11-22 DIAGNOSIS — L97.422 SKIN ULCER OF LEFT HEEL WITH FAT LAYER EXPOSED (HCC): Primary | ICD-10-CM

## 2021-11-22 DIAGNOSIS — L97.522 DIABETIC ULCER OF OTHER PART OF LEFT FOOT ASSOCIATED WITH TYPE 2 DIABETES MELLITUS, WITH FAT LAYER EXPOSED (HCC): ICD-10-CM

## 2021-11-22 DIAGNOSIS — I73.9 PAD (PERIPHERAL ARTERY DISEASE) (HCC): ICD-10-CM

## 2021-11-22 PROCEDURE — 11042 DBRDMT SUBQ TIS 1ST 20SQCM/<: CPT | Performed by: STUDENT IN AN ORGANIZED HEALTH CARE EDUCATION/TRAINING PROGRAM

## 2021-11-22 NOTE — PROGRESS NOTES
Patient ID: Darrell Monreal is a 35year old male.     Cast application   Date/Time: 11/22/2021 12:12 PM   Performed by: Lauren Apple DPM   Authorized by: Lauren Apple DPM   Procedure  Immobilization: cast  Cast type: total contact  Post-pr

## 2021-11-22 NOTE — PROGRESS NOTES
Patient ID: Mylene Duarte is a 35year old male.     Debridement   Wound 07/02/21 #1 Diabetic Ulcer Foot Left;Plantar    Consent obtained? verbal  Consent given by: patient    Performed by: provider  Debridement type: surgical  Level of debridement:

## 2021-11-22 NOTE — PATIENT INSTRUCTIONS
Wound Care Patient Instructions/Details      Wound number: 1   Wound description: Left plantar medial    Wound Cleaning and Dressings:  May shower with protection. Protect wound and dressing. 1. Wash your hands with soap and water.  Remove old dressing,

## 2021-11-22 NOTE — PROGRESS NOTES
Subjective   Marlon Hoffmann is a 35year old male. Patient presents with:  Wound Care: Follow up for left foot wound. No complaints at this time.        HPI    Patient is a pleasant 22-year-old diabetic male who presents for consultation regarding 5 % 10/19/21 0912   Wound Bed Slough (%) 25 % 10/26/21 0928   Wound Odor None 11/22/21 0928   Shape Bridged, blisters to periwound.  10/19/21 0912   Undermining 1-5 o'clock @ 0.2 10/05/21 0950   Posadas Scale Grade 4 11/16/21 0948   Shoe Type TCC total conta Encounter      Debridement    -Patient examined, chart history reviewed. -Inspected ulcer site to left plantar heel--site is superficial and stable in appearance.   Sharply debrided periwound hyperkeratotic tissue and ulceration with a 15 blade, into and t following with Dennise until testing is complete.       Sarah Bustillos DPM, 11/22/21, 10:01 AM

## 2021-11-23 ENCOUNTER — APPOINTMENT (OUTPATIENT)
Dept: WOUND CARE | Facility: HOSPITAL | Age: 41
End: 2021-11-23
Attending: NURSE PRACTITIONER
Payer: COMMERCIAL

## 2021-11-30 ENCOUNTER — OFFICE VISIT (OUTPATIENT)
Dept: WOUND CARE | Facility: HOSPITAL | Age: 41
End: 2021-11-30
Attending: NURSE PRACTITIONER
Payer: COMMERCIAL

## 2021-11-30 ENCOUNTER — HOSPITAL ENCOUNTER (OUTPATIENT)
Dept: ULTRASOUND IMAGING | Facility: HOSPITAL | Age: 41
Discharge: HOME | End: 2021-11-30
Attending: STUDENT IN AN ORGANIZED HEALTH CARE EDUCATION/TRAINING PROGRAM
Payer: COMMERCIAL

## 2021-11-30 ENCOUNTER — HOSPITAL ENCOUNTER (OUTPATIENT)
Dept: GENERAL RADIOLOGY | Facility: HOSPITAL | Age: 41
Discharge: HOME | End: 2021-11-30
Attending: STUDENT IN AN ORGANIZED HEALTH CARE EDUCATION/TRAINING PROGRAM
Payer: COMMERCIAL

## 2021-11-30 VITALS
HEART RATE: 93 BPM | SYSTOLIC BLOOD PRESSURE: 153 MMHG | TEMPERATURE: 98 F | DIASTOLIC BLOOD PRESSURE: 80 MMHG | RESPIRATION RATE: 16 BRPM

## 2021-11-30 DIAGNOSIS — E11.621 DIABETIC ULCER OF OTHER PART OF LEFT FOOT ASSOCIATED WITH TYPE 2 DIABETES MELLITUS, WITH FAT LAYER EXPOSED (HCC): ICD-10-CM

## 2021-11-30 DIAGNOSIS — L97.522 DIABETIC ULCER OF OTHER PART OF LEFT FOOT ASSOCIATED WITH TYPE 2 DIABETES MELLITUS, WITH FAT LAYER EXPOSED (HCC): ICD-10-CM

## 2021-11-30 DIAGNOSIS — L97.422 SKIN ULCER OF LEFT HEEL WITH FAT LAYER EXPOSED (HCC): ICD-10-CM

## 2021-11-30 DIAGNOSIS — I73.9 PAD (PERIPHERAL ARTERY DISEASE) (HCC): ICD-10-CM

## 2021-11-30 DIAGNOSIS — Z79.4 CURRENT USE OF INSULIN (HCC): ICD-10-CM

## 2021-11-30 DIAGNOSIS — L97.422 SKIN ULCER OF LEFT HEEL WITH FAT LAYER EXPOSED (HCC): Primary | ICD-10-CM

## 2021-11-30 PROCEDURE — 93925 LOWER EXTREMITY STUDY: CPT | Performed by: STUDENT IN AN ORGANIZED HEALTH CARE EDUCATION/TRAINING PROGRAM

## 2021-11-30 PROCEDURE — 73630 X-RAY EXAM OF FOOT: CPT | Performed by: STUDENT IN AN ORGANIZED HEALTH CARE EDUCATION/TRAINING PROGRAM

## 2021-11-30 PROCEDURE — 99213 OFFICE O/P EST LOW 20 MIN: CPT | Performed by: NURSE PRACTITIONER

## 2021-11-30 NOTE — PROGRESS NOTES
Weekly Wound Education Note    Teaching Provided To: Patient  Training Topics: Cleasing and general instructions;Dressing; Discharge instructions; Off-loading  Training Method: Explain/Verbal  Training Response: Patient responds and understands        Notes:

## 2021-11-30 NOTE — PATIENT INSTRUCTIONS
Please return on:  Tuesday with [facility]  December 13 with Dr. Saleem Hoffman    Patient discharge and wound care instructions  11/30/2021  Daniel Tobin        Cleansing/dressing:   In clinic, with each dressing change:    please cleanse the limb, foot, and bet The treatment plan has been discussed at length between you and your provider. Follow all instructions carefully, it is very important.  If you do not follow all instructions you are at risk of your wound not healing, infection, possible loss of limb and ev

## 2021-11-30 NOTE — PROGRESS NOTES
CHIEF COMPLAINT:     Patient presents with:  Wound Care: Patients is here for a follow up.  Patients denies any issues       HPI:   Information obtained from patient, chart and spouse  7-2-21 INITIAL  Patient is a 34 yo male with diabetes, htn, dyslipidemia Block-here in clinic) patient is agreeable. 11-2-21 patient returns today, he followed up with nephrology last week for his ckd and hyperkalemia. Today again there is a blister in the periwound with serous fluid beneath. This was debrided again.   I d/w p opinion on surgical options. Palpable prominence is noted clinically. weightbearing x-rays and arterial studies were ordered and once these are complete patient would be candidate for partial cacanectomy/ostectomy.   Patient's xray and arterial study ar Pulse 93   Temp 97.8 °F (36.6 °C)   Resp 16    Estimated body mass index is 27.69 kg/m² as calculated from the following:    Height as of 10/28/21: 68\". Weight as of 10/28/21: 182 lb 2 oz (82.6 kg).      Lab Results   Component Value Date    BUN 54 (H Assessments 7/2/2021  8:08 AM 11/30/2021  9:53 AM   Wound Image        Drainage Amount Large Large   Drainage Description Serosanguineous; Other (Comment) Serosanguineous   Treatments — Honey Gel   Dressing — 2x2s; Tape   Wound Length (cm) 5 cm 0.1 cm   Wo 95/30/11 1690 Cast application Routine Completed Earney Cables, APRN   31/19/13 4089 Cast application Routine Completed Earney Cables, APRN   62/93/93 1614 Cast application Routine Completed Earney Cables, APRN   28/51/07 3879 Cast application Routine patient, documenting in the record.     DISCHARGE:      Patient Instructions   Please return on:  Tuesday with [facility]  December 13 with Dr. Iván Krause    Patient discharge and wound care instructions  11/30/2021  Martínez Tobin        Cleansing/dressing:  I regular business hours, please call your family doctor or local emergency room. The treatment plan has been discussed at length between you and your provider. Follow all instructions carefully, it is very important.  If you do not follow all instructions yo

## 2021-12-03 ENCOUNTER — OFFICE VISIT (OUTPATIENT)
Dept: PODIATRY CLINIC | Facility: CLINIC | Age: 41
End: 2021-12-03
Payer: COMMERCIAL

## 2021-12-03 DIAGNOSIS — E11.51 UNCONTROLLED TYPE 2 DIABETES MELLITUS WITH DIABETIC PERIPHERAL ANGIOPATHY WITHOUT GANGRENE (HCC): ICD-10-CM

## 2021-12-03 DIAGNOSIS — E11.65 UNCONTROLLED TYPE 2 DIABETES MELLITUS WITH DIABETIC PERIPHERAL ANGIOPATHY WITHOUT GANGRENE (HCC): ICD-10-CM

## 2021-12-03 DIAGNOSIS — L97.421 HEEL ULCER, LEFT, LIMITED TO BREAKDOWN OF SKIN (HCC): Primary | ICD-10-CM

## 2021-12-03 PROCEDURE — 99213 OFFICE O/P EST LOW 20 MIN: CPT | Performed by: STUDENT IN AN ORGANIZED HEALTH CARE EDUCATION/TRAINING PROGRAM

## 2021-12-05 ENCOUNTER — TELEPHONE (OUTPATIENT)
Dept: PODIATRY CLINIC | Facility: CLINIC | Age: 41
End: 2021-12-05

## 2021-12-05 DIAGNOSIS — M89.8X7 EXOSTOSIS OF BONE OF FOOT: ICD-10-CM

## 2021-12-05 DIAGNOSIS — L97.429 HEEL ULCERATION, LEFT, WITH UNSPECIFIED SEVERITY (HCC): Primary | ICD-10-CM

## 2021-12-05 NOTE — TELEPHONE ENCOUNTER
Procedure: Partial calcanectomy, left foot  CPT code: 52169  Length of Surgery: 60 minutes  Any Instruments: Podiatry tray, small power, mini fluoro  Call patient: when convenient  Anesthesia: Choice  Location: [address] [place] [address] Road: none  Pacemaker:

## 2021-12-05 NOTE — H&P
[facility], [facility] Podiatry  History and Physical    Mary Catherine is a 35year old male. Patient presents with:   Follow - Up: left foot cast. follow-up from wound care        HPI:     Patient is a pleasant 53-year-old male with past medical history of tablet 5   • Nutritional Supplements (ALLIE OR) Take by mouth As Directed.  For wound healing     • TRULICITY 0.65 HK/4.1MH Subcutaneous Solution Pen-injector ADMINISTER 0.75 MG UNDER THE SKIN 1 TIME A WEEK 2 mL 5   • aspirin 81 MG Oral Tab EC Take 81 mg by clinic. Physical exam from wound care consultation visit: imported from visit from 11/22:    Derm:  Full-thickness ulceration present to left plantar heel measuring 0.4 x 0.5 x 0.2 cm. Serosanguineous drainage is present.   Hyperkeratotic tissue is pres Patient does have plantar prominence at region of skin breakdown.  -Reviewed arterial duplex with patient--there is heavy calcification of posterior tibial artery, however patient does have triphasic and biphasic waveforms to left lower extremity with no a

## 2021-12-06 NOTE — TELEPHONE ENCOUNTER
Called patient this date and scheduled surgery on 12/28/21 at [facility] at 11:15 a.m. Patient relates he has an appointment with PCP tomorrow to be cleared for surgery. Message sent to PCP this date.   Patient was told I would contact him in 1-2 day

## 2021-12-07 ENCOUNTER — OFFICE VISIT (OUTPATIENT)
Dept: WOUND CARE | Facility: HOSPITAL | Age: 41
End: 2021-12-07
Attending: NURSE PRACTITIONER
Payer: COMMERCIAL

## 2021-12-07 ENCOUNTER — OFFICE VISIT (OUTPATIENT)
Dept: FAMILY MEDICINE CLINIC | Facility: CLINIC | Age: 41
End: 2021-12-07
Payer: COMMERCIAL

## 2021-12-07 VITALS
SYSTOLIC BLOOD PRESSURE: 122 MMHG | DIASTOLIC BLOOD PRESSURE: 78 MMHG | BODY MASS INDEX: 29 KG/M2 | HEART RATE: 74 BPM | OXYGEN SATURATION: 99 % | WEIGHT: 188 LBS | TEMPERATURE: 98 F | RESPIRATION RATE: 16 BRPM

## 2021-12-07 VITALS
TEMPERATURE: 98 F | DIASTOLIC BLOOD PRESSURE: 87 MMHG | SYSTOLIC BLOOD PRESSURE: 123 MMHG | HEART RATE: 101 BPM | RESPIRATION RATE: 18 BRPM

## 2021-12-07 DIAGNOSIS — L97.422 SKIN ULCER OF LEFT HEEL WITH FAT LAYER EXPOSED (HCC): Primary | ICD-10-CM

## 2021-12-07 DIAGNOSIS — I73.9 PAD (PERIPHERAL ARTERY DISEASE) (HCC): ICD-10-CM

## 2021-12-07 DIAGNOSIS — Z79.4 CURRENT USE OF INSULIN (HCC): ICD-10-CM

## 2021-12-07 DIAGNOSIS — L97.522 DIABETIC ULCER OF OTHER PART OF LEFT FOOT ASSOCIATED WITH TYPE 2 DIABETES MELLITUS, WITH FAT LAYER EXPOSED (HCC): ICD-10-CM

## 2021-12-07 DIAGNOSIS — E11.621 DIABETIC ULCER OF OTHER PART OF LEFT FOOT ASSOCIATED WITH TYPE 2 DIABETES MELLITUS, WITH FAT LAYER EXPOSED (HCC): ICD-10-CM

## 2021-12-07 DIAGNOSIS — Z01.818 PRE-OP EXAMINATION: Primary | ICD-10-CM

## 2021-12-07 PROCEDURE — 3074F SYST BP LT 130 MM HG: CPT | Performed by: NURSE PRACTITIONER

## 2021-12-07 PROCEDURE — 3074F SYST BP LT 130 MM HG: CPT | Performed by: FAMILY MEDICINE

## 2021-12-07 PROCEDURE — 3079F DIAST BP 80-89 MM HG: CPT | Performed by: NURSE PRACTITIONER

## 2021-12-07 PROCEDURE — 99214 OFFICE O/P EST MOD 30 MIN: CPT | Performed by: NURSE PRACTITIONER

## 2021-12-07 PROCEDURE — 99243 OFF/OP CNSLTJ NEW/EST LOW 30: CPT | Performed by: FAMILY MEDICINE

## 2021-12-07 PROCEDURE — 3078F DIAST BP <80 MM HG: CPT | Performed by: FAMILY MEDICINE

## 2021-12-07 NOTE — PROGRESS NOTES
Timothy Murray is a 35year old male. Patient presents with:  Pre-Op Exam: L foot surgery- possibly shaving bone.  Surgery schedued on 12/28/21 w/     HPI:   Van Hoover presents for preoperative evaluation and clearance for Partial calcanectomy, ARTHROTOMY/EXPLORE/TREAT KNEE JOINT Left     DONE TWICE   • CHOLECYSTECTOMY     • DEBRIDE WOUND Left     HEEL   • FOOT SURGERY        Family History   Problem Relation Age of Onset   • Diabetes Father    • Heart Disorder Father    • Diabetes Mother    • Hy TDAP                  11/17/2017      EXAM:   /78   Pulse 74   Temp 98.2 °F (36.8 °C) (Temporal)   Resp 16   Wt 188 lb (85.3 kg)   SpO2 99%   BMI 28.59 kg/m²   GENERAL: well developed, well nourished, in no apparent distress  SKIN: no rashes, no michela intended as peer to peer communication. It is written in medical language and may contain abbreviations or verbiage that are unfamiliar. It may appear blunt or direct.  Medical documents are intended to carry relevant information, facts as evident, and the

## 2021-12-07 NOTE — PROGRESS NOTES
CHIEF COMPLAINT:     Patient presents with:  Wound Care: Pt here for follow up. He states no new concerns or pain.       HPI:   Information obtained from patient, chart and spouse  7-2-21 INITIAL  Patient is a 34 yo male with diabetes, htn, dyslipidemia, an in clinic) patient is agreeable. 11-2-21 patient returns today, he followed up with nephrology last week for his ckd and hyperkalemia. Today again there is a blister in the periwound with serous fluid beneath. This was debrided again.   I d/w patient that surgical options. Palpable prominence is noted clinically. weightbearing x-rays and arterial studies were ordered and once these are complete patient would be candidate for partial cacanectomy/ostectomy.   Patient's xray and arterial study are both sche 1  •  insulin detemir (LEVEMIR FLEXTOUCH) 100 UNIT/ML Subcutaneous Solution Pen-injector, Inject 15 Units into the skin daily. , Disp: 3 mL, Rfl: 5  •  ezetimibe-simvastatin 10-80 MG Oral Tab, Take 1 tablet by mouth daily. , Disp: 90 tablet, Rfl: 3  •  amLOD developed. Cardiovascular: Left dp palpable. Left lower Extremity free of varicosities, edema. Capillary refill < 3 seconds. Digits are warm. toenails are wnl for color, thickness and hygeine. The left digits are clawed.   The left has scarring and foot Authorizing Provider   63/74/81 5690 Cast application Routine Completed Sangeeta Lewis ZACHARY  [facility]   11/22/21 1001 Debridement Routine Completed Sangeeta Lewis DPM   74/55/10 8784 Cast application Routine Completed ARNOL Mayes   11/16/21 1147 Debr Discontinued Jose Wagner MD   85/43/34 5823 Cast application Routine Completed ARNOL Rachel   68/22/84 9883 Cast application Routine Completed ARNOL Rachel   43/34/07 3837 Cast application Routine Completed ARNOL Rachel   07/02 heel>darco with peg offloading as outer shoe. Nutrition and blood sugar control:  Focus on the followin.  Protein: Meats, beans, eggs, milk and yogurt particularly [place] yogurt), tofu, soy nuts, soy protein products (Follow the protein handout in

## 2021-12-07 NOTE — PATIENT INSTRUCTIONS
One day before surgery or procedure:  • While still eating your usual diet, take all your diabetes medicines, including insulin up to  any taken with dinner.   • Insulin Instructions:  -At bedtime, do not take any short-acting insulin (Humalog, Novolog, Api

## 2021-12-13 ENCOUNTER — TCOM VISIT (OUTPATIENT)
Dept: WOUND CARE | Facility: HOSPITAL | Age: 41
End: 2021-12-13
Attending: STUDENT IN AN ORGANIZED HEALTH CARE EDUCATION/TRAINING PROGRAM
Payer: COMMERCIAL

## 2021-12-13 VITALS
TEMPERATURE: 98 F | DIASTOLIC BLOOD PRESSURE: 82 MMHG | SYSTOLIC BLOOD PRESSURE: 124 MMHG | RESPIRATION RATE: 18 BRPM | HEART RATE: 92 BPM

## 2021-12-13 DIAGNOSIS — E11.621 DIABETIC ULCER OF OTHER PART OF LEFT FOOT ASSOCIATED WITH TYPE 2 DIABETES MELLITUS, WITH FAT LAYER EXPOSED (HCC): ICD-10-CM

## 2021-12-13 DIAGNOSIS — L97.522 DIABETIC ULCER OF OTHER PART OF LEFT FOOT ASSOCIATED WITH TYPE 2 DIABETES MELLITUS, WITH FAT LAYER EXPOSED (HCC): ICD-10-CM

## 2021-12-13 DIAGNOSIS — I73.9 PAD (PERIPHERAL ARTERY DISEASE) (HCC): ICD-10-CM

## 2021-12-13 DIAGNOSIS — L84 PRE-ULCERATIVE CALLUSES: Primary | ICD-10-CM

## 2021-12-13 PROCEDURE — 99213 OFFICE O/P EST LOW 20 MIN: CPT | Performed by: STUDENT IN AN ORGANIZED HEALTH CARE EDUCATION/TRAINING PROGRAM

## 2021-12-13 NOTE — PROGRESS NOTES
Patient ID: Shirin Phillips is a 35year old male.     Cast application   Date/Time: 12/13/2021 12:12 PM   Performed by: Nova Portillo RN   Authorized by: Claudia Alves DPM   Consent given by: patient  Site marked: site marked  Timeout: Immedi

## 2021-12-13 NOTE — PROGRESS NOTES
.Weekly Wound Education Note    Teaching Provided To: Patient  Training Topics: Cleasing and general instructions; Discharge instructions;Dressing  Training Method: Explain/Verbal;Written  Training Response: Patient responds and understands        Notes:  Wo

## 2021-12-13 NOTE — PROGRESS NOTES
Subjective   Fleming Slipper is a 35year old male. Patient presents with:  Wound Care: Patients is here for a follow up.  Patients denies any issues       HPI    Patient is a pleasant 57-year-old male with past medical history of diabetes and CKD w No edema noted.     Neuro: Decreased protective sensation of the lower extremities.     Musculoskeletal: Status post partial calcanectomy to left lower extremity. Prominence noted to the plantar calcaneus at site of callus of left lower extremity.   [place] on 12/28.  -He is scheduled to receive TCOM evaluation later today. Pending results, may consider vascular referral.  -Patient did complete arterial duplex which demonstrated triphasic and biphasic waveforms to left lower extremity.   The left posterior ti

## 2021-12-13 NOTE — PATIENT INSTRUCTIONS
Wound Care Patient Instructions/Details      Wound number: All wounds  Wound description: Left plantar heel    Wound Cleaning and Dressings:  May shower with protection. Protect wound and dressing. 1. Wash your hands with soap and water.  Remove old lio

## 2021-12-14 ENCOUNTER — APPOINTMENT (OUTPATIENT)
Dept: WOUND CARE | Facility: HOSPITAL | Age: 41
End: 2021-12-14
Attending: NURSE PRACTITIONER
Payer: COMMERCIAL

## 2021-12-14 RX ORDER — SODIUM ZIRCONIUM CYCLOSILICATE 10 G/10G
POWDER, FOR SUSPENSION ORAL
Qty: 30 EACH | Refills: 5 | Status: SHIPPED | OUTPATIENT
Start: 2021-12-14

## 2021-12-14 NOTE — PROGRESS NOTES
Site 1 Left leg, above knee, medial  Baseline 84  o2  170  Site 2 Left leg, below knee, medial  Baseline 49 o2    57  Site 3 Left foot, dorsal, proximal     Baseline  79 o2    151  Site 4 left foot, dorsal, distal      Baseline 85 o2  130

## 2021-12-20 RX ORDER — ACETAMINOPHEN 500 MG
1000 TABLET ORAL ONCE
Status: CANCELLED | OUTPATIENT
Start: 2021-12-20 | End: 2021-12-20

## 2021-12-21 ENCOUNTER — OFFICE VISIT (OUTPATIENT)
Dept: WOUND CARE | Facility: HOSPITAL | Age: 41
End: 2021-12-21
Attending: NURSE PRACTITIONER
Payer: COMMERCIAL

## 2021-12-21 VITALS
DIASTOLIC BLOOD PRESSURE: 85 MMHG | SYSTOLIC BLOOD PRESSURE: 149 MMHG | HEART RATE: 98 BPM | TEMPERATURE: 98 F | RESPIRATION RATE: 16 BRPM

## 2021-12-21 DIAGNOSIS — Z79.4 CURRENT USE OF INSULIN (HCC): ICD-10-CM

## 2021-12-21 DIAGNOSIS — E11.621 DIABETIC ULCER OF OTHER PART OF LEFT FOOT ASSOCIATED WITH TYPE 2 DIABETES MELLITUS, WITH FAT LAYER EXPOSED (HCC): Primary | ICD-10-CM

## 2021-12-21 DIAGNOSIS — L89.892: ICD-10-CM

## 2021-12-21 DIAGNOSIS — L97.522 DIABETIC ULCER OF OTHER PART OF LEFT FOOT ASSOCIATED WITH TYPE 2 DIABETES MELLITUS, WITH FAT LAYER EXPOSED (HCC): Primary | ICD-10-CM

## 2021-12-21 PROCEDURE — 99214 OFFICE O/P EST MOD 30 MIN: CPT | Performed by: NURSE PRACTITIONER

## 2021-12-21 NOTE — PROGRESS NOTES
CHIEF COMPLAINT:     Patient presents with:  Wound Care: Patient is here for a wound care follow up. He denies any pain or new wound concerns.        HPI:   Information obtained from patient, chart and spouse  7-2-21 INITIAL  Patient is a 34 yo male with di or his partner dr. Gabriel Chilel in clinic) patient is agreeable. 11-2-21 patient returns today, he followed up with nephrology last week for his ckd and hyperkalemia. Today again there is a blister in the periwound with serous fluid beneath.   This was debri saw dr. Kiera Del Angel for opinion on surgical options. Palpable prominence is noted clinically. weightbearing x-rays and arterial studies were ordered and once these are complete patient would be candidate for partial cacanectomy/ostectomy.   Patient's xray and s/s of infection, no c/o pain.    MEDICATIONS:     Current Outpatient Medications:   •  LOKELMA 10 g Oral Powd Pack, MIX AND DRINK 1 PACKET BY MOUTH DAILY AS DIRECTED (Patient taking differently: daily.), Disp: 30 each, Rfl: 5  •  FUROSEMIDE 20 MG Oral Tab, 12/21/2021 126 (H) 70 - 99 mg/dL Final   12/07/2021 136 (H) 70 - 99 mg/dL Final   11/22/2021 149 (H) 70 - 99 mg/dL Final       Vital signs reviewed. Appears stated age, well groomed. Constitutional:  Bp wnl for patient.  Pulse Regular and wnl for patien edges —   Dagmar-wound Assessment Dry; Other (Comment);Edema; Maceration Dry;Callous   Wound Granulation Tissue Firm;Pink —   Wound Bed Granulation (%) 75 % —   Wound Bed Slough (%) 25 % —   Wound Odor None None   Posadas Scale Grade 4 Grade 4   Shoe Type Lisbet DRESSING Routine Completed Halle Saldivar MD     - Dressing:    Endoform collagen     - Dressing:    Silver alginate     - Dressing:    Kerramax/Super absorbent     - Additional Wound Dressing Information:    TOTAL CONTACT CAST TO BE APPLIED TOMORROW.  PA Patient (or parent) agrees to plan. I spent 30 minutes with the patient.  This time included:    preparing to see the patient (eg, review notes),  seeing the patient, obtaining and/or reviewing separately obtained history, performing a medically approp infection may include the following: • Increase in redness • Red \"streaks\" from wound • Increase in swelling • Fever • Unusual odor • Change in the amount of wound drainage     Should you experience any significant changes in your wound(s) or have any qu

## 2021-12-21 NOTE — PATIENT INSTRUCTIONS
Please return on:   Monday with Dr. Carissa Grajeda as previously scheduled  Tuesday surgery. Patient discharge and wound care instructions  12/21/2021  [facility]        Cleansing/dressing:   In clinic, with each dressing change:    please cleanse the l please call your family doctor or local emergency room. The treatment plan has been discussed at length between you and your provider. Follow all instructions carefully, it is very important.  If you do not follow all instructions you are at risk of your wo

## 2021-12-21 NOTE — PROGRESS NOTES
Patient ID: Donny Martinez is a 35year old male.     Cast application   Date/Time: 12/21/2021 10:26 AM   Performed by: Caryle Countryman, APRN   Authorized by: Caryle Countryman, APRN   Consent given by: patient  Timeout: Immediately prior to procedure a t

## 2021-12-21 NOTE — PROGRESS NOTES
.Weekly Wound Education Note    Teaching Provided To: Patient  Training Topics: Off-loading; Discharge instructions;Dressing  Training Method: Explain/Verbal;Written  Training Response: Patient responds and understands         Silver foam to wounds.   CLINT ap

## 2021-12-22 ENCOUNTER — TELEPHONE (OUTPATIENT)
Dept: FAMILY MEDICINE CLINIC | Facility: CLINIC | Age: 41
End: 2021-12-22

## 2021-12-22 ENCOUNTER — LABORATORY ENCOUNTER (OUTPATIENT)
Dept: LAB | Age: 41
End: 2021-12-22
Attending: FAMILY MEDICINE
Payer: COMMERCIAL

## 2021-12-22 DIAGNOSIS — Z01.818 PRE-OP EXAMINATION: ICD-10-CM

## 2021-12-22 PROCEDURE — 36415 COLL VENOUS BLD VENIPUNCTURE: CPT

## 2021-12-22 PROCEDURE — 85025 COMPLETE CBC W/AUTO DIFF WBC: CPT

## 2021-12-22 PROCEDURE — 80053 COMPREHEN METABOLIC PANEL: CPT

## 2021-12-22 NOTE — TELEPHONE ENCOUNTER
Received diabetic eye exam report from [facility]. Pt completed eye exam on 12/10/21. Hypertensive diabetic retinopathy in both eyes. Flow sheet updated in patients chart. Report sent to scan.

## 2021-12-26 ENCOUNTER — LAB ENCOUNTER (OUTPATIENT)
Dept: LAB | Facility: HOSPITAL | Age: 41
End: 2021-12-26
Attending: STUDENT IN AN ORGANIZED HEALTH CARE EDUCATION/TRAINING PROGRAM
Payer: COMMERCIAL

## 2021-12-26 DIAGNOSIS — M89.8X7 EXOSTOSIS OF BONE OF FOOT: ICD-10-CM

## 2021-12-26 DIAGNOSIS — L97.429 HEEL ULCERATION, LEFT, WITH UNSPECIFIED SEVERITY (HCC): ICD-10-CM

## 2021-12-27 ENCOUNTER — TELEPHONE (OUTPATIENT)
Dept: WOUND CARE | Facility: HOSPITAL | Age: 41
End: 2021-12-27

## 2021-12-27 ENCOUNTER — TELEPHONE (OUTPATIENT)
Dept: FAMILY MEDICINE CLINIC | Facility: CLINIC | Age: 41
End: 2021-12-27

## 2021-12-27 ENCOUNTER — OFFICE VISIT (OUTPATIENT)
Dept: WOUND CARE | Facility: HOSPITAL | Age: 41
End: 2021-12-27
Attending: STUDENT IN AN ORGANIZED HEALTH CARE EDUCATION/TRAINING PROGRAM
Payer: COMMERCIAL

## 2021-12-27 ENCOUNTER — ANESTHESIA EVENT (OUTPATIENT)
Dept: SURGERY | Facility: HOSPITAL | Age: 41
End: 2021-12-27
Payer: COMMERCIAL

## 2021-12-27 VITALS
TEMPERATURE: 98 F | HEART RATE: 56 BPM | RESPIRATION RATE: 16 BRPM | SYSTOLIC BLOOD PRESSURE: 154 MMHG | DIASTOLIC BLOOD PRESSURE: 92 MMHG

## 2021-12-27 DIAGNOSIS — L84 PRE-ULCERATIVE CALLUSES: Primary | ICD-10-CM

## 2021-12-27 DIAGNOSIS — E11.8 CONTROLLED TYPE 2 DIABETES MELLITUS WITH COMPLICATION, WITHOUT LONG-TERM CURRENT USE OF INSULIN (HCC): ICD-10-CM

## 2021-12-27 DIAGNOSIS — N18.4 STAGE 4 CHRONIC KIDNEY DISEASE (HCC): ICD-10-CM

## 2021-12-27 PROCEDURE — 99214 OFFICE O/P EST MOD 30 MIN: CPT

## 2021-12-27 PROCEDURE — 82962 GLUCOSE BLOOD TEST: CPT | Performed by: STUDENT IN AN ORGANIZED HEALTH CARE EDUCATION/TRAINING PROGRAM

## 2021-12-27 NOTE — TELEPHONE ENCOUNTER
Calling because needs 24 hour update for patient's surgery tomorrow at [facility]. Please call the patient to make sure nothing has changed with the patient.  Presurgical note from 12-7-21 will need to be addended to reflect no changes and that patient is stil

## 2021-12-27 NOTE — PATIENT INSTRUCTIONS
-Calcaneal planing procedure scheduled for tomorrow.  -Patient to remain minimally weightbearing to left lower extremity with Darco offloading shoe until surgery.  -We will plan to follow-up with patient on 12/31 for postop visit.

## 2021-12-27 NOTE — TELEPHONE ENCOUNTER
Norina Lombard states that [facility] requires a 24 hour update on pt that nothing has changed since he was seen for pre-op 12/7/2021 and then the note needs to be addended by Dr. Marci Choudhury.  Asked Norina Lombard if Dr. Gary Nguyen can do 24 hour update as pt was seen in-offic

## 2021-12-27 NOTE — PROGRESS NOTES
Weekly Wound Education Note    Teaching Provided To: Patient  Training Topics: Cleasing and general instructions; Discharge instructions; Off-loading  Training Method: Explain/Verbal  Training Response: Patient responds and understands        Notes: Healed.

## 2021-12-27 NOTE — H&P
[facility], [facility] Podiatry  History and Physical    Kenna Dos Santos is a 35year old male. Patient presents with:  Wound Care: follow-up. Denies pain or concerns.  TCC to be removed, surgery tomorrow        HPI:     Patient is a pleasant 20-year-old male Pen-injector Inject 15 Units into the skin daily. 3 mL 5   • ezetimibe-simvastatin 10-80 MG Oral Tab Take 1 tablet by mouth daily. 90 tablet 3   • amLODIPine 10 MG Oral Tab Take 1 tablet (10 mg total) by mouth daily.  90 tablet 5   • Nutritional Supplements arthritis, back pain      EXAM:   BP (!) 154/92   Pulse 56   Temp 97.6 °F (36.4 °C)   Resp 16   GENERAL: well developed, well nourished, in no apparent distress  EXTREMITIES:   1. Integument: Normal skin temperature and turgor.   Site of prior ulceration le Amber Devi MD on 11/30/2021 at 11:37 AM             ASSESSMENT AND PLAN:   Diagnoses and all orders for this visit:    Pre-ulcerative calluses    Stage 4 chronic kidney disease ([facility] [address]    Controlled type 2 diabetes mellitus with complication, without long-term cu surgery, need for amputation, loss of limb, or loss of life. Patient understands the risks and would like to proceed with surgery.   Patient also understands that surgery will require period of nonweightbearing while incision site heals.  -Surgery is sched

## 2021-12-28 ENCOUNTER — APPOINTMENT (OUTPATIENT)
Dept: GENERAL RADIOLOGY | Facility: HOSPITAL | Age: 41
End: 2021-12-28
Attending: STUDENT IN AN ORGANIZED HEALTH CARE EDUCATION/TRAINING PROGRAM
Payer: COMMERCIAL

## 2021-12-28 ENCOUNTER — ANESTHESIA (OUTPATIENT)
Dept: SURGERY | Facility: HOSPITAL | Age: 41
End: 2021-12-28
Payer: COMMERCIAL

## 2021-12-28 ENCOUNTER — APPOINTMENT (OUTPATIENT)
Dept: WOUND CARE | Facility: HOSPITAL | Age: 41
End: 2021-12-28
Attending: NURSE PRACTITIONER
Payer: COMMERCIAL

## 2021-12-28 ENCOUNTER — HOSPITAL ENCOUNTER (OUTPATIENT)
Facility: HOSPITAL | Age: 41
Setting detail: HOSPITAL OUTPATIENT SURGERY
Discharge: HOME | End: 2021-12-28
Attending: STUDENT IN AN ORGANIZED HEALTH CARE EDUCATION/TRAINING PROGRAM | Admitting: STUDENT IN AN ORGANIZED HEALTH CARE EDUCATION/TRAINING PROGRAM
Payer: COMMERCIAL

## 2021-12-28 VITALS
HEART RATE: 87 BPM | DIASTOLIC BLOOD PRESSURE: 81 MMHG | OXYGEN SATURATION: 100 % | SYSTOLIC BLOOD PRESSURE: 134 MMHG | RESPIRATION RATE: 15 BRPM | WEIGHT: 189.13 LBS | HEIGHT: 68 IN | TEMPERATURE: 97 F | BODY MASS INDEX: 28.66 KG/M2

## 2021-12-28 DIAGNOSIS — M89.8X7 EXOSTOSIS OF BONE OF FOOT: ICD-10-CM

## 2021-12-28 DIAGNOSIS — L97.429 HEEL ULCERATION, LEFT, WITH UNSPECIFIED SEVERITY (HCC): ICD-10-CM

## 2021-12-28 LAB
GLUCOSE BLD-MCNC: 104 MG/DL (ref 70–99)
GLUCOSE BLD-MCNC: 174 MG/DL (ref 70–99)

## 2021-12-28 PROCEDURE — 0QBM0ZZ EXCISION OF LEFT TARSAL, OPEN APPROACH: ICD-10-PCS | Performed by: STUDENT IN AN ORGANIZED HEALTH CARE EDUCATION/TRAINING PROGRAM

## 2021-12-28 PROCEDURE — 73650 X-RAY EXAM OF HEEL: CPT | Performed by: STUDENT IN AN ORGANIZED HEALTH CARE EDUCATION/TRAINING PROGRAM

## 2021-12-28 PROCEDURE — 28120 PART REMOVAL OF ANKLE/HEEL: CPT | Performed by: STUDENT IN AN ORGANIZED HEALTH CARE EDUCATION/TRAINING PROGRAM

## 2021-12-28 PROCEDURE — 76942 ECHO GUIDE FOR BIOPSY: CPT | Performed by: STUDENT IN AN ORGANIZED HEALTH CARE EDUCATION/TRAINING PROGRAM

## 2021-12-28 PROCEDURE — 76000 FLUOROSCOPY <1 HR PHYS/QHP: CPT | Performed by: STUDENT IN AN ORGANIZED HEALTH CARE EDUCATION/TRAINING PROGRAM

## 2021-12-28 RX ORDER — ONDANSETRON 2 MG/ML
INJECTION INTRAMUSCULAR; INTRAVENOUS AS NEEDED
Status: DISCONTINUED | OUTPATIENT
Start: 2021-12-28 | End: 2021-12-28 | Stop reason: SURG

## 2021-12-28 RX ORDER — SODIUM CHLORIDE 9 MG/ML
INJECTION, SOLUTION INTRAVENOUS CONTINUOUS
Status: DISCONTINUED | OUTPATIENT
Start: 2021-12-28 | End: 2021-12-28

## 2021-12-28 RX ORDER — MIDAZOLAM HYDROCHLORIDE 1 MG/ML
INJECTION INTRAMUSCULAR; INTRAVENOUS AS NEEDED
Status: DISCONTINUED | OUTPATIENT
Start: 2021-12-28 | End: 2021-12-28 | Stop reason: SURG

## 2021-12-28 RX ORDER — HYDROCODONE BITARTRATE AND ACETAMINOPHEN 10; 325 MG/1; MG/1
2 TABLET ORAL AS NEEDED
Status: DISCONTINUED | OUTPATIENT
Start: 2021-12-28 | End: 2021-12-28

## 2021-12-28 RX ORDER — ACETAMINOPHEN 500 MG
1000 TABLET ORAL ONCE AS NEEDED
Status: DISCONTINUED | OUTPATIENT
Start: 2021-12-28 | End: 2021-12-28

## 2021-12-28 RX ORDER — SODIUM CHLORIDE, SODIUM LACTATE, POTASSIUM CHLORIDE, CALCIUM CHLORIDE 600; 310; 30; 20 MG/100ML; MG/100ML; MG/100ML; MG/100ML
INJECTION, SOLUTION INTRAVENOUS CONTINUOUS
Status: DISCONTINUED | OUTPATIENT
Start: 2021-12-28 | End: 2021-12-28

## 2021-12-28 RX ORDER — PHENYLEPHRINE HCL 10 MG/ML
VIAL (ML) INJECTION AS NEEDED
Status: DISCONTINUED | OUTPATIENT
Start: 2021-12-28 | End: 2021-12-28 | Stop reason: SURG

## 2021-12-28 RX ORDER — NALOXONE HYDROCHLORIDE 0.4 MG/ML
80 INJECTION, SOLUTION INTRAMUSCULAR; INTRAVENOUS; SUBCUTANEOUS AS NEEDED
Status: DISCONTINUED | OUTPATIENT
Start: 2021-12-28 | End: 2021-12-28

## 2021-12-28 RX ORDER — HYDROMORPHONE HYDROCHLORIDE 1 MG/ML
0.4 INJECTION, SOLUTION INTRAMUSCULAR; INTRAVENOUS; SUBCUTANEOUS EVERY 5 MIN PRN
Status: DISCONTINUED | OUTPATIENT
Start: 2021-12-28 | End: 2021-12-28

## 2021-12-28 RX ORDER — DEXAMETHASONE SODIUM PHOSPHATE 10 MG/ML
INJECTION, SOLUTION INTRAMUSCULAR; INTRAVENOUS AS NEEDED
Status: DISCONTINUED | OUTPATIENT
Start: 2021-12-28 | End: 2021-12-28 | Stop reason: SURG

## 2021-12-28 RX ORDER — HYDROCODONE BITARTRATE AND ACETAMINOPHEN 10; 325 MG/1; MG/1
1 TABLET ORAL AS NEEDED
Status: DISCONTINUED | OUTPATIENT
Start: 2021-12-28 | End: 2021-12-28

## 2021-12-28 RX ORDER — LIDOCAINE HYDROCHLORIDE 10 MG/ML
INJECTION, SOLUTION EPIDURAL; INFILTRATION; INTRACAUDAL; PERINEURAL AS NEEDED
Status: DISCONTINUED | OUTPATIENT
Start: 2021-12-28 | End: 2021-12-28 | Stop reason: SURG

## 2021-12-28 RX ORDER — MIDAZOLAM HYDROCHLORIDE 1 MG/ML
1 INJECTION INTRAMUSCULAR; INTRAVENOUS EVERY 5 MIN PRN
Status: DISCONTINUED | OUTPATIENT
Start: 2021-12-28 | End: 2021-12-28

## 2021-12-28 RX ORDER — METOCLOPRAMIDE HYDROCHLORIDE 5 MG/ML
10 INJECTION INTRAMUSCULAR; INTRAVENOUS AS NEEDED
Status: DISCONTINUED | OUTPATIENT
Start: 2021-12-28 | End: 2021-12-28

## 2021-12-28 RX ORDER — INSULIN ASPART 100 [IU]/ML
INJECTION, SOLUTION INTRAVENOUS; SUBCUTANEOUS ONCE
Status: DISCONTINUED | OUTPATIENT
Start: 2021-12-28 | End: 2021-12-28

## 2021-12-28 RX ORDER — ACETAMINOPHEN 500 MG
1000 TABLET ORAL ONCE
COMMUNITY
End: 2022-01-26 | Stop reason: ALTCHOICE

## 2021-12-28 RX ORDER — HYDROCODONE BITARTRATE AND ACETAMINOPHEN 5; 325 MG/1; MG/1
1-2 TABLET ORAL EVERY 4 HOURS PRN
Qty: 20 TABLET | Refills: 0 | Status: SHIPPED | OUTPATIENT
Start: 2021-12-28 | End: 2022-01-26 | Stop reason: DRUGHIGH

## 2021-12-28 RX ORDER — ONDANSETRON 2 MG/ML
4 INJECTION INTRAMUSCULAR; INTRAVENOUS AS NEEDED
Status: DISCONTINUED | OUTPATIENT
Start: 2021-12-28 | End: 2021-12-28

## 2021-12-28 RX ORDER — MEPERIDINE HYDROCHLORIDE 25 MG/ML
12.5 INJECTION INTRAMUSCULAR; INTRAVENOUS; SUBCUTANEOUS AS NEEDED
Status: DISCONTINUED | OUTPATIENT
Start: 2021-12-28 | End: 2021-12-28

## 2021-12-28 RX ORDER — BUPIVACAINE HYDROCHLORIDE 5 MG/ML
INJECTION, SOLUTION EPIDURAL; INTRACAUDAL AS NEEDED
Status: DISCONTINUED | OUTPATIENT
Start: 2021-12-28 | End: 2021-12-28 | Stop reason: SURG

## 2021-12-28 RX ORDER — CLINDAMYCIN HYDROCHLORIDE 300 MG/1
300 CAPSULE ORAL 3 TIMES DAILY
Qty: 30 CAPSULE | Refills: 0 | Status: SHIPPED | OUTPATIENT
Start: 2021-12-28 | End: 2022-01-07

## 2021-12-28 RX ORDER — LABETALOL HYDROCHLORIDE 5 MG/ML
5 INJECTION, SOLUTION INTRAVENOUS EVERY 5 MIN PRN
Status: DISCONTINUED | OUTPATIENT
Start: 2021-12-28 | End: 2021-12-28

## 2021-12-28 RX ORDER — DEXTROSE MONOHYDRATE 25 G/50ML
50 INJECTION, SOLUTION INTRAVENOUS
Status: DISCONTINUED | OUTPATIENT
Start: 2021-12-28 | End: 2021-12-28 | Stop reason: HOSPADM

## 2021-12-28 RX ORDER — CEFAZOLIN SODIUM/WATER 2 G/20 ML
2 SYRINGE (ML) INTRAVENOUS ONCE
Status: COMPLETED | OUTPATIENT
Start: 2021-12-28 | End: 2021-12-28

## 2021-12-28 RX ADMIN — LIDOCAINE HYDROCHLORIDE 30 MG: 10 INJECTION, SOLUTION EPIDURAL; INFILTRATION; INTRACAUDAL; PERINEURAL at 11:55:00

## 2021-12-28 RX ADMIN — PHENYLEPHRINE HCL 100 MCG: 10 MG/ML VIAL (ML) INJECTION at 12:26:00

## 2021-12-28 RX ADMIN — PHENYLEPHRINE HCL 100 MCG: 10 MG/ML VIAL (ML) INJECTION at 12:15:00

## 2021-12-28 RX ADMIN — DEXAMETHASONE SODIUM PHOSPHATE 2 MG: 10 INJECTION, SOLUTION INTRAMUSCULAR; INTRAVENOUS at 11:52:00

## 2021-12-28 RX ADMIN — ONDANSETRON 4 MG: 2 INJECTION INTRAMUSCULAR; INTRAVENOUS at 12:45:00

## 2021-12-28 RX ADMIN — MIDAZOLAM HYDROCHLORIDE 2 MG: 1 INJECTION INTRAMUSCULAR; INTRAVENOUS at 11:47:00

## 2021-12-28 RX ADMIN — SODIUM CHLORIDE, SODIUM LACTATE, POTASSIUM CHLORIDE, CALCIUM CHLORIDE: 600; 310; 30; 20 INJECTION, SOLUTION INTRAVENOUS at 12:56:00

## 2021-12-28 RX ADMIN — PHENYLEPHRINE HCL 100 MCG: 10 MG/ML VIAL (ML) INJECTION at 12:41:00

## 2021-12-28 RX ADMIN — BUPIVACAINE HYDROCHLORIDE 30 ML: 5 INJECTION, SOLUTION EPIDURAL; INTRACAUDAL at 11:52:00

## 2021-12-28 RX ADMIN — CEFAZOLIN SODIUM/WATER 2 G: 2 G/20 ML SYRINGE (ML) INTRAVENOUS at 11:59:00

## 2021-12-28 NOTE — ADDENDUM NOTE
Addendum  created 12/28/21 1431 by Evon Larson MD    Clinical Note Signed, Intraprocedure Blocks edited

## 2021-12-28 NOTE — ANESTHESIA PROCEDURE NOTES
Regional Block  Performed by: Antonio Kwan MD  Authorized by: Antonio Kwan MD       General Information and Staff    Start Time:  12/28/2021 11:48 AM  End Time:  12/28/2021 11:52 AM  Anesthesiologist:  Antonio Kwan MD  Performed by:

## 2021-12-28 NOTE — ANESTHESIA POSTPROCEDURE EVALUATION
[facility] Patient Status:  Hospital Outpatient Surgery   Age/Gender 35year old male MRN [id number]   Location [address] [facility] Attending Keya Vargas, [address] Day # 0 PCP Surinder Rocha DO

## 2021-12-28 NOTE — BRIEF OP NOTE
Pre-Operative Diagnosis:   1. 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     Surgeon(s) and Role:     * Clementina Lewis, TAMMY Mccall P

## 2021-12-28 NOTE — ANESTHESIA PREPROCEDURE EVALUATION
PRE-OP EVALUATION    Patient Name: Buzz Ford Ervinel    Admit Diagnosis: Heel ulceration, left, with unspecified severity ([address] [L97.429]  Exostosis of bone of foot [M89.8X7]    Pre-op Diagnosis: Heel ulceration, left, with unspecified severity ([address] [L Q5 Min PRN  HYDROmorphone HCl (DILAUDID) 1 MG/ML injection 0.4 mg, 0.4 mg, Intravenous, Q5 Min PRN  ondansetron (ZOFRAN) injection 4 mg, 4 mg, Intravenous, PRN  metoclopramide (REGLAN) injection 10 mg, 10 mg, Intravenous, PRN  HYDROcodone-acetaminophen (NO Evaluation    Patient summary reviewed.     Anesthetic Complications  (-) history of anesthetic complications         GI/Hepatic/Renal    Negative GI/hepatic/renal ROS.  (-) GERD                           Cardiovascular        Exercise tolerance: good     M plan discussed with surgeon and patient. Surgeon requests: regional block  Comment:     A detailed discussion about the anesthetic plan was held with Mónica Higgins in the preoperative area.  Discussed benefits and risks of general anesthesia includi

## 2021-12-29 ENCOUNTER — TELEPHONE (OUTPATIENT)
Dept: PODIATRY CLINIC | Facility: CLINIC | Age: 41
End: 2021-12-29

## 2021-12-29 NOTE — OPERATIVE REPORT
OPERATIVE REPORT     Savanah Tobin Patient Status:  [facility] Outpatient Surgery    1980 MRN [id number]   Location [address] [place] [address] Attending No att. providers found   Hosp Day # 0 PCP Noel Hernandez DO     Date of Surg patient has completed weightbearing x-rays demonstrating a plantar prominence to his heel. He is interested in calcaneal planing procedure at this time. Informed Consent:   All treatment options have been discussed with the patient including both co made in the lateral foot along the calcaneus with a 15 blade, measuring approximately 5 cm in length. The incision was bluntly deepened with tenotomy scissors taking care to preserve and retract vital neurovascular structures.   The incision was deepened t tolerated the procedure and anesthesia well. He was transferred to the recovery room with VSS and vascular status intact. Following a period of postoperative monitoring, the patient will be discharged home.     Marimar Regan DPM   12/28/21

## 2021-12-29 NOTE — TELEPHONE ENCOUNTER
Patient called for follow-up after planing procedure to left calcaneus performed yesterday. He relates that he is doing well and is without pain. He is kept dressings clean, dry, intact. He is remained nonweightbearing to left lower extremity.   No other

## 2021-12-31 ENCOUNTER — OFFICE VISIT (OUTPATIENT)
Dept: PODIATRY CLINIC | Facility: CLINIC | Age: 41
End: 2021-12-31
Payer: COMMERCIAL

## 2021-12-31 DIAGNOSIS — L97.429 HEEL ULCERATION, LEFT, WITH UNSPECIFIED SEVERITY (HCC): ICD-10-CM

## 2021-12-31 DIAGNOSIS — E11.8 CONTROLLED TYPE 2 DIABETES MELLITUS WITH COMPLICATION, WITHOUT LONG-TERM CURRENT USE OF INSULIN (HCC): ICD-10-CM

## 2021-12-31 DIAGNOSIS — Z47.89 ORTHOPEDIC AFTERCARE: Primary | ICD-10-CM

## 2021-12-31 DIAGNOSIS — N18.4 STAGE 4 CHRONIC KIDNEY DISEASE (HCC): ICD-10-CM

## 2021-12-31 PROCEDURE — 99024 POSTOP FOLLOW-UP VISIT: CPT | Performed by: STUDENT IN AN ORGANIZED HEALTH CARE EDUCATION/TRAINING PROGRAM

## 2021-12-31 RX ORDER — LEVOFLOXACIN 250 MG/1
250 TABLET ORAL DAILY
Qty: 7 TABLET | Refills: 0 | Status: SHIPPED | OUTPATIENT
Start: 2021-12-31 | End: 2022-01-26 | Stop reason: DRUGHIGH

## 2021-12-31 NOTE — PROGRESS NOTES
[facility], [facility] Podiatry  Progress Note    Hermila Melendez is a 35year old male. Patient presents with:  Post-Op: Folow up post-op. Patient denies any pain today.  A1C 5.5 as 10/18/21  this AM        HPI:     Patient is a pleasant 43-year-old m MG Oral Tab Take 1 tablet by mouth daily. 90 tablet 3   • amLODIPine 10 MG Oral Tab Take 1 tablet (10 mg total) by mouth daily. 90 tablet 5   • Nutritional Supplements (ALLIE OR) Take by mouth As Directed.  For wound healing     • TRULICITY 8.36 AC/3.5XT [place] well developed, well nourished, in no apparent distress  EXTREMITIES:   1. Integument: Normal skin temperature and turgor. Incision site to left lateral calcaneus is well approximated sutures intact.   No dehiscence, purulence, erythema, fluctuance, hemato results, will consider infectious disease referral for possible IV antibiotics.   Currently there new no acute signs of infection noted to site clinically.  -Intra-Op path is pending.  -Postop x-rays obtained in PACU reviewed--satisfactory resection of plan

## 2022-01-01 NOTE — PATIENT INSTRUCTIONS
-Leave dressings clean, dry, and intact.  -Take Levaquin as prescribed.  -We will call with pathology results. -Remain nonweightbearing to left lower extremity.  -Seek immediate medical attention if any acute signs of infection or other concerns arise.   -

## 2022-01-04 ENCOUNTER — APPOINTMENT (OUTPATIENT)
Dept: WOUND CARE | Facility: HOSPITAL | Age: 42
End: 2022-01-04
Attending: NURSE PRACTITIONER
Payer: COMMERCIAL

## 2022-01-05 ENCOUNTER — OFFICE VISIT (OUTPATIENT)
Dept: PODIATRY CLINIC | Facility: CLINIC | Age: 42
End: 2022-01-05
Payer: COMMERCIAL

## 2022-01-05 DIAGNOSIS — N18.4 STAGE 4 CHRONIC KIDNEY DISEASE (HCC): ICD-10-CM

## 2022-01-05 DIAGNOSIS — L97.429 HEEL ULCERATION, LEFT, WITH UNSPECIFIED SEVERITY (HCC): ICD-10-CM

## 2022-01-05 DIAGNOSIS — Z47.89 ORTHOPEDIC AFTERCARE: Primary | ICD-10-CM

## 2022-01-05 DIAGNOSIS — E11.42 DIABETIC POLYNEUROPATHY ASSOCIATED WITH TYPE 2 DIABETES MELLITUS (HCC): ICD-10-CM

## 2022-01-05 PROCEDURE — 99024 POSTOP FOLLOW-UP VISIT: CPT | Performed by: STUDENT IN AN ORGANIZED HEALTH CARE EDUCATION/TRAINING PROGRAM

## 2022-01-09 NOTE — PROGRESS NOTES
[facility], [facility] Podiatry  Progress Note    Angelina Pereyra is a 35year old male.  Patient presents with:  Post-Op: FBS 83 - 2nd Post-op - patient denies any pain in office today        HPI:     Patient is a pleasant 64-year-old male with past medical Directed. For wound healing     • TRULICITY 0.37 MM/6.4OP Subcutaneous Solution Pen-injector ADMINISTER 0.75 MG UNDER THE SKIN 1 TIME A WEEK 2 mL 5   • aspirin 81 MG Oral Tab EC Take 81 mg by mouth daily.      • HUMALOG KWIKPEN 100 UNIT/ML Subcutaneous Solu site to left lateral calcaneus is well approximated sutures intact. Minor eschar/superficial necrosis present to proximal aspect of incision. No dehiscence, purulence, erythema, fluctuance, hematoma, or other concerns noted.   2. Vascular: Dorsalis pedis a pain.  -Educated patient on acute signs of infection and symptoms of DVT and advised patient to seek immediate medical attention if any concerns arise. Patient expressed understanding.     The patient indicates understanding of these issues and agrees to t

## 2022-01-09 NOTE — PATIENT INSTRUCTIONS
-Leave dressings clean, dry, and intact to left lower extremity.  -May nonweightbearing left lower extremity crutches and posterior splint.  -Finish Levaquin as prescribed.   -Follow-up with infectious disease as ordered.  -Follow-up in 1 week for likely keller

## 2022-01-12 ENCOUNTER — OFFICE VISIT (OUTPATIENT)
Dept: PODIATRY CLINIC | Facility: CLINIC | Age: 42
End: 2022-01-12
Payer: COMMERCIAL

## 2022-01-12 DIAGNOSIS — E11.42 DIABETIC POLYNEUROPATHY ASSOCIATED WITH TYPE 2 DIABETES MELLITUS (HCC): ICD-10-CM

## 2022-01-12 DIAGNOSIS — M89.8X7 EXOSTOSIS OF BONE OF FOOT: ICD-10-CM

## 2022-01-12 DIAGNOSIS — N18.4 STAGE 4 CHRONIC KIDNEY DISEASE (HCC): ICD-10-CM

## 2022-01-12 DIAGNOSIS — Z47.89 ORTHOPEDIC AFTERCARE: ICD-10-CM

## 2022-01-12 DIAGNOSIS — L97.429 HEEL ULCERATION, LEFT, WITH UNSPECIFIED SEVERITY (HCC): Primary | ICD-10-CM

## 2022-01-12 PROCEDURE — L4361 PNEUMA/VAC WALK BOOT PRE OTS: HCPCS | Performed by: STUDENT IN AN ORGANIZED HEALTH CARE EDUCATION/TRAINING PROGRAM

## 2022-01-12 PROCEDURE — 99024 POSTOP FOLLOW-UP VISIT: CPT | Performed by: STUDENT IN AN ORGANIZED HEALTH CARE EDUCATION/TRAINING PROGRAM

## 2022-01-12 RX ORDER — CEFADROXIL 500 MG/1
500 CAPSULE ORAL DAILY
Qty: 14 CAPSULE | Refills: 0 | Status: SHIPPED | OUTPATIENT
Start: 2022-01-12 | End: 2022-01-26 | Stop reason: DRUGHIGH

## 2022-01-12 NOTE — PATIENT INSTRUCTIONS
- Leave dressings clean, dry, intact to the left foot. -Remain nonweightbearing to left lower extremity with Cam boot and crutches.  -Take cefadroxil as prescribed. -Follow-up with infectious disease.

## 2022-01-12 NOTE — PROGRESS NOTES
[facility] Podiatry  Progress Note    Laura Zazueta is a 35year old male. Patient presents with:  Post-Op: left foot post op visit sx 12/28. Denies pain. Swelling and redness noted.         HPI:     Patient is a pleasant 75-year-old male with p total) by mouth daily. 90 tablet 5   • Nutritional Supplements (ALLIE OR) Take by mouth As Directed.  For wound healing     • TRULICITY 5.84 EU/9.3YS Subcutaneous Solution Pen-injector ADMINISTER 0.75 MG UNDER THE SKIN 1 TIME A WEEK 2 mL 5   • aspirin 81 MG apparent distress  EXTREMITIES:   1. Integument: Normal skin temperature and turgor. Incision site to left lateral calcaneus is well approximated sutures intact. Minor eschar/superficial necrosis present to proximal aspect of incision.  No dehiscence, pur healing well and is without acute signs of infection. However, will consult infectious disease given positive bone cultures despite negative pathology report. Patient still has not followed up with infectious disease.   Spoke with Dr. Kimbrough on telephone wh

## 2022-01-13 ENCOUNTER — TELEPHONE (OUTPATIENT)
Dept: FAMILY MEDICINE CLINIC | Facility: CLINIC | Age: 42
End: 2022-01-13

## 2022-01-17 ENCOUNTER — TELEPHONE (OUTPATIENT)
Dept: PODIATRY CLINIC | Facility: CLINIC | Age: 42
End: 2022-01-17

## 2022-01-19 ENCOUNTER — OFFICE VISIT (OUTPATIENT)
Dept: PODIATRY CLINIC | Facility: CLINIC | Age: 42
End: 2022-01-19
Payer: COMMERCIAL

## 2022-01-19 DIAGNOSIS — Z47.89 ORTHOPEDIC AFTERCARE: Primary | ICD-10-CM

## 2022-01-19 DIAGNOSIS — L97.429 HEEL ULCERATION, LEFT, WITH UNSPECIFIED SEVERITY (HCC): ICD-10-CM

## 2022-01-19 DIAGNOSIS — M89.8X7 EXOSTOSIS OF BONE OF FOOT: ICD-10-CM

## 2022-01-19 DIAGNOSIS — N18.4 STAGE 4 CHRONIC KIDNEY DISEASE (HCC): ICD-10-CM

## 2022-01-19 PROCEDURE — 99024 POSTOP FOLLOW-UP VISIT: CPT | Performed by: STUDENT IN AN ORGANIZED HEALTH CARE EDUCATION/TRAINING PROGRAM

## 2022-01-20 NOTE — PROGRESS NOTES
[facility], [facility] Podiatry  Progress Note    Latanya Esparza is a 35year old male. Patient presents with:  Post-Op: Left Heel sx 12/28. Denies pain. Redness noted. Slightly swollen. FBS 97, A1C 5.5 on 10/18        HPI:     Patient is a pleasant 41-year- ezetimibe-simvastatin 10-80 MG Oral Tab Take 1 tablet by mouth daily. 90 tablet 3   • Nutritional Supplements (ALLIE OR) Take by mouth As Directed.  For wound healing     • TRULICITY 3.79 YT/7.4QL Subcutaneous Solution Pen-injector ADMINISTER 0.75 MG UNDER visit.  GENERAL: well developed, well nourished, in no apparent distress  EXTREMITIES:   1. Integument: Normal skin temperature and turgor. Incision site to left lateral calcaneus is well healed with minimal hyperkeratotic tissue present.   No dehiscence, continue taking cefadroxil as prescribed. -Bone culture reviewed--site is growing 1+ growth of staph aureus and 2+ growth of staph lugdunensis which are resistant to clindamycin.  -Patient's bone pathology is negative for osteomyelitis.   -Patient has not

## 2022-01-22 NOTE — PATIENT INSTRUCTIONS
- No need for further dressing changes at this time.  -Okay to begin getting foot wet. Do not soak or scrub surgical site.  -Take antibiotics as planned with infectious disease.  -May slowly begin placing weight on foot in cam boot.   Check foot regularly

## 2022-01-26 PROCEDURE — 3044F HG A1C LEVEL LT 7.0%: CPT | Performed by: FAMILY MEDICINE

## 2022-02-02 ENCOUNTER — TELEPHONE (OUTPATIENT)
Dept: FAMILY MEDICINE CLINIC | Facility: CLINIC | Age: 42
End: 2022-02-02

## 2022-02-02 NOTE — TELEPHONE ENCOUNTER
Received disability paperwork addressed to Dr. Sigrid Espinoza and Dr. Lico Mendoza. I don't see that Dr. Lico Mendoza has seen the patient unless it is through wound care (DM ulcer). I placed paperwork in his basket to review and determine if he knows patient well enough to fill out or will have pt f/u with Dr. Sigrid Espinoza when she is back in the office soon.

## 2022-02-04 ENCOUNTER — OFFICE VISIT (OUTPATIENT)
Dept: PODIATRY CLINIC | Facility: CLINIC | Age: 42
End: 2022-02-04
Payer: COMMERCIAL

## 2022-02-04 DIAGNOSIS — L84 CALLUS OF FOOT: ICD-10-CM

## 2022-02-04 DIAGNOSIS — E11.42 DIABETIC POLYNEUROPATHY ASSOCIATED WITH TYPE 2 DIABETES MELLITUS (HCC): ICD-10-CM

## 2022-02-04 DIAGNOSIS — Z87.898 HISTORY OF ULCERATION: ICD-10-CM

## 2022-02-04 DIAGNOSIS — N18.4 STAGE 4 CHRONIC KIDNEY DISEASE (HCC): Primary | ICD-10-CM

## 2022-02-04 PROCEDURE — 99024 POSTOP FOLLOW-UP VISIT: CPT | Performed by: STUDENT IN AN ORGANIZED HEALTH CARE EDUCATION/TRAINING PROGRAM

## 2022-02-07 ENCOUNTER — TELEPHONE (OUTPATIENT)
Dept: FAMILY MEDICINE CLINIC | Facility: CLINIC | Age: 42
End: 2022-02-07

## 2022-02-07 NOTE — TELEPHONE ENCOUNTER
- 's office is sending labs over for revew as levels are elevated weekly. Pt is on Antibiotic and there may be something else going on. Please watch for labs.

## 2022-02-08 ENCOUNTER — TELEPHONE (OUTPATIENT)
Dept: FAMILY MEDICINE CLINIC | Facility: CLINIC | Age: 42
End: 2022-02-08

## 2022-02-08 NOTE — TELEPHONE ENCOUNTER
Please inform Marcia Greco that we had reviewed laboratory tests from [facility].  Renal function is low but stable. He has baseline anemia likely secondary to chronic kidney disease but this is also stable.

## 2022-02-09 NOTE — PATIENT INSTRUCTIONS
-Continue weightbearing as tolerated with Cam boot to left lower extremity.  -Continue IV antibiotics per infectious disease.  -Acquire custom accommodative inserts as ordered. Seek immediate medical attention if infection or other concerns arise.

## 2022-02-10 NOTE — TELEPHONE ENCOUNTER
Dr. Ahsan Carr     Please sign off on form: Disability  -Highlight the patient and hit \"Chart\" button. -In Chart Review, w/in the Encounter tab - click 1 time on the Telephone call encounter for 01/17/2022 Scroll down the telephone encounter.  -Click \"scan on\" blue Hyperlink under \"Media\" heading for Disab Dr Ahsan Carr 02/10/22  w/in the telephone enc.  -Click on Acknowledge button at the bottom right corner and left-click onto image, signature stamp appears and drag signature to Provider signature line. Stamp will turn blue. Close window.      Thank you,  Collette Melgar

## 2022-02-15 ENCOUNTER — TELEPHONE (OUTPATIENT)
Dept: PODIATRY CLINIC | Facility: CLINIC | Age: 42
End: 2022-02-15

## 2022-02-15 ENCOUNTER — TELEPHONE (OUTPATIENT)
Dept: FAMILY MEDICINE CLINIC | Facility: CLINIC | Age: 42
End: 2022-02-15

## 2022-02-15 NOTE — TELEPHONE ENCOUNTER
Recd ppw from [address] [place] [address] to be completed by physician for patient's LTD. Ppw placed in physician folder. Please fax to [phone] once completed. Incident #[id number]. Previously had recd request for all medical records which was faxed to [employer] on 1/13/2022.

## 2022-02-15 NOTE — TELEPHONE ENCOUNTER
Patient called requesting the Forms department phone number. Advsd that we would leave a message and have them contact him.

## 2022-02-18 ENCOUNTER — OFFICE VISIT (OUTPATIENT)
Dept: FAMILY MEDICINE CLINIC | Facility: CLINIC | Age: 42
End: 2022-02-18
Payer: COMMERCIAL

## 2022-02-18 VITALS
TEMPERATURE: 97 F | HEIGHT: 67 IN | RESPIRATION RATE: 16 BRPM | WEIGHT: 195 LBS | HEART RATE: 100 BPM | DIASTOLIC BLOOD PRESSURE: 72 MMHG | BODY MASS INDEX: 30.61 KG/M2 | SYSTOLIC BLOOD PRESSURE: 130 MMHG | OXYGEN SATURATION: 100 %

## 2022-02-18 DIAGNOSIS — I10 ESSENTIAL HYPERTENSION: ICD-10-CM

## 2022-02-18 DIAGNOSIS — E78.5 DYSLIPIDEMIA: ICD-10-CM

## 2022-02-18 DIAGNOSIS — E66.9 DIABETES MELLITUS TYPE 2 IN OBESE (HCC): ICD-10-CM

## 2022-02-18 DIAGNOSIS — I10 PRIMARY HYPERTENSION: ICD-10-CM

## 2022-02-18 DIAGNOSIS — E11.8 CONTROLLED TYPE 2 DIABETES MELLITUS WITH COMPLICATION, WITHOUT LONG-TERM CURRENT USE OF INSULIN (HCC): ICD-10-CM

## 2022-02-18 DIAGNOSIS — E11.69 DIABETES MELLITUS TYPE 2 IN OBESE (HCC): ICD-10-CM

## 2022-02-18 DIAGNOSIS — Z00.00 WELL ADULT EXAM: Primary | ICD-10-CM

## 2022-02-18 PROCEDURE — 3075F SYST BP GE 130 - 139MM HG: CPT | Performed by: FAMILY MEDICINE

## 2022-02-18 PROCEDURE — 3008F BODY MASS INDEX DOCD: CPT | Performed by: FAMILY MEDICINE

## 2022-02-18 PROCEDURE — 99396 PREV VISIT EST AGE 40-64: CPT | Performed by: FAMILY MEDICINE

## 2022-02-18 PROCEDURE — 3078F DIAST BP <80 MM HG: CPT | Performed by: FAMILY MEDICINE

## 2022-02-18 RX ORDER — AMLODIPINE BESYLATE 10 MG/1
TABLET ORAL
COMMUNITY
Start: 2022-02-07

## 2022-02-22 NOTE — TELEPHONE ENCOUNTER
Per Dr. Petty Espinosa- PPW needs to be completed by foot surgeon. Paperwork faxed back with info to fax to Dr. Anil Cheng at [phone].

## 2022-02-23 ENCOUNTER — OFFICE VISIT (OUTPATIENT)
Dept: PODIATRY CLINIC | Facility: CLINIC | Age: 42
End: 2022-02-23
Payer: COMMERCIAL

## 2022-02-23 DIAGNOSIS — N18.4 STAGE 4 CHRONIC KIDNEY DISEASE (HCC): Primary | ICD-10-CM

## 2022-02-23 DIAGNOSIS — E11.42 DIABETIC POLYNEUROPATHY ASSOCIATED WITH TYPE 2 DIABETES MELLITUS (HCC): ICD-10-CM

## 2022-02-23 DIAGNOSIS — M89.8X7 EXOSTOSIS OF BONE OF FOOT: ICD-10-CM

## 2022-02-23 DIAGNOSIS — Z87.898 HISTORY OF ULCERATION: ICD-10-CM

## 2022-02-23 DIAGNOSIS — Z47.89 ORTHOPEDIC AFTERCARE: ICD-10-CM

## 2022-02-23 PROCEDURE — 99024 POSTOP FOLLOW-UP VISIT: CPT | Performed by: STUDENT IN AN ORGANIZED HEALTH CARE EDUCATION/TRAINING PROGRAM

## 2022-02-26 NOTE — PATIENT INSTRUCTIONS
-Continue ambulating with Cam boot until custom inserts arrive.  -Once accommodative inserts are received, may transition to supportive shoe gear with accommodative inserts slowly. Should check feet regularly for any breakdown.  -Follow-up in 1 to 2 weeks after inserts have arrived so we can check on they are working.  -If doing well at this time may consider physical therapy prior to resuming work.

## 2022-03-02 ENCOUNTER — PATIENT MESSAGE (OUTPATIENT)
Dept: FAMILY MEDICINE CLINIC | Facility: CLINIC | Age: 42
End: 2022-03-02

## 2022-03-02 NOTE — TELEPHONE ENCOUNTER
From: Yves Tobin  To: Hattie Alexander DO  Sent: 3/2/2022 12:43 PM CST  Subject: Recent Sweating    Dr. Paola Mcclellan,    About 2 weeks ago I started sweating when eating anything. I thought that it may be a reaction to the dalbavancin that I was taking for my foot to make sure that if there was any infection it would take care of it. I was taking it through infusion and had my last dose last week. Is this possible or could it be related to something else?  Thanks  [employer]

## 2022-03-11 NOTE — LETTER
[facility], [address] [place] [id number]  [facility]: [phone]  FAX: [phone]        22  Gosia Tobin, :  1980  [address] [place] [phone]    To whom it may concern,    Please allow patient to return to work on 2022 without restrictions. He may return to work without restrictions as long as he continues to do well and completes physical therapy. Please reach out with any questions or concerns.     Felton Johnson DPM, 22, 10:20 AM
18

## 2022-03-18 ENCOUNTER — OFFICE VISIT (OUTPATIENT)
Dept: PODIATRY CLINIC | Facility: CLINIC | Age: 42
End: 2022-03-18
Payer: COMMERCIAL

## 2022-03-18 DIAGNOSIS — Z87.898 HISTORY OF ULCERATION: ICD-10-CM

## 2022-03-18 DIAGNOSIS — R26.81 GAIT INSTABILITY: Primary | ICD-10-CM

## 2022-03-18 DIAGNOSIS — M89.8X7 EXOSTOSIS OF BONE OF FOOT: ICD-10-CM

## 2022-03-18 DIAGNOSIS — E11.42 DIABETIC POLYNEUROPATHY ASSOCIATED WITH TYPE 2 DIABETES MELLITUS (HCC): ICD-10-CM

## 2022-03-18 DIAGNOSIS — N18.4 STAGE 4 CHRONIC KIDNEY DISEASE (HCC): ICD-10-CM

## 2022-03-18 PROCEDURE — 99024 POSTOP FOLLOW-UP VISIT: CPT | Performed by: STUDENT IN AN ORGANIZED HEALTH CARE EDUCATION/TRAINING PROGRAM

## 2022-03-19 ENCOUNTER — LAB ENCOUNTER (OUTPATIENT)
Dept: LAB | Age: 42
End: 2022-03-19
Attending: FAMILY MEDICINE
Payer: COMMERCIAL

## 2022-03-19 DIAGNOSIS — R61 UNEXPLAINED NIGHT SWEATS: ICD-10-CM

## 2022-03-19 LAB
ALBUMIN SERPL-MCNC: 4.1 G/DL (ref 3.4–5)
ALBUMIN/GLOB SERPL: 1.2 {RATIO} (ref 1–2)
ALP LIVER SERPL-CCNC: 107 U/L
ALT SERPL-CCNC: 49 U/L
ANION GAP SERPL CALC-SCNC: 5 MMOL/L (ref 0–18)
AST SERPL-CCNC: 59 U/L (ref 15–37)
BASOPHILS # BLD AUTO: 0.05 X10(3) UL (ref 0–0.2)
BASOPHILS NFR BLD AUTO: 0.9 %
BILIRUB SERPL-MCNC: 0.6 MG/DL (ref 0.1–2)
BUN BLD-MCNC: 57 MG/DL (ref 7–18)
CALCIUM BLD-MCNC: 9 MG/DL (ref 8.5–10.1)
CHLORIDE SERPL-SCNC: 112 MMOL/L (ref 98–112)
CO2 SERPL-SCNC: 25 MMOL/L (ref 21–32)
CREAT BLD-MCNC: 3.11 MG/DL
EOSINOPHIL # BLD AUTO: 0.27 X10(3) UL (ref 0–0.7)
EOSINOPHIL NFR BLD AUTO: 4.8 %
ERYTHROCYTE [DISTWIDTH] IN BLOOD BY AUTOMATED COUNT: 12.9 %
FASTING STATUS PATIENT QL REPORTED: NO
GLOBULIN PLAS-MCNC: 3.4 G/DL (ref 2.8–4.4)
GLUCOSE BLD-MCNC: 125 MG/DL (ref 70–99)
HCT VFR BLD AUTO: 31.4 %
HGB BLD-MCNC: 10.4 G/DL
IMM GRANULOCYTES # BLD AUTO: 0.01 X10(3) UL (ref 0–1)
IMM GRANULOCYTES NFR BLD: 0.2 %
LYMPHOCYTES # BLD AUTO: 1.48 X10(3) UL (ref 1–4)
LYMPHOCYTES NFR BLD AUTO: 26.1 %
MCH RBC QN AUTO: 30.3 PG (ref 26–34)
MCHC RBC AUTO-ENTMCNC: 33.1 G/DL (ref 31–37)
MCV RBC AUTO: 91.5 FL
MONOCYTES # BLD AUTO: 0.47 X10(3) UL (ref 0.1–1)
MONOCYTES NFR BLD AUTO: 8.3 %
NEUTROPHILS # BLD AUTO: 3.38 X10 (3) UL (ref 1.5–7.7)
NEUTROPHILS # BLD AUTO: 3.38 X10(3) UL (ref 1.5–7.7)
NEUTROPHILS NFR BLD AUTO: 59.7 %
OSMOLALITY SERPL CALC.SUM OF ELEC: 311 MOSM/KG (ref 275–295)
PLATELET # BLD AUTO: 162 10(3)UL (ref 150–450)
POTASSIUM SERPL-SCNC: 5 MMOL/L (ref 3.5–5.1)
PROT SERPL-MCNC: 7.5 G/DL (ref 6.4–8.2)
RBC # BLD AUTO: 3.43 X10(6)UL
SODIUM SERPL-SCNC: 142 MMOL/L (ref 136–145)
TSI SER-ACNC: 1.55 MIU/ML (ref 0.36–3.74)
WBC # BLD AUTO: 5.7 X10(3) UL (ref 4–11)

## 2022-03-19 PROCEDURE — 80050 GENERAL HEALTH PANEL: CPT | Performed by: FAMILY MEDICINE

## 2022-03-19 NOTE — PATIENT INSTRUCTIONS
-Continue ambulating with supportive shoe gear and accommodative inserts.  -Continue to check feet regularly for breakdown or other concerns.  -Complete physical therapy as prescribed. -If doing well in 3 weeks, can return to work can be discharged from surgical care.

## 2022-04-12 ENCOUNTER — TELEPHONE (OUTPATIENT)
Dept: PHYSICAL THERAPY | Facility: HOSPITAL | Age: 42
End: 2022-04-12

## 2022-04-13 RX ORDER — EZETIMIBE AND SIMVASTATIN 10; 80 MG/1; MG/1
1 TABLET ORAL DAILY
Qty: 90 TABLET | Refills: 3 | Status: SHIPPED | OUTPATIENT
Start: 2022-04-13

## 2022-04-15 ENCOUNTER — OFFICE VISIT (OUTPATIENT)
Dept: PODIATRY CLINIC | Facility: CLINIC | Age: 42
End: 2022-04-15
Payer: COMMERCIAL

## 2022-04-15 DIAGNOSIS — Z47.89 ORTHOPEDIC AFTERCARE: ICD-10-CM

## 2022-04-15 DIAGNOSIS — Z87.898 HISTORY OF ULCERATION: ICD-10-CM

## 2022-04-15 DIAGNOSIS — M89.8X7 EXOSTOSIS OF BONE OF FOOT: ICD-10-CM

## 2022-04-15 DIAGNOSIS — B35.1 ONYCHOMYCOSIS: Primary | ICD-10-CM

## 2022-04-15 PROCEDURE — 11721 DEBRIDE NAIL 6 OR MORE: CPT | Performed by: STUDENT IN AN ORGANIZED HEALTH CARE EDUCATION/TRAINING PROGRAM

## 2022-04-15 RX ORDER — SAL ACID/UREA/PETROLATUM,WHITE 5 %-10 %
1 OINTMENT (GRAM) TOPICAL DAILY
Qty: 10 ML | Refills: 3 | Status: SHIPPED | OUTPATIENT
Start: 2022-04-15

## 2022-04-15 NOTE — PATIENT INSTRUCTIONS
-Continue ambulating with diabetic shoes and inserts. Complete physical therapy as ordered. Can slowly return to work at your convenience/as tolerated. Follow-up in 3 to 4 weeks for reevaluation or sooner if other concerns arise.

## 2022-04-19 ENCOUNTER — TELEPHONE (OUTPATIENT)
Dept: PHYSICAL THERAPY | Facility: HOSPITAL | Age: 42
End: 2022-04-19

## 2022-04-20 ENCOUNTER — OFFICE VISIT (OUTPATIENT)
Dept: PHYSICAL THERAPY | Age: 42
End: 2022-04-20
Attending: STUDENT IN AN ORGANIZED HEALTH CARE EDUCATION/TRAINING PROGRAM
Payer: COMMERCIAL

## 2022-04-20 DIAGNOSIS — Z87.898 HISTORY OF ULCERATION: ICD-10-CM

## 2022-04-20 DIAGNOSIS — R26.81 GAIT INSTABILITY: ICD-10-CM

## 2022-04-20 DIAGNOSIS — E11.42 DIABETIC POLYNEUROPATHY ASSOCIATED WITH TYPE 2 DIABETES MELLITUS (HCC): ICD-10-CM

## 2022-04-20 DIAGNOSIS — M89.8X7 EXOSTOSIS OF BONE OF FOOT: ICD-10-CM

## 2022-04-20 PROCEDURE — 97110 THERAPEUTIC EXERCISES: CPT

## 2022-04-20 PROCEDURE — 97162 PT EVAL MOD COMPLEX 30 MIN: CPT

## 2022-04-27 ENCOUNTER — TELEPHONE (OUTPATIENT)
Dept: PHYSICAL THERAPY | Facility: HOSPITAL | Age: 42
End: 2022-04-27

## 2022-04-27 ENCOUNTER — APPOINTMENT (OUTPATIENT)
Dept: PHYSICAL THERAPY | Age: 42
End: 2022-04-27
Attending: STUDENT IN AN ORGANIZED HEALTH CARE EDUCATION/TRAINING PROGRAM
Payer: COMMERCIAL

## 2022-04-29 ENCOUNTER — OFFICE VISIT (OUTPATIENT)
Dept: PHYSICAL THERAPY | Age: 42
End: 2022-04-29
Attending: STUDENT IN AN ORGANIZED HEALTH CARE EDUCATION/TRAINING PROGRAM
Payer: COMMERCIAL

## 2022-04-29 PROCEDURE — 97110 THERAPEUTIC EXERCISES: CPT

## 2022-04-29 NOTE — PROGRESS NOTES
Dx: Diabetic polyneuropathy associated with type 2 diabetes mellitus ([facility] [address] (E11.42)  Gait instability (R26.81)         Authorized # of Visits:  8  Fall Risk: standard         Precautions: n/a             Subjective: The patient reports he had his wisdom teeth extracted on Tuesday and wasn't feeling well on Wednesday and had to cancel his appointment  Current Pain Ratin/10  Objective:   See flow sheet    Assessment:   The patient tolerated exercises well without significant difficulty or complaints of pain. Significant weakness in left foot/ankle especially    Plan:   Continue PT to address soft tissue and joint restrictions and provide instruction in a progressive therapeutic exercise program with manual and verbal cueing for proper form and technique    Date: 2022  Tx#:  Date: Tx#: 3/ Date: Tx#: 4/ Date: Tx#: 5/ Date: Tx#: 6/ Date: Tx#: 7/ Date: Tx#: 8/   TherEx TherEx TherEx TherEx TherEx TherEx TherEx   Nustep L4 x 10 min         Ankle yellow theraband DF/PF/Inv/Ev 2 x 15 each         Seated heel/toe raises 2 x 15         Rocker board AP/Lateral rocking and balance         Shuttle Bilateral 4 bands 3 x 15         Shuttle R/L 3 bands 2 x 15         Reviewed HEP                               Charges:  TherEx x 3       Total Timed Treatment: 40 min  Total Treatment Time: 40 min

## 2022-05-02 ENCOUNTER — OFFICE VISIT (OUTPATIENT)
Dept: PHYSICAL THERAPY | Age: 42
End: 2022-05-02
Attending: STUDENT IN AN ORGANIZED HEALTH CARE EDUCATION/TRAINING PROGRAM
Payer: COMMERCIAL

## 2022-05-02 PROCEDURE — 97110 THERAPEUTIC EXERCISES: CPT

## 2022-05-02 NOTE — PROGRESS NOTES
Dx: Diabetic polyneuropathy associated with type 2 diabetes mellitus ([facility] [address] (E11.42)  Gait instability (R26.81)         Authorized # of Visits:  8  Fall Risk: standard         Precautions: n/a             Subjective: The patient reports he felt ok after last session. He reports he has to leave early today for a dentist appointment  Current Pain Ratin/10  Objective:   See flow sheet    Assessment:   The patient tolerated treatment well with no complaints of pain or significant fatigue    Plan:   Continue PT to address soft tissue and joint restrictions and provide instruction in a progressive therapeutic exercise program with manual and verbal cueing for proper form and technique    Date: 2022  Tx#:  Date:  2022   Tx#: 3/8 Date: Tx#: 4/ Date: Tx#: 5/ Date: Tx#: 6/ Date: Tx#: 7/ Date: Tx#: 8/   TherEx TherEx TherEx TherEx TherEx TherEx TherEx   Nustep L4 x 10 min Nustep L4 x 10 min        Ankle yellow theraband DF/PF/Inv/Ev 2 x 15 each Ankle yellow theraband DF/PF/Inv/Ev 2 x 15 each        Seated heel/toe raises 2 x 15 Shuttle Bilateral 5 bands 3 x 15        Rocker board AP/Lateral rocking and balance Shuttle R/L 3 bands 2 x 15        Shuttle Bilateral 4 bands 3 x 15 Rockerboard AP rocking and balance        Shuttle R/L 3 bands 2 x 15 -        Reviewed HEP -         -         -            Charges:  TherEx x 2       Total Timed Treatment: 30 min  Total Treatment Time: 30 min

## 2022-05-04 ENCOUNTER — OFFICE VISIT (OUTPATIENT)
Dept: PHYSICAL THERAPY | Age: 42
End: 2022-05-04
Attending: STUDENT IN AN ORGANIZED HEALTH CARE EDUCATION/TRAINING PROGRAM
Payer: COMMERCIAL

## 2022-05-04 PROCEDURE — 97110 THERAPEUTIC EXERCISES: CPT

## 2022-05-04 NOTE — PROGRESS NOTES
Dx: Diabetic polyneuropathy associated with type 2 diabetes mellitus ([facility] [address] (E11.42)  Gait instability (R26.81)         Authorized # of Visits:  8  Fall Risk: standard         Precautions: n/a             Subjective: The patient reports he hasn't been having any significant pain or difficulty with his foot  Current Pain Ratin/10  Objective:   See flow sheet    Assessment:   The patient tolerated treatment well with no complaints of pain or significant fatigue    Plan:   Continue PT to address soft tissue and joint restrictions and provide instruction in a progressive therapeutic exercise program with manual and verbal cueing for proper form and technique    Date: 2022  Tx#:  Date:  2022   Tx#: 3/8 Date:   2022  Tx#:  Date: Tx#: 5/ Date: Tx#: / Date: Tx#: / Date: Tx#: 8/   TherEx TherEx TherEx TherEx TherEx TherEx TherEx   Nustep L4 x 10 min Nustep L4 x 10 min Nustep L5 x 10 min       Ankle yellow theraband DF/PF/Inv/Ev 2 x 15 each Ankle yellow theraband DF/PF/Inv/Ev 2 x 15 each Shuttle Bilateral 6 bands 3 x 15       Seated heel/toe raises 2 x 15 Shuttle Bilateral 5 bands 3 x 15 Shuttle R/L 4 bands 2 x 15       Rocker board AP/Lateral rocking and balance Shuttle R/L 3 bands 2 x 15 Standing heel raises with ball between ankles 2 x 15       Shuttle Bilateral 4 bands 3 x 15 Rockerboard AP rocking and balance Balance on Airex with 1 finger assist        Shuttle R/L 3 bands 2 x 15 - SLS with 1 finger assist       Reviewed HEP - Rockerboard AP/lateral rocking and balance        - Sidestepping with green band 2 x to fatigue        - Reviewed HEP           Charges:  TherEx x 3       Total Timed Treatment: 40 min  Total Treatment Time: 40 min

## 2022-05-05 ENCOUNTER — OFFICE VISIT (OUTPATIENT)
Dept: PODIATRY CLINIC | Facility: CLINIC | Age: 42
End: 2022-05-05
Payer: COMMERCIAL

## 2022-05-05 DIAGNOSIS — N18.4 STAGE 4 CHRONIC KIDNEY DISEASE (HCC): ICD-10-CM

## 2022-05-05 DIAGNOSIS — E11.42 DIABETIC POLYNEUROPATHY ASSOCIATED WITH TYPE 2 DIABETES MELLITUS (HCC): ICD-10-CM

## 2022-05-05 DIAGNOSIS — Z47.89 ORTHOPEDIC AFTERCARE: ICD-10-CM

## 2022-05-05 DIAGNOSIS — Z87.898 HISTORY OF ULCERATION: Primary | ICD-10-CM

## 2022-05-05 DIAGNOSIS — M89.8X7 EXOSTOSIS OF BONE OF FOOT: ICD-10-CM

## 2022-05-05 PROCEDURE — 99213 OFFICE O/P EST LOW 20 MIN: CPT | Performed by: STUDENT IN AN ORGANIZED HEALTH CARE EDUCATION/TRAINING PROGRAM

## 2022-05-05 NOTE — PATIENT INSTRUCTIONS
- Continue normal activity as tolerated without restrictions. Ambulate with accommodative inserts in tennis shoes. Finish physical therapy as prescribed. Okay to return to work on 5/31/2022.

## 2022-05-09 ENCOUNTER — OFFICE VISIT (OUTPATIENT)
Dept: PHYSICAL THERAPY | Age: 42
End: 2022-05-09
Attending: STUDENT IN AN ORGANIZED HEALTH CARE EDUCATION/TRAINING PROGRAM
Payer: COMMERCIAL

## 2022-05-09 PROCEDURE — 97110 THERAPEUTIC EXERCISES: CPT

## 2022-05-09 NOTE — PROGRESS NOTES
Dx: Diabetic polyneuropathy associated with type 2 diabetes mellitus ([facility] [address] (E11.42)  Gait instability (R26.81)         Authorized # of Visits:  8  Fall Risk: standard         Precautions: n/a             Subjective: The patient reports when he walks on uneven surfaces and heels he feels like he has to take his time  Current Pain Ratin/10  Objective:   See flow sheet    Assessment:   The patient tolerated treatment progression of exercises well without significant difficulty or complaints of fatigue    Plan:   Progress strengthening and endurance and balance exercises as tolerated    Date: 2022  Tx#:  Date:  2022   Tx#: 3/8 Date:   2022  Tx#:  Date:   2022  Tx#:  Date: Tx#: / Date: Tx#: / Date: Tx#: 8/   TherEx TherEx TherEx TherEx TherEx TherEx TherEx   Nustep L4 x 10 min Nustep L4 x 10 min Nustep L5 x 10 min Nustep L5 x 10 min      Ankle yellow theraband DF/PF/Inv/Ev 2 x 15 each Ankle yellow theraband DF/PF/Inv/Ev 2 x 15 each Shuttle Bilateral 6 bands 3 x 15 Shuttle Bilateral 6 bands 3 x 15      Seated heel/toe raises 2 x 15 Shuttle Bilateral 5 bands 3 x 15 Shuttle R/L 4 bands 2 x 15 Shuttle R/L 4 bands 2 x 15      Rocker board AP/Lateral rocking and balance Shuttle R/L 3 bands 2 x 15 Standing heel raises with ball between ankles 2 x 15 TRX squats 2 x 15 and lateral step/lunges x 10 R/L       Shuttle Bilateral 4 bands 3 x 15 Rockerboard AP rocking and balance Balance on Airex with 1 finger assist  8 inch step FSU 2 x 15      Shuttle R/L 3 bands 2 x 15 - SLS with 1 finger assist LSU and over onto Airex 2 x 15      Reviewed HEP - Rockerboard AP/lateral rocking and balance Standing HS stretch 3 x 30 sec       - Sidestepping with green band 2 x to fatigue Rockerboard AP/lateral rocking and balance       - Reviewed HEP Reviewed HEP          Charges:  TherEx x 3       Total Timed Treatment: 40 min  Total Treatment Time: 40 min

## 2022-05-11 ENCOUNTER — OFFICE VISIT (OUTPATIENT)
Dept: PHYSICAL THERAPY | Age: 42
End: 2022-05-11
Attending: STUDENT IN AN ORGANIZED HEALTH CARE EDUCATION/TRAINING PROGRAM
Payer: COMMERCIAL

## 2022-05-11 ENCOUNTER — TELEPHONE (OUTPATIENT)
Dept: FAMILY MEDICINE CLINIC | Facility: CLINIC | Age: 42
End: 2022-05-11

## 2022-05-11 PROCEDURE — 97110 THERAPEUTIC EXERCISES: CPT

## 2022-05-11 RX ORDER — FUROSEMIDE 20 MG/1
20 TABLET ORAL DAILY
Qty: 90 TABLET | Refills: 0 | Status: SHIPPED | OUTPATIENT
Start: 2022-05-11

## 2022-05-11 NOTE — PROGRESS NOTES
Dx: Diabetic polyneuropathy associated with type 2 diabetes mellitus ([facility] [address] (E11.42)  Gait instability (R26.81)         Authorized # of Visits:  8  Fall Risk: standard         Precautions: n/a             Subjective: The patient reports he had to do a lot of heavy lifting of furniture and other stuff at home since they were having new carpet put in and he's a little sore/tired today  Current Pain Ratin/10  Objective:   See flow sheet    Assessment:   The patient tolerated treatment well with improving stability/balance with exercises, requiring less UE assistance for balance    Plan:   Progress strengthening and endurance and balance exercises as tolerated    Date: 2022  Tx#:  Date:  2022   Tx#: 3/8 Date:   2022  Tx#:  Date:   2022  Tx#:  Date:   2022  Tx#:  Date: Tx#: 7/ Date:    Tx#: 8/   TherEx TherEx TherEx TherEx TherEx TherEx TherEx   Nustep L4 x 10 min Nustep L4 x 10 min Nustep L5 x 10 min Nustep L5 x 10 min Nustep L5 x 10 min     Ankle yellow theraband DF/PF/Inv/Ev 2 x 15 each Ankle yellow theraband DF/PF/Inv/Ev 2 x 15 each Shuttle Bilateral 6 bands 3 x 15 Shuttle Bilateral 6 bands 3 x 15 Shuttle Bilateral 6 bands 3 x 15     Seated heel/toe raises 2 x 15 Shuttle Bilateral 5 bands 3 x 15 Shuttle R/L 4 bands 2 x 15 Shuttle R/L 4 bands 2 x 15 Shuttle R/L 4 bands 2 x 15     Rocker board AP/Lateral rocking and balance Shuttle R/L 3 bands 2 x 15 Standing heel raises with ball between ankles 2 x 15 TRX squats 2 x 15 and lateral step/lunges x 10 R/L  Cable 4 plates single arm rows x 15 R/L     Shuttle Bilateral 4 bands 3 x 15 Rockerboard AP rocking and balance Balance on Airex with 1 finger assist  8 inch step FSU 2 x 15 Rockerboard AP/Lateral rocking and balance     Shuttle R/L 3 bands 2 x 15 - SLS with 1 finger assist LSU and over onto Airex 2 x 15 FSU and LSU and over onto Airex      Reviewed HEP - Rockerboard AP/lateral rocking and balance Standing HS stretch 3 x 30 sec Standing HS stretch 3 x 30 sec      - Sidestepping with green band 2 x to fatigue Rockerboard AP/lateral rocking and balance Standing hip flexor stretch 3 x 30 sec      - Reviewed HEP Reviewed HEP Reviewed HEP     HEP: Standing HS stretch, Standing hip flexor stretch, Sidestepping with band, Sit to stand    Charges:  TherEx x 3       Total Timed Treatment: 40 min  Total Treatment Time: 40 min

## 2022-05-13 ENCOUNTER — OFFICE VISIT (OUTPATIENT)
Dept: PHYSICAL THERAPY | Age: 42
End: 2022-05-13
Attending: STUDENT IN AN ORGANIZED HEALTH CARE EDUCATION/TRAINING PROGRAM
Payer: COMMERCIAL

## 2022-05-13 PROCEDURE — 97110 THERAPEUTIC EXERCISES: CPT

## 2022-05-13 RX ORDER — PEN NEEDLE, DIABETIC, SAFETY 30 GX3/16"
NEEDLE, DISPOSABLE MISCELLANEOUS
Qty: 100 EACH | Refills: 0 | OUTPATIENT
Start: 2022-05-13

## 2022-05-13 NOTE — TELEPHONE ENCOUNTER
Patient calling on status of refill, patient states he uses insulin needles when needed for insulin.  Please advise

## 2022-05-13 NOTE — PROGRESS NOTES
Dx: Diabetic polyneuropathy associated with type 2 diabetes mellitus ([facility] [place] [facility] [address] (E11.42)  Gait instability (R26.81)         Authorized # of Visits:  8  Fall Risk: standard         Precautions: n/a             Subjective: The patient reports he's not feeling as sore as he was the other day. Current Pain Ratin/10  Objective:   See flow sheet    Assessment:   The patient continues to tolerate exercises well with no significant complaints of fatigue and continued improvements in balance/stability    Plan:   Progress strengthening and endurance and balance exercises as tolerated    Date: 2022  Tx#:  Date:  2022   Tx#: 3/8 Date:   2022  Tx#:  Date:   2022  Tx#:  Date:   2022  Tx#:  Date:   2022  Tx#:  Date:    Tx#: 8/   TherEx TherEx TherEx TherEx TherEx TherEx TherEx   Nustep L4 x 10 min Nustep L4 x 10 min Nustep L5 x 10 min Nustep L5 x 10 min Nustep L5 x 10 min Nustep L5 x 10 min    Ankle yellow theraband DF/PF/Inv/Ev 2 x 15 each Ankle yellow theraband DF/PF/Inv/Ev 2 x 15 each Shuttle Bilateral 6 bands 3 x 15 Shuttle Bilateral 6 bands 3 x 15 Shuttle Bilateral 6 bands 3 x 15 TRX side lunges x 10 R/L    Seated heel/toe raises 2 x 15 Shuttle Bilateral 5 bands 3 x 15 Shuttle R/L 4 bands 2 x 15 Shuttle R/L 4 bands 2 x 15 Shuttle R/L 4 bands 2 x 15 Rockerboard AP/Lateral rocking and balance    Rocker board AP/Lateral rocking and balance Shuttle R/L 3 bands 2 x 15 Standing heel raises with ball between ankles 2 x 15 TRX squats 2 x 15 and lateral step/lunges x 10 R/L  Cable 4 plates single arm rows x 15 R/L LSU and over onto Airex x 15    Shuttle Bilateral 4 bands 3 x 15 Rockerboard AP rocking and balance Balance on Airex with 1 finger assist  8 inch step FSU 2 x 15 Rockerboard AP/Lateral rocking and balance Standing HS stretch 3 x 30 sec    Shuttle R/L 3 bands 2 x 15 - SLS with 1 finger assist LSU and over onto Airex 2 x 15 FSU and LSU and over onto Airex  Shuttle Bilateral 6 bands 3 x 15    Reviewed HEP - Rockerboard AP/lateral rocking and balance Standing HS stretch 3 x 30 sec Standing HS stretch 3 x 30 sec Shuttle R/L 4 bands 2 x 15     - Sidestepping with green band 2 x to fatigue Rockerboard AP/lateral rocking and balance Standing hip flexor stretch 3 x 30 sec Standing hip flexor stretch 3 x 30 sec     - Reviewed HEP Reviewed HEP Reviewed HEP Cable 4 plates single arm rows x 15 R/L    HEP: Standing HS stretch, Standing hip flexor stretch, Sidestepping with band, Sit to stand    Charges:  TherEx x 3       Total Timed Treatment: 40 min  Total Treatment Time: 40 min

## 2022-05-16 ENCOUNTER — OFFICE VISIT (OUTPATIENT)
Dept: PHYSICAL THERAPY | Age: 42
End: 2022-05-16
Attending: STUDENT IN AN ORGANIZED HEALTH CARE EDUCATION/TRAINING PROGRAM
Payer: COMMERCIAL

## 2022-05-16 PROCEDURE — 97110 THERAPEUTIC EXERCISES: CPT

## 2022-05-16 NOTE — PROGRESS NOTES
Dx: Diabetic polyneuropathy associated with type 2 diabetes mellitus ([facility] [address] (E11.42)  Gait instability (R26.81)         Authorized # of Visits:  8  Fall Risk: standard         Precautions: n/a             Subjective: The patient reports he's been feeling better, but he would like to continue for a few more visits to progress his endurance and balance  Current Pain Ratin/10   Objective:   Gait: The patient ambulates with improved gait pattern, but continues to have a slightly decreased left foot clearance and heel strike with a slight hip hike/steppage gait noted to facilitate clearance of the left foot     ROM: Left ankle AROM dorsiflexion 10 degrees, Inversion inversion 10 degrees, Eversion to 8 degrees. Bilateral knee AROM WFL     Flexibility: No significant limitations in left gastroc/soleus     Strength/MMT: Hip flexors 5/5, Quadriceps 5/5, Hamstrings 5/5, Ankle dorsiflexors right 5/5, left 4/5, Ankle invertors right 5/5, Left 4/5, Ankle evertors right 5/5, left 4/5     Balance: SLS: R >15 sec, L 2 sec  Tandem Stance  30 seconds      FOTO Score: 54/100    Goals:  Patient to demonstrate independence and compliance with a comprehensive home exercise program Progressing towards  Patient to improve FOTO score to be at least 67/100 Progressing towards  Patient to tolerate ambulating at least 2000 feet without requiring a rest break to facilitate return to work Progressing towards  Patient to tolerate standing activities for at least 1 hour without requiring a rest break to facilitate return to work Progressing towards      Assessment:   The patient has demonstrated improvements in ROM, strength, endurance and tolerance for activity.  He continues to have some balance/stability deficits and would benefit from continued skilled physical therapy to address remaining deficits and functional limitations    Plan:   Continue PT 4 additional visits, 2x per week, for 2 weeks, to progress balance, strength, stability and endurance    Date: 4/29/2022  Tx#: 2/8 Date:  5/2/2022   Tx#: 3/8 Date:   5/4/2022  Tx#: 4/8 Date:   5/9/2022  Tx#: 5/8 Date:   5/11/2022  Tx#: 6/8 Date:   5/13/2022  Tx#: 7/8 Date:   5/16/2022  Tx#: 8/8   TherEx TherEx TherEx TherEx TherEx TherEx TherEx   Nustep L4 x 10 min Nustep L4 x 10 min Nustep L5 x 10 min Nustep L5 x 10 min Nustep L5 x 10 min Nustep L5 x 10 min Nustep L5 x 10 min   Ankle yellow theraband DF/PF/Inv/Ev 2 x 15 each Ankle yellow theraband DF/PF/Inv/Ev 2 x 15 each Shuttle Bilateral 6 bands 3 x 15 Shuttle Bilateral 6 bands 3 x 15 Shuttle Bilateral 6 bands 3 x 15 TRX side lunges x 10 R/L Rockerboard AP/Lateral rocking and balance   Seated heel/toe raises 2 x 15 Shuttle Bilateral 5 bands 3 x 15 Shuttle R/L 4 bands 2 x 15 Shuttle R/L 4 bands 2 x 15 Shuttle R/L 4 bands 2 x 15 Rockerboard AP/Lateral rocking and balance 8 inch step FSU 2 x 15   Rocker board AP/Lateral rocking and balance Shuttle R/L 3 bands 2 x 15 Standing heel raises with ball between ankles 2 x 15 TRX squats 2 x 15 and lateral step/lunges x 10 R/L  Cable 4 plates single arm rows x 15 R/L LSU and over onto Airex x 15 Shuttle Bilateral 6 bands 3 x 15   Shuttle Bilateral 4 bands 3 x 15 Rockerboard AP rocking and balance Balance on Airex with 1 finger assist  8 inch step FSU 2 x 15 Rockerboard AP/Lateral rocking and balance Standing HS stretch 3 x 30 sec Shuttle R/L 4 bands 2 x 15   Shuttle R/L 3 bands 2 x 15 - SLS with 1 finger assist LSU and over onto Airex 2 x 15 FSU and LSU and over onto Airex  Shuttle Bilateral 6 bands 3 x 15 Re-assessment of ROM, strength, gait   Reviewed HEP - Rockerboard AP/lateral rocking and balance Standing HS stretch 3 x 30 sec Standing HS stretch 3 x 30 sec Shuttle R/L 4 bands 2 x 15 Reviewed HEP    - Sidestepping with green band 2 x to fatigue Rockerboard AP/lateral rocking and balance Standing hip flexor stretch 3 x 30 sec Standing hip flexor stretch 3 x 30 sec -    - Reviewed HEP Reviewed HEP Reviewed HEP Cable 4 plates single arm rows x 15 R/L -   HEP: Standing HS stretch, Standing hip flexor stretch, Sidestepping with band, Sit to stand    Charges:  TherEx x 3       Total Timed Treatment: 40 min  Total Treatment Time: 40 min

## 2022-05-18 ENCOUNTER — APPOINTMENT (OUTPATIENT)
Dept: PHYSICAL THERAPY | Age: 42
End: 2022-05-18
Attending: STUDENT IN AN ORGANIZED HEALTH CARE EDUCATION/TRAINING PROGRAM
Payer: COMMERCIAL

## 2022-05-20 ENCOUNTER — OFFICE VISIT (OUTPATIENT)
Dept: PHYSICAL THERAPY | Age: 42
End: 2022-05-20
Attending: STUDENT IN AN ORGANIZED HEALTH CARE EDUCATION/TRAINING PROGRAM
Payer: COMMERCIAL

## 2022-05-20 PROCEDURE — 97110 THERAPEUTIC EXERCISES: CPT

## 2022-05-20 NOTE — PROGRESS NOTES
Dx: Diabetic polyneuropathy associated with type 2 diabetes mellitus ([facility] [address] (E11.42)  Gait instability (R26.81)         Authorized # of Visits:  12  Fall Risk: standard         Precautions: n/a             Subjective:    The patient reports he's been feeling pretty good  Current Pain Ratin/10   Objective:   See flow sheet    Assessment:   The patient tolerated treatment well and continues to demonstrate improved stability on the left LE with balance exercises  Plan:   Progress balance, stability and endurance as tolerated    Date:   2022  Tx#:  Date:   2022  Tx#:  Date:   2022  Tx#:  Date:   2022  Tx#:  Date:  2022  Tx#:    TherEx TherEx TherEx TherEx TherEx   Nustep L5 x 10 min Nustep L5 x 10 min Nustep L5 x 10 min Nustep L5 x 10 min Nustep L5 x 10 min   Shuttle Bilateral 6 bands 3 x 15 Shuttle Bilateral 6 bands 3 x 15 TRX side lunges x 10 R/L Rockerboard AP/Lateral rocking and balance Shuttle Bilateral 6 bands 3 x 15   Shuttle R/L 4 bands 2 x 15 Shuttle R/L 4 bands 2 x 15 Rockerboard AP/Lateral rocking and balance 8 inch step FSU 2 x 15 Shuttle R/L 5 bands 2 x 15   TRX squats 2 x 15 and lateral step/lunges x 10 R/L  Cable 4 plates single arm rows x 15 R/L LSU and over onto Airex x 15 Shuttle Bilateral 6 bands 3 x 15 Standing HS stretch 3 x 30 sec   8 inch step FSU 2 x 15 Rockerboard AP/Lateral rocking and balance Standing HS stretch 3 x 30 sec Shuttle R/L 4 bands 2 x 15 Cable 4 plates single arm rows 2 x 15   LSU and over onto Airex 2 x 15 FSU and LSU and over onto Airex  Shuttle Bilateral 6 bands 3 x 15 Re-assessment of ROM, strength, gait Rockerboard AP/Lateral rocking and balance   Standing HS stretch 3 x 30 sec Standing HS stretch 3 x 30 sec Shuttle R/L 4 bands 2 x 15 Reviewed HEP FTU/LTU from Airex to 8 inch step x 15 R/L each   Rockerboard AP/lateral rocking and balance Standing hip flexor stretch 3 x 30 sec Standing hip flexor stretch 3 x 30 sec - Reviewed HEP Reviewed HEP Reviewed HEP Cable 4 plates single arm rows x 15 R/L - -   HEP: Standing HS stretch, Standing hip flexor stretch, Sidestepping with band, Sit to stand    Charges:  TherEx x 3       Total Timed Treatment: 40 min  Total Treatment Time: 40 min

## 2022-05-23 ENCOUNTER — OFFICE VISIT (OUTPATIENT)
Dept: PHYSICAL THERAPY | Age: 42
End: 2022-05-23
Attending: STUDENT IN AN ORGANIZED HEALTH CARE EDUCATION/TRAINING PROGRAM
Payer: COMMERCIAL

## 2022-05-23 ENCOUNTER — OFFICE VISIT (OUTPATIENT)
Dept: NEPHROLOGY | Facility: CLINIC | Age: 42
End: 2022-05-23
Payer: COMMERCIAL

## 2022-05-23 VITALS — SYSTOLIC BLOOD PRESSURE: 138 MMHG | DIASTOLIC BLOOD PRESSURE: 76 MMHG | BODY MASS INDEX: 31 KG/M2 | WEIGHT: 199 LBS

## 2022-05-23 DIAGNOSIS — I10 ESSENTIAL HYPERTENSION: ICD-10-CM

## 2022-05-23 DIAGNOSIS — N18.4 CKD (CHRONIC KIDNEY DISEASE) STAGE 4, GFR 15-29 ML/MIN (HCC): Primary | ICD-10-CM

## 2022-05-23 PROCEDURE — 97110 THERAPEUTIC EXERCISES: CPT

## 2022-05-23 NOTE — PROGRESS NOTES
Dx: Diabetic polyneuropathy associated with type 2 diabetes mellitus ([facility] [address] (E11.42)  Gait instability (R26.81)         Authorized # of Visits:  12  Fall Risk: standard         Precautions: n/a             Subjective:    The patient reports he's planning to return to work next week  Current Pain Ratin/10   Objective:   See flow sheet    Assessment:   The patient tolerated progression of exercises well without significant difficulty  Plan:   Progress balance, stability and endurance as tolerated    Date:   2022  Tx#:  Date:   2022  Tx#:  Date:   2022  Tx#:  Date:   2022  Tx#:  Date:  2022  Tx#:  Date:  2022  Tx#: 10/12   TherEx TherEx TherEx TherEx TherEx TherEx   Nustep L5 x 10 min Nustep L5 x 10 min Nustep L5 x 10 min Nustep L5 x 10 min Nustep L5 x 10 min Nustep L5 x 10 min   Shuttle Bilateral 6 bands 3 x 15 Shuttle Bilateral 6 bands 3 x 15 TRX side lunges x 10 R/L Rockerboard AP/Lateral rocking and balance Shuttle Bilateral 6 bands 3 x 15 Standing HS and hip flexor stretches 3 x 30 sec each   Shuttle R/L 4 bands 2 x 15 Shuttle R/L 4 bands 2 x 15 Rockerboard AP/Lateral rocking and balance 8 inch step FSU 2 x 15 Shuttle R/L 5 bands 2 x 15 Cable 4 plates single arm rows 2 x 15   TRX squats 2 x 15 and lateral step/lunges x 10 R/L  Cable 4 plates single arm rows x 15 R/L LSU and over onto Airex x 15 Shuttle Bilateral 6 bands 3 x 15 Standing HS stretch 3 x 30 sec TRX squats 2 x 15   8 inch step FSU 2 x 15 Rockerboard AP/Lateral rocking and balance Standing HS stretch 3 x 30 sec Shuttle R/L 4 bands 2 x 15 Cable 4 plates single arm rows 2 x 15 Rockerboard AP/Lateral rocking and balance   LSU and over onto Airex 2 x 15 FSU and LSU and over onto Airex  Shuttle Bilateral 6 bands 3 x 15 Re-assessment of ROM, strength, gait Rockerboard AP/Lateral rocking and balance FTU from Airex to 8 inch step x 15   Standing HS stretch 3 x 30 sec Standing HS stretch 3 x 30 sec Shuttle R/L 4 bands 2 x 15 Reviewed HEP FTU/LTU from Airex to 8 inch step x 15 R/L each Stance on Airex with AP weight shifting   Rockerboard AP/lateral rocking and balance Standing hip flexor stretch 3 x 30 sec Standing hip flexor stretch 3 x 30 sec - Reviewed HEP Shuttle Bilateral 6 bands 3 x 15   Reviewed HEP Reviewed HEP Cable 4 plates single arm rows x 15 R/L - - Shuttle R/L 5 bands 2 x 15   HEP: Standing HS stretch, Standing hip flexor stretch, Sidestepping with band, Sit to stand    Charges:  TherEx x 3       Total Timed Treatment: 40 min  Total Treatment Time: 40 min

## 2022-05-25 ENCOUNTER — TELEPHONE (OUTPATIENT)
Dept: FAMILY MEDICINE CLINIC | Facility: CLINIC | Age: 42
End: 2022-05-25

## 2022-05-25 ENCOUNTER — APPOINTMENT (OUTPATIENT)
Dept: PHYSICAL THERAPY | Age: 42
End: 2022-05-25
Attending: STUDENT IN AN ORGANIZED HEALTH CARE EDUCATION/TRAINING PROGRAM
Payer: COMMERCIAL

## 2022-05-25 NOTE — TELEPHONE ENCOUNTER
Received via fax the diabetic eye exam report from Dr. Marta Aggarwal. Date of exam was 5/20/22, showed severe nonproliferative diabetic retinopathy both eyes. DM flow sheet updated & the report was reviewed/signed by the covering provider/ Dr. Kerri Khanna then sent to scan.

## 2022-05-27 ENCOUNTER — TELEPHONE (OUTPATIENT)
Dept: PODIATRY CLINIC | Facility: CLINIC | Age: 42
End: 2022-05-27

## 2022-05-27 ENCOUNTER — OFFICE VISIT (OUTPATIENT)
Dept: PHYSICAL THERAPY | Age: 42
End: 2022-05-27
Attending: STUDENT IN AN ORGANIZED HEALTH CARE EDUCATION/TRAINING PROGRAM
Payer: COMMERCIAL

## 2022-05-27 ENCOUNTER — OFFICE VISIT (OUTPATIENT)
Dept: PODIATRY CLINIC | Facility: CLINIC | Age: 42
End: 2022-05-27
Payer: COMMERCIAL

## 2022-05-27 DIAGNOSIS — Z87.898 HISTORY OF ULCERATION: Primary | ICD-10-CM

## 2022-05-27 DIAGNOSIS — M89.8X7 EXOSTOSIS OF BONE OF FOOT: ICD-10-CM

## 2022-05-27 DIAGNOSIS — E11.42 DIABETIC POLYNEUROPATHY ASSOCIATED WITH TYPE 2 DIABETES MELLITUS (HCC): ICD-10-CM

## 2022-05-27 DIAGNOSIS — N18.4 STAGE 4 CHRONIC KIDNEY DISEASE (HCC): ICD-10-CM

## 2022-05-27 PROCEDURE — 97110 THERAPEUTIC EXERCISES: CPT

## 2022-05-27 PROCEDURE — 99213 OFFICE O/P EST LOW 20 MIN: CPT | Performed by: STUDENT IN AN ORGANIZED HEALTH CARE EDUCATION/TRAINING PROGRAM

## 2022-05-27 NOTE — PROGRESS NOTES
Dx: Diabetic polyneuropathy associated with type 2 diabetes mellitus ([facility] [address] (E11.42)  Gait instability (R26.81)         Authorized # of Visits:  12  Fall Risk: standard         Precautions: n/a             Subjective: The patient reports he's been returning to work soon and he feels like he's ready and feels comfortable continuing independently with a home exercise program  Current Pain Ratin/10   Objective:   Gait: The patient ambulates with improved gait pattern, with decreased heel strike and push off on the left     ROM: Left ankle AROM dorsiflexion 10 degrees, Inversion inversion 10 degrees, Eversion to 8 degrees. Bilateral knee AROM WFL     Flexibility: No significant limitations in left gastroc/soleus     Strength/MMT: Hip flexors 5/5, Quadriceps 5/5, Hamstrings 5/5, Ankle dorsiflexors right 5/5, left 4/5, Ankle invertors right 5/5, Left 4/5, Ankle evertors right 5/5, left 4/5     Balance: SLS: R >15 sec, L 2 sec  Tandem Stance  30 seconds        Goals:  Patient to demonstrate independence and compliance with a comprehensive home exercise program Met  Patient to improve FOTO score to be at least 67/100 Progressing towards  Patient to tolerate ambulating at least 2000 feet without requiring a rest break to facilitate return to work Progressing towards  Patient to tolerate standing activities for at least 1 hour without requiring a rest break to facilitate return to work Progressing towards      Assessment:   The patient has demonstrated improvements in balance, functional strength and tolerance for ambulation and activity.  He has been given a home exercise program and has demonstrated/verbalized a good understanding of the exercises  Plan:   The patient will be discharged from outpatient physical therapy and will continue independently with a home exercise program    Date:   2022  Tx#:  Date:  2022  Tx#:  Date:  2022  Tx#: 10/12 Date:  2022  Tx#:    TherEx TherEx TherEx TherEx   Nustep L5 x 10 min Nustep L5 x 10 min Nustep L5 x 10 min Nustep L5 x 10 min   Rockerboard AP/Lateral rocking and balance Shuttle Bilateral 6 bands 3 x 15 Standing HS and hip flexor stretches 3 x 30 sec each Standing HS and hip flexor stretches 3 x 30 sec each   8 inch step FSU 2 x 15 Shuttle R/L 5 bands 2 x 15 Cable 4 plates single arm rows 2 x 15 Green band single arm rows 2 x 15   Shuttle Bilateral 6 bands 3 x 15 Standing HS stretch 3 x 30 sec TRX squats 2 x 15 Rockerboard AP/Lateral rocking and balance   Shuttle R/L 4 bands 2 x 15 Cable 4 plates single arm rows 2 x 15 Rockerboard AP/Lateral rocking and balance Stance on Airex FTU/LTU to 8 inch step   Re-assessment of ROM, strength, gait Rockerboard AP/Lateral rocking and balance FTU from Airex to 8 inch step x 15 Shuttle Bilateral 6 bands 3 x 15   Reviewed HEP FTU/LTU from Airex to 8 inch step x 15 R/L each Stance on Airex with AP weight shifting Shuttle R/L 5 bands 2 x 15   - Reviewed HEP Shuttle Bilateral 6 bands 3 x 15 Reviewed HEP   - - Shuttle R/L 5 bands 2 x 15 -   HEP: Standing HS stretch, Standing hip flexor stretch, Sidestepping with band, Sit to stand    Charges:  TherEx x 3       Total Timed Treatment: 40 min  Total Treatment Time: 40 min

## 2022-06-01 NOTE — PATIENT INSTRUCTIONS
- Continue ambulate with custom accommodative inserts and supportive shoes.  -Continue daily foot checks. Use urea cream to callus sites. Seek immediate medical attention for any acute signs of infection, skin breakdown, or other concerns arise.  -Can return to work without restrictions at this time.  -Okay to be discharged from surgical care.

## 2022-06-03 NOTE — TELEPHONE ENCOUNTER
Dr. Abhay Barker,     Please sign off on form:  -Highlight the patient and hit \"Chart\" button. -In Chart Review, w/in the Encounter tab - click 1 time on the Telephone call encounter for 5/27/22 Scroll down the telephone encounter.  -Click \"scan on\" blue Hyperlink under \"Media\" heading for Disab Dr. Abhay Barker  6/3/22 w/in the telephone enc.  -Click on Acknowledge button at the bottom right corner and left-click onto image, signature stamp appears and drag signature to Provider signature line. Stamp will turn blue. Close window.      Thank you,    Mahogany Poe

## 2022-06-17 ENCOUNTER — LAB ENCOUNTER (OUTPATIENT)
Dept: LAB | Age: 42
End: 2022-06-17
Attending: FAMILY MEDICINE
Payer: COMMERCIAL

## 2022-06-17 DIAGNOSIS — E11.69 DIABETES MELLITUS TYPE 2 IN OBESE (HCC): ICD-10-CM

## 2022-06-17 DIAGNOSIS — E66.9 DIABETES MELLITUS TYPE 2 IN OBESE (HCC): ICD-10-CM

## 2022-06-17 DIAGNOSIS — E11.8 CONTROLLED TYPE 2 DIABETES MELLITUS WITH COMPLICATION, WITHOUT LONG-TERM CURRENT USE OF INSULIN (HCC): ICD-10-CM

## 2022-06-17 LAB
ALBUMIN SERPL-MCNC: 4.1 G/DL (ref 3.4–5)
ALBUMIN/GLOB SERPL: 1.2 {RATIO} (ref 1–2)
ALP LIVER SERPL-CCNC: 96 U/L
ALT SERPL-CCNC: 23 U/L
ANION GAP SERPL CALC-SCNC: 5 MMOL/L (ref 0–18)
AST SERPL-CCNC: 23 U/L (ref 15–37)
BILIRUB SERPL-MCNC: 0.5 MG/DL (ref 0.1–2)
BUN BLD-MCNC: 39 MG/DL (ref 7–18)
CALCIUM BLD-MCNC: 9.2 MG/DL (ref 8.5–10.1)
CHLORIDE SERPL-SCNC: 113 MMOL/L (ref 98–112)
CHOLEST SERPL-MCNC: 150 MG/DL (ref ?–200)
CO2 SERPL-SCNC: 23 MMOL/L (ref 21–32)
CREAT BLD-MCNC: 2.79 MG/DL
CREAT UR-SCNC: 62.7 MG/DL
EST. AVERAGE GLUCOSE BLD GHB EST-MCNC: 137 MG/DL (ref 68–126)
FASTING PATIENT LIPID ANSWER: YES
FASTING STATUS PATIENT QL REPORTED: YES
GLOBULIN PLAS-MCNC: 3.4 G/DL (ref 2.8–4.4)
GLUCOSE BLD-MCNC: 112 MG/DL (ref 70–99)
HBA1C MFR BLD: 6.4 % (ref ?–5.7)
HDLC SERPL-MCNC: 36 MG/DL (ref 40–59)
LDLC SERPL CALC-MCNC: 99 MG/DL (ref ?–100)
MICROALBUMIN UR-MCNC: 10.8 MG/DL
MICROALBUMIN/CREAT 24H UR-RTO: 172.2 UG/MG (ref ?–30)
NONHDLC SERPL-MCNC: 114 MG/DL (ref ?–130)
OSMOLALITY SERPL CALC.SUM OF ELEC: 302 MOSM/KG (ref 275–295)
POTASSIUM SERPL-SCNC: 5.1 MMOL/L (ref 3.5–5.1)
PROT SERPL-MCNC: 7.5 G/DL (ref 6.4–8.2)
SODIUM SERPL-SCNC: 141 MMOL/L (ref 136–145)
T4 FREE SERPL-MCNC: 0.9 NG/DL (ref 0.8–1.7)
TRIGL SERPL-MCNC: 77 MG/DL (ref 30–149)
TSI SER-ACNC: 2.41 MIU/ML (ref 0.36–3.74)
VIT D+METAB SERPL-MCNC: 31 NG/ML (ref 30–100)
VLDLC SERPL CALC-MCNC: 13 MG/DL (ref 0–30)

## 2022-06-17 PROCEDURE — 82570 ASSAY OF URINE CREATININE: CPT | Performed by: FAMILY MEDICINE

## 2022-06-17 PROCEDURE — 3060F POS MICROALBUMINURIA REV: CPT | Performed by: FAMILY MEDICINE

## 2022-06-17 PROCEDURE — 3044F HG A1C LEVEL LT 7.0%: CPT | Performed by: FAMILY MEDICINE

## 2022-06-17 PROCEDURE — 80061 LIPID PANEL: CPT | Performed by: FAMILY MEDICINE

## 2022-06-17 PROCEDURE — 80053 COMPREHEN METABOLIC PANEL: CPT | Performed by: FAMILY MEDICINE

## 2022-06-17 PROCEDURE — 3061F NEG MICROALBUMINURIA REV: CPT | Performed by: FAMILY MEDICINE

## 2022-06-17 PROCEDURE — 82043 UR ALBUMIN QUANTITATIVE: CPT | Performed by: FAMILY MEDICINE

## 2022-06-17 PROCEDURE — 84439 ASSAY OF FREE THYROXINE: CPT | Performed by: FAMILY MEDICINE

## 2022-06-17 PROCEDURE — 84443 ASSAY THYROID STIM HORMONE: CPT | Performed by: FAMILY MEDICINE

## 2022-06-17 PROCEDURE — 83036 HEMOGLOBIN GLYCOSYLATED A1C: CPT | Performed by: FAMILY MEDICINE

## 2022-06-17 PROCEDURE — 82306 VITAMIN D 25 HYDROXY: CPT | Performed by: FAMILY MEDICINE

## 2022-06-24 ENCOUNTER — OFFICE VISIT (OUTPATIENT)
Dept: FAMILY MEDICINE CLINIC | Facility: CLINIC | Age: 42
End: 2022-06-24
Payer: COMMERCIAL

## 2022-06-24 ENCOUNTER — OFFICE VISIT (OUTPATIENT)
Dept: PODIATRY CLINIC | Facility: CLINIC | Age: 42
End: 2022-06-24
Payer: COMMERCIAL

## 2022-06-24 ENCOUNTER — PATIENT MESSAGE (OUTPATIENT)
Dept: FAMILY MEDICINE CLINIC | Facility: CLINIC | Age: 42
End: 2022-06-24

## 2022-06-24 VITALS
BODY MASS INDEX: 32.49 KG/M2 | WEIGHT: 207 LBS | HEART RATE: 77 BPM | RESPIRATION RATE: 16 BRPM | OXYGEN SATURATION: 98 % | TEMPERATURE: 98 F | DIASTOLIC BLOOD PRESSURE: 90 MMHG | SYSTOLIC BLOOD PRESSURE: 162 MMHG | HEIGHT: 67 IN

## 2022-06-24 DIAGNOSIS — E78.1 PURE HYPERGLYCERIDEMIA: ICD-10-CM

## 2022-06-24 DIAGNOSIS — E78.5 DYSLIPIDEMIA: Primary | ICD-10-CM

## 2022-06-24 DIAGNOSIS — M20.42 HAMMER TOES OF BOTH FEET: ICD-10-CM

## 2022-06-24 DIAGNOSIS — B35.1 ONYCHOMYCOSIS: Primary | ICD-10-CM

## 2022-06-24 DIAGNOSIS — E11.42 DIABETIC POLYNEUROPATHY ASSOCIATED WITH TYPE 2 DIABETES MELLITUS (HCC): ICD-10-CM

## 2022-06-24 DIAGNOSIS — E78.2 MIXED HYPERLIPIDEMIA: ICD-10-CM

## 2022-06-24 DIAGNOSIS — M20.41 HAMMER TOES OF BOTH FEET: ICD-10-CM

## 2022-06-24 DIAGNOSIS — Z87.898 HISTORY OF ULCERATION: ICD-10-CM

## 2022-06-24 DIAGNOSIS — E66.9 DIABETES MELLITUS TYPE 2 IN OBESE (HCC): ICD-10-CM

## 2022-06-24 DIAGNOSIS — M89.8X7 EXOSTOSIS OF BONE OF FOOT: ICD-10-CM

## 2022-06-24 DIAGNOSIS — E11.69 DIABETES MELLITUS TYPE 2 IN OBESE (HCC): ICD-10-CM

## 2022-06-24 DIAGNOSIS — E11.8 CONTROLLED TYPE 2 DIABETES MELLITUS WITH COMPLICATION, WITHOUT LONG-TERM CURRENT USE OF INSULIN (HCC): ICD-10-CM

## 2022-06-24 DIAGNOSIS — N18.4 STAGE 4 CHRONIC KIDNEY DISEASE (HCC): ICD-10-CM

## 2022-06-24 DIAGNOSIS — I10 ESSENTIAL HYPERTENSION: ICD-10-CM

## 2022-06-24 PROCEDURE — 3080F DIAST BP >= 90 MM HG: CPT | Performed by: FAMILY MEDICINE

## 2022-06-24 PROCEDURE — 99215 OFFICE O/P EST HI 40 MIN: CPT | Performed by: FAMILY MEDICINE

## 2022-06-24 PROCEDURE — 3008F BODY MASS INDEX DOCD: CPT | Performed by: FAMILY MEDICINE

## 2022-06-24 PROCEDURE — 3077F SYST BP >= 140 MM HG: CPT | Performed by: FAMILY MEDICINE

## 2022-06-24 PROCEDURE — 99213 OFFICE O/P EST LOW 20 MIN: CPT | Performed by: STUDENT IN AN ORGANIZED HEALTH CARE EDUCATION/TRAINING PROGRAM

## 2022-06-24 RX ORDER — POTASSIUM CHLORIDE 750 MG/1
10 TABLET, FILM COATED, EXTENDED RELEASE ORAL DAILY
Qty: 90 TABLET | Refills: 0 | Status: SHIPPED | OUTPATIENT
Start: 2022-06-24 | End: 2022-09-22

## 2022-06-24 NOTE — PATIENT INSTRUCTIONS
KEEP A BLOOD PRESSURE LOG, CHECK IT EVERY MORNING FOR THE NEXT WEEK AND SEND US A Woto MESSAGE WITH ALL VALUES.

## 2022-06-24 NOTE — TELEPHONE ENCOUNTER
From: Sunshine Tobin  To: Jj Sweet DO  Sent: 6/24/2022 10:19 AM CDT  Subject: Mahendra Peres    I forgot to ask at my appointment. With my A1C being better controlled for a while, I was wondering if I could possibly get a prescription for viagara to see if it would help with my ED.     Richelle Kaplan

## 2022-06-29 RX ORDER — SILDENAFIL 25 MG/1
25 TABLET, FILM COATED ORAL
Qty: 10 TABLET | Refills: 0 | Status: SHIPPED | OUTPATIENT
Start: 2022-06-29 | End: 2022-07-01

## 2022-06-30 ENCOUNTER — TELEPHONE (OUTPATIENT)
Dept: PODIATRY CLINIC | Facility: CLINIC | Age: 42
End: 2022-06-30

## 2022-06-30 NOTE — TELEPHONE ENCOUNTER
Letter received from [facility] disability determination request. Forwarded to forms department via inter office and email

## 2022-06-30 NOTE — TELEPHONE ENCOUNTER
rx sent. Instruct him to read the patient information leaflet prior to starting medication. He can take one tablet 30 minutes to 1 hr  before sexual activity. If he is ever placed on a Nitrate medication he cannot take viagra, this can be life threatening    Go to ED if erection is > 2 hours or painful.

## 2022-07-01 RX ORDER — SILDENAFIL 25 MG/1
25 TABLET, FILM COATED ORAL
Qty: 10 TABLET | Refills: 0 | Status: SHIPPED | OUTPATIENT
Start: 2022-07-01

## 2022-07-01 NOTE — TELEPHONE ENCOUNTER
Patient would like to  paper copy of rx. Sildenafil Citrate 25 MG Oral Tab 10 tablet 0 6/29/2022     Sig - Route:  Take 1 tablet (25 mg total) by mouth daily as needed for Erectile Dysfunction. - Oral    Sent to pharmacy as: Sildenafil Citrate 25 MG Oral Tablet (VIAGRA)

## 2022-07-07 DIAGNOSIS — B35.1 ONYCHOMYCOSIS: Primary | ICD-10-CM

## 2022-07-07 RX ORDER — TERBINAFINE HYDROCHLORIDE 250 MG/1
250 TABLET ORAL DAILY
Qty: 90 TABLET | Refills: 0 | Status: SHIPPED | OUTPATIENT
Start: 2022-07-07

## 2022-07-27 ENCOUNTER — OFFICE VISIT (OUTPATIENT)
Dept: PODIATRY CLINIC | Facility: CLINIC | Age: 42
End: 2022-07-27
Payer: COMMERCIAL

## 2022-07-27 DIAGNOSIS — M20.41 HAMMER TOES OF BOTH FEET: ICD-10-CM

## 2022-07-27 DIAGNOSIS — Z87.898 HISTORY OF ULCERATION: ICD-10-CM

## 2022-07-27 DIAGNOSIS — L84 CALLUS OF FOOT: ICD-10-CM

## 2022-07-27 DIAGNOSIS — B35.1 ONYCHOMYCOSIS: Primary | ICD-10-CM

## 2022-07-27 DIAGNOSIS — E11.42 DIABETIC POLYNEUROPATHY ASSOCIATED WITH TYPE 2 DIABETES MELLITUS (HCC): ICD-10-CM

## 2022-07-27 DIAGNOSIS — M20.42 HAMMER TOES OF BOTH FEET: ICD-10-CM

## 2022-07-27 DIAGNOSIS — M89.8X7 EXOSTOSIS OF BONE OF FOOT: ICD-10-CM

## 2022-07-27 DIAGNOSIS — N18.4 STAGE 4 CHRONIC KIDNEY DISEASE (HCC): ICD-10-CM

## 2022-07-27 PROCEDURE — 99213 OFFICE O/P EST LOW 20 MIN: CPT | Performed by: STUDENT IN AN ORGANIZED HEALTH CARE EDUCATION/TRAINING PROGRAM

## 2022-07-31 NOTE — PATIENT INSTRUCTIONS
-Use urea cream to callus sites daily.  -Take oral lamisil as prescribed for management of onychomycosis. -Continue ambulating with accommodative shoes and inserts.

## 2022-08-24 ENCOUNTER — MED REC SCAN ONLY (OUTPATIENT)
Dept: NEPHROLOGY | Facility: CLINIC | Age: 42
End: 2022-08-24

## 2022-08-24 ENCOUNTER — TELEPHONE (OUTPATIENT)
Dept: FAMILY MEDICINE CLINIC | Facility: CLINIC | Age: 42
End: 2022-08-24

## 2022-08-24 DIAGNOSIS — D64.9 LOW HEMOGLOBIN AND LOW HEMATOCRIT: Primary | ICD-10-CM

## 2022-08-24 DIAGNOSIS — E87.5 HYPERKALEMIA: Primary | ICD-10-CM

## 2022-08-24 LAB
AMB EXT BILIRUBIN, TOTAL: 0.3 MG/DL (ref 0.2–1.2)
AMB EXT BUN: 59 MG/DL (ref 7–25)
AMB EXT CALCIUM: 8.6 (ref 8.6–10.3)
AMB EXT CARBON DIOXIDE: 18 (ref 20–32)
AMB EXT CHLORIDE: 110 (ref 98–110)
AMB EXT CMP ALT: 10 U/L (ref 9–46)
AMB EXT CMP AST: 28 U/L (ref 10–40)
AMB EXT CREATININE: 3.13 MG/DL (ref 0.6–1.29)
AMB EXT GLUCOSE: 94 MG/DL (ref 65–139)
AMB EXT HEMATOCRIT: 21.6 (ref 38.5–45)
AMB EXT HEMOGLOBIN: 6.8 (ref 13.2–15.5)
AMB EXT MCV: 92.7 (ref 80–100)
AMB EXT PLATELETS: 321 (ref 140–400)
AMB EXT POSTASSIUM: 5.4 MMOL/L (ref 3.4–4.8)
AMB EXT SODIUM: 139 MMOL/L (ref 135–146)
AMB EXT TOTAL PROTEIN: 7.2 (ref 6.4–8.4)
AMB EXT WBC: 9.9 X10(3)UL (ref 3.8–10.8)
SED RATE BY MODIFIED$WESTERGREN: >130 MM/H

## 2022-08-24 NOTE — TELEPHONE ENCOUNTER
- 's office is calling with abnormal labs.   Ferdinand Khan is faxing results and would like a call back     Please call [phone]

## 2022-08-24 NOTE — TELEPHONE ENCOUNTER
Reviewed his recent hospital stay in [place] for cellulitis/JESSICA. I spoke with the patient. He denies sob, chest pain, palpitations, dizziness. Please order H/H to be drawn tomorrow. He needs to see Hematologist soon for low hgb. Place referral and notify patient. He declined to go to the ER for blood transfusion. Pt understands risk of severe anemia. He has appt with Dr. Ameya Sarmiento tomorrow. Please fax labs to his office today.

## 2022-08-24 NOTE — TELEPHONE ENCOUNTER
Labs ordered, referral placed, results faxed to Dr. Drew Allna, [phone], confirmation received.

## 2022-08-24 NOTE — TELEPHONE ENCOUNTER
See TE please advise. Results received, abnormal results below:    BUN 59  Creatinine 3.13  Potassium 5.4  Sed Rate > 130  RBC 2.33  HGB 6.8   hematocrit 21.6  Absolute neutrophils 8336    Results abstracted    Copy of results sent to scan, copy in triage accordion folder.

## 2022-08-25 ENCOUNTER — TELEPHONE (OUTPATIENT)
Dept: FAMILY MEDICINE CLINIC | Facility: CLINIC | Age: 42
End: 2022-08-25

## 2022-08-25 ENCOUNTER — OFFICE VISIT (OUTPATIENT)
Dept: NEPHROLOGY | Facility: CLINIC | Age: 42
End: 2022-08-25
Payer: COMMERCIAL

## 2022-08-25 VITALS — BODY MASS INDEX: 36 KG/M2 | DIASTOLIC BLOOD PRESSURE: 64 MMHG | WEIGHT: 229.13 LBS | SYSTOLIC BLOOD PRESSURE: 148 MMHG

## 2022-08-25 DIAGNOSIS — N18.4 ANEMIA IN STAGE 4 CHRONIC KIDNEY DISEASE (HCC): ICD-10-CM

## 2022-08-25 DIAGNOSIS — D63.1 ANEMIA IN STAGE 4 CHRONIC KIDNEY DISEASE (HCC): ICD-10-CM

## 2022-08-25 DIAGNOSIS — N18.4 CKD (CHRONIC KIDNEY DISEASE) STAGE 4, GFR 15-29 ML/MIN (HCC): Primary | ICD-10-CM

## 2022-08-25 DIAGNOSIS — I10 ESSENTIAL HYPERTENSION: ICD-10-CM

## 2022-08-25 PROCEDURE — 99214 OFFICE O/P EST MOD 30 MIN: CPT | Performed by: INTERNAL MEDICINE

## 2022-08-25 PROCEDURE — 3077F SYST BP >= 140 MM HG: CPT | Performed by: INTERNAL MEDICINE

## 2022-08-25 PROCEDURE — 3078F DIAST BP <80 MM HG: CPT | Performed by: INTERNAL MEDICINE

## 2022-08-26 ENCOUNTER — OFFICE VISIT (OUTPATIENT)
Dept: PODIATRY CLINIC | Facility: CLINIC | Age: 42
End: 2022-08-26
Payer: COMMERCIAL

## 2022-08-26 DIAGNOSIS — L84 CALLUS OF FOOT: ICD-10-CM

## 2022-08-26 DIAGNOSIS — Z87.898 HISTORY OF ULCERATION: Primary | ICD-10-CM

## 2022-08-26 DIAGNOSIS — B35.1 ONYCHOMYCOSIS: ICD-10-CM

## 2022-08-26 DIAGNOSIS — M89.8X7 EXOSTOSIS OF BONE OF FOOT: ICD-10-CM

## 2022-08-26 DIAGNOSIS — N18.4 STAGE 4 CHRONIC KIDNEY DISEASE (HCC): ICD-10-CM

## 2022-08-26 DIAGNOSIS — M20.42 HAMMER TOES OF BOTH FEET: ICD-10-CM

## 2022-08-26 DIAGNOSIS — E11.42 DIABETIC POLYNEUROPATHY ASSOCIATED WITH TYPE 2 DIABETES MELLITUS (HCC): ICD-10-CM

## 2022-08-26 DIAGNOSIS — M20.41 HAMMER TOES OF BOTH FEET: ICD-10-CM

## 2022-08-26 PROCEDURE — 99213 OFFICE O/P EST LOW 20 MIN: CPT | Performed by: STUDENT IN AN ORGANIZED HEALTH CARE EDUCATION/TRAINING PROGRAM

## 2022-08-27 NOTE — PATIENT INSTRUCTIONS
-Continue using urea cream to callus site between visits.  -Continue ambulate with accommodative inserts and supportive tennis shoes.  -Continue IV antibiotics per infectious disease for right lower extremity cellulitis. -Follow-up up in 4 to 6 weeks for reevaluation.

## 2022-08-30 DIAGNOSIS — L03.115 CELLULITIS OF LEG, RIGHT: ICD-10-CM

## 2022-08-30 DIAGNOSIS — R78.81 BACTEREMIA DUE TO GRAM-NEGATIVE BACTERIA: Primary | ICD-10-CM

## 2022-08-31 ENCOUNTER — TELEPHONE (OUTPATIENT)
Dept: FAMILY MEDICINE CLINIC | Facility: CLINIC | Age: 42
End: 2022-08-31

## 2022-08-31 ENCOUNTER — ORDER TRANSCRIPTION (OUTPATIENT)
Dept: ADMINISTRATIVE | Facility: HOSPITAL | Age: 42
End: 2022-08-31

## 2022-08-31 DIAGNOSIS — L03.115 CELLULITIS OF LEG, RIGHT: ICD-10-CM

## 2022-08-31 DIAGNOSIS — R78.81 BACTEREMIA DUE TO GRAM-NEGATIVE BACTERIA: Primary | ICD-10-CM

## 2022-08-31 NOTE — TELEPHONE ENCOUNTER
We received DANTE paperwork from [employer] to be completed by Dr. Paola Mcclellan. The paperwork was placed in her folder and pink sheet and fee sheet have been attached.

## 2022-09-01 ENCOUNTER — TELEPHONE (OUTPATIENT)
Dept: FAMILY MEDICINE CLINIC | Facility: CLINIC | Age: 42
End: 2022-09-01

## 2022-09-01 ENCOUNTER — OFFICE VISIT (OUTPATIENT)
Dept: FAMILY MEDICINE CLINIC | Facility: CLINIC | Age: 42
End: 2022-09-01
Payer: COMMERCIAL

## 2022-09-01 VITALS
HEART RATE: 88 BPM | WEIGHT: 204 LBS | SYSTOLIC BLOOD PRESSURE: 138 MMHG | HEIGHT: 67 IN | TEMPERATURE: 98 F | DIASTOLIC BLOOD PRESSURE: 74 MMHG | RESPIRATION RATE: 16 BRPM | BODY MASS INDEX: 32.02 KG/M2

## 2022-09-01 DIAGNOSIS — Z09 HOSPITAL DISCHARGE FOLLOW-UP: Primary | ICD-10-CM

## 2022-09-01 DIAGNOSIS — L03.115 CELLULITIS OF LEG, RIGHT: ICD-10-CM

## 2022-09-01 DIAGNOSIS — M25.461 KNEE EFFUSION, RIGHT: ICD-10-CM

## 2022-09-01 DIAGNOSIS — M25.561 ACUTE PAIN OF RIGHT KNEE: ICD-10-CM

## 2022-09-01 PROCEDURE — 3075F SYST BP GE 130 - 139MM HG: CPT | Performed by: FAMILY MEDICINE

## 2022-09-01 PROCEDURE — 99215 OFFICE O/P EST HI 40 MIN: CPT | Performed by: FAMILY MEDICINE

## 2022-09-01 PROCEDURE — 3078F DIAST BP <80 MM HG: CPT | Performed by: FAMILY MEDICINE

## 2022-09-01 PROCEDURE — 3008F BODY MASS INDEX DOCD: CPT | Performed by: FAMILY MEDICINE

## 2022-09-01 NOTE — TELEPHONE ENCOUNTER
Faxed disability ppw to [address] [place] [address]. Faxed to : [phone]- fax confirmation received. Copy given to patient. Copy sent to scan. Original ppw placed in patient ppw folder with fax confirmation.

## 2022-09-02 ENCOUNTER — HOSPITAL ENCOUNTER (OUTPATIENT)
Dept: GENERAL RADIOLOGY | Age: 42
Discharge: HOME | End: 2022-09-02
Attending: FAMILY MEDICINE
Payer: COMMERCIAL

## 2022-09-02 ENCOUNTER — TELEPHONE (OUTPATIENT)
Dept: FAMILY MEDICINE CLINIC | Facility: CLINIC | Age: 42
End: 2022-09-02

## 2022-09-02 DIAGNOSIS — Z01.89 ENCOUNTER FOR LOWER EXTREMITY COMPARISON IMAGING STUDY: ICD-10-CM

## 2022-09-02 DIAGNOSIS — M25.561 ACUTE PAIN OF RIGHT KNEE: ICD-10-CM

## 2022-09-02 PROCEDURE — 73562 X-RAY EXAM OF KNEE 3: CPT | Performed by: FAMILY MEDICINE

## 2022-09-02 PROCEDURE — 73564 X-RAY EXAM KNEE 4 OR MORE: CPT | Performed by: FAMILY MEDICINE

## 2022-09-02 NOTE — TELEPHONE ENCOUNTER
Reviewed home health ordered BMP. Creatinine has stabilized at 2.75 his potassium remains elevated at 5.8. ESR elevated over 130. Hemoglobin 7.5. Given his hyperkalemia I have messaged his nephrologist regarding labs and for advice on management.

## 2022-09-07 ENCOUNTER — TELEPHONE (OUTPATIENT)
Dept: FAMILY MEDICINE CLINIC | Facility: CLINIC | Age: 42
End: 2022-09-07

## 2022-09-07 NOTE — TELEPHONE ENCOUNTER
Had recd request on 9-1-22 from [facility] for chart notes from 9-1-22. No date of birth listed on request, faxed back to [place] at [phone]. Recd corrected request today with patient's date of birth. Faxed office visit notes from 9-1-22 to [phone]. Confirmation recd.

## 2022-09-08 ENCOUNTER — TELEPHONE (OUTPATIENT)
Dept: FAMILY MEDICINE CLINIC | Facility: CLINIC | Age: 42
End: 2022-09-08

## 2022-09-08 ENCOUNTER — HOSPITAL ENCOUNTER (OUTPATIENT)
Dept: ULTRASOUND IMAGING | Facility: HOSPITAL | Age: 42
Discharge: HOME | End: 2022-09-08
Attending: FAMILY MEDICINE
Payer: COMMERCIAL

## 2022-09-08 DIAGNOSIS — L03.115 CELLULITIS OF LEG, RIGHT: ICD-10-CM

## 2022-09-08 DIAGNOSIS — E87.5 HYPERKALEMIA: Primary | ICD-10-CM

## 2022-09-08 DIAGNOSIS — M25.461 KNEE EFFUSION, RIGHT: ICD-10-CM

## 2022-09-08 DIAGNOSIS — M25.561 ACUTE PAIN OF RIGHT KNEE: ICD-10-CM

## 2022-09-08 PROCEDURE — 76881 US COMPL JOINT R-T W/IMG: CPT | Performed by: FAMILY MEDICINE

## 2022-09-08 NOTE — TELEPHONE ENCOUNTER
Calling with a question about the orders for the US-should be R knee limited or should include aspirate? Is this to rule of septic arthritis? Pt is there for his appt.

## 2022-09-08 NOTE — TELEPHONE ENCOUNTER
9/1/22 OV note faxed to [facility], [phone], confirmation received. Called to inform patient that she have to be seen in clinic for her refill request. No answer, unable to lvm.      ----- Message from Alison Miranda sent at 4/27/2022 11:40 AM CDT -----  Contact: Susie, [phone]  Requesting an RX refill or new RX.  Is this a refill or new RX: Refill  RX name and strength (copy/paste from chart):  ibuprofen (ADVIL,MOTRIN) 800 MG tablet  Is this a 30 day or 90 day RX: 30  Pharmacy name and phone # (copy/paste from chart):    [id number] - [place], [place] - [address] AT [address] OF [address] [address]  Phone: [phone] Fax: [phone]  The doctors have asked that we provide their patients with the following 2 reminders -- prescription refills can take up to 72 hours, and a friendly reminder that in the future you can use your [facility] account to request refills: Yes        Requesting an RX refill or new RX.  Is this a refill or new RX: Refill  RX name and strength (copy/paste from chart):  gabapentin (NEURONTIN) 300 MG capsule  Is this a 30 day or 90 day RX: 30  Pharmacy name and phone # (copy/paste from chart):    [id number] - [place], [place] - [address] AT [address] [address]  Phone: [phone] Fax: [phone]  The doctors have asked that we provide their patients with the following 2 reminders -- prescription refills can take up to 72 hours, and a friendly reminder that in the future you can use your [facility] account to request refills: Yes        Requesting an RX refill or new RX.  Is this a refill or new RX: Refill  RX name and strength (copy/paste from chart):  tiZANidine (ZANAFLEX) 4 MG tablet  Is this a 30 day or 90 day RX: 30  Pharmacy name and phone # (copy/paste from chart):    [id number] - [place], [place] - [address] AT [address] [address]  Phone: [phone] Fax: [phone]  The doctors have asked that we provide their patients  with the following 2 reminders -- prescription refills can take up to 72 hours, and a friendly reminder that in the future you can use your [facility] account to request refills: Yes

## 2022-09-08 NOTE — TELEPHONE ENCOUNTER
Spoke with pt and advised to have repeat BMP done, pt verbalized understanding. Pt states he also needs his last OV note faxed to leave management at [phone].

## 2022-09-08 NOTE — PROGRESS NOTES
I ordered a repeat BMP on him. His K was elevated last wee at 5.8. Results were also faxed to his nephrologist from home health. Please notify him to get labs done.

## 2022-09-12 ENCOUNTER — OFFICE VISIT (OUTPATIENT)
Dept: ORTHOPEDICS CLINIC | Facility: CLINIC | Age: 42
End: 2022-09-12
Payer: COMMERCIAL

## 2022-09-12 VITALS — HEIGHT: 68 IN | WEIGHT: 195 LBS | BODY MASS INDEX: 29.55 KG/M2

## 2022-09-12 DIAGNOSIS — S83.206A POSITIVE MCMURRAY TEST OF RIGHT KNEE, INITIAL ENCOUNTER: ICD-10-CM

## 2022-09-12 DIAGNOSIS — M17.11 PRIMARY OSTEOARTHRITIS OF RIGHT KNEE: Primary | ICD-10-CM

## 2022-09-12 PROCEDURE — 3008F BODY MASS INDEX DOCD: CPT | Performed by: PHYSICIAN ASSISTANT

## 2022-09-12 PROCEDURE — 99204 OFFICE O/P NEW MOD 45 MIN: CPT | Performed by: PHYSICIAN ASSISTANT

## 2022-09-13 ENCOUNTER — HOSPITAL ENCOUNTER (OUTPATIENT)
Dept: CT IMAGING | Facility: HOSPITAL | Age: 42
Discharge: HOME | End: 2022-09-13
Attending: INTERNAL MEDICINE
Payer: COMMERCIAL

## 2022-09-13 DIAGNOSIS — R78.81 BACTEREMIA DUE TO GRAM-NEGATIVE BACTERIA: ICD-10-CM

## 2022-09-13 DIAGNOSIS — L03.115 CELLULITIS OF LEG, RIGHT: ICD-10-CM

## 2022-09-15 NOTE — PROGRESS NOTES
[employer] message sent to the pt for condition update. Initial Post Discharge Follow Up   Discharge Date: 2/20/18  Contact Date: 2/21/2018    Consent Verification:  Assessment Completed With: Patient  HIPAA Verified?   Yes    Discharge Dx:   Diabetic f AUTOCOVER) 30G X 8 MM Does not apply Misc Use a new pen needle with each injection as directed by your doctor Disp: 50 each Rfl: 6   Glucose Blood In Vitro Strip Medicare Only:If on oral medications, test blood glucose once a day in the morning before marlene yes  o Was the new medication’s side effects discussed? yes  o Do you have any questions about your new medication?  No  • Did you  your discharge medications when you left the hospital? Yes  • May I go over your medications with you to make sure we DIABETES [place])        [facility]  [facility] [address] [place], [address]  [facility]  [place] [address] [place] [address].  [place] [address] 15-Sep-2022

## 2022-09-17 ENCOUNTER — LAB ENCOUNTER (OUTPATIENT)
Dept: LAB | Age: 42
End: 2022-09-17
Attending: INTERNAL MEDICINE

## 2022-09-17 DIAGNOSIS — E87.5 HYPERKALEMIA: ICD-10-CM

## 2022-09-17 DIAGNOSIS — Z01.818 PRE-OP TESTING: ICD-10-CM

## 2022-09-17 LAB
ANION GAP SERPL CALC-SCNC: 5 MMOL/L (ref 0–18)
BASOPHILS # BLD AUTO: 0.04 X10(3) UL (ref 0–0.2)
BASOPHILS NFR BLD AUTO: 0.6 %
BUN BLD-MCNC: 48 MG/DL (ref 7–18)
CALCIUM BLD-MCNC: 9.4 MG/DL (ref 8.5–10.1)
CHLORIDE SERPL-SCNC: 116 MMOL/L (ref 98–112)
CO2 SERPL-SCNC: 18 MMOL/L (ref 21–32)
CREAT BLD-MCNC: 2.58 MG/DL
EOSINOPHIL # BLD AUTO: 0.2 X10(3) UL (ref 0–0.7)
EOSINOPHIL NFR BLD AUTO: 3 %
ERYTHROCYTE [DISTWIDTH] IN BLOOD BY AUTOMATED COUNT: 13.5 %
FASTING STATUS PATIENT QL REPORTED: YES
GFR SERPLBLD BASED ON 1.73 SQ M-ARVRAT: 31 ML/MIN/1.73M2 (ref 60–?)
GLUCOSE BLD-MCNC: 143 MG/DL (ref 70–99)
HCT VFR BLD AUTO: 26.8 %
HGB BLD-MCNC: 8.4 G/DL
IMM GRANULOCYTES # BLD AUTO: 0.01 X10(3) UL (ref 0–1)
IMM GRANULOCYTES NFR BLD: 0.2 %
LYMPHOCYTES # BLD AUTO: 1.26 X10(3) UL (ref 1–4)
LYMPHOCYTES NFR BLD AUTO: 19.2 %
MCH RBC QN AUTO: 28.8 PG (ref 26–34)
MCHC RBC AUTO-ENTMCNC: 31.3 G/DL (ref 31–37)
MCV RBC AUTO: 91.8 FL
MONOCYTES # BLD AUTO: 0.39 X10(3) UL (ref 0.1–1)
MONOCYTES NFR BLD AUTO: 5.9 %
NEUTROPHILS # BLD AUTO: 4.66 X10 (3) UL (ref 1.5–7.7)
NEUTROPHILS # BLD AUTO: 4.66 X10(3) UL (ref 1.5–7.7)
NEUTROPHILS NFR BLD AUTO: 71.1 %
OSMOLALITY SERPL CALC.SUM OF ELEC: 303 MOSM/KG (ref 275–295)
PLATELET # BLD AUTO: 182 10(3)UL (ref 150–450)
POTASSIUM SERPL-SCNC: 4.9 MMOL/L (ref 3.5–5.1)
RBC # BLD AUTO: 2.92 X10(6)UL
SODIUM SERPL-SCNC: 139 MMOL/L (ref 136–145)
WBC # BLD AUTO: 6.6 X10(3) UL (ref 4–11)

## 2022-09-17 PROCEDURE — 85025 COMPLETE CBC W/AUTO DIFF WBC: CPT

## 2022-09-17 PROCEDURE — 36415 COLL VENOUS BLD VENIPUNCTURE: CPT

## 2022-09-17 PROCEDURE — 80048 BASIC METABOLIC PNL TOTAL CA: CPT

## 2022-09-18 LAB — SARS-COV-2 RNA RESP QL NAA+PROBE: NOT DETECTED

## 2022-09-20 ENCOUNTER — HOSPITAL ENCOUNTER (OUTPATIENT)
Dept: INTERVENTIONAL RADIOLOGY/VASCULAR | Facility: HOSPITAL | Age: 42
Discharge: HOME | End: 2022-09-20
Attending: INTERNAL MEDICINE | Admitting: INTERNAL MEDICINE

## 2022-09-20 ENCOUNTER — HOSPITAL ENCOUNTER (OUTPATIENT)
Dept: MRI IMAGING | Facility: HOSPITAL | Age: 42
Discharge: HOME | End: 2022-09-20
Attending: PHYSICIAN ASSISTANT

## 2022-09-20 VITALS
HEIGHT: 68 IN | BODY MASS INDEX: 29.55 KG/M2 | HEART RATE: 93 BPM | SYSTOLIC BLOOD PRESSURE: 127 MMHG | WEIGHT: 195 LBS | OXYGEN SATURATION: 100 % | DIASTOLIC BLOOD PRESSURE: 76 MMHG | RESPIRATION RATE: 16 BRPM | TEMPERATURE: 97 F

## 2022-09-20 DIAGNOSIS — S83.206A POSITIVE MCMURRAY TEST OF RIGHT KNEE, INITIAL ENCOUNTER: ICD-10-CM

## 2022-09-20 DIAGNOSIS — L03.115 CELLULITIS OF LEG, RIGHT: ICD-10-CM

## 2022-09-20 DIAGNOSIS — Z01.818 PRE-OP TESTING: Primary | ICD-10-CM

## 2022-09-20 DIAGNOSIS — R78.81 BACTEREMIA DUE TO GRAM-NEGATIVE BACTERIA: ICD-10-CM

## 2022-09-20 LAB — GLUCOSE BLD-MCNC: 95 MG/DL (ref 70–99)

## 2022-09-20 PROCEDURE — 99152 MOD SED SAME PHYS/QHP 5/>YRS: CPT | Performed by: RADIOLOGY

## 2022-09-20 PROCEDURE — 82962 GLUCOSE BLOOD TEST: CPT

## 2022-09-20 PROCEDURE — 73721 MRI JNT OF LWR EXTRE W/O DYE: CPT | Performed by: PHYSICIAN ASSISTANT

## 2022-09-20 PROCEDURE — 05PY33Z REMOVAL OF INFUSION DEVICE FROM UPPER VEIN, PERCUTANEOUS APPROACH: ICD-10-PCS | Performed by: RADIOLOGY

## 2022-09-20 PROCEDURE — 36589 REMOVAL TUNNELED CV CATH: CPT | Performed by: RADIOLOGY

## 2022-09-20 PROCEDURE — 77001 FLUOROGUIDE FOR VEIN DEVICE: CPT | Performed by: RADIOLOGY

## 2022-09-20 RX ORDER — SODIUM CHLORIDE 9 MG/ML
INJECTION, SOLUTION INTRAVENOUS CONTINUOUS
Status: DISCONTINUED | OUTPATIENT
Start: 2022-09-20 | End: 2022-09-20

## 2022-09-20 RX ORDER — LIDOCAINE HYDROCHLORIDE 10 MG/ML
INJECTION, SOLUTION INFILTRATION; PERINEURAL
Status: COMPLETED
Start: 2022-09-20 | End: 2022-09-20

## 2022-09-20 RX ORDER — MIDAZOLAM HYDROCHLORIDE 1 MG/ML
INJECTION INTRAMUSCULAR; INTRAVENOUS
Status: COMPLETED
Start: 2022-09-20 | End: 2022-09-20

## 2022-09-20 NOTE — PROCEDURES
[facility]  Procedure Note    Oriana Tobin Patient Status:  Outpatient in a Bed    1980 MRN [id number]   Location [facility] [address] [facility] Attending Nasrin Dickinson MD   [facility] Day # 0 PCP Angelic Marcial DO     Procedure:  Tunneled line removal    Pre-Procedure Diagnosis:  Bacteremia    Post-Procedure Diagnosis: Same    Anesthesia:  Local and Sedation    Findings:  Existing R tunneled line removed intact    Specimens: None    Blood Loss:  MInimal    Tourniquet Time: None  Complications:  None  Drains:  None    Secondary Diagnosis:  None    Trisha Shepherd MD  2022

## 2022-09-20 NOTE — PROGRESS NOTES
S/p PICC line removal, right chest soft C/D/I. Pt arrived via bed awake without complaints, denies pain 0/10 on pain scale. Bedrest completed, pt declining anything to eat/drink. Up in room without complaints, voiding without complaints. Discharge instructions given/explained to pt, all questions answered, verbalized understanding. Tele dc'd, IV dc'd catheter intact, patient discharged via w/c instable condition.

## 2022-09-23 ENCOUNTER — OFFICE VISIT (OUTPATIENT)
Dept: ORTHOPEDICS CLINIC | Facility: CLINIC | Age: 42
End: 2022-09-23

## 2022-09-23 DIAGNOSIS — M76.51 PATELLAR TENDINITIS OF RIGHT KNEE: Primary | ICD-10-CM

## 2022-09-23 PROCEDURE — 99204 OFFICE O/P NEW MOD 45 MIN: CPT | Performed by: ORTHOPAEDIC SURGERY

## 2022-09-23 RX ORDER — MELOXICAM 15 MG/1
15 TABLET ORAL DAILY
Qty: 14 TABLET | Refills: 1 | Status: SHIPPED | OUTPATIENT
Start: 2022-09-23

## 2022-09-30 ENCOUNTER — OFFICE VISIT (OUTPATIENT)
Dept: PODIATRY CLINIC | Facility: CLINIC | Age: 42
End: 2022-09-30

## 2022-09-30 DIAGNOSIS — M89.8X7 EXOSTOSIS OF BONE OF FOOT: ICD-10-CM

## 2022-09-30 DIAGNOSIS — E11.42 DIABETIC POLYNEUROPATHY ASSOCIATED WITH TYPE 2 DIABETES MELLITUS (HCC): ICD-10-CM

## 2022-09-30 DIAGNOSIS — M20.41 HAMMER TOES OF BOTH FEET: ICD-10-CM

## 2022-09-30 DIAGNOSIS — M20.42 HAMMER TOES OF BOTH FEET: ICD-10-CM

## 2022-09-30 DIAGNOSIS — L84 CALLUS OF FOOT: ICD-10-CM

## 2022-09-30 DIAGNOSIS — Z87.898 HISTORY OF ULCERATION: Primary | ICD-10-CM

## 2022-09-30 DIAGNOSIS — N18.4 STAGE 4 CHRONIC KIDNEY DISEASE (HCC): ICD-10-CM

## 2022-09-30 DIAGNOSIS — B35.1 ONYCHOMYCOSIS: ICD-10-CM

## 2022-09-30 PROCEDURE — 99213 OFFICE O/P EST LOW 20 MIN: CPT | Performed by: STUDENT IN AN ORGANIZED HEALTH CARE EDUCATION/TRAINING PROGRAM

## 2022-10-01 NOTE — PATIENT INSTRUCTIONS
-Discussed importance of proper pedal hygiene, regular foot checks, and tight glucose control.  -Patient to avoid walking barefoot. Ambulate with supportive accommodative shoes and inserts.  -Use urea cream to callus sites between visits.  -Patient to monitor for acute signs of infection and seek immediate medical attention if any signs of infection or other concerns arise.

## 2022-10-24 RX ORDER — ASPIRIN 81 MG/1
TABLET, COATED ORAL
Qty: 90 TABLET | Refills: 0 | Status: SHIPPED | OUTPATIENT
Start: 2022-10-24

## 2022-10-25 ENCOUNTER — APPOINTMENT (OUTPATIENT)
Dept: ULTRASOUND IMAGING | Age: 42
End: 2022-10-25
Attending: NURSE PRACTITIONER
Payer: COMMERCIAL

## 2022-10-25 ENCOUNTER — HOSPITAL ENCOUNTER (EMERGENCY)
Age: 42
Discharge: HOME | End: 2022-10-25
Attending: NURSE PRACTITIONER
Payer: COMMERCIAL

## 2022-10-25 VITALS
SYSTOLIC BLOOD PRESSURE: 143 MMHG | DIASTOLIC BLOOD PRESSURE: 81 MMHG | OXYGEN SATURATION: 98 % | HEART RATE: 96 BPM | BODY MASS INDEX: 29.55 KG/M2 | RESPIRATION RATE: 16 BRPM | TEMPERATURE: 98 F | WEIGHT: 195 LBS | HEIGHT: 68 IN

## 2022-10-25 DIAGNOSIS — M79.89 RIGHT LEG SWELLING: Primary | ICD-10-CM

## 2022-10-25 PROCEDURE — 99284 EMERGENCY DEPT VISIT MOD MDM: CPT

## 2022-10-25 PROCEDURE — 93971 EXTREMITY STUDY: CPT | Performed by: NURSE PRACTITIONER

## 2022-10-27 ENCOUNTER — LAB ENCOUNTER (OUTPATIENT)
Dept: LAB | Age: 42
End: 2022-10-27
Attending: INTERNAL MEDICINE
Payer: COMMERCIAL

## 2022-10-27 DIAGNOSIS — N18.4 ANEMIA IN STAGE 4 CHRONIC KIDNEY DISEASE (HCC): ICD-10-CM

## 2022-10-27 DIAGNOSIS — N18.4 CKD (CHRONIC KIDNEY DISEASE) STAGE 4, GFR 15-29 ML/MIN (HCC): ICD-10-CM

## 2022-10-27 DIAGNOSIS — D63.1 ANEMIA IN STAGE 4 CHRONIC KIDNEY DISEASE (HCC): ICD-10-CM

## 2022-10-27 DIAGNOSIS — I10 ESSENTIAL HYPERTENSION: ICD-10-CM

## 2022-10-27 LAB
ANION GAP SERPL CALC-SCNC: 8 MMOL/L (ref 0–18)
BASOPHILS # BLD AUTO: 0.01 X10(3) UL (ref 0–0.2)
BASOPHILS NFR BLD AUTO: 0.1 %
BUN BLD-MCNC: 54 MG/DL (ref 7–18)
CALCIUM BLD-MCNC: 9.2 MG/DL (ref 8.5–10.1)
CHLORIDE SERPL-SCNC: 115 MMOL/L (ref 98–112)
CO2 SERPL-SCNC: 17 MMOL/L (ref 21–32)
CREAT BLD-MCNC: 2.92 MG/DL
DEPRECATED HBV CORE AB SER IA-ACNC: 188.4 NG/ML
EOSINOPHIL # BLD AUTO: 0 X10(3) UL (ref 0–0.7)
EOSINOPHIL NFR BLD AUTO: 0 %
ERYTHROCYTE [DISTWIDTH] IN BLOOD BY AUTOMATED COUNT: 14.3 %
FASTING STATUS PATIENT QL REPORTED: YES
GFR SERPLBLD BASED ON 1.73 SQ M-ARVRAT: 26 ML/MIN/1.73M2 (ref 60–?)
GLUCOSE BLD-MCNC: 323 MG/DL (ref 70–99)
HCT VFR BLD AUTO: 28.5 %
HGB BLD-MCNC: 9.2 G/DL
IMM GRANULOCYTES # BLD AUTO: 0.03 X10(3) UL (ref 0–1)
IMM GRANULOCYTES NFR BLD: 0.3 %
IRON SATN MFR SERPL: 24 %
IRON SERPL-MCNC: 73 UG/DL
LYMPHOCYTES # BLD AUTO: 0.8 X10(3) UL (ref 1–4)
LYMPHOCYTES NFR BLD AUTO: 8.4 %
MCH RBC QN AUTO: 29.3 PG (ref 26–34)
MCHC RBC AUTO-ENTMCNC: 32.3 G/DL (ref 31–37)
MCV RBC AUTO: 90.8 FL
MONOCYTES # BLD AUTO: 0.56 X10(3) UL (ref 0.1–1)
MONOCYTES NFR BLD AUTO: 5.9 %
NEUTROPHILS # BLD AUTO: 8.13 X10 (3) UL (ref 1.5–7.7)
NEUTROPHILS # BLD AUTO: 8.13 X10(3) UL (ref 1.5–7.7)
NEUTROPHILS NFR BLD AUTO: 85.3 %
OSMOLALITY SERPL CALC.SUM OF ELEC: 317 MOSM/KG (ref 275–295)
PLATELET # BLD AUTO: 192 10(3)UL (ref 150–450)
POTASSIUM SERPL-SCNC: 5.3 MMOL/L (ref 3.5–5.1)
RBC # BLD AUTO: 3.14 X10(6)UL
SODIUM SERPL-SCNC: 140 MMOL/L (ref 136–145)
TIBC SERPL-MCNC: 305 UG/DL (ref 240–450)
TRANSFERRIN SERPL-MCNC: 205 MG/DL (ref 200–360)
WBC # BLD AUTO: 9.5 X10(3) UL (ref 4–11)

## 2022-10-27 PROCEDURE — 85025 COMPLETE CBC W/AUTO DIFF WBC: CPT | Performed by: INTERNAL MEDICINE

## 2022-10-27 PROCEDURE — 83540 ASSAY OF IRON: CPT | Performed by: INTERNAL MEDICINE

## 2022-10-27 PROCEDURE — 80048 BASIC METABOLIC PNL TOTAL CA: CPT | Performed by: INTERNAL MEDICINE

## 2022-10-27 PROCEDURE — 82728 ASSAY OF FERRITIN: CPT | Performed by: INTERNAL MEDICINE

## 2022-10-27 PROCEDURE — 83550 IRON BINDING TEST: CPT | Performed by: INTERNAL MEDICINE

## 2022-10-31 ENCOUNTER — OFFICE VISIT (OUTPATIENT)
Dept: NEPHROLOGY | Facility: CLINIC | Age: 42
End: 2022-10-31
Payer: COMMERCIAL

## 2022-10-31 VITALS — DIASTOLIC BLOOD PRESSURE: 70 MMHG | SYSTOLIC BLOOD PRESSURE: 130 MMHG | BODY MASS INDEX: 31 KG/M2 | WEIGHT: 202.13 LBS

## 2022-10-31 DIAGNOSIS — I10 ESSENTIAL HYPERTENSION: ICD-10-CM

## 2022-10-31 DIAGNOSIS — N18.4 CKD (CHRONIC KIDNEY DISEASE) STAGE 4, GFR 15-29 ML/MIN (HCC): Primary | ICD-10-CM

## 2022-10-31 PROCEDURE — 3078F DIAST BP <80 MM HG: CPT | Performed by: INTERNAL MEDICINE

## 2022-10-31 PROCEDURE — 99214 OFFICE O/P EST MOD 30 MIN: CPT | Performed by: INTERNAL MEDICINE

## 2022-10-31 PROCEDURE — 3075F SYST BP GE 130 - 139MM HG: CPT | Performed by: INTERNAL MEDICINE

## 2022-11-01 ENCOUNTER — LAB ENCOUNTER (OUTPATIENT)
Dept: LAB | Age: 42
End: 2022-11-01
Attending: INTERNAL MEDICINE
Payer: COMMERCIAL

## 2022-11-01 DIAGNOSIS — D64.9 LOW HEMOGLOBIN AND LOW HEMATOCRIT: ICD-10-CM

## 2022-11-01 DIAGNOSIS — E87.5 HYPERKALEMIA: ICD-10-CM

## 2022-11-01 LAB
ANION GAP SERPL CALC-SCNC: 2 MMOL/L (ref 0–18)
BUN BLD-MCNC: 44 MG/DL (ref 7–18)
CALCIUM BLD-MCNC: 9.1 MG/DL (ref 8.5–10.1)
CHLORIDE SERPL-SCNC: 120 MMOL/L (ref 98–112)
CO2 SERPL-SCNC: 19 MMOL/L (ref 21–32)
CREAT BLD-MCNC: 2.51 MG/DL
FASTING STATUS PATIENT QL REPORTED: YES
GFR SERPLBLD BASED ON 1.73 SQ M-ARVRAT: 32 ML/MIN/1.73M2 (ref 60–?)
GLUCOSE BLD-MCNC: 56 MG/DL (ref 70–99)
HCT VFR BLD AUTO: 32.4 %
HGB BLD-MCNC: 10.1 G/DL
OSMOLALITY SERPL CALC.SUM OF ELEC: 301 MOSM/KG (ref 275–295)
POTASSIUM SERPL-SCNC: 5.8 MMOL/L (ref 3.5–5.1)
SODIUM SERPL-SCNC: 141 MMOL/L (ref 136–145)

## 2022-11-01 PROCEDURE — 85018 HEMOGLOBIN: CPT | Performed by: FAMILY MEDICINE

## 2022-11-01 PROCEDURE — 85014 HEMATOCRIT: CPT | Performed by: FAMILY MEDICINE

## 2022-11-01 PROCEDURE — 80048 BASIC METABOLIC PNL TOTAL CA: CPT | Performed by: FAMILY MEDICINE

## 2022-11-03 ENCOUNTER — OFFICE VISIT (OUTPATIENT)
Dept: PODIATRY CLINIC | Facility: CLINIC | Age: 42
End: 2022-11-03
Payer: COMMERCIAL

## 2022-11-03 ENCOUNTER — OFFICE VISIT (OUTPATIENT)
Dept: FAMILY MEDICINE CLINIC | Facility: CLINIC | Age: 42
End: 2022-11-03
Payer: COMMERCIAL

## 2022-11-03 VITALS
RESPIRATION RATE: 16 BRPM | HEIGHT: 68 IN | OXYGEN SATURATION: 99 % | DIASTOLIC BLOOD PRESSURE: 76 MMHG | TEMPERATURE: 97 F | SYSTOLIC BLOOD PRESSURE: 138 MMHG | WEIGHT: 205 LBS | BODY MASS INDEX: 31.07 KG/M2 | HEART RATE: 93 BPM

## 2022-11-03 DIAGNOSIS — M20.41 HAMMER TOES OF BOTH FEET: ICD-10-CM

## 2022-11-03 DIAGNOSIS — G89.29 CHRONIC PAIN OF RIGHT KNEE: Primary | ICD-10-CM

## 2022-11-03 DIAGNOSIS — M89.8X7 EXOSTOSIS OF BONE OF FOOT: ICD-10-CM

## 2022-11-03 DIAGNOSIS — N18.4 STAGE 4 CHRONIC KIDNEY DISEASE (HCC): ICD-10-CM

## 2022-11-03 DIAGNOSIS — E11.42 DIABETIC POLYNEUROPATHY ASSOCIATED WITH TYPE 2 DIABETES MELLITUS (HCC): ICD-10-CM

## 2022-11-03 DIAGNOSIS — M25.561 CHRONIC PAIN OF RIGHT KNEE: Primary | ICD-10-CM

## 2022-11-03 DIAGNOSIS — M20.42 HAMMER TOES OF BOTH FEET: ICD-10-CM

## 2022-11-03 DIAGNOSIS — L84 CALLUS OF FOOT: ICD-10-CM

## 2022-11-03 DIAGNOSIS — Z87.898 HISTORY OF ULCERATION: Primary | ICD-10-CM

## 2022-11-03 DIAGNOSIS — Z23 FLU VACCINE NEED: ICD-10-CM

## 2022-11-03 PROCEDURE — 3078F DIAST BP <80 MM HG: CPT | Performed by: FAMILY MEDICINE

## 2022-11-03 PROCEDURE — 90686 IIV4 VACC NO PRSV 0.5 ML IM: CPT | Performed by: FAMILY MEDICINE

## 2022-11-03 PROCEDURE — 99213 OFFICE O/P EST LOW 20 MIN: CPT | Performed by: STUDENT IN AN ORGANIZED HEALTH CARE EDUCATION/TRAINING PROGRAM

## 2022-11-03 PROCEDURE — 3008F BODY MASS INDEX DOCD: CPT | Performed by: FAMILY MEDICINE

## 2022-11-03 PROCEDURE — 3075F SYST BP GE 130 - 139MM HG: CPT | Performed by: FAMILY MEDICINE

## 2022-11-03 PROCEDURE — 99213 OFFICE O/P EST LOW 20 MIN: CPT | Performed by: FAMILY MEDICINE

## 2022-11-03 PROCEDURE — 90471 IMMUNIZATION ADMIN: CPT | Performed by: FAMILY MEDICINE

## 2022-12-02 ENCOUNTER — OFFICE VISIT (OUTPATIENT)
Dept: PODIATRY CLINIC | Facility: CLINIC | Age: 42
End: 2022-12-02
Payer: COMMERCIAL

## 2022-12-02 DIAGNOSIS — M89.8X7 EXOSTOSIS OF BONE OF FOOT: ICD-10-CM

## 2022-12-02 DIAGNOSIS — E11.42 DIABETIC POLYNEUROPATHY ASSOCIATED WITH TYPE 2 DIABETES MELLITUS (HCC): ICD-10-CM

## 2022-12-02 DIAGNOSIS — L84 CALLUS OF FOOT: ICD-10-CM

## 2022-12-02 DIAGNOSIS — Z87.898 HISTORY OF ULCERATION: Primary | ICD-10-CM

## 2022-12-02 DIAGNOSIS — N18.4 STAGE 4 CHRONIC KIDNEY DISEASE (HCC): ICD-10-CM

## 2022-12-02 DIAGNOSIS — B35.1 ONYCHOMYCOSIS: ICD-10-CM

## 2022-12-02 DIAGNOSIS — M20.42 HAMMER TOES OF BOTH FEET: ICD-10-CM

## 2022-12-02 DIAGNOSIS — M20.41 HAMMER TOES OF BOTH FEET: ICD-10-CM

## 2022-12-02 PROCEDURE — 99213 OFFICE O/P EST LOW 20 MIN: CPT | Performed by: STUDENT IN AN ORGANIZED HEALTH CARE EDUCATION/TRAINING PROGRAM

## 2022-12-03 NOTE — PATIENT INSTRUCTIONS
-Continue ambulate with diabetic shoes and inserts. Apply urea cream to callus site between visits. Follow-up in 6 weeks for reevaluation or sooner if other concerns arise.

## 2023-01-10 NOTE — TELEPHONE ENCOUNTER
Delvis Viera from [facility] called and spoke with her. She is requesting 1 additional PT visit for next week. This visit would be for D/C. Please advise. Thank you! 98

## 2023-01-13 ENCOUNTER — TELEMEDICINE (OUTPATIENT)
Dept: FAMILY MEDICINE CLINIC | Facility: CLINIC | Age: 43
End: 2023-01-13
Payer: COMMERCIAL

## 2023-01-13 DIAGNOSIS — U07.1 COVID-19: Primary | ICD-10-CM

## 2023-01-13 PROCEDURE — 99213 OFFICE O/P EST LOW 20 MIN: CPT | Performed by: FAMILY MEDICINE

## 2023-01-13 RX ORDER — ALBUTEROL SULFATE 90 UG/1
2 AEROSOL, METERED RESPIRATORY (INHALATION) EVERY 6 HOURS PRN
Qty: 8 G | Refills: 0 | Status: SHIPPED | OUTPATIENT
Start: 2023-01-13 | End: 2023-01-23

## 2023-02-03 ENCOUNTER — OFFICE VISIT (OUTPATIENT)
Dept: PODIATRY CLINIC | Facility: CLINIC | Age: 43
End: 2023-02-03

## 2023-02-03 DIAGNOSIS — B35.1 ONYCHOMYCOSIS: ICD-10-CM

## 2023-02-03 DIAGNOSIS — Z87.898 HISTORY OF ULCERATION: Primary | ICD-10-CM

## 2023-02-03 DIAGNOSIS — M20.41 HAMMER TOES OF BOTH FEET: ICD-10-CM

## 2023-02-03 DIAGNOSIS — M89.8X7 EXOSTOSIS OF BONE OF FOOT: ICD-10-CM

## 2023-02-03 DIAGNOSIS — N18.4 STAGE 4 CHRONIC KIDNEY DISEASE (HCC): ICD-10-CM

## 2023-02-03 DIAGNOSIS — L84 CALLUS OF FOOT: ICD-10-CM

## 2023-02-03 DIAGNOSIS — E11.42 DIABETIC POLYNEUROPATHY ASSOCIATED WITH TYPE 2 DIABETES MELLITUS (HCC): ICD-10-CM

## 2023-02-03 DIAGNOSIS — M20.42 HAMMER TOES OF BOTH FEET: ICD-10-CM

## 2023-02-03 PROCEDURE — 99213 OFFICE O/P EST LOW 20 MIN: CPT | Performed by: STUDENT IN AN ORGANIZED HEALTH CARE EDUCATION/TRAINING PROGRAM

## 2023-02-04 NOTE — PATIENT INSTRUCTIONS
-Ambulate with diabetic shoes and inserts. Apply urea cream to callus sites between visits. Follow-up in 6 weeks for reevaluation or sooner if other concerns arise.

## 2023-02-22 NOTE — TELEPHONE ENCOUNTER
Is This A New Presentation, Or A Follow-Up?: Skin Lesion PA for sildenafil complete on CMM. Will jillian for follow up. What Type Of Note Output Would You Prefer (Optional)?: Standard Output

## 2023-03-17 ENCOUNTER — OFFICE VISIT (OUTPATIENT)
Dept: PODIATRY CLINIC | Facility: CLINIC | Age: 43
End: 2023-03-17

## 2023-03-17 DIAGNOSIS — M89.8X7 EXOSTOSIS OF BONE OF FOOT: ICD-10-CM

## 2023-03-17 DIAGNOSIS — N18.4 STAGE 4 CHRONIC KIDNEY DISEASE (HCC): ICD-10-CM

## 2023-03-17 DIAGNOSIS — M20.41 HAMMER TOES OF BOTH FEET: ICD-10-CM

## 2023-03-17 DIAGNOSIS — M20.42 HAMMER TOES OF BOTH FEET: ICD-10-CM

## 2023-03-17 DIAGNOSIS — L84 CALLUS OF FOOT: ICD-10-CM

## 2023-03-17 DIAGNOSIS — Z87.898 HISTORY OF ULCERATION: Primary | ICD-10-CM

## 2023-03-17 DIAGNOSIS — B35.1 ONYCHOMYCOSIS: ICD-10-CM

## 2023-03-17 DIAGNOSIS — E11.42 DIABETIC POLYNEUROPATHY ASSOCIATED WITH TYPE 2 DIABETES MELLITUS (HCC): ICD-10-CM

## 2023-03-17 PROCEDURE — 99213 OFFICE O/P EST LOW 20 MIN: CPT | Performed by: STUDENT IN AN ORGANIZED HEALTH CARE EDUCATION/TRAINING PROGRAM

## 2023-03-19 NOTE — PATIENT INSTRUCTIONS
-Ambulate with supportive shoes and inserts. Apply urea cream to callus sites daily. Follow-up in 4 to 6 weeks for reevaluation.

## 2023-04-28 ENCOUNTER — OFFICE VISIT (OUTPATIENT)
Dept: PODIATRY CLINIC | Facility: CLINIC | Age: 43
End: 2023-04-28

## 2023-04-28 DIAGNOSIS — Z87.898 HISTORY OF ULCERATION: Primary | ICD-10-CM

## 2023-04-28 DIAGNOSIS — E11.42 DIABETIC POLYNEUROPATHY ASSOCIATED WITH TYPE 2 DIABETES MELLITUS (HCC): ICD-10-CM

## 2023-04-28 DIAGNOSIS — N18.4 STAGE 4 CHRONIC KIDNEY DISEASE (HCC): ICD-10-CM

## 2023-04-28 DIAGNOSIS — M20.41 HAMMER TOES OF BOTH FEET: ICD-10-CM

## 2023-04-28 DIAGNOSIS — L84 CALLUS OF FOOT: ICD-10-CM

## 2023-04-28 DIAGNOSIS — M89.8X7 EXOSTOSIS OF BONE OF FOOT: ICD-10-CM

## 2023-04-28 DIAGNOSIS — B35.1 ONYCHOMYCOSIS: ICD-10-CM

## 2023-04-28 DIAGNOSIS — M20.42 HAMMER TOES OF BOTH FEET: ICD-10-CM

## 2023-04-28 PROCEDURE — 99213 OFFICE O/P EST LOW 20 MIN: CPT | Performed by: STUDENT IN AN ORGANIZED HEALTH CARE EDUCATION/TRAINING PROGRAM

## 2023-04-28 NOTE — PATIENT INSTRUCTIONS
-Apply urea cream to callus sites daily. Ambulate with supportive shoes and inserts and avoid walking barefoot. Follow-up in 6 weeks for reevaluation or sooner if other concerns arise.

## 2023-06-27 ENCOUNTER — LAB ENCOUNTER (OUTPATIENT)
Dept: LAB | Age: 43
End: 2023-06-27
Attending: INTERNAL MEDICINE
Payer: COMMERCIAL

## 2023-06-27 DIAGNOSIS — I10 ESSENTIAL HYPERTENSION: ICD-10-CM

## 2023-06-27 DIAGNOSIS — N18.4 CKD (CHRONIC KIDNEY DISEASE) STAGE 4, GFR 15-29 ML/MIN (HCC): ICD-10-CM

## 2023-06-27 LAB
ANION GAP SERPL CALC-SCNC: 5 MMOL/L (ref 0–18)
BUN BLD-MCNC: 64 MG/DL (ref 7–18)
CALCIUM BLD-MCNC: 9.4 MG/DL (ref 8.5–10.1)
CHLORIDE SERPL-SCNC: 119 MMOL/L (ref 98–112)
CO2 SERPL-SCNC: 16 MMOL/L (ref 21–32)
CREAT BLD-MCNC: 4.07 MG/DL
FASTING STATUS PATIENT QL REPORTED: YES
GFR SERPLBLD BASED ON 1.73 SQ M-ARVRAT: 18 ML/MIN/1.73M2 (ref 60–?)
GLUCOSE BLD-MCNC: 191 MG/DL (ref 70–99)
OSMOLALITY SERPL CALC.SUM OF ELEC: 313 MOSM/KG (ref 275–295)
POTASSIUM SERPL-SCNC: 6.5 MMOL/L (ref 3.5–5.1)
SODIUM SERPL-SCNC: 140 MMOL/L (ref 136–145)

## 2023-06-27 PROCEDURE — 80048 BASIC METABOLIC PNL TOTAL CA: CPT | Performed by: INTERNAL MEDICINE

## 2023-06-28 ENCOUNTER — TELEPHONE (OUTPATIENT)
Dept: NEPHROLOGY | Facility: CLINIC | Age: 43
End: 2023-06-28

## 2023-06-28 RX ORDER — AMLODIPINE BESYLATE 10 MG/1
10 TABLET ORAL DAILY
Qty: 90 TABLET | Refills: 0 | Status: SHIPPED | OUTPATIENT
Start: 2023-06-28

## 2023-06-28 RX ORDER — ASPIRIN 81 MG/1
81 TABLET ORAL DAILY
Qty: 90 TABLET | Refills: 0 | Status: SHIPPED | OUTPATIENT
Start: 2023-06-28

## 2023-06-29 ENCOUNTER — TELEPHONE (OUTPATIENT)
Dept: NEPHROLOGY | Facility: CLINIC | Age: 43
End: 2023-06-29

## 2023-06-29 ENCOUNTER — OFFICE VISIT (OUTPATIENT)
Dept: NEPHROLOGY | Facility: CLINIC | Age: 43
End: 2023-06-29
Payer: COMMERCIAL

## 2023-06-29 ENCOUNTER — LAB ENCOUNTER (OUTPATIENT)
Dept: LAB | Age: 43
End: 2023-06-29
Attending: INTERNAL MEDICINE
Payer: COMMERCIAL

## 2023-06-29 VITALS — SYSTOLIC BLOOD PRESSURE: 186 MMHG | BODY MASS INDEX: 34 KG/M2 | WEIGHT: 224.38 LBS | DIASTOLIC BLOOD PRESSURE: 84 MMHG

## 2023-06-29 DIAGNOSIS — N18.4 CKD (CHRONIC KIDNEY DISEASE) STAGE 4, GFR 15-29 ML/MIN (HCC): ICD-10-CM

## 2023-06-29 DIAGNOSIS — I10 ESSENTIAL HYPERTENSION: ICD-10-CM

## 2023-06-29 DIAGNOSIS — N17.9 AKI (ACUTE KIDNEY INJURY) (HCC): Primary | ICD-10-CM

## 2023-06-29 DIAGNOSIS — N18.4 CKD (CHRONIC KIDNEY DISEASE) STAGE 4, GFR 15-29 ML/MIN (HCC): Primary | ICD-10-CM

## 2023-06-29 DIAGNOSIS — N17.9 AKI (ACUTE KIDNEY INJURY) (HCC): ICD-10-CM

## 2023-06-29 DIAGNOSIS — E87.5 HYPERKALEMIA: ICD-10-CM

## 2023-06-29 LAB
ANION GAP SERPL CALC-SCNC: 1 MMOL/L (ref 0–18)
BUN BLD-MCNC: 73 MG/DL (ref 7–18)
CALCIUM BLD-MCNC: 8.7 MG/DL (ref 8.5–10.1)
CHLORIDE SERPL-SCNC: 121 MMOL/L (ref 98–112)
CO2 SERPL-SCNC: 17 MMOL/L (ref 21–32)
CREAT BLD-MCNC: 4.12 MG/DL
FASTING STATUS PATIENT QL REPORTED: YES
GFR SERPLBLD BASED ON 1.73 SQ M-ARVRAT: 17 ML/MIN/1.73M2 (ref 60–?)
GLUCOSE BLD-MCNC: 116 MG/DL (ref 70–99)
OSMOLALITY SERPL CALC.SUM OF ELEC: 311 MOSM/KG (ref 275–295)
POTASSIUM SERPL-SCNC: 6.3 MMOL/L (ref 3.5–5.1)
SODIUM SERPL-SCNC: 139 MMOL/L (ref 136–145)

## 2023-06-29 PROCEDURE — 36415 COLL VENOUS BLD VENIPUNCTURE: CPT

## 2023-06-29 PROCEDURE — 3077F SYST BP >= 140 MM HG: CPT | Performed by: INTERNAL MEDICINE

## 2023-06-29 PROCEDURE — 80048 BASIC METABOLIC PNL TOTAL CA: CPT

## 2023-06-29 PROCEDURE — 3079F DIAST BP 80-89 MM HG: CPT | Performed by: INTERNAL MEDICINE

## 2023-06-29 PROCEDURE — 99214 OFFICE O/P EST MOD 30 MIN: CPT | Performed by: INTERNAL MEDICINE

## 2023-06-29 RX ORDER — FUROSEMIDE 20 MG/1
20 TABLET ORAL DAILY
Qty: 30 TABLET | Refills: 5 | Status: SHIPPED | OUTPATIENT
Start: 2023-06-29

## 2023-07-07 ENCOUNTER — TELEPHONE (OUTPATIENT)
Dept: NEPHROLOGY | Facility: CLINIC | Age: 43
End: 2023-07-07

## 2023-07-07 ENCOUNTER — LAB ENCOUNTER (OUTPATIENT)
Dept: LAB | Age: 43
End: 2023-07-07
Attending: INTERNAL MEDICINE
Payer: COMMERCIAL

## 2023-07-07 DIAGNOSIS — N18.4 CKD (CHRONIC KIDNEY DISEASE) STAGE 4, GFR 15-29 ML/MIN (HCC): ICD-10-CM

## 2023-07-07 DIAGNOSIS — E87.5 HYPERKALEMIA: Primary | ICD-10-CM

## 2023-07-07 DIAGNOSIS — E87.5 HYPERKALEMIA: ICD-10-CM

## 2023-07-07 LAB
ANION GAP SERPL CALC-SCNC: 6 MMOL/L (ref 0–18)
BUN BLD-MCNC: 71 MG/DL (ref 7–18)
CALCIUM BLD-MCNC: 8.7 MG/DL (ref 8.5–10.1)
CHLORIDE SERPL-SCNC: 119 MMOL/L (ref 98–112)
CO2 SERPL-SCNC: 18 MMOL/L (ref 21–32)
CREAT BLD-MCNC: 4.21 MG/DL
FASTING STATUS PATIENT QL REPORTED: YES
GFR SERPLBLD BASED ON 1.73 SQ M-ARVRAT: 17 ML/MIN/1.73M2 (ref 60–?)
GLUCOSE BLD-MCNC: 107 MG/DL (ref 70–99)
OSMOLALITY SERPL CALC.SUM OF ELEC: 317 MOSM/KG (ref 275–295)
POTASSIUM SERPL-SCNC: 6.2 MMOL/L (ref 3.5–5.1)
SODIUM SERPL-SCNC: 143 MMOL/L (ref 136–145)

## 2023-07-07 PROCEDURE — 80048 BASIC METABOLIC PNL TOTAL CA: CPT

## 2023-07-07 NOTE — TELEPHONE ENCOUNTER
Called results to pt. He has Sanam Nguyen and Gabrielle at home and is going to take 1 packet of each.   I told him to do 2 packets of each tonight then 1 packet each Sat and Sunday then check repeat BMP Monday

## 2023-07-14 ENCOUNTER — LAB ENCOUNTER (OUTPATIENT)
Dept: LAB | Age: 43
End: 2023-07-14
Attending: INTERNAL MEDICINE
Payer: COMMERCIAL

## 2023-07-14 ENCOUNTER — OFFICE VISIT (OUTPATIENT)
Dept: PODIATRY CLINIC | Facility: CLINIC | Age: 43
End: 2023-07-14

## 2023-07-14 DIAGNOSIS — M89.8X7 EXOSTOSIS OF BONE OF FOOT: ICD-10-CM

## 2023-07-14 DIAGNOSIS — Z87.898 HISTORY OF ULCERATION: Primary | ICD-10-CM

## 2023-07-14 DIAGNOSIS — E87.5 HYPERKALEMIA: ICD-10-CM

## 2023-07-14 DIAGNOSIS — M20.42 HAMMER TOES OF BOTH FEET: ICD-10-CM

## 2023-07-14 DIAGNOSIS — B35.1 ONYCHOMYCOSIS: ICD-10-CM

## 2023-07-14 DIAGNOSIS — E11.42 DIABETIC POLYNEUROPATHY ASSOCIATED WITH TYPE 2 DIABETES MELLITUS (HCC): ICD-10-CM

## 2023-07-14 DIAGNOSIS — N18.4 STAGE 4 CHRONIC KIDNEY DISEASE (HCC): ICD-10-CM

## 2023-07-14 DIAGNOSIS — L84 CALLUS OF FOOT: ICD-10-CM

## 2023-07-14 DIAGNOSIS — M20.41 HAMMER TOES OF BOTH FEET: ICD-10-CM

## 2023-07-14 LAB
ANION GAP SERPL CALC-SCNC: 7 MMOL/L (ref 0–18)
BUN BLD-MCNC: 73 MG/DL (ref 7–18)
CALCIUM BLD-MCNC: 8.9 MG/DL (ref 8.5–10.1)
CHLORIDE SERPL-SCNC: 121 MMOL/L (ref 98–112)
CO2 SERPL-SCNC: 14 MMOL/L (ref 21–32)
CREAT BLD-MCNC: 4.41 MG/DL
FASTING STATUS PATIENT QL REPORTED: YES
GFR SERPLBLD BASED ON 1.73 SQ M-ARVRAT: 16 ML/MIN/1.73M2 (ref 60–?)
GLUCOSE BLD-MCNC: 101 MG/DL (ref 70–99)
OSMOLALITY SERPL CALC.SUM OF ELEC: 316 MOSM/KG (ref 275–295)
POTASSIUM SERPL-SCNC: 5.2 MMOL/L (ref 3.5–5.1)
SODIUM SERPL-SCNC: 142 MMOL/L (ref 136–145)

## 2023-07-14 PROCEDURE — 80048 BASIC METABOLIC PNL TOTAL CA: CPT

## 2023-07-14 PROCEDURE — 99213 OFFICE O/P EST LOW 20 MIN: CPT | Performed by: STUDENT IN AN ORGANIZED HEALTH CARE EDUCATION/TRAINING PROGRAM

## 2023-07-14 PROCEDURE — 36415 COLL VENOUS BLD VENIPUNCTURE: CPT

## 2023-07-16 NOTE — PROGRESS NOTES
[facility], [facility] Podiatry  Progress Note    Steven Garcia is a 37year old male. Patient presents with: Foot Pain: Pt here for F/U on Left Foot, states doing good. Denies Pain in today.          HPI:     Patient is a pleasant 73-year-old male with past medical history of recurrent heel ulcerations, diabetes, and CKD returns to clinic for foot exam.  He continues to ambulate with accommodative inserts and shoes and relays that he is tolerating this well. He denies open sores or other concerns to his left foot. He did miss last visits has been a while since his callus and nails been trimmed. Patient is status post calcaneal planing of left foot (DOS: 12/28/21) for treatment of recurrent ulceration site which has worked well to keep site healed. He does have elongated and thickened nails he has difficulty trimming on his own. He has been applying urea cream to callus sites between visits. No other complaints are mentioned. His blood sugars were 103 mg/dL when checked this morning. No other complaints are mentioned. Allergies: Patient has no known allergies. Current Outpatient Medications   Medication Sig Dispense Refill    furosemide 20 MG Oral Tab Take 1 tablet (20 mg total) by mouth daily. 30 tablet 5    patiromer (VELTASSA) 8.4 g Oral Powd Pack Take 1 packet (8.4 g total) by mouth daily. 30 each 0    aspirin (ASPIRIN EC LOW DOSE) 81 MG Oral Tab EC Take 1 tablet (81 mg total) by mouth daily. 90 tablet 0    amLODIPine 10 MG Oral Tab Take 1 tablet (10 mg total) by mouth daily. 90 tablet 0    EZETIMIBE-SIMVASTATIN 10-80 MG Oral Tab TAKE 1 TABLET BY MOUTH DAILY 90 tablet 3    HUMALOG KWIKPEN 100 UNIT/ML Subcutaneous Solution Pen-injector SLIDING SCALE  TID with meals MDD 15u (Patient taking differently: SLIDING SCALE  TID with meals MDD 15u) 15 mL 0    Dulaglutide (TRULICITY) 0.34 EX/5.3XK Subcutaneous Solution Pen-injector Inject 0.75 mg into the skin once a week.  7 mL 3    insulin detemir 100 UNIT/ML Subcutaneous Solution Pen-injector Inject 25 Units into the skin every morning. 30 mL 1      Past Medical History:   Diagnosis Date    CKD (chronic kidney disease)     Diabetes ([address]     Disorder of liver     Elevated liver enzymes 8/30/2016    Elevated serum GGT level 8/30/2016    High blood pressure     High cholesterol     Hyperlipidemia     HYPERTRIGLYCERIDEMIA     OBESITY     Obesity     Pneumonia due to organism     Renal disorder     Visual impairment     GLASSES      Past Surgical History:   Procedure Laterality Date    ARTHROTOMY/EXPLORE/TREAT KNEE JOINT Left     DONE TWICE    CHOLECYSTECTOMY      DEBRIDE WOUND Left     HEEL    FOOT SURGERY      OTHER SURGICAL HISTORY  Knee & Foot      Family History   Problem Relation Age of Onset    Diabetes Father     Heart Disorder Father     Diabetes Mother     Hypertension Sister       Social History    Socioeconomic History      Marital status:     Tobacco Use      Smoking status: Never      Smokeless tobacco: Never    Vaping Use      Vaping Use: Never used    Substance and Sexual Activity      Alcohol use: Yes        Alcohol/week: 1.0 standard drink of alcohol        Types: 1 Standard drinks or equivalent per week        Comment: socially      Drug use: No    Other Topics      Concerns:        Caffeine Concern: No        Exercise: No        Seat Belt: Yes        Special Diet: Yes        Stress Concern: No        Weight Concern: No          REVIEW OF SYSTEMS:     Today reviewed systems as documented below  GENERAL HEALTH: feels well otherwise  SKIN: denies any unusual skin lesions or rashes  RESPIRATORY: denies shortness of breath with exertion  CARDIOVASCULAR: denies chest pain on exertion  GI: denies abdominal pain and denies heartburn  NEURO: denies headaches  MUSCULO: denies arthritis, back pain      EXAM:   There were no vitals taken for this visit. GENERAL: well developed, well nourished, in no apparent distress  EXTREMITIES:     1. Integument: Normal skin temperature and turgor. Site of prior ulceration to left plantar lateral calcaneus remains well-healed. Minimal hyperkeratotic tissue present posterior heel. No hyperkeratotic tissue or breakdown present to site of prior ulceration. Nails x10 are mildly elongated and thickened. 2. Vascular: Dorsalis pedis and posterior tibial pulses are lightly palpable. CFT intact digits. 3. Musculoskeletal: Muscle strength intact to left lower extremity with decreased plantar flexion which is patient's baseline. There is no longer plantar prominence of calcaneus that was noted preoperatively. Compartments are soft and compressible. Flexion contracture of digits bilaterally. 4. Neurological: Normal sharp dull sensation; reflexes normal.  Decreased protective sensation noted to lower extremities. ASSESSMENT AND PLAN:   Diagnoses and all orders for this visit:    History of ulceration    Callus of foot    Hammer toes of both feet    Exostosis of bone of foot    Stage 4 chronic kidney disease (HCC)    Diabetic polyneuropathy associated with type 2 diabetes mellitus ([facility] [address]    Onychomycosis        Plan:   -Patient examined, chart history reviewed. -Inspected surgical site of left foot--site is well-healed and without acute signs of infection or other concerns.  -Site of prior ulceration is also well-healed and without any breakdown at this time.   -Pared HPK to posterior heel that was debrided to healthy tissue without incident.    -Patient can continue applying urea cream to site every 1-2 days.  -Sharply debrided nails x10 with nail nippers without incident.  -Patient can continue ambulating with accommodative inserts in tennis shoes.  -Educated patient on acute signs of infection and symptoms of DVT and advised patient to seek immediate medical attention if any concerns arise. Patient expressed understanding.      The patient indicates understanding of these issues and agrees to the plan.    Return in about 6 weeks (around 8/25/2023) for Preulcerative callus follow-up.     Monalisa Watkins DPM

## 2023-08-25 ENCOUNTER — OFFICE VISIT (OUTPATIENT)
Dept: PODIATRY CLINIC | Facility: CLINIC | Age: 43
End: 2023-08-25

## 2023-08-25 DIAGNOSIS — N18.4 STAGE 4 CHRONIC KIDNEY DISEASE (HCC): ICD-10-CM

## 2023-08-25 DIAGNOSIS — Z87.898 HISTORY OF ULCERATION: Primary | ICD-10-CM

## 2023-08-25 DIAGNOSIS — L84 CALLUS OF FOOT: ICD-10-CM

## 2023-08-25 DIAGNOSIS — M20.41 HAMMER TOES OF BOTH FEET: ICD-10-CM

## 2023-08-25 DIAGNOSIS — M20.42 HAMMER TOES OF BOTH FEET: ICD-10-CM

## 2023-08-25 DIAGNOSIS — M89.8X7 EXOSTOSIS OF BONE OF FOOT: ICD-10-CM

## 2023-08-25 DIAGNOSIS — E11.42 DIABETIC POLYNEUROPATHY ASSOCIATED WITH TYPE 2 DIABETES MELLITUS (HCC): ICD-10-CM

## 2023-08-25 PROCEDURE — 99213 OFFICE O/P EST LOW 20 MIN: CPT | Performed by: STUDENT IN AN ORGANIZED HEALTH CARE EDUCATION/TRAINING PROGRAM

## 2023-08-25 NOTE — PATIENT INSTRUCTIONS
Ambulate with supportive shoes/inserts. Apply urea cream to callus site daily. Follow up in 6 weeks or sooner if other concerns arise.

## 2023-08-25 NOTE — PROGRESS NOTES
[facility], [facility] Podiatry  Progress Note    Nathan Hernández is a 37year old male. Patient presents with: Follow - Up: Left heel- patient denies pain at this time. HPI:     Patient is a pleasant 68-year-old male with past medical history of recurrent heel ulcerations, diabetes, and CKD returns to clinic for foot exam.  He continues to ambulate with accommodative inserts and shoes and relays that he is tolerating this well. He denies open sores or other concerns to his left foot. Patient is status post calcaneal planing of left foot (DOS: 12/28/21) for treatment of recurrent ulceration site which has worked well to keep site healed. He does have elongated and thickened nails he has difficulty trimming on his own. He has been applying urea cream to callus sites between visits. No other complaints are mentioned. Allergies: Patient has no known allergies. Current Outpatient Medications   Medication Sig Dispense Refill    furosemide 20 MG Oral Tab Take 1 tablet (20 mg total) by mouth daily. 30 tablet 5    aspirin (ASPIRIN EC LOW DOSE) 81 MG Oral Tab EC Take 1 tablet (81 mg total) by mouth daily. 90 tablet 0    amLODIPine 10 MG Oral Tab Take 1 tablet (10 mg total) by mouth daily. 90 tablet 0    EZETIMIBE-SIMVASTATIN 10-80 MG Oral Tab TAKE 1 TABLET BY MOUTH DAILY 90 tablet 3    HUMALOG KWIKPEN 100 UNIT/ML Subcutaneous Solution Pen-injector SLIDING SCALE  TID with meals MDD 15u (Patient taking differently: SLIDING SCALE  TID with meals MDD 15u) 15 mL 0    Dulaglutide (TRULICITY) 2.18 KE/1.8PE Subcutaneous Solution Pen-injector Inject 0.75 mg into the skin once a week. 7 mL 3    insulin detemir 100 UNIT/ML Subcutaneous Solution Pen-injector Inject 25 Units into the skin every morning.  30 mL 1      Past Medical History:   Diagnosis Date    CKD (chronic kidney disease)     Diabetes ([facility] [address]     Disorder of liver     Elevated liver enzymes 8/30/2016    Elevated serum GGT level 8/30/2016    High blood pressure     High cholesterol     Hyperlipidemia     HYPERTRIGLYCERIDEMIA     OBESITY     Obesity     Pneumonia due to organism     Renal disorder     Visual impairment     GLASSES      Past Surgical History:   Procedure Laterality Date    ARTHROTOMY/EXPLORE/TREAT KNEE JOINT Left     DONE TWICE    CHOLECYSTECTOMY      DEBRIDE WOUND Left     HEEL    FOOT SURGERY      OTHER SURGICAL HISTORY  Knee & Foot      Family History   Problem Relation Age of Onset    Diabetes Father     Heart Disorder Father     Diabetes Mother     Hypertension Sister       Social History    Socioeconomic History      Marital status:     Tobacco Use      Smoking status: Never      Smokeless tobacco: Never    Vaping Use      Vaping Use: Never used    Substance and Sexual Activity      Alcohol use: Yes        Alcohol/week: 1.0 standard drink of alcohol        Types: 1 Standard drinks or equivalent per week        Comment: socially      Drug use: No    Other Topics      Concerns:        Caffeine Concern: No        Exercise: No        Seat Belt: Yes        Special Diet: Yes        Stress Concern: No        Weight Concern: No          REVIEW OF SYSTEMS:     Today reviewed systems as documented below  GENERAL HEALTH: feels well otherwise  SKIN: denies any unusual skin lesions or rashes  RESPIRATORY: denies shortness of breath with exertion  CARDIOVASCULAR: denies chest pain on exertion  GI: denies abdominal pain and denies heartburn  NEURO: denies headaches  MUSCULO: denies arthritis, back pain      EXAM:   There were no vitals taken for this visit. GENERAL: well developed, well nourished, in no apparent distress  EXTREMITIES:     1. Integument: Normal skin temperature and turgor. Site of prior ulceration to left plantar lateral calcaneus remains well-healed. Minimal hyperkeratotic tissue present posterior heel. No hyperkeratotic tissue or breakdown present to site of prior ulceration. Nails x10 are mildly elongated and thickened.   2. Vascular: Dorsalis pedis and posterior tibial pulses are lightly palpable. CFT intact digits. 3. Musculoskeletal: Muscle strength intact to left lower extremity with decreased plantar flexion which is patient's baseline. There is no longer plantar prominence of calcaneus that was noted preoperatively. Compartments are soft and compressible. Flexion contracture of digits bilaterally. 4. Neurological: Normal sharp dull sensation; reflexes normal.  Decreased protective sensation noted to lower extremities. ASSESSMENT AND PLAN:   Diagnoses and all orders for this visit:    History of ulceration  -     DME - EXTERNAL     Callus of foot  -     DME - EXTERNAL     Exostosis of bone of foot  -     DME - EXTERNAL     Diabetic polyneuropathy associated with type 2 diabetes mellitus ([facility] [address]  -     DME - EXTERNAL     Stage 4 chronic kidney disease (HCC)    Hammer toes of both feet          Plan:   -Patient examined, chart history reviewed. -Inspected surgical site of left foot--site is well-healed and without acute signs of infection or other concerns.  -Site of prior ulceration is also well-healed and without any breakdown at this time.   -Pared HPK to posterior heel that was debrided to healthy tissue without incident.    -Patient can continue applying urea cream to site every 1-2 days.  -Sharply debrided nails x10 with nail nippers without incident.  -Patient can continue ambulating with accommodative inserts in tennis shoes.  -Educated patient on acute signs of infection and symptoms of DVT and advised patient to seek immediate medical attention if any concerns arise. Patient expressed understanding. The patient indicates understanding of these issues and agrees to the plan. Return in about 6 weeks (around 10/6/2023) for pre ulcerative callus follow up.     Tommy Mace DPM

## 2023-10-06 ENCOUNTER — OFFICE VISIT (OUTPATIENT)
Dept: PODIATRY CLINIC | Facility: CLINIC | Age: 43
End: 2023-10-06

## 2023-10-06 DIAGNOSIS — Z87.898 HISTORY OF ULCERATION: Primary | ICD-10-CM

## 2023-10-06 DIAGNOSIS — B35.1 ONYCHOMYCOSIS: ICD-10-CM

## 2023-10-06 DIAGNOSIS — R26.81 GAIT INSTABILITY: ICD-10-CM

## 2023-10-06 DIAGNOSIS — M20.42 HAMMER TOES OF BOTH FEET: ICD-10-CM

## 2023-10-06 DIAGNOSIS — N18.4 STAGE 4 CHRONIC KIDNEY DISEASE (HCC): ICD-10-CM

## 2023-10-06 DIAGNOSIS — Z47.89 ORTHOPEDIC AFTERCARE: ICD-10-CM

## 2023-10-06 DIAGNOSIS — L84 CALLUS OF FOOT: ICD-10-CM

## 2023-10-06 DIAGNOSIS — E11.42 DIABETIC POLYNEUROPATHY ASSOCIATED WITH TYPE 2 DIABETES MELLITUS (HCC): ICD-10-CM

## 2023-10-06 DIAGNOSIS — M20.41 HAMMER TOES OF BOTH FEET: ICD-10-CM

## 2023-10-06 DIAGNOSIS — M89.8X7 EXOSTOSIS OF BONE OF FOOT: ICD-10-CM

## 2023-10-06 PROCEDURE — 99213 OFFICE O/P EST LOW 20 MIN: CPT | Performed by: STUDENT IN AN ORGANIZED HEALTH CARE EDUCATION/TRAINING PROGRAM

## 2023-10-07 NOTE — PROGRESS NOTES
----- Message from Shadia Rodriguez RN sent at 6/3/2020  4:39 PM CDT -----  Sandra Flynn no showed for their appointment with Dr. Licea.  This is their first no show.  Please call family and schedule for the first available appointment.  If no answer please mail no show letter.   [facility], [facility] Podiatry  Progress Note    Gayatri Rebollar is a 37year old male. Patient presents with: Follow - Up: Follow up left foot heel. Patient denies any pain. . HPI:     Patient is a pleasant 25-year-old male with past medical history of recurrent heel ulcerations, diabetes, and CKD returns to clinic for foot exam.  He continues to ambulate with accommodative inserts and shoes and relays that he is tolerating this well. He denies open sores or other concerns to his left foot. Patient is status post calcaneal planing of left foot (DOS: 12/28/21) for treatment of recurrent ulceration site which has worked well to keep site healed. He does have elongated and thickened nails he has difficulty trimming on his own. He has been applying urea cream to callus sites between visits. His blood sugars were 111 mg/dL and checked this morning. No other complaints are mentioned. Allergies: Patient has no known allergies. Current Outpatient Medications   Medication Sig Dispense Refill    furosemide 20 MG Oral Tab Take 1 tablet (20 mg total) by mouth daily. 30 tablet 5    aspirin (ASPIRIN EC LOW DOSE) 81 MG Oral Tab EC Take 1 tablet (81 mg total) by mouth daily. 90 tablet 0    amLODIPine 10 MG Oral Tab Take 1 tablet (10 mg total) by mouth daily. 90 tablet 0    EZETIMIBE-SIMVASTATIN 10-80 MG Oral Tab TAKE 1 TABLET BY MOUTH DAILY 90 tablet 3    HUMALOG KWIKPEN 100 UNIT/ML Subcutaneous Solution Pen-injector SLIDING SCALE  TID with meals MDD 15u (Patient taking differently: SLIDING SCALE  TID with meals MDD 15u) 15 mL 0    Dulaglutide (TRULICITY) 2.83 COBURN/8.2EM Subcutaneous Solution Pen-injector Inject 0.75 mg into the skin once a week. 7 mL 3    insulin detemir 100 UNIT/ML Subcutaneous Solution Pen-injector Inject 25 Units into the skin every morning.  30 mL 1      Past Medical History:   Diagnosis Date    CKD (chronic kidney disease)     Diabetes (HCC)     Disorder of liver     Elevated liver enzymes 8/30/2016    Elevated serum GGT level 8/30/2016    High blood pressure     High cholesterol     Hyperlipidemia     HYPERTRIGLYCERIDEMIA     OBESITY     Obesity     Pneumonia due to organism     Renal disorder     Visual impairment     GLASSES      Past Surgical History:   Procedure Laterality Date    ARTHROTOMY/EXPLORE/TREAT KNEE JOINT Left     DONE TWICE    CHOLECYSTECTOMY      DEBRIDE WOUND Left     HEEL    FOOT SURGERY      OTHER SURGICAL HISTORY  Knee & Foot      Family History   Problem Relation Age of Onset    Diabetes Father     Heart Disorder Father     Diabetes Mother     Hypertension Sister       Social History    Socioeconomic History      Marital status:     Tobacco Use      Smoking status: Never      Smokeless tobacco: Never    Vaping Use      Vaping Use: Never used    Substance and Sexual Activity      Alcohol use: Yes        Alcohol/week: 1.0 standard drink of alcohol        Types: 1 Standard drinks or equivalent per week        Comment: socially      Drug use: No    Other Topics      Concerns:        Caffeine Concern: No        Exercise: No        Seat Belt: Yes        Special Diet: Yes        Stress Concern: No        Weight Concern: No          REVIEW OF SYSTEMS:     Today reviewed systems as documented below  GENERAL HEALTH: feels well otherwise  SKIN: denies any unusual skin lesions or rashes  RESPIRATORY: denies shortness of breath with exertion  CARDIOVASCULAR: denies chest pain on exertion  GI: denies abdominal pain and denies heartburn  NEURO: denies headaches  MUSCULO: denies arthritis, back pain      EXAM:   There were no vitals taken for this visit. GENERAL: well developed, well nourished, in no apparent distress  EXTREMITIES:     1. Integument: Normal skin temperature and turgor. Site of prior ulceration to left plantar lateral calcaneus remains well-healed. Minimal hyperkeratotic tissue present posterior heel.   No hyperkeratotic tissue or breakdown present to site of prior ulceration. Nails x10 are mildly elongated and thickened. 2. Vascular: Dorsalis pedis and posterior tibial pulses are lightly palpable. CFT intact digits. 3. Musculoskeletal: Muscle strength intact to left lower extremity with decreased plantar flexion which is patient's baseline. There is no longer plantar prominence of calcaneus that was noted preoperatively. Compartments are soft and compressible. Flexion contracture of digits bilaterally. 4. Neurological: Normal sharp dull sensation; reflexes normal.  Decreased protective sensation noted to lower extremities. ASSESSMENT AND PLAN:   Diagnoses and all orders for this visit:    History of ulceration    Callus of foot    Exostosis of bone of foot    Diabetic polyneuropathy associated with type 2 diabetes mellitus (HCC)    Stage 4 chronic kidney disease (HCC)    Hammer toes of both feet    Onychomycosis    Orthopedic aftercare    Gait instability          Plan:   -Patient examined, chart history reviewed. -Inspected surgical site of left foot--site is well-healed and without acute signs of infection or other concerns.  -Site of prior ulceration is also well-healed and without any breakdown at this time.   -Pared HPK to posterior heel that was debrided to healthy tissue without incident.    -Patient can continue applying urea cream to site every 1-2 days.  -Sharply debrided nails x10 with nail nippers without incident.  -Patient can continue ambulating with accommodative inserts in tennis shoes.  -Educated patient on acute signs of infection and symptoms of DVT and advised patient to seek immediate medical attention if any concerns arise. Patient expressed understanding. The patient indicates understanding of these issues and agrees to the plan. RTC 6 weeks.     Floyd Keen DPM

## 2023-10-13 ENCOUNTER — TELEPHONE (OUTPATIENT)
Dept: FAMILY MEDICINE CLINIC | Facility: CLINIC | Age: 43
End: 2023-10-13

## 2023-10-13 DIAGNOSIS — E11.8 CONTROLLED TYPE 2 DIABETES MELLITUS WITH COMPLICATION, WITHOUT LONG-TERM CURRENT USE OF INSULIN (HCC): Primary | ICD-10-CM

## 2023-10-18 ENCOUNTER — LAB ENCOUNTER (OUTPATIENT)
Dept: LAB | Age: 43
End: 2023-10-18
Attending: FAMILY MEDICINE
Payer: COMMERCIAL

## 2023-10-18 DIAGNOSIS — E11.8 CONTROLLED TYPE 2 DIABETES MELLITUS WITH COMPLICATION, WITHOUT LONG-TERM CURRENT USE OF INSULIN (HCC): ICD-10-CM

## 2023-10-18 LAB
ALBUMIN SERPL-MCNC: 3.1 G/DL (ref 3.4–5)
ALBUMIN/GLOB SERPL: 0.8 {RATIO} (ref 1–2)
ALP LIVER SERPL-CCNC: 85 U/L
ALT SERPL-CCNC: 16 U/L
ANION GAP SERPL CALC-SCNC: 3 MMOL/L (ref 0–18)
AST SERPL-CCNC: 26 U/L (ref 15–37)
BILIRUB SERPL-MCNC: 0.3 MG/DL (ref 0.1–2)
BUN BLD-MCNC: 58 MG/DL (ref 7–18)
CALCIUM BLD-MCNC: 8.7 MG/DL (ref 8.5–10.1)
CHLORIDE SERPL-SCNC: 122 MMOL/L (ref 98–112)
CHOLEST SERPL-MCNC: 202 MG/DL (ref ?–200)
CO2 SERPL-SCNC: 17 MMOL/L (ref 21–32)
CREAT BLD-MCNC: 4.42 MG/DL
CREAT UR-SCNC: 136 MG/DL
EGFRCR SERPLBLD CKD-EPI 2021: 16 ML/MIN/1.73M2 (ref 60–?)
EST. AVERAGE GLUCOSE BLD GHB EST-MCNC: 111 MG/DL (ref 68–126)
FASTING PATIENT LIPID ANSWER: YES
FASTING STATUS PATIENT QL REPORTED: YES
GLOBULIN PLAS-MCNC: 4 G/DL (ref 2.8–4.4)
GLUCOSE BLD-MCNC: 99 MG/DL (ref 70–99)
HBA1C MFR BLD: 5.5 % (ref ?–5.7)
HDLC SERPL-MCNC: 29 MG/DL (ref 40–59)
LDLC SERPL CALC-MCNC: 153 MG/DL (ref ?–100)
MICROALBUMIN UR-MCNC: 196 MG/DL
MICROALBUMIN/CREAT 24H UR-RTO: 1441.2 UG/MG (ref ?–30)
NONHDLC SERPL-MCNC: 173 MG/DL (ref ?–130)
OSMOLALITY SERPL CALC.SUM OF ELEC: 310 MOSM/KG (ref 275–295)
POTASSIUM SERPL-SCNC: 6 MMOL/L (ref 3.5–5.1)
PROT SERPL-MCNC: 7.1 G/DL (ref 6.4–8.2)
SODIUM SERPL-SCNC: 142 MMOL/L (ref 136–145)
TRIGL SERPL-MCNC: 110 MG/DL (ref 30–149)
VLDLC SERPL CALC-MCNC: 21 MG/DL (ref 0–30)

## 2023-10-18 PROCEDURE — 80061 LIPID PANEL: CPT | Performed by: FAMILY MEDICINE

## 2023-10-18 PROCEDURE — 3044F HG A1C LEVEL LT 7.0%: CPT | Performed by: FAMILY MEDICINE

## 2023-10-18 PROCEDURE — 82570 ASSAY OF URINE CREATININE: CPT | Performed by: FAMILY MEDICINE

## 2023-10-18 PROCEDURE — 83036 HEMOGLOBIN GLYCOSYLATED A1C: CPT | Performed by: FAMILY MEDICINE

## 2023-10-18 PROCEDURE — 3060F POS MICROALBUMINURIA REV: CPT | Performed by: FAMILY MEDICINE

## 2023-10-18 PROCEDURE — 80053 COMPREHEN METABOLIC PANEL: CPT | Performed by: FAMILY MEDICINE

## 2023-10-18 PROCEDURE — 82043 UR ALBUMIN QUANTITATIVE: CPT | Performed by: FAMILY MEDICINE

## 2023-10-20 ENCOUNTER — OFFICE VISIT (OUTPATIENT)
Dept: FAMILY MEDICINE CLINIC | Facility: CLINIC | Age: 43
End: 2023-10-20
Payer: COMMERCIAL

## 2023-10-20 VITALS
HEIGHT: 68 IN | WEIGHT: 238 LBS | OXYGEN SATURATION: 97 % | SYSTOLIC BLOOD PRESSURE: 140 MMHG | TEMPERATURE: 98 F | BODY MASS INDEX: 36.07 KG/M2 | DIASTOLIC BLOOD PRESSURE: 72 MMHG | HEART RATE: 98 BPM | RESPIRATION RATE: 16 BRPM

## 2023-10-20 DIAGNOSIS — E87.5 HYPERKALEMIA: ICD-10-CM

## 2023-10-20 DIAGNOSIS — Z23 NEED FOR IMMUNIZATION AGAINST INFLUENZA: ICD-10-CM

## 2023-10-20 DIAGNOSIS — E11.3493 SEVERE NONPROLIFERATIVE DIABETIC RETINOPATHY OF BOTH EYES ASSOCIATED WITH TYPE 2 DIABETES MELLITUS, MACULAR EDEMA PRESENCE UNSPECIFIED (HCC): ICD-10-CM

## 2023-10-20 DIAGNOSIS — Z79.4 LONG-TERM INSULIN USE (HCC): ICD-10-CM

## 2023-10-20 DIAGNOSIS — E78.5 DYSLIPIDEMIA: ICD-10-CM

## 2023-10-20 DIAGNOSIS — Z23 NEED FOR PNEUMOCOCCAL 20-VALENT CONJUGATE VACCINATION: ICD-10-CM

## 2023-10-20 DIAGNOSIS — I10 ESSENTIAL HYPERTENSION: ICD-10-CM

## 2023-10-20 DIAGNOSIS — E11.8 CONTROLLED TYPE 2 DIABETES MELLITUS WITH COMPLICATION, WITHOUT LONG-TERM CURRENT USE OF INSULIN (HCC): Primary | ICD-10-CM

## 2023-10-20 DIAGNOSIS — Z12.11 SCREENING FOR COLON CANCER: ICD-10-CM

## 2023-10-20 DIAGNOSIS — Z79.4 CONTROLLED TYPE 2 DIABETES MELLITUS WITH CHRONIC KIDNEY DISEASE, WITH LONG-TERM CURRENT USE OF INSULIN, UNSPECIFIED CKD STAGE (HCC): ICD-10-CM

## 2023-10-20 DIAGNOSIS — E11.22 CONTROLLED TYPE 2 DIABETES MELLITUS WITH CHRONIC KIDNEY DISEASE, WITH LONG-TERM CURRENT USE OF INSULIN, UNSPECIFIED CKD STAGE (HCC): ICD-10-CM

## 2023-10-20 RX ORDER — SODIUM POLYSTYRENE SULFONATE 15 G/60ML
15 SUSPENSION ORAL; RECTAL DAILY
Qty: 420 ML | Refills: 0 | Status: SHIPPED | OUTPATIENT
Start: 2023-10-20 | End: 2023-10-27

## 2023-10-20 RX ORDER — CLONIDINE HYDROCHLORIDE 0.1 MG/1
0.1 TABLET ORAL 2 TIMES DAILY
Qty: 180 TABLET | Refills: 0 | Status: SHIPPED | OUTPATIENT
Start: 2023-10-20 | End: 2024-01-18

## 2023-10-24 ENCOUNTER — TELEPHONE (OUTPATIENT)
Dept: FAMILY MEDICINE CLINIC | Facility: CLINIC | Age: 43
End: 2023-10-24

## 2023-11-10 ENCOUNTER — OFFICE VISIT (OUTPATIENT)
Dept: PODIATRY CLINIC | Facility: CLINIC | Age: 43
End: 2023-11-10
Payer: COMMERCIAL

## 2023-11-10 DIAGNOSIS — M20.42 HAMMER TOES OF BOTH FEET: ICD-10-CM

## 2023-11-10 DIAGNOSIS — M20.41 HAMMER TOES OF BOTH FEET: ICD-10-CM

## 2023-11-10 DIAGNOSIS — M89.8X7 EXOSTOSIS OF BONE OF FOOT: ICD-10-CM

## 2023-11-10 DIAGNOSIS — E11.42 DIABETIC POLYNEUROPATHY ASSOCIATED WITH TYPE 2 DIABETES MELLITUS (HCC): Primary | ICD-10-CM

## 2023-11-10 DIAGNOSIS — L84 CALLUS OF FOOT: ICD-10-CM

## 2023-11-10 DIAGNOSIS — Z87.898 HISTORY OF ULCERATION: ICD-10-CM

## 2023-11-10 DIAGNOSIS — N18.4 STAGE 4 CHRONIC KIDNEY DISEASE (HCC): ICD-10-CM

## 2023-11-10 PROCEDURE — 99213 OFFICE O/P EST LOW 20 MIN: CPT | Performed by: STUDENT IN AN ORGANIZED HEALTH CARE EDUCATION/TRAINING PROGRAM

## 2023-11-10 NOTE — PROGRESS NOTES
[facility], [facility] Podiatry  Progress Note    Saritha Murrell is a 37year old male. Chief Complaint   Patient presents with    Follow - Up     Left foot- patient denies pain at this time. HPI:     Patient is a pleasant 49-year-old male with past medical history of recurrent heel ulcerations, diabetes, and CKD returns to clinic for foot exam.  He continues to ambulate with accommodative inserts and shoes and relays that he is tolerating this well. He denies open sores or other concerns to his left foot. Patient is status post calcaneal planing of left foot (DOS: 12/28/21) for treatment of recurrent ulceration site which has worked well to keep site healed. He does have elongated and thickened nails he has difficulty trimming on his own. He has been applying urea cream to callus sites between visits. His last HGB-A1c was 5.5% on 10/18/23. No other complaints are mentioned. Allergies: Patient has no known allergies. Current Outpatient Medications   Medication Sig Dispense Refill    insulin detemir 100 UNIT/ML Subcutaneous Solution Pen-injector Inject 25 Units into the skin every morning. 30 mL 1    cloNIDine 0.1 MG Oral Tab Take 1 tablet (0.1 mg total) by mouth 2 (two) times daily. 180 tablet 0    Blood Pressure Monitoring (BLOOD PRESSURE KIT) Does not apply Device 1 kit daily. Check blood pressure daily. 1 each 0    furosemide 20 MG Oral Tab Take 1 tablet (20 mg total) by mouth daily. 30 tablet 5    aspirin (ASPIRIN EC LOW DOSE) 81 MG Oral Tab EC Take 1 tablet (81 mg total) by mouth daily. 90 tablet 0    amLODIPine 10 MG Oral Tab Take 1 tablet (10 mg total) by mouth daily.  90 tablet 0    EZETIMIBE-SIMVASTATIN 10-80 MG Oral Tab TAKE 1 TABLET BY MOUTH DAILY 90 tablet 3    HUMALOG KWIKPEN 100 UNIT/ML Subcutaneous Solution Pen-injector SLIDING SCALE  TID with meals MDD 15u (Patient taking differently: SLIDING SCALE  TID with meals MDD 15u) 15 mL 0    Dulaglutide (TRULICITY) 5.51 FU/8.2FJ Subcutaneous Solution Pen-injector Inject 0.75 mg into the skin once a week. 7 mL 3      Past Medical History:   Diagnosis Date    CKD (chronic kidney disease)     Diabetes ([facility] [address]     Disorder of liver     Elevated liver enzymes 8/30/2016    Elevated serum GGT level 8/30/2016    High blood pressure     High cholesterol     Hyperlipidemia     HYPERTRIGLYCERIDEMIA     OBESITY     Obesity     Pneumonia due to organism     Renal disorder     Visual impairment     GLASSES      Past Surgical History:   Procedure Laterality Date    ARTHROTOMY/EXPLORE/TREAT KNEE JOINT Left     DONE TWICE    CHOLECYSTECTOMY      DEBRIDE WOUND Left     HEEL    FOOT SURGERY      OTHER SURGICAL HISTORY  Knee & Foot      Family History   Problem Relation Age of Onset    Diabetes Father     Heart Disorder Father     Diabetes Mother     Hypertension Sister       Social History     Socioeconomic History    Marital status:    Tobacco Use    Smoking status: Never    Smokeless tobacco: Never   Vaping Use    Vaping Use: Never used   Substance and Sexual Activity    Alcohol use: Yes     Alcohol/week: 1.0 standard drink of alcohol     Types: 1 Standard drinks or equivalent per week     Comment: socially    Drug use: No   Other Topics Concern    Caffeine Concern No    Exercise No    Seat Belt Yes    Special Diet Yes    Stress Concern No    Weight Concern No           REVIEW OF SYSTEMS:     Today reviewed systems as documented below  GENERAL HEALTH: feels well otherwise  SKIN: denies any unusual skin lesions or rashes  RESPIRATORY: denies shortness of breath with exertion  CARDIOVASCULAR: denies chest pain on exertion  GI: denies abdominal pain and denies heartburn  NEURO: denies headaches  MUSCULO: denies arthritis, back pain      EXAM:   There were no vitals taken for this visit. GENERAL: well developed, well nourished, in no apparent distress  EXTREMITIES:     1. Integument: Normal skin temperature and turgor.   Site of prior ulceration to left plantar lateral calcaneus remains well-healed. Minimal hyperkeratotic tissue present posterior heel. No hyperkeratotic tissue or breakdown present to site of prior ulceration. Nails x10 are mildly elongated and thickened. 2. Vascular: Dorsalis pedis and posterior tibial pulses are lightly palpable. CFT intact digits. 3. Musculoskeletal: Muscle strength intact to left lower extremity with decreased plantar flexion which is patient's baseline. There is no longer plantar prominence of calcaneus that was noted preoperatively. Compartments are soft and compressible. Flexion contracture of digits bilaterally. 4. Neurological: Normal sharp dull sensation; reflexes normal.  Decreased protective sensation noted to lower extremities. ASSESSMENT AND PLAN:   Diagnoses and all orders for this visit:    Diabetic polyneuropathy associated with type 2 diabetes mellitus ([facility] [address]    History of ulceration    Callus of foot    Exostosis of bone of foot    Stage 4 chronic kidney disease (HCC)    Hammer toes of both feet          Plan:   -Patient examined, chart history reviewed. -Inspected surgical site of left foot--site is well-healed and without acute signs of infection or other concerns.  -Site of prior ulceration is also well-healed and without any breakdown at this time.   -Pared HPK to posterior heel that was debrided to healthy tissue without incident.    -Patient can continue applying urea cream to site every 1-2 days.  -Sharply debrided nails x10 with nail nippers without incident.  -Patient can continue ambulating with accommodative inserts in tennis shoes.  -Educated patient on acute signs of infection and symptoms of DVT and advised patient to seek immediate medical attention if any concerns arise. Patient expressed understanding. The patient indicates understanding of these issues and agrees to the plan. RTC 6 weeks.     Matthew Watkins DPM

## 2023-11-12 ENCOUNTER — APPOINTMENT (OUTPATIENT)
Dept: GENERAL RADIOLOGY | Facility: HOSPITAL | Age: 43
End: 2023-11-12
Attending: EMERGENCY MEDICINE
Payer: COMMERCIAL

## 2023-11-12 ENCOUNTER — HOSPITAL ENCOUNTER (INPATIENT)
Facility: HOSPITAL | Age: 43
LOS: 2 days | Discharge: HOME | End: 2023-11-16
Attending: EMERGENCY MEDICINE | Admitting: HOSPITALIST
Payer: COMMERCIAL

## 2023-11-12 DIAGNOSIS — E11.22 CONTROLLED TYPE 2 DIABETES MELLITUS WITH CHRONIC KIDNEY DISEASE, WITH LONG-TERM CURRENT USE OF INSULIN, UNSPECIFIED CKD STAGE (HCC): ICD-10-CM

## 2023-11-12 DIAGNOSIS — Z79.4 CONTROLLED TYPE 2 DIABETES MELLITUS WITH CHRONIC KIDNEY DISEASE, WITH LONG-TERM CURRENT USE OF INSULIN, UNSPECIFIED CKD STAGE (HCC): ICD-10-CM

## 2023-11-12 DIAGNOSIS — D64.9 ANEMIA, UNSPECIFIED TYPE: Primary | ICD-10-CM

## 2023-11-12 DIAGNOSIS — N17.9 AKI (ACUTE KIDNEY INJURY) (HCC): ICD-10-CM

## 2023-11-12 DIAGNOSIS — I50.9 ACUTE ON CHRONIC CONGESTIVE HEART FAILURE, UNSPECIFIED HEART FAILURE TYPE (HCC): ICD-10-CM

## 2023-11-12 DIAGNOSIS — Z79.4 LONG-TERM INSULIN USE (HCC): ICD-10-CM

## 2023-11-12 DIAGNOSIS — R03.0 ELEVATED BLOOD PRESSURE READING: ICD-10-CM

## 2023-11-12 DIAGNOSIS — E11.3493 SEVERE NONPROLIFERATIVE DIABETIC RETINOPATHY OF BOTH EYES ASSOCIATED WITH TYPE 2 DIABETES MELLITUS, MACULAR EDEMA PRESENCE UNSPECIFIED (HCC): ICD-10-CM

## 2023-11-12 LAB
ALBUMIN SERPL-MCNC: 2.9 G/DL (ref 3.4–5)
ALBUMIN/GLOB SERPL: 0.8 {RATIO} (ref 1–2)
ALP LIVER SERPL-CCNC: 95 U/L
ALT SERPL-CCNC: 12 U/L
ANION GAP SERPL CALC-SCNC: 5 MMOL/L (ref 0–18)
AST SERPL-CCNC: 16 U/L (ref 15–37)
BASOPHILS # BLD AUTO: 0.02 X10(3) UL (ref 0–0.2)
BASOPHILS NFR BLD AUTO: 0.4 %
BILIRUB SERPL-MCNC: 0.4 MG/DL (ref 0.1–2)
BUN BLD-MCNC: 48 MG/DL (ref 9–23)
CALCIUM BLD-MCNC: 7.7 MG/DL (ref 8.5–10.1)
CHLORIDE SERPL-SCNC: 117 MMOL/L (ref 98–112)
CO2 SERPL-SCNC: 21 MMOL/L (ref 21–32)
CREAT BLD-MCNC: 4.86 MG/DL
DEPRECATED HBV CORE AB SER IA-ACNC: 115.7 NG/ML
EGFRCR SERPLBLD CKD-EPI 2021: 14 ML/MIN/1.73M2 (ref 60–?)
EOSINOPHIL # BLD AUTO: 0.11 X10(3) UL (ref 0–0.7)
EOSINOPHIL NFR BLD AUTO: 2.1 %
ERYTHROCYTE [DISTWIDTH] IN BLOOD BY AUTOMATED COUNT: 13.3 %
GLOBULIN PLAS-MCNC: 3.8 G/DL (ref 2.8–4.4)
GLUCOSE BLD-MCNC: 130 MG/DL (ref 70–99)
GLUCOSE BLD-MCNC: 142 MG/DL (ref 70–99)
HCT VFR BLD AUTO: 23.9 %
HGB BLD-MCNC: 7.3 G/DL
IMM GRANULOCYTES # BLD AUTO: 0.01 X10(3) UL (ref 0–1)
IMM GRANULOCYTES NFR BLD: 0.2 %
IRON SATN MFR SERPL: 11 %
IRON SERPL-MCNC: 29 UG/DL
LYMPHOCYTES # BLD AUTO: 0.87 X10(3) UL (ref 1–4)
LYMPHOCYTES NFR BLD AUTO: 16.9 %
MCH RBC QN AUTO: 28.9 PG (ref 26–34)
MCHC RBC AUTO-ENTMCNC: 30.5 G/DL (ref 31–37)
MCV RBC AUTO: 94.5 FL
MONOCYTES # BLD AUTO: 0.34 X10(3) UL (ref 0.1–1)
MONOCYTES NFR BLD AUTO: 6.6 %
NEUTROPHILS # BLD AUTO: 3.79 X10 (3) UL (ref 1.5–7.7)
NEUTROPHILS # BLD AUTO: 3.79 X10(3) UL (ref 1.5–7.7)
NEUTROPHILS NFR BLD AUTO: 73.8 %
NT-PROBNP SERPL-MCNC: ABNORMAL PG/ML (ref ?–125)
OSMOLALITY SERPL CALC.SUM OF ELEC: 311 MOSM/KG (ref 275–295)
PLATELET # BLD AUTO: 146 10(3)UL (ref 150–450)
POTASSIUM SERPL-SCNC: 4.8 MMOL/L (ref 3.5–5.1)
PROT SERPL-MCNC: 6.7 G/DL (ref 6.4–8.2)
RBC # BLD AUTO: 2.53 X10(6)UL
RH BLOOD TYPE: POSITIVE
SODIUM SERPL-SCNC: 143 MMOL/L (ref 136–145)
TIBC SERPL-MCNC: 267 UG/DL (ref 240–450)
TRANSFERRIN SERPL-MCNC: 179 MG/DL (ref 200–360)
TROPONIN I SERPL HS-MCNC: 39 NG/L
WBC # BLD AUTO: 5.1 X10(3) UL (ref 4–11)

## 2023-11-12 PROCEDURE — 71045 X-RAY EXAM CHEST 1 VIEW: CPT | Performed by: EMERGENCY MEDICINE

## 2023-11-12 PROCEDURE — 99223 1ST HOSP IP/OBS HIGH 75: CPT | Performed by: HOSPITALIST

## 2023-11-12 RX ORDER — ECHINACEA PURPUREA EXTRACT 125 MG
1 TABLET ORAL
Status: DISCONTINUED | OUTPATIENT
Start: 2023-11-12 | End: 2023-11-16

## 2023-11-12 RX ORDER — FAMOTIDINE 10 MG/ML
20 INJECTION, SOLUTION INTRAVENOUS ONCE
Status: DISCONTINUED | OUTPATIENT
Start: 2023-11-12 | End: 2023-11-12

## 2023-11-12 RX ORDER — DEXTROSE MONOHYDRATE 25 G/50ML
50 INJECTION, SOLUTION INTRAVENOUS
Status: DISCONTINUED | OUTPATIENT
Start: 2023-11-12 | End: 2023-11-16

## 2023-11-12 RX ORDER — MELATONIN
3 NIGHTLY PRN
Status: DISCONTINUED | OUTPATIENT
Start: 2023-11-12 | End: 2023-11-16

## 2023-11-12 RX ORDER — FUROSEMIDE 20 MG/1
20 TABLET ORAL DAILY
Status: DISCONTINUED | OUTPATIENT
Start: 2023-11-12 | End: 2023-11-12

## 2023-11-12 RX ORDER — AMLODIPINE BESYLATE 5 MG/1
10 TABLET ORAL DAILY
Status: DISCONTINUED | OUTPATIENT
Start: 2023-11-12 | End: 2023-11-16

## 2023-11-12 RX ORDER — PROCHLORPERAZINE EDISYLATE 5 MG/ML
5 INJECTION INTRAMUSCULAR; INTRAVENOUS EVERY 8 HOURS PRN
Status: DISCONTINUED | OUTPATIENT
Start: 2023-11-12 | End: 2023-11-16

## 2023-11-12 RX ORDER — FUROSEMIDE 20 MG/1
20 TABLET ORAL DAILY
Status: DISCONTINUED | OUTPATIENT
Start: 2023-11-13 | End: 2023-11-13

## 2023-11-12 RX ORDER — ONDANSETRON 2 MG/ML
4 INJECTION INTRAMUSCULAR; INTRAVENOUS EVERY 6 HOURS PRN
Status: DISCONTINUED | OUTPATIENT
Start: 2023-11-12 | End: 2023-11-16

## 2023-11-12 RX ORDER — BISACODYL 10 MG
10 SUPPOSITORY, RECTAL RECTAL
Status: DISCONTINUED | OUTPATIENT
Start: 2023-11-12 | End: 2023-11-16

## 2023-11-12 RX ORDER — NICOTINE POLACRILEX 4 MG
30 LOZENGE BUCCAL
Status: DISCONTINUED | OUTPATIENT
Start: 2023-11-12 | End: 2023-11-16

## 2023-11-12 RX ORDER — SENNOSIDES 8.6 MG
17.2 TABLET ORAL NIGHTLY PRN
Status: DISCONTINUED | OUTPATIENT
Start: 2023-11-12 | End: 2023-11-16

## 2023-11-12 RX ORDER — BENZONATATE 100 MG/1
200 CAPSULE ORAL 3 TIMES DAILY PRN
Status: DISCONTINUED | OUTPATIENT
Start: 2023-11-12 | End: 2023-11-16

## 2023-11-12 RX ORDER — HYDRALAZINE HYDROCHLORIDE 20 MG/ML
10 INJECTION INTRAMUSCULAR; INTRAVENOUS EVERY 6 HOURS PRN
Status: DISCONTINUED | OUTPATIENT
Start: 2023-11-12 | End: 2023-11-16

## 2023-11-12 RX ORDER — MECLIZINE HYDROCHLORIDE 25 MG/1
25 TABLET ORAL ONCE
Status: DISCONTINUED | OUTPATIENT
Start: 2023-11-12 | End: 2023-11-12

## 2023-11-12 RX ORDER — CLONIDINE HYDROCHLORIDE 0.1 MG/1
0.1 TABLET ORAL 2 TIMES DAILY
Status: DISCONTINUED | OUTPATIENT
Start: 2023-11-12 | End: 2023-11-13

## 2023-11-12 RX ORDER — FUROSEMIDE 10 MG/ML
20 INJECTION INTRAMUSCULAR; INTRAVENOUS ONCE
Status: COMPLETED | OUTPATIENT
Start: 2023-11-12 | End: 2023-11-12

## 2023-11-12 RX ORDER — POLYETHYLENE GLYCOL 3350 17 G/17G
17 POWDER, FOR SOLUTION ORAL DAILY PRN
Status: DISCONTINUED | OUTPATIENT
Start: 2023-11-12 | End: 2023-11-16

## 2023-11-12 RX ORDER — MAGNESIUM HYDROXIDE/ALUMINUM HYDROXICE/SIMETHICONE 120; 1200; 1200 MG/30ML; MG/30ML; MG/30ML
30 SUSPENSION ORAL ONCE
Status: DISCONTINUED | OUTPATIENT
Start: 2023-11-12 | End: 2023-11-12

## 2023-11-12 RX ORDER — NICOTINE POLACRILEX 4 MG
15 LOZENGE BUCCAL
Status: DISCONTINUED | OUTPATIENT
Start: 2023-11-12 | End: 2023-11-16

## 2023-11-12 RX ORDER — ACETAMINOPHEN 500 MG
500 TABLET ORAL EVERY 4 HOURS PRN
Status: DISCONTINUED | OUTPATIENT
Start: 2023-11-12 | End: 2023-11-16

## 2023-11-12 RX ORDER — ONDANSETRON 2 MG/ML
4 INJECTION INTRAMUSCULAR; INTRAVENOUS ONCE
Status: DISCONTINUED | OUTPATIENT
Start: 2023-11-12 | End: 2023-11-12

## 2023-11-13 ENCOUNTER — APPOINTMENT (OUTPATIENT)
Dept: CV DIAGNOSTICS | Facility: HOSPITAL | Age: 43
End: 2023-11-13
Attending: HOSPITALIST
Payer: COMMERCIAL

## 2023-11-13 PROBLEM — D63.1 ANEMIA IN STAGE 4 CHRONIC KIDNEY DISEASE  (HCC): Status: ACTIVE | Noted: 2023-11-12

## 2023-11-13 PROBLEM — D63.1 ANEMIA IN STAGE 4 CHRONIC KIDNEY DISEASE: Status: ACTIVE | Noted: 2023-11-12

## 2023-11-13 PROBLEM — N18.4 ANEMIA IN STAGE 4 CHRONIC KIDNEY DISEASE (HCC): Status: ACTIVE | Noted: 2023-11-12

## 2023-11-13 PROBLEM — N18.4 ANEMIA IN STAGE 4 CHRONIC KIDNEY DISEASE  (HCC): Status: ACTIVE | Noted: 2023-11-12

## 2023-11-13 PROBLEM — D63.1 ANEMIA IN STAGE 4 CHRONIC KIDNEY DISEASE (HCC): Status: ACTIVE | Noted: 2023-11-12

## 2023-11-13 PROBLEM — D64.9 ANEMIA: Status: ACTIVE | Noted: 2023-11-12

## 2023-11-13 PROBLEM — N18.4 ANEMIA IN STAGE 4 CHRONIC KIDNEY DISEASE: Status: ACTIVE | Noted: 2023-11-12

## 2023-11-13 LAB
ANION GAP SERPL CALC-SCNC: 1 MMOL/L (ref 0–18)
ANTIBODY SCREEN: NEGATIVE
ATRIAL RATE: 90 BPM
BASOPHILS # BLD AUTO: 0.03 X10(3) UL (ref 0–0.2)
BASOPHILS NFR BLD AUTO: 0.7 %
BILIRUB UR QL STRIP.AUTO: NEGATIVE
BUN BLD-MCNC: 50 MG/DL (ref 9–23)
CALCIUM BLD-MCNC: 7.9 MG/DL (ref 8.5–10.1)
CHLORIDE SERPL-SCNC: 118 MMOL/L (ref 98–112)
CLARITY UR REFRACT.AUTO: CLEAR
CO2 SERPL-SCNC: 23 MMOL/L (ref 21–32)
COLOR UR AUTO: COLORLESS
CREAT BLD-MCNC: 4.94 MG/DL
CREAT UR-SCNC: 78.3 MG/DL
EGFRCR SERPLBLD CKD-EPI 2021: 14 ML/MIN/1.73M2 (ref 60–?)
EOSINOPHIL # BLD AUTO: 0.16 X10(3) UL (ref 0–0.7)
EOSINOPHIL NFR BLD AUTO: 3.5 %
ERYTHROCYTE [DISTWIDTH] IN BLOOD BY AUTOMATED COUNT: 13.2 %
FOLATE SERPL-MCNC: 9.3 NG/ML (ref 8.7–?)
GLUCOSE BLD-MCNC: 100 MG/DL (ref 70–99)
GLUCOSE BLD-MCNC: 115 MG/DL (ref 70–99)
GLUCOSE BLD-MCNC: 124 MG/DL (ref 70–99)
GLUCOSE BLD-MCNC: 136 MG/DL (ref 70–99)
GLUCOSE BLD-MCNC: 149 MG/DL (ref 70–99)
GLUCOSE UR STRIP.AUTO-MCNC: 50 MG/DL
HCT VFR BLD AUTO: 22.5 %
HGB BLD-MCNC: 6.9 G/DL
HGB BLD-MCNC: 7.8 G/DL
HGB RETIC QN AUTO: 29.1 PG (ref 28.2–36.6)
HYALINE CASTS #/AREA URNS AUTO: PRESENT /LPF
IMM GRANULOCYTES # BLD AUTO: 0.02 X10(3) UL (ref 0–1)
IMM GRANULOCYTES NFR BLD: 0.4 %
IMM RETICS NFR: 0.17 RATIO (ref 0.1–0.3)
KETONES UR STRIP.AUTO-MCNC: NEGATIVE MG/DL
LEUKOCYTE ESTERASE UR QL STRIP.AUTO: NEGATIVE
LYMPHOCYTES # BLD AUTO: 0.91 X10(3) UL (ref 1–4)
LYMPHOCYTES NFR BLD AUTO: 19.9 %
MAGNESIUM SERPL-MCNC: 1.5 MG/DL (ref 1.6–2.6)
MCH RBC QN AUTO: 29.2 PG (ref 26–34)
MCHC RBC AUTO-ENTMCNC: 30.7 G/DL (ref 31–37)
MCV RBC AUTO: 95.3 FL
MONOCYTES # BLD AUTO: 0.48 X10(3) UL (ref 0.1–1)
MONOCYTES NFR BLD AUTO: 10.5 %
NEUTROPHILS # BLD AUTO: 2.97 X10 (3) UL (ref 1.5–7.7)
NEUTROPHILS # BLD AUTO: 2.97 X10(3) UL (ref 1.5–7.7)
NEUTROPHILS NFR BLD AUTO: 65 %
NITRITE UR QL STRIP.AUTO: NEGATIVE
OSMOLALITY SERPL CALC.SUM OF ELEC: 309 MOSM/KG (ref 275–295)
P AXIS: 50 DEGREES
P-R INTERVAL: 156 MS
PH UR STRIP.AUTO: 6 [PH] (ref 5–8)
PLATELET # BLD AUTO: 136 10(3)UL (ref 150–450)
POTASSIUM SERPL-SCNC: 4.9 MMOL/L (ref 3.5–5.1)
PROT UR STRIP.AUTO-MCNC: 200 MG/DL
PROT UR-MCNC: 252.2 MG/DL
PROT/CREAT UR-RTO: 3.22
Q-T INTERVAL: 388 MS
QRS DURATION: 88 MS
QTC CALCULATION (BEZET): 474 MS
R AXIS: -17 DEGREES
RBC # BLD AUTO: 2.36 X10(6)UL
RETICS # AUTO: 32 X10(3) UL (ref 22.5–147.5)
RETICS/RBC NFR AUTO: 1.4 %
RH BLOOD TYPE: POSITIVE
SODIUM SERPL-SCNC: 142 MMOL/L (ref 136–145)
SP GR UR STRIP.AUTO: 1.01 (ref 1–1.03)
T AXIS: 79 DEGREES
TSI SER-ACNC: 0.41 MIU/ML (ref 0.36–3.74)
UROBILINOGEN UR STRIP.AUTO-MCNC: NORMAL MG/DL
VENTRICULAR RATE: 90 BPM
VIT B12 SERPL-MCNC: 423 PG/ML (ref 193–986)
WBC # BLD AUTO: 4.6 X10(3) UL (ref 4–11)

## 2023-11-13 PROCEDURE — 93306 TTE W/DOPPLER COMPLETE: CPT | Performed by: HOSPITALIST

## 2023-11-13 PROCEDURE — 99232 SBSQ HOSP IP/OBS MODERATE 35: CPT | Performed by: STUDENT IN AN ORGANIZED HEALTH CARE EDUCATION/TRAINING PROGRAM

## 2023-11-13 PROCEDURE — 30233N1 TRANSFUSION OF NONAUTOLOGOUS RED BLOOD CELLS INTO PERIPHERAL VEIN, PERCUTANEOUS APPROACH: ICD-10-PCS | Performed by: HOSPITALIST

## 2023-11-13 PROCEDURE — 99255 IP/OBS CONSLTJ NEW/EST HI 80: CPT | Performed by: INTERNAL MEDICINE

## 2023-11-13 RX ORDER — FUROSEMIDE 10 MG/ML
40 INJECTION INTRAMUSCULAR; INTRAVENOUS
Status: DISCONTINUED | OUTPATIENT
Start: 2023-11-13 | End: 2023-11-14

## 2023-11-13 RX ORDER — CLONIDINE HYDROCHLORIDE 0.2 MG/1
0.2 TABLET ORAL 3 TIMES DAILY
Status: DISCONTINUED | OUTPATIENT
Start: 2023-11-13 | End: 2023-11-15

## 2023-11-13 RX ORDER — SODIUM CHLORIDE 9 MG/ML
INJECTION, SOLUTION INTRAVENOUS ONCE
Status: COMPLETED | OUTPATIENT
Start: 2023-11-13 | End: 2023-11-13

## 2023-11-13 NOTE — ED QUICK NOTES
Orders for admission, patient is aware of plan and ready to go upstairs. Any questions, please call ED RN Millie Bautista at extension [phone].      Patient Covid vaccination status: Fully vaccinated     COVID Test Ordered in ED: None    COVID Suspicion at Admission: N/A    Running Infusions:  None    Mental Status/LOC at time of transport: A&Ox4    Other pertinent information:   CIWA score: N/A   NIH score:  N/A

## 2023-11-13 NOTE — PROGRESS NOTES
11/12/23 2118 11/12/23 2120 11/12/23 2123   Vital Signs   BP (!) 201/97 (!) 193/96 (!) 178/86   MAP (mmHg) (!) 124 (!) 121 (!) 110   BP Location Left arm Left arm Left arm   BP Method Automatic Automatic Automatic   Patient Position Lying Sitting Standing     Orthostatic BP.  Pt denies dizziness

## 2023-11-13 NOTE — PLAN OF CARE
Pt is received at 0700. A&O x 4. Denies any pain. Lungs are clear/diminished, RA. NSR on tele. Last BM 11/12/23, bowel sounds are active x 4, abdomen is soft and non -tender. Received 1 unit of RBC and post transfusing hemoglobin is 7.8. All needs are met. Pt is updated with plan of care.       Problem: Patient/Family Goals  Goal: Patient/Family Long Term Goal  Description: Patient's Long Term Goal: discharge home    Interventions:  - labs, telemetry monitoring, monitor hemoglobin  - See additional Care Plan goals for specific interventions  Outcome: Progressing  Goal: Patient/Family Short Term Goal  Description: Patient's Short Term Goal: feel better    Interventions:   - taking prescribed meds, monitor hemoglobin   - See additional Care Plan goals for specific interventions  Outcome: Progressing

## 2023-11-13 NOTE — PLAN OF CARE
Assumed care of patient around 2130. Admission navigator and med rec completed. Pt A/Ox 4. O2 sats maintained on room air. NSR on tele. Last BM 11/12. Voiding without difficulty. Pt reports no pain. Pt up independently, instructed to call with any changes. Pt updated on plan of care. Care needs met. Bed in lowest position, Call light within reach. Pt declining bed alarm for the night. Skin intact, old scar on left heel. POC: occult stool sample, UA, monitor hemoglobin/BP, echo, nephrology consult    0530: Dr. Keerthi Rivero notified of Hgb 6.9. Orders placed. Consent for blood obtained. Lab called to draw type and screen. 1490: Nephrology consulted.      Problem: Patient/Family Goals  Goal: Patient/Family Long Term Goal  Description: Patient's Long Term Goal: discharge home    Interventions:  - labs, telemetry monitoring, monitor hemoglobin  - See additional Care Plan goals for specific interventions  Outcome: Progressing  Goal: Patient/Family Short Term Goal  Description: Patient's Short Term Goal: feel better    Interventions:   - taking prescribed meds, monitor hemoglobin   - See additional Care Plan goals for specific interventions  Outcome: Progressing

## 2023-11-14 PROBLEM — N18.4 ANEMIA IN STAGE 4 CHRONIC KIDNEY DISEASE (HCC): Status: ACTIVE | Noted: 2023-11-14

## 2023-11-14 PROBLEM — N18.4 ANEMIA IN STAGE 4 CHRONIC KIDNEY DISEASE: Status: ACTIVE | Noted: 2023-11-14

## 2023-11-14 PROBLEM — D63.1 ANEMIA IN STAGE 4 CHRONIC KIDNEY DISEASE: Status: ACTIVE | Noted: 2023-11-14

## 2023-11-14 PROBLEM — E87.70 HYPERVOLEMIA: Status: ACTIVE | Noted: 2023-11-14

## 2023-11-14 PROBLEM — D63.1 ANEMIA IN STAGE 4 CHRONIC KIDNEY DISEASE (HCC): Status: ACTIVE | Noted: 2023-11-14

## 2023-11-14 PROBLEM — D64.9 ANEMIA, UNSPECIFIED TYPE: Status: ACTIVE | Noted: 2023-11-14

## 2023-11-14 PROBLEM — D63.1 ANEMIA IN STAGE 4 CHRONIC KIDNEY DISEASE  (HCC): Status: ACTIVE | Noted: 2023-11-14

## 2023-11-14 PROBLEM — N18.4 ANEMIA IN STAGE 4 CHRONIC KIDNEY DISEASE  (HCC): Status: ACTIVE | Noted: 2023-11-14

## 2023-11-14 LAB
ANION GAP SERPL CALC-SCNC: 5 MMOL/L (ref 0–18)
BASOPHILS # BLD AUTO: 0.03 X10(3) UL (ref 0–0.2)
BASOPHILS NFR BLD AUTO: 0.5 %
BLOOD TYPE BARCODE: 6200
BUN BLD-MCNC: 49 MG/DL (ref 9–23)
CALCIUM BLD-MCNC: 8.4 MG/DL (ref 8.5–10.1)
CHLORIDE SERPL-SCNC: 114 MMOL/L (ref 98–112)
CO2 SERPL-SCNC: 21 MMOL/L (ref 21–32)
CREAT BLD-MCNC: 5.06 MG/DL
EGFRCR SERPLBLD CKD-EPI 2021: 14 ML/MIN/1.73M2 (ref 60–?)
EOSINOPHIL # BLD AUTO: 0.19 X10(3) UL (ref 0–0.7)
EOSINOPHIL NFR BLD AUTO: 3.3 %
ERYTHROCYTE [DISTWIDTH] IN BLOOD BY AUTOMATED COUNT: 13.7 %
GLUCOSE BLD-MCNC: 112 MG/DL (ref 70–99)
GLUCOSE BLD-MCNC: 134 MG/DL (ref 70–99)
GLUCOSE BLD-MCNC: 176 MG/DL (ref 70–99)
GLUCOSE BLD-MCNC: 79 MG/DL (ref 70–99)
GLUCOSE BLD-MCNC: 81 MG/DL (ref 70–99)
HCT VFR BLD AUTO: 25.6 %
HGB BLD-MCNC: 8.1 G/DL
IMM GRANULOCYTES # BLD AUTO: 0.03 X10(3) UL (ref 0–1)
IMM GRANULOCYTES NFR BLD: 0.5 %
LYMPHOCYTES # BLD AUTO: 0.91 X10(3) UL (ref 1–4)
LYMPHOCYTES NFR BLD AUTO: 15.8 %
MAGNESIUM SERPL-MCNC: 1.5 MG/DL (ref 1.6–2.6)
MCH RBC QN AUTO: 28.6 PG (ref 26–34)
MCHC RBC AUTO-ENTMCNC: 31.6 G/DL (ref 31–37)
MCV RBC AUTO: 90.5 FL
MONOCYTES # BLD AUTO: 0.45 X10(3) UL (ref 0.1–1)
MONOCYTES NFR BLD AUTO: 7.8 %
NEUTROPHILS # BLD AUTO: 4.15 X10 (3) UL (ref 1.5–7.7)
NEUTROPHILS # BLD AUTO: 4.15 X10(3) UL (ref 1.5–7.7)
NEUTROPHILS NFR BLD AUTO: 72.1 %
OSMOLALITY SERPL CALC.SUM OF ELEC: 302 MOSM/KG (ref 275–295)
PHOSPHATE SERPL-MCNC: 4.8 MG/DL (ref 2.5–4.9)
PLATELET # BLD AUTO: 159 10(3)UL (ref 150–450)
POTASSIUM SERPL-SCNC: 4.6 MMOL/L (ref 3.5–5.1)
RBC # BLD AUTO: 2.83 X10(6)UL
SODIUM SERPL-SCNC: 140 MMOL/L (ref 136–145)
UNIT VOLUME: 350 ML
WBC # BLD AUTO: 5.8 X10(3) UL (ref 4–11)

## 2023-11-14 PROCEDURE — 99232 SBSQ HOSP IP/OBS MODERATE 35: CPT | Performed by: STUDENT IN AN ORGANIZED HEALTH CARE EDUCATION/TRAINING PROGRAM

## 2023-11-14 PROCEDURE — 99233 SBSQ HOSP IP/OBS HIGH 50: CPT | Performed by: INTERNAL MEDICINE

## 2023-11-14 RX ORDER — FUROSEMIDE 10 MG/ML
80 INJECTION INTRAMUSCULAR; INTRAVENOUS 3 TIMES DAILY
Status: DISCONTINUED | OUTPATIENT
Start: 2023-11-14 | End: 2023-11-16

## 2023-11-14 RX ORDER — MAGNESIUM SULFATE HEPTAHYDRATE 40 MG/ML
2 INJECTION, SOLUTION INTRAVENOUS ONCE
Status: COMPLETED | OUTPATIENT
Start: 2023-11-14 | End: 2023-11-14

## 2023-11-14 NOTE — PLAN OF CARE
Assumed care of patient at 1. Pt A/Ox 4. O2 sats maintained on room air. NSR on tele. Last BM 11/12. Voiding without difficulty. Pt reports no pain. Pt up independently, instructed to call with any changes. Pt updated on plan of care. Care needs met. Bed in lowest position, Call light within reach. Pt declining bed alarm for the night.      POC: IV Lasix BID, occult stool sample       Problem: Patient/Family Goals  Goal: Patient/Family Long Term Goal  Description: Patient's Long Term Goal: discharge home    Interventions:  - labs, telemetry monitoring, monitor hemoglobin  - See additional Care Plan goals for specific interventions  Outcome: Progressing  Goal: Patient/Family Short Term Goal  Description: Patient's Short Term Goal: feel better    Interventions:   - taking prescribed meds, monitor hemoglobin   - See additional Care Plan goals for specific interventions  Outcome: Progressing

## 2023-11-14 NOTE — PLAN OF CARE
Assumed care at 0730. Pt alert, oriented x4. Oxygen saturation adequate on room air. Lung sounds clear bilaterally. Tele: NSR. Continent. No BM since prior to admission on 11/12, stool sample still needed. Pt declined offered miralax. Denies pain. Plan of care: diuril today, IV lasix BID, IV venofer, need stool sample. Pt updated on plan of care. Questions answered.        Problem: Patient/Family Goals  Goal: Patient/Family Long Term Goal  Description: Patient's Long Term Goal: discharge home    Interventions:  - labs, telemetry monitoring, monitor hemoglobin  - See additional Care Plan goals for specific interventions  Outcome: Progressing  Goal: Patient/Family Short Term Goal  Description: Patient's Short Term Goal: feel better    Interventions:   - taking prescribed meds, monitor hemoglobin   - See additional Care Plan goals for specific interventions  Outcome: Progressing

## 2023-11-15 LAB
ANION GAP SERPL CALC-SCNC: 7 MMOL/L (ref 0–18)
BUN BLD-MCNC: 57 MG/DL (ref 9–23)
CALCIUM BLD-MCNC: 8.5 MG/DL (ref 8.5–10.1)
CHLORIDE SERPL-SCNC: 110 MMOL/L (ref 98–112)
CO2 SERPL-SCNC: 23 MMOL/L (ref 21–32)
CREAT BLD-MCNC: 5.39 MG/DL
EGFRCR SERPLBLD CKD-EPI 2021: 13 ML/MIN/1.73M2 (ref 60–?)
GLUCOSE BLD-MCNC: 136 MG/DL (ref 70–99)
GLUCOSE BLD-MCNC: 138 MG/DL (ref 70–99)
GLUCOSE BLD-MCNC: 155 MG/DL (ref 70–99)
GLUCOSE BLD-MCNC: 182 MG/DL (ref 70–99)
GLUCOSE BLD-MCNC: 195 MG/DL (ref 70–99)
MAGNESIUM SERPL-MCNC: 2 MG/DL (ref 1.6–2.6)
OSMOLALITY SERPL CALC.SUM OF ELEC: 308 MOSM/KG (ref 275–295)
POTASSIUM SERPL-SCNC: 4.8 MMOL/L (ref 3.5–5.1)
SODIUM SERPL-SCNC: 140 MMOL/L (ref 136–145)

## 2023-11-15 PROCEDURE — 99233 SBSQ HOSP IP/OBS HIGH 50: CPT | Performed by: INTERNAL MEDICINE

## 2023-11-15 PROCEDURE — 99232 SBSQ HOSP IP/OBS MODERATE 35: CPT | Performed by: INTERNAL MEDICINE

## 2023-11-15 NOTE — PLAN OF CARE
Patient is alert and oriented x 4. Maintaining O2 saturation WNL on room air. NSR on tele monitor. Patient continent, per pt last BM prior to admission. No complains of pain. Patient aware of plan of care. Safety precautions in place, call light within reach.         Problem: Patient/Family Goals  Goal: Patient/Family Long Term Goal  Description: Patient's Long Term Goal: discharge home    Interventions:  - labs, telemetry monitoring, monitor hemoglobin  - See additional Care Plan goals for specific interventions  Outcome: Progressing  Goal: Patient/Family Short Term Goal  Description: Patient's Short Term Goal: feel better    Interventions:   - taking prescribed meds, monitor hemoglobin   - See additional Care Plan goals for specific interventions  Outcome: Progressing

## 2023-11-15 NOTE — PLAN OF CARE
Pt A&Ox4. Lungs Clear on RA. NSR on tele. Voids in urinal. Up ad benja. Pt receiving IV lasix TID. IV diuril given. No pain at this time. Plan for discharge tomorrow. Call light and belongings within reach.       Problem: Patient/Family Goals  Goal: Patient/Family Long Term Goal  Description: Patient's Long Term Goal: discharge home    Interventions:  - labs, telemetry monitoring, monitor hemoglobin  - See additional Care Plan goals for specific interventions  Outcome: Progressing  Goal: Patient/Family Short Term Goal  Description: Patient's Short Term Goal: feel better    Interventions:   - taking prescribed meds, monitor hemoglobin   - See additional Care Plan goals for specific interventions  Outcome: Progressing

## 2023-11-16 ENCOUNTER — TELEPHONE (OUTPATIENT)
Dept: CASE MANAGEMENT | Facility: HOSPITAL | Age: 43
End: 2023-11-16

## 2023-11-16 VITALS
WEIGHT: 227.31 LBS | OXYGEN SATURATION: 95 % | HEART RATE: 74 BPM | SYSTOLIC BLOOD PRESSURE: 139 MMHG | DIASTOLIC BLOOD PRESSURE: 74 MMHG | TEMPERATURE: 98 F | RESPIRATION RATE: 16 BRPM | BODY MASS INDEX: 35 KG/M2

## 2023-11-16 LAB
ANION GAP SERPL CALC-SCNC: 7 MMOL/L (ref 0–18)
BUN BLD-MCNC: 61 MG/DL (ref 9–23)
CALCIUM BLD-MCNC: 8.5 MG/DL (ref 8.5–10.1)
CHLORIDE SERPL-SCNC: 108 MMOL/L (ref 98–112)
CO2 SERPL-SCNC: 24 MMOL/L (ref 21–32)
CREAT BLD-MCNC: 5.69 MG/DL
EGFRCR SERPLBLD CKD-EPI 2021: 12 ML/MIN/1.73M2 (ref 60–?)
GLUCOSE BLD-MCNC: 167 MG/DL (ref 70–99)
GLUCOSE BLD-MCNC: 168 MG/DL (ref 70–99)
OSMOLALITY SERPL CALC.SUM OF ELEC: 309 MOSM/KG (ref 275–295)
POTASSIUM SERPL-SCNC: 4.7 MMOL/L (ref 3.5–5.1)
SODIUM SERPL-SCNC: 139 MMOL/L (ref 136–145)

## 2023-11-16 PROCEDURE — 99233 SBSQ HOSP IP/OBS HIGH 50: CPT | Performed by: INTERNAL MEDICINE

## 2023-11-16 PROCEDURE — 99239 HOSP IP/OBS DSCHRG MGMT >30: CPT | Performed by: INTERNAL MEDICINE

## 2023-11-16 RX ORDER — TORSEMIDE 20 MG/1
20 TABLET ORAL
Qty: 60 TABLET | Refills: 5 | Status: SHIPPED | OUTPATIENT
Start: 2023-11-16

## 2023-11-16 RX ORDER — CLONIDINE HYDROCHLORIDE 0.3 MG/1
0.3 TABLET ORAL 3 TIMES DAILY
Qty: 90 TABLET | Refills: 5 | Status: SHIPPED | OUTPATIENT
Start: 2023-11-16

## 2023-11-16 RX ORDER — TORSEMIDE 20 MG/1
20 TABLET ORAL
Status: DISCONTINUED | OUTPATIENT
Start: 2023-11-16 | End: 2023-11-16

## 2023-11-16 NOTE — PLAN OF CARE
Patient alert and oriented x 4. Maintaining O2 saturation WNL on room air. NSR on tele monitor. Up at benja. Voids per urinal. IV lasix TID. No complains of pian. Safety precautions in place, Call light within reach. Patient aware of pian of care. All needs met at this time.        Problem: Patient/Family Goals  Goal: Patient/Family Long Term Goal  Description: Patient's Long Term Goal: discharge home    Interventions:  - labs, telemetry monitoring, monitor hemoglobin  - See additional Care Plan goals for specific interventions  Outcome: Progressing  Goal: Patient/Family Short Term Goal  Description: Patient's Short Term Goal: feel better    Interventions:   - taking prescribed meds, monitor hemoglobin   - See additional Care Plan goals for specific interventions  Outcome: Progressing

## 2023-11-16 NOTE — CM/SW NOTE
Shannon Garcia RN case manager with [facility] calling to offer discharge planning assistance. If needed please call back at [phone].

## 2023-11-16 NOTE — PATIENT INSTRUCTIONS
Please return on:   December 13 with Dr. Mary Jane [place] as previously scheduled.   Schedule a TCOM for that morning (prior to Dr. Mary Jane [place] appointment)    Diagnostic:  TCOM with leads at the lateral, posterior and medial LEFT ankle to determine best microvascular flow have any questions regarding your home care instructions please contact the wound center [facility] @ [phone] If after regular business hours, please call your family doctor or local emergency room.  The treatment plan has been discussed at length Azathioprine Pregnancy And Lactation Text: This medication is Pregnancy Category D and isn't considered safe during pregnancy. It is unknown if this medication is excreted in breast milk.

## 2023-11-16 NOTE — PLAN OF CARE
Pt is ok to discharge per primary and consults. Discharge instructions including meds & follow ups given. Patient verbalizes understanding & questions answered. IV removed, tele monitor discontinued, all belongings taken with patient. Pt transported off unit via wheelchair to [place].

## 2023-11-16 NOTE — PLAN OF CARE
Received pt at 0700. Pt is A&Ox4, no complaints of pain. Pt is in NSR. On room air, lungs are clear, dry cough. BLE edema present. Last BM 11-12: miralax provided, denies feelings of constipation/discomfort. Pt updated with plan of care.          Problem: Patient/Family Goals  Goal: Patient/Family Long Term Goal  Description: Patient's Long Term Goal: discharge home  Stay out of hospital 11-16    Interventions:  - labs, telemetry monitoring, monitor hemoglobin  - med compliance, follow ups    - See additional Care Plan goals for specific interventions  Outcome: Progressing  Goal: Patient/Family Short Term Goal  Description: Patient's Short Term Goal: feel better  Go home 11-16    Interventions:   - taking prescribed meds, monitor hemoglobin   - round with MD's, review discharge info    - See additional Care Plan goals for specific interventions  Outcome: Progressing     Problem: PAIN - ADULT  Goal: Verbalizes/displays adequate comfort level or patient's stated pain goal  Description: INTERVENTIONS:  - Encourage pt to monitor pain and request assistance  - Assess pain using appropriate pain scale  - Administer analgesics based on type and severity of pain and evaluate response  - Implement non-pharmacological measures as appropriate and evaluate response  - Consider cultural and social influences on pain and pain management  - Manage/alleviate anxiety  - Utilize distraction and/or relaxation techniques  - Monitor for opioid side effects  - Notify MD/LIP if interventions unsuccessful or patient reports new pain  - Anticipate increased pain with activity and pre-medicate as appropriate  Outcome: Progressing

## 2023-11-16 NOTE — DISCHARGE SUMMARY
[facility] HOSPITALIST  DISCHARGE SUMMARY     Gosia Tobin Patient Status:  Inpatient    1980 MRN [id number]   [facility] 8NE-A Attending No att. providers found   Hosp Day # 2 PCP Mónica Cabrales DO     Date of Admission: 2023  Date of Discharge:  2023     Discharge Disposition: Home or Self Care    Discharge Diagnosis:    #CAMEJO likely FOL due to CKD   # Cough, resolved    -TTE with LVEF 55-60%   - CXR without consolidation, though with cardiomegaly     # Acute on chronic normocytic anemia     #CKD 4   - Thought 2/2 poorly controlled HTN in setting of prior ATN     #DM Type 2      #Essential HTN     #HLD    History of Present Illness: Halle Landers is a 37year old male with a past medical history of diabetes, hypertension, hyperlipidemia who presented to the emergency department with a cough. Patient states his cough has been ongoing for the last 1 month, has recently become more productive. He went to an urgent care who noted that his blood pressure was severely elevated and transferred him to the emergency department. Patient does see a primary care doctor and says he misunderstood his blood pressure medication regimen instructions. He does endorse shortness of breath when climbing stairs, no chest pain, no recent fevers, no other acute complaints at this time. Brief Synopsis: patient admitted and diuresed with nephrology's recommendation. He has responded well and will be dc home today with close follow up as outpatient. Lace+ Score: 55  59-90 High Risk  29-58 Medium Risk  0-28   Low Risk       TCM Follow-Up Recommendation:  LACE > 58: High Risk of readmission after discharge from the hospital.      Discharge Medication List:     Discharge Medications        START taking these medications        Instructions Prescription details   torsemide 20 MG Tabs  Commonly known as: Demadex      Take 1 tablet (20 mg total) by mouth BID (Diuretic).    Quantity: 60 tablet  Refills: 5            CHANGE how you take these medications        Instructions Prescription details   cloNIDine 0.3 MG Tabs  Commonly known as: Catapres  What changed:   medication strength  how much to take  when to take this      Take 1 tablet (0.3 mg total) by mouth 3 (three) times daily. Quantity: 90 tablet  Refills: 5     insulin detemir 100 UNIT/ML Sopn  Commonly known as: Levemir  What changed: how much to take      Inject 22 Units into the skin every morning. Refills: 0            CONTINUE taking these medications        Instructions Prescription details   amLODIPine 10 MG Tabs  Commonly known as: Norvasc      Take 1 tablet (10 mg total) by mouth daily. Quantity: 90 tablet  Refills: 0     aspirin 81 MG Tbec  Commonly known as: Aspirin EC Low Dose      Take 1 tablet (81 mg total) by mouth daily. Quantity: 90 tablet  Refills: 0     Blood Pressure Kit Thais      1 kit daily. Check blood pressure daily. Stop taking on: January 18, 2024  Quantity: 1 each  Refills: 0     ezetimibe-simvastatin 10-80 MG Tabs  Commonly known as: Vytorin      TAKE 1 TABLET BY MOUTH DAILY   Quantity: 90 tablet  Refills: 3     HumaLOG KwikPen 100 UNIT/ML Sopn  Generic drug: Insulin Lispro (1 Unit Dial)      SLIDING SCALE  TID with meals MDD 15u   Quantity: 15 mL  Refills: 0     Trulicity 1.57 MR/0.8RF Sopn  Generic drug: Dulaglutide      Inject 0.75 mg into the skin once a week.    Quantity: 7 mL  Refills: 3            STOP taking these medications      furosemide 20 MG Tabs  Commonly known as: Lasix        sodium polystyrene sulfonate 15 GM/60ML Susp  Commonly known as: Kayexalate                  Where to Get Your Medications        These medications were sent to [address] [place] [address] [address] AT [facility], [phone], [address] [facility] [address]      Hours: 24-hours Phone: [phone]   cloNIDine 0.3 MG Tabs  torsemide 20 MG Tabs         [facility] NA    Follow-up appointment:   GLO Upton/ [address]  [phone]    Schedule an appointment as soon as possible for a visit in 1 week(s)  Disbetes management    Appointments for Next 30 Days 2023 - 2023        Date Arrival Time Visit Type Length Department Provider     2023  2:00 PM  [facility] [address], [address] [place] , [place]    Patient Instructions:         Location Instructions: Your appointment is located at [address]. The facility is approximately 1/2 mile [address] [place] [address] of [address] [address] [place] at the corner of [address] [place] and [address]. Please park in the [address], enter through [address] [facility] [address] and follow the posted signs for guidance. Masks are optional for all patients and visitors, unless otherwise indicated. 2023  9:00 AM  EXAM - ESTABLISHED [2844] 20 min [facility] Alvin Anne MD    Patient Instructions:         Location Instructions:     Masks are optional for all patients and visitors, unless otherwise indicated. 12/15/2023  9:00 AM  FOLLOW UP VISIT [2828] 15 min [facility] [place], [place]    Patient Instructions:         Location Instructions:     Masks are optional for all patients and visitors, unless otherwise indicated.                       Vital signs:       Physical Exam:    General: No acute distress   Lungs: clear to auscultation  Cardiovascular: S1, S2  Abdomen: Soft NTND    -----------------------------------------------------------------------------------------------  PATIENT DISCHARGE INSTRUCTIONS: See electronic chart    Lauren Ojeda MD    Total time spent on discharge plannin minutes     The [facility] makes medical notes like these available to patients in the interest of transparency. Please be advised this is a medical document. Medical documents are intended to carry relevant information, facts as evident, and the clinical opinion of the practitioner. The medical note is intended as peer to peer communication and may appear blunt or direct. It is written in medical language and may contain abbreviations or verbiage that are unfamiliar.

## 2023-11-17 ENCOUNTER — PATIENT OUTREACH (OUTPATIENT)
Dept: CASE MANAGEMENT | Age: 43
End: 2023-11-17

## 2023-11-17 ENCOUNTER — TELEPHONE (OUTPATIENT)
Dept: FAMILY MEDICINE CLINIC | Facility: CLINIC | Age: 43
End: 2023-11-17

## 2023-11-17 DIAGNOSIS — Z02.9 ENCOUNTERS FOR UNSPECIFIED ADMINISTRATIVE PURPOSE: ICD-10-CM

## 2023-11-17 DIAGNOSIS — D64.9 ANEMIA, UNSPECIFIED TYPE: Primary | ICD-10-CM

## 2023-11-17 NOTE — TELEPHONE ENCOUNTER
Future Appointments   Date Time Provider Fiordaliza Chatterjee   11/27/2023  2:00 PM Betsey Donato DO [facility] [address]   12/7/2023  9:00 AM Jesse Miller MD [facility]   12/15/2023  9:00 AM Rocio Lewis DPM [id number]   1/4/2024 10:00 AM Jesse Miller MD [facility] [place]

## 2023-11-17 NOTE — TELEPHONE ENCOUNTER
Spoke to pt for TCM today. Pt does not have an appointment scheduled at this time. TCM appt recommended by 11/30/23 as pt is a moderate risk for readmission. Please advise. BOOK BY DATE (last date for TCM): 11/30/23    Clinical staff:  Please f/u with pt and try to get them to schedule as pt would greatly benefit from TCM appt. Thank you!

## 2023-11-17 NOTE — PROGRESS NOTES
Initial Post Discharge Follow Up   Discharge Date: 11/16/23  Contact Date: 11/17/2023    Consent Verification:  Assessment Completed With: Patient  HIPAA Verified? Yes    Discharge Dx:   Anemia     General:   How have you been since your discharge from the hospital? Spoke with patient states he is feeling okay. Pt denies any fevers, chills, nausea, vomiting, shortness of breath, chest pain, or any other symptoms. Pt states his BS this morning at 160. Pt is overall doing okay. Do you have any pain since discharge? No    How well was your pain managed while in the hospital?   On a scale of 1-5   1- Very Poor and 5- Very well   5  When you were leaving the hospital were your discharge instructions reviewed with you? Yes  How well were your discharge instructions explained to you? On a scale of 1-5   1- Very Poor and 5- Very well   5  Do you have any questions about your discharge instructions? No  Before leaving the hospital was your diagnoses explained to you? Yes  Do you have any questions about your diagnoses? No  Are you able to perform normal daily activities of living as you have prior to your hospital stay (dressing, bathing, ambulating to the bathroom, etc)? yes  (NCM) Was patient given a different diet per AVS? no      Medications: Reviewed medication list.  Medications are up to date. Current Outpatient Medications   Medication Sig Dispense Refill    cloNIDine 0.3 MG Oral Tab Take 1 tablet (0.3 mg total) by mouth 3 (three) times daily. 90 tablet 5    torsemide 20 MG Oral Tab Take 1 tablet (20 mg total) by mouth BID (Diuretic). 60 tablet 5    insulin detemir 100 UNIT/ML Subcutaneous Solution Pen-injector Inject 22 Units into the skin every morning. Blood Pressure Monitoring (BLOOD PRESSURE KIT) Does not apply Device 1 kit daily. Check blood pressure daily. 1 each 0    aspirin (ASPIRIN EC LOW DOSE) 81 MG Oral Tab EC Take 1 tablet (81 mg total) by mouth daily.  90 tablet 0    amLODIPine 10 MG Oral Tab Take 1 tablet (10 mg total) by mouth daily. 90 tablet 0    EZETIMIBE-SIMVASTATIN 10-80 MG Oral Tab TAKE 1 TABLET BY MOUTH DAILY 90 tablet 3    HUMALOG KWIKPEN 100 UNIT/ML Subcutaneous Solution Pen-injector SLIDING SCALE  TID with meals MDD 15u (Patient taking differently: SLIDING SCALE  TID with meals MDD 15u) 15 mL 0    Dulaglutide (TRULICITY) 7.61 TB/3.4SO Subcutaneous Solution Pen-injector Inject 0.75 mg into the skin once a week. 7 mL 3     Were there any changes to your current medication(s) noted on the AVS? Yes  If so, were these medication changes discussed with you prior to leaving the hospital? Yes  If a new medication was prescribed:    Was the new medication's purpose & side effects reviewed? Yes  Do you have any questions about your new medication? No  Did you  your discharge medications when you left the hospital? Yes  Let's go over your medications together to make sure we are not missing anything. Medications Reviewed  Are there any reasons that keep you from taking your medication as prescribed? No  Are you having any concerns with constipation? No  Did patient receive their flu shot (Sept-March)? Yes    Discharge medications reviewed/discussed/and reconciled against outpatient medications with patient. Any changes or updates to medications sent to PCP. Patient Acknowledged     Referrals/orders at D/C:  Referrals/orders placed at D/C? no    DME ordered at D/C? No      Discharge orders, AVS reviewed and discussed with patient. Any changes or updates to orders sent to PCP.   Patient Acknowledged      SDOH:   Transportation Needs: No Transportation Needs (11/12/2023)    Transportation Needs     Lack of Transportation: No     Financial Resource Strain: Low Risk  (11/17/2023)    Financial Resource Strain     Difficulty of Paying Living Expenses: Not very hard     Med Affordability: No           Follow up appointments:      Your appointments       Date & Time Appointment Department (Center) Dec 07, 2023  9:00 AM CST Exam - Established with MD Reese Mittal [facility] Nephrology [facility] AND NURSING [facility] [place])        Dec 15, 2023  9:00 AM CST [facility] [address] ([place] [address])        Jan 04, 2024 10:00 AM CST Exam - Established with MD Reese Mittal Nephrology ([address])              [facility]  [address]  [address] [facility] [address] [facility] [address]  [facility] [address]  [address] [facility] [address] [phone]            TCC  Was TCC ordered: No      PCP (If no TCC appointment)  Does patient already have a PCP appointment scheduled? No  NCM Attempted to schedule PCP office TCM appointment with patient   If no appointment scheduled: Explain: no availability within TCM timeframe with PCP. Specialist    Does the patient have any other follow up appointment(s) needing to be scheduled? No      Is there any reason as to why you cannot make your appointment(s)? No     Needs post D/C:   Now that you are home, are there any needs or concerns you need addressed before your next visit with your PCP?  (DME, meds, questions, etc.): No    Interventions by NCM:   All discharge instructions reviewed with the patient. Reviewed when to call MD vs when to call 911 or go the ED. Educated patient on the importance of taking all meds as prescribed as well as close f/u with PCP/specialists. Pt verbalized understanding and will contact the office with any further questions or concerns. Patient denies fevers, chills, nausea, vomiting, shortness of breath, chest pain, or any other symptoms at this time. NCM attempted to schedule HFU, no availability with PCP. NCM sent TE to PCP office re: assistance in scheduling HFU appt.  ERIN provided contact information for any further questions/concerns. Patient verbalized understanding and agreeable. Overall Rating:    How would you rate the care you received while in the hospital? good    CCM referral placed:    No    BOOK BY DATE: 11/30/23

## 2023-11-20 NOTE — CDS QUERY
Paula Garcia  Dear Dr. Mars Bullock,  Clinical information (provided below) includes an uncertain diagnosis of Heart Failure                              PLEASE SELECT THE DIAGNOSIS THAT APPLIES:              (    ) Heart failure is ruled out             (    ) Acute Diastolic Heart failure was an active diagnosis treated this admission             (    ) Other - please specify: _________________________    Documentation from the Medical Record:   RISK FACTORS: CKD 4 - history of ATN - Acute on chronic normocytic anemia  CLINICAL INDICATORS:   -11/15/23 Hospitalist: Assessment & Plan:  # CAMEJO likely FOL due to CKD  - suspected 2/2 new onset heart failure, TTE with LVEF 55-60%, though with mild  LA dilation suggesting possible HFpEF vs. Renal etiology of volume overload    -11/16 Discharge Summary:   Discharge Diagnosis:    #CAMEJO likely FOL due to CKD  # Cough, resolved  -TTE with LVEF 55-60%  - CXR without consolidation, though with cardiomegaly        TREATMENT: I&O monitoring - Serial labs - IV Diuresis - Nephrologist consult -TTE            For questions regarding this query, please contact Clinical :   Ce Marlow [phone]                                                           THIS FORM IS A PERMANENT PART OF [facility]

## 2023-11-22 NOTE — PAYOR COMM NOTE
--------------  DISCHARGE REVIEW    Payor: [address] [facility]  Subscriber #:  [id number]  Authorization Number: [id number]    Admit date: 23  Admit time:   1:39 PM  Discharge Date: 2023 11:15 AM     Admitting Physician: Gale Arreguin MD  Attending Physician:  No att. providers found  Primary Care Physician: Jose Sousa DO          Discharge Summary Notes        Discharge Summary signed by Melchor Matute MD at 2023 12:02 PM       Author: Melchor Matute MD Specialty: HOSPITALIST, Internal Medicine Author Type: Physician    Filed: 2023 12:02 PM Date of Service: 2023  9:25 AM Status: Signed    : Melchor Matute MD (Physician)           [facility] HOSPITALIST  DISCHARGE SUMMARY     Karina Tobin Patient Status:  Inpatient    1980 MRN [id number]   [facility] 8NE-A Attending No att. providers found   Hosp Day # 2 PCP Saba Watson DO     Date of Admission: 2023  Date of Discharge:  2023     Discharge Disposition: Home or Self Care    Discharge Diagnosis:    #CAMEJO likely FOL due to CKD   # Cough, resolved    -TTE with LVEF 55-60%   - CXR without consolidation, though with cardiomegaly     # Acute on chronic normocytic anemia     #CKD 4   - Thought 2/2 poorly controlled HTN in setting of prior ATN     #DM Type 2      #Essential HTN     #HLD    History of Present Illness: Gaye Moraes is a 37year old male with a past medical history of diabetes, hypertension, hyperlipidemia who presented to the emergency department with a cough. Patient states his cough has been ongoing for the last 1 month, has recently become more productive. He went to an urgent care who noted that his blood pressure was severely elevated and transferred him to the emergency department. Patient does see a primary care doctor and says he misunderstood his blood pressure medication regimen instructions.   He does endorse shortness of breath when climbing stairs, no chest pain, no recent fevers, no other acute complaints at this time. Brief Synopsis: patient admitted and diuresed with nephrology's recommendation. He has responded well and will be dc home today with close follow up as outpatient. Lace+ Score: 55  59-90 High Risk  29-58 Medium Risk  0-28   Low Risk       TCM Follow-Up Recommendation:  LACE > 58: High Risk of readmission after discharge from the hospital.      Discharge Medication List:     Discharge Medications        START taking these medications        Instructions Prescription details   torsemide 20 MG Tabs  Commonly known as: Demadex      Take 1 tablet (20 mg total) by mouth BID (Diuretic). Quantity: 60 tablet  Refills: 5            CHANGE how you take these medications        Instructions Prescription details   cloNIDine 0.3 MG Tabs  Commonly known as: Catapres  What changed:   medication strength  how much to take  when to take this      Take 1 tablet (0.3 mg total) by mouth 3 (three) times daily. Quantity: 90 tablet  Refills: 5     insulin detemir 100 UNIT/ML Sopn  Commonly known as: Levemir  What changed: how much to take      Inject 22 Units into the skin every morning. Refills: 0            CONTINUE taking these medications        Instructions Prescription details   amLODIPine 10 MG Tabs  Commonly known as: Norvasc      Take 1 tablet (10 mg total) by mouth daily. Quantity: 90 tablet  Refills: 0     aspirin 81 MG Tbec  Commonly known as: Aspirin EC Low Dose      Take 1 tablet (81 mg total) by mouth daily. Quantity: 90 tablet  Refills: 0     Blood Pressure Kit Thais      1 kit daily. Check blood pressure daily.    Stop taking on: January 18, 2024  Quantity: 1 each  Refills: 0     ezetimibe-simvastatin 10-80 MG Tabs  Commonly known as: Vytorin      TAKE 1 TABLET BY MOUTH DAILY   Quantity: 90 tablet  Refills: 3     HumaLOG KwikPen 100 UNIT/ML Sopn  Generic drug: Insulin Lispro (1 Unit Dial)      SLIDING SCALE  TID with meals MDD 15u Quantity: 15 mL  Refills: 0     Trulicity 2.41 FC/3.8TC Sopn  Generic drug: Dulaglutide      Inject 0.75 mg into the skin once a week. Quantity: 7 mL  Refills: 3            STOP taking these medications      furosemide 20 MG Tabs  Commonly known as: Lasix        sodium polystyrene sulfonate 15 GM/60ML Susp  Commonly known as: Kayexalate                  Where to Get Your Medications        These medications were sent to [address] [place] [address], [address] [place] [address] AT [facility], [phone], [address] [place] [id number]      Hours: 24-hours Phone: [phone]   cloNIDine 0.3 MG Tabs  torsemide 20 MG Tabs         [place]: [place]    Follow-up appointment:   GLO Benoit/ [address]  [phone]    Schedule an appointment as soon as possible for a visit in 1 week(s)  Disbetes management    Appointments for Next 30 Days 11/18/2023 - 12/18/2023        Date Arrival Time Visit Type Length Department Provider     11/27/2023  2:00 PM  [facility] 30 min Darin Marie, [address] [facility] , [address]    Patient Instructions:         Location Instructions: Your appointment is located at [address]. The facility is approximately 1/2 mile [address] [facility] [address] on [address] [address] at the corner of [address] [place] [address] and [address]. Please park in the [address], enter through [address] [facility] [address] and follow the posted signs for guidance. Masks are optional for all patients and visitors, unless otherwise indicated. 12/7/2023  9:00 AM  EXAM - ESTABLISHED [2844] 20 min [facility] Nephrology Melvin Neumann MD    Patient Instructions:         Location Instructions:     Masks are optional for all patients and visitors, unless otherwise indicated.                12/15/2023  9:00 AM  FOLLOW UP VISIT [2828] 15 min [facility], [address], [place], [facility]    Patient Instructions:         Location Instructions:     Masks are optional for all patients and visitors, unless otherwise indicated. Vital signs:       Physical Exam:    General: No acute distress   Lungs: clear to auscultation  Cardiovascular: S1, S2  Abdomen: Soft NTND    -----------------------------------------------------------------------------------------------  PATIENT DISCHARGE INSTRUCTIONS: See electronic chart    Mali Nash MD    Total time spent on discharge plannin minutes     The [facility] makes medical notes like these available to patients in the interest of transparency. Please be advised this is a medical document. Medical documents are intended to carry relevant information, facts as evident, and the clinical opinion of the practitioner. The medical note is intended as peer to peer communication and may appear blunt or direct. It is written in medical language and may contain abbreviations or verbiage that are unfamiliar.        Electronically signed by Ling Sherman MD on 2023 12:02 PM         REVIEWER COMMENTS

## 2023-11-27 ENCOUNTER — OFFICE VISIT (OUTPATIENT)
Dept: FAMILY MEDICINE CLINIC | Facility: CLINIC | Age: 43
End: 2023-11-27
Payer: COMMERCIAL

## 2023-11-27 VITALS
SYSTOLIC BLOOD PRESSURE: 160 MMHG | BODY MASS INDEX: 33.8 KG/M2 | TEMPERATURE: 98 F | DIASTOLIC BLOOD PRESSURE: 84 MMHG | RESPIRATION RATE: 16 BRPM | HEIGHT: 68 IN | HEART RATE: 76 BPM | WEIGHT: 223 LBS

## 2023-11-27 DIAGNOSIS — I10 ESSENTIAL HYPERTENSION: ICD-10-CM

## 2023-11-27 DIAGNOSIS — E11.8 CONTROLLED TYPE 2 DIABETES MELLITUS WITH COMPLICATION, WITHOUT LONG-TERM CURRENT USE OF INSULIN (HCC): ICD-10-CM

## 2023-11-27 DIAGNOSIS — Z79.4 CURRENT USE OF INSULIN (HCC): ICD-10-CM

## 2023-11-27 DIAGNOSIS — N18.4 CKD (CHRONIC KIDNEY DISEASE) STAGE 4, GFR 15-29 ML/MIN (HCC): ICD-10-CM

## 2023-11-27 DIAGNOSIS — Z09 HOSPITAL DISCHARGE FOLLOW-UP: Primary | ICD-10-CM

## 2023-11-27 PROCEDURE — 99495 TRANSJ CARE MGMT MOD F2F 14D: CPT | Performed by: FAMILY MEDICINE

## 2023-11-27 PROCEDURE — 3077F SYST BP >= 140 MM HG: CPT | Performed by: FAMILY MEDICINE

## 2023-11-27 PROCEDURE — 3008F BODY MASS INDEX DOCD: CPT | Performed by: FAMILY MEDICINE

## 2023-11-27 PROCEDURE — 3079F DIAST BP 80-89 MM HG: CPT | Performed by: FAMILY MEDICINE

## 2023-11-29 ENCOUNTER — LAB ENCOUNTER (OUTPATIENT)
Dept: LAB | Age: 43
End: 2023-11-29
Attending: FAMILY MEDICINE
Payer: COMMERCIAL

## 2023-11-29 DIAGNOSIS — E87.5 HYPERKALEMIA: ICD-10-CM

## 2023-11-29 LAB
ANION GAP SERPL CALC-SCNC: 3 MMOL/L (ref 0–18)
BUN BLD-MCNC: 78 MG/DL (ref 9–23)
CALCIUM BLD-MCNC: 8.3 MG/DL (ref 8.5–10.1)
CHLORIDE SERPL-SCNC: 114 MMOL/L (ref 98–112)
CO2 SERPL-SCNC: 25 MMOL/L (ref 21–32)
CREAT BLD-MCNC: 6.02 MG/DL
EGFRCR SERPLBLD CKD-EPI 2021: 11 ML/MIN/1.73M2 (ref 60–?)
FASTING STATUS PATIENT QL REPORTED: NO
GLUCOSE BLD-MCNC: 119 MG/DL (ref 70–99)
OSMOLALITY SERPL CALC.SUM OF ELEC: 318 MOSM/KG (ref 275–295)
POTASSIUM SERPL-SCNC: 5.1 MMOL/L (ref 3.5–5.1)
SODIUM SERPL-SCNC: 142 MMOL/L (ref 136–145)

## 2023-11-29 PROCEDURE — 80048 BASIC METABOLIC PNL TOTAL CA: CPT | Performed by: FAMILY MEDICINE

## 2023-12-07 ENCOUNTER — OFFICE VISIT (OUTPATIENT)
Dept: NEPHROLOGY | Facility: CLINIC | Age: 43
End: 2023-12-07
Payer: COMMERCIAL

## 2023-12-07 VITALS — WEIGHT: 228.38 LBS | SYSTOLIC BLOOD PRESSURE: 160 MMHG | BODY MASS INDEX: 35 KG/M2 | DIASTOLIC BLOOD PRESSURE: 72 MMHG

## 2023-12-07 DIAGNOSIS — N18.4 ANEMIA IN STAGE 4 CHRONIC KIDNEY DISEASE: ICD-10-CM

## 2023-12-07 DIAGNOSIS — D63.1 ANEMIA IN STAGE 4 CHRONIC KIDNEY DISEASE: ICD-10-CM

## 2023-12-07 DIAGNOSIS — I10 ESSENTIAL HYPERTENSION: ICD-10-CM

## 2023-12-07 DIAGNOSIS — N18.4 CKD (CHRONIC KIDNEY DISEASE) STAGE 4, GFR 15-29 ML/MIN (HCC): Primary | ICD-10-CM

## 2023-12-07 PROCEDURE — 3078F DIAST BP <80 MM HG: CPT | Performed by: INTERNAL MEDICINE

## 2023-12-07 PROCEDURE — 99214 OFFICE O/P EST MOD 30 MIN: CPT | Performed by: INTERNAL MEDICINE

## 2023-12-07 PROCEDURE — 3077F SYST BP >= 140 MM HG: CPT | Performed by: INTERNAL MEDICINE

## 2023-12-15 ENCOUNTER — OFFICE VISIT (OUTPATIENT)
Dept: PODIATRY CLINIC | Facility: CLINIC | Age: 43
End: 2023-12-15
Payer: COMMERCIAL

## 2023-12-15 DIAGNOSIS — B35.1 ONYCHOMYCOSIS: ICD-10-CM

## 2023-12-15 DIAGNOSIS — L84 PRE-ULCERATIVE CALLUSES: ICD-10-CM

## 2023-12-15 DIAGNOSIS — E11.42 DIABETIC POLYNEUROPATHY ASSOCIATED WITH TYPE 2 DIABETES MELLITUS (HCC): ICD-10-CM

## 2023-12-15 DIAGNOSIS — Z87.898 HISTORY OF ULCERATION: Primary | ICD-10-CM

## 2023-12-15 DIAGNOSIS — N18.4 STAGE 4 CHRONIC KIDNEY DISEASE (HCC): ICD-10-CM

## 2023-12-15 DIAGNOSIS — L84 CALLUS OF FOOT: ICD-10-CM

## 2023-12-15 DIAGNOSIS — M89.8X7 EXOSTOSIS OF BONE OF FOOT: ICD-10-CM

## 2023-12-15 PROCEDURE — 99213 OFFICE O/P EST LOW 20 MIN: CPT | Performed by: STUDENT IN AN ORGANIZED HEALTH CARE EDUCATION/TRAINING PROGRAM

## 2023-12-16 LAB — AMB EXT COLOGUARD RESULT: POSITIVE

## 2023-12-17 NOTE — PROGRESS NOTES
[facility] Podiatry  Progress Note    Gaye Moraes is a 37year old male. Chief Complaint   Patient presents with    Follow - Up     F/U left foot. Patient denies any pain. . HPI:     Patient is a pleasant 25-year-old male with past medical history of recurrent heel ulcerations, diabetes, and CKD returns to clinic for foot exam.  He continues to ambulate with accommodative inserts and shoes and relays that he is tolerating this well. He denies open sores or other concerns to his left foot. Patient is status post calcaneal planing of left foot (DOS: 12/28/21) for treatment of recurrent ulceration site which has worked well to keep site healed. He does have elongated and thickened nails he has difficulty trimming on his own. He has been applying urea cream to callus sites between visits. He did recently get back from trip to [facility]. His last blood sugars were 111 mg/dL when checked this morning. His last HGB-A1c was 5.5% on 10/18/23. No other complaints are mentioned. Allergies: Patient has no known allergies. Current Outpatient Medications   Medication Sig Dispense Refill    cloNIDine 0.3 MG Oral Tab Take 1 tablet (0.3 mg total) by mouth 3 (three) times daily. 90 tablet 5    torsemide 20 MG Oral Tab Take 1 tablet (20 mg total) by mouth BID (Diuretic). 60 tablet 5    insulin detemir 100 UNIT/ML Subcutaneous Solution Pen-injector Inject 22 Units into the skin every morning. Blood Pressure Monitoring (BLOOD PRESSURE KIT) Does not apply Device 1 kit daily. Check blood pressure daily. 1 each 0    aspirin (ASPIRIN EC LOW DOSE) 81 MG Oral Tab EC Take 1 tablet (81 mg total) by mouth daily. 90 tablet 0    amLODIPine 10 MG Oral Tab Take 1 tablet (10 mg total) by mouth daily.  90 tablet 0    EZETIMIBE-SIMVASTATIN 10-80 MG Oral Tab TAKE 1 TABLET BY MOUTH DAILY 90 tablet 3    HUMALOG KWIKPEN 100 UNIT/ML Subcutaneous Solution Pen-injector SLIDING SCALE  TID with meals MDD 15u (Patient taking differently: SLIDING SCALE  TID with meals MDD 15u) 15 mL 0    Dulaglutide (TRULICITY) 4.31 XH/5.3VU Subcutaneous Solution Pen-injector Inject 0.75 mg into the skin once a week. 7 mL 3      Past Medical History:   Diagnosis Date    CKD (chronic kidney disease)     Diabetes ([facility] [address]     Disorder of liver     Elevated liver enzymes 8/30/2016    Elevated serum GGT level 8/30/2016    High blood pressure     High cholesterol     Hyperlipidemia     HYPERTRIGLYCERIDEMIA     OBESITY     Obesity     Pneumonia due to organism     Renal disorder     Visual impairment     GLASSES      Past Surgical History:   Procedure Laterality Date    ARTHROTOMY/EXPLORE/TREAT KNEE JOINT Left     DONE TWICE    CHOLECYSTECTOMY      DEBRIDE WOUND Left     HEEL    FOOT SURGERY      OTHER SURGICAL HISTORY  Knee & Foot      Family History   Problem Relation Age of Onset    Diabetes Father     Heart Disorder Father     Diabetes Mother     Hypertension Sister       Social History     Socioeconomic History    Marital status:    Tobacco Use    Smoking status: Never    Smokeless tobacco: Never   Vaping Use    Vaping Use: Never used   Substance and Sexual Activity    Alcohol use: Yes     Alcohol/week: 1.0 standard drink of alcohol     Types: 1 Standard drinks or equivalent per week     Comment: socially    Drug use: No   Other Topics Concern    Caffeine Concern No    Exercise No    Seat Belt Yes    Special Diet Yes    Stress Concern No    Weight Concern No           REVIEW OF SYSTEMS:     Today reviewed systems as documented below  GENERAL HEALTH: feels well otherwise  SKIN: denies any unusual skin lesions or rashes  RESPIRATORY: denies shortness of breath with exertion  CARDIOVASCULAR: denies chest pain on exertion  GI: denies abdominal pain and denies heartburn  NEURO: denies headaches  MUSCULO: denies arthritis, back pain      EXAM:   There were no vitals taken for this visit.   GENERAL: well developed, well nourished, in no apparent distress  EXTREMITIES:     1. Integument: Normal skin temperature and turgor. Site of prior ulceration to left plantar lateral calcaneus remains well-healed. HPK present to the posterior heel with some blood blister formation. No acute signs of infection noted. No hyperkeratotic tissue or breakdown present to site of prior ulceration. Nails x10 are mildly elongated and thickened. 2. Vascular: Dorsalis pedis and posterior tibial pulses are lightly palpable. CFT intact digits. 3. Musculoskeletal: Muscle strength intact to left lower extremity with decreased plantar flexion which is patient's baseline. There is no longer plantar prominence of calcaneus that was noted preoperatively. Compartments are soft and compressible. Flexion contracture of digits bilaterally. 4. Neurological: Normal sharp dull sensation; reflexes normal.  Decreased protective sensation noted to lower extremities. ASSESSMENT AND PLAN:   Diagnoses and all orders for this visit:    History of ulceration    Callus of foot    Exostosis of bone of foot    Diabetic polyneuropathy associated with type 2 diabetes mellitus (HCC)    Stage 4 chronic kidney disease (HCC)    Onychomycosis    Pre-ulcerative calluses          Plan:   -Patient examined, chart history reviewed. -Inspected surgical site of left foot--site is well-healed and without acute signs of infection or other concerns.  -Site of prior ulceration is also well-healed and without any breakdown at this time.   -Pared HPK to posterior heel that was debrided to preulcerative skin breakdown noted that was noted under blood blister. The site was dressed with Betadine and Band-Aid. Patient received similar dressing changes daily. Given some more breakdown at today's visit we will follow-up next week to make sure site is healed.   Patient did recently come back from trip to [facility] which may have caused a little more buildup than usual.  -Sharply debrided nails x10 with nail nippers without incident.  -Patient can continue ambulating with accommodative inserts in tennis shoes.  -Educated patient on acute signs of infection and symptoms of DVT and advised patient to seek immediate medical attention if any concerns arise. Patient expressed understanding. The patient indicates understanding of these issues and agrees to the plan. RTC 1 to 2 weeks.     Misti Washington DPM

## 2023-12-19 ENCOUNTER — TELEPHONE (OUTPATIENT)
Dept: FAMILY MEDICINE CLINIC | Facility: CLINIC | Age: 43
End: 2023-12-19

## 2023-12-19 DIAGNOSIS — R19.5 POSITIVE COLORECTAL CANCER SCREENING USING COLOGUARD TEST: Primary | ICD-10-CM

## 2023-12-19 RX ORDER — PEN NEEDLE, DIABETIC 32GX 5/32"
1 NEEDLE, DISPOSABLE MISCELLANEOUS 4 TIMES DAILY
Qty: 400 EACH | Refills: 2 | Status: SHIPPED | OUTPATIENT
Start: 2023-12-19

## 2023-12-19 NOTE — TELEPHONE ENCOUNTER
Positive cologuard result  Patient notified and GI referral entered.   All questions were answered and patient verbalizes understanding

## 2023-12-21 ENCOUNTER — OFFICE VISIT (OUTPATIENT)
Dept: PODIATRY CLINIC | Facility: CLINIC | Age: 43
End: 2023-12-21
Payer: COMMERCIAL

## 2023-12-21 DIAGNOSIS — N18.4 STAGE 4 CHRONIC KIDNEY DISEASE (HCC): ICD-10-CM

## 2023-12-21 DIAGNOSIS — E11.42 DIABETIC POLYNEUROPATHY ASSOCIATED WITH TYPE 2 DIABETES MELLITUS (HCC): Primary | ICD-10-CM

## 2023-12-21 DIAGNOSIS — L84 PRE-ULCERATIVE CALLUSES: ICD-10-CM

## 2023-12-21 PROCEDURE — 99213 OFFICE O/P EST LOW 20 MIN: CPT | Performed by: STUDENT IN AN ORGANIZED HEALTH CARE EDUCATION/TRAINING PROGRAM

## 2023-12-25 NOTE — PROGRESS NOTES
[address] [facility] Podiatry  Progress Note    Katherine Minor is a 37year old male. Chief Complaint   Patient presents with    Follow - Up     Left foot follow up - denies any pain. HPI:     Patient is a pleasant 42-year-old male with past medical history of recurrent heel ulcerations, diabetes, and CKD returns to clinic for foot exam.  He continues to ambulate with accommodative inserts and shoes and relays that he is tolerating this well. He presents today for follow-up of preulcerative callus/blister in his left posterior heel. He has been dressing with antibiotic ointment since last visit and is doing well. He is without pain. No other complaints are mentioned. Allergies: Patient has no known allergies. Current Outpatient Medications   Medication Sig Dispense Refill    PEG 3350-KCl-NaBcb-NaCl-NaSulf (PEG-3350/ELECTROLYTES) 236 g Oral Recon Soln Take as directed by physician 4000 mL 0    Insulin Pen Needle (BD PEN NEEDLE ODALIS 2ND GEN) 32G X 4 MM Does not apply Misc USE FOUR TIMES DAILY AS DIRECTED 400 each 2    cloNIDine 0.3 MG Oral Tab Take 1 tablet (0.3 mg total) by mouth 3 (three) times daily. 90 tablet 5    torsemide 20 MG Oral Tab Take 1 tablet (20 mg total) by mouth BID (Diuretic). 60 tablet 5    insulin detemir 100 UNIT/ML Subcutaneous Solution Pen-injector Inject 22 Units into the skin every morning. Blood Pressure Monitoring (BLOOD PRESSURE KIT) Does not apply Device 1 kit daily. Check blood pressure daily. 1 each 0    aspirin (ASPIRIN EC LOW DOSE) 81 MG Oral Tab EC Take 1 tablet (81 mg total) by mouth daily. 90 tablet 0    amLODIPine 10 MG Oral Tab Take 1 tablet (10 mg total) by mouth daily.  90 tablet 0    EZETIMIBE-SIMVASTATIN 10-80 MG Oral Tab TAKE 1 TABLET BY MOUTH DAILY 90 tablet 3    HUMALOG KWIKPEN 100 UNIT/ML Subcutaneous Solution Pen-injector SLIDING SCALE  TID with meals MDD 15u (Patient taking differently: SLIDING SCALE  TID with meals MDD 15u) 15 mL 0 Dulaglutide (TRULICITY) 9.96 EG/1.4YF Subcutaneous Solution Pen-injector Inject 0.75 mg into the skin once a week. 7 mL 3      Past Medical History:   Diagnosis Date    CKD (chronic kidney disease)     Diabetes ([facility] [address]     Disorder of liver     Elevated liver enzymes 8/30/2016    Elevated serum GGT level 8/30/2016    High blood pressure     High cholesterol     Hyperlipidemia     HYPERTRIGLYCERIDEMIA     OBESITY     Obesity     Pneumonia due to organism     Renal disorder     Visual impairment     GLASSES      Past Surgical History:   Procedure Laterality Date    ARTHROTOMY/EXPLORE/TREAT KNEE JOINT Left     DONE TWICE    CHOLECYSTECTOMY      DEBRIDE WOUND Left     HEEL    FOOT SURGERY      OTHER SURGICAL HISTORY  Knee & Foot      Family History   Problem Relation Age of Onset    Diabetes Father     Heart Disorder Father     Diabetes Mother     Hypertension Sister       Social History     Socioeconomic History    Marital status:    Tobacco Use    Smoking status: Never    Smokeless tobacco: Never   Vaping Use    Vaping Use: Never used   Substance and Sexual Activity    Alcohol use: Yes     Alcohol/week: 1.0 standard drink of alcohol     Types: 1 Standard drinks or equivalent per week     Comment: socially    Drug use: No   Other Topics Concern    Caffeine Concern No    Exercise No    Seat Belt Yes    Special Diet Yes    Stress Concern No    Weight Concern No           REVIEW OF SYSTEMS:     Today reviewed systems as documented below  GENERAL HEALTH: feels well otherwise  SKIN: denies any unusual skin lesions or rashes  RESPIRATORY: denies shortness of breath with exertion  CARDIOVASCULAR: denies chest pain on exertion  GI: denies abdominal pain and denies heartburn  NEURO: denies headaches  MUSCULO: denies arthritis, back pain      EXAM:   There were no vitals taken for this visit. GENERAL: well developed, well nourished, in no apparent distress  EXTREMITIES:     1.  Integument: Normal skin temperature and turgor. Site of prior ulceration to left plantar lateral calcaneus remains well-healed. Site of preulcerative callus/superficial skin breakdown is resolved. Pared HPK and smooth HPK with Dremel without incident. No further breakdown or other concerns noted. 2. Vascular: Dorsalis pedis and posterior tibial pulses are lightly palpable. CFT intact digits. 3. Musculoskeletal: Muscle strength intact to left lower extremity with decreased plantar flexion which is patient's baseline. There is no longer plantar prominence of calcaneus that was noted preoperatively. Compartments are soft and compressible. Flexion contracture of digits bilaterally. 4. Neurological: Normal sharp dull sensation; reflexes normal.  Decreased protective sensation noted to lower extremities. ASSESSMENT AND PLAN:   Diagnoses and all orders for this visit:    Diabetic polyneuropathy associated with type 2 diabetes mellitus ([facility] [address]    Pre-ulcerative calluses    Stage 4 chronic kidney disease ([facility] [address]          Plan:   -Patient examined, chart history reviewed. -Inspected surgical site of left foot--site is well-healed and without acute signs of infection or other concerns.  -Site of prior ulceration is also well-healed and without any breakdown at this time.   -Site of preulcerative callus to posterior heel appears resolved. Pared HPK with #15 blade and smoothed with Dremel. Site of superficial skin breakdown is now fully resolved. Can can resume dressing site with urea cream 1-2 times daily.    -Continue ambulate with supportive shoes and inserts and checking feet daily. Follow-up immediately if any skin breakdown or other concerns arise.    -Can return to usual 4 to 6-week follow-up.    -Educated patient on acute signs of infection and symptoms of DVT and advised patient to seek immediate medical attention if any concerns arise. Patient expressed understanding.      The patient indicates understanding of these issues and agrees to the plan.       Maryella Shone, DPM

## 2023-12-28 NOTE — TELEPHONE ENCOUNTER
Recd request and authorization from [address] [place] [address] for records for Long-Term Disability. Request is for records from 7/28/2021 to present. Fax to [phone]. Faxed to [employer] for processing. Subjective chief complaint is just tablet care  Nirav Szymanski is a 73 y.o. male.     History of Present Illness Nirav is here today to establish care.  He has been receiving care in [place].  They are on the epic system.  He has a number of issues going on.  He does have a slightly dilated aortic root and a ascending aortic aneurysm approximately 5.2 cm.  They were planning on surgery there but he wants to see someone here.  He originally solve the cardiologist and thoracic surgeon because of shortness of breath.  He has had a CT angio of the chest that really does not show reason for shortness of breath.  He has never been a cigarette smoker.  His stress test was unremarkable and his echocardiogram looked okay.  Additionally he has a large liver cyst measuring anywhere from 11.5 to 13 cm.  Did discuss risks and benefits of treatment such as aspirating the cyst or surgery or watchful waiting.  We are going to get the opinion of the liver specialist.  He does have reflux.  He is on Pepcid.  He reports that he can take the brand name Pepcid but not the generic Pepcid.  Apparently that because angioedema.  He wanted to get tested for the flu today because of some nasal symptoms and possible exposure at a basketball game.  He did test positive for that.  We did discuss treatment and he is going to take some Tamiflu.  He will contact me if he has any problems with the medicine.  He did have adenomatous polyps.  His last colonoscopy was in 2018.  He was actually due in 2021 but did not get it done because of COVID.  We will refer him for that as well.    The following portions of the patient's history were reviewed and updated as appropriate: He  has a past medical history of Cancer (2003) and Enlarged aorta.  He does not have any pertinent problems on file.  He  has a past surgical history that includes Prostate surgery (2004); Rotator cuff repair (Left); Hernia repair; Nose surgery; and Testicle surgery.  His family history is  not on file.  He  reports that he has never smoked. He has never used smokeless tobacco. He reports that he does not currently use alcohol. He reports that he does not use drugs.  Current Outpatient Medications   Medication Sig Dispense Refill    aspirin 81 MG chewable tablet Chew 1 tablet Every Night.      B Complex Vitamins (VITAMIN B COMPLEX PO) Take  by mouth.      docusate sodium (COLACE) 250 MG capsule Take 1 capsule by mouth Daily.      famotidine (PEPCID) 40 MG tablet Take 0.5 tablets by mouth 2 (Two) Times a Day.      fexofenadine (ALLEGRA) 180 MG tablet Take 1 tablet by mouth Every Night.      Krill Oil 1000 MG capsule Take  by mouth Every Night.      Magnesium 125 MG capsule Take  by mouth Daily.      Turmeric (QC TUMERIC COMPLEX PO) Take  by mouth.      vitamin D3 125 MCG (5000 UT) capsule capsule Take 1 capsule by mouth Daily.      aspirin 325 MG tablet Take 81 mg by mouth Daily. (Patient not taking: Reported on 12/28/2023)      oseltamivir (TAMIFLU) 75 MG capsule Take 1 capsule by mouth 2 (Two) Times a Day. 10 capsule 0     No current facility-administered medications for this visit.     Current Outpatient Medications on File Prior to Visit   Medication Sig    aspirin 81 MG chewable tablet Chew 1 tablet Every Night.    B Complex Vitamins (VITAMIN B COMPLEX PO) Take  by mouth.    docusate sodium (COLACE) 250 MG capsule Take 1 capsule by mouth Daily.    fexofenadine (ALLEGRA) 180 MG tablet Take 1 tablet by mouth Every Night.    Krill Oil 1000 MG capsule Take  by mouth Every Night.    Magnesium 125 MG capsule Take  by mouth Daily.    Turmeric (QC TUMERIC COMPLEX PO) Take  by mouth.    vitamin D3 125 MCG (5000 UT) capsule capsule Take 1 capsule by mouth Daily.    [DISCONTINUED] famotidine (PEPCID) 40 MG tablet Take 0.5 tablets by mouth 2 (Two) Times a Day.    aspirin 325 MG tablet Take 81 mg by mouth Daily. (Patient not taking: Reported on 12/28/2023)     No current facility-administered medications on  file prior to visit.     He is allergic to famotidine, ranitidine hcl, and fexofenadine..    Review of Systems   HENT:  Positive for sinus pressure.    Respiratory:  Positive for shortness of breath.      Objective   Physical Exam  Vitals and nursing note reviewed.   Constitutional:       Appearance: Normal appearance.   Neck:      Vascular: No carotid bruit.   Cardiovascular:      Rate and Rhythm: Normal rate and regular rhythm.      Pulses: Normal pulses.      Heart sounds: No murmur heard.     No friction rub. No gallop.   Pulmonary:      Effort: Pulmonary effort is normal.      Breath sounds: No wheezing, rhonchi or rales.   Abdominal:      General: Bowel sounds are normal.      Palpations: Abdomen is soft.      Tenderness: There is no abdominal tenderness. There is no guarding or rebound.      Comments: He does have diastases recti but no obvious hernia   Musculoskeletal:      Right lower leg: No edema.      Left lower leg: No edema.   Neurological:      Mental Status: He is alert.       Assessment & Plan   Diagnoses and all orders for this visit:    1. Nasal discharge (Primary)  -     POCT SARS-CoV-2 + Flu Antigen NIRAJ    2. Influenza A    3. Aortic root dilatation  -     Ambulatory Referral to Cardiothoracic Surgery  -     Ambulatory Referral to Cardiology    4. Aneurysm of ascending aorta without rupture  -     Ambulatory Referral to Cardiothoracic Surgery  -     Ambulatory Referral to Cardiology    5. Adenomatous polyp of colon, unspecified part of colon  -     Ambulatory Referral to Gastroenterology    6. Liver cyst  -     Ambulatory Referral to Gastroenterology    7. Screening for malignant neoplasm of colon    Other orders  -     famotidine (PEPCID) 40 MG tablet; Take 0.5 tablets by mouth 2 (Two) Times a Day.  -     oseltamivir (TAMIFLU) 75 MG capsule; Take 1 capsule by mouth 2 (Two) Times a Day.  Dispense: 10 capsule; Refill: 0    Nirav is here today to establish care.  He does have influenza A today and  we are going to treat him for that.  We are going to make some referrals from some of his other problems but overall he seems to be fairly healthy for 73 years of age.

## 2024-01-03 ENCOUNTER — HOSPITAL ENCOUNTER (OUTPATIENT)
Facility: HOSPITAL | Age: 44
Setting detail: OBSERVATION
Discharge: HOME | End: 2024-01-05
Attending: EMERGENCY MEDICINE | Admitting: HOSPITALIST
Payer: COMMERCIAL

## 2024-01-03 DIAGNOSIS — E87.20 METABOLIC ACIDOSIS: ICD-10-CM

## 2024-01-03 DIAGNOSIS — R53.83 OTHER FATIGUE: ICD-10-CM

## 2024-01-03 DIAGNOSIS — N17.9 AKI (ACUTE KIDNEY INJURY) (HCC): ICD-10-CM

## 2024-01-03 DIAGNOSIS — R73.9 HYPERGLYCEMIA: ICD-10-CM

## 2024-01-03 DIAGNOSIS — E87.5 HYPERKALEMIA: ICD-10-CM

## 2024-01-03 DIAGNOSIS — R11.2 INTRACTABLE NAUSEA AND VOMITING: Primary | ICD-10-CM

## 2024-01-03 DIAGNOSIS — R19.7 DIARRHEA, UNSPECIFIED TYPE: ICD-10-CM

## 2024-01-03 LAB
ALBUMIN SERPL-MCNC: 4.1 G/DL (ref 3.4–5)
ALBUMIN/GLOB SERPL: 0.8 {RATIO} (ref 1–2)
ALP LIVER SERPL-CCNC: 185 U/L
ALT SERPL-CCNC: 48 U/L
ANION GAP SERPL CALC-SCNC: 9 MMOL/L (ref 0–18)
AST SERPL-CCNC: 29 U/L (ref 15–37)
BASOPHILS # BLD AUTO: 0.02 X10(3) UL (ref 0–0.2)
BASOPHILS NFR BLD AUTO: 0.3 %
BILIRUB SERPL-MCNC: 0.6 MG/DL (ref 0.1–2)
BUN BLD-MCNC: 84 MG/DL (ref 9–23)
CALCIUM BLD-MCNC: 9.6 MG/DL (ref 8.5–10.1)
CHLORIDE SERPL-SCNC: 113 MMOL/L (ref 98–112)
CO2 SERPL-SCNC: 19 MMOL/L (ref 21–32)
CREAT BLD-MCNC: 6.21 MG/DL
EGFRCR SERPLBLD CKD-EPI 2021: 11 ML/MIN/1.73M2 (ref 60–?)
EOSINOPHIL # BLD AUTO: 0 X10(3) UL (ref 0–0.7)
EOSINOPHIL NFR BLD AUTO: 0 %
ERYTHROCYTE [DISTWIDTH] IN BLOOD BY AUTOMATED COUNT: 13.8 %
GLOBULIN PLAS-MCNC: 4.9 G/DL (ref 2.8–4.4)
GLUCOSE BLD-MCNC: 276 MG/DL (ref 70–99)
GLUCOSE BLD-MCNC: 320 MG/DL (ref 70–99)
HCT VFR BLD AUTO: 34.5 %
HGB BLD-MCNC: 11.5 G/DL
IMM GRANULOCYTES # BLD AUTO: 0.02 X10(3) UL (ref 0–1)
IMM GRANULOCYTES NFR BLD: 0.3 %
LIPASE SERPL-CCNC: 39 U/L (ref 13–75)
LYMPHOCYTES # BLD AUTO: 0.63 X10(3) UL (ref 1–4)
LYMPHOCYTES NFR BLD AUTO: 8.2 %
MCH RBC QN AUTO: 29.3 PG (ref 26–34)
MCHC RBC AUTO-ENTMCNC: 33.3 G/DL (ref 31–37)
MCV RBC AUTO: 88 FL
MONOCYTES # BLD AUTO: 0.17 X10(3) UL (ref 0.1–1)
MONOCYTES NFR BLD AUTO: 2.2 %
NEUTROPHILS # BLD AUTO: 6.83 X10 (3) UL (ref 1.5–7.7)
NEUTROPHILS # BLD AUTO: 6.83 X10(3) UL (ref 1.5–7.7)
NEUTROPHILS NFR BLD AUTO: 89 %
OSMOLALITY SERPL CALC.SUM OF ELEC: 330 MOSM/KG (ref 275–295)
PLATELET # BLD AUTO: 149 10(3)UL (ref 150–450)
POCT INFLUENZA A: NEGATIVE
POCT INFLUENZA B: NEGATIVE
POTASSIUM SERPL-SCNC: 5.8 MMOL/L (ref 3.5–5.1)
PROT SERPL-MCNC: 9 G/DL (ref 6.4–8.2)
RBC # BLD AUTO: 3.92 X10(6)UL
SARS-COV-2 RNA RESP QL NAA+PROBE: NOT DETECTED
SODIUM SERPL-SCNC: 141 MMOL/L (ref 136–145)
WBC # BLD AUTO: 7.7 X10(3) UL (ref 4–11)

## 2024-01-03 RX ORDER — CLONIDINE HYDROCHLORIDE 0.1 MG/1
0.3 TABLET ORAL ONCE
Status: COMPLETED | OUTPATIENT
Start: 2024-01-03 | End: 2024-01-04

## 2024-01-03 RX ORDER — KETOROLAC TROMETHAMINE 15 MG/ML
15 INJECTION, SOLUTION INTRAMUSCULAR; INTRAVENOUS ONCE
Status: DISCONTINUED | OUTPATIENT
Start: 2024-01-03 | End: 2024-01-03

## 2024-01-03 RX ORDER — ONDANSETRON 4 MG/1
4 TABLET, ORALLY DISINTEGRATING ORAL ONCE
Status: COMPLETED | OUTPATIENT
Start: 2024-01-03 | End: 2024-01-03

## 2024-01-03 RX ORDER — INSULIN ASPART 100 [IU]/ML
10 INJECTION, SOLUTION INTRAVENOUS; SUBCUTANEOUS ONCE
Status: COMPLETED | OUTPATIENT
Start: 2024-01-03 | End: 2024-01-04

## 2024-01-03 RX ORDER — ONDANSETRON 2 MG/ML
4 INJECTION INTRAMUSCULAR; INTRAVENOUS ONCE
Status: COMPLETED | OUTPATIENT
Start: 2024-01-03 | End: 2024-01-03

## 2024-01-04 ENCOUNTER — APPOINTMENT (OUTPATIENT)
Dept: GENERAL RADIOLOGY | Age: 44
End: 2024-01-04
Attending: EMERGENCY MEDICINE
Payer: COMMERCIAL

## 2024-01-04 PROBLEM — E87.20 METABOLIC ACIDOSIS: Status: ACTIVE | Noted: 2024-01-04

## 2024-01-04 PROBLEM — R19.7 DIARRHEA, UNSPECIFIED TYPE: Status: ACTIVE | Noted: 2024-01-04

## 2024-01-04 PROBLEM — R11.2 INTRACTABLE NAUSEA AND VOMITING: Status: ACTIVE | Noted: 2024-01-04

## 2024-01-04 PROBLEM — N18.5 STAGE 5 CHRONIC KIDNEY DISEASE NOT ON CHRONIC DIALYSIS (HCC): Status: ACTIVE | Noted: 2024-01-04

## 2024-01-04 PROBLEM — R53.83 OTHER FATIGUE: Status: ACTIVE | Noted: 2024-01-04

## 2024-01-04 LAB
ALBUMIN SERPL-MCNC: 3.9 G/DL (ref 3.4–5)
ALBUMIN/GLOB SERPL: 0.9 {RATIO} (ref 1–2)
ALP LIVER SERPL-CCNC: 172 U/L
ALT SERPL-CCNC: 38 U/L
ANION GAP SERPL CALC-SCNC: 10 MMOL/L (ref 0–18)
AST SERPL-CCNC: 33 U/L (ref 15–37)
ATRIAL RATE: 113 BPM
BASOPHILS # BLD AUTO: 0.01 X10(3) UL (ref 0–0.2)
BASOPHILS NFR BLD AUTO: 0.1 %
BILIRUB SERPL-MCNC: 0.3 MG/DL (ref 0.1–2)
BUN BLD-MCNC: 92 MG/DL (ref 9–23)
CALCIUM BLD-MCNC: 9.7 MG/DL (ref 8.5–10.1)
CHLORIDE SERPL-SCNC: 117 MMOL/L (ref 98–112)
CO2 SERPL-SCNC: 18 MMOL/L (ref 21–32)
CREAT BLD-MCNC: 6.35 MG/DL
EGFRCR SERPLBLD CKD-EPI 2021: 10 ML/MIN/1.73M2 (ref 60–?)
EOSINOPHIL # BLD AUTO: 0 X10(3) UL (ref 0–0.7)
EOSINOPHIL NFR BLD AUTO: 0 %
ERYTHROCYTE [DISTWIDTH] IN BLOOD BY AUTOMATED COUNT: 13.7 %
GLOBULIN PLAS-MCNC: 4.2 G/DL (ref 2.8–4.4)
GLUCOSE BLD-MCNC: 199 MG/DL (ref 70–99)
GLUCOSE BLD-MCNC: 212 MG/DL (ref 70–99)
GLUCOSE BLD-MCNC: 224 MG/DL (ref 70–99)
GLUCOSE BLD-MCNC: 232 MG/DL (ref 70–99)
GLUCOSE BLD-MCNC: 236 MG/DL (ref 70–99)
GLUCOSE BLD-MCNC: 257 MG/DL (ref 70–99)
GLUCOSE BLD-MCNC: 282 MG/DL (ref 70–99)
HCT VFR BLD AUTO: 31.5 %
HGB BLD-MCNC: 10.5 G/DL
IMM GRANULOCYTES # BLD AUTO: 0.03 X10(3) UL (ref 0–1)
IMM GRANULOCYTES NFR BLD: 0.3 %
LYMPHOCYTES # BLD AUTO: 0.88 X10(3) UL (ref 1–4)
LYMPHOCYTES NFR BLD AUTO: 9.9 %
MCH RBC QN AUTO: 28.9 PG (ref 26–34)
MCHC RBC AUTO-ENTMCNC: 33.3 G/DL (ref 31–37)
MCV RBC AUTO: 86.8 FL
MONOCYTES # BLD AUTO: 0.53 X10(3) UL (ref 0.1–1)
MONOCYTES NFR BLD AUTO: 6 %
NEUTROPHILS # BLD AUTO: 7.4 X10 (3) UL (ref 1.5–7.7)
NEUTROPHILS # BLD AUTO: 7.4 X10(3) UL (ref 1.5–7.7)
NEUTROPHILS NFR BLD AUTO: 83.7 %
OSMOLALITY SERPL CALC.SUM OF ELEC: 339 MOSM/KG (ref 275–295)
P AXIS: 45 DEGREES
P-R INTERVAL: 144 MS
PLATELET # BLD AUTO: 144 10(3)UL (ref 150–450)
POTASSIUM SERPL-SCNC: 5.1 MMOL/L (ref 3.5–5.1)
POTASSIUM SERPL-SCNC: 5.3 MMOL/L (ref 3.5–5.1)
POTASSIUM SERPL-SCNC: 5.3 MMOL/L (ref 3.5–5.1)
POTASSIUM SERPL-SCNC: 5.4 MMOL/L (ref 3.5–5.1)
PROT SERPL-MCNC: 8.1 G/DL (ref 6.4–8.2)
Q-T INTERVAL: 354 MS
QRS DURATION: 92 MS
QTC CALCULATION (BEZET): 485 MS
R AXIS: 13 DEGREES
RBC # BLD AUTO: 3.63 X10(6)UL
SODIUM SERPL-SCNC: 145 MMOL/L (ref 136–145)
T AXIS: 66 DEGREES
VENTRICULAR RATE: 113 BPM
WBC # BLD AUTO: 8.9 X10(3) UL (ref 4–11)

## 2024-01-04 PROCEDURE — 71045 X-RAY EXAM CHEST 1 VIEW: CPT | Performed by: EMERGENCY MEDICINE

## 2024-01-04 PROCEDURE — 99223 1ST HOSP IP/OBS HIGH 75: CPT | Performed by: INTERNAL MEDICINE

## 2024-01-04 PROCEDURE — 99291 CRITICAL CARE FIRST HOUR: CPT | Performed by: HOSPITALIST

## 2024-01-04 RX ORDER — NICOTINE POLACRILEX 4 MG
30 LOZENGE BUCCAL
Status: DISCONTINUED | OUTPATIENT
Start: 2024-01-04 | End: 2024-01-05

## 2024-01-04 RX ORDER — HEPARIN SODIUM 5000 [USP'U]/ML
5000 INJECTION, SOLUTION INTRAVENOUS; SUBCUTANEOUS EVERY 8 HOURS SCHEDULED
Status: DISCONTINUED | OUTPATIENT
Start: 2024-01-04 | End: 2024-01-05

## 2024-01-04 RX ORDER — NICOTINE POLACRILEX 4 MG
15 LOZENGE BUCCAL
Status: DISCONTINUED | OUTPATIENT
Start: 2024-01-04 | End: 2024-01-05

## 2024-01-04 RX ORDER — SODIUM CHLORIDE 9 MG/ML
INJECTION, SOLUTION INTRAVENOUS CONTINUOUS
Status: DISCONTINUED | OUTPATIENT
Start: 2024-01-04 | End: 2024-01-04

## 2024-01-04 RX ORDER — PROCHLORPERAZINE EDISYLATE 5 MG/ML
5 INJECTION INTRAMUSCULAR; INTRAVENOUS EVERY 8 HOURS PRN
Status: DISCONTINUED | OUTPATIENT
Start: 2024-01-04 | End: 2024-01-05

## 2024-01-04 RX ORDER — ONDANSETRON 2 MG/ML
4 INJECTION INTRAMUSCULAR; INTRAVENOUS ONCE
Status: COMPLETED | OUTPATIENT
Start: 2024-01-04 | End: 2024-01-04

## 2024-01-04 RX ORDER — BENZONATATE 200 MG/1
200 CAPSULE ORAL 3 TIMES DAILY PRN
Status: DISCONTINUED | OUTPATIENT
Start: 2024-01-04 | End: 2024-01-05

## 2024-01-04 RX ORDER — METOCLOPRAMIDE HYDROCHLORIDE 5 MG/ML
10 INJECTION INTRAMUSCULAR; INTRAVENOUS ONCE
Status: COMPLETED | OUTPATIENT
Start: 2024-01-04 | End: 2024-01-04

## 2024-01-04 RX ORDER — MELATONIN
3 NIGHTLY PRN
Status: DISCONTINUED | OUTPATIENT
Start: 2024-01-04 | End: 2024-01-05

## 2024-01-04 RX ORDER — ASPIRIN 81 MG/1
81 TABLET ORAL DAILY
Status: DISCONTINUED | OUTPATIENT
Start: 2024-01-04 | End: 2024-01-05

## 2024-01-04 RX ORDER — DEXTROSE MONOHYDRATE 25 G/50ML
25 INJECTION, SOLUTION INTRAVENOUS
Status: ACTIVE | OUTPATIENT
Start: 2024-01-04 | End: 2024-01-04

## 2024-01-04 RX ORDER — CALCIUM GLUCONATE 10 MG/ML
1 INJECTION, SOLUTION INTRAVENOUS ONCE
Status: COMPLETED | OUTPATIENT
Start: 2024-01-04 | End: 2024-01-05

## 2024-01-04 RX ORDER — SENNOSIDES 8.6 MG
17.2 TABLET ORAL NIGHTLY PRN
Status: DISCONTINUED | OUTPATIENT
Start: 2024-01-04 | End: 2024-01-05

## 2024-01-04 RX ORDER — AMLODIPINE BESYLATE 5 MG/1
10 TABLET ORAL DAILY
Status: DISCONTINUED | OUTPATIENT
Start: 2024-01-04 | End: 2024-01-05

## 2024-01-04 RX ORDER — POLYETHYLENE GLYCOL 3350 17 G/17G
17 POWDER, FOR SOLUTION ORAL DAILY PRN
Status: DISCONTINUED | OUTPATIENT
Start: 2024-01-04 | End: 2024-01-05

## 2024-01-04 RX ORDER — DEXTROSE MONOHYDRATE 25 G/50ML
50 INJECTION, SOLUTION INTRAVENOUS
Status: DISCONTINUED | OUTPATIENT
Start: 2024-01-04 | End: 2024-01-05

## 2024-01-04 RX ORDER — SODIUM BICARBONATE 325 MG/1
650 TABLET ORAL 2 TIMES DAILY
Status: DISCONTINUED | OUTPATIENT
Start: 2024-01-04 | End: 2024-01-05

## 2024-01-04 RX ORDER — HALOPERIDOL 5 MG/ML
2 INJECTION INTRAMUSCULAR ONCE
Status: COMPLETED | OUTPATIENT
Start: 2024-01-04 | End: 2024-01-04

## 2024-01-04 RX ORDER — ONDANSETRON 2 MG/ML
4 INJECTION INTRAMUSCULAR; INTRAVENOUS EVERY 6 HOURS PRN
Status: DISCONTINUED | OUTPATIENT
Start: 2024-01-04 | End: 2024-01-05

## 2024-01-04 RX ORDER — ACETAMINOPHEN 500 MG
1000 TABLET ORAL EVERY 4 HOURS PRN
Status: DISCONTINUED | OUTPATIENT
Start: 2024-01-04 | End: 2024-01-05

## 2024-01-04 RX ORDER — DIPHENHYDRAMINE HCL 25 MG
25 CAPSULE ORAL EVERY 4 HOURS PRN
Status: DISCONTINUED | OUTPATIENT
Start: 2024-01-04 | End: 2024-01-05

## 2024-01-04 RX ORDER — HYDRALAZINE HYDROCHLORIDE 20 MG/ML
5 INJECTION INTRAMUSCULAR; INTRAVENOUS ONCE
Status: COMPLETED | OUTPATIENT
Start: 2024-01-04 | End: 2024-01-04

## 2024-01-04 RX ORDER — DIPHENHYDRAMINE HYDROCHLORIDE 50 MG/ML
25 INJECTION INTRAMUSCULAR; INTRAVENOUS ONCE
Status: COMPLETED | OUTPATIENT
Start: 2024-01-04 | End: 2024-01-04

## 2024-01-04 RX ORDER — DEXTROSE MONOHYDRATE 25 G/50ML
INJECTION, SOLUTION INTRAVENOUS ONCE
Status: COMPLETED | OUTPATIENT
Start: 2024-01-04 | End: 2024-01-04

## 2024-01-04 RX ORDER — ECHINACEA PURPUREA EXTRACT 125 MG
1 TABLET ORAL
Status: DISCONTINUED | OUTPATIENT
Start: 2024-01-04 | End: 2024-01-05

## 2024-01-04 RX ORDER — BISACODYL 10 MG
10 SUPPOSITORY, RECTAL RECTAL
Status: DISCONTINUED | OUTPATIENT
Start: 2024-01-04 | End: 2024-01-05

## 2024-01-04 RX ORDER — DIPHENHYDRAMINE HYDROCHLORIDE 50 MG/ML
12.5 INJECTION INTRAMUSCULAR; INTRAVENOUS EVERY 4 HOURS PRN
Status: DISCONTINUED | OUTPATIENT
Start: 2024-01-04 | End: 2024-01-05

## 2024-01-04 NOTE — PLAN OF CARE
Patient A&O x4, not talkative at this time, feeling very sick.  No c/o pain, room air.  Patient having N/V/D since Monday, and can not hold down food.  Patient looks pale and walking very slowly.  History Stage 4 CKD, no dialysis and HTN.  Potassium is high and is getting multiple medications to lower.  Patient refusing oral medication because worried about throwing up.  Nephrology consulted for JESSICA on CKD.

## 2024-01-04 NOTE — ED PROVIDER NOTES
Patient Seen in: [facility] [place]      History     Chief Complaint   Patient presents with    Nausea/Vomiting/Diarrhea     Stated Complaint: pt to er with fever, vomiting and diarrhea x3 days    Subjective:   HPI    43 old male with complaints of fever/vomiting/diarrhea/sob.  History of hypertension hyperlipidemia and CKD not on dialysis.  Began on Monday and he has not needed to hold down any food or water.  Denies any abdominal pain.  Last episode of diarrhea a few hours ago.  Patient reports that he has not take any medications for symptoms.  He reports that he has not able to take his home medications because he has not been able to even tolerate water today.  Did not take his insulin either as he has not eaten today.    Objective:   Past Medical History:   Diagnosis Date    CKD (chronic kidney disease)     Diabetes (HCC)     Disorder of liver     Elevated liver enzymes 08/30/2016    Elevated serum GGT level 08/30/2016    High blood pressure     High cholesterol     Hyperlipidemia     HYPERTRIGLYCERIDEMIA     OBESITY     Obesity     Pneumonia due to organism     Renal disorder     Visual impairment     GLASSES              Past Surgical History:   Procedure Laterality Date    ARTHROTOMY/EXPLORE/TREAT KNEE JOINT Left     DONE TWICE    CHOLECYSTECTOMY      DEBRIDE WOUND Left     HEEL    FOOT SURGERY      OTHER SURGICAL HISTORY  Knee & Foot                Social History     Socioeconomic History    Marital status:    Tobacco Use    Smoking status: Never    Smokeless tobacco: Never   Vaping Use    Vaping Use: Never used   Substance and Sexual Activity    Alcohol use: Yes     Alcohol/week: 1.0 standard drink of alcohol     Types: 1 Standard drinks or equivalent per week     Comment: socially    Drug use: No   Other Topics Concern    Caffeine Concern No    Exercise No    Seat Belt Yes    Special Diet Yes    Stress Concern No    Weight Concern No     Social Determinants of Health      Financial Resource Strain: Low Risk  (11/17/2023)    Financial Resource Strain     Difficulty of Paying Living Expenses: Not very hard     Med Affordability: No   Food Insecurity: No Food Insecurity (11/12/2023)    Food Insecurity     Food Insecurity: Never true   Transportation Needs: No Transportation Needs (11/12/2023)    Transportation Needs     Lack of Transportation: No   Housing Stability: Low Risk  (11/12/2023)    Housing Stability     Housing Instability: No              Review of Systems    Positive for stated complaint: pt to er with fever, vomiting and diarrhea x3 days  Other systems are as noted in HPI.  Constitutional and vital signs reviewed.      All other systems reviewed and negative except as noted above.    Physical Exam     ED Triage Vitals [01/03/24 2135]   BP (!) 202/103   Pulse 115   Resp 18   Temp 98.1 °F (36.7 °C)   Temp src Oral   SpO2 98 %   O2 Device None (Room air)       Current:BP (!) 180/105   Pulse 110   Temp 98.9 °F (37.2 °C) (Oral)   Resp 18   Ht 172.7 cm (5' 8\")   Wt 90.7 kg   SpO2 98%   BMI 30.41 kg/m²         Physical Exam  Vitals and nursing note reviewed.   Constitutional:       Appearance: He is well-developed.   HENT:      Head: Normocephalic and atraumatic.   Eyes:      Pupils: Pupils are equal, round, and reactive to light.   Cardiovascular:      Rate and Rhythm: Normal rate and regular rhythm.   Pulmonary:      Effort: Pulmonary effort is normal.      Breath sounds: Normal breath sounds.   Abdominal:      General: Bowel sounds are normal.      Palpations: Abdomen is soft.   Musculoskeletal:      Cervical back: Normal range of motion and neck supple.   Skin:     General: Skin is warm and dry.      Capillary Refill: Capillary refill takes less than 2 seconds.   Neurological:      Mental Status: He is alert and oriented to person, place, and time.   Psychiatric:         Behavior: Behavior normal.               ED Course     Labs Reviewed   COMP METABOLIC PANEL  (14) - Abnormal; Notable for the following components:       Result Value    Glucose 320 (*)     Potassium 5.8 (*)     Chloride 113 (*)     CO2 19.0 (*)     BUN 84 (*)     Creatinine 6.21 (*)     Calculated Osmolality 330 (*)     eGFR-Cr 11 (*)     Alkaline Phosphatase 185 (*)     Total Protein 9.0 (*)     Globulin  4.9 (*)     A/G Ratio 0.8 (*)     All other components within normal limits   POCT GLUCOSE - Abnormal; Notable for the following components:    POC Glucose 276 (*)     All other components within normal limits   CBC W/ DIFFERENTIAL - Abnormal; Notable for the following components:    RBC 3.92 (*)     HGB 11.5 (*)     HCT 34.5 (*)     .0 (*)     Lymphocyte Absolute 0.63 (*)     All other components within normal limits   LIPASE - Normal   RAPID SARS-COV-2 BY PCR - Normal   POCT FLU TEST - Normal    Narrative:     This assay is a rapid molecular in vitro test utilizing nucleic acid amplification of influenza A and B viral RNA.   CBC WITH DIFFERENTIAL WITH PLATELET    Narrative:     The following orders were created for panel order CBC With Differential With Platelet.  Procedure                               Abnormality         Status                     ---------                               -----------         ------                     CBC W/ DIFFERENTIAL[[id number]]          Abnormal            Final result                 Please view results for these tests on the individual orders.        X-ray: No mediastinal widening, effusions, or infiltrates    X-RAY CHEST 1 VIEW 0008 HRS.      IMPRESSION:  -No evidence of acute cardiopulmonary disease.      Rate, intervals and axes as noted on EKG Report.  Rate: 13  Rhythm: Sinus Rhythm  Reading: No gross ST elevation or depressions normal axis normal intervals       MDM      This is a 43-year-old male who presents ED with complaints of nausea vomiting diarrhea.  Patient is hypertensive on arrival he appears fatigued on examination.  Lung sounds are clear  heart sounds normal.  Abdomen is soft nontender nondistended.  Basic labs obtained patient has worsening of his chronic renal failure.  Hyperkalemia as well as 5.8 EKG sinus no gross ST changes significant for ischemia.  patient also has slight metabolic acidosis bicarb 19.  Patient is hyperglycemic at 320 we will give IV fluids and reassess.  After 1 L of IV fluids patient tolerated well.  X-ray is clear no mediastinal any effusions or infiltrates from independent evaluation of the chest x-ray.  He was also given ondansetron for nausea and vomiting and continues to have nausea.  He was given Reglan and Benadryl and reevaluated and still has nausea and is unable to take sips of water.  As patient is persistently nauseous unable to tolerate p.o. Will admit for observation for intractable nausea and vomiting hypokalemia JESSICA on CKD as well as elevated blood pressure.  Given hydralazine for his persistently elevated blood pressure and inability to take his oral medications.                                   Medical Decision Making      Disposition and Plan     Clinical Impression:  1. Intractable nausea and vomiting    2. Other fatigue    3. Diarrhea, unspecified type    4. Hyperkalemia    5. JESSICA (acute kidney injury) (HCC)    6. Metabolic acidosis    7. Hyperglycemia         Disposition:  There is no disposition on file for this visit.  There is no disposition time on file for this visit.    Follow-up:  Jimbo Sullivan DO  [address]  [facility]  [facility] [id number] [phone]    Call in 1 day(s)            Medications Prescribed:  Current Discharge Medication List

## 2024-01-04 NOTE — H&P
[facility]  History and Physical     Adrien Tobin Patient Status:  Observation    1980 MRN [id number]   Location [facility] 1NE-A Attending Marcus Garcia MD   Hosp Day # 0 PCP Jimbo Sullivan DO     Chief Complaint: n/v    Subjective:    History of Present Illness:     Adrien Tobin is a 43 year old male with PMH DM2, CKD, HTN, HLD, who p/w intractable n/v. On Monday, developed n/v. Unable to take PO. Also w/ diarrhea which persists. Denies abd pain. Unable to tolerate water or meds today. Did not take insulin either since not eating.     History/Other:    Past Medical History:  Past Medical History:   Diagnosis Date    CKD (chronic kidney disease)     Diabetes (HCC)     Disorder of liver     Elevated liver enzymes 2016    Elevated serum GGT level 2016    High blood pressure     High cholesterol     Hyperlipidemia     HYPERTRIGLYCERIDEMIA     Intractable nausea and vomiting 2024    OBESITY     Obesity     Pneumonia due to organism     Renal disorder     Visual impairment     GLASSES     Past Surgical History:   Past Surgical History:   Procedure Laterality Date    ARTHROTOMY/EXPLORE/TREAT KNEE JOINT Left     DONE TWICE    CHOLECYSTECTOMY      DEBRIDE WOUND Left     HEEL    FOOT SURGERY      OTHER SURGICAL HISTORY  Knee & Foot      Family History:   Family History   Problem Relation Age of Onset    Diabetes Father     Heart Disorder Father     Diabetes Mother     Hypertension Sister      Social History:    reports that he has never smoked. He has never used smokeless tobacco. He reports current alcohol use of about 1.0 standard drink of alcohol per week. He reports that he does not use drugs.     Allergies: No Known Allergies    Medications:    Current Facility-Administered Medications on File Prior to Encounter   Medication Dose Route Frequency Provider Last Rate Last Admin    [COMPLETED] epoetin harman (Epogen, Procrit) 51920 UNIT/ML injection 20,000 Units   20,000 Units Subcutaneous Once Justin Meyers MD   20,000 Units at 11/16/23 0855    [COMPLETED] chlorothiazide (Diuril) 500 mg in sterile water for injection (PF) IV push  500 mg Intravenous Once Justin Meyers MD   500 mg at 11/15/23 1022    [COMPLETED] magnesium sulfate in sterile water for injection 2 g/50mL IVPB premix 2 g  2 g Intravenous Once Mark Hanks MD 50 mL/hr at 11/14/23 1151 2 g at 11/14/23 1151    [COMPLETED] chlorothiazide (Diuril) 250 mg in sterile water for injection (PF) IV push  250 mg Intravenous Once Mark Hanks MD   250 mg at 11/14/23 1151    [COMPLETED] sodium chloride 0.9% infusion   Intravenous Once Rm Doty MD   Stopped at 11/13/23 1000    [COMPLETED] epoetin harman (Epogen, Procrit) 14795 UNIT/ML injection 20,000 Units  20,000 Units Subcutaneous Once Justin Meyers MD   20,000 Units at 11/13/23 0926    [COMPLETED] furosemide (Lasix) 10 mg/mL injection 20 mg  20 mg Intravenous Once Rm Doty MD   20 mg at 11/12/23 2316     Current Outpatient Medications on File Prior to Encounter   Medication Sig Dispense Refill    PEG 3350-KCl-NaBcb-NaCl-NaSulf (PEG-3350/ELECTROLYTES) 236 g Oral Recon Soln Take as directed by physician 4000 mL 0    Insulin Pen Needle (BD PEN NEEDLE ODALIS 2ND GEN) 32G X 4 MM Does not apply Misc USE FOUR TIMES DAILY AS DIRECTED 400 each 2    cloNIDine 0.3 MG Oral Tab Take 1 tablet (0.3 mg total) by mouth 3 (three) times daily. 90 tablet 5    torsemide 20 MG Oral Tab Take 1 tablet (20 mg total) by mouth BID (Diuretic). 60 tablet 5    insulin detemir 100 UNIT/ML Subcutaneous Solution Pen-injector Inject 22 Units into the skin every morning.      Blood Pressure Monitoring (BLOOD PRESSURE KIT) Does not apply Device 1 kit daily. Check blood pressure daily. 1 each 0    aspirin (ASPIRIN EC LOW DOSE) 81 MG Oral Tab EC Take 1 tablet (81 mg total) by mouth daily. 90 tablet 0    amLODIPine 10 MG Oral Tab Take 1 tablet (10 mg total) by mouth daily. 90 tablet 0     EZETIMIBE-SIMVASTATIN 10-80 MG Oral Tab TAKE 1 TABLET BY MOUTH DAILY 90 tablet 3    HUMALOG KWIKPEN 100 UNIT/ML Subcutaneous Solution Pen-injector SLIDING SCALE  TID with meals MDD 15u (Patient taking differently: SLIDING SCALE  TID with meals MDD 15u) 15 mL 0    Dulaglutide (TRULICITY) 0.75 MG/0.5ML Subcutaneous Solution Pen-injector Inject 0.75 mg into the skin once a week. 7 mL 3       Review of Systems:   A comprehensive review of systems was completed.    Pertinent positives and negatives noted in the HPI.    Objective:   Physical Exam:    /65 (BP Location: Right arm)   Pulse 114   Temp 98.5 °F (36.9 °C) (Axillary)   Resp 22   Ht 5' 8\" (1.727 m)   Wt 203 lb 8 oz (92.3 kg)   SpO2 98%   BMI 30.94 kg/m²   General: No acute distress, Alert  Respiratory: No rhonchi, no wheezes  Cardiovascular: S1, S2. Regular rate and rhythm  Abdomen: Soft, Non-tender, non-distended, positive bowel sounds  Neuro: No new focal deficits  Extremities: No edema      Results:    Labs:      Labs Last 24 Hours:    Recent Labs   Lab 01/03/24  2242   RBC 3.92*   HGB 11.5*   HCT 34.5*   MCV 88.0   MCH 29.3   MCHC 33.3   RDW 13.8   NEPRELIM 6.83   WBC 7.7   .0*       Recent Labs   Lab 01/03/24  2242   *   BUN 84*   CREATSERUM 6.21*   EGFRCR 11*   CA 9.6   ALB 4.1      K 5.8*   *   CO2 19.0*   ALKPHO 185*   AST 29   ALT 48   BILT 0.6   TP 9.0*       Lab Results   Component Value Date    INR 1.08 06/18/2018       No results for input(s): \"TROP\", \"TROPHS\", \"CK\" in the last 168 hours.    No results for input(s): \"TROP\", \"PBNP\" in the last 168 hours.    No results for input(s): \"PCT\" in the last 168 hours.    Imaging: Imaging data reviewed in Epic.    Assessment & Plan:      #intractable n/v  #diarrhea  -anti emetics  -cdiff, stool pcr  -IVF    #JESSICA on CKD5  -not on HD  -IVF  -renal consult    #hyperK  -Caglu  -insulin/dextros  -valtessa when able to tolerate PO   -EKG reviewed, peaked Ts  -nephro    #Dm2 w/  hyperglycemia  -insulin    #anemia of CKD    Crit care time spent 36 min     Plan of care discussed with pt, ed physician    Marcus Garcia MD    Supplementary Documentation:     The 21st Century Cures Act makes medical notes like these available to patients in the interest of transparency. Please be advised this is a medical document. Medical documents are intended to carry relevant information, facts as evident, and the clinical opinion of the practitioner. The medical note is intended as peer to peer communication and may appear blunt or direct. It is written in medical language and may contain abbreviations or verbiage that are unfamiliar.

## 2024-01-04 NOTE — ED QUICK NOTES
Orders for admission, patient is aware of plan and ready to go upstairs. Any questions, please call ED RN Gisselle at extension [phone].     Patient Covid vaccination status: Fully vaccinated     COVID Test Ordered in ED: Rapid SARS-CoV-2 by PCR    COVID Suspicion at Admission: N/A    Running Infusions:  None    Mental Status/LOC at time of transport: alert and oriented x4    Other pertinent information:   CIWA score: N/A   NIH score:  N/A        
Patient reports no changes in nausea s/p medication administration.   Continues to vomit in room. ED Physician aware.   
Patient reports no changes in nausea s/p medication administration.   Continues to vomit in room. ED Physician aware.   
Pt continues to c/o of nausea/vomiting. Physician notified. Orders for haldol obtained.   
Transport set up, ETA 20-30mins.  
Statement Selected

## 2024-01-04 NOTE — CONSULTS
[facility]  Report of Consultation    Adrien Tobin Patient Status:  Observation    1980 MRN [id number]   Location [facility] 1NE-A Attending Richard Ricci MD   Hosp Day # 0 PCP Jimbo Sullivan DO     Reason for Consultation:  JESSICA/CKDV    History of Present Illness:  Adrien Tobin is a a(n) 43 year old male with hx of CKD V, DM, HTN, HL who presents to hospital w/ n/v/d. Symptoms ongoing since past Monday  About 2 loose stools per day  No abd pain  Denies any cp/sob    He was seen in o/p renal clinic in Dec and is undergoing transplant eval @ [facility]    On admit lab his Cr is 6.2 (baseline ~ 5-6); K 5.8  CO2 19    History:  Past Medical History:   Diagnosis Date    CKD (chronic kidney disease)     Diabetes (HCC)     Disorder of liver     Elevated liver enzymes 2016    Elevated serum GGT level 2016    High blood pressure     High cholesterol     Hyperlipidemia     HYPERTRIGLYCERIDEMIA     Intractable nausea and vomiting 2024    OBESITY     Obesity     Pneumonia due to organism     Renal disorder     Visual impairment     GLASSES     Past Surgical History:   Procedure Laterality Date    ARTHROTOMY/EXPLORE/TREAT KNEE JOINT Left     DONE TWICE    CHOLECYSTECTOMY      DEBRIDE WOUND Left     HEEL    FOOT SURGERY      OTHER SURGICAL HISTORY  Knee & Foot     Family History   Problem Relation Age of Onset    Diabetes Father     Heart Disorder Father     Diabetes Mother     Hypertension Sister       reports that he has never smoked. He has never used smokeless tobacco. He reports current alcohol use of about 1.0 standard drink of alcohol per week. He reports that he does not use drugs.    Allergies:  No Known Allergies    Current Medications:    Current Facility-Administered Medications:     amLODIPine (Norvasc) tab 10 mg, 10 mg, Oral, Daily    aspirin DR tab 81 mg, 81 mg, Oral, Daily    cloNIDine (Catapres) tab 0.3 mg, 0.3 mg, Oral, TID    ezetimibe (Zetia) 10 mg, atorvastatin  (Lipitor) 40 mg for Vytorin 10-80 (EEH only), , Oral, Nightly    insulin detemir (Levemir) 100 UNIT/ML FlexPen/FlexTouch 11 Units, 11 Units, Subcutaneous, QAM    insulin aspart (NovoLOG) 100 Units/mL FlexPen 1-68 Units, 1-68 Units, Subcutaneous, TID CC    glucose (Dex4) 15 GM/59ML oral liquid 15 g, 15 g, Oral, Q15 Min PRN **OR** glucose (Glutose) 40% oral gel 15 g, 15 g, Oral, Q15 Min PRN **OR** glucose-vitamin C (Dex-4) chewable tab 4 tablet, 4 tablet, Oral, Q15 Min PRN **OR** dextrose 50% injection 50 mL, 50 mL, Intravenous, Q15 Min PRN **OR** glucose (Dex4) 15 GM/59ML oral liquid 30 g, 30 g, Oral, Q15 Min PRN **OR** glucose (Glutose) 40% oral gel 30 g, 30 g, Oral, Q15 Min PRN **OR** glucose-vitamin C (Dex-4) chewable tab 8 tablet, 8 tablet, Oral, Q15 Min PRN    acetaminophen (Tylenol Extra Strength) tab 1,000 mg, 1,000 mg, Oral, Q4H PRN    melatonin tab 3 mg, 3 mg, Oral, Nightly PRN    polyethylene glycol (PEG 3350) (Miralax) 17 g oral packet 17 g, 17 g, Oral, Daily PRN    sennosides (Senokot) tab 17.2 mg, 17.2 mg, Oral, Nightly PRN    bisacodyl (Dulcolax) 10 MG rectal suppository 10 mg, 10 mg, Rectal, Daily PRN    benzonatate (Tessalon) cap 200 mg, 200 mg, Oral, TID PRN    sodium chloride 0.9% infusion, , Intravenous, Continuous    heparin (Porcine) 5000 UNIT/ML injection 5,000 Units, 5,000 Units, Subcutaneous, Q8H BERTA    ondansetron (Zofran) 4 MG/2ML injection 4 mg, 4 mg, Intravenous, Q6H PRN    prochlorperazine (Compazine) 10 MG/2ML injection 5 mg, 5 mg, Intravenous, Q8H PRN    insulin aspart (NovoLOG) 100 Units/mL FlexPen 1-10 Units, 1-10 Units, Subcutaneous, TID AC and HS    sodium chloride (Saline Mist) 0.65 % nasal solution 1 spray, 1 spray, Each Nare, Q3H PRN    glycerin-hypromellose- (Artifical Tears) 0.2-0.2-1 % ophthalmic solution 1 drop, 1 drop, Both Eyes, QID PRN    diphenhydrAMINE (Benadryl) 50 mg/mL  injection 12.5 mg, 12.5 mg, Intravenous, Q4H PRN **OR** diphenhydrAMINE (Benadryl) cap/tab  25 mg, 25 mg, Oral, Q4H PRN  Home Medications:  No current outpatient medications on file.       Review of Systems:  See HPI; A total of 12 systems reviewed and otherwise unremarkable.    Physical Exam:  Vital signs: Blood pressure (!) 153/96, pulse 112, temperature 98.2 °F (36.8 °C), temperature source Oral, resp. rate 15, height 5' 8\" (1.727 m), weight 203 lb 8 oz (92.3 kg), SpO2 96%.  General: No acute distress. Alert and oriented x 3.  HEENT: Moist mucous membranes. EOM-I. PERRL  Neck: No lymphadenopathy.  No JVD. No carotid bruits.  Respiratory: Clear to auscultation bilaterally.  No wheezes. No rhonchi.  Cardiovascular: S1, S2.  Regular rate and rhythm.  No murmurs. Equal pulses   Abdomen: Soft, nontender, nondistended.  Positive bowel sounds. No rebound tenderness   Neurologic: No focal neurological deficits.   Musculoskeletal: Full range of motion of all extremities.  No swelling noted.   Integument: No lesions. No erythema.  Psychiatric: Appropriate mood and affect.    Laboratory:  Lab Results   Component Value Date    WBC 8.9 01/04/2024    HGB 10.5 01/04/2024    HCT 31.5 01/04/2024    .0 01/04/2024    CREATSERUM 6.35 01/04/2024    BUN 92 01/04/2024     01/04/2024    K 5.4 01/04/2024     01/04/2024    CO2 18.0 01/04/2024     01/04/2024    CA 9.7 01/04/2024    ALB 3.9 01/04/2024    ALKPHO 172 01/04/2024    BILT 0.3 01/04/2024    TP 8.1 01/04/2024    AST 33 01/04/2024    ALT 38 01/04/2024    LIP 39 01/03/2024    PGLU 199 01/04/2024          BUN (mg/dL)   Date Value   01/04/2024 92 (H)   01/03/2024 84 (H)   11/29/2023 78 (H)   08/24/2022 59 (A)   05/11/2014 12     Blood Urea Nitrogen (milligrams per deciliter)   Date Value   04/21/2014 15   09/04/2013 12   11/16/2012 9     Creatinine, Serum (milligrams per deciliter)   Date Value   04/21/2014 0.72 (L)   09/04/2013 0.67 (L)   11/16/2012 0.64 (L)     CREATININE (mg/dL)   Date Value   08/24/2022 3.13 (A)   05/11/2014 0.72     Creatinine  (mg/dL)   Date Value   01/04/2024 6.35 (H)   01/03/2024 6.21 (H)   11/29/2023 6.02 (H)         Imaging:  reviewed    Impression:  JESSICA/CKD- stg V - not on dialysis.  Cr near baseline. Probable component of uremia  He feels better w/ fluids  - monitor labs closely  - low threshold to start HD - discussed w/ patient in detail   HTN- stable; continue amlodipine/clonidine+ prn meds; hold torsemide  Hyperkalemia/acidosis- related to # 1; start on oral bicarb; dose w/ veltassa   - monitor  DM  N/V/D- ? Gastroenteritis. Concern for uremia as well given his underlying CKD   Anemia- stble; monitor         Thank you for allowing me to participate in this patient's care.  Please feel free to call me with any questions or concerns.    Mark Hanks MD  1/4/2024  4:15 PM

## 2024-01-05 VITALS
OXYGEN SATURATION: 98 % | WEIGHT: 203.5 LBS | DIASTOLIC BLOOD PRESSURE: 94 MMHG | HEART RATE: 89 BPM | TEMPERATURE: 97 F | HEIGHT: 68 IN | BODY MASS INDEX: 30.84 KG/M2 | SYSTOLIC BLOOD PRESSURE: 174 MMHG | RESPIRATION RATE: 16 BRPM

## 2024-01-05 LAB
ANION GAP SERPL CALC-SCNC: 5 MMOL/L (ref 0–18)
BUN BLD-MCNC: 90 MG/DL (ref 9–23)
CALCIUM BLD-MCNC: 8.5 MG/DL (ref 8.5–10.1)
CHLORIDE SERPL-SCNC: 114 MMOL/L (ref 98–112)
CO2 SERPL-SCNC: 24 MMOL/L (ref 21–32)
CREAT BLD-MCNC: 6.14 MG/DL
EGFRCR SERPLBLD CKD-EPI 2021: 11 ML/MIN/1.73M2 (ref 60–?)
GLUCOSE BLD-MCNC: 131 MG/DL (ref 70–99)
GLUCOSE BLD-MCNC: 144 MG/DL (ref 70–99)
GLUCOSE BLD-MCNC: 232 MG/DL (ref 70–99)
MAGNESIUM SERPL-MCNC: 2.1 MG/DL (ref 1.6–2.6)
OSMOLALITY SERPL CALC.SUM OF ELEC: 326 MOSM/KG (ref 275–295)
PHOSPHATE SERPL-MCNC: 4.8 MG/DL (ref 2.5–4.9)
POTASSIUM SERPL-SCNC: 4.7 MMOL/L (ref 3.5–5.1)
POTASSIUM SERPL-SCNC: 4.7 MMOL/L (ref 3.5–5.1)
POTASSIUM SERPL-SCNC: 4.8 MMOL/L (ref 3.5–5.1)
POTASSIUM SERPL-SCNC: 4.8 MMOL/L (ref 3.5–5.1)
SODIUM SERPL-SCNC: 143 MMOL/L (ref 136–145)

## 2024-01-05 PROCEDURE — 99239 HOSP IP/OBS DSCHRG MGMT >30: CPT | Performed by: HOSPITALIST

## 2024-01-05 PROCEDURE — 99233 SBSQ HOSP IP/OBS HIGH 50: CPT | Performed by: INTERNAL MEDICINE

## 2024-01-05 RX ORDER — SODIUM BICARBONATE 650 MG/1
650 TABLET ORAL 2 TIMES DAILY
Qty: 60 TABLET | Refills: 0 | Status: SHIPPED | OUTPATIENT
Start: 2024-01-05

## 2024-01-05 NOTE — PLAN OF CARE
Patient alert and oriented x 4. Up  independently in the room. On RA. NSR on tele. Continent of bowel and bladder. Denies nausea, vomiting and diarrhea. PT states he feels much better today. No complaints of pain, shortness of breath or chest pain/discomfort. POC : IVF. Fall precautions in place. Call light within reach.    Problem: Diabetes/Glucose Control  Goal: Glucose maintained within prescribed range  Description: INTERVENTIONS:  - Monitor Blood Glucose as ordered  - Assess for signs and symptoms of hyperglycemia and hypoglycemia  - Administer ordered medications to maintain glucose within target range  - Assess barriers to adequate nutritional intake and initiate nutrition consult as needed  - Instruct patient on self management of diabetes  Outcome: Progressing     Problem: GASTROINTESTINAL - ADULT  Goal: Minimal or absence of nausea and vomiting  Description: INTERVENTIONS:  - Maintain adequate hydration with IV or PO as ordered and tolerated  - Nasogastric tube to low intermittent suction as ordered  - Evaluate effectiveness of ordered antiemetic medications  - Provide nonpharmacologic comfort measures as appropriate  - Advance diet as tolerated, if ordered  - Obtain nutritional consult as needed  - Evaluate fluid balance  Outcome: Progressing     Problem: Diabetes/Glucose Control  Goal: Glucose maintained within prescribed range  Description: INTERVENTIONS:  - Monitor Blood Glucose as ordered  - Assess for signs and symptoms of hyperglycemia and hypoglycemia  - Administer ordered medications to maintain glucose within target range  - Assess barriers to adequate nutritional intake and initiate nutrition consult as needed  - Instruct patient on self management of diabetes  1/4/2024 2257 by Sami Gastelum, RN  Outcome: Progressing  1/4/2024 2256 by Sami Gastelum, RN  Outcome: Progressing

## 2024-01-05 NOTE — PLAN OF CARE
Pt discharged home accompanied by family members. He will follow up with Dr. Meyers on Monday. Prescriptions and discharge instructions given with pt verbalizing understanding.

## 2024-01-05 NOTE — PROGRESS NOTES
[facility]     Hospitalist Progress Note     Adrien Tobin Patient Status:  Observation    1980 MRN [id number]   [facility] 1NE-A Attending Richard Ricci MD   Hosp Day # 0 PCP Jimbo Sullivan DO     Chief Complaint:   Chief Complaint   Patient presents with    Nausea/Vomiting/Diarrhea       Subjective:     Patient denies any complaints. Feeling better    Objective:    Review of Systems:   6  point ROS completed and was negative, except for pertinent positive and negatives stated in subjective.    Vital signs:  Temp:  [96.5 °F (35.8 °C)-98.2 °F (36.8 °C)] 97 °F (36.1 °C)  Pulse:  [] 83  Resp:  [13-19] 16  BP: (153-167)/(84-96) 155/87  SpO2:  [96 %-98 %] 98 %    Physical Exam:    General: No acute distress.   Respiratory: Clear to auscultation bilaterally. No wheezes. No rhonchi.  Cardiovascular: S1, S2. Regular rate and rhythm. No murmurs.  Abdomen: Soft, nontender, nondistended.    Extremities: No edema.    Diagnostic Data:    Labs:  Recent Labs   Lab 24  0859   WBC 7.7 8.9   HGB 11.5* 10.5*   MCV 88.0 86.8   .0* 144.0*       Recent Labs   Lab 242 24  0859 24  1136 24  1956 24  0621   * 282*  --   --  144*   BUN 84* 92*  --   --  90*   CREATSERUM 6.21* 6.35*  --   --  6.14*   CA 9.6 9.7  --   --  8.5   ALB 4.1 3.9  --   --   --     145  --   --  143   K 5.8* 5.3*  5.3* 5.4* 5.1 4.8  4.8  4.7   * 117*  --   --  114*   CO2 19.0* 18.0*  --   --  24.0   ALKPHO 185* 172*  --   --   --    AST 29 33  --   --   --    ALT 48 38  --   --   --    BILT 0.6 0.3  --   --   --    TP 9.0* 8.1  --   --   --        Estimated Creatinine Clearance: 14.7 mL/min (A) (based on SCr of 6.14 mg/dL (H)).    No results for input(s): \"PTP\", \"INR\" in the last 168 hours.         COVID-19 Lab Results    COVID-19  Lab Results   Component Value Date    COVID19 Not Detected 2024    COVID19 Not Detected 2022     COVID19 Not Detected 12/26/2021       Pro-Calcitonin  No results for input(s): \"PCT\" in the last 168 hours.    Cardiac  No results for input(s): \"TROP\", \"PBNP\" in the last 168 hours.    Creatinine Kinase  No results for input(s): \"CK\" in the last 168 hours.    Inflammatory Markers  No results for input(s): \"CRP\", \"VANCE\", \"LDH\", \"DDIMER\" in the last 168 hours.    No results for input(s): \"TROP\", \"TROPHS\", \"CK\" in the last 168 hours.    Imaging: Imaging data reviewed in Epic.    Medications:    amLODIPine  10 mg Oral Daily    aspirin  81 mg Oral Daily    cloNIDine  0.3 mg Oral TID    ezetimibe (Zetia) 10 mg, atorvastatin (Lipitor) 40 mg for Vytorin 10-80 (EEH only)   Oral Nightly    insulin detemir  11 Units Subcutaneous QAM    insulin aspart  1-68 Units Subcutaneous TID CC    heparin  5,000 Units Subcutaneous Q8H BERTA    insulin aspart  1-10 Units Subcutaneous TID AC and HS    sodium bicarbonate  650 mg Oral BID       Assessment & Plan:      #intractable n/v, diarrhea; gastroenteritis and/or uremia component?  Await renal recs.    -IVF; Cr stable/mildly improved     #JESSICA on CKD5  -not on HD  -IVF  -Cr stable/mildly improved  -defer to renal if permcath needed at this time or not     #hyperK-resolved with medical trx     #Dm2 w/ hyperglycemia  -insulin; stable     #anemia of CKD-stable      Plan of care discussed with patient and RN    Richard Triplett MD    Supplementary Documentation:     Quality:  DVT Prophylaxis:heparin  CODE status: full  Gibbons: no  Central line: no      Estimated date of discharge: 0-2 days?  At this point Mr. Tobin is expected to be discharge to: home

## 2024-01-05 NOTE — PROGRESS NOTES
[facility]  Progress Note    Adrien Tobin Patient Status:  Observation    1980 MRN [id number]   [facility] 1NE-A Attending Richard Ricci MD   Hosp Day # 0 PCP Jimbo Sullivan, DO       No problems overnight  N/v improved  Denies any cp/sob  No diarrhea       Current Medications:    Current Facility-Administered Medications:     amLODIPine (Norvasc) tab 10 mg, 10 mg, Oral, Daily    aspirin DR tab 81 mg, 81 mg, Oral, Daily    cloNIDine (Catapres) tab 0.3 mg, 0.3 mg, Oral, TID    ezetimibe (Zetia) 10 mg, atorvastatin (Lipitor) 40 mg for Vytorin 10-80 (EEH only), , Oral, Nightly    insulin detemir (Levemir) 100 UNIT/ML FlexPen/FlexTouch 11 Units, 11 Units, Subcutaneous, QAM    insulin aspart (NovoLOG) 100 Units/mL FlexPen 1-68 Units, 1-68 Units, Subcutaneous, TID CC    glucose (Dex4) 15 GM/59ML oral liquid 15 g, 15 g, Oral, Q15 Min PRN **OR** glucose (Glutose) 40% oral gel 15 g, 15 g, Oral, Q15 Min PRN **OR** glucose-vitamin C (Dex-4) chewable tab 4 tablet, 4 tablet, Oral, Q15 Min PRN **OR** dextrose 50% injection 50 mL, 50 mL, Intravenous, Q15 Min PRN **OR** glucose (Dex4) 15 GM/59ML oral liquid 30 g, 30 g, Oral, Q15 Min PRN **OR** glucose (Glutose) 40% oral gel 30 g, 30 g, Oral, Q15 Min PRN **OR** glucose-vitamin C (Dex-4) chewable tab 8 tablet, 8 tablet, Oral, Q15 Min PRN    acetaminophen (Tylenol Extra Strength) tab 1,000 mg, 1,000 mg, Oral, Q4H PRN    melatonin tab 3 mg, 3 mg, Oral, Nightly PRN    polyethylene glycol (PEG 3350) (Miralax) 17 g oral packet 17 g, 17 g, Oral, Daily PRN    sennosides (Senokot) tab 17.2 mg, 17.2 mg, Oral, Nightly PRN    bisacodyl (Dulcolax) 10 MG rectal suppository 10 mg, 10 mg, Rectal, Daily PRN    benzonatate (Tessalon) cap 200 mg, 200 mg, Oral, TID PRN    heparin (Porcine) 5000 UNIT/ML injection 5,000 Units, 5,000 Units, Subcutaneous, Q8H BERTA    ondansetron (Zofran) 4 MG/2ML injection 4 mg, 4 mg, Intravenous, Q6H PRN    prochlorperazine (Compazine)  10 MG/2ML injection 5 mg, 5 mg, Intravenous, Q8H PRN    insulin aspart (NovoLOG) 100 Units/mL FlexPen 1-10 Units, 1-10 Units, Subcutaneous, TID AC and HS    sodium chloride (Saline Mist) 0.65 % nasal solution 1 spray, 1 spray, Each Nare, Q3H PRN    glycerin-hypromellose- (Artifical Tears) 0.2-0.2-1 % ophthalmic solution 1 drop, 1 drop, Both Eyes, QID PRN    diphenhydrAMINE (Benadryl) 50 mg/mL  injection 12.5 mg, 12.5 mg, Intravenous, Q4H PRN **OR** diphenhydrAMINE (Benadryl) cap/tab 25 mg, 25 mg, Oral, Q4H PRN    sodium bicarbonate 75 mEq in sodium chloride 0.45% 1,075 mL infusion, 75 mEq, Intravenous, Continuous    sodium bicarbonate tab 650 mg, 650 mg, Oral, BID  Home Medications:  Prior to Admission Medications   Medication Sig    sodium bicarbonate 650 MG Oral Tab Take 1 tablet (650 mg total) by mouth 2 (two) times daily.       Review of Systems:  See HPI; A total of 12 systems reviewed and otherwise unremarkable.    Physical Exam:  Vital signs: Blood pressure (!) 174/94, pulse 89, temperature 97.3 °F (36.3 °C), temperature source Oral, resp. rate 16, height 5' 8\" (1.727 m), weight 203 lb 8 oz (92.3 kg), SpO2 98%.  General: No acute distress. Alert and oriented x 3.  HEENT: Moist mucous membranes. EOM-I. PERRL  Neck: No lymphadenopathy.  No JVD. No carotid bruits.  Respiratory: Clear to auscultation bilaterally.  No wheezes. No rhonchi.  Cardiovascular: S1, S2.  Regular rate and rhythm.  No murmurs. Equal pulses   Abdomen: Soft, nontender, nondistended.  Positive bowel sounds. No rebound tenderness   Neurologic: No focal neurological deficits.   Musculoskeletal: Full range of motion of all extremities.  No swelling noted.   Integument: No lesions. No erythema.  Psychiatric: Appropriate mood and affect.  Recent Labs     01/03/24  2242 01/04/24  0859   WBC 7.7 8.9   HGB 11.5* 10.5*   MCV 88.0 86.8   .0* 144.0*       Recent Labs     01/03/24  2242 01/04/24  0859 01/04/24  1136 01/04/24  1956  01/05/24  0621 01/05/24  0823    145  --   --  143  --    K 5.8* 5.3*  5.3* 5.4* 5.1 4.8  4.8  4.7 4.7   * 117*  --   --  114*  --    CO2 19.0* 18.0*  --   --  24.0  --    BUN 84* 92*  --   --  90*  --    CREATSERUM 6.21* 6.35*  --   --  6.14*  --    CA 9.6 9.7  --   --  8.5  --    MG  --   --   --   --  2.1  --    PHOS  --   --   --   --  4.8  --        Recent Labs     01/03/24  2242 01/04/24  0859   ALT 48 38   AST 29 33   ALB 4.1 3.9             Imaging:  reviewed    Impression:  JESSICA/CKD- stg V - not on dialysis.  Cr near baseline. Probable component of uremia  He feels better w/ fluids  Stable  - OK w/ dc today w/ close o/p follow up    HTN- stable ; cpm   Hyperkalemia/acidosis- related to # 1; start on oral bicarb; dose w/ veltassa   Improved   DM  N/V/D- ? Gastroenteritis. Concern for uremia as well given his underlying CKD ; improved   Anemia- stble; monitor         Thank you for allowing me to participate in this patient's care.  Please feel free to call me with any questions or concerns.    Mark Hanks MD  1/5/2024

## 2024-01-05 NOTE — DISCHARGE SUMMARY
[place] HOSPITALIST  DISCHARGE SUMMARY     Adrien Tobin Patient Status:  Observation    1980 MRN [id number]   Location [facility] 1NE-A Attending Richard Ricci MD   Hosp Day # 0 PCP Jimbo Sullivan DO     Date of Admission: 1/3/2024  Date of Discharge: 2024  Discharge Disposition: Home or Self Care  Chief complaint:   Chief Complaint   Patient presents with    Nausea/Vomiting/Diarrhea     Discharge Diagnosis:   #intractable n/v, diarrhea; gastroenteritis; possible early uremia component?  His symptoms improved with hydration during hospital stay.  #JESSICA on CKD5-needs very close f/u with nephrology, as he is probably close to needing to start dialysis  #hyperK-resolved with medical trx; started on bicarb, continued at discharge  #Dm2 w/ hyperglycemia  #anemia of CKD-stable        Lab/Test results pending at Discharge:   none        Admission History of Present Illness (author of HPI not necessarily myself; see H&P author): Adrien Tobin is a 43 year old male with PMH DM2, CKD, HTN, HLD, who p/w intractable n/v. On Monday, developed n/v. Unable to take PO. Also w/ diarrhea which persists. Denies abd pain. Unable to tolerate water or meds today. Did not take insulin either since not eating.        Lace+ Score: 71  59-90 High Risk  29-58 Medium Risk  0-28   Low Risk  Patient was referred to the [facility].    TCM Follow-Up Recommendation:  LACE 29-58: Moderate Risk of readmission after discharge from the hospital.      Discharge Medication List:     Discharge Medications        START taking these medications        Instructions Prescription details   sodium bicarbonate 650 MG Tabs      Take 1 tablet (650 mg total) by mouth 2 (two) times daily.   Quantity: 60 tablet  Refills: 0            CONTINUE taking these medications        Instructions Prescription details   amLODIPine 10 MG Tabs  Commonly known as: Norvasc      Take 1 tablet (10 mg total) by mouth daily.    Quantity: 90 tablet  Refills: 0     aspirin 81 MG Tbec  Commonly known as: Aspirin EC Low Dose      Take 1 tablet (81 mg total) by mouth daily.   Quantity: 90 tablet  Refills: 0     BD Pen Needle Krysten 2nd Gen 32G X 4 MM Misc  Generic drug: Insulin Pen Needle      USE FOUR TIMES DAILY AS DIRECTED   Quantity: 400 each  Refills: 2     Blood Pressure Kit Thais      1 kit daily. Check blood pressure daily.   Stop taking on: January 18, 2024  Quantity: 1 each  Refills: 0     cloNIDine 0.3 MG Tabs  Commonly known as: Catapres      Take 1 tablet (0.3 mg total) by mouth 3 (three) times daily.   Quantity: 90 tablet  Refills: 5     ezetimibe-simvastatin 10-80 MG Tabs  Commonly known as: Vytorin      TAKE 1 TABLET BY MOUTH DAILY   Quantity: 90 tablet  Refills: 3     HumaLOG KwikPen 100 UNIT/ML Sopn  Generic drug: Insulin Lispro (1 Unit Dial)      SLIDING SCALE  TID with meals MDD 15u   Quantity: 15 mL  Refills: 0     insulin detemir 100 UNIT/ML Sopn  Commonly known as: Levemir      Inject 22 Units into the skin every morning.   Refills: 0     torsemide 20 MG Tabs  Commonly known as: Demadex      Take 1 tablet (20 mg total) by mouth BID (Diuretic).   Quantity: 60 tablet  Refills: 5     Trulicity 0.75 MG/0.5ML Sopn  Generic drug: Dulaglutide      Inject 0.75 mg into the skin once a week.   Quantity: 7 mL  Refills: 3            STOP taking these medications      PEG-3350/Electrolytes 236 g Solr                  Where to Get Your Medications        Please  your prescriptions at the location directed by your doctor or nurse    Bring a paper prescription for each of these medications  sodium bicarbonate 650 MG Tabs         [place]    Follow-up appointment:   Jimbo Sullivan DO  [address]  [address]  [facility] [address] [phone]    Call in 1 day(s)      Justin Meyers MD  [address]  [facility] [address] [phone]    Follow up in 3 day(s)  call for appointment    Appointments for Next  30 Days 1/6/2024 - 2/5/2024        Date Arrival Time Visit Type Length Department Provider     1/8/2024  3:00 PM  EXAM - ESTABLISHED [3874] 20 min [facility] Justin Meyers MD    Patient Instructions:         Location Instructions:     Masks are optional for all patients and visitors, unless otherwise indicated.               1/23/2024  1:00 PM  COLONOSCOPY [39516] 30 min [facility], PROCEDURE    Patient Instructions:     Please arrive 60 minutes prior to your scheduled procedure time.                    1/26/2024  9:30 AM  FOLLOW UP VISIT [[id number]] 15 min [facility], Royce Palomino DPM    Patient Instructions:         Location Instructions:     Masks are optional for all patients and visitors, unless otherwise indicated.                      Vital signs:  Temp:  [97.3 °F (36.3 °C)] 97.3 °F (36.3 °C)  Pulse:  [89] 89  Resp:  [16] 16  BP: (174)/(94) 174/94  SpO2:  [98 %] 98 %    Physical Exam:    General: No acute distress.   Respiratory: Clear to auscultation bilaterally. No wheezes. No rhonchi.  Cardiovascular: S1, S2. Regular rate and rhythm. No murmurs.  Abdomen: Soft, nontender, nondistended.    Extremities: No edema.  -----------------------------------------------------------------------------------------------  PATIENT DISCHARGE INSTRUCTIONS: See electronic chart    Richard Triplett MD    Time spent:  > 30 minutes

## 2024-01-08 ENCOUNTER — PATIENT OUTREACH (OUTPATIENT)
Dept: CASE MANAGEMENT | Age: 44
End: 2024-01-08

## 2024-01-08 ENCOUNTER — OFFICE VISIT (OUTPATIENT)
Dept: NEPHROLOGY | Facility: CLINIC | Age: 44
End: 2024-01-08
Payer: COMMERCIAL

## 2024-01-08 VITALS — SYSTOLIC BLOOD PRESSURE: 146 MMHG | WEIGHT: 216.56 LBS | BODY MASS INDEX: 33 KG/M2 | DIASTOLIC BLOOD PRESSURE: 80 MMHG

## 2024-01-08 DIAGNOSIS — Z02.9 ENCOUNTERS FOR UNSPECIFIED ADMINISTRATIVE PURPOSE: Primary | ICD-10-CM

## 2024-01-08 DIAGNOSIS — N18.4 CKD (CHRONIC KIDNEY DISEASE) STAGE 4, GFR 15-29 ML/MIN (HCC): Primary | ICD-10-CM

## 2024-01-08 DIAGNOSIS — D63.1 ANEMIA IN STAGE 4 CHRONIC KIDNEY DISEASE  (HCC): ICD-10-CM

## 2024-01-08 DIAGNOSIS — N18.4 ANEMIA IN STAGE 4 CHRONIC KIDNEY DISEASE  (HCC): ICD-10-CM

## 2024-01-08 DIAGNOSIS — R11.2 INTRACTABLE NAUSEA AND VOMITING: ICD-10-CM

## 2024-01-08 DIAGNOSIS — I10 ESSENTIAL HYPERTENSION: ICD-10-CM

## 2024-01-08 NOTE — PROGRESS NOTES
LM for pt to call [facility] for TCM since discharge. NCM phone number was provided for pt to call back.    [facility] contact information was provided for pt to call back as well.

## 2024-01-08 NOTE — PROGRESS NOTES
Nephrology Progress Note      Adrien Tobin is a 43 year old male.    HPI:     Chief Complaint   Patient presents with    Chronic Kidney Disease    Hypertension    Anemia       Gilbert Tobin was seen in the nephrology clinic today in follow-up for management of progressive chronic kidney disease stage V secondary to diabetes and hypertension in the setting of severe JESSICA due to biopsy-proven ATN.  I most recently saw him in the hospital on December 7 and since that time he was hospitalized last week due to severe nausea, vomiting and inability to take food by mouth.  He also had developed severe diarrhea as well.  He did improve symptomatically and was discharged home on January 5.  Serum creatinine at that time was 6.14 mg/dL after being 6.35 mg/dL on presentation.  Since discharge from the hospital he has been feeling well and has not had any further nausea, vomiting or diarrhea.  It was thought that his gastrointestinal issue was related to a viral or bacterial illness.      HISTORY:  Past Medical History:   Diagnosis Date    CKD (chronic kidney disease)     Diabetes (HCC)     Disorder of liver     Elevated liver enzymes 08/30/2016    Elevated serum GGT level 08/30/2016    High blood pressure     High cholesterol     Hyperlipidemia     HYPERTRIGLYCERIDEMIA     Intractable nausea and vomiting 1/4/2024    OBESITY     Obesity     Pneumonia due to organism     Renal disorder     Visual impairment     GLASSES      Past Surgical History:   Procedure Laterality Date    ARTHROTOMY/EXPLORE/TREAT KNEE JOINT Left     DONE TWICE    CHOLECYSTECTOMY      DEBRIDE WOUND Left     HEEL    FOOT SURGERY      OTHER SURGICAL HISTORY  Knee & Foot      Family History   Problem Relation Age of Onset    Diabetes Father     Heart Disorder Father     Diabetes Mother     Hypertension Sister       Social History:   Social History     Socioeconomic History    Marital status:    Tobacco Use    Smoking status: Never    Smokeless  tobacco: Never   Vaping Use    Vaping Use: Never used   Substance and Sexual Activity    Alcohol use: Yes     Alcohol/week: 1.0 standard drink of alcohol     Types: 1 Standard drinks or equivalent per week     Comment: socially    Drug use: No   Other Topics Concern    Caffeine Concern No    Exercise No    Seat Belt Yes    Special Diet Yes    Stress Concern No    Weight Concern No     Social Determinants of Health     Financial Resource Strain: Low Risk  (11/17/2023)    Financial Resource Strain     Difficulty of Paying Living Expenses: Not very hard     Med Affordability: No   Food Insecurity: No Food Insecurity (1/4/2024)    Food Insecurity     Food Insecurity: Never true   Transportation Needs: No Transportation Needs (1/4/2024)    Transportation Needs     Lack of Transportation: No   Housing Stability: Low Risk  (1/4/2024)    Housing Stability     Housing Instability: No        Medications (Active prior to today's visit):  Current Outpatient Medications   Medication Sig Dispense Refill    sodium bicarbonate 650 MG Oral Tab Take 1 tablet (650 mg total) by mouth 2 (two) times daily. 60 tablet 0    Insulin Pen Needle (BD PEN NEEDLE ODALIS 2ND GEN) 32G X 4 MM Does not apply Misc USE FOUR TIMES DAILY AS DIRECTED 400 each 2    cloNIDine 0.3 MG Oral Tab Take 1 tablet (0.3 mg total) by mouth 3 (three) times daily. 90 tablet 5    torsemide 20 MG Oral Tab Take 1 tablet (20 mg total) by mouth BID (Diuretic). 60 tablet 5    insulin detemir 100 UNIT/ML Subcutaneous Solution Pen-injector Inject 22 Units into the skin every morning.      Blood Pressure Monitoring (BLOOD PRESSURE KIT) Does not apply Device 1 kit daily. Check blood pressure daily. 1 each 0    aspirin (ASPIRIN EC LOW DOSE) 81 MG Oral Tab EC Take 1 tablet (81 mg total) by mouth daily. 90 tablet 0    amLODIPine 10 MG Oral Tab Take 1 tablet (10 mg total) by mouth daily. 90 tablet 0    EZETIMIBE-SIMVASTATIN 10-80 MG Oral Tab TAKE 1 TABLET BY MOUTH DAILY 90 tablet 3     HUMALOG KWIKPEN 100 UNIT/ML Subcutaneous Solution Pen-injector SLIDING SCALE  TID with meals MDD 15u (Patient taking differently: SLIDING SCALE  TID with meals MDD 15u) 15 mL 0    Dulaglutide (TRULICITY) 0.75 MG/0.5ML Subcutaneous Solution Pen-injector Inject 0.75 mg into the skin once a week. 7 mL 3       Allergies:  No Known Allergies      ROS:     Denies fever/chills  Denies wt loss/gain  Denies HA or visual changes  Denies CP or palpitations  Denies SOB/cough/hemoptysis  Denies abd or flank pain  Denies N/V/D  Denies change in urinary habits or gross hematuria  Denies LE edema  Denies skin rashes/myalgias/arthralgias      PHYSICAL EXAM:   /80   Wt 216 lb 9 oz (98.2 kg)   BMI 32.93 kg/m²   Wt Readings from Last 6 Encounters:   01/08/24 216 lb 9 oz (98.2 kg)   01/04/24 203 lb 8 oz (92.3 kg)   12/27/23 228 lb (103.4 kg)   12/07/23 228 lb 6 oz (103.6 kg)   11/27/23 223 lb (101.2 kg)   11/16/23 227 lb 4.8 oz (103.1 kg)       General: Alert and oriented in no apparent distress.  HEENT: No scleral icterus, MMM  Neck: Supple, no ITZ or thyromegaly  Cardiac: Regular rate and rhythm, S1, S2 normal, no murmur or rub  Lungs: Clear without wheezes, rales, rhonchi.    Extremities: Trace ankle edema.  Neurologic: Alert and oriented, normal affect, cranial nerves grossly intact, moving all extremities  Skin: Warm and dry, no rashes      LABS:     Lab Results   Component Value Date    BUN 90 (H) 01/05/2024    BUNCREA 12.9 05/08/2021    CREATSERUM 6.14 (H) 01/05/2024    ANIONGAP 5 01/05/2024    GFR 90 11/22/2017    GFRNAA 26 (L) 06/17/2022    GFRAA 30 (L) 06/17/2022    CA 8.5 01/05/2024    OSMOCALC 326 (H) 01/05/2024    ALKPHO 172 (H) 01/04/2024    AST 33 01/04/2024    ALT 38 01/04/2024    BILT 0.3 01/04/2024    TP 8.1 01/04/2024    ALB 3.9 01/04/2024    GLOBULIN 4.2 01/04/2024    AGRATIO 1.5 04/21/2014     01/05/2024    K 4.7 01/05/2024     (H) 01/05/2024    CO2 24.0 01/05/2024     Lab Results   Component  Value Date    RBC 3.63 (L) 01/04/2024    HGB 10.5 (L) 01/04/2024    HCT 31.5 (L) 01/04/2024    .0 (L) 01/04/2024    MCV 86.8 01/04/2024    MCH 28.9 01/04/2024    MCHC 33.3 01/04/2024    RDW 13.7 01/04/2024    NEPRELIM 7.40 01/04/2024    NEPERCENT 83.7 01/04/2024    LYPERCENT 9.9 01/04/2024    MOPERCENT 6.0 01/04/2024    EOPERCENT 0.0 01/04/2024    BAPERCENT 0.1 01/04/2024    NE 7.40 01/04/2024    LYMABS 0.88 (L) 01/04/2024    MOABSO 0.53 01/04/2024    EOABSO 0.00 01/04/2024    BAABSO 0.01 01/04/2024     Lab Results   Component Value Date    CREUR 78.30 11/13/2023     Lab Results   Component Value Date    CLARITY Clear 11/13/2023    SPECGRAVITY 1.010 11/13/2023    GLUUR 50 (A) 11/13/2023    BILUR Negative 11/13/2023    KETUR Negative 11/13/2023    BLOODURINE 1+ (A) 11/13/2023    PHURINE 6.0 11/13/2023    PROUR 200 (A) 11/13/2023    UROBILINOGEN Normal 11/13/2023    NITRITE Negative 11/13/2023    LEUUR Negative 11/13/2023    NMIC Microscopic not indicated 07/12/2018    WBCUR 1-5 11/13/2023    RBCUR 6-10 (A) 11/13/2023    EPIUR None Seen 11/13/2023    BACUR None Seen 11/13/2023    HYLUR Present (A) 11/13/2023     ASSESSMENT/PLAN:     #1.  Chronic kidney disease stage V-this is due to multiple factors.  In the past he had an episode of severe acute kidney injury which was related to biopsy-proven acute tubular necrosis for which he never fully recovered.  This is in the setting of ongoing diabetes and hypertension and while his creatinine roughly 1 year ago or so was 2.5 mg/dL he has progressively worsened in a rather significant fashion and now the creatinine is closer to 6 mg/dL.  Occasionally this is seen particularly diabetic patients.  He has an initial evaluation at [facility] for transplant on February 5 but I suspect he will require dialysis prior to this.  We have had lengthy discussions in the past and he has chosen hemodialysis as his modality of choice.  To that effect I have  referred him to Dr. Najjar from placement of an AV fistula and I would anticipate that once the fistula is mature we would initiate hemodialysis.  We have discussed the hopeful avoidance of central venous catheter although certainly this may be possible depending on the timeframe.    #2.  Hypertension-his blood pressure is relatively stable overall and we will continue his current amlodipine, clonidine and torsemide    #3.  Anemia-this is related to chronic kidney disease although his hemoglobin is between 10 and 11 g and he is currently not in need of LATONIA therapy        Thank you again for allowing me to participate in care of your patient.  Please do not hesitate to call with any questions or concerns.      Justin Meyers MD  1/8/2024  3:11 PM

## 2024-01-09 ENCOUNTER — TELEPHONE (OUTPATIENT)
Dept: FAMILY MEDICINE CLINIC | Facility: CLINIC | Age: 44
End: 2024-01-09

## 2024-01-09 NOTE — TELEPHONE ENCOUNTER
Spoke to pt for TCM today.  Pt does not have an appointment scheduled at this time.  TCM appt recommended by 01/19/24 as pt is a moderate risk for readmission.  Please advise.    BOOK BY DATE (last date for TCM): 01/19/24    Clinical staff:  Please f/u with pt and try to get them to schedule as pt would greatly benefit from TCM appt.  Thank you!

## 2024-01-09 NOTE — PROGRESS NOTES
Initial Post Discharge Follow Up   Discharge Date: 1/5/24  Contact Date: 1/8/2024    Consent Verification:  Assessment Completed With: Patient  HIPAA Verified?  Yes    Discharge Dx:   Intractable nausea and vomiting     General:   How have you been since your discharge from the hospital? NCM spoke with patient states he is feeling better. Pt denies any fevers, chills, nausea, vomiting, shortness of breath, chest pain or any other symptoms. Pt states his BS at 105 this morning. Pt states overall he is feeling so much better.    Do you have any pain since discharge?  No    How well was your pain managed while in the hospital?   On a scale of 1-5   1- Very Poor and 5- Very well   5  When you were leaving the hospital were your discharge instructions reviewed with you? Yes  How well were your discharge instructions explained to you?   On a scale of 1-5   1- Very Poor and 5- Very well   5  Do you have any questions about your discharge instructions?  No  Before leaving the hospital was your diagnoses explained to you? Yes  Do you have any questions about your diagnoses? No  Are you able to perform normal daily activities of living as you have prior to your hospital stay (dressing, bathing, ambulating to the bathroom, etc)? yes  (ERIN) Was patient given a different diet per AVS? no      Medications: Reviewed medication list.  Medications are up to date.  Current Outpatient Medications   Medication Sig Dispense Refill    sodium bicarbonate 650 MG Oral Tab Take 1 tablet (650 mg total) by mouth 2 (two) times daily. 60 tablet 0    Insulin Pen Needle (BD PEN NEEDLE ODALIS 2ND GEN) 32G X 4 MM Does not apply Misc USE FOUR TIMES DAILY AS DIRECTED 400 each 2    cloNIDine 0.3 MG Oral Tab Take 1 tablet (0.3 mg total) by mouth 3 (three) times daily. 90 tablet 5    torsemide 20 MG Oral Tab Take 1 tablet (20 mg total) by mouth BID (Diuretic). 60 tablet 5    insulin detemir 100 UNIT/ML Subcutaneous Solution Pen-injector Inject 22 Units  into the skin every morning.      Blood Pressure Monitoring (BLOOD PRESSURE KIT) Does not apply Device 1 kit daily. Check blood pressure daily. 1 each 0    aspirin (ASPIRIN EC LOW DOSE) 81 MG Oral Tab EC Take 1 tablet (81 mg total) by mouth daily. 90 tablet 0    amLODIPine 10 MG Oral Tab Take 1 tablet (10 mg total) by mouth daily. 90 tablet 0    EZETIMIBE-SIMVASTATIN 10-80 MG Oral Tab TAKE 1 TABLET BY MOUTH DAILY 90 tablet 3    HUMALOG KWIKPEN 100 UNIT/ML Subcutaneous Solution Pen-injector SLIDING SCALE  TID with meals MDD 15u (Patient taking differently: SLIDING SCALE  TID with meals MDD 15u) 15 mL 0    Dulaglutide (TRULICITY) 0.75 MG/0.5ML Subcutaneous Solution Pen-injector Inject 0.75 mg into the skin once a week. 7 mL 3     Were there any changes to your current medication(s) noted on the AVS? Yes  If so, were these medication changes discussed with you prior to leaving the hospital? Yes  If a new medication was prescribed:    Was the new medication's purpose & side effects reviewed? Yes  Do you have any questions about your new medication? No  Did you  your discharge medications when you left the hospital? Yes  Let's go over your medications together to make sure we are not missing anything. Medications Reviewed  Are there any reasons that keep you from taking your medication as prescribed? No  Are you having any concerns with constipation? No  Did patient receive their flu shot (Sept-March)? Yes    Discharge medications reviewed/discussed/and reconciled against outpatient medications with patient.  Any changes or updates to medications sent to PCP.  Patient Acknowledged     Referrals/orders at D/C:  Referrals/orders placed at D/C? no    DME ordered at D/C? No      Discharge orders, AVS reviewed and discussed with patient. Any changes or updates to orders sent to PCP.  Patient Acknowledged      SDOH:   Transportation Needs: No Transportation Needs (1/4/2024)    Transportation Needs     Lack of  Transportation: No     Financial Resource Strain: Low Risk  (11/17/2023)    Financial Resource Strain     Difficulty of Paying Living Expenses: Not very hard     Med Affordability: No           Follow up appointments:      Your appointments       Date & Time Appointment Department ([place])    Jan 11, 2024 10:30 AM CST Consult with Najjar, Samer F, MD [facility] ([facility])        Jan 23, 2024  1:00 PM CST Colonoscopy with PANDOLFI, PROCEDURE EDILBERTO Stuart (--)    Please arrive 60 minutes prior to your scheduled procedure time.         Jan 26, 2024  9:30 AM CST Follow Up Visit with Royce Lewis DPM [facility] ([facility])        Mar 11, 2024  9:20 AM CDT Exam - Established with Justin Meyers MD [facility] ([facility])              [facility]  [facility]  [address]  [place] [id number] [phone] [facility]  [address]  [place] [id number] [phone] [facility]  [address]  [place] [id number] [phone]    KRISTYN Stuart  No address on file  [phone]            TCC  Was TCC ordered: Yes  Was TCC scheduled: No, Explain: pt prefers to follow up with PCP.       PCP (If no TCC appointment)  Does patient already have a PCP appointment scheduled? No  NCM Attempted to schedule PCP office TCM appointment with patient   If no appointment scheduled: Explain: no availability with PCP within TCM timeframe.     Specialist    Does the patient have any other follow up appointment(s) needing to be scheduled? No  If yes: NCM reviewed upcoming specialist appointment with patient: Yes  Does the patient need assistance  scheduling appointment(s): No    Is there any reason as to why you cannot make your appointment(s)?  No     Needs post D/C:   Now that you are home, are there any needs or concerns you need addressed before your next visit with your PCP?  (DME, meds, questions, etc.): No    Interventions by NCM:   All discharge instructions reviewed with the patient. Reviewed when to call MD vs when to call 911 or go the ED. Educated patient on the importance of taking all meds as prescribed as well as close f/u with PCP/specialists. Pt verbalized understanding and will contact the office with any further questions or concerns. Patient denies fevers, chills, nausea, vomiting, shortness of breath, chest pain, or any other symptoms at this time.  NCM attempted to schedule HFU, however no availability with PCP within TCM timeframe. Pt prefers to follow up with PCP. NCM sent TE to PCP office re: assistance in scheduling HFU appt. NCM provided contact information for any further questions/concerns. Patient verbalized understanding and agreeable.       Overall Rating:   How would you rate the care you received while in the hospital? good    CCM referral placed:    No    BOOK BY DATE: 01/19/24

## 2024-01-10 NOTE — TELEPHONE ENCOUNTER
Scheduled appt with Dr. Sullivan for the soonest available HFU/  Future Appointments   Date Time Provider Department Center   1/11/2024 10:30 AM Najjar, Samer F, MD [id number]   1/23/2024  1:00 PM FALLON, PROCEDURE [id number] None   1/26/2024  9:30 AM Royce Lewis DPM [id number]   1/29/2024 11:00 AM Jmibo Sullivan,  EM[facility] [address]   3/11/2024  9:20 AM Justin Meyers MD [id number] [facility] [place]

## 2024-01-11 ENCOUNTER — OFFICE VISIT (OUTPATIENT)
Facility: CLINIC | Age: 44
End: 2024-01-11
Payer: COMMERCIAL

## 2024-01-11 VITALS — HEIGHT: 68 IN | RESPIRATION RATE: 20 BRPM | WEIGHT: 216 LBS | BODY MASS INDEX: 32.74 KG/M2

## 2024-01-11 DIAGNOSIS — E11.22 CKD STAGE 4 DUE TO TYPE 2 DIABETES MELLITUS (HCC): Primary | ICD-10-CM

## 2024-01-11 DIAGNOSIS — N18.4 CKD STAGE 4 DUE TO TYPE 2 DIABETES MELLITUS (HCC): Primary | ICD-10-CM

## 2024-01-11 NOTE — PROGRESS NOTES
Samer F. Najjar, MD  Vascular Surgery  [facility]        VASCULAR SURGERY CONSULT NOTE      Name: Adrien Tobin   :   1980  [id number]     REFERRING PHYSICIAN:  No ref. provider found  PRIMARY CARE PHYSICIAN:  Jimbo Sullivan DO    HISTORY OF PRESENT ILLNESS:   The patient is a 43 year old male with ESRD in need of a more permanent access. He developed chronic kidney disease 5 years ago after undergoing treatment for a left leg infection using vancomycin.  He is not on dialysis yet.  The patient has not undergone a vein mapping study yet.  He is right-handed.      PAST MEDICAL HISTORY:    Past Medical History:   Diagnosis Date    CKD (chronic kidney disease)     Diabetes (HCC)     Disorder of liver     Elevated liver enzymes 2016    Elevated serum GGT level 2016    High blood pressure     High cholesterol     Hyperlipidemia     HYPERTRIGLYCERIDEMIA     Intractable nausea and vomiting 2024    OBESITY     Obesity     Pneumonia due to organism     Renal disorder     Visual impairment     GLASSES        PAST SURGICAL HISTORY:   Past Surgical History:   Procedure Laterality Date    ARTHROTOMY/EXPLORE/TREAT KNEE JOINT Left     DONE TWICE    CHOLECYSTECTOMY      DEBRIDE WOUND Left     HEEL    FOOT SURGERY      OTHER SURGICAL HISTORY  Knee & Foot        MEDICATIONS:     Current Outpatient Medications:     sodium bicarbonate 650 MG Oral Tab, Take 1 tablet (650 mg total) by mouth 2 (two) times daily., Disp: 60 tablet, Rfl: 0    Insulin Pen Needle (BD PEN NEEDLE ODALIS 2ND GEN) 32G X 4 MM Does not apply Misc, USE FOUR TIMES DAILY AS DIRECTED, Disp: 400 each, Rfl: 2    cloNIDine 0.3 MG Oral Tab, Take 1 tablet (0.3 mg total) by mouth 3 (three) times daily., Disp: 90 tablet, Rfl: 5    torsemide 20 MG Oral Tab, Take 1 tablet (20 mg total) by mouth BID (Diuretic)., Disp: 60 tablet, Rfl: 5    insulin detemir 100 UNIT/ML Subcutaneous Solution Pen-injector, Inject 22 Units into the skin  every morning., Disp: , Rfl:     Blood Pressure Monitoring (BLOOD PRESSURE KIT) Does not apply Device, 1 kit daily. Check blood pressure daily., Disp: 1 each, Rfl: 0    aspirin (ASPIRIN EC LOW DOSE) 81 MG Oral Tab EC, Take 1 tablet (81 mg total) by mouth daily., Disp: 90 tablet, Rfl: 0    amLODIPine 10 MG Oral Tab, Take 1 tablet (10 mg total) by mouth daily., Disp: 90 tablet, Rfl: 0    EZETIMIBE-SIMVASTATIN 10-80 MG Oral Tab, TAKE 1 TABLET BY MOUTH DAILY, Disp: 90 tablet, Rfl: 3    HUMALOG KWIKPEN 100 UNIT/ML Subcutaneous Solution Pen-injector, SLIDING SCALE  TID with meals MDD 15u (Patient taking differently: SLIDING SCALE  TID with meals MDD 15u), Disp: 15 mL, Rfl: 0    Dulaglutide (TRULICITY) 0.75 MG/0.5ML Subcutaneous Solution Pen-injector, Inject 0.75 mg into the skin once a week., Disp: 7 mL, Rfl: 3    ALLERGIES:    He has No Known Allergies.    SOCIAL HISTORY:    Patient  reports that he has never smoked. He has never used smokeless tobacco. He reports current alcohol use of about 1.0 standard drink of alcohol per week. He reports that he does not use drugs.    FAMILY HISTORY:    Patient's family history includes Diabetes in his father and mother; Heart Disorder in his father; Hypertension in his sister.    ROS:     A 12 point review of systems with pertinent positives and negatives listed in the HPI.    EXAM:    Resp 20   Ht 5' 8\" (1.727 m)   Wt 216 lb (98 kg)   BMI 32.84 kg/m²   GENERAL: alert and orientated X 3, well developed, well nourished, in no apparent distress  PSYCH: normal mood and affect  HEENT: ears and throat are clear  NECK: supple, no lymphadenopathy, thyroid wnl  CAROTID: No bruits  RESPIRATORY: no rales, rhonchi, or wheezes B  CARDIO: RRR without murmur, no murmur, no gallop   ABDOMEN: soft, non-tender with no palpable aneurysm or masses  BACK: normal, no tenderness  SKIN: no rashes, warm and dry  EXTREMITIES: no tenderness  NEURO: no sensory or motor deficits  VASCULAR:   Both brachial  and radial pulses are palpable  Arm veins are not readily visible      IMAGING:       LABS:   Lab Results   Component Value Date     10/18/2023    A1C 5.5 10/18/2023      Lab Results   Component Value Date     (H) 01/05/2024    BUN 90 (H) 01/05/2024    CREATSERUM 6.14 (H) 01/05/2024    BUNCREA 12.9 05/08/2021    ANIONGAP 5 01/05/2024    GFRAA 30 (L) 06/17/2022    GFRNAA 26 (L) 06/17/2022    CA 8.5 01/05/2024     01/05/2024    K 4.7 01/05/2024     (H) 01/05/2024    CO2 24.0 01/05/2024    OSMOCALC 326 (H) 01/05/2024      Lab Results   Component Value Date    WBC 8.9 01/04/2024    RBC 3.63 (L) 01/04/2024    HGB 10.5 (L) 01/04/2024    HCT 31.5 (L) 01/04/2024    MCV 86.8 01/04/2024    MCH 28.9 01/04/2024    MCHC 33.3 01/04/2024    RDW 13.7 01/04/2024    .0 (L) 01/04/2024        Lab Results   Component Value Date    HGB 10.5 (L) 01/04/2024    HGB 11.5 (L) 01/03/2024    HGB 8.1 (L) 11/14/2023    HGB 7.8 (L) 11/13/2023    HGB 6.9 (LL) 11/13/2023    HGB 7.3 (L) 11/12/2023    CREATSERUM 6.14 (H) 01/05/2024    CREATSERUM 6.35 (H) 01/04/2024    CREATSERUM 6.21 (H) 01/03/2024    CREATSERUM 6.02 (H) 11/29/2023    CREATSERUM 5.69 (H) 11/16/2023    CREATSERUM 5.39 (H) 11/15/2023       ASSESSMENT  There are no diagnoses linked to this encounter.    I have recommended obtaining a vein mapping study after which I will call the patient to let him know which type of fistula would be appropriate depending on the size of his veins.  I explained to him that we always try to aim for using the nondominant hand which is the left side in his case.  We discussed the 3 types of fistulas that can be placed.  We also discussed the risks and benefits of the fistula in detail including the risk of nerve injury, ischemic nerve injury, thrombosis, with need for angioplasty or revision, infection, and steal syndrome among other complications.    PLAN:  Vein mapping study of both upper extremities    The patient  indicated an understanding of these issues and agreed to the plan and all questions were answered during the clinic visit.     Thank you for allowing to participate in the patient's care.   Please do not hesitate to contact me with any questions.    Sincerely,  Samer F. Najjar MD

## 2024-01-23 ENCOUNTER — HOSPITAL ENCOUNTER (OUTPATIENT)
Facility: HOSPITAL | Age: 44
Setting detail: HOSPITAL OUTPATIENT SURGERY
Discharge: HOME | End: 2024-01-23
Attending: INTERNAL MEDICINE | Admitting: INTERNAL MEDICINE
Payer: COMMERCIAL

## 2024-01-23 ENCOUNTER — ANESTHESIA (OUTPATIENT)
Dept: ENDOSCOPY | Facility: HOSPITAL | Age: 44
End: 2024-01-23
Payer: COMMERCIAL

## 2024-01-23 ENCOUNTER — ANESTHESIA EVENT (OUTPATIENT)
Dept: ENDOSCOPY | Facility: HOSPITAL | Age: 44
End: 2024-01-23
Payer: COMMERCIAL

## 2024-01-23 VITALS
BODY MASS INDEX: 31.83 KG/M2 | RESPIRATION RATE: 18 BRPM | DIASTOLIC BLOOD PRESSURE: 88 MMHG | SYSTOLIC BLOOD PRESSURE: 159 MMHG | OXYGEN SATURATION: 100 % | TEMPERATURE: 98 F | HEART RATE: 84 BPM | WEIGHT: 210 LBS | HEIGHT: 68 IN

## 2024-01-23 DIAGNOSIS — R19.5 POSITIVE COLORECTAL CANCER SCREENING USING COLOGUARD TEST: ICD-10-CM

## 2024-01-23 LAB — GLUCOSE BLD-MCNC: 144 MG/DL (ref 70–99)

## 2024-01-23 PROCEDURE — 0DBP8ZX EXCISION OF RECTUM, VIA NATURAL OR ARTIFICIAL OPENING ENDOSCOPIC, DIAGNOSTIC: ICD-10-PCS | Performed by: INTERNAL MEDICINE

## 2024-01-23 PROCEDURE — 88305 TISSUE EXAM BY PATHOLOGIST: CPT | Performed by: INTERNAL MEDICINE

## 2024-01-23 PROCEDURE — 82962 GLUCOSE BLOOD TEST: CPT

## 2024-01-23 RX ORDER — SODIUM CHLORIDE, SODIUM LACTATE, POTASSIUM CHLORIDE, CALCIUM CHLORIDE 600; 310; 30; 20 MG/100ML; MG/100ML; MG/100ML; MG/100ML
INJECTION, SOLUTION INTRAVENOUS CONTINUOUS
Status: DISCONTINUED | OUTPATIENT
Start: 2024-01-23 | End: 2024-01-23

## 2024-01-23 RX ORDER — LIDOCAINE HYDROCHLORIDE 10 MG/ML
INJECTION, SOLUTION EPIDURAL; INFILTRATION; INTRACAUDAL; PERINEURAL AS NEEDED
Status: DISCONTINUED | OUTPATIENT
Start: 2024-01-23 | End: 2024-01-23 | Stop reason: SURG

## 2024-01-23 RX ORDER — SODIUM CHLORIDE, SODIUM LACTATE, POTASSIUM CHLORIDE, CALCIUM CHLORIDE 600; 310; 30; 20 MG/100ML; MG/100ML; MG/100ML; MG/100ML
INJECTION, SOLUTION INTRAVENOUS CONTINUOUS PRN
Status: DISCONTINUED | OUTPATIENT
Start: 2024-01-23 | End: 2024-01-23 | Stop reason: SURG

## 2024-01-23 RX ORDER — NICOTINE POLACRILEX 4 MG
15 LOZENGE BUCCAL
Status: DISCONTINUED | OUTPATIENT
Start: 2024-01-23 | End: 2024-01-23

## 2024-01-23 RX ORDER — NICOTINE POLACRILEX 4 MG
30 LOZENGE BUCCAL
Status: DISCONTINUED | OUTPATIENT
Start: 2024-01-23 | End: 2024-01-23

## 2024-01-23 RX ORDER — NALOXONE HYDROCHLORIDE 0.4 MG/ML
0.08 INJECTION, SOLUTION INTRAMUSCULAR; INTRAVENOUS; SUBCUTANEOUS ONCE AS NEEDED
Status: DISCONTINUED | OUTPATIENT
Start: 2024-01-23 | End: 2024-01-23

## 2024-01-23 RX ORDER — DEXTROSE MONOHYDRATE 25 G/50ML
50 INJECTION, SOLUTION INTRAVENOUS
Status: DISCONTINUED | OUTPATIENT
Start: 2024-01-23 | End: 2024-01-23

## 2024-01-23 RX ADMIN — LIDOCAINE HYDROCHLORIDE 50 MG: 10 INJECTION, SOLUTION EPIDURAL; INFILTRATION; INTRACAUDAL; PERINEURAL at 10:06:00

## 2024-01-23 RX ADMIN — SODIUM CHLORIDE, SODIUM LACTATE, POTASSIUM CHLORIDE, CALCIUM CHLORIDE: 600; 310; 30; 20 INJECTION, SOLUTION INTRAVENOUS at 10:00:00

## 2024-01-23 NOTE — ANESTHESIA PREPROCEDURE EVALUATION
PRE-OP EVALUATION    Patient Name: Adrien Tobin    Admit Diagnosis: Positive colorectal cancer screening using Cologuard test [R19.5]    Pre-op Diagnosis: Positive colorectal cancer screening using Cologuard test [R19.5]    COLONOSCOPY    Anesthesia Procedure: COLONOSCOPY    Surgeon(s) and Role:     * Albino Hernandez MD - Primary    Pre-op vitals reviewed.  Temp: 97.5 °F (36.4 °C)  Pulse: 85  Resp: 21  BP: 151/80  SpO2: 100 %  Body mass index is 31.93 kg/m².    Current medications reviewed.  Hospital Medications:  • glucose (Dex4) 15 GM/59ML oral liquid 15 g  15 g Oral Q15 Min PRN    Or   • glucose (Glutose) 40% oral gel 15 g  15 g Oral Q15 Min PRN    Or   • glucose-vitamin C (Dex-4) chewable tab 4 tablet  4 tablet Oral Q15 Min PRN    Or   • dextrose 50% injection 50 mL  50 mL Intravenous Q15 Min PRN    Or   • glucose (Dex4) 15 GM/59ML oral liquid 30 g  30 g Oral Q15 Min PRN    Or   • glucose (Glutose) 40% oral gel 30 g  30 g Oral Q15 Min PRN    Or   • glucose-vitamin C (Dex-4) chewable tab 8 tablet  8 tablet Oral Q15 Min PRN   • lactated ringers infusion   Intravenous Continuous       Outpatient Medications:     Medications Prior to Admission   Medication Sig Dispense Refill Last Dose   • PEG 3350-KCl-Na Bicarb-NaCl 420 g Oral Recon Soln Take as directed by physician. 4000 mL 0 1/22/2024   • sodium bicarbonate 650 MG Oral Tab Take 1 tablet (650 mg total) by mouth 2 (two) times daily. 60 tablet 0 1/23/2024   • cloNIDine 0.3 MG Oral Tab Take 1 tablet (0.3 mg total) by mouth 3 (three) times daily. 90 tablet 5 1/23/2024   • torsemide 20 MG Oral Tab Take 1 tablet (20 mg total) by mouth BID (Diuretic). 60 tablet 5 1/23/2024   • insulin detemir 100 UNIT/ML Subcutaneous Solution Pen-injector Inject 22 Units into the skin every morning.   1/22/2024   • aspirin (ASPIRIN EC LOW DOSE) 81 MG Oral Tab EC Take 1 tablet (81 mg total) by mouth daily. 90 tablet 0 Past Week   • amLODIPine 10 MG Oral Tab Take 1 tablet (10  mg total) by mouth daily. 90 tablet 0 2024   • EZETIMIBE-SIMVASTATIN 10-80 MG Oral Tab TAKE 1 TABLET BY MOUTH DAILY 90 tablet 3 2024   • HUMALOG KWIKPEN 100 UNIT/ML Subcutaneous Solution Pen-injector SLIDING SCALE  TID with meals MDD 15u (Patient taking differently: SLIDING SCALE  TID with meals MDD 15u) 15 mL 0 Past Week   • Dulaglutide (TRULICITY) 0.75 MG/0.5ML Subcutaneous Solution Pen-injector Inject 0.75 mg into the skin once a week. 7 mL 3 Past Week   • Insulin Pen Needle (BD PEN NEEDLE ODALIS 2ND GEN) 32G X 4 MM Does not apply Misc USE FOUR TIMES DAILY AS DIRECTED 400 each 2    • [] Blood Pressure Monitoring (BLOOD PRESSURE KIT) Does not apply Device 1 kit daily. Check blood pressure daily. 1 each 0        Allergies: Patient has no known allergies.      Anesthesia Evaluation    Patient summary reviewed.    Anesthetic Complications  (-) history of anesthetic complications         GI/Hepatic/Renal             (+) chronic renal disease and CRI  (+) liver disease                 Cardiovascular      ECG reviewed.  Exercise tolerance: good     MET: >4      (+) hypertension   (+) hyperlipidemia                                  Endo/Other      (+) diabetes         (+) anemia                   Pulmonary    Negative pulmonary ROS.                       Neuro/Psych                                  Past Surgical History:   Procedure Laterality Date   • ARTHROTOMY/EXPLORE/TREAT KNEE JOINT Left     DONE TWICE   • CHOLECYSTECTOMY     • DEBRIDE WOUND Left     HEEL   • FOOT SURGERY     • OTHER SURGICAL HISTORY  Knee & Foot     Social History     Socioeconomic History   • Marital status:    Tobacco Use   • Smoking status: Never   • Smokeless tobacco: Never   Vaping Use   • Vaping Use: Never used   Substance and Sexual Activity   • Alcohol use: Yes     Alcohol/week: 1.0 standard drink of alcohol     Types: 1 Standard drinks or equivalent per week     Comment: socially   • Drug use: No   Other Topics  Concern   • Caffeine Concern No   • Exercise No   • Seat Belt Yes   • Special Diet Yes   • Stress Concern No   • Weight Concern No     History   Drug Use No     Available pre-op labs reviewed.  Lab Results   Component Value Date    WBC 8.9 01/04/2024    RBC 3.63 (L) 01/04/2024    HGB 10.5 (L) 01/04/2024    HCT 31.5 (L) 01/04/2024    MCV 86.8 01/04/2024    MCH 28.9 01/04/2024    MCHC 33.3 01/04/2024    RDW 13.7 01/04/2024    .0 (L) 01/04/2024     Lab Results   Component Value Date     01/05/2024    K 4.7 01/05/2024     (H) 01/05/2024    CO2 24.0 01/05/2024    BUN 90 (H) 01/05/2024    CREATSERUM 6.14 (H) 01/05/2024     (H) 01/05/2024    CA 8.5 01/05/2024            Airway      Mallampati: II  Mouth opening: >3 FB  TM distance: > 6 cm  Neck ROM: full Cardiovascular    Cardiovascular exam normal.  Rhythm: regular  Rate: normal     Dental             Pulmonary    Pulmonary exam normal.  Breath sounds clear to auscultation bilaterally.               Other findings        ASA: 3   Plan: MAC  NPO status verified and patient meets guidelines.    Post-procedure pain management plan discussed with surgeon and patient.      Plan/risks discussed with: patient            Present on Admission:  **None**

## 2024-01-23 NOTE — H&P
[facility]  Pre-op H and P    Adrien Tobin Patient Status:  Hospital Outpatient Surgery    1980 MRN [id number]   Location [facility] ENDOSCOPY PAIN CENTER Attending Albino Hernandez MD   Date 2024 PCP Jimbo Sullivan DO     CC:   Diagnosis   Positive colorectal cancer screening using Cologuard test       History of Present Illness:  Adrien Tobin is a a(n) 43 year old male.   Diagnosis   Positive colorectal cancer screening using Cologuard test       History:  Past Medical History:   Diagnosis Date    CKD (chronic kidney disease)     Diabetes (HCC)     Disorder of liver     Elevated liver enzymes 2016    Elevated serum GGT level 2016    High blood pressure     High cholesterol     Hyperlipidemia     HYPERTRIGLYCERIDEMIA     Intractable nausea and vomiting 2024    OBESITY     Obesity     Pneumonia due to organism     Renal disorder     Visual impairment     GLASSES     Past Surgical History:   Procedure Laterality Date    ARTHROTOMY/EXPLORE/TREAT KNEE JOINT Left     DONE TWICE    CHOLECYSTECTOMY      DEBRIDE WOUND Left     HEEL    FOOT SURGERY      OTHER SURGICAL HISTORY  Knee & Foot     Family History   Problem Relation Age of Onset    Diabetes Father     Heart Disorder Father     Diabetes Mother     Hypertension Sister       reports that he has never smoked. He has never used smokeless tobacco. He reports current alcohol use of about 1.0 standard drink of alcohol per week. He reports that he does not use drugs.    Allergies:  No Known Allergies    Medications:    Current Facility-Administered Medications:     glucose (Dex4) 15 GM/59ML oral liquid 15 g, 15 g, Oral, Q15 Min PRN **OR** glucose (Glutose) 40% oral gel 15 g, 15 g, Oral, Q15 Min PRN **OR** glucose-vitamin C (Dex-4) chewable tab 4 tablet, 4 tablet, Oral, Q15 Min PRN **OR** dextrose 50% injection 50 mL, 50 mL, Intravenous, Q15 Min PRN **OR** glucose (Dex4) 15 GM/59ML oral liquid 30 g, 30 g, Oral, Q15  Min PRN **OR** glucose (Glutose) 40% oral gel 30 g, 30 g, Oral, Q15 Min PRN **OR** glucose-vitamin C (Dex-4) chewable tab 8 tablet, 8 tablet, Oral, Q15 Min PRN    lactated ringers infusion, , Intravenous, Continuous    Physical Exam:    Blood pressure 151/80, pulse 85, temperature 97.5 °F (36.4 °C), temperature source Temporal, resp. rate 21, height 5' 8\" (1.727 m), weight 210 lb (95.3 kg), SpO2 100%.    General: Appears alert, oriented x3 and in no acute distress.  CV: Normal rate   Lungs: Normal effort   Skin: Warm and dry.  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, left leg ([facility])    Erectile disorder due to medical condition in male    PAD (peripheral artery disease) ([facility])    Hyponatremia    Hyperglycemia    Respiratory acidosis    Secondary hypertension    Diabetic foot ulcer ([facility])    Current use of insulin ([facility])    Hyperkalemia    JESSICA (acute kidney injury) ([facility])    CKD (chronic kidney disease) stage 4, GFR 15-29 ml/min ([facility])    Primary hypertension    Controlled type 2 diabetes mellitus with complication, without long-term current use of insulin ([facility])    Anemia    Elevated blood pressure reading    Acute on chronic congestive heart failure, unspecified heart failure type ([facility])    Anemia in stage 4 chronic kidney disease  ([facility])    Hypervolemia    Intractable nausea and vomiting    Other fatigue    Diarrhea, unspecified type    Metabolic acidosis    Stage 5 chronic kidney disease not on  chronic dialysis (HCC)       Diagnosis   Positive colorectal cancer screening using Cologuard test       PlaN;  Colonoscopy    Albino Hernandez MD  1/23/2024  9:36 AM

## 2024-01-23 NOTE — OPERATIVE REPORT
Adrien Tobin Patient Status:  Hospital Outpatient Surgery    1980 MRN [id number]   [facility] ENDOSCOPY PAIN CENTER Attending Albino Hernandez MD   Date 2024 PCP Jimbo Sullivan DO     PREOPERATIVE DIAGNOSIS/INDICATION: Positive cologuard  POSTOPERTATIVE DIAGNOSIS: Polyp  PROCEDURE PERFORMED: COLONOSCOPY with snare polypectomy  SEDATION: MAC sedation provided by General Anesthesia    TIME OUT WAS PERFORMED    INFORMED CONSENT: Risks, benefits and alternatives to the procedure were explained to the patient including but not limited to bleeding, infection, perforation, adverse drug reactions, pancreatitis and the need for hospitalization and surgery if this occurs, the patient understands and agrees to procedure.  PROCEDURE DESCRIPTION: After careful digital rectal examination a pediatric colonoscope was introduced into the patients rectum, advanced pass the recto sigmoid junction, into the descending colon, splenic flexure, transverse colon, hepatic flexure, ascending colon, cecum and the last 5-10cm of the terminal ileum, confirmed by landmarks, including the appendiceal orifice and ileocecal valve. Careful examination of the above described areas was performed on withdrawal of the endoscope. Retroflexion was performed on the rectum. The patient tolerated the procedure well, there were no immediate complication immediately following the procedure, and the patient was transferred to recovery in stable condition.  QUALITY OF PREPARATION: [place] Bowel Preparation Scale:            -      Right colon 3, Transverse colon 3, Left colon 3   FINDINGS/THERAPEUTICS:  COLON: Normal  RECTUM: 15 mm sessile rectal polyp s/p hot snare polypectomy  RECOMMENDATIONS:   Post Colonoscopy/polypectomy precautions, watch for bleeding, infection, perforation, adverse drug reactions   Follow biopsy  Repeat colonoscopy in 3 years.    Albino Hernandez MD  2024  10:29 AM

## 2024-01-23 NOTE — ANESTHESIA POSTPROCEDURE EVALUATION
[facility]    Adrien Tobin Patient Status:  Hospital Outpatient Surgery   Age/Gender 43 year old male MRN [id number]   Location [facility] ENDOSCOPY PAIN CENTER Attending Albino Hernandez MD   Hosp Day # 0 PCP Jimbo Sullivan DO       Anesthesia Post-op Note    COLONOSCOPY with hot snare polypectomy    Procedure Summary       Date: 01/23/24 Room / Location:  ENDOSCOPY 02 / [facility] ENDOSCOPY    Anesthesia Start: 1000 Anesthesia Stop:     Procedure: COLONOSCOPY with hot snare polypectomy Diagnosis:       Positive colorectal cancer screening using Cologuard test      (polyp)    Surgeons: Albino Hernandez MD Anesthesiologist: Willis Atkins MD    Anesthesia Type: MAC ASA Status: 3            Anesthesia Type: MAC    Vitals Value Taken Time   /75 01/23/24 1030   Temp na 01/23/24 1030   Pulse 80 01/23/24 1030   Resp 16 01/23/24 1030   SpO2 100 % 01/23/24 1030   Vitals shown include unfiled device data.    Patient Location: Endoscopy    Anesthesia Type: MAC    Airway Patency: patent    Postop Pain Control: adequate    Mental Status: mildly sedated but able to meaningfully participate in the post-anesthesia evaluation    Nausea/Vomiting: none    Cardiopulmonary/Hydration status: stable euvolemic    Complications: no apparent anesthesia related complications    Postop vital signs: stable    Dental Exam: Unchanged from Preop    Patient to be discharged from PACU when criteria met.

## 2024-01-23 NOTE — DISCHARGE INSTRUCTIONS
Home Care Instructions for Colonoscopy with Sedation    Diet:  - Resume your regular diet as tolerated unless otherwise instructed.  - Start with light meals to minimize bloating.  - Do not drink alcohol today.    Medication:  - If you have questions about resuming your normal medications, please contact your Primary Care Physician.    Activities:  - Take it easy today. Do not return to work today.  - Do not drive today.  - Do not operate any machinery today (including kitchen equipment).    Colonoscopy:  - You may notice some rectal \"spotting\" (a little blood on the toilet tissue) for a day or two after the exam. This is normal.  - If you experience any rectal bleeding (not spotting), persistent tenderness or sharp severe abdominal pains, oral temperature over 100 degrees Fahrenheit, light-headedness or dizziness, or any other problems, contact your doctor.      **If unable to reach your doctor, please go to the [facility] Emergency Room**    - Your referring physician will receive a full report of your examination.  - If you do not hear from your doctor's office within two weeks of your biopsy, please call them for your results.    You may be able to see your laboratory results in [facility] between 4 and 7 business days.  In some cases, your physician may not have viewed the results before they are released to [facility].  If you have questions regarding your results contact the physician who ordered the test/exam by phone or via [facility] by choosing \"Ask a Medical Question.\"

## 2024-01-24 ENCOUNTER — HOSPITAL ENCOUNTER (OUTPATIENT)
Dept: ULTRASOUND IMAGING | Age: 44
Discharge: HOME | End: 2024-01-24
Attending: SURGERY
Payer: COMMERCIAL

## 2024-01-24 DIAGNOSIS — N18.4 CKD STAGE 4 DUE TO TYPE 2 DIABETES MELLITUS (HCC): ICD-10-CM

## 2024-01-24 DIAGNOSIS — E11.22 CKD STAGE 4 DUE TO TYPE 2 DIABETES MELLITUS (HCC): ICD-10-CM

## 2024-01-24 PROCEDURE — 93970 EXTREMITY STUDY: CPT | Performed by: SURGERY

## 2024-01-26 ENCOUNTER — OFFICE VISIT (OUTPATIENT)
Dept: PODIATRY CLINIC | Facility: CLINIC | Age: 44
End: 2024-01-26

## 2024-01-26 DIAGNOSIS — N18.4 STAGE 4 CHRONIC KIDNEY DISEASE (HCC): ICD-10-CM

## 2024-01-26 DIAGNOSIS — M89.8X7 EXOSTOSIS OF BONE OF FOOT: ICD-10-CM

## 2024-01-26 DIAGNOSIS — B35.1 ONYCHOMYCOSIS: ICD-10-CM

## 2024-01-26 DIAGNOSIS — Z87.898 HISTORY OF ULCERATION: ICD-10-CM

## 2024-01-26 DIAGNOSIS — E11.42 DIABETIC POLYNEUROPATHY ASSOCIATED WITH TYPE 2 DIABETES MELLITUS (HCC): Primary | ICD-10-CM

## 2024-01-26 DIAGNOSIS — L84 CALLUS OF FOOT: ICD-10-CM

## 2024-01-26 DIAGNOSIS — L84 PRE-ULCERATIVE CALLUSES: ICD-10-CM

## 2024-01-26 PROCEDURE — 99213 OFFICE O/P EST LOW 20 MIN: CPT | Performed by: STUDENT IN AN ORGANIZED HEALTH CARE EDUCATION/TRAINING PROGRAM

## 2024-01-26 NOTE — PROGRESS NOTES
[facility]  Progress Note    Adrien Tobin is a 43 year old male.   Chief Complaint   Patient presents with    Follow - Up     Left foot - Patient denies any pain.         HPI:     Patient is a pleasant 43-year-old male with past medical history of recurrent heel ulcerations, diabetes, and CKD returns to clinic for foot exam.  He continues to ambulate with accommodative inserts and shoes and relays that he is tolerating this well.  He presents today for follow-up of preulcerative callus/blister in his left posterior heel.  He also has elongated and thickened nails he has difficulty trimming on his own.  He is in the process of getting a port for dialysis that he will require in the future.      Allergies: Patient has no known allergies.   Current Outpatient Medications   Medication Sig Dispense Refill    PEG 3350-KCl-Na Bicarb-NaCl 420 g Oral Recon Soln Take as directed by physician. 4000 mL 0    sodium bicarbonate 650 MG Oral Tab Take 1 tablet (650 mg total) by mouth 2 (two) times daily. 60 tablet 0    Insulin Pen Needle (BD PEN NEEDLE ODALIS 2ND GEN) 32G X 4 MM Does not apply Misc USE FOUR TIMES DAILY AS DIRECTED 400 each 2    cloNIDine 0.3 MG Oral Tab Take 1 tablet (0.3 mg total) by mouth 3 (three) times daily. 90 tablet 5    torsemide 20 MG Oral Tab Take 1 tablet (20 mg total) by mouth BID (Diuretic). 60 tablet 5    insulin detemir 100 UNIT/ML Subcutaneous Solution Pen-injector Inject 22 Units into the skin every morning.      aspirin (ASPIRIN EC LOW DOSE) 81 MG Oral Tab EC Take 1 tablet (81 mg total) by mouth daily. 90 tablet 0    amLODIPine 10 MG Oral Tab Take 1 tablet (10 mg total) by mouth daily. 90 tablet 0    EZETIMIBE-SIMVASTATIN 10-80 MG Oral Tab TAKE 1 TABLET BY MOUTH DAILY 90 tablet 3    HUMALOG KWIKPEN 100 UNIT/ML Subcutaneous Solution Pen-injector SLIDING SCALE  TID with meals MDD 15u (Patient taking differently: SLIDING SCALE  TID with meals MDD 15u) 15 mL 0    Dulaglutide  (TRULICITY) 0.75 MG/0.5ML Subcutaneous Solution Pen-injector Inject 0.75 mg into the skin once a week. 7 mL 3      Past Medical History:   Diagnosis Date    CKD (chronic kidney disease)     Diabetes (HCC)     Disorder of liver     Elevated liver enzymes 08/30/2016    Elevated serum GGT level 08/30/2016    High blood pressure     High cholesterol     Hyperlipidemia     HYPERTRIGLYCERIDEMIA     Intractable nausea and vomiting 1/4/2024    OBESITY     Obesity     Pneumonia due to organism     Renal disorder     Visual impairment     GLASSES      Past Surgical History:   Procedure Laterality Date    ARTHROTOMY/EXPLORE/TREAT KNEE JOINT Left     DONE TWICE    CHOLECYSTECTOMY      COLONOSCOPY N/A 1/23/2024    Procedure: COLONOSCOPY with hot snare polypectomy;  Surgeon: Albino Hernandez MD;  Location:  ENDOSCOPY    DEBRIDE WOUND Left     HEEL    FOOT SURGERY      OTHER SURGICAL HISTORY  Knee & Foot      Family History   Problem Relation Age of Onset    Diabetes Father     Heart Disorder Father     Diabetes Mother     Hypertension Sister       Social History     Socioeconomic History    Marital status:    Tobacco Use    Smoking status: Never    Smokeless tobacco: Never   Vaping Use    Vaping Use: Never used   Substance and Sexual Activity    Alcohol use: Yes     Alcohol/week: 1.0 standard drink of alcohol     Types: 1 Standard drinks or equivalent per week     Comment: socially    Drug use: No   Other Topics Concern    Caffeine Concern No    Exercise No    Seat Belt Yes    Special Diet Yes    Stress Concern No    Weight Concern No           REVIEW OF SYSTEMS:     Today reviewed systems as documented below  GENERAL HEALTH: feels well otherwise  SKIN: denies any unusual skin lesions or rashes  RESPIRATORY: denies shortness of breath with exertion  CARDIOVASCULAR: denies chest pain on exertion  GI: denies abdominal pain and denies heartburn  NEURO: denies headaches  MUSCULO: denies arthritis, back pain      EXAM:    There were no vitals taken for this visit.  GENERAL: well developed, well nourished, in no apparent distress  EXTREMITIES:     1. Integument: Normal skin temperature and turgor.  Site of prior ulceration to left plantar lateral calcaneus remains well-healed.  HPK noted to posterior heel.  Nails are elongated and thickened.  2. Vascular: Dorsalis pedis and posterior tibial pulses are lightly palpable.  CFT intact digits.     3. Musculoskeletal: Muscle strength intact to left lower extremity with decreased plantar flexion which is patient's baseline.  There is no longer plantar prominence of calcaneus that was noted preoperatively.  Compartments are soft and compressible.  Flexion contracture of digits bilaterally.   4. Neurological: Normal sharp dull sensation; reflexes normal.  Decreased protective sensation noted to lower extremities.        ASSESSMENT AND PLAN:   Diagnoses and all orders for this visit:    Diabetic polyneuropathy associated with type 2 diabetes mellitus (HCC)    Pre-ulcerative calluses    Stage 4 chronic kidney disease (HCC)    History of ulceration    Callus of foot    Exostosis of bone of foot    Onychomycosis          Plan:   -Patient examined, chart history reviewed.  -Inspected surgical site of left foot--site is well-healed and without acute signs of infection or other concerns.  -Pared HPK to healthy tissue without incident.  Continue urea cream to site daily.  -Sharply debrided nails x 10 with nail nipper without incident.  Pinpoint bleeding noted to left hallux that is dressed with bacitracin and Band-Aid.  Nails smoothed with Dremel.   -Educated patient on acute signs of infection and symptoms of DVT and advised patient to seek immediate medical attention if any concerns arise.  Patient expressed understanding.     The patient indicates understanding of these issues and agrees to the plan.  RTC 4 to 6 weeks.    Royce Lewis DPM

## 2024-01-30 ENCOUNTER — TELEPHONE (OUTPATIENT)
Facility: CLINIC | Age: 44
End: 2024-01-30

## 2024-01-30 NOTE — TELEPHONE ENCOUNTER
Patient will need Left radio-cephalic AV Fistula procedure.   Called [facility] ([phone]) and spoke with Ac ERICKSON.    Per Ac, cpt code 72404 does not require prior authorization.   Call Reference #: [id number]

## 2024-02-01 ENCOUNTER — TELEPHONE (OUTPATIENT)
Facility: CLINIC | Age: 44
End: 2024-02-01

## 2024-02-01 DIAGNOSIS — E11.22 CKD STAGE 4 DUE TO TYPE 2 DIABETES MELLITUS (HCC): Primary | ICD-10-CM

## 2024-02-01 DIAGNOSIS — N18.4 CKD STAGE 4 DUE TO TYPE 2 DIABETES MELLITUS (HCC): Primary | ICD-10-CM

## 2024-02-01 NOTE — TELEPHONE ENCOUNTER
Called patient to schedule Fistula procedure. No answer, left VM to call me back.   Next available procedure time is Friday 02/09/2024

## 2024-02-05 NOTE — TELEPHONE ENCOUNTER
Called patient, he answered. Let him know the next available procedure date. He agreed. Patient set for Fistula Procedure on 02/09/2024

## 2024-02-08 ENCOUNTER — TELEPHONE (OUTPATIENT)
Facility: CLINIC | Age: 44
End: 2024-02-08

## 2024-02-08 ENCOUNTER — ANESTHESIA EVENT (OUTPATIENT)
Dept: SURGERY | Facility: HOSPITAL | Age: 44
End: 2024-02-08
Payer: COMMERCIAL

## 2024-02-09 ENCOUNTER — HOSPITAL ENCOUNTER (OUTPATIENT)
Facility: HOSPITAL | Age: 44
Setting detail: HOSPITAL OUTPATIENT SURGERY
Discharge: HOME | End: 2024-02-09
Attending: SURGERY | Admitting: SURGERY
Payer: COMMERCIAL

## 2024-02-09 ENCOUNTER — ANESTHESIA (OUTPATIENT)
Dept: SURGERY | Facility: HOSPITAL | Age: 44
End: 2024-02-09
Payer: COMMERCIAL

## 2024-02-09 VITALS
TEMPERATURE: 98 F | WEIGHT: 218 LBS | SYSTOLIC BLOOD PRESSURE: 131 MMHG | DIASTOLIC BLOOD PRESSURE: 70 MMHG | HEIGHT: 68 IN | HEART RATE: 76 BPM | RESPIRATION RATE: 18 BRPM | OXYGEN SATURATION: 96 % | BODY MASS INDEX: 33.04 KG/M2

## 2024-02-09 DIAGNOSIS — N18.5 STAGE 5 CHRONIC KIDNEY DISEASE NOT ON CHRONIC DIALYSIS (HCC): Primary | ICD-10-CM

## 2024-02-09 LAB
GLUCOSE BLDC GLUCOMTR-MCNC: 166 MG/DL (ref 70–99)
GLUCOSE BLDC GLUCOMTR-MCNC: 65 MG/DL (ref 70–99)
GLUCOSE BLDC GLUCOMTR-MCNC: 67 MG/DL (ref 70–99)
GLUCOSE BLDC GLUCOMTR-MCNC: 68 MG/DL (ref 70–99)
POTASSIUM SERPL-SCNC: 4.4 MMOL/L (ref 3.5–5.1)

## 2024-02-09 PROCEDURE — 82962 GLUCOSE BLOOD TEST: CPT

## 2024-02-09 PROCEDURE — 031C0ZF BYPASS LEFT RADIAL ARTERY TO LOWER ARM VEIN, OPEN APPROACH: ICD-10-PCS | Performed by: SURGERY

## 2024-02-09 PROCEDURE — 64415 NJX AA&/STRD BRCH PLXS IMG: CPT | Performed by: SURGERY

## 2024-02-09 PROCEDURE — 84132 ASSAY OF SERUM POTASSIUM: CPT | Performed by: ANESTHESIOLOGY

## 2024-02-09 RX ORDER — MIDAZOLAM HYDROCHLORIDE 1 MG/ML
INJECTION INTRAMUSCULAR; INTRAVENOUS AS NEEDED
Status: DISCONTINUED | OUTPATIENT
Start: 2024-02-09 | End: 2024-02-09 | Stop reason: SURG

## 2024-02-09 RX ORDER — HYDROMORPHONE HYDROCHLORIDE 1 MG/ML
0.8 INJECTION, SOLUTION INTRAMUSCULAR; INTRAVENOUS; SUBCUTANEOUS EVERY 2 HOUR PRN
Status: CANCELLED | OUTPATIENT
Start: 2024-02-09

## 2024-02-09 RX ORDER — NALOXONE HYDROCHLORIDE 0.4 MG/ML
0.08 INJECTION, SOLUTION INTRAMUSCULAR; INTRAVENOUS; SUBCUTANEOUS AS NEEDED
Status: DISCONTINUED | OUTPATIENT
Start: 2024-02-09 | End: 2024-02-09

## 2024-02-09 RX ORDER — SODIUM CHLORIDE 9 MG/ML
INJECTION, SOLUTION INTRAVENOUS CONTINUOUS
Status: DISCONTINUED | OUTPATIENT
Start: 2024-02-09 | End: 2024-02-09

## 2024-02-09 RX ORDER — LIDOCAINE HYDROCHLORIDE 10 MG/ML
INJECTION, SOLUTION INFILTRATION; PERINEURAL
Status: COMPLETED | OUTPATIENT
Start: 2024-02-09 | End: 2024-02-09

## 2024-02-09 RX ORDER — CEFAZOLIN SODIUM/WATER 2 G/20 ML
SYRINGE (ML) INTRAVENOUS AS NEEDED
Status: DISCONTINUED | OUTPATIENT
Start: 2024-02-09 | End: 2024-02-09 | Stop reason: SURG

## 2024-02-09 RX ORDER — CEFAZOLIN SODIUM/WATER 2 G/20 ML
2 SYRINGE (ML) INTRAVENOUS ONCE
Status: DISCONTINUED | OUTPATIENT
Start: 2024-02-09 | End: 2024-02-09 | Stop reason: HOSPADM

## 2024-02-09 RX ORDER — HYDROMORPHONE HYDROCHLORIDE 1 MG/ML
0.4 INJECTION, SOLUTION INTRAMUSCULAR; INTRAVENOUS; SUBCUTANEOUS EVERY 2 HOUR PRN
Status: CANCELLED | OUTPATIENT
Start: 2024-02-09

## 2024-02-09 RX ORDER — MORPHINE SULFATE 10 MG/ML
6 INJECTION, SOLUTION INTRAMUSCULAR; INTRAVENOUS EVERY 10 MIN PRN
Status: DISCONTINUED | OUTPATIENT
Start: 2024-02-09 | End: 2024-02-09

## 2024-02-09 RX ORDER — HYDROMORPHONE HYDROCHLORIDE 1 MG/ML
0.2 INJECTION, SOLUTION INTRAMUSCULAR; INTRAVENOUS; SUBCUTANEOUS EVERY 5 MIN PRN
Status: DISCONTINUED | OUTPATIENT
Start: 2024-02-09 | End: 2024-02-09

## 2024-02-09 RX ORDER — HYDROMORPHONE HYDROCHLORIDE 1 MG/ML
0.4 INJECTION, SOLUTION INTRAMUSCULAR; INTRAVENOUS; SUBCUTANEOUS EVERY 5 MIN PRN
Status: DISCONTINUED | OUTPATIENT
Start: 2024-02-09 | End: 2024-02-09

## 2024-02-09 RX ORDER — NICOTINE POLACRILEX 4 MG
15 LOZENGE BUCCAL
Status: DISCONTINUED | OUTPATIENT
Start: 2024-02-09 | End: 2024-02-09

## 2024-02-09 RX ORDER — METOCLOPRAMIDE 10 MG/1
10 TABLET ORAL ONCE
Status: COMPLETED | OUTPATIENT
Start: 2024-02-09 | End: 2024-02-09

## 2024-02-09 RX ORDER — SODIUM CHLORIDE, SODIUM LACTATE, POTASSIUM CHLORIDE, CALCIUM CHLORIDE 600; 310; 30; 20 MG/100ML; MG/100ML; MG/100ML; MG/100ML
INJECTION, SOLUTION INTRAVENOUS CONTINUOUS
Status: DISCONTINUED | OUTPATIENT
Start: 2024-02-09 | End: 2024-02-09

## 2024-02-09 RX ORDER — METOCLOPRAMIDE HYDROCHLORIDE 5 MG/ML
10 INJECTION INTRAMUSCULAR; INTRAVENOUS ONCE
Status: COMPLETED | OUTPATIENT
Start: 2024-02-09 | End: 2024-02-09

## 2024-02-09 RX ORDER — FAMOTIDINE 10 MG/ML
20 INJECTION, SOLUTION INTRAVENOUS ONCE
Status: COMPLETED | OUTPATIENT
Start: 2024-02-09 | End: 2024-02-09

## 2024-02-09 RX ORDER — DEXAMETHASONE SODIUM PHOSPHATE 10 MG/ML
INJECTION, SOLUTION INTRAMUSCULAR; INTRAVENOUS
Status: COMPLETED | OUTPATIENT
Start: 2024-02-09 | End: 2024-02-09

## 2024-02-09 RX ORDER — PROCHLORPERAZINE EDISYLATE 5 MG/ML
5 INJECTION INTRAMUSCULAR; INTRAVENOUS EVERY 8 HOURS PRN
Status: CANCELLED | OUTPATIENT
Start: 2024-02-09

## 2024-02-09 RX ORDER — HYDROMORPHONE HYDROCHLORIDE 1 MG/ML
0.6 INJECTION, SOLUTION INTRAMUSCULAR; INTRAVENOUS; SUBCUTANEOUS EVERY 5 MIN PRN
Status: DISCONTINUED | OUTPATIENT
Start: 2024-02-09 | End: 2024-02-09

## 2024-02-09 RX ORDER — ROPIVACAINE HYDROCHLORIDE 5 MG/ML
INJECTION, SOLUTION EPIDURAL; INFILTRATION; PERINEURAL
Status: COMPLETED | OUTPATIENT
Start: 2024-02-09 | End: 2024-02-09

## 2024-02-09 RX ORDER — MORPHINE SULFATE 4 MG/ML
2 INJECTION, SOLUTION INTRAMUSCULAR; INTRAVENOUS EVERY 10 MIN PRN
Status: DISCONTINUED | OUTPATIENT
Start: 2024-02-09 | End: 2024-02-09

## 2024-02-09 RX ORDER — ONDANSETRON 2 MG/ML
4 INJECTION INTRAMUSCULAR; INTRAVENOUS EVERY 6 HOURS PRN
Status: CANCELLED | OUTPATIENT
Start: 2024-02-09

## 2024-02-09 RX ORDER — DEXTROSE MONOHYDRATE 25 G/50ML
50 INJECTION, SOLUTION INTRAVENOUS
Status: DISCONTINUED | OUTPATIENT
Start: 2024-02-09 | End: 2024-02-09

## 2024-02-09 RX ORDER — HYDROCODONE BITARTRATE AND ACETAMINOPHEN 5; 325 MG/1; MG/1
1-2 TABLET ORAL EVERY 6 HOURS PRN
Qty: 15 TABLET | Refills: 0 | Status: SHIPPED | OUTPATIENT
Start: 2024-02-09

## 2024-02-09 RX ORDER — ACETAMINOPHEN 500 MG
1000 TABLET ORAL ONCE
Status: COMPLETED | OUTPATIENT
Start: 2024-02-09 | End: 2024-02-09

## 2024-02-09 RX ORDER — MORPHINE SULFATE 4 MG/ML
4 INJECTION, SOLUTION INTRAMUSCULAR; INTRAVENOUS EVERY 10 MIN PRN
Status: DISCONTINUED | OUTPATIENT
Start: 2024-02-09 | End: 2024-02-09

## 2024-02-09 RX ORDER — DEXTROSE MONOHYDRATE 25 G/50ML
25 INJECTION, SOLUTION INTRAVENOUS ONCE
Status: COMPLETED | OUTPATIENT
Start: 2024-02-09 | End: 2024-02-09

## 2024-02-09 RX ORDER — NICOTINE POLACRILEX 4 MG
30 LOZENGE BUCCAL
Status: DISCONTINUED | OUTPATIENT
Start: 2024-02-09 | End: 2024-02-09

## 2024-02-09 RX ORDER — FAMOTIDINE 20 MG/1
20 TABLET, FILM COATED ORAL ONCE
Status: COMPLETED | OUTPATIENT
Start: 2024-02-09 | End: 2024-02-09

## 2024-02-09 RX ADMIN — MIDAZOLAM HYDROCHLORIDE 2 MG: 1 INJECTION INTRAMUSCULAR; INTRAVENOUS at 08:15:00

## 2024-02-09 RX ADMIN — DEXAMETHASONE SODIUM PHOSPHATE 10 MG: 10 INJECTION, SOLUTION INTRAMUSCULAR; INTRAVENOUS at 07:58:00

## 2024-02-09 RX ADMIN — SODIUM CHLORIDE: 9 INJECTION, SOLUTION INTRAVENOUS at 08:13:00

## 2024-02-09 RX ADMIN — LIDOCAINE HYDROCHLORIDE 5 ML: 10 INJECTION, SOLUTION INFILTRATION; PERINEURAL at 07:58:00

## 2024-02-09 RX ADMIN — CEFAZOLIN SODIUM/WATER 2 G: 2 G/20 ML SYRINGE (ML) INTRAVENOUS at 08:14:00

## 2024-02-09 RX ADMIN — ROPIVACAINE HYDROCHLORIDE 30 ML: 5 INJECTION, SOLUTION EPIDURAL; INFILTRATION; PERINEURAL at 07:58:00

## 2024-02-09 NOTE — ANESTHESIA POSTPROCEDURE EVALUATION
Patient: Adrien Tobin    Procedure Summary       Date: 02/09/24 Room / Location: [facility] MAIN OR [address] [place] [address] MAIN OR    Anesthesia Start: 0812 Anesthesia Stop:     Procedure: Left radio-cephalic arteriovenous fistula (Left) Diagnosis:       CKD stage 4 due to type 2 diabetes mellitus (HCC)      (CKD stage 4 due to type 2 diabetes mellitus (HCC) [E11.22, N18.4])    Surgeons: Najjar, Samer F, MD Anesthesiologist: Antonio Aly MD    Anesthesia Type: MAC, general ASA Status: 3            Anesthesia Type: MAC, general    Vitals Value Taken Time   /67 02/09/24 0954   Temp 97.7 °F (36.5 °C) 02/09/24 0954   Pulse 73 02/09/24 0954   Resp 11 02/09/24 0954   SpO2 96 % 02/09/24 0954   Vitals shown include unfiled device data.    EM AN Post Evaluation:   Patient Evaluated in PACU  Patient Participation: complete - patient participated  Level of Consciousness: awake  Pain Management: adequate  Airway Patency:patent  Dental exam unchanged from preop  Yes    Cardiovascular Status: acceptable  Respiratory Status: acceptable  Postoperative Hydration acceptable      ANTONIO ALY MD  2/9/2024 9:54 AM

## 2024-02-09 NOTE — ANESTHESIA PREPROCEDURE EVALUATION
Anesthesia PreOp Note    HPI:     Adrein Tobin is a 43 year old male who presents for preoperative consultation requested by: Najjar, Samer F, MD    Date of Surgery: 2/9/2024    Procedure(s):  Left radio-cephalic arteriovenous fistula  Indication: CKD stage 4 due to type 2 diabetes mellitus (HCC) [E11.22, N18.4]    Relevant Problems   No relevant active problems       NPO:  Last Liquid Consumption Date: 02/08/24  Last Liquid Consumption Time: 2000  Last Solid Consumption Date: 02/08/24  Last Solid Consumption Time: 2000  Last Liquid Consumption Date: 02/08/24          History Review:  Patient Active Problem List    Diagnosis Date Noted    Intractable nausea and vomiting 01/04/2024    Other fatigue 01/04/2024    Diarrhea, unspecified type 01/04/2024    Metabolic acidosis 01/04/2024    Stage 5 chronic kidney disease not on chronic dialysis ([facility]) 01/04/2024    Anemia in stage 4 chronic kidney disease  ([facility]) 11/14/2023    Hypervolemia 11/14/2023    JESSICA (acute kidney injury) ([facility]) 11/12/2023    Anemia 11/12/2023    Elevated blood pressure reading 11/12/2023    Acute on chronic congestive heart failure, unspecified heart failure type ([facility]) 11/12/2023    Controlled type 2 diabetes mellitus with complication, without long-term current use of insulin ([facility]) 10/21/2021    CKD (chronic kidney disease) stage 4, GFR 15-29 ml/min ([facility])     Primary hypertension     Hyperkalemia 10/12/2021    Current use of insulin ([facility]) 08/03/2021    Diabetic foot ulcer ([facility]) 07/02/2021    Hyponatremia 01/25/2021    Hyperglycemia 01/25/2021    Respiratory acidosis 01/25/2021    Secondary hypertension 01/25/2021    Erectile disorder due to medical condition in male 01/15/2021    PAD (peripheral artery disease) ([facility]) 01/15/2021    Osteomyelitis ([facility]) 06/18/2018    S/P left knee arthroscopy 05/16/2018    S/P left knee surgery 05/16/2018    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, left leg ([facility])  2018    MSSA (methicillin susceptible Staphylococcus aureus)     Diabetic infection of left foot (HCC) 2018    Essential hypertension     Dyslipidemia     Elevated liver enzymes 2016    Vitamin D deficiency 2016    Obesity (BMI 30.0-34.9) 2016    Pure hyperglyceridemia 2010       Past Medical History:   Diagnosis Date    CKD (chronic kidney disease)     Diabetes (HCC)     Elevated liver enzymes 2016    Elevated serum GGT level 2016    High blood pressure     High cholesterol     History of blood transfusion     2023    Hyperlipidemia     HYPERTRIGLYCERIDEMIA     Intractable nausea and vomiting 2024    OBESITY     Obesity     Pneumonia due to organism     40 years ago    PONV (postoperative nausea and vomiting)     Renal disorder     Visual impairment     GLASSES       Past Surgical History:   Procedure Laterality Date    ARTHROTOMY/EXPLORE/TREAT KNEE JOINT Left     DONE TWICE    CHOLECYSTECTOMY      COLONOSCOPY N/A 2024    Procedure: COLONOSCOPY with hot snare polypectomy;  Surgeon: Albino Hernandez MD;  Location:  ENDOSCOPY    DEBRIDE WOUND Left     HEEL    FOOT SURGERY      OTHER SURGICAL HISTORY  Knee & Foot       Medications Prior to Admission   Medication Sig Dispense Refill Last Dose    Insulin Pen Needle (BD PEN NEEDLE ODALIS 2ND GEN) 32G X 4 MM Does not apply Misc USE FOUR TIMES DAILY AS DIRECTED 400 each 2     cloNIDine 0.3 MG Oral Tab Take 1 tablet (0.3 mg total) by mouth 3 (three) times daily. 90 tablet 5 2024 at 0500    torsemide 20 MG Oral Tab Take 1 tablet (20 mg total) by mouth BID (Diuretic). 60 tablet 5 2024 at 1800    insulin detemir 100 UNIT/ML Subcutaneous Solution Pen-injector Inject 22 Units into the skin every morning.   2024 at 0800    [] Blood Pressure Monitoring (BLOOD PRESSURE KIT) Does not apply Device 1 kit daily. Check blood pressure daily. 1 each 0     aspirin (ASPIRIN EC LOW DOSE) 81 MG Oral Tab  EC Take 1 tablet (81 mg total) by mouth daily. 90 tablet 0 2/5/2024    amLODIPine 10 MG Oral Tab Take 1 tablet (10 mg total) by mouth daily. 90 tablet 0 2/9/2024 at 0500    EZETIMIBE-SIMVASTATIN 10-80 MG Oral Tab TAKE 1 TABLET BY MOUTH DAILY 90 tablet 3 2/8/2024 at 0800    HUMALOG KWIKPEN 100 UNIT/ML Subcutaneous Solution Pen-injector SLIDING SCALE  TID with meals MDD 15u (Patient taking differently: SLIDING SCALE  TID with meals MDD 15u) 15 mL 0 2/4/2024    sodium bicarbonate 650 MG Oral Tab Take 1 tablet (650 mg total) by mouth 2 (two) times daily. 60 tablet 0     Dulaglutide (TRULICITY) 0.75 MG/0.5ML Subcutaneous Solution Pen-injector Inject 0.75 mg into the skin once a week. 7 mL 3 1/28/2024     Current Facility-Administered Medications Ordered in Epic   Medication Dose Route Frequency Provider Last Rate Last Admin    sodium chloride 0.9% infusion   Intravenous Continuous Najjar, Samer F, MD 20 mL/hr at 02/09/24 0645 New Bag at 02/09/24 0645    ceFAZolin (Ancef) 2 g in 20mL IV syringe premix  2 g Intravenous Once Najjar, Samer F, MD        heparin (Porcine) 50,000 units in 500 mL sodium chloride 0.9% irrigation  50,000 Units Irrigation Once Najjar, Samer F, MD         No current [facility]-ordered outpatient medications on file.       No Known Allergies    Family History   Problem Relation Age of Onset    Diabetes Father     Heart Disorder Father     Diabetes Mother     Hypertension Sister      Social History     Socioeconomic History    Marital status:    Tobacco Use    Smoking status: Never    Smokeless tobacco: Never   Vaping Use    Vaping Use: Never used   Substance and Sexual Activity    Alcohol use: Yes     Alcohol/week: 1.0 standard drink of alcohol     Types: 1 Standard drinks or equivalent per week     Comment: socially    Drug use: No   Other Topics Concern    Caffeine Concern No    Exercise No    Seat Belt Yes    Special Diet Yes    Stress Concern No    Weight Concern No       Available pre-op labs  reviewed.  Lab Results   Component Value Date    WBC 8.9 01/04/2024    RBC 3.63 (L) 01/04/2024    HGB 10.5 (L) 01/04/2024    HCT 31.5 (L) 01/04/2024    MCV 86.8 01/04/2024    MCH 28.9 01/04/2024    MCHC 33.3 01/04/2024    RDW 13.7 01/04/2024    .0 (L) 01/04/2024     Lab Results   Component Value Date     01/05/2024    K 4.7 01/05/2024     (H) 01/05/2024    CO2 24.0 01/05/2024    BUN 90 (H) 01/05/2024    CREATSERUM 6.14 (H) 01/05/2024     (H) 01/05/2024    PGLU 65 (L) 02/09/2024    PGLU 144 (H) 01/23/2024    CA 8.5 01/05/2024          Vital Signs:  Body mass index is 33.15 kg/m².   height is 1.727 m (5' 8\") and weight is 98.9 kg (218 lb). His oral temperature is 98.1 °F (36.7 °C). His blood pressure is 124/70 and his pulse is 83. His respiration is 18 and oxygen saturation is 100%.   Vitals:    02/08/24 0910 02/09/24 0640   BP:  124/70   Pulse:  83   Resp:  18   Temp:  98.1 °F (36.7 °C)   TempSrc:  Oral   SpO2:  100%   Weight: 95.3 kg (210 lb) 98.9 kg (218 lb)   Height: 1.727 m (5' 8\") 1.727 m (5' 8\")        Anesthesia Evaluation     Patient summary reviewed and Nursing notes reviewed    History of anesthetic complications   Airway   Mallampati: III  TM distance: >3 FB  Neck ROM: full  Dental - Dentition appears grossly intact     Pulmonary     breath sounds clear to auscultation  Cardiovascular   (+) hypertension, CHF    Rhythm: regular  Rate: normal    Neuro/Psych - negative ROS     GI/Hepatic/Renal    (+) chronic renal disease CRI    Endo/Other    (+) diabetes mellitus poorly controlled using insulin  Abdominal   (+) obese    Abdomen: soft.                 Anesthesia Plan:   ASA:  3  Plan:   MAC and regional  Post-op Pain Management: Supraclavicular block  Informed Consent Plan and Risks Discussed With:  Patient and spouse  Discussed plan with:  Surgeon  Provider Attestation (if preop done by other):  Pt seen, chart reviewed, the surgeon requests a regional anesthetic and MAC for this  renal failure patient,switched providers      I have informed Adrien Tobin and/or legal guardian or family member of the nature of the anesthetic plan, benefits, risks including possible dental damage if relevant, major complications, and any alternative forms of anesthetic management.   All of the patient's questions were answered to the best of my ability. The patient desires the anesthetic management as planned.  Saurav Ma MD  2/9/2024 7:04 AM  Present on Admission:  **None**

## 2024-02-09 NOTE — OPERATIVE REPORT
[facility]     PATIENTS NAME: Adrien Tobin  ATTENDING PHYSICIAN: Najjar, Samer F, MD  OPERATING PHYSICIAN: Samer F Najjar, MD  [facility]: [id number]     LOCATION:  OR  MRN: [id number]    YOB: 1980  ADMISSION DATE: 2/9/2024  OPERATION DATE: 2/9/2024                OPERATIVE REPORT     PRE-OPERATIVE DIAGNOSIS:  CKD in need of a more permanent access.     POST-OPERATIVE DIAGNOSIS: Same as above.    PROCEDURE PERFORMED: left radio-cephalic arteriovenous fistula creation    ANESTHESIA: Regional    SURGEON: Samer F Najjar, MD    ASSISTANT: Winston Mccallum SA    EBL: 30 ml    SPECIMENS: * No specimens in log *    COMPLICATIONS: None    SUMMARY OF CASE: Vein dilated well, however, the artery was small (2 mm) and soft     INDICATIONS: The patient is a 43 year old male with CKD in need of a more permanent access. Preoperative mapping revealed a usable left forearm cephalic vein. We have discussed the risks and benefits of the fistula in detail including the risk of nerve injury, ischemic nerve injury, thrombosis, with need for angioplasty or revision, infection, and steal syndrome among other complications.    DESCRIPTION OF PROCEDURE:  The patient was brought to the operating room and placed in the supine position. The left upper extremity was prepped and draped in the usual sterile fashion.  Following infiltration of local anesthesia with 1% lidocaine, a longitudinal incision was made adjacent to the radial artery and cephalic vein in the wrist. The cephalic vein was identified.  It was dissected at a large branch point. Several small tributaries were ligated with 4-0 silk . The vein was mobilized and secured with silastic loops. It was of an excellent caliber (3 mm). Next, the radial artery was identified and dissected. The artery was small and without any atherosclerotic disease. The artery was mobilized proximally and distally for a 2-cm segment. The distal vein was ligated and the vein divided with  proximal control. The end of the vein was then opened and spatulated and the vein was brought adjacent to the artery and appeared to lie comfortably without tension or kinking.  The artery was controlled proximally and distally with silastic loops and an anterolateral arteriotomy was made using a #11 blade scalpel. The artery was then locally heparinized with concentrated heparinized saline solution. The arteriovenous anastomosis was then performed using a running 7-0 prolene suture in a running fashion. Following completion of the anastomosis, vascular control appeared excellent. Excellent flow was established through the fistula, and a distal pulse was palpable within the radial artery. Hemostasis was achieved with pressure and thrombin-gel foam. The wound was irrigated and the subcutaneous tissues were reapproximated using a running 3-0 Vicryl sutures. The skin was closed a running subcuticular closure 5-0 Vicryl sutures. The incision area was injected with a 0.5% Marcaine solution, then Dermabond was applied followed by a dry sterile dressing.   The patient tolerated the procedure well and was taken to the postanesthesia care unit in a stable condition.

## 2024-02-09 NOTE — H&P
Samer F. Najjar, MD  Vascular Surgery  [facility]         VASCULAR SURGERY HP NOTE        Name: Adrien Tobin   :   1980  [id number]      REFERRING PHYSICIAN:  No ref. provider found  PRIMARY CARE PHYSICIAN:  Jimbo Sullivan DO     HISTORY OF PRESENT ILLNESS:   The patient is a 43 year old male with ESRD in need of a more permanent access. He developed chronic kidney disease 5 years ago after undergoing treatment for a left leg infection using vancomycin.  He is not on dialysis yet.  The patient has a usable left forearm cephalic vein. A left radio-cephalic AVF was recommended and accepted.     PAST MEDICAL HISTORY:         Past Medical History:   Diagnosis Date    CKD (chronic kidney disease)      Diabetes (HCC)      Disorder of liver      Elevated liver enzymes 2016    Elevated serum GGT level 2016    High blood pressure      High cholesterol      Hyperlipidemia      HYPERTRIGLYCERIDEMIA      Intractable nausea and vomiting 2024    OBESITY      Obesity      Pneumonia due to organism      Renal disorder      Visual impairment       GLASSES         PAST SURGICAL HISTORY:         Past Surgical History:   Procedure Laterality Date    ARTHROTOMY/EXPLORE/TREAT KNEE JOINT Left       DONE TWICE    CHOLECYSTECTOMY        DEBRIDE WOUND Left       HEEL    FOOT SURGERY        OTHER SURGICAL HISTORY   Knee & Foot         MEDICATIONS:      Current Outpatient Medications:     sodium bicarbonate 650 MG Oral Tab, Take 1 tablet (650 mg total) by mouth 2 (two) times daily., Disp: 60 tablet, Rfl: 0    Insulin Pen Needle (BD PEN NEEDLE ODALIS 2ND GEN) 32G X 4 MM Does not apply Misc, USE FOUR TIMES DAILY AS DIRECTED, Disp: 400 each, Rfl: 2    cloNIDine 0.3 MG Oral Tab, Take 1 tablet (0.3 mg total) by mouth 3 (three) times daily., Disp: 90 tablet, Rfl: 5    torsemide 20 MG Oral Tab, Take 1 tablet (20 mg total) by mouth BID (Diuretic)., Disp: 60 tablet, Rfl: 5    insulin detemir 100  UNIT/ML Subcutaneous Solution Pen-injector, Inject 22 Units into the skin every morning., Disp: , Rfl:     Blood Pressure Monitoring (BLOOD PRESSURE KIT) Does not apply Device, 1 kit daily. Check blood pressure daily., Disp: 1 each, Rfl: 0    aspirin (ASPIRIN EC LOW DOSE) 81 MG Oral Tab EC, Take 1 tablet (81 mg total) by mouth daily., Disp: 90 tablet, Rfl: 0    amLODIPine 10 MG Oral Tab, Take 1 tablet (10 mg total) by mouth daily., Disp: 90 tablet, Rfl: 0    EZETIMIBE-SIMVASTATIN 10-80 MG Oral Tab, TAKE 1 TABLET BY MOUTH DAILY, Disp: 90 tablet, Rfl: 3    HUMALOG KWIKPEN 100 UNIT/ML Subcutaneous Solution Pen-injector, SLIDING SCALE  TID with meals MDD 15u (Patient taking differently: SLIDING SCALE  TID with meals MDD 15u), Disp: 15 mL, Rfl: 0    Dulaglutide (TRULICITY) 0.75 MG/0.5ML Subcutaneous Solution Pen-injector, Inject 0.75 mg into the skin once a week., Disp: 7 mL, Rfl: 3     ALLERGIES:    He has No Known Allergies.     SOCIAL HISTORY:    Patient  reports that he has never smoked. He has never used smokeless tobacco. He reports current alcohol use of about 1.0 standard drink of alcohol per week. He reports that he does not use drugs.     FAMILY HISTORY:    Patient's family history includes Diabetes in his father and mother; Heart Disorder in his father; Hypertension in his sister.     ROS:     A 12 point review of systems with pertinent positives and negatives listed in the HPI.     EXAM:     Resp 20   Ht 5' 8\" (1.727 m)   Wt 216 lb (98 kg)   BMI 32.84 kg/m²   GENERAL: alert and orientated X 3, well developed, well nourished, in no apparent distress  PSYCH: normal mood and affect  HEENT: ears and throat are clear  NECK: supple, no lymphadenopathy, thyroid wnl  CAROTID: No bruits  RESPIRATORY: no rales, rhonchi, or wheezes B  CARDIO: RRR without murmur, no murmur, no gallop   ABDOMEN: soft, non-tender with no palpable aneurysm or masses  BACK: normal, no tenderness  SKIN: no rashes, warm and  dry  EXTREMITIES: no tenderness  NEURO: no sensory or motor deficits  VASCULAR:   Both brachial and radial pulses are palpable  Arm veins are not readily visible        IMAGING:         LABS:         Lab Results   Component Value Date      10/18/2023     A1C 5.5 10/18/2023            Lab Results   Component Value Date      (H) 01/05/2024     BUN 90 (H) 01/05/2024     CREATSERUM 6.14 (H) 01/05/2024     BUNCREA 12.9 05/08/2021     ANIONGAP 5 01/05/2024     GFRAA 30 (L) 06/17/2022     GFRNAA 26 (L) 06/17/2022     CA 8.5 01/05/2024      01/05/2024     K 4.7 01/05/2024      (H) 01/05/2024     CO2 24.0 01/05/2024     OSMOCALC 326 (H) 01/05/2024            Lab Results   Component Value Date     WBC 8.9 01/04/2024     RBC 3.63 (L) 01/04/2024     HGB 10.5 (L) 01/04/2024     HCT 31.5 (L) 01/04/2024     MCV 86.8 01/04/2024     MCH 28.9 01/04/2024     MCHC 33.3 01/04/2024     RDW 13.7 01/04/2024     .0 (L) 01/04/2024               Lab Results   Component Value Date     HGB 10.5 (L) 01/04/2024     HGB 11.5 (L) 01/03/2024     HGB 8.1 (L) 11/14/2023     HGB 7.8 (L) 11/13/2023     HGB 6.9 (LL) 11/13/2023     HGB 7.3 (L) 11/12/2023     CREATSERUM 6.14 (H) 01/05/2024     CREATSERUM 6.35 (H) 01/04/2024     CREATSERUM 6.21 (H) 01/03/2024     CREATSERUM 6.02 (H) 11/29/2023     CREATSERUM 5.69 (H) 11/16/2023     CREATSERUM 5.39 (H) 11/15/2023             LEFT ARM:  Cephalic P arm:  5 mm  Cephalic M arm:  4  Cephalic D arm:  3  Cephalic P forearm:  5  Cephalic M forearm:  3  Cephalic D forearm:  2      I have recommended a left radio-cephalic AVF creation.  We also discussed the risks and benefits of the fistula in detail including the risk of nerve injury, ischemic nerve injury, thrombosis, with need for angioplasty or revision, infection, and steal syndrome among other complications.     PLAN:  Left radio-cephalic AVF under regional anesthesia (if available)     The patient indicated an understanding of  these issues and agreed to the plan and all questions were answered during the clinic visit.      Thank you for allowing to participate in the patient's care.   Please do not hesitate to contact me with any questions.     Sincerely,  Samer F. Najjar MD

## 2024-02-09 NOTE — DISCHARGE INSTRUCTIONS
Recovering at home  Once at home, follow all the instructions you’ve been given. Be sure to:   Take all medicines as directed.  Care for your incision as instructed. Dressing is to stay dry, clean and intact. Keep wound covered.  Check for signs of infection at the incision site (see below).  Don't do any heavy lifting and strenuous activities as directed.  Monitor and care for your fistula as instructed.  Sometimes hand or fingers may swell after the surgery; this is normal. It can be relieved by elevating your arm on several pillows when resting.    When to call your healthcare provider  Call your healthcare provider right away if any of these occur:  Fever of 100.4°F ( 38°C) or higher, or as advised by your provider  Signs of infection at the incision site, such as more redness or swelling, warmth, worsening pain, bleeding, or bad-smelling drainage  You can’t feel a thrill (the vibration of blood going through your arm)  Pain or numbness in your fingers, hand, or arm  Bleeding, redness, or warmth around your fistula  Sudden bulging of the fistula that is more than normal (a slight bulge is normal)    Interscalene Block  The block usually numbs your entire arm and makes it unusable until the block wears off.  The nerve block technique used is a single injection that lasts 12?24 hours. The duration of the numbness can vary and is dependent on the type of local anesthetic used, additives and individual variations.  Once the numbness starts to wear off, the discomfort from surgery will intensify progressively over the next 1-2 hours. Therefore, we recommend starting oral narcotics as soon as oral medications are tolerated. These medications should be taken on a scheduled basis as soon as you start feeling tingling in your arm or fingers. The tingling sensation means the block is starting to wear off. If you don’t notice the tingling sensation prior to going to bed, it may be recommended to take the prescribed  narcotics prior to falling asleep. This will allow for a smooth transition from the nerve block to oral medication based pain relief.     HOME INSTRUCTIONS  AMBSURG HOME CARE INSTRUCTIONS: POST-OP ANESTHESIA  The medication that you received for sedation or general anesthesia can last up to 24 hours. Your judgment and reflexes may be altered, even if you feel like your normal self.      We Recommend:   Do not drive any motor vehicle or bicycle   Avoid mowing the lawn, playing sports, or working with power tools/applicances (power saws, electric knives or mixers)   That you have someone stay with you on your first night home   Do not drink alcohol or take sleeping pills or tranquilizers   Do not sign legal documents within 24 hours of your procedure   If you had a nerve block for your surgery, take extra care not to put any pressure on your arm or hand for 24 hours    It is normal:  For you to have a sore throat if you had a breathing tube during surgery (while you were asleep!). The sore throat should get better within 48 hours. You can gargle with warm salt water (1/2 tsp in 4 oz warm water) or use a throat lozenge for comfort  To feel muscle aches or soreness especially in the abdomen, chest or neck. The achy feeling should go away in the next 24 hours  To feel weak, sleepy or \"wiped out\". Your should start feeling better in the next 24 hours.   To experience mild discomforts such as sore lip or tongue, headache, cramps, gas pains or a bloated feeling in your abdomen.   To experience mild back pain or soreness for a day or two if you had spinal or epidural anesthesia.   If you had laparoscopic surgery, to feel shoulder pain or discomfort on the day of surgery.   For some patients to have nausea after surgery/anesthesia    If you feel nausea or experience vomiting:   Try to move around less.   Eat less than usual or drink only liquids until the next morning   Nausea should resolve in about 24 hours    If you have a  problem when you are at home:    Call your surgeons office +      Discharge Instructions: After Your Surgery  You’ve just had surgery. During surgery, you were given medicine called anesthesia to keep you relaxed and free of pain. After surgery, you may have some pain or nausea. This is common. Here are some tips for feeling better and getting well after surgery.   Going home  Your healthcare provider will show you how to take care of yourself when you go home. They'll also answer your questions. Have an adult family member or friend drive you home. For the first 24 hours after your surgery:   Don't drive or use heavy equipment.  Don't make important decisions or sign legal papers.  Take medicines as directed.  Don't drink alcohol.  Have someone stay with you, if needed. They can watch for problems and help keep you safe.  Be sure to go to all follow-up visits with your healthcare provider. And rest after your surgery for as long as your provider tells you to.   Coping with pain  If you have pain after surgery, pain medicine will help you feel better. Take it as directed, before pain becomes severe. Also, ask your healthcare provider or pharmacist about other ways to control pain. This might be with heat, ice, or relaxation. And follow any other instructions your surgeon or nurse gives you.      Stay on schedule with your medicine.     Tips for taking pain medicine  To get the best relief possible, remember these points:   Pain medicines can upset your stomach. Taking them with a little food may help.  Most pain relievers taken by mouth need at least 20 to 30 minutes to start to work.  Don't wait till your pain becomes severe before you take your medicine. Try to time your medicine so that you can take it before starting an activity. This might be before you get dressed, go for a walk, or sit down for dinner.  Constipation is a common side effect of some pain medicines. Call your healthcare provider before taking  any medicines such as laxatives or stool softeners to help ease constipation. Also ask if you should skip any foods. Drinking lots of fluids and eating foods such as fruits and vegetables that are high in fiber can also help. Remember, don't take laxatives unless your surgeon has prescribed them.  Drinking alcohol and taking pain medicine can cause dizziness and slow your breathing. It can even be deadly. Don't drink alcohol while taking pain medicine.  Pain medicine can make you react more slowly to things. Don't drive or run machinery while taking pain medicine.  Your healthcare provider may tell you to take acetaminophen to help ease your pain. Ask them how much you're supposed to take each day. Acetaminophen or other pain relievers may interact with your prescription medicines or other over-the-counter (OTC) medicines. Some prescription medicines have acetaminophen and other ingredients in them. Using both prescription and OTC acetaminophen for pain can cause you to accidentally overdose. Read the labels on your OTC medicines with care. This will help you to clearly know the list of ingredients, how much to take, and any warnings. It may also help you not take too much acetaminophen. If you have questions or don't understand the information, ask your pharmacist or healthcare provider to explain it to you before you take the OTC medicine.   Managing nausea  Some people have an upset stomach (nausea) after surgery. This is often because of anesthesia, pain, or pain medicine, less movement of food in the stomach, or the stress of surgery. These tips will help you handle nausea and eat healthy foods as you get better. If you were on a special food plan before surgery, ask your healthcare provider if you should follow it while you get better. Check with your provider on how your eating should progress. It may depend on the surgery you had. These general tips may help:   Don't push yourself to eat. Your body will tell  you when to eat and how much.  Start off with clear liquids and soup. They're easier to digest.  Next try semi-solid foods as you feel ready. These include mashed potatoes, applesauce, and gelatin.  Slowly move to solid foods. Don’t eat fatty, rich, or spicy foods at first.  Don't force yourself to have 3 large meals a day. Instead eat smaller amounts more often.  Take pain medicines with a small amount of solid food, such as crackers or toast. This helps prevent nausea.  When to call your healthcare provider  Call your healthcare provider right away if you have any of these:   You still have too much pain, or the pain gets worse, after taking the medicine. The medicine may not be strong enough. Or there may be a complication from the surgery.  You feel too sleepy, dizzy, or groggy. The medicine may be too strong.  Side effects such as nausea or vomiting. Your healthcare provider may advise taking other medicines to .  Skin changes such as rash, itching, or hives. This may mean you have an allergic reaction. Your provider may advise taking other medicines.  The incision looks different (for instance, part of it opens up).  Bleeding or fluid leaking from the incision site, and weren't told to expect that.  Fever of 100.4°F (38°C) or higher, or as directed by your provider.  Call 911  Call 911 right away if you have:   Trouble breathing  Facial swelling    If you have obstructive sleep apnea   You were given anesthesia medicine during surgery to keep you comfortable and free of pain. After surgery, you may have more apnea spells because of this medicine and other medicines you were given. The spells may last longer than normal.    At home:  Keep using the continuous positive airway pressure (CPAP) device when you sleep. Unless your healthcare provider tells you not to, use it when you sleep, day or night. CPAP is a common device used to treat obstructive sleep apnea.  Talk with your provider before taking any pain  medicine, muscle relaxants, or sedatives. Your provider will tell you about the possible dangers of taking these medicines.  Contact your provider if your sleeping changes a lot even when taking medicines as directed.  Sania last reviewed this educational content on 10/1/2021  © 2350-7968 [employer]. All rights reserved. This information is not intended as a substitute for professional medical care. Always follow your healthcare professional's instructions.

## 2024-02-09 NOTE — ANESTHESIA PROCEDURE NOTES
Peripheral Block    Date/Time: 2/9/2024 7:58 AM    Performed by: Antonio Marvin MD  Authorized by: Antonio Marvin MD      General Information and Staff    Start Time:  2/9/2024 7:58 AM  End Time:  2/9/2024 8:06 AM  Anesthesiologist:  Antonio Marvin MD  Performed by:  Anesthesiologist  Patient Location:  PACU    Block Placement: Pre Induction  Site Identification: real time ultrasound guided and image stored and retrievable    Block site/laterality marked before start: site marked  Reason for Block: at surgeon's request, post-op pain management and surgical anesthesia    Preanesthetic Checklist: 2 patient identifers, IV checked, risks and benefits discussed, monitors and equipment checked, pre-op evaluation, timeout performed, anesthesia consent, sterile technique used, no prohibitive neurological deficits and no local skin infection at insertion site      Procedure Details    Patient Position:  Supine  Prep: ChloraPrep and patient draped    Monitoring:  Heart rate, cardiac monitor, continuous pulse ox and blood pressure cuff  Block Type:  Interscalene  Laterality:  Left  Injection Technique:  Single-shot    Needle    Needle Type:  Echogenic  Needle Gauge:  22 G  Needle Length:  50 mm  Needle Localization:  Nerve stimulator and ultrasound guidance  Reason for Ultrasound Use: appropriate spread of the medication was noted in real time and no ultrasound evidence of intravascular and/or intraneural injection      Muscle Twitch Response: distal twitch      Assessment    Injection Assessment:  Good spread noted, incremental injection, local visualized surrounding nerve on ultrasound, low pressure, negative aspiration for heme, no pain on injection and negative resistance  Paresthesia Pain:  None  Heart Rate Change: No        Medications  2/9/2024 7:58 AM  lidocaine injection 1% - Intradermal   5 mL - 2/9/2024 7:58:00 AM  ropivacaine (NAROPIN) injection 0.5% - Infiltration   30 mL - 2/9/2024 7:58:00  AM  dexamethasone (DECADRON) PF injection 10 mg/ml - Injection   10 mg - 2/9/2024 7:58:00 AM    Additional Comments

## 2024-02-21 DIAGNOSIS — E78.1 PURE HYPERGLYCERIDEMIA: ICD-10-CM

## 2024-02-21 DIAGNOSIS — E78.5 DYSLIPIDEMIA: ICD-10-CM

## 2024-02-21 DIAGNOSIS — E78.2 MIXED HYPERLIPIDEMIA: ICD-10-CM

## 2024-02-21 DIAGNOSIS — E11.69 DYSLIPIDEMIA ASSOCIATED WITH TYPE 2 DIABETES MELLITUS (HCC): ICD-10-CM

## 2024-02-21 DIAGNOSIS — E78.5 DYSLIPIDEMIA ASSOCIATED WITH TYPE 2 DIABETES MELLITUS (HCC): ICD-10-CM

## 2024-02-22 ENCOUNTER — OFFICE VISIT (OUTPATIENT)
Facility: CLINIC | Age: 44
End: 2024-02-22
Payer: COMMERCIAL

## 2024-02-22 DIAGNOSIS — T82.590A MALFUNCTION OF ARTERIOVENOUS DIALYSIS FISTULA, INITIAL ENCOUNTER (HCC): Primary | ICD-10-CM

## 2024-02-22 NOTE — PROGRESS NOTES
Samer F. Najjar, MD  Vascular Surgery  [facility]         VASCULAR SURGERY OFFICE NOTE      2024    Name: Adrien Tobin   : 1980  [id number]     REASON FOR VISIT:   Patient is a 43 year old male who is here for his post op visit s/p left radiocephalic AVF.  He reports minimal pain but no steal sx. Denies any erythema or drainage. Has minor edema.     PAST MEDICAL HISTORY:     Past Medical History:   Diagnosis Date    CKD (chronic kidney disease)     Diabetes (HCC)     Elevated liver enzymes 2016    Elevated serum GGT level 2016    High blood pressure     High cholesterol     History of blood transfusion     2023    Hyperlipidemia     HYPERTRIGLYCERIDEMIA     Intractable nausea and vomiting 2024    OBESITY     Obesity     Pneumonia due to organism     40 years ago    PONV (postoperative nausea and vomiting)     Renal disorder     Visual impairment     GLASSES       PAST SURGICAL HISTORY:     Past Surgical History:   Procedure Laterality Date    ARTHROTOMY/EXPLORE/TREAT KNEE JOINT Left     DONE TWICE    CHOLECYSTECTOMY      COLONOSCOPY N/A 2024    Procedure: COLONOSCOPY with hot snare polypectomy;  Surgeon: Albino Hernandez MD;  Location:  ENDOSCOPY    DEBRIDE WOUND Left     HEEL    FOOT SURGERY      OTHER SURGICAL HISTORY  Knee & Foot        MEDICATIONS:     Current Outpatient Medications   Medication Sig Dispense Refill    HYDROcodone-acetaminophen (NORCO) 5-325 MG Oral Tab Take 1-2 tablets by mouth every 6 (six) hours as needed for Pain. Take with a stool softener 15 tablet 0    sodium bicarbonate 650 MG Oral Tab Take 1 tablet (650 mg total) by mouth 2 (two) times daily. 60 tablet 0    Insulin Pen Needle (BD PEN NEEDLE ODALIS 2ND GEN) 32G X 4 MM Does not apply Misc USE FOUR TIMES DAILY AS DIRECTED 400 each 2    cloNIDine 0.3 MG Oral Tab Take 1 tablet (0.3 mg total) by mouth 3 (three) times daily. 90 tablet 5    torsemide 20 MG Oral Tab Take 1  tablet (20 mg total) by mouth BID (Diuretic). 60 tablet 5    insulin detemir 100 UNIT/ML Subcutaneous Solution Pen-injector Inject 22 Units into the skin every morning.      aspirin (ASPIRIN EC LOW DOSE) 81 MG Oral Tab EC Take 1 tablet (81 mg total) by mouth daily. 90 tablet 0    amLODIPine 10 MG Oral Tab Take 1 tablet (10 mg total) by mouth daily. 90 tablet 0    EZETIMIBE-SIMVASTATIN 10-80 MG Oral Tab TAKE 1 TABLET BY MOUTH DAILY 90 tablet 3    HUMALOG KWIKPEN 100 UNIT/ML Subcutaneous Solution Pen-injector SLIDING SCALE  TID with meals MDD 15u (Patient taking differently: SLIDING SCALE  TID with meals MDD 15u) 15 mL 0    Dulaglutide (TRULICITY) 0.75 MG/0.5ML Subcutaneous Solution Pen-injector Inject 0.75 mg into the skin once a week. 7 mL 3       EXAM:   There were no vitals taken for this visit.    The left  arm incision has healed very well.  The fistula is patent with a nice thrill by palpation and using a stethoscope. It is a bit deep. It felt weak.  Has a non-palpable left radial pulse.    ASSESSMENT AND PLAN:   Diagnoses and all orders for this visit:    Malfunction of arteriovenous dialysis fistula, initial encounter ([facility])        The patient is s/p AVF creation for ESRD. The fistula is patent and the incision is healing well. Will allow time to mature.  Will see him in 4 weeks for evaluation with an ultrasound and physical exam to decide if the fistula is ready for use. The patient indicates an understanding of these issues and agrees to the plan.    Overall, he is very satisfied with his outcome.     Samer F. Najjar MD  Division of Vascular Surgery

## 2024-02-23 RX ORDER — EZETIMIBE AND SIMVASTATIN 10; 80 MG/1; MG/1
1 TABLET ORAL DAILY
Qty: 90 TABLET | Refills: 2 | Status: SHIPPED | OUTPATIENT
Start: 2024-02-23

## 2024-02-23 NOTE — TELEPHONE ENCOUNTER
Requested Prescriptions     Pending Prescriptions Disp Refills    EZETIMIBE-SIMVASTATIN 10-80 MG Oral Tab [Pharmacy Med Name: EZETIMIBE/SIMVASTATIN 10-80MG TABS] 90 tablet 3     Sig: TAKE 1 TABLET BY MOUTH DAILY     LOV: 11/27/23  RTC:   Last Relevant Labs: 2/5/24  Filled: 4/16/22  #90 with 3 refills    Future Appointments   Date Time Provider Department Center   3/1/2024  9:30 AM Royce Lewis DPM [id number]   3/11/2024  9:20 AM Justin Meyers MD [id number] [place]   4/4/2024 11:00 AM PF US RM2 PF [facility]

## 2024-02-27 ENCOUNTER — TELEPHONE (OUTPATIENT)
Dept: FAMILY MEDICINE CLINIC | Facility: CLINIC | Age: 44
End: 2024-02-27

## 2024-02-27 NOTE — TELEPHONE ENCOUNTER
Please complete a PA. Given his complicated diabetic hx it;s better to not change his regimen/meds.

## 2024-02-27 NOTE — TELEPHONE ENCOUNTER
Message from pharmacy via fax    Plan does not cover medications prescribed. Per Rx benefit plan anlternative medications included:     Semglee  Touojeo   Tresiba     Left in pcp's in box

## 2024-03-01 ENCOUNTER — TELEPHONE (OUTPATIENT)
Dept: FAMILY MEDICINE CLINIC | Facility: CLINIC | Age: 44
End: 2024-03-01

## 2024-03-01 NOTE — TELEPHONE ENCOUNTER
Isn’t he seeing Endo? If he is send them this medication issue so they can be aware of insulin change. Also ask the patient if he has insulin at home still until we figure this out. I don’t want him to be out of medication. Let me know.

## 2024-03-01 NOTE — TELEPHONE ENCOUNTER
Recd fax from [employer] for the Levemir Flexpen Injection. Plan does not cover medication. Alternative is Semglee Pen.

## 2024-03-01 NOTE — TELEPHONE ENCOUNTER
- Rec'd incoming fax request for prior authorization.    LEvemir Flexpen injection 3ML  Qty:30      Placed fax in MA folder.

## 2024-03-08 ENCOUNTER — OFFICE VISIT (OUTPATIENT)
Dept: PODIATRY CLINIC | Facility: CLINIC | Age: 44
End: 2024-03-08
Payer: COMMERCIAL

## 2024-03-08 ENCOUNTER — LAB ENCOUNTER (OUTPATIENT)
Dept: LAB | Age: 44
End: 2024-03-08
Attending: INTERNAL MEDICINE
Payer: COMMERCIAL

## 2024-03-08 DIAGNOSIS — B35.1 ONYCHOMYCOSIS: ICD-10-CM

## 2024-03-08 DIAGNOSIS — L84 CALLUS OF FOOT: ICD-10-CM

## 2024-03-08 DIAGNOSIS — N18.4 ANEMIA IN STAGE 4 CHRONIC KIDNEY DISEASE (HCC): ICD-10-CM

## 2024-03-08 DIAGNOSIS — M20.42 HAMMER TOES OF BOTH FEET: ICD-10-CM

## 2024-03-08 DIAGNOSIS — E11.42 DIABETIC POLYNEUROPATHY ASSOCIATED WITH TYPE 2 DIABETES MELLITUS (HCC): Primary | ICD-10-CM

## 2024-03-08 DIAGNOSIS — N18.4 STAGE 4 CHRONIC KIDNEY DISEASE (HCC): ICD-10-CM

## 2024-03-08 DIAGNOSIS — M20.41 HAMMER TOES OF BOTH FEET: ICD-10-CM

## 2024-03-08 DIAGNOSIS — M89.8X7 EXOSTOSIS OF BONE OF FOOT: ICD-10-CM

## 2024-03-08 DIAGNOSIS — N18.4 CKD (CHRONIC KIDNEY DISEASE) STAGE 4, GFR 15-29 ML/MIN (HCC): ICD-10-CM

## 2024-03-08 DIAGNOSIS — I10 ESSENTIAL HYPERTENSION: ICD-10-CM

## 2024-03-08 DIAGNOSIS — D63.1 ANEMIA IN STAGE 4 CHRONIC KIDNEY DISEASE (HCC): ICD-10-CM

## 2024-03-08 DIAGNOSIS — L84 PRE-ULCERATIVE CALLUSES: ICD-10-CM

## 2024-03-08 DIAGNOSIS — Z87.898 HISTORY OF ULCERATION: ICD-10-CM

## 2024-03-08 LAB
ALBUMIN SERPL-MCNC: 3.5 G/DL (ref 3.4–5)
ALBUMIN/GLOB SERPL: 1 {RATIO} (ref 1–2)
ALP LIVER SERPL-CCNC: 100 U/L
ALT SERPL-CCNC: 11 U/L
ANION GAP SERPL CALC-SCNC: 3 MMOL/L (ref 0–18)
AST SERPL-CCNC: 25 U/L (ref 15–37)
BASOPHILS # BLD AUTO: 0.03 X10(3) UL (ref 0–0.2)
BASOPHILS NFR BLD AUTO: 0.6 %
BILIRUB SERPL-MCNC: 0.4 MG/DL (ref 0.1–2)
BUN BLD-MCNC: 75 MG/DL (ref 9–23)
CALCIUM BLD-MCNC: 8.9 MG/DL (ref 8.5–10.1)
CHLORIDE SERPL-SCNC: 116 MMOL/L (ref 98–112)
CO2 SERPL-SCNC: 24 MMOL/L (ref 21–32)
CREAT BLD-MCNC: 5.22 MG/DL
EGFRCR SERPLBLD CKD-EPI 2021: 13 ML/MIN/1.73M2 (ref 60–?)
EOSINOPHIL # BLD AUTO: 0.1 X10(3) UL (ref 0–0.7)
EOSINOPHIL NFR BLD AUTO: 2.1 %
ERYTHROCYTE [DISTWIDTH] IN BLOOD BY AUTOMATED COUNT: 14.5 %
FASTING STATUS PATIENT QL REPORTED: YES
GLOBULIN PLAS-MCNC: 3.4 G/DL (ref 2.8–4.4)
GLUCOSE BLD-MCNC: 104 MG/DL (ref 70–99)
HBV SURFACE AG SER-ACNC: 0.5 [IU]/L
HBV SURFACE AG SERPL QL IA: NONREACTIVE
HCT VFR BLD AUTO: 24.6 %
HGB BLD-MCNC: 8 G/DL
IMM GRANULOCYTES # BLD AUTO: 0.02 X10(3) UL (ref 0–1)
IMM GRANULOCYTES NFR BLD: 0.4 %
LYMPHOCYTES # BLD AUTO: 1.05 X10(3) UL (ref 1–4)
LYMPHOCYTES NFR BLD AUTO: 22.3 %
MCH RBC QN AUTO: 30 PG (ref 26–34)
MCHC RBC AUTO-ENTMCNC: 32.5 G/DL (ref 31–37)
MCV RBC AUTO: 92.1 FL
MONOCYTES # BLD AUTO: 0.54 X10(3) UL (ref 0.1–1)
MONOCYTES NFR BLD AUTO: 11.5 %
NEUTROPHILS # BLD AUTO: 2.96 X10 (3) UL (ref 1.5–7.7)
NEUTROPHILS # BLD AUTO: 2.96 X10(3) UL (ref 1.5–7.7)
NEUTROPHILS NFR BLD AUTO: 63.1 %
OSMOLALITY SERPL CALC.SUM OF ELEC: 319 MOSM/KG (ref 275–295)
PHOSPHATE SERPL-MCNC: 5.2 MG/DL (ref 2.5–4.9)
PLATELET # BLD AUTO: 170 10(3)UL (ref 150–450)
POTASSIUM SERPL-SCNC: 5.6 MMOL/L (ref 3.5–5.1)
PROT SERPL-MCNC: 6.9 G/DL (ref 6.4–8.2)
RBC # BLD AUTO: 2.67 X10(6)UL
SODIUM SERPL-SCNC: 143 MMOL/L (ref 136–145)
WBC # BLD AUTO: 4.7 X10(3) UL (ref 4–11)

## 2024-03-08 PROCEDURE — 99213 OFFICE O/P EST LOW 20 MIN: CPT | Performed by: STUDENT IN AN ORGANIZED HEALTH CARE EDUCATION/TRAINING PROGRAM

## 2024-03-08 PROCEDURE — 80053 COMPREHEN METABOLIC PANEL: CPT | Performed by: INTERNAL MEDICINE

## 2024-03-08 PROCEDURE — 84100 ASSAY OF PHOSPHORUS: CPT | Performed by: INTERNAL MEDICINE

## 2024-03-08 PROCEDURE — 85025 COMPLETE CBC W/AUTO DIFF WBC: CPT | Performed by: INTERNAL MEDICINE

## 2024-03-08 PROCEDURE — 87340 HEPATITIS B SURFACE AG IA: CPT | Performed by: INTERNAL MEDICINE

## 2024-03-09 NOTE — PROGRESS NOTES
[facility] Podiatry  Progress Note    Adrien Tobin is a 43 year old male.   Chief Complaint   Patient presents with    Follow - Up     Left foot -denies pain         HPI:     Patient is a pleasant 43-year-old male with past medical history of recurrent heel ulcerations, diabetes, and CKD returns to clinic for foot exam.  He continues to ambulate with accommodative inserts and shoes and relays that he is tolerating this well.  He presents today for follow-up of preulcerative callus/blister in his left posterior heel.  He also has elongated and thickened nails he has difficulty trimming on his own.        Allergies: Patient has no known allergies.   Current Outpatient Medications   Medication Sig Dispense Refill    ezetimibe-simvastatin 10-80 MG Oral Tab TAKE 1 TABLET BY MOUTH DAILY 90 tablet 2    HYDROcodone-acetaminophen (NORCO) 5-325 MG Oral Tab Take 1-2 tablets by mouth every 6 (six) hours as needed for Pain. Take with a stool softener 15 tablet 0    sodium bicarbonate 650 MG Oral Tab Take 1 tablet (650 mg total) by mouth 2 (two) times daily. 60 tablet 0    Insulin Pen Needle (BD PEN NEEDLE ODALIS 2ND GEN) 32G X 4 MM Does not apply Misc USE FOUR TIMES DAILY AS DIRECTED 400 each 2    cloNIDine 0.3 MG Oral Tab Take 1 tablet (0.3 mg total) by mouth 3 (three) times daily. 90 tablet 5    torsemide 20 MG Oral Tab Take 1 tablet (20 mg total) by mouth BID (Diuretic). 60 tablet 5    insulin detemir 100 UNIT/ML Subcutaneous Solution Pen-injector Inject 22 Units into the skin every morning.      aspirin (ASPIRIN EC LOW DOSE) 81 MG Oral Tab EC Take 1 tablet (81 mg total) by mouth daily. 90 tablet 0    amLODIPine 10 MG Oral Tab Take 1 tablet (10 mg total) by mouth daily. 90 tablet 0    HUMALOG KWIKPEN 100 UNIT/ML Subcutaneous Solution Pen-injector SLIDING SCALE  TID with meals MDD 15u (Patient taking differently: SLIDING SCALE  TID with meals MDD 15u) 15 mL 0    Dulaglutide (TRULICITY) 0.75 MG/0.5ML Subcutaneous  Solution Pen-injector Inject 0.75 mg into the skin once a week. 7 mL 3      Past Medical History:   Diagnosis Date    CKD (chronic kidney disease)     Diabetes (HCC)     Elevated liver enzymes 08/30/2016    Elevated serum GGT level 08/30/2016    High blood pressure     High cholesterol     History of blood transfusion     2023 November    Hyperlipidemia     HYPERTRIGLYCERIDEMIA     Intractable nausea and vomiting 01/04/2024    OBESITY     Obesity     Pneumonia due to organism     40 years ago    PONV (postoperative nausea and vomiting)     Renal disorder     Visual impairment     GLASSES      Past Surgical History:   Procedure Laterality Date    ARTHROTOMY/EXPLORE/TREAT KNEE JOINT Left     DONE TWICE    CHOLECYSTECTOMY      COLONOSCOPY N/A 1/23/2024    Procedure: COLONOSCOPY with hot snare polypectomy;  Surgeon: Albino Hernandez MD;  Location:  ENDOSCOPY    DEBRIDE WOUND Left     HEEL    FOOT SURGERY      OTHER SURGICAL HISTORY  Knee & Foot      Family History   Problem Relation Age of Onset    Diabetes Father     Heart Disorder Father     Diabetes Mother     Hypertension Sister       Social History     Socioeconomic History    Marital status:    Tobacco Use    Smoking status: Never    Smokeless tobacco: Never   Vaping Use    Vaping Use: Never used   Substance and Sexual Activity    Alcohol use: Yes     Alcohol/week: 1.0 standard drink of alcohol     Types: 1 Standard drinks or equivalent per week     Comment: socially    Drug use: No   Other Topics Concern    Caffeine Concern No    Exercise No    Seat Belt Yes    Special Diet Yes    Stress Concern No    Weight Concern No           REVIEW OF SYSTEMS:     Today reviewed systems as documented below  GENERAL HEALTH: feels well otherwise  SKIN: denies any unusual skin lesions or rashes  RESPIRATORY: denies shortness of breath with exertion  CARDIOVASCULAR: denies chest pain on exertion  GI: denies abdominal pain and denies heartburn  NEURO: denies  headaches  MUSCULO: denies arthritis, back pain      EXAM:   There were no vitals taken for this visit.  GENERAL: well developed, well nourished, in no apparent distress  EXTREMITIES:     1. Integument: Normal skin temperature and turgor.  Site of prior ulceration to left plantar lateral calcaneus remains well-healed.  HPK noted to posterior heel.  Nails are elongated and thickened.  2. Vascular: Dorsalis pedis and posterior tibial pulses are lightly palpable.  CFT intact digits.     3. Musculoskeletal: Muscle strength intact to left lower extremity with decreased plantar flexion which is patient's baseline.  There is no longer plantar prominence of calcaneus that was noted preoperatively.  Compartments are soft and compressible.  Flexion contracture of digits bilaterally.   4. Neurological: Normal sharp dull sensation; reflexes normal.  Decreased protective sensation noted to lower extremities.        ASSESSMENT AND PLAN:   Diagnoses and all orders for this visit:    Diabetic polyneuropathy associated with type 2 diabetes mellitus (HCC)    Pre-ulcerative calluses    Stage 4 chronic kidney disease (HCC)    History of ulceration    Callus of foot    Exostosis of bone of foot    Onychomycosis    Hammer toes of both feet          Plan:   -Patient examined, chart history reviewed.  -Inspected surgical site of left foot--site is well-healed and without acute signs of infection or other concerns.  -Pared HPK to healthy tissue without incident.  Continue urea cream to site daily.  -Sharply debrided nails x 10 with nail nipper without incident.  Nails smoothed with dremel.  -Educated patient on acute signs of infection and symptoms of DVT and advised patient to seek immediate medical attention if any concerns arise.  Patient expressed understanding.     The patient indicates understanding of these issues and agrees to the plan.  RTC 4 to 6 weeks.    Royce Lewis DPM

## 2024-03-11 ENCOUNTER — OFFICE VISIT (OUTPATIENT)
Dept: NEPHROLOGY | Facility: CLINIC | Age: 44
End: 2024-03-11
Payer: COMMERCIAL

## 2024-03-11 VITALS — BODY MASS INDEX: 34 KG/M2 | SYSTOLIC BLOOD PRESSURE: 152 MMHG | WEIGHT: 226 LBS | DIASTOLIC BLOOD PRESSURE: 78 MMHG

## 2024-03-11 DIAGNOSIS — D63.1 ANEMIA IN STAGE 4 CHRONIC KIDNEY DISEASE (HCC): ICD-10-CM

## 2024-03-11 DIAGNOSIS — N18.4 CKD (CHRONIC KIDNEY DISEASE) STAGE 4, GFR 15-29 ML/MIN (HCC): Primary | ICD-10-CM

## 2024-03-11 DIAGNOSIS — N18.4 ANEMIA IN STAGE 4 CHRONIC KIDNEY DISEASE (HCC): ICD-10-CM

## 2024-03-11 DIAGNOSIS — I10 ESSENTIAL HYPERTENSION: ICD-10-CM

## 2024-03-11 PROCEDURE — 3077F SYST BP >= 140 MM HG: CPT | Performed by: INTERNAL MEDICINE

## 2024-03-11 PROCEDURE — 3078F DIAST BP <80 MM HG: CPT | Performed by: INTERNAL MEDICINE

## 2024-03-11 PROCEDURE — 99214 OFFICE O/P EST MOD 30 MIN: CPT | Performed by: INTERNAL MEDICINE

## 2024-03-11 NOTE — PROGRESS NOTES
Nephrology Progress Note      Adrien Tobin is a 43 year old male.    HPI:     Chief Complaint   Patient presents with    Chronic Kidney Disease    Hypertension    Anemia       Mr. Tobin was seen in the nephrology clinic today in follow-up for management of chronic kidney disease stage IV-V secondary to diabetes and hypertension in the setting of a history of severe acute kidney injury due to biopsy-proven ATN.  Since his last clinic visit he reports that overall he has felt well.  He did have initial transplant evaluation at [facility] and is following up there with testing later this month.  In addition he underwent left wrist AV fistula placement and is following up closely with vascular surgery.  He is feeling well and the review of systems is otherwise unremarkable.      HISTORY:  Past Medical History:   Diagnosis Date    CKD (chronic kidney disease)     Diabetes (HCC)     Elevated liver enzymes 08/30/2016    Elevated serum GGT level 08/30/2016    High blood pressure     High cholesterol     History of blood transfusion     2023 November    Hyperlipidemia     HYPERTRIGLYCERIDEMIA     Intractable nausea and vomiting 01/04/2024    OBESITY     Obesity     Pneumonia due to organism     40 years ago    PONV (postoperative nausea and vomiting)     Renal disorder     Visual impairment     GLASSES      Past Surgical History:   Procedure Laterality Date    ARTHROTOMY/EXPLORE/TREAT KNEE JOINT Left     DONE TWICE    CHOLECYSTECTOMY      COLONOSCOPY N/A 1/23/2024    Procedure: COLONOSCOPY with hot snare polypectomy;  Surgeon: Albino Hernandez MD;  Location:  ENDOSCOPY    DEBRIDE WOUND Left     HEEL    FOOT SURGERY      OTHER SURGICAL HISTORY  Knee & Foot      Family History   Problem Relation Age of Onset    Diabetes Father     Heart Disorder Father     Diabetes Mother     Hypertension Sister       Social History:   Social History     Socioeconomic History    Marital status:    Tobacco Use    Smoking status:  Never    Smokeless tobacco: Never   Vaping Use    Vaping Use: Never used   Substance and Sexual Activity    Alcohol use: Yes     Alcohol/week: 1.0 standard drink of alcohol     Types: 1 Standard drinks or equivalent per week     Comment: socially    Drug use: No   Other Topics Concern    Caffeine Concern No    Exercise No    Seat Belt Yes    Special Diet Yes    Stress Concern No    Weight Concern No     Social Determinants of Health     Financial Resource Strain: Low Risk  (11/17/2023)    Financial Resource Strain     Difficulty of Paying Living Expenses: Not very hard     Med Affordability: No   Food Insecurity: No Food Insecurity (1/4/2024)    Food Insecurity     Food Insecurity: Never true   Transportation Needs: No Transportation Needs (1/4/2024)    Transportation Needs     Lack of Transportation: No   Housing Stability: Low Risk  (1/4/2024)    Housing Stability     Housing Instability: No        Medications (Active prior to today's visit):  Current Outpatient Medications   Medication Sig Dispense Refill    ezetimibe-simvastatin 10-80 MG Oral Tab TAKE 1 TABLET BY MOUTH DAILY 90 tablet 2    sodium bicarbonate 650 MG Oral Tab Take 1 tablet (650 mg total) by mouth 2 (two) times daily. 60 tablet 0    Insulin Pen Needle (BD PEN NEEDLE ODALIS 2ND GEN) 32G X 4 MM Does not apply Misc USE FOUR TIMES DAILY AS DIRECTED 400 each 2    cloNIDine 0.3 MG Oral Tab Take 1 tablet (0.3 mg total) by mouth 3 (three) times daily. 90 tablet 5    torsemide 20 MG Oral Tab Take 1 tablet (20 mg total) by mouth BID (Diuretic). 60 tablet 5    insulin detemir 100 UNIT/ML Subcutaneous Solution Pen-injector Inject 22 Units into the skin every morning.      aspirin (ASPIRIN EC LOW DOSE) 81 MG Oral Tab EC Take 1 tablet (81 mg total) by mouth daily. 90 tablet 0    amLODIPine 10 MG Oral Tab Take 1 tablet (10 mg total) by mouth daily. 90 tablet 0    HUMALOG KWIKPEN 100 UNIT/ML Subcutaneous Solution Pen-injector SLIDING SCALE  TID with meals MDD 15u  (Patient taking differently: SLIDING SCALE  TID with meals MDD 15u) 15 mL 0    Dulaglutide (TRULICITY) 0.75 MG/0.5ML Subcutaneous Solution Pen-injector Inject 0.75 mg into the skin once a week. 7 mL 3    HYDROcodone-acetaminophen (NORCO) 5-325 MG Oral Tab Take 1-2 tablets by mouth every 6 (six) hours as needed for Pain. Take with a stool softener (Patient not taking: Reported on 3/11/2024) 15 tablet 0       Allergies:  No Known Allergies      ROS:     Denies fever/chills  Denies wt loss/gain  Denies HA or visual changes  Denies CP or palpitations  Denies SOB/cough/hemoptysis  Denies abd or flank pain  Denies N/V/D  Denies change in urinary habits or gross hematuria  + Occasional LE edema  Denies skin rashes/myalgias/arthralgias      PHYSICAL EXAM:   /78   Wt 226 lb (102.5 kg)   BMI 34.36 kg/m²   Wt Readings from Last 6 Encounters:   03/11/24 226 lb (102.5 kg)   02/09/24 218 lb (98.9 kg)   01/23/24 210 lb (95.3 kg)   01/11/24 216 lb (98 kg)   01/08/24 216 lb 9 oz (98.2 kg)   01/04/24 203 lb 8 oz (92.3 kg)       General: Alert and oriented in no apparent distress.  HEENT: No scleral icterus, MMM  Neck: Supple, no ITZ or thyromegaly  Cardiac: Regular rate and rhythm, S1, S2 normal, no murmur or rub  Lungs: Clear without wheezes, rales, rhonchi.    Extremities: Trace ankle edema.  Left wrist AV fistula with bruit and thrill  Neurologic:  moving all extremities  Skin: Warm and dry, no rashes      LABS:     Lab Results   Component Value Date    BUN 75 (H) 03/08/2024    BUNCREA 12.9 05/08/2021    CREATSERUM 5.22 (H) 03/08/2024    ANIONGAP 3 03/08/2024    GFR 90 11/22/2017    GFRNAA 26 (L) 06/17/2022    GFRAA 30 (L) 06/17/2022    CA 8.9 03/08/2024    OSMOCALC 319 (H) 03/08/2024    ALKPHO 100 03/08/2024    AST 25 03/08/2024    ALT 11 (L) 03/08/2024    BILT 0.4 03/08/2024    TP 6.9 03/08/2024    ALB 3.5 03/08/2024    GLOBULIN 3.4 03/08/2024    AGRATIO 1.5 04/21/2014     03/08/2024    K 5.6 (H) 03/08/2024    CL  116 (H) 03/08/2024    CO2 24.0 03/08/2024     Lab Results   Component Value Date    RBC 2.67 (L) 03/08/2024    HGB 8.0 (L) 03/08/2024    HCT 24.6 (L) 03/08/2024    .0 03/08/2024    MCV 92.1 03/08/2024    MCH 30.0 03/08/2024    MCHC 32.5 03/08/2024    RDW 14.5 03/08/2024    NEPRELIM 2.96 03/08/2024    NEPERCENT 63.1 03/08/2024    LYPERCENT 22.3 03/08/2024    MOPERCENT 11.5 03/08/2024    EOPERCENT 2.1 03/08/2024    BAPERCENT 0.6 03/08/2024    NE 2.96 03/08/2024    LYMABS 1.05 03/08/2024    MOABSO 0.54 03/08/2024    EOABSO 0.10 03/08/2024    BAABSO 0.03 03/08/2024     Lab Results   Component Value Date    CREUR 78.30 11/13/2023     Lab Results   Component Value Date    CLARITY Clear 11/13/2023    SPECGRAVITY 1.010 11/13/2023    GLUUR 50 (A) 11/13/2023    BILUR Negative 11/13/2023    KETUR Negative 11/13/2023    BLOODURINE 1+ (A) 11/13/2023    PHURINE 6.0 11/13/2023    PROUR 200 (A) 11/13/2023    UROBILINOGEN Normal 11/13/2023    NITRITE Negative 11/13/2023    LEUUR Negative 11/13/2023    NMIC Microscopic not indicated 07/12/2018    WBCUR 1-5 11/13/2023    RBCUR 6-10 (A) 11/13/2023    EPIUR None Seen 11/13/2023    BACUR None Seen 11/13/2023    HYLUR Present (A) 11/13/2023     ASSESSMENT/PLAN:     #1.  Chronic kidney disease stage IV-V-this has been longstanding and somewhat rapidly progressive over the past year or so.  This is in the setting of diabetes and hypertension as well as a history of a severe episode of acute tubular necrosis (biopsy-proven).  Fortunately he has had initial transplant evaluation and continues to follow closely with [facility].  He was also seen by vascular surgery and AV fistula was placed.  He is currently not in need of renal replacement therapy although we will continue to monitor very closely and plan to start dialysis at some point likely within the next 6 to 12 months.  Clearly we will dialyze him sooner than this should he develop symptomatic uremia    #2.   Hypertension-blood pressure remains relatively stable overall and he will continue amlodipine, clonidine and torsemide    #3.  Anemia-hemoglobin was noted to be lower this month although he did have extensive blood draw done at the transplant center.  Will consider starting him on LATONIA therapy pending a repeat hemoglobin level    Thank you again for allowing me to participate in care of your patient.  Please do not hesitate to call with any question or concerns.      Justin Meyers MD  3/11/2024  9:24 AM

## 2024-03-13 ENCOUNTER — TELEPHONE (OUTPATIENT)
Dept: NEPHROLOGY | Facility: CLINIC | Age: 44
End: 2024-03-13

## 2024-03-13 DIAGNOSIS — N18.5 CKD (CHRONIC KIDNEY DISEASE) STAGE 5, GFR LESS THAN 15 ML/MIN (HCC): Primary | ICD-10-CM

## 2024-03-13 DIAGNOSIS — N18.5 ANEMIA IN STAGE 5 CHRONIC KIDNEY DISEASE, NOT ON CHRONIC DIALYSIS (HCC): ICD-10-CM

## 2024-03-13 DIAGNOSIS — D63.1 ANEMIA IN STAGE 5 CHRONIC KIDNEY DISEASE, NOT ON CHRONIC DIALYSIS (HCC): ICD-10-CM

## 2024-03-13 NOTE — TELEPHONE ENCOUNTER
I called pt this afternoon.  Labs are notable for worsening anemia and we had discussed initiation of Aranesp in the past.  We will reach out for approval from his insurance company to start him on monthly Aranesp injections.  And I did mention that he will have to have monthly blood work performed and we will give Aranesp for goal hemoglobin of 10 to 11 g.

## 2024-04-08 ENCOUNTER — PATIENT MESSAGE (OUTPATIENT)
Dept: FAMILY MEDICINE CLINIC | Facility: CLINIC | Age: 44
End: 2024-04-08

## 2024-04-08 DIAGNOSIS — E11.8 CONTROLLED TYPE 2 DIABETES MELLITUS WITH COMPLICATION, WITHOUT LONG-TERM CURRENT USE OF INSULIN (HCC): Primary | ICD-10-CM

## 2024-04-08 LAB — AMB EXT HGBA1C: 7.5 %

## 2024-04-08 NOTE — TELEPHONE ENCOUNTER
From: Adrien Tobin  To: Jimbo Sullivan  Sent: 4/8/2024 7:41 AM CDT  Subject: Endocrinologist referral    Can you please refer me to the endocrinologist that we discussed at our last appointment?    Gilbert

## 2024-04-22 ENCOUNTER — TELEPHONE (OUTPATIENT)
Dept: FAMILY MEDICINE CLINIC | Facility: CLINIC | Age: 44
End: 2024-04-22

## 2024-04-27 ENCOUNTER — LAB ENCOUNTER (OUTPATIENT)
Dept: LAB | Age: 44
End: 2024-04-27
Attending: INTERNAL MEDICINE
Payer: COMMERCIAL

## 2024-04-27 DIAGNOSIS — N18.5 CKD (CHRONIC KIDNEY DISEASE) STAGE 5, GFR LESS THAN 15 ML/MIN (HCC): ICD-10-CM

## 2024-04-27 DIAGNOSIS — D63.1 ANEMIA IN STAGE 4 CHRONIC KIDNEY DISEASE (HCC): ICD-10-CM

## 2024-04-27 DIAGNOSIS — N18.4 CKD (CHRONIC KIDNEY DISEASE) STAGE 4, GFR 15-29 ML/MIN (HCC): ICD-10-CM

## 2024-04-27 DIAGNOSIS — N18.5 ANEMIA IN STAGE 5 CHRONIC KIDNEY DISEASE, NOT ON CHRONIC DIALYSIS (HCC): ICD-10-CM

## 2024-04-27 DIAGNOSIS — N18.4 ANEMIA IN STAGE 4 CHRONIC KIDNEY DISEASE (HCC): ICD-10-CM

## 2024-04-27 DIAGNOSIS — D63.1 ANEMIA IN STAGE 5 CHRONIC KIDNEY DISEASE, NOT ON CHRONIC DIALYSIS (HCC): ICD-10-CM

## 2024-04-27 DIAGNOSIS — I10 ESSENTIAL HYPERTENSION: ICD-10-CM

## 2024-04-27 LAB
ALBUMIN SERPL-MCNC: 3.4 G/DL (ref 3.4–5)
ALBUMIN/GLOB SERPL: 0.9 {RATIO} (ref 1–2)
ALP LIVER SERPL-CCNC: 107 U/L
ALT SERPL-CCNC: 14 U/L
ANION GAP SERPL CALC-SCNC: 11 MMOL/L (ref 0–18)
AST SERPL-CCNC: 24 U/L (ref 15–37)
BASOPHILS # BLD AUTO: 0.04 X10(3) UL (ref 0–0.2)
BASOPHILS NFR BLD AUTO: 0.7 %
BILIRUB SERPL-MCNC: 0.3 MG/DL (ref 0.1–2)
BUN BLD-MCNC: 99 MG/DL (ref 9–23)
CALCIUM BLD-MCNC: 8.4 MG/DL (ref 8.5–10.1)
CHLORIDE SERPL-SCNC: 115 MMOL/L (ref 98–112)
CO2 SERPL-SCNC: 18 MMOL/L (ref 21–32)
CREAT BLD-MCNC: 8.58 MG/DL
DEPRECATED HBV CORE AB SER IA-ACNC: 336 NG/ML
EGFRCR SERPLBLD CKD-EPI 2021: 7 ML/MIN/1.73M2 (ref 60–?)
EOSINOPHIL # BLD AUTO: 0.22 X10(3) UL (ref 0–0.7)
EOSINOPHIL NFR BLD AUTO: 4.1 %
ERYTHROCYTE [DISTWIDTH] IN BLOOD BY AUTOMATED COUNT: 12.9 %
FASTING STATUS PATIENT QL REPORTED: YES
GLOBULIN PLAS-MCNC: 4 G/DL (ref 2.8–4.4)
GLUCOSE BLD-MCNC: 142 MG/DL (ref 70–99)
HCT VFR BLD AUTO: 24.3 %
HGB BLD-MCNC: 8 G/DL
IMM GRANULOCYTES # BLD AUTO: 0.01 X10(3) UL (ref 0–1)
IMM GRANULOCYTES NFR BLD: 0.2 %
IRON SATN MFR SERPL: 28 %
IRON SERPL-MCNC: 82 UG/DL
LYMPHOCYTES # BLD AUTO: 1.23 X10(3) UL (ref 1–4)
LYMPHOCYTES NFR BLD AUTO: 22.7 %
MCH RBC QN AUTO: 30.9 PG (ref 26–34)
MCHC RBC AUTO-ENTMCNC: 32.9 G/DL (ref 31–37)
MCV RBC AUTO: 93.8 FL
MONOCYTES # BLD AUTO: 0.53 X10(3) UL (ref 0.1–1)
MONOCYTES NFR BLD AUTO: 9.8 %
NEUTROPHILS # BLD AUTO: 3.4 X10 (3) UL (ref 1.5–7.7)
NEUTROPHILS # BLD AUTO: 3.4 X10(3) UL (ref 1.5–7.7)
NEUTROPHILS NFR BLD AUTO: 62.5 %
OSMOLALITY SERPL CALC.SUM OF ELEC: 331 MOSM/KG (ref 275–295)
PLATELET # BLD AUTO: 158 10(3)UL (ref 150–450)
POTASSIUM SERPL-SCNC: 5.7 MMOL/L (ref 3.5–5.1)
PROT SERPL-MCNC: 7.4 G/DL (ref 6.4–8.2)
RBC # BLD AUTO: 2.59 X10(6)UL
SODIUM SERPL-SCNC: 144 MMOL/L (ref 136–145)
TIBC SERPL-MCNC: 289 UG/DL (ref 240–450)
TRANSFERRIN SERPL-MCNC: 194 MG/DL (ref 200–360)
WBC # BLD AUTO: 5.4 X10(3) UL (ref 4–11)

## 2024-04-27 PROCEDURE — 80053 COMPREHEN METABOLIC PANEL: CPT

## 2024-04-27 PROCEDURE — 83550 IRON BINDING TEST: CPT

## 2024-04-27 PROCEDURE — 85025 COMPLETE CBC W/AUTO DIFF WBC: CPT

## 2024-04-27 PROCEDURE — 83540 ASSAY OF IRON: CPT

## 2024-04-27 PROCEDURE — 82728 ASSAY OF FERRITIN: CPT

## 2024-05-01 ENCOUNTER — OFFICE VISIT (OUTPATIENT)
Dept: PODIATRY CLINIC | Facility: CLINIC | Age: 44
End: 2024-05-01
Payer: COMMERCIAL

## 2024-05-01 DIAGNOSIS — N18.4 STAGE 4 CHRONIC KIDNEY DISEASE (HCC): ICD-10-CM

## 2024-05-01 DIAGNOSIS — B35.1 ONYCHOMYCOSIS: ICD-10-CM

## 2024-05-01 DIAGNOSIS — L84 PRE-ULCERATIVE CALLUSES: ICD-10-CM

## 2024-05-01 DIAGNOSIS — M20.41 HAMMER TOES OF BOTH FEET: ICD-10-CM

## 2024-05-01 DIAGNOSIS — M89.8X7 EXOSTOSIS OF BONE OF FOOT: ICD-10-CM

## 2024-05-01 DIAGNOSIS — Z87.898 HISTORY OF ULCERATION: ICD-10-CM

## 2024-05-01 DIAGNOSIS — L84 CALLUS OF FOOT: ICD-10-CM

## 2024-05-01 DIAGNOSIS — M20.42 HAMMER TOES OF BOTH FEET: ICD-10-CM

## 2024-05-01 DIAGNOSIS — E11.42 DIABETIC POLYNEUROPATHY ASSOCIATED WITH TYPE 2 DIABETES MELLITUS (HCC): Primary | ICD-10-CM

## 2024-05-01 PROCEDURE — 99213 OFFICE O/P EST LOW 20 MIN: CPT | Performed by: STUDENT IN AN ORGANIZED HEALTH CARE EDUCATION/TRAINING PROGRAM

## 2024-05-05 NOTE — PROGRESS NOTES
[facility] Podiatry  Progress Note    Adrien Tobin is a 44 year old male.   Chief Complaint   Patient presents with    Follow - Up     Left foot- patient denies pain          HPI:     Patient is a pleasant 44-year-old male with past medical history of recurrent heel ulcerations, diabetes, and CKD returns to clinic for foot exam.  He continues to ambulate with accommodative inserts and shoes and relays that he is tolerating this well.  He presents today for follow-up of preulcerative callus/blister in his left posterior heel.  He has new small superficial scab under right great toe. He also has elongated and thickened nails he has difficulty trimming on his own.  No other complaints are mentioned.      Allergies: Patient has no known allergies.   Current Outpatient Medications   Medication Sig Dispense Refill    ezetimibe-simvastatin 10-80 MG Oral Tab TAKE 1 TABLET BY MOUTH DAILY 90 tablet 2    HYDROcodone-acetaminophen (NORCO) 5-325 MG Oral Tab Take 1-2 tablets by mouth every 6 (six) hours as needed for Pain. Take with a stool softener 15 tablet 0    sodium bicarbonate 650 MG Oral Tab Take 1 tablet (650 mg total) by mouth 2 (two) times daily. 60 tablet 0    Insulin Pen Needle (BD PEN NEEDLE ODALIS 2ND GEN) 32G X 4 MM Does not apply Misc USE FOUR TIMES DAILY AS DIRECTED 400 each 2    cloNIDine 0.3 MG Oral Tab Take 1 tablet (0.3 mg total) by mouth 3 (three) times daily. 90 tablet 5    torsemide 20 MG Oral Tab Take 1 tablet (20 mg total) by mouth BID (Diuretic). 60 tablet 5    insulin detemir 100 UNIT/ML Subcutaneous Solution Pen-injector Inject 22 Units into the skin every morning.      aspirin (ASPIRIN EC LOW DOSE) 81 MG Oral Tab EC Take 1 tablet (81 mg total) by mouth daily. 90 tablet 0    amLODIPine 10 MG Oral Tab Take 1 tablet (10 mg total) by mouth daily. 90 tablet 0    HUMALOG KWIKPEN 100 UNIT/ML Subcutaneous Solution Pen-injector SLIDING SCALE  TID with meals MDD 15u (Patient taking differently:  SLIDING SCALE  TID with meals MDD 15u) 15 mL 0    Dulaglutide (TRULICITY) 0.75 MG/0.5ML Subcutaneous Solution Pen-injector Inject 0.75 mg into the skin once a week. 7 mL 3      Past Medical History:    CKD (chronic kidney disease)    Diabetes (HCC)    Elevated liver enzymes    Elevated serum GGT level    High blood pressure    High cholesterol    History of blood transfusion    2023 November    Hyperlipidemia    HYPERTRIGLYCERIDEMIA    Intractable nausea and vomiting    OBESITY    Obesity    Pneumonia due to organism    40 years ago    PONV (postoperative nausea and vomiting)    Renal disorder    Visual impairment    GLASSES      Past Surgical History:   Procedure Laterality Date    Arthrotomy/explore/treat knee joint Left     DONE TWICE    Cholecystectomy      Colonoscopy N/A 1/23/2024    Procedure: COLONOSCOPY with hot snare polypectomy;  Surgeon: Albino Hernandez MD;  Location:  ENDOSCOPY    Debride wound Left     HEEL    Foot surgery      Other surgical history  Knee & Foot      Family History   Problem Relation Age of Onset    Diabetes Father     Heart Disorder Father     Diabetes Mother     Hypertension Sister       Social History     Socioeconomic History    Marital status:    Tobacco Use    Smoking status: Never    Smokeless tobacco: Never   Vaping Use    Vaping status: Never Used   Substance and Sexual Activity    Alcohol use: Yes     Alcohol/week: 1.0 standard drink of alcohol     Types: 1 Standard drinks or equivalent per week     Comment: socially    Drug use: No   Other Topics Concern    Caffeine Concern No    Exercise No    Seat Belt Yes    Special Diet Yes    Stress Concern No    Weight Concern No           REVIEW OF SYSTEMS:     Today reviewed systems as documented below  GENERAL HEALTH: feels well otherwise  SKIN: denies any unusual skin lesions or rashes  RESPIRATORY: denies shortness of breath with exertion  CARDIOVASCULAR: denies chest pain on exertion  GI: denies abdominal pain and  denies heartburn  NEURO: denies headaches  MUSCULO: denies arthritis, back pain      EXAM:   There were no vitals taken for this visit.  GENERAL: well developed, well nourished, in no apparent distress  EXTREMITIES:     1. Integument: Normal skin temperature and turgor.  Site of prior ulceration to left plantar lateral calcaneus remains well-healed.  HPK noted to posterior heel.  Nails are elongated and thickened. Mild dry scab under right great toe.  2. Vascular: Dorsalis pedis and posterior tibial pulses are lightly palpable.  CFT intact digits.     3. Musculoskeletal: Muscle strength intact to left lower extremity with decreased plantar flexion which is patient's baseline.  There is no longer plantar prominence of calcaneus that was noted preoperatively.  Compartments are soft and compressible.  Flexion contracture of digits bilaterally.   4. Neurological: Normal sharp dull sensation; reflexes normal.  Decreased protective sensation noted to lower extremities.        ASSESSMENT AND PLAN:   Diagnoses and all orders for this visit:    Diabetic polyneuropathy associated with type 2 diabetes mellitus (HCC)    Pre-ulcerative calluses    Stage 4 chronic kidney disease (HCC)    History of ulceration    Callus of foot    Exostosis of bone of foot    Onychomycosis    Hammer toes of both feet          Plan:   -Patient examined, chart history reviewed.  -Inspected surgical site of left foot--site is well-healed and without acute signs of infection or other concerns.  -Pared HPK to healthy tissue without incident.  Continue urea cream to site daily.  -Sharply debrided nails x 10 with nail nipper without incident.  Nails smoothed with dremel.  -Can monitor right great toe--very superficial scab--can left fall off on its own. Patient to seek immediate medical attention if any concerns arise to site.  -Educated patient on acute signs of infection and symptoms of DVT and advised patient to seek immediate medical attention if any  concerns arise.  Patient expressed understanding.     The patient indicates understanding of these issues and agrees to the plan.  RTC 6 weeks.    Royce Lewis DPM

## 2024-05-13 ENCOUNTER — OFFICE VISIT (OUTPATIENT)
Dept: NEPHROLOGY | Facility: CLINIC | Age: 44
End: 2024-05-13
Payer: COMMERCIAL

## 2024-05-13 VITALS — WEIGHT: 221.5 LBS | BODY MASS INDEX: 34 KG/M2 | SYSTOLIC BLOOD PRESSURE: 146 MMHG | DIASTOLIC BLOOD PRESSURE: 90 MMHG

## 2024-05-13 DIAGNOSIS — D63.1 ANEMIA IN STAGE 5 CHRONIC KIDNEY DISEASE, NOT ON CHRONIC DIALYSIS (HCC): ICD-10-CM

## 2024-05-13 DIAGNOSIS — N18.5 CKD (CHRONIC KIDNEY DISEASE) STAGE 5, GFR LESS THAN 15 ML/MIN (HCC): Primary | ICD-10-CM

## 2024-05-13 DIAGNOSIS — I10 ESSENTIAL HYPERTENSION: ICD-10-CM

## 2024-05-13 DIAGNOSIS — N18.5 ANEMIA IN STAGE 5 CHRONIC KIDNEY DISEASE, NOT ON CHRONIC DIALYSIS (HCC): ICD-10-CM

## 2024-05-13 RX ORDER — LIDOCAINE AND PRILOCAINE 25; 25 MG/G; MG/G
1 CREAM TOPICAL
Qty: 1 EACH | Refills: 11 | Status: SHIPPED | OUTPATIENT
Start: 2024-05-13

## 2024-05-13 RX ORDER — ATORVASTATIN CALCIUM 40 MG/1
40 TABLET, FILM COATED ORAL DAILY
COMMUNITY
Start: 2024-04-08

## 2024-05-13 NOTE — PROGRESS NOTES
Nephrology Progress Note      Adrien Tobin is a 44 year old male.    HPI:     Chief Complaint   Patient presents with    Chronic Kidney Disease    Hypertension       Mr. Tobin was seen in the nephrology clinic today in follow-up for management of chronic kidney disease stage V in the setting of diabetes and hypertension with a history of severe ATN (biopsy-proven).  Since his last clinic visit he reports that overall he has felt well and has not had any illnesses or hospitalizations.  He denies nausea or vomiting, metallic taste in his mouth, lack of appetite, shortness of breath, pruritus or muscle cramping.  He has not had any lower extremity swelling.  Over the past month he was evaluated at [facility] and was placed on the renal transplant list.  It is noted that his renal function has significantly worsened and we discussed initiation of renal replacement therapy.  His AV fistula has been maturing although he does have a Doppler ultrasound scheduled for later this week with follow-up with vascular surgery      HISTORY:  Past Medical History:    CKD (chronic kidney disease)    Diabetes (HCC)    Elevated liver enzymes    Elevated serum GGT level    High blood pressure    High cholesterol    History of blood transfusion    2023 November    Hyperlipidemia    HYPERTRIGLYCERIDEMIA    Intractable nausea and vomiting    OBESITY    Obesity    Pneumonia due to organism    40 years ago    PONV (postoperative nausea and vomiting)    Renal disorder    Visual impairment    GLASSES      Past Surgical History:   Procedure Laterality Date    Arthrotomy/explore/treat knee joint Left     DONE TWICE    Cholecystectomy      Colonoscopy N/A 1/23/2024    Procedure: COLONOSCOPY with hot snare polypectomy;  Surgeon: Albino Hernandez MD;  Location:  ENDOSCOPY    Debride wound Left     HEEL    Foot surgery      Other surgical history  Knee & Foot      Family History   Problem Relation Age of Onset     Diabetes Father     Heart Disorder Father     Diabetes Mother     Hypertension Sister       Social History:   Social History     Socioeconomic History    Marital status:    Tobacco Use    Smoking status: Never    Smokeless tobacco: Never   Vaping Use    Vaping status: Never Used   Substance and Sexual Activity    Alcohol use: Yes     Alcohol/week: 1.0 standard drink of alcohol     Types: 1 Standard drinks or equivalent per week     Comment: socially    Drug use: No   Other Topics Concern    Caffeine Concern No    Exercise No    Seat Belt Yes    Special Diet Yes    Stress Concern No    Weight Concern No     Social Determinants of Health     Financial Resource Strain: Low Risk  (11/17/2023)    Financial Resource Strain     Difficulty of Paying Living Expenses: Not very hard     Med Affordability: No   Food Insecurity: No Food Insecurity (1/4/2024)    Food Insecurity     Food Insecurity: Never true   Transportation Needs: No Transportation Needs (1/4/2024)    Transportation Needs     Lack of Transportation: No   Housing Stability: Low Risk  (1/4/2024)    Housing Stability     Housing Instability: No        Medications (Active prior to today's visit):  Current Outpatient Medications   Medication Sig Dispense Refill    atorvastatin 40 MG Oral Tab Take 1 tablet (40 mg total) by mouth daily.      HYDROcodone-acetaminophen (NORCO) 5-325 MG Oral Tab Take 1-2 tablets by mouth every 6 (six) hours as needed for Pain. Take with a stool softener 15 tablet 0    sodium bicarbonate 650 MG Oral Tab Take 1 tablet (650 mg total) by mouth 2 (two) times daily. 60 tablet 0    Insulin Pen Needle (BD PEN NEEDLE ODALIS 2ND GEN) 32G X 4 MM Does not apply Misc USE FOUR TIMES DAILY AS DIRECTED 400 each 2    cloNIDine 0.3 MG Oral Tab Take 1 tablet (0.3 mg total) by mouth 3 (three) times daily. 90 tablet 5    torsemide 20 MG Oral Tab Take 1 tablet (20 mg total) by mouth BID (Diuretic). 60 tablet 5    insulin detemir 100 UNIT/ML Subcutaneous  Solution Pen-injector Inject 22 Units into the skin every morning.      aspirin (ASPIRIN EC LOW DOSE) 81 MG Oral Tab EC Take 1 tablet (81 mg total) by mouth daily. 90 tablet 0    amLODIPine 10 MG Oral Tab Take 1 tablet (10 mg total) by mouth daily. 90 tablet 0    HUMALOG KWIKPEN 100 UNIT/ML Subcutaneous Solution Pen-injector SLIDING SCALE  TID with meals MDD 15u (Patient taking differently: SLIDING SCALE  TID with meals MDD 15u) 15 mL 0    Dulaglutide (TRULICITY) 0.75 MG/0.5ML Subcutaneous Solution Pen-injector Inject 0.75 mg into the skin once a week. 7 mL 3       Allergies:  No Known Allergies      ROS:     Denies fever/chills  Denies wt loss/gain  Denies HA or visual changes  Denies CP or palpitations  Denies SOB/cough/hemoptysis  Denies abd or flank pain  Denies N/V/D  Denies change in urinary habits or gross hematuria  Denies LE edema  Denies skin rashes/myalgias/arthralgias      PHYSICAL EXAM:   Wt 221 lb 8 oz (100.5 kg)   BMI 33.68 kg/m²   Wt Readings from Last 6 Encounters:   05/13/24 221 lb 8 oz (100.5 kg)   03/11/24 226 lb (102.5 kg)   02/09/24 218 lb (98.9 kg)   01/23/24 210 lb (95.3 kg)   01/11/24 216 lb (98 kg)   01/08/24 216 lb 9 oz (98.2 kg)       General: Alert and oriented in no apparent distress.  HEENT: No scleral icterus, MMM  Neck: Supple, no ITZ or thyromegaly  Cardiac: Regular rate and rhythm, S1, S2 normal, no murmur or rub  Lungs: Clear without wheezes, rales, rhonchi.    Extremities: Without clubbing, cyanosis or edema.  Left wrist AV fistula with bruit and thrill  Neurologic: Alert and oriented, normal affect, cranial nerves grossly intact, moving all extremities  Skin: Warm and dry, no rashes      LABS:     Lab Results   Component Value Date    BUN 99 (H) 04/27/2024    BUNCREA 12.9 05/08/2021    CREATSERUM 8.58 (H) 04/27/2024    ANIONGAP 11 04/27/2024    GFR 90 11/22/2017    GFRNAA 26 (L) 06/17/2022    GFRAA 30 (L) 06/17/2022    CA 8.4 (L) 04/27/2024    OSMOCALC 331 (H) 04/27/2024     ALKPHO 107 04/27/2024    AST 24 04/27/2024    ALT 14 (L) 04/27/2024    BILT 0.3 04/27/2024    TP 7.4 04/27/2024    ALB 3.4 04/27/2024    GLOBULIN 4.0 04/27/2024    AGRATIO 1.5 04/21/2014     04/27/2024    K 5.7 (H) 04/27/2024     (H) 04/27/2024    CO2 18.0 (L) 04/27/2024     Lab Results   Component Value Date    RBC 2.59 (L) 04/27/2024    HGB 8.0 (L) 04/27/2024    HCT 24.3 (L) 04/27/2024    .0 04/27/2024    MCV 93.8 04/27/2024    MCH 30.9 04/27/2024    MCHC 32.9 04/27/2024    RDW 12.9 04/27/2024    NEPRELIM 3.40 04/27/2024    NEPERCENT 62.5 04/27/2024    LYPERCENT 22.7 04/27/2024    MOPERCENT 9.8 04/27/2024    EOPERCENT 4.1 04/27/2024    BAPERCENT 0.7 04/27/2024    NE 3.40 04/27/2024    LYMABS 1.23 04/27/2024    MOABSO 0.53 04/27/2024    EOABSO 0.22 04/27/2024    BAABSO 0.04 04/27/2024     Lab Results   Component Value Date    CREUR 78.30 11/13/2023     Lab Results   Component Value Date    CLARITY Clear 11/13/2023    SPECGRAVITY 1.010 11/13/2023    GLUUR 50 (A) 11/13/2023    BILUR Negative 11/13/2023    KETUR Negative 11/13/2023    BLOODURINE 1+ (A) 11/13/2023    PHURINE 6.0 11/13/2023    PROUR 200 (A) 11/13/2023    UROBILINOGEN Normal 11/13/2023    NITRITE Negative 11/13/2023    LEUUR Negative 11/13/2023    NMIC Microscopic not indicated 07/12/2018    WBCUR 1-5 11/13/2023    RBCUR 6-10 (A) 11/13/2023    EPIUR None Seen 11/13/2023    BACUR None Seen 11/13/2023    HYLUR Present (A) 11/13/2023     ASSESSMENT/PLAN:     #1.  Chronic kidney disease stage V-he has had progressive chronic kidney disease stage V in the setting of diabetes and hypertension with a history of biopsy-proven severe ATN from which he never recovered.  As mentioned above he is on a [facility] list [facility].  Renal function has deteriorated significantly over the past several months and his creatinine is now at 8.5 mg/dL.  While he is asymptomatic in regard to his level of uremia we did discuss that it  is time for initiation of hemodialysis.  I am hopeful that his fistula will be ready to be used and that he can start dialysis with this in the next week or so.  If, however, the fistula is not ready then he would require placement of central venous catheter.  He lives in [place] and we will plan on dialysis close to his home    #2.  Hypertension-blood pressure is stable and he will continue his current doses of amlodipine, clonidine and torsemide    #3.  Anemia-this is due to progressive renal failure.  He will receive Aranesp in the office today and will continue erythropoietin stimulating agents while on dialysis    Thank you again for allowing me to participate in care of your patient.  Please do not hesitate to call with any question or concerns.      Justin Meyers MD  5/13/2024  10:09 AM

## 2024-05-17 ENCOUNTER — HOSPITAL ENCOUNTER (OUTPATIENT)
Dept: ULTRASOUND IMAGING | Facility: HOSPITAL | Age: 44
Discharge: HOME | End: 2024-05-17
Attending: SURGERY

## 2024-05-17 DIAGNOSIS — T82.590A MALFUNCTION OF ARTERIOVENOUS DIALYSIS FISTULA, INITIAL ENCOUNTER (HCC): ICD-10-CM

## 2024-05-17 PROCEDURE — 93990 DOPPLER FLOW TESTING: CPT | Performed by: SURGERY

## 2024-05-20 ENCOUNTER — TELEPHONE (OUTPATIENT)
Dept: FAMILY MEDICINE CLINIC | Facility: CLINIC | Age: 44
End: 2024-05-20

## 2024-05-20 ENCOUNTER — TELEPHONE (OUTPATIENT)
Dept: NEPHROLOGY | Facility: CLINIC | Age: 44
End: 2024-05-20

## 2024-05-20 DIAGNOSIS — N18.5 CKD (CHRONIC KIDNEY DISEASE) STAGE 5, GFR LESS THAN 15 ML/MIN (HCC): Primary | ICD-10-CM

## 2024-05-20 NOTE — TELEPHONE ENCOUNTER
Received fax from pharmacy on Levemir flexpen, plan does not cover this medication. Needs PA, will place fax in MA folder. Patient ID# [id number]

## 2024-05-21 ENCOUNTER — LAB ENCOUNTER (OUTPATIENT)
Dept: LAB | Age: 44
End: 2024-05-21
Attending: INTERNAL MEDICINE

## 2024-05-21 DIAGNOSIS — N18.5 CKD (CHRONIC KIDNEY DISEASE) STAGE 5, GFR LESS THAN 15 ML/MIN (HCC): ICD-10-CM

## 2024-05-21 DIAGNOSIS — N18.5 ANEMIA IN STAGE 5 CHRONIC KIDNEY DISEASE, NOT ON CHRONIC DIALYSIS (HCC): ICD-10-CM

## 2024-05-21 DIAGNOSIS — D63.1 ANEMIA IN STAGE 5 CHRONIC KIDNEY DISEASE, NOT ON CHRONIC DIALYSIS (HCC): ICD-10-CM

## 2024-05-21 LAB
ALBUMIN SERPL-MCNC: 3.4 G/DL (ref 3.4–5)
ALBUMIN/GLOB SERPL: 1 {RATIO} (ref 1–2)
ALP LIVER SERPL-CCNC: 107 U/L
ALT SERPL-CCNC: 12 U/L
ANION GAP SERPL CALC-SCNC: 6 MMOL/L (ref 0–18)
AST SERPL-CCNC: 19 U/L (ref 15–37)
BASOPHILS # BLD AUTO: 0.02 X10(3) UL (ref 0–0.2)
BASOPHILS NFR BLD AUTO: 0.4 %
BILIRUB SERPL-MCNC: 0.4 MG/DL (ref 0.1–2)
BUN BLD-MCNC: 84 MG/DL (ref 9–23)
CALCIUM BLD-MCNC: 7.9 MG/DL (ref 8.5–10.1)
CHLORIDE SERPL-SCNC: 118 MMOL/L (ref 98–112)
CO2 SERPL-SCNC: 17 MMOL/L (ref 21–32)
CREAT BLD-MCNC: 8.98 MG/DL
EGFRCR SERPLBLD CKD-EPI 2021: 7 ML/MIN/1.73M2 (ref 60–?)
EOSINOPHIL # BLD AUTO: 0.14 X10(3) UL (ref 0–0.7)
EOSINOPHIL NFR BLD AUTO: 3.1 %
ERYTHROCYTE [DISTWIDTH] IN BLOOD BY AUTOMATED COUNT: 13.6 %
FASTING STATUS PATIENT QL REPORTED: NO
GLOBULIN PLAS-MCNC: 3.5 G/DL (ref 2.8–4.4)
GLUCOSE BLD-MCNC: 186 MG/DL (ref 70–99)
HBV SURFACE AG SER-ACNC: <0.1 [IU]/L
HBV SURFACE AG SERPL QL IA: NONREACTIVE
HCT VFR BLD AUTO: 26.4 %
HGB BLD-MCNC: 8.4 G/DL
IMM GRANULOCYTES # BLD AUTO: 0.03 X10(3) UL (ref 0–1)
IMM GRANULOCYTES NFR BLD: 0.7 %
LYMPHOCYTES # BLD AUTO: 0.75 X10(3) UL (ref 1–4)
LYMPHOCYTES NFR BLD AUTO: 16.4 %
MCH RBC QN AUTO: 30.4 PG (ref 26–34)
MCHC RBC AUTO-ENTMCNC: 31.8 G/DL (ref 31–37)
MCV RBC AUTO: 95.7 FL
MONOCYTES # BLD AUTO: 0.34 X10(3) UL (ref 0.1–1)
MONOCYTES NFR BLD AUTO: 7.4 %
NEUTROPHILS # BLD AUTO: 3.3 X10 (3) UL (ref 1.5–7.7)
NEUTROPHILS # BLD AUTO: 3.3 X10(3) UL (ref 1.5–7.7)
NEUTROPHILS NFR BLD AUTO: 72 %
OSMOLALITY SERPL CALC.SUM OF ELEC: 322 MOSM/KG (ref 275–295)
PLATELET # BLD AUTO: 173 10(3)UL (ref 150–450)
POTASSIUM SERPL-SCNC: 5.8 MMOL/L (ref 3.5–5.1)
PROT SERPL-MCNC: 6.9 G/DL (ref 6.4–8.2)
RBC # BLD AUTO: 2.76 X10(6)UL
SODIUM SERPL-SCNC: 141 MMOL/L (ref 136–145)
WBC # BLD AUTO: 4.6 X10(3) UL (ref 4–11)

## 2024-05-21 PROCEDURE — 87340 HEPATITIS B SURFACE AG IA: CPT | Performed by: INTERNAL MEDICINE

## 2024-05-21 PROCEDURE — 85025 COMPLETE CBC W/AUTO DIFF WBC: CPT | Performed by: INTERNAL MEDICINE

## 2024-05-21 PROCEDURE — 80053 COMPREHEN METABOLIC PANEL: CPT | Performed by: INTERNAL MEDICINE

## 2024-05-21 NOTE — TELEPHONE ENCOUNTER
Per Dr Sullivan - patient was referred to Endo and see endocrinologist.. Tried multiple time to confirm this with patient with no response.

## 2024-05-22 ENCOUNTER — TELEPHONE (OUTPATIENT)
Dept: NEPHROLOGY | Facility: CLINIC | Age: 44
End: 2024-05-22

## 2024-05-22 NOTE — TELEPHONE ENCOUNTER
To whom it may concern-    Please be advised that Mr. Tobin is a patient under my care with a diagnosis of end-stage renal disease.  He is set to initiate renal replacement therapy in the form of hemodialysis.  This will be 3 times weekly indefinitely until he should receive a transplant.  Each treatment is typically around 4 hours long.  Please do not hesitate to contact me with any questions or concerns.    Sincerely    Justin Meyers MD  5/22/2024  2:10 PM

## 2024-06-07 ENCOUNTER — OFFICE VISIT (OUTPATIENT)
Dept: PODIATRY CLINIC | Facility: CLINIC | Age: 44
End: 2024-06-07

## 2024-06-07 DIAGNOSIS — M20.41 HAMMER TOES OF BOTH FEET: ICD-10-CM

## 2024-06-07 DIAGNOSIS — L84 CALLUS OF FOOT: ICD-10-CM

## 2024-06-07 DIAGNOSIS — B35.1 ONYCHOMYCOSIS: ICD-10-CM

## 2024-06-07 DIAGNOSIS — N18.4 STAGE 4 CHRONIC KIDNEY DISEASE (HCC): ICD-10-CM

## 2024-06-07 DIAGNOSIS — E11.42 DIABETIC POLYNEUROPATHY ASSOCIATED WITH TYPE 2 DIABETES MELLITUS (HCC): Primary | ICD-10-CM

## 2024-06-07 DIAGNOSIS — M89.8X7 EXOSTOSIS OF BONE OF FOOT: ICD-10-CM

## 2024-06-07 DIAGNOSIS — L84 PRE-ULCERATIVE CALLUSES: ICD-10-CM

## 2024-06-07 DIAGNOSIS — M20.42 HAMMER TOES OF BOTH FEET: ICD-10-CM

## 2024-06-07 DIAGNOSIS — Z87.898 HISTORY OF ULCERATION: ICD-10-CM

## 2024-06-07 PROCEDURE — 99213 OFFICE O/P EST LOW 20 MIN: CPT | Performed by: STUDENT IN AN ORGANIZED HEALTH CARE EDUCATION/TRAINING PROGRAM

## 2024-06-07 NOTE — PROGRESS NOTES
[facility] Podiatry  Progress Note    Adrien Tobin is a 44 year old male.   Chief Complaint   Patient presents with    Follow - Up     Left foot - Denies pain -          HPI:     Patient is a pleasant 44-year-old male with past medical history of recurrent heel ulcerations, diabetes, and CKD returns to clinic for foot exam.  He continues to ambulate with accommodative inserts and shoes and relays that he is tolerating this well.  He presents today for follow-up of preulcerative callus to his left posterior heel.  His blood sugars were 128 mg/dL when checked this morning.  He also has elongated and thickened nails he has difficulty trimming on his own.  No other complaints are mentioned.      Allergies: Patient has no known allergies.   Current Outpatient Medications   Medication Sig Dispense Refill    atorvastatin 40 MG Oral Tab Take 1 tablet (40 mg total) by mouth daily.      lidocaine-prilocaine 2.5-2.5 % External Cream Apply 1 Application topically as needed with dialysis (apply to L wrist AVF prior to dialysis). 1 each 11    HYDROcodone-acetaminophen (NORCO) 5-325 MG Oral Tab Take 1-2 tablets by mouth every 6 (six) hours as needed for Pain. Take with a stool softener 15 tablet 0    sodium bicarbonate 650 MG Oral Tab Take 1 tablet (650 mg total) by mouth 2 (two) times daily. 60 tablet 0    Insulin Pen Needle (BD PEN NEEDLE ODALIS 2ND GEN) 32G X 4 MM Does not apply Misc USE FOUR TIMES DAILY AS DIRECTED 400 each 2    cloNIDine 0.3 MG Oral Tab Take 1 tablet (0.3 mg total) by mouth 3 (three) times daily. 90 tablet 5    torsemide 20 MG Oral Tab Take 1 tablet (20 mg total) by mouth BID (Diuretic). 60 tablet 5    insulin detemir 100 UNIT/ML Subcutaneous Solution Pen-injector Inject 22 Units into the skin every morning.      aspirin (ASPIRIN EC LOW DOSE) 81 MG Oral Tab EC Take 1 tablet (81 mg total) by mouth daily. 90 tablet 0    amLODIPine 10 MG Oral Tab Take 1 tablet (10 mg total) by mouth daily. 90  tablet 0    HUMALOG KWIKPEN 100 UNIT/ML Subcutaneous Solution Pen-injector SLIDING SCALE  TID with meals MDD 15u (Patient taking differently: SLIDING SCALE  TID with meals MDD 15u) 15 mL 0    Dulaglutide (TRULICITY) 0.75 MG/0.5ML Subcutaneous Solution Pen-injector Inject 0.75 mg into the skin once a week. 7 mL 3      Past Medical History:    CKD (chronic kidney disease)    Diabetes (HCC)    Elevated liver enzymes    Elevated serum GGT level    High blood pressure    High cholesterol    History of blood transfusion    2023 November    Hyperlipidemia    HYPERTRIGLYCERIDEMIA    Intractable nausea and vomiting    OBESITY    Obesity    Pneumonia due to organism    40 years ago    PONV (postoperative nausea and vomiting)    Renal disorder    Visual impairment    GLASSES      Past Surgical History:   Procedure Laterality Date    Arthrotomy/explore/treat knee joint Left     DONE TWICE    Cholecystectomy      Colonoscopy N/A 1/23/2024    Procedure: COLONOSCOPY with hot snare polypectomy;  Surgeon: Albino Hernandez MD;  Location:  ENDOSCOPY    Debride wound Left     HEEL    Foot surgery      Other surgical history  Knee & Foot      Family History   Problem Relation Age of Onset    Diabetes Father     Heart Disorder Father     Diabetes Mother     Hypertension Sister       Social History     Socioeconomic History    Marital status:    Tobacco Use    Smoking status: Never    Smokeless tobacco: Never   Vaping Use    Vaping status: Never Used   Substance and Sexual Activity    Alcohol use: Yes     Alcohol/week: 1.0 standard drink of alcohol     Types: 1 Standard drinks or equivalent per week     Comment: socially    Drug use: No   Other Topics Concern    Caffeine Concern No    Exercise No    Seat Belt Yes    Special Diet Yes    Stress Concern No    Weight Concern No           REVIEW OF SYSTEMS:     Today reviewed systems as documented below  GENERAL HEALTH: feels well otherwise  SKIN: denies any unusual skin lesions or  rashes  RESPIRATORY: denies shortness of breath with exertion  CARDIOVASCULAR: denies chest pain on exertion  GI: denies abdominal pain and denies heartburn  NEURO: denies headaches  MUSCULO: denies arthritis, back pain      EXAM:   There were no vitals taken for this visit.  GENERAL: well developed, well nourished, in no apparent distress  EXTREMITIES:     1. Integument: Normal skin temperature and turgor.  Site of prior ulceration to left plantar lateral calcaneus remains well-healed.  HPK noted to posterior heel.  Nails are elongated and thickened. Mild dry scab under right great toe.  2. Vascular: Dorsalis pedis and posterior tibial pulses are lightly palpable.  CFT intact digits.     3. Musculoskeletal: Muscle strength intact to left lower extremity with decreased plantar flexion which is patient's baseline.  There is no longer plantar prominence of calcaneus that was noted preoperatively.  Compartments are soft and compressible.  Flexion contracture of digits bilaterally.   4. Neurological: Normal sharp dull sensation; reflexes normal.  Decreased protective sensation noted to lower extremities.        ASSESSMENT AND PLAN:   Diagnoses and all orders for this visit:    Diabetic polyneuropathy associated with type 2 diabetes mellitus (HCC)    Pre-ulcerative calluses    Stage 4 chronic kidney disease (HCC)    History of ulceration    Callus of foot    Exostosis of bone of foot    Onychomycosis    Hammer toes of both feet          Plan:   -Patient examined, chart history reviewed.  -Inspected surgical site of left foot--site is well-healed and without acute signs of infection or other concerns.  -Pared HPK x 2 to healthy tissue without incident.  Continue urea cream to site daily.  -Sharply debrided nails x 10 with nail nipper without incident.  Nails smoothed with dremel.  -Educated patient on acute signs of infection and symptoms of DVT and advised patient to seek immediate medical attention if any concerns  arise.  Patient expressed understanding.     The patient indicates understanding of these issues and agrees to the plan.  RTC 6 weeks.    Royce Lewis DPM

## 2024-06-10 RX ORDER — TORSEMIDE 20 MG/1
20 TABLET ORAL 2 TIMES DAILY
Qty: 60 TABLET | Refills: 5 | Status: SHIPPED | OUTPATIENT
Start: 2024-06-10

## 2024-06-10 RX ORDER — CLONIDINE HYDROCHLORIDE 0.3 MG/1
0.3 TABLET ORAL 3 TIMES DAILY
Qty: 90 TABLET | Refills: 5 | Status: SHIPPED | OUTPATIENT
Start: 2024-06-10

## 2024-06-13 ENCOUNTER — TELEPHONE (OUTPATIENT)
Facility: CLINIC | Age: 44
End: 2024-06-13

## 2024-06-13 NOTE — TELEPHONE ENCOUNTER
Patient needs angioplasty of LT radio-cephalic AV Fistula procedure.   Called Cath Lab and spoke with Modesta. Was able to schedule patient for Tuesday 06/25/2024 at 8am.   Procedure Order Form faxed to Cath Lab. Fax confirmation received.     Called patient and let him know date and time of procedure. He agreed.   He let me know that he has dialysis on Tuesday/Thursday/Saturday and he goes at 9am. Let him know I would call to arrange it with the nurse. He states he goes to [facility] in [place].   Called [facility] ([phone]) and spoke with Ollie. Per Ollie, we would need to reschedule his Tuesday dialysis to Monday 06/24/2024. She will speak with the manager and she will give the patient the new date this coming Saturday when he comes in.   Called patient back to let him know the changes. He understood.

## 2024-06-17 ENCOUNTER — TELEPHONE (OUTPATIENT)
Facility: CLINIC | Age: 44
End: 2024-06-17

## 2024-06-17 ENCOUNTER — MED REC SCAN ONLY (OUTPATIENT)
Dept: NEPHROLOGY | Facility: CLINIC | Age: 44
End: 2024-06-17

## 2024-06-17 NOTE — TELEPHONE ENCOUNTER
Called [facility] ([phone]) and spoke with Elias Griffin, [id number] does not require prior authorization.  Call Reference #: [id number]

## 2024-06-25 ENCOUNTER — HOSPITAL ENCOUNTER (OUTPATIENT)
Dept: INTERVENTIONAL RADIOLOGY/VASCULAR | Facility: HOSPITAL | Age: 44
Discharge: HOME | End: 2024-06-25
Attending: SURGERY | Admitting: SURGERY

## 2024-06-25 VITALS
TEMPERATURE: 96 F | BODY MASS INDEX: 31.83 KG/M2 | WEIGHT: 210 LBS | SYSTOLIC BLOOD PRESSURE: 129 MMHG | HEART RATE: 90 BPM | HEIGHT: 68 IN | OXYGEN SATURATION: 97 % | RESPIRATION RATE: 9 BRPM | DIASTOLIC BLOOD PRESSURE: 77 MMHG

## 2024-06-25 DIAGNOSIS — T82.590A MALFUNCTION OF ARTERIOVENOUS DIALYSIS FISTULA, INITIAL ENCOUNTER (HCC): ICD-10-CM

## 2024-06-25 DIAGNOSIS — Z01.818 PREOP TESTING: Primary | ICD-10-CM

## 2024-06-25 LAB
ANION GAP SERPL CALC-SCNC: 5 MMOL/L (ref 0–18)
BUN BLD-MCNC: 39 MG/DL (ref 9–23)
BUN/CREAT SERPL: 5.5 (ref 10–20)
CALCIUM BLD-MCNC: 9.5 MG/DL (ref 8.7–10.4)
CHLORIDE SERPL-SCNC: 104 MMOL/L (ref 98–112)
CO2 SERPL-SCNC: 33 MMOL/L (ref 21–32)
CREAT BLD-MCNC: 7.07 MG/DL
DEPRECATED RDW RBC AUTO: 45.2 FL (ref 35.1–46.3)
EGFRCR SERPLBLD CKD-EPI 2021: 9 ML/MIN/1.73M2 (ref 60–?)
ERYTHROCYTE [DISTWIDTH] IN BLOOD BY AUTOMATED COUNT: 14 % (ref 11–15)
FASTING STATUS PATIENT QL REPORTED: YES
GLUCOSE BLD-MCNC: 82 MG/DL (ref 70–99)
GLUCOSE BLDC GLUCOMTR-MCNC: 112 MG/DL (ref 70–99)
HCT VFR BLD AUTO: 22.3 %
HGB BLD-MCNC: 7.4 G/DL
MCH RBC QN AUTO: 30.6 PG (ref 26–34)
MCHC RBC AUTO-ENTMCNC: 33.2 G/DL (ref 31–37)
MCV RBC AUTO: 92.1 FL
OSMOLALITY SERPL CALC.SUM OF ELEC: 302 MOSM/KG (ref 275–295)
PLATELET # BLD AUTO: 88 10(3)UL (ref 150–450)
PLATELETS.RETICULATED NFR BLD AUTO: 4.1 % (ref 0–7)
POTASSIUM SERPL-SCNC: 4.4 MMOL/L (ref 3.5–5.1)
RBC # BLD AUTO: 2.42 X10(6)UL
SODIUM SERPL-SCNC: 142 MMOL/L (ref 136–145)
WBC # BLD AUTO: 5.1 X10(3) UL (ref 4–11)

## 2024-06-25 PROCEDURE — 36415 COLL VENOUS BLD VENIPUNCTURE: CPT

## 2024-06-25 PROCEDURE — 057F3ZZ DILATION OF LEFT CEPHALIC VEIN, PERCUTANEOUS APPROACH: ICD-10-PCS | Performed by: SURGERY

## 2024-06-25 PROCEDURE — 99153 MOD SED SAME PHYS/QHP EA: CPT | Performed by: SURGERY

## 2024-06-25 PROCEDURE — 85027 COMPLETE CBC AUTOMATED: CPT | Performed by: SURGERY

## 2024-06-25 PROCEDURE — 36902 INTRO CATH DIALYSIS CIRCUIT: CPT | Performed by: SURGERY

## 2024-06-25 PROCEDURE — 99152 MOD SED SAME PHYS/QHP 5/>YRS: CPT | Performed by: SURGERY

## 2024-06-25 PROCEDURE — 82962 GLUCOSE BLOOD TEST: CPT

## 2024-06-25 PROCEDURE — 80048 BASIC METABOLIC PNL TOTAL CA: CPT | Performed by: SURGERY

## 2024-06-25 RX ORDER — SODIUM CHLORIDE 9 MG/ML
INJECTION, SOLUTION INTRAVENOUS CONTINUOUS
Status: DISCONTINUED | OUTPATIENT
Start: 2024-06-25 | End: 2024-06-25

## 2024-06-25 RX ORDER — SODIUM CHLORIDE 0.9 % (FLUSH) 0.9 %
10 SYRINGE (ML) INJECTION AS NEEDED
Status: DISCONTINUED | OUTPATIENT
Start: 2024-06-25 | End: 2024-06-25

## 2024-06-25 RX ORDER — MIDAZOLAM HYDROCHLORIDE 1 MG/ML
INJECTION INTRAMUSCULAR; INTRAVENOUS
Status: COMPLETED
Start: 2024-06-25 | End: 2024-06-25

## 2024-06-25 RX ORDER — LIDOCAINE HYDROCHLORIDE 20 MG/ML
INJECTION, SOLUTION EPIDURAL; INFILTRATION; INTRACAUDAL; PERINEURAL
Status: COMPLETED
Start: 2024-06-25 | End: 2024-06-25

## 2024-06-25 RX ORDER — HEPARIN SODIUM 1000 [USP'U]/ML
INJECTION, SOLUTION INTRAVENOUS; SUBCUTANEOUS
Status: COMPLETED
Start: 2024-06-25 | End: 2024-06-25

## 2024-06-25 RX ADMIN — SODIUM CHLORIDE: 9 INJECTION, SOLUTION INTRAVENOUS at 06:45:00

## 2024-06-25 NOTE — PROGRESS NOTES
DISCHARGE NOTE      Pt is able to sit up and ambulate without difficulty.   Pt voided and tolerated fluids and food.   Procedural site remains dry and intact with good circulation, motion, and sensation.   No signs and symptoms of bleeding/hematoma noted.   Instruction provided, patient/family verbalizes understanding.   Dr. Najjar spoke with patient/family post procedure.

## 2024-06-25 NOTE — DISCHARGE INSTRUCTIONS
HOME CARE INSTRUCTIONS FOLLOWING PERIPHERAL ANGIOGRAPHY, ANGIOPLASTY  in the Peripheral arteries      Activity  DO NOT drive after the procedure.  You may resume driving late the following day according to the nurse or physician's instructions  Plan on resting and relaxing tonight and tomorrow  Resume your normal activity after 48 hours, or as instructed by your physician  Do not lift anything over 10 pounds for 1 WEEK  Avoid drinking alcohol for the next 24 hours      What is Normal?  A small lump at the procedure site associated with mild tenderness when touched  The procedure site may be bruised or discolored  There may be a small amount of drainage on the bandage    Special Instructions  Drink plenty of fluids during the next 24 hours to \"flush\" the contrast from your system  The bandage is to remain in place for 24 hours  Keep the bandage clean and dry  DO NOT submerge the procedure site for 1 WEEK (no bath tubs or pools)  After 24 hours, you must remove the bandage  You should shower after removing the bandage, and wash the procedure site gently with soap and water  If you choose to wear a bandage for a few days, make sure it remains clean and dry and that it is changed daily    When you should NOTIFY YOUR PHYSICIAN  Bleeding can occur at the procedure site - both on the outside of the skin and/or beneath the surface of the skin  Swelling or a large lump at the procedure site can occur, which may be accompanied by moderate to severe pain    If either of the above occurs, lie down flat.  Have someone apply pressure to the procedure site with both hands, as instructed by the nurse.  Hold pressure for 20 minutes and the bleeding should stop.  Notify your physician of the occurrence  If the bleeding does not stop, call 911 and continue to apply pressure    If you experience signs of a fever, temperature > 101°, chills, infection (redness, swelling, thick yellow drainage, or a foul odor from the procedure site)  If  you notice any numbness, tingling, or loss of feeling to your leg or foot or groin access

## 2024-06-25 NOTE — H&P
Samer F. Najjar, MD  Vascular Surgery  [facility]            VASCULAR SURGERY HP NOTE           Name: Adrien Tobin   : 1980  [id number]      REASON FOR VISIT:   Patient is a 43 year old male who is here for angioplasty of his left radiocephalic AVF.  This was created back in February and has been used few times but the volume flows were low.  He underwent a fistula ultrasound that revealed elevated flows at the anastomotic area.  Therefore, angioplasty of the fistula was recommended     PAST MEDICAL HISTORY:      Past Medical History        Past Medical History:   Diagnosis Date    CKD (chronic kidney disease)      Diabetes (HCC)      Elevated liver enzymes 2016    Elevated serum GGT level 2016    High blood pressure      High cholesterol      History of blood transfusion       2023    Hyperlipidemia      HYPERTRIGLYCERIDEMIA      Intractable nausea and vomiting 2024    OBESITY      Obesity      Pneumonia due to organism       40 years ago    PONV (postoperative nausea and vomiting)      Renal disorder      Visual impairment       GLASSES            PAST SURGICAL HISTORY:      Past Surgical History         Past Surgical History:   Procedure Laterality Date    ARTHROTOMY/EXPLORE/TREAT KNEE JOINT Left       DONE TWICE    CHOLECYSTECTOMY        COLONOSCOPY N/A 2024     Procedure: COLONOSCOPY with hot snare polypectomy;  Surgeon: Albino Hernandez MD;  Location:  ENDOSCOPY    DEBRIDE WOUND Left       HEEL    FOOT SURGERY        OTHER SURGICAL HISTORY   Knee & Foot            MEDICATIONS:      Current Medications          Current Outpatient Medications   Medication Sig Dispense Refill    HYDROcodone-acetaminophen (NORCO) 5-325 MG Oral Tab Take 1-2 tablets by mouth every 6 (six) hours as needed for Pain. Take with a stool softener 15 tablet 0    sodium bicarbonate 650 MG Oral Tab Take 1 tablet (650 mg total) by mouth 2 (two) times daily. 60 tablet 0     Insulin Pen Needle (BD PEN NEEDLE ODALIS 2ND GEN) 32G X 4 MM Does not apply Misc USE FOUR TIMES DAILY AS DIRECTED 400 each 2    cloNIDine 0.3 MG Oral Tab Take 1 tablet (0.3 mg total) by mouth 3 (three) times daily. 90 tablet 5    torsemide 20 MG Oral Tab Take 1 tablet (20 mg total) by mouth BID (Diuretic). 60 tablet 5    insulin detemir 100 UNIT/ML Subcutaneous Solution Pen-injector Inject 22 Units into the skin every morning.        aspirin (ASPIRIN EC LOW DOSE) 81 MG Oral Tab EC Take 1 tablet (81 mg total) by mouth daily. 90 tablet 0    amLODIPine 10 MG Oral Tab Take 1 tablet (10 mg total) by mouth daily. 90 tablet 0    EZETIMIBE-SIMVASTATIN 10-80 MG Oral Tab TAKE 1 TABLET BY MOUTH DAILY 90 tablet 3    HUMALOG KWIKPEN 100 UNIT/ML Subcutaneous Solution Pen-injector SLIDING SCALE  TID with meals MDD 15u (Patient taking differently: SLIDING SCALE  TID with meals MDD 15u) 15 mL 0    Dulaglutide (TRULICITY) 0.75 MG/0.5ML Subcutaneous Solution Pen-injector Inject 0.75 mg into the skin once a week. 7 mL 3            EXAM:   There were no vitals taken for this visit.  The fistula is patent but has weak flow.     ASSESSMENT AND PLAN:   Diagnoses and all orders for this visit:     Malfunction of arteriovenous dialysis fistula, initial encounter ([facility])           The patient is s/p AVF creation for ESRD. The fistula is patent.  I recommended angioplasty of the fistula.    We discussed the benefits of the fistulagram with angioplasty as well as  the risks of acute thrombosis, distal embolization, nerve injury, restenosis, dissection or poorly controlled bleeding, pseudoaneurysm, and need for open surgery among other complications.  Patient understood and all questions were answered.  Overall, he is very satisfied with his outcome.      Samer F. Najjar MD  Division of Vascular Surgery

## 2024-06-25 NOTE — OPERATIVE REPORT
[facility]     PATIENTS NAME: Adrien Tobin  ATTENDING PHYSICIAN: Najjar, Samer F, MD  OPERATING PHYSICIAN: Samer F Najjar, MD  CSN: [id number]     LOCATION:  OR  MRN: [id number]    YOB: 1980  ADMISSION DATE: 6/25/2024  OPERATION DATE: 6/25/2024                CATH LAB PROCEDURE REPORT       PRE-OPERATIVE DIAGNOSIS:  left radio-cephalic arteriovenous fistula stenosis    POST-OPERATIVE DIAGNOSIS: Same as above.    PROCEDURE PERFORMED: left radio-cephalic arteriovenous fistula angioplasty    ANESTHESIA: Moderate conscious sedation using Versed and Fentanyl was provided.  The RN monitored the oxygen, heart rate and blood pressure under my direct supervision during the course of the procedure.    SURGEON: Samer F Najjar, MD    SEDATION TIME: 40 min    SEDATION MEDICATION:   Versed: 2 mg  Fentanyl: 50 mcg    CONTRAST AMOUNT: 40 ml    EBL: 30 ml    COMPLICATIONS: None    SUMMARY OF CASE: Tight para-anastomotic stenosis treated successfully with a 5 x 40 Cosmo balloon.  The distal radial artery is occluded    INDICATIONS: The patient is a 44 year old male with left radio-cephalic arteriovenous fistula stenosis seen on a recent ultrasound. Angioplasty was recommended. The risks and benefits of angioplasty were explained to the patient and those included the risks of bleeding, nerve injury, thrombosis, restenosis, infection, and need for more open surgery among other complications including an amputation. Patient understood and all questions were answered. The patient has expressed a wish to proceed.    DESCRIPTION OF PROCEDURE:  The patient was brought to the Cath Lab and laid supine on the table.  The left arm was placed stretched on an armboard and sedation was initiated.  The arm was prepped and draped in the usual surgical sterile fashion.  A micropuncture needle was then used to access the fistula after the overlying skin area was infiltrated with 1% lidocaine solution.  Access was gained  with a needle pointing towards the hand.  A microwire was then advanced followed by placement of a microsheath which was then exchanged to a 5.5 short sheath.  A fistulagram was then performed which revealed the a tight para-anastomotic stenosis.  The stenosis was very tight so that I felt it would be more reasonable to advance an 018 Glidewire advantage wire and with the use of a Kumpe catheter I was able then to advance it retrograde into the radial artery.  The patient was then given 3000 units of heparin.  Then we used a 5 x 40 Cosmo balloon to angioplasty the area of stenosis successfully.  This was done twice until the stenosis went down from around 90% to less than 20%.  There was a noticeable improvement in the thrill in the fistula, and a few images were then obtained of the distal segment of the fistula at the axillary and subclavian vein area, both of which revealed wide patency.  The sheath was then pulled and pressure was held until hemostasis was achieved.  There were no complications.

## 2024-07-26 ENCOUNTER — OFFICE VISIT (OUTPATIENT)
Dept: PODIATRY CLINIC | Facility: CLINIC | Age: 44
End: 2024-07-26
Payer: COMMERCIAL

## 2024-07-26 DIAGNOSIS — L84 PRE-ULCERATIVE CALLUSES: ICD-10-CM

## 2024-07-26 DIAGNOSIS — N18.4 STAGE 4 CHRONIC KIDNEY DISEASE (HCC): ICD-10-CM

## 2024-07-26 DIAGNOSIS — E11.42 DIABETIC POLYNEUROPATHY ASSOCIATED WITH TYPE 2 DIABETES MELLITUS (HCC): Primary | ICD-10-CM

## 2024-07-26 DIAGNOSIS — M89.8X7 EXOSTOSIS OF BONE OF FOOT: ICD-10-CM

## 2024-07-26 PROCEDURE — 99213 OFFICE O/P EST LOW 20 MIN: CPT | Performed by: STUDENT IN AN ORGANIZED HEALTH CARE EDUCATION/TRAINING PROGRAM

## 2024-07-26 NOTE — PROGRESS NOTES
[facility] Podiatry  Progress Note    Adrien Tobin is a 44 year old male.   Chief Complaint   Patient presents with    Follow - Up     Left foot - denies pain         HPI:     Patient is a pleasant 44-year-old male with past medical history of recurrent heel ulcerations, diabetes, and CKD returns to clinic for foot exam.  He continues to ambulate with accommodative inserts and shoes and relays that he is tolerating this well.  He presents today for follow-up of preulcerative callus to his left posterior heel and nail care. He did recently start dialysis.  No other complaints are mentioned.      Allergies: Patient has no known allergies.   Current Outpatient Medications   Medication Sig Dispense Refill    torsemide 20 MG Oral Tab Take 1 tablet (20 mg total) by mouth 2 (two) times daily. 60 tablet 5    cloNIDine 0.3 MG Oral Tab Take 1 tablet (0.3 mg total) by mouth 3 (three) times daily. 90 tablet 5    atorvastatin 40 MG Oral Tab Take 1 tablet (40 mg total) by mouth daily.      lidocaine-prilocaine 2.5-2.5 % External Cream Apply 1 Application topically as needed with dialysis (apply to L wrist AVF prior to dialysis). 1 each 11    sodium bicarbonate 650 MG Oral Tab Take 1 tablet (650 mg total) by mouth 2 (two) times daily. 60 tablet 0    Insulin Pen Needle (BD PEN NEEDLE ODALIS 2ND GEN) 32G X 4 MM Does not apply Misc USE FOUR TIMES DAILY AS DIRECTED 400 each 2    insulin detemir 100 UNIT/ML Subcutaneous Solution Pen-injector Inject 22 Units into the skin every morning.      aspirin (ASPIRIN EC LOW DOSE) 81 MG Oral Tab EC Take 1 tablet (81 mg total) by mouth daily. 90 tablet 0    amLODIPine 10 MG Oral Tab Take 1 tablet (10 mg total) by mouth daily. 90 tablet 0    HUMALOG KWIKPEN 100 UNIT/ML Subcutaneous Solution Pen-injector SLIDING SCALE  TID with meals MDD 15u (Patient taking differently: SLIDING SCALE  TID with meals MDD 15u) 15 mL 0    Dulaglutide (TRULICITY) 0.75 MG/0.5ML Subcutaneous Solution  Pen-injector Inject 0.75 mg into the skin once a week. 7 mL 3      Past Medical History:    CKD (chronic kidney disease)    Diabetes (HCC)    Elevated liver enzymes    Elevated serum GGT level    High blood pressure    High cholesterol    History of blood transfusion    2023 November    Hyperlipidemia    HYPERTRIGLYCERIDEMIA    Intractable nausea and vomiting    OBESITY    Obesity    Pneumonia due to organism    40 years ago    PONV (postoperative nausea and vomiting)    Renal disorder    Visual impairment    GLASSES      Past Surgical History:   Procedure Laterality Date    Arthrotomy/explore/treat knee joint Left     DONE TWICE    Cholecystectomy      Colonoscopy N/A 1/23/2024    Procedure: COLONOSCOPY with hot snare polypectomy;  Surgeon: Albino Hernandez MD;  Location:  ENDOSCOPY    Debride wound Left     HEEL    Foot surgery      Other surgical history  Knee & Foot      Family History   Problem Relation Age of Onset    Diabetes Father     Heart Disorder Father     Diabetes Mother     Hypertension Sister       Social History     Socioeconomic History    Marital status:    Tobacco Use    Smoking status: Never    Smokeless tobacco: Never   Vaping Use    Vaping status: Never Used   Substance and Sexual Activity    Alcohol use: Yes     Alcohol/week: 1.0 standard drink of alcohol     Types: 1 Standard drinks or equivalent per week     Comment: socially    Drug use: No   Other Topics Concern    Caffeine Concern No    Exercise No    Seat Belt Yes    Special Diet Yes    Stress Concern No    Weight Concern No           REVIEW OF SYSTEMS:     Today reviewed systems as documented below  GENERAL HEALTH: feels well otherwise  SKIN: denies any unusual skin lesions or rashes  RESPIRATORY: denies shortness of breath with exertion  CARDIOVASCULAR: denies chest pain on exertion  GI: denies abdominal pain and denies heartburn  NEURO: denies headaches  MUSCULO: denies arthritis, back pain      EXAM:   There were no  vitals taken for this visit.  GENERAL: well developed, well nourished, in no apparent distress  EXTREMITIES:     1. Integument: Normal skin temperature and turgor.  Site of prior ulceration to left plantar lateral calcaneus remains well-healed.  HPK noted to posterior heel with minimal breakdown.  Nails are elongated and thickened.   2. Vascular: Dorsalis pedis and posterior tibial pulses are lightly palpable.  CFT intact digits.     3. Musculoskeletal: Muscle strength intact to left lower extremity with decreased plantar flexion which is patient's baseline.  There is no longer plantar prominence of calcaneus that was noted preoperatively.  Compartments are soft and compressible.  Flexion contracture of digits bilaterally.   4. Neurological: Normal sharp dull sensation; reflexes normal.  Decreased protective sensation noted to lower extremities.        ASSESSMENT AND PLAN:   Diagnoses and all orders for this visit:    Diabetic polyneuropathy associated with type 2 diabetes mellitus (HCC)  -     DME - EXTERNAL     Pre-ulcerative calluses  -     DME - EXTERNAL     Stage 4 chronic kidney disease (HCC)  -     DME - EXTERNAL     Exostosis of bone of foot  -     DME - EXTERNAL             Plan:   -Patient examined, chart history reviewed.  -Inspected surgical site of left foot--site is well-healed and without acute signs of infection or other concerns.  -Pared HPK x 1 to mostly healthy tissue WOI. Minor skin breakdown/bleeding controlled with silver nitrate. Dressed with bacitracin. Patient to monitor for routine healing.  -Sharply debrided nails x 10 with nail nipper without incident.  Nails smoothed with dremel.  -Ambulate with supportive shoes and accommodative inserts. Referral placed for new inserts.  -Educated patient on acute signs of infection and symptoms of DVT and advised patient to seek immediate medical attention if any concerns arise.  Patient expressed understanding.     The patient indicates understanding  of these issues and agrees to the plan.    RTC 6 weeks.    Royce Lewis, TAMMY

## 2024-08-01 ENCOUNTER — MED REC SCAN ONLY (OUTPATIENT)
Dept: FAMILY MEDICINE CLINIC | Facility: CLINIC | Age: 44
End: 2024-08-01

## 2024-08-20 ENCOUNTER — TELEPHONE (OUTPATIENT)
Dept: FAMILY MEDICINE CLINIC | Facility: CLINIC | Age: 44
End: 2024-08-20

## 2024-09-06 ENCOUNTER — OFFICE VISIT (OUTPATIENT)
Dept: PODIATRY CLINIC | Facility: CLINIC | Age: 44
End: 2024-09-06
Payer: COMMERCIAL

## 2024-09-06 DIAGNOSIS — Z87.898 HISTORY OF ULCERATION: ICD-10-CM

## 2024-09-06 DIAGNOSIS — L84 CALLUS OF FOOT: ICD-10-CM

## 2024-09-06 DIAGNOSIS — E11.42 DIABETIC POLYNEUROPATHY ASSOCIATED WITH TYPE 2 DIABETES MELLITUS (HCC): Primary | ICD-10-CM

## 2024-09-06 DIAGNOSIS — B35.1 ONYCHOMYCOSIS: ICD-10-CM

## 2024-09-06 DIAGNOSIS — M89.8X7 EXOSTOSIS OF BONE OF FOOT: ICD-10-CM

## 2024-09-06 DIAGNOSIS — N18.4 STAGE 4 CHRONIC KIDNEY DISEASE (HCC): ICD-10-CM

## 2024-09-06 DIAGNOSIS — L84 PRE-ULCERATIVE CALLUSES: ICD-10-CM

## 2024-09-06 PROCEDURE — 99213 OFFICE O/P EST LOW 20 MIN: CPT | Performed by: STUDENT IN AN ORGANIZED HEALTH CARE EDUCATION/TRAINING PROGRAM

## 2024-09-06 NOTE — PROGRESS NOTES
[facility] Podiatry  Progress Note    Adrien Tobin is a 44 year old male.   Chief Complaint   Patient presents with    Follow - Up     Left foot - Denies pain         HPI:     Patient is a pleasant 44-year-old male with past medical history of recurrent heel ulcerations, diabetes, and CKD returns to clinic for foot exam.  He continues to ambulate with accommodative inserts and shoes and relays that he is tolerating this well.  He presents today for follow-up of preulcerative callus to his left posterior heel and nail care. He did recently start dialysis which is going better.  No other complaints are mentioned.      Allergies: Patient has no known allergies.   Current Outpatient Medications   Medication Sig Dispense Refill    torsemide 20 MG Oral Tab Take 1 tablet (20 mg total) by mouth 2 (two) times daily. 60 tablet 5    cloNIDine 0.3 MG Oral Tab Take 1 tablet (0.3 mg total) by mouth 3 (three) times daily. 90 tablet 5    atorvastatin 40 MG Oral Tab Take 1 tablet (40 mg total) by mouth daily.      lidocaine-prilocaine 2.5-2.5 % External Cream Apply 1 Application topically as needed with dialysis (apply to L wrist AVF prior to dialysis). 1 each 11    sodium bicarbonate 650 MG Oral Tab Take 1 tablet (650 mg total) by mouth 2 (two) times daily. 60 tablet 0    Insulin Pen Needle (BD PEN NEEDLE ODALIS 2ND GEN) 32G X 4 MM Does not apply Misc USE FOUR TIMES DAILY AS DIRECTED 400 each 2    insulin detemir 100 UNIT/ML Subcutaneous Solution Pen-injector Inject 22 Units into the skin every morning.      aspirin (ASPIRIN EC LOW DOSE) 81 MG Oral Tab EC Take 1 tablet (81 mg total) by mouth daily. 90 tablet 0    amLODIPine 10 MG Oral Tab Take 1 tablet (10 mg total) by mouth daily. 90 tablet 0    HUMALOG KWIKPEN 100 UNIT/ML Subcutaneous Solution Pen-injector SLIDING SCALE  TID with meals MDD 15u (Patient taking differently: SLIDING SCALE  TID with meals MDD 15u) 15 mL 0    Dulaglutide (TRULICITY) 0.75 MG/0.5ML  Subcutaneous Solution Pen-injector Inject 0.75 mg into the skin once a week. 7 mL 3      Past Medical History:    CKD (chronic kidney disease)    Diabetes (HCC)    Elevated liver enzymes    Elevated serum GGT level    High blood pressure    High cholesterol    History of blood transfusion    2023 November    Hyperlipidemia    HYPERTRIGLYCERIDEMIA    Intractable nausea and vomiting    OBESITY    Obesity    Pneumonia due to organism    40 years ago    PONV (postoperative nausea and vomiting)    Renal disorder    Visual impairment    GLASSES      Past Surgical History:   Procedure Laterality Date    Arthrotomy/explore/treat knee joint Left     DONE TWICE    Cholecystectomy      Colonoscopy N/A 1/23/2024    Procedure: COLONOSCOPY with hot snare polypectomy;  Surgeon: Albino Hernandez MD;  Location:  ENDOSCOPY    Debride wound Left     HEEL    Foot surgery      Other surgical history  Knee & Foot      Family History   Problem Relation Age of Onset    Diabetes Father     Heart Disorder Father     Diabetes Mother     Hypertension Sister       Social History     Socioeconomic History    Marital status:    Tobacco Use    Smoking status: Never    Smokeless tobacco: Never   Vaping Use    Vaping status: Never Used   Substance and Sexual Activity    Alcohol use: Yes     Alcohol/week: 1.0 standard drink of alcohol     Types: 1 Standard drinks or equivalent per week     Comment: socially    Drug use: No   Other Topics Concern    Caffeine Concern No    Exercise No    Seat Belt Yes    Special Diet Yes    Stress Concern No    Weight Concern No           REVIEW OF SYSTEMS:     Today reviewed systems as documented below  GENERAL HEALTH: feels well otherwise  SKIN: denies any unusual skin lesions or rashes  RESPIRATORY: denies shortness of breath with exertion  CARDIOVASCULAR: denies chest pain on exertion  GI: denies abdominal pain and denies heartburn  NEURO: denies headaches  MUSCULO: denies arthritis, back pain      EXAM:    There were no vitals taken for this visit.  GENERAL: well developed, well nourished, in no apparent distress  EXTREMITIES:     1. Integument: Normal skin temperature and turgor.  Site of prior ulceration to left plantar lateral calcaneus remains well-healed.  HPK noted to posterior heel with minimal breakdown.  Nails are elongated and thickened.   2. Vascular: Dorsalis pedis and posterior tibial pulses are lightly palpable.  CFT intact digits.     3. Musculoskeletal: Muscle strength intact to left lower extremity with decreased plantar flexion which is patient's baseline.  There is no longer plantar prominence of calcaneus that was noted preoperatively.  Compartments are soft and compressible.  Flexion contracture of digits bilaterally.   4. Neurological: Normal sharp dull sensation; reflexes normal.  Decreased protective sensation noted to lower extremities.        ASSESSMENT AND PLAN:   Diagnoses and all orders for this visit:    Diabetic polyneuropathy associated with type 2 diabetes mellitus (HCC)    Pre-ulcerative calluses    Stage 4 chronic kidney disease (HCC)    Exostosis of bone of foot    History of ulceration    Callus of foot    Onychomycosis            Plan:   -Patient examined, chart history reviewed.  -Inspected surgical site of left foot--site is well-healed and without acute signs of infection or other concerns.  -Pared HPK x 1 to mostly healthy tissue WOI. Minor skin breakdown/bleeding controlled with silver nitrate. Dressed with bacitracin. Patient to monitor for routine healing.  -Sharply debrided nails x 10 with nail nipper without incident.  Nails smoothed with dremel.  -Ambulate with supportive shoes and accommodative inserts. Referral placed for new inserts.  -Educated patient on acute signs of infection and symptoms of DVT and advised patient to seek immediate medical attention if any concerns arise.  Patient expressed understanding.     The patient indicates understanding of these issues  and agrees to the plan.    RTC 6 weeks.    Royce Lewis DPM

## 2024-10-18 ENCOUNTER — OFFICE VISIT (OUTPATIENT)
Dept: PODIATRY CLINIC | Facility: CLINIC | Age: 44
End: 2024-10-18
Payer: COMMERCIAL

## 2024-10-18 DIAGNOSIS — M89.8X7 EXOSTOSIS OF BONE OF FOOT: ICD-10-CM

## 2024-10-18 DIAGNOSIS — L84 CALLUS OF FOOT: ICD-10-CM

## 2024-10-18 DIAGNOSIS — E11.42 DIABETIC POLYNEUROPATHY ASSOCIATED WITH TYPE 2 DIABETES MELLITUS (HCC): Primary | ICD-10-CM

## 2024-10-18 DIAGNOSIS — N18.4 STAGE 4 CHRONIC KIDNEY DISEASE (HCC): ICD-10-CM

## 2024-10-18 DIAGNOSIS — M20.42 HAMMER TOES OF BOTH FEET: ICD-10-CM

## 2024-10-18 DIAGNOSIS — M20.41 HAMMER TOES OF BOTH FEET: ICD-10-CM

## 2024-10-18 DIAGNOSIS — L84 PRE-ULCERATIVE CALLUSES: ICD-10-CM

## 2024-10-18 DIAGNOSIS — Z87.898 HISTORY OF ULCERATION: ICD-10-CM

## 2024-10-18 DIAGNOSIS — B35.1 ONYCHOMYCOSIS: ICD-10-CM

## 2024-10-18 PROCEDURE — 99213 OFFICE O/P EST LOW 20 MIN: CPT | Performed by: STUDENT IN AN ORGANIZED HEALTH CARE EDUCATION/TRAINING PROGRAM

## 2024-10-18 NOTE — PROGRESS NOTES
[facility] Podiatry  Progress Note    Adrien Tobin is a 44 year old male.   No chief complaint on file.        HPI:     Patient is a pleasant 44-year-old male with past medical history of recurrent heel ulcerations, diabetes, and CKD returns to clinic for foot exam.  He continues to ambulate with accommodative inserts and shoes and relays that he is tolerating this well.  He presents today for follow-up of preulcerative callus to his left posterior heel and nail care. He did recently start dialysis which is going better.  No other complaints are mentioned.      Allergies: Patient has no known allergies.   Current Outpatient Medications   Medication Sig Dispense Refill    torsemide 20 MG Oral Tab Take 1 tablet (20 mg total) by mouth 2 (two) times daily. 60 tablet 5    cloNIDine 0.3 MG Oral Tab Take 1 tablet (0.3 mg total) by mouth 3 (three) times daily. 90 tablet 5    atorvastatin 40 MG Oral Tab Take 1 tablet (40 mg total) by mouth daily.      lidocaine-prilocaine 2.5-2.5 % External Cream Apply 1 Application topically as needed with dialysis (apply to L wrist AVF prior to dialysis). 1 each 11    sodium bicarbonate 650 MG Oral Tab Take 1 tablet (650 mg total) by mouth 2 (two) times daily. 60 tablet 0    Insulin Pen Needle (BD PEN NEEDLE ODALIS 2ND GEN) 32G X 4 MM Does not apply Misc USE FOUR TIMES DAILY AS DIRECTED 400 each 2    insulin detemir 100 UNIT/ML Subcutaneous Solution Pen-injector Inject 22 Units into the skin every morning.      aspirin (ASPIRIN EC LOW DOSE) 81 MG Oral Tab EC Take 1 tablet (81 mg total) by mouth daily. 90 tablet 0    amLODIPine 10 MG Oral Tab Take 1 tablet (10 mg total) by mouth daily. 90 tablet 0    HUMALOG KWIKPEN 100 UNIT/ML Subcutaneous Solution Pen-injector SLIDING SCALE  TID with meals MDD 15u (Patient taking differently: SLIDING SCALE  TID with meals MDD 15u) 15 mL 0    Dulaglutide (TRULICITY) 0.75 MG/0.5ML Subcutaneous Solution Pen-injector Inject 0.75 mg into the skin  once a week. 7 mL 3      Past Medical History:    CKD (chronic kidney disease)    Diabetes (HCC)    Elevated liver enzymes    Elevated serum GGT level    High blood pressure    High cholesterol    History of blood transfusion    2023 November    Hyperlipidemia    HYPERTRIGLYCERIDEMIA    Intractable nausea and vomiting    OBESITY    Obesity    Pneumonia due to organism    40 years ago    PONV (postoperative nausea and vomiting)    Renal disorder    Visual impairment    GLASSES      Past Surgical History:   Procedure Laterality Date    Arthrotomy/explore/treat knee joint Left     DONE TWICE    Cholecystectomy      Colonoscopy N/A 1/23/2024    Procedure: COLONOSCOPY with hot snare polypectomy;  Surgeon: Albino Hernandez MD;  Location:  ENDOSCOPY    Debride wound Left     HEEL    Foot surgery      Other surgical history  Knee & Foot      Family History   Problem Relation Age of Onset    Diabetes Father     Heart Disorder Father     Diabetes Mother     Hypertension Sister       Social History     Socioeconomic History    Marital status:    Tobacco Use    Smoking status: Never    Smokeless tobacco: Never   Vaping Use    Vaping status: Never Used   Substance and Sexual Activity    Alcohol use: Yes     Alcohol/week: 1.0 standard drink of alcohol     Types: 1 Standard drinks or equivalent per week     Comment: socially    Drug use: No   Other Topics Concern    Caffeine Concern No    Exercise No    Seat Belt Yes    Special Diet Yes    Stress Concern No    Weight Concern No           REVIEW OF SYSTEMS:     Today reviewed systems as documented below  GENERAL HEALTH: feels well otherwise  SKIN: denies any unusual skin lesions or rashes  RESPIRATORY: denies shortness of breath with exertion  CARDIOVASCULAR: denies chest pain on exertion  GI: denies abdominal pain and denies heartburn  NEURO: denies headaches  MUSCULO: denies arthritis, back pain      EXAM:   There were no vitals taken for this visit.  GENERAL: well  developed, well nourished, in no apparent distress  EXTREMITIES:     1. Integument: Normal skin temperature and turgor.  Site of prior ulceration to left plantar lateral calcaneus remains well-healed.  HPK noted to posterior heel with minimal breakdown.  Nails are elongated and thickened.   2. Vascular: Dorsalis pedis and posterior tibial pulses are lightly palpable.  CFT intact digits.     3. Musculoskeletal: Muscle strength intact to left lower extremity with decreased plantar flexion which is patient's baseline.  There is no longer plantar prominence of calcaneus that was noted preoperatively.  Compartments are soft and compressible.  Flexion contracture of digits bilaterally.   4. Neurological: Normal sharp dull sensation; reflexes normal.  Decreased protective sensation noted to lower extremities.        ASSESSMENT AND PLAN:   Diagnoses and all orders for this visit:    Diabetic polyneuropathy associated with type 2 diabetes mellitus (HCC)    Pre-ulcerative calluses    Stage 4 chronic kidney disease (HCC)    Exostosis of bone of foot    History of ulceration    Callus of foot    Onychomycosis    Hammer toes of both feet            Plan:   -Patient examined, chart history reviewed.  -Inspected surgical site of left foot--site is well-healed and without acute signs of infection or other concerns.  -Pared HPK x 1 to mostly healthy tissue WOI.   -Sharply debrided nails x 10 with nail nipper without incident.  Nails smoothed with dremel.  -Ambulate with supportive shoes and accommodative inserts. Referral placed for new inserts.  -Educated patient on acute signs of infection and symptoms of DVT and advised patient to seek immediate medical attention if any concerns arise.  Patient expressed understanding.     The patient indicates understanding of these issues and agrees to the plan.    RTC 6 weeks.    Royce Lewis DPM

## 2024-10-28 ENCOUNTER — MED REC SCAN ONLY (OUTPATIENT)
Dept: NEPHROLOGY | Facility: CLINIC | Age: 44
End: 2024-10-28

## 2024-11-29 ENCOUNTER — OFFICE VISIT (OUTPATIENT)
Dept: PODIATRY CLINIC | Facility: CLINIC | Age: 44
End: 2024-11-29
Payer: COMMERCIAL

## 2024-11-29 DIAGNOSIS — M89.8X7 EXOSTOSIS OF BONE OF FOOT: ICD-10-CM

## 2024-11-29 DIAGNOSIS — E11.42 DIABETIC POLYNEUROPATHY ASSOCIATED WITH TYPE 2 DIABETES MELLITUS (HCC): ICD-10-CM

## 2024-11-29 DIAGNOSIS — M20.42 HAMMER TOES OF BOTH FEET: ICD-10-CM

## 2024-11-29 DIAGNOSIS — L97.522 FOOT ULCER, LEFT, WITH FAT LAYER EXPOSED (HCC): Primary | ICD-10-CM

## 2024-11-29 DIAGNOSIS — N18.4 STAGE 4 CHRONIC KIDNEY DISEASE (HCC): ICD-10-CM

## 2024-11-29 DIAGNOSIS — M20.41 HAMMER TOES OF BOTH FEET: ICD-10-CM

## 2024-11-29 PROCEDURE — 99213 OFFICE O/P EST LOW 20 MIN: CPT | Performed by: STUDENT IN AN ORGANIZED HEALTH CARE EDUCATION/TRAINING PROGRAM

## 2024-12-01 NOTE — PROGRESS NOTES
[facility] Podiatry  Progress Note    Adrien Tobin is a 44 year old male.   Chief Complaint   Patient presents with    Follow - Up     L heel f/u - Denies any pain . FBS- not taken this AM         HPI:     Patient is a pleasant 44-year-old male with past medical history of recurrent heel ulcerations, diabetes, and CKD returns to clinic for foot exam.  He continues to ambulate with accommodative inserts and shoes and relays that he is tolerating this well.  He presents today for follow-up of preulcerative callus to his left posterior heel and nail care. He relays that site seems to have worsened a little bit lately. He did recently start dialysis which is going better.  No other complaints are mentioned. He did not check his blood sugars this AM.       Allergies: Patient has no known allergies.   Current Outpatient Medications   Medication Sig Dispense Refill    torsemide 20 MG Oral Tab Take 1 tablet (20 mg total) by mouth 2 (two) times daily. 60 tablet 5    cloNIDine 0.3 MG Oral Tab Take 1 tablet (0.3 mg total) by mouth 3 (three) times daily. 90 tablet 5    atorvastatin 40 MG Oral Tab Take 1 tablet (40 mg total) by mouth daily.      lidocaine-prilocaine 2.5-2.5 % External Cream Apply 1 Application topically as needed with dialysis (apply to L wrist AVF prior to dialysis). 1 each 11    sodium bicarbonate 650 MG Oral Tab Take 1 tablet (650 mg total) by mouth 2 (two) times daily. 60 tablet 0    Insulin Pen Needle (BD PEN NEEDLE ODALIS 2ND GEN) 32G X 4 MM Does not apply Misc USE FOUR TIMES DAILY AS DIRECTED 400 each 2    insulin detemir 100 UNIT/ML Subcutaneous Solution Pen-injector Inject 22 Units into the skin every morning.      aspirin (ASPIRIN EC LOW DOSE) 81 MG Oral Tab EC Take 1 tablet (81 mg total) by mouth daily. 90 tablet 0    amLODIPine 10 MG Oral Tab Take 1 tablet (10 mg total) by mouth daily. 90 tablet 0    HUMALOG KWIKPEN 100 UNIT/ML Subcutaneous Solution Pen-injector SLIDING SCALE  TID with  meals MDD 15u (Patient taking differently: SLIDING SCALE  TID with meals MDD 15u) 15 mL 0    Dulaglutide (TRULICITY) 0.75 MG/0.5ML Subcutaneous Solution Pen-injector Inject 0.75 mg into the skin once a week. 7 mL 3      Past Medical History:    CKD (chronic kidney disease)    Diabetes (HCC)    Elevated liver enzymes    Elevated serum GGT level    High blood pressure    High cholesterol    History of blood transfusion    2023 November    Hyperlipidemia    HYPERTRIGLYCERIDEMIA    Intractable nausea and vomiting    OBESITY    Obesity    Pneumonia due to organism    40 years ago    PONV (postoperative nausea and vomiting)    Renal disorder    Visual impairment    GLASSES      Past Surgical History:   Procedure Laterality Date    Arthrotomy/explore/treat knee joint Left     DONE TWICE    Cholecystectomy      Colonoscopy N/A 1/23/2024    Procedure: COLONOSCOPY with hot snare polypectomy;  Surgeon: Albino Hernandez MD;  Location:  ENDOSCOPY    Debride wound Left     HEEL    Foot surgery      Other surgical history  Knee & Foot      Family History   Problem Relation Age of Onset    Diabetes Father     Heart Disorder Father     Diabetes Mother     Hypertension Sister       Social History     Socioeconomic History    Marital status:    Tobacco Use    Smoking status: Never    Smokeless tobacco: Never   Vaping Use    Vaping status: Never Used   Substance and Sexual Activity    Alcohol use: Yes     Alcohol/week: 1.0 standard drink of alcohol     Types: 1 Standard drinks or equivalent per week     Comment: socially    Drug use: No   Other Topics Concern    Caffeine Concern No    Exercise No    Seat Belt Yes    Special Diet Yes    Stress Concern No    Weight Concern No           REVIEW OF SYSTEMS:     No n/v/f/c.      EXAM:   There were no vitals taken for this visit.  GENERAL: well developed, well nourished, in no apparent distress  EXTREMITIES:       1. Integument: Normal skin temperature and turgor.  Site of prior  ulceration to left plantar lateral calcaneus remains well-healed.  There is new full thickness ulcer to posterior heel measuring 1.5 x 0.5 x 0.2 cm with granular base. See images above. No open sores or acute SOI. Nails are elongated and thickened.   2. Vascular: Dorsalis pedis and posterior tibial pulses are lightly palpable.  CFT intact digits.     3. Musculoskeletal: Muscle strength intact to left lower extremity with decreased plantar flexion which is patient's baseline.  There is no longer plantar prominence of calcaneus that was noted preoperatively.  Compartments are soft and compressible.  Flexion contracture of digits bilaterally.   4. Neurological: Normal sharp dull sensation; reflexes normal.  Decreased protective sensation noted to lower extremities.        ASSESSMENT AND PLAN:   Diagnoses and all orders for this visit:    Foot ulcer, left, with fat layer exposed (HCC)    Diabetic polyneuropathy associated with type 2 diabetes mellitus (HCC)  -     DME - External    Exostosis of bone of foot  -     DME - External    Hammer toes of both feet  -     DME - External    Stage 4 chronic kidney disease (HCC)            Plan:   -Patient examined, chart history reviewed.  -Inspected surgical site of left foot--site is well-healed and without acute signs of infection or other concerns.  -There is new ulcer to posterior heel.  Excisional debridement performed with #15 blade into through subcutaneous tissue to healthy bleeding base.  Site dressed with Citlaly and Hydrofera Blue.  Patient received similar dressing changes every other day.  He should very closely monitor site and seek immediate medical attention if any acute signs of infection or other concerns arise.  -Sharply debrided nails x 10 with nail nipper without incident.  Nails smoothed with dremel.  -Ambulate with supportive shoes and accommodative inserts. Referral placed for new inserts.  -Educated patient on acute signs of infection and symptoms of DVT  and advised patient to seek immediate medical attention if any concerns arise.  Patient expressed understanding.     The patient indicates understanding of these issues and agrees to the plan.    RTC 1 week.    Royce Lewis DPM

## 2024-12-04 ENCOUNTER — TELEPHONE (OUTPATIENT)
Dept: HEMATOLOGY/ONCOLOGY | Facility: HOSPITAL | Age: 44
End: 2024-12-04

## 2024-12-09 ENCOUNTER — OFFICE VISIT (OUTPATIENT)
Dept: PODIATRY CLINIC | Facility: CLINIC | Age: 44
End: 2024-12-09

## 2024-12-09 ENCOUNTER — OFFICE VISIT (OUTPATIENT)
Dept: NEPHROLOGY | Facility: CLINIC | Age: 44
End: 2024-12-09
Payer: COMMERCIAL

## 2024-12-09 VITALS — WEIGHT: 218.38 LBS | BODY MASS INDEX: 33 KG/M2 | DIASTOLIC BLOOD PRESSURE: 50 MMHG | SYSTOLIC BLOOD PRESSURE: 110 MMHG

## 2024-12-09 DIAGNOSIS — L84 PRE-ULCERATIVE CALLUSES: ICD-10-CM

## 2024-12-09 DIAGNOSIS — Z99.2 ESRD ON HEMODIALYSIS (HCC): Primary | ICD-10-CM

## 2024-12-09 DIAGNOSIS — E11.42 DIABETIC POLYNEUROPATHY ASSOCIATED WITH TYPE 2 DIABETES MELLITUS (HCC): ICD-10-CM

## 2024-12-09 DIAGNOSIS — M20.42 HAMMER TOES OF BOTH FEET: ICD-10-CM

## 2024-12-09 DIAGNOSIS — M89.8X7 EXOSTOSIS OF BONE OF FOOT: ICD-10-CM

## 2024-12-09 DIAGNOSIS — M20.41 HAMMER TOES OF BOTH FEET: ICD-10-CM

## 2024-12-09 DIAGNOSIS — N18.4 STAGE 4 CHRONIC KIDNEY DISEASE (HCC): ICD-10-CM

## 2024-12-09 DIAGNOSIS — L97.522 FOOT ULCER, LEFT, WITH FAT LAYER EXPOSED (HCC): Primary | ICD-10-CM

## 2024-12-09 DIAGNOSIS — N18.6 ESRD ON HEMODIALYSIS (HCC): Primary | ICD-10-CM

## 2024-12-09 PROCEDURE — L4387 NON-PNEUM WALK BOOT PRE OTS: HCPCS | Performed by: STUDENT IN AN ORGANIZED HEALTH CARE EDUCATION/TRAINING PROGRAM

## 2024-12-09 PROCEDURE — 99213 OFFICE O/P EST LOW 20 MIN: CPT | Performed by: STUDENT IN AN ORGANIZED HEALTH CARE EDUCATION/TRAINING PROGRAM

## 2024-12-09 RX ORDER — CLINDAMYCIN HYDROCHLORIDE 300 MG/1
300 CAPSULE ORAL 3 TIMES DAILY
Qty: 30 CAPSULE | Refills: 0 | Status: SHIPPED | OUTPATIENT
Start: 2024-12-09

## 2024-12-09 NOTE — PROGRESS NOTES
Gilbert Tobin was seen in the nephrology clinic today in follow-up for management of ESRD for which he is on hemodialysis MWF at [facility].  Recent clinical course has been complicated by issues with clotting and with thrombocytopenia.  He reports that he has been having dialysis interrupted due to clotting of his filter and was started on heparin for this.  It was noted that his platelets had declined from roughly 150,000-75,000 while receiving heparin and heparin was subsequently discontinued.  Unfortunately he again clotted the filter and was again given heparin although with the same result.  This prompted evaluation including heparin-induced platelet antibody to be sent and this was found to be positive.  He obviously presents today with concern regarding recurrent clotting and associated heparin-induced platelet antibodies.  In addition to the above his blood pressures are modestly low at dialysis and he has been backing off his antihypertensive agents.  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  He has been given the number to contact Dr. Sharp and will call tomorrow for this.  I did advise him to bring the results of his blood work to his hematology appointment            Justin Meyers MD  12/9/2024  3:41 PM

## 2024-12-11 ENCOUNTER — HOSPITAL ENCOUNTER (OUTPATIENT)
Dept: ULTRASOUND IMAGING | Age: 44
Discharge: HOME | End: 2024-12-11
Attending: INTERNAL MEDICINE
Payer: COMMERCIAL

## 2024-12-11 ENCOUNTER — OFFICE VISIT (OUTPATIENT)
Dept: HEMATOLOGY/ONCOLOGY | Age: 44
End: 2024-12-11
Attending: INTERNAL MEDICINE
Payer: COMMERCIAL

## 2024-12-11 VITALS
WEIGHT: 218.5 LBS | OXYGEN SATURATION: 99 % | SYSTOLIC BLOOD PRESSURE: 105 MMHG | TEMPERATURE: 97 F | DIASTOLIC BLOOD PRESSURE: 70 MMHG | RESPIRATION RATE: 18 BRPM | BODY MASS INDEX: 33.12 KG/M2 | HEART RATE: 114 BPM | HEIGHT: 68 IN

## 2024-12-11 DIAGNOSIS — D75.829 HIT (HEPARIN-INDUCED THROMBOCYTOPENIA) (HCC): Primary | ICD-10-CM

## 2024-12-11 DIAGNOSIS — D75.829 HIT (HEPARIN-INDUCED THROMBOCYTOPENIA) (HCC): ICD-10-CM

## 2024-12-11 DIAGNOSIS — D64.9 ANEMIA, UNSPECIFIED TYPE: ICD-10-CM

## 2024-12-11 LAB
BASOPHILS # BLD AUTO: 0.03 X10(3) UL (ref 0–0.2)
BASOPHILS NFR BLD AUTO: 0.5 %
DEPRECATED HBV CORE AB SER IA-ACNC: 824 NG/ML
EOSINOPHIL # BLD AUTO: 0.06 X10(3) UL (ref 0–0.7)
EOSINOPHIL NFR BLD AUTO: 1.1 %
ERYTHROCYTE [DISTWIDTH] IN BLOOD BY AUTOMATED COUNT: 17 %
HCT VFR BLD AUTO: 31.3 %
HGB BLD-MCNC: 10.4 G/DL
IMM GRANULOCYTES # BLD AUTO: 0.02 X10(3) UL (ref 0–1)
IMM GRANULOCYTES NFR BLD: 0.4 %
IRON SATN MFR SERPL: 18 %
IRON SERPL-MCNC: 46 UG/DL
LYMPHOCYTES # BLD AUTO: 1.11 X10(3) UL (ref 1–4)
LYMPHOCYTES NFR BLD AUTO: 19.7 %
MCH RBC QN AUTO: 33.2 PG (ref 26–34)
MCHC RBC AUTO-ENTMCNC: 33.2 G/DL (ref 31–37)
MCV RBC AUTO: 100 FL
MONOCYTES # BLD AUTO: 0.34 X10(3) UL (ref 0.1–1)
MONOCYTES NFR BLD AUTO: 6 %
NEUTROPHILS # BLD AUTO: 4.07 X10 (3) UL (ref 1.5–7.7)
NEUTROPHILS # BLD AUTO: 4.07 X10(3) UL (ref 1.5–7.7)
NEUTROPHILS NFR BLD AUTO: 72.3 %
PLATELET # BLD AUTO: 140 10(3)UL (ref 150–450)
RBC # BLD AUTO: 3.13 X10(6)UL
TOTAL IRON BINDING CAPACITY: 255 UG/DL (ref 250–425)
TRANSFERRIN SERPL-MCNC: 187 MG/DL (ref 215–365)
WBC # BLD AUTO: 5.6 X10(3) UL (ref 4–11)

## 2024-12-11 PROCEDURE — 93970 EXTREMITY STUDY: CPT | Performed by: INTERNAL MEDICINE

## 2024-12-11 PROCEDURE — G2211 COMPLEX E/M VISIT ADD ON: HCPCS | Performed by: INTERNAL MEDICINE

## 2024-12-11 PROCEDURE — 99205 OFFICE O/P NEW HI 60 MIN: CPT | Performed by: INTERNAL MEDICINE

## 2024-12-11 NOTE — PROGRESS NOTES
Hematology/Oncology Initial Consultation Note    Patient Name: Adrien Tobin  Medical Record Number: [id number]    YOB: 1980   Date of Consultation: 12/11/2024   PCP: Jimbo Sullivan DO   Other providers:  Dr Meyers    Reason for Consultation:  Adrien Tobin was seen today for the diagnosis of thrombocytopenia    History of Present Illness:      45 y/o F PMH ESRD on HD MWF who presents for thrombocytopenia with concern for HIT.    - started dialysis in May; kidney failure was originally due to vancomycin induced JESSICA in setting of treatment for cellulitis and kidney function since then declined  - reports dialysis filters were constantly clotting. In addition he kept having recurrent thrombocytopenia with exposure to heparin, declining to ~85k  - HIPA test was sent in 10/18 that showed 0.712 OD  - reports now using normal saline instead of heparin and has not had a problem with filters   - thinks he gets IV iron once a week  - denies any swelling in his lower legs  - remains in offloading boot for his L ankle  - most recent lab with platelets at 127k. Hgb 9.7g/dL. He does not think he is getting EPO    Past Medical History:  Past Medical History:    CKD (chronic kidney disease)    Diabetes (HCC)    Elevated liver enzymes    Elevated serum GGT level    High blood pressure    High cholesterol    History of blood transfusion    2023 November    Hyperlipidemia    HYPERTRIGLYCERIDEMIA    Intractable nausea and vomiting    OBESITY    Obesity    Pneumonia due to organism    40 years ago    PONV (postoperative nausea and vomiting)    Renal disorder    Visual impairment    GLASSES       Past Surgical History:   Procedure Laterality Date    Arthrotomy/explore/treat knee joint Left     DONE TWICE    Cholecystectomy      Colonoscopy N/A 1/23/2024    Procedure: COLONOSCOPY with hot snare polypectomy;  Surgeon: Albino Hernandez MD;  Location:  ENDOSCOPY    Debride wound Left     HEEL    Foot  surgery      Other surgical history  Knee & Foot       Home Medications:   apixaban 5 MG Oral Tab Take 1 tablet (5 mg total) by mouth 2 (two) times daily. 60 tablet 0     -------  Medications Ordered Prior to Encounter[1]    Allergies:   Allergies[2]    Psychosocial History:  Social History     Social History Narrative    Not on file     Social History     Socioeconomic History    Marital status:    Tobacco Use    Smoking status: Never    Smokeless tobacco: Never   Vaping Use    Vaping status: Never Used   Substance and Sexual Activity    Alcohol use: Yes     Alcohol/week: 1.0 standard drink of alcohol     Types: 1 Standard drinks or equivalent per week     Comment: socially    Drug use: No   Other Topics Concern    Caffeine Concern No    Exercise No    Seat Belt Yes    Special Diet Yes    Stress Concern No    Weight Concern No     Social Drivers of Health     Financial Resource Strain: Low Risk  (11/17/2023)    Financial Resource Strain     Difficulty of Paying Living Expenses: Not very hard     Med Affordability: No   Food Insecurity: No Food Insecurity (11/5/2024)    Received from [facility]    Food Insecurity     Currently or in the past 3 months, have you worried your food would run out before you had money to buy more?: No     In the past 12 months, have you run out of food or been unable to get more?: No   Transportation Needs: No Transportation Needs (11/5/2024)    Received from [facility]    Transportation Needs     Currently or in the past 3 months, has lack of transportation kept you from medical appointments, getting food or medicine, or providing care to a family member?: Unrecognized value     Medical Transportation Needs?: No   Housing Stability: Low Risk  (1/4/2024)    Housing Stability     Housing Instability: No       Family Medical History:  Family History   Problem Relation Age of Onset    Diabetes Father     Heart Disorder Father     Diabetes Mother      Hypertension Sister        Review of Systems:  A 10-point ROS was done with pertinent positives and negative per the HPI    Vital Signs:  Height: 172.7 cm (5' 8\") (12/11 1233)  Weight: 99.1 kg (218 lb 8 oz) (12/11 1233)  BSA (Calculated - sq m): 2.12 sq meters (12/11 1233)  Pulse: 114 (12/11 1233)  BP: 105/70 (12/11 1233)  Temp: 97.1 °F (36.2 °C) (12/11 1233)  Do Not Use - Resp Rate: --  SpO2: 99 % (12/11 1233)    Wt Readings from Last 6 Encounters:   12/11/24 99.1 kg (218 lb 8 oz)   12/09/24 99.1 kg (218 lb 6.4 oz)   06/18/24 95.3 kg (210 lb)   05/13/24 100.5 kg (221 lb 8 oz)   03/11/24 102.5 kg (226 lb)   02/09/24 98.9 kg (218 lb)         Physical Examination:  General: Patient is alert and oriented, not in acute distress  Psych: Mood and affect are appropriate  Eyes: EOMI, PERRL  ENT: Oropharynx is clear, no adenopathy  CV: no LE edema although question of cord in lower L calf  Respiratory: Non-labored respirations  GI/Abd: Soft, non-tender  Neurological: Grossly intact     Laboratory:  Lab Results   Component Value Date    WBC 5.1 06/25/2024    WBC 4.6 05/21/2024    WBC 5.4 04/27/2024    HGB 7.4 (L) 06/25/2024    HGB 8.4 (L) 05/21/2024    HGB 8.0 (L) 04/27/2024    HCT 22.3 (L) 06/25/2024    MCV 92.1 06/25/2024    MCH 30.6 06/25/2024    MCHC 33.2 06/25/2024    RDW 14.0 06/25/2024    PLT 88.0 (L) 06/25/2024    .0 05/21/2024    .0 04/27/2024     Lab Results   Component Value Date    GLU 82 06/25/2024    BUN 39 (H) 06/25/2024    BUNCREA 5.5 (L) 06/25/2024    CREATSERUM 7.07 (H) 06/25/2024    CREATSERUM 8.98 (H) 05/21/2024    CREATSERUM 8.58 (H) 04/27/2024    ANIONGAP 5 06/25/2024    GFR 90 11/22/2017    GFRNAA 26 (L) 06/17/2022    GFRAA 30 (L) 06/17/2022    CA 9.5 06/25/2024    OSMOCALC 302 (H) 06/25/2024    ALKPHO 107 05/21/2024    AST 19 05/21/2024    ALT 12 (L) 05/21/2024    BILT 0.4 05/21/2024    TP 6.9 05/21/2024    ALB 3.4 05/21/2024    GLOBULIN 3.5 05/21/2024    AGRATIO 1.5 04/21/2014    NA  142 06/25/2024    K 4.4 06/25/2024     06/25/2024    CO2 33.0 (H) 06/25/2024     Lab Results   Component Value Date    PTT 33.2 06/18/2018    INR 1.08 06/18/2018     Impression & Plan:     Thrombocytopenia  - concern for HIT with positive HIPA and repeated instances of thrombocytopenia with heparin exposure  - obtain LE dopplers to assess for DVT  - will send MIGDALIA to confirm if he has HIT  - given concern for HIT, start 5mg apixaban BID while MIGDALIA is pending. If HIT is confirmed without thrombosis, he will need to be on apixaban 5mg BID for 4 weeks. If he has thrombosis, he will need to be on anticoagulation for at least 3 months. If MIGDALIA negative, discontinue anticoagulation. Discussed bleeding precautions  - no heparin exposure for now but he is no longer getting exposed to heparin in dialysis anyway    Anemia  - likely secondary to ESRD  - check iron studies  - if hgb persistently <10g/dL, can start EPO during dialysis    ESRD  - on HD MWF    F/u pending MIGDALIA testing    Sam Sharp MD  [facility] Hematology Oncology           [1]   Current Outpatient Medications on File Prior to Visit   Medication Sig Dispense Refill    clindamycin 300 MG Oral Cap Take 1 capsule (300 mg total) by mouth 3 (three) times daily. 30 capsule 0    torsemide 20 MG Oral Tab Take 1 tablet (20 mg total) by mouth 2 (two) times daily. 60 tablet 5    cloNIDine 0.3 MG Oral Tab Take 1 tablet (0.3 mg total) by mouth 3 (three) times daily. 90 tablet 5    atorvastatin 40 MG Oral Tab Take 1 tablet (40 mg total) by mouth daily.      lidocaine-prilocaine 2.5-2.5 % External Cream Apply 1 Application topically as needed with dialysis (apply to L wrist AVF prior to dialysis). 1 each 11    sodium bicarbonate 650 MG Oral Tab Take 1 tablet (650 mg total) by mouth 2 (two) times daily. 60 tablet 0    Insulin Pen Needle (BD PEN NEEDLE ODALIS 2ND GEN) 32G X 4 MM Does not apply Misc USE FOUR TIMES DAILY AS DIRECTED 400 each 2    insulin detemir 100 UNIT/ML  Subcutaneous Solution Pen-injector Inject 22 Units into the skin every morning.      aspirin (ASPIRIN EC LOW DOSE) 81 MG Oral Tab EC Take 1 tablet (81 mg total) by mouth daily. 90 tablet 0    amLODIPine 10 MG Oral Tab Take 1 tablet (10 mg total) by mouth daily. 90 tablet 0    HUMALOG KWIKPEN 100 UNIT/ML Subcutaneous Solution Pen-injector SLIDING SCALE  TID with meals MDD 15u (Patient taking differently: SLIDING SCALE  TID with meals MDD 15u) 15 mL 0    Dulaglutide (TRULICITY) 0.75 MG/0.5ML Subcutaneous Solution Pen-injector Inject 0.75 mg into the skin once a week. 7 mL 3     No current facility-administered medications on file prior to visit.   [2] No Known Allergies

## 2024-12-11 NOTE — PROGRESS NOTES
Education Record    Learner:  Patient    Disease / Diagnosis: HIT     Barriers / Limitations:  None   Comments:    Method:  Discussion   Comments:    General Topics:  Side effects and symptom management and Plan of care reviewed   Comments:    Outcome:  Shows understanding   Comments:    Here for new consult- clotting during dialysis, thrombocytopenia after heparin. Tested for HIT and positive.

## 2024-12-14 LAB
SRA HIGH DOSE HEPARIN: <1 %
SRA LOW DOSE HEPARIN: <1 %

## 2024-12-15 NOTE — PROGRESS NOTES
[facility] Podiatry  Progress Note    Adrien Tobin is a 44 year old male.   Chief Complaint   Patient presents with    Diabetic Ulcer     Left foot ulcer follow up- denies pain         HPI:     Patient is a pleasant 44-year-old male with past medical history of recurrent heel ulcerations, diabetes, and CKD returns to clinic for foot exam.  He continues to ambulate with accommodative inserts and shoes and relays that he is tolerating this well.  He presents for follow-up of left foot ulcer.  He relates that his ulcer does seem to be worsening despite daily changes with Betadine and trying to stay off of it.  He is without pain.  Denies nausea, vomiting, fever, chills.     Allergies: Patient has no known allergies.   Current Outpatient Medications   Medication Sig Dispense Refill    clindamycin 300 MG Oral Cap Take 1 capsule (300 mg total) by mouth 3 (three) times daily. 30 capsule 0    torsemide 20 MG Oral Tab Take 1 tablet (20 mg total) by mouth 2 (two) times daily. 60 tablet 5    cloNIDine 0.3 MG Oral Tab Take 1 tablet (0.3 mg total) by mouth 3 (three) times daily. 90 tablet 5    atorvastatin 40 MG Oral Tab Take 1 tablet (40 mg total) by mouth daily.      lidocaine-prilocaine 2.5-2.5 % External Cream Apply 1 Application topically as needed with dialysis (apply to L wrist AVF prior to dialysis). 1 each 11    sodium bicarbonate 650 MG Oral Tab Take 1 tablet (650 mg total) by mouth 2 (two) times daily. 60 tablet 0    Insulin Pen Needle (BD PEN NEEDLE ODALIS 2ND GEN) 32G X 4 MM Does not apply Misc USE FOUR TIMES DAILY AS DIRECTED 400 each 2    insulin detemir 100 UNIT/ML Subcutaneous Solution Pen-injector Inject 22 Units into the skin every morning.      aspirin (ASPIRIN EC LOW DOSE) 81 MG Oral Tab EC Take 1 tablet (81 mg total) by mouth daily. 90 tablet 0    amLODIPine 10 MG Oral Tab Take 1 tablet (10 mg total) by mouth daily. 90 tablet 0    HUMALOG KWIKPEN 100 UNIT/ML Subcutaneous Solution Pen-injector  SLIDING SCALE  TID with meals MDD 15u (Patient taking differently: SLIDING SCALE  TID with meals MDD 15u) 15 mL 0    Dulaglutide (TRULICITY) 0.75 MG/0.5ML Subcutaneous Solution Pen-injector Inject 0.75 mg into the skin once a week. 7 mL 3    apixaban 5 MG Oral Tab Take 1 tablet (5 mg total) by mouth 2 (two) times daily. 60 tablet 0    levoFLOXacin (LEVAQUIN) 250 MG Oral Tab Take 2x 250 mg tablets after dialysis followed by one tablet after dialysis every 48 hours 7 tablet 0    gentamicin 0.1 % External Cream Apply topically to the ulcer site twice daily 30 g 1      Past Medical History:    CKD (chronic kidney disease)    Diabetes (HCC)    Elevated liver enzymes    Elevated serum GGT level    High blood pressure    High cholesterol    History of blood transfusion    2023 November    Hyperlipidemia    HYPERTRIGLYCERIDEMIA    Intractable nausea and vomiting    OBESITY    Obesity    Pneumonia due to organism    40 years ago    PONV (postoperative nausea and vomiting)    Renal disorder    Visual impairment    GLASSES      Past Surgical History:   Procedure Laterality Date    Arthrotomy/explore/treat knee joint Left     DONE TWICE    Cholecystectomy      Colonoscopy N/A 1/23/2024    Procedure: COLONOSCOPY with hot snare polypectomy;  Surgeon: Albino Hernandez MD;  Location:  ENDOSCOPY    Debride wound Left     HEEL    Foot surgery      Other surgical history  Knee & Foot      Family History   Problem Relation Age of Onset    Diabetes Father     Heart Disorder Father     Diabetes Mother     Hypertension Sister       Social History     Socioeconomic History    Marital status:    Tobacco Use    Smoking status: Never    Smokeless tobacco: Never   Vaping Use    Vaping status: Never Used   Substance and Sexual Activity    Alcohol use: Yes     Alcohol/week: 1.0 standard drink of alcohol     Types: 1 Standard drinks or equivalent per week     Comment: socially    Drug use: No   Other Topics Concern    Caffeine Concern  No    Exercise No    Seat Belt Yes    Special Diet Yes    Stress Concern No    Weight Concern No           REVIEW OF SYSTEMS:     No n/v/f/c.      EXAM:   There were no vitals taken for this visit.  GENERAL: well developed, well nourished, in no apparent distress  EXTREMITIES:       1. Integument: Normal skin temperature and turgor.  Site of prior ulceration to left plantar lateral calcaneus remains well-healed.  There is new full thickness ulcer to posterior heel measuring 1.5 x 1.0 x 0.2 cm with granular base.  There is more maceration to site today and slight malodor.  No probing to bone appreciated.  2. Vascular: Dorsalis pedis and posterior tibial pulses are lightly palpable.  CFT intact digits.     3. Musculoskeletal: Muscle strength intact to left lower extremity with decreased plantar flexion which is patient's baseline.  There is no longer plantar prominence of calcaneus that was noted preoperatively.  Compartments are soft and compressible.  Flexion contracture of digits bilaterally.   4. Neurological: Normal sharp dull sensation; reflexes normal.  Decreased protective sensation noted to lower extremities.        ASSESSMENT AND PLAN:   Diagnoses and all orders for this visit:    Foot ulcer, left, with fat layer exposed (HCC)  -     [facility] AMB POD XR - LT FOOT 2 VIEWS(AP, LATERAL)WT BEARING  -     Aerobic Bacterial Culture [E]; Future  -     Anaerobic Culture [E]; Future  -     clindamycin 300 MG Oral Cap; Take 1 capsule (300 mg total) by mouth 3 (three) times daily.    Diabetic polyneuropathy associated with type 2 diabetes mellitus (HCC)    Exostosis of bone of foot    Hammer toes of both feet    Stage 4 chronic kidney disease (HCC)    Pre-ulcerative calluses            Plan:   -Patient examined, chart history reviewed.  -Ulcer to patient's left foot has unfortunately worsened a little bit this week despite local wound care.  -X-rays obtained reviewed.  No erosions or other concerns.  Prior postsurgical  changes appreciated.  -Wound cultures obtained.  -Excisional debridement formed site 15 blade into through subcutaneous tissue to healthy bleeding base.  -Site dressed with Citlaly and Hydrofera Blue.  Patient received similar dressing changes every other day.  -Rx clindamycin out of precaution.  -Cam boot dispensed to better immobilize site.  -Educated patient on acute signs of infection and symptoms of DVT and advised patient to seek immediate medical attention if any concerns arise.  Patient expressed understanding.     The patient indicates understanding of these issues and agrees to the plan.    RTC 1 week or sooner if other concerns arise.    Royce Lewis DPM

## 2024-12-16 ENCOUNTER — OFFICE VISIT (OUTPATIENT)
Dept: PODIATRY CLINIC | Facility: CLINIC | Age: 44
End: 2024-12-16
Payer: COMMERCIAL

## 2024-12-16 DIAGNOSIS — L97.522 FOOT ULCER, LEFT, WITH FAT LAYER EXPOSED (HCC): Primary | ICD-10-CM

## 2024-12-16 DIAGNOSIS — M89.8X7 EXOSTOSIS OF BONE OF FOOT: ICD-10-CM

## 2024-12-16 DIAGNOSIS — N18.4 STAGE 4 CHRONIC KIDNEY DISEASE (HCC): ICD-10-CM

## 2024-12-16 DIAGNOSIS — E11.42 DIABETIC POLYNEUROPATHY ASSOCIATED WITH TYPE 2 DIABETES MELLITUS (HCC): ICD-10-CM

## 2024-12-16 PROCEDURE — 99213 OFFICE O/P EST LOW 20 MIN: CPT | Performed by: STUDENT IN AN ORGANIZED HEALTH CARE EDUCATION/TRAINING PROGRAM

## 2024-12-16 NOTE — PROGRESS NOTES
[facility] Podiatry  Progress Note    Adrien Tobin is a 44 year old male.   Chief Complaint   Patient presents with    Diabetic Ulcer     L foot f/u- denies pain - pt walks w/ cam boot         HPI:     Patient is a pleasant 44-year-old male with past medical history of recurrent heel ulcerations, diabetes, and CKD returns to clinic for follow-up of left heel ulcer.  He relates that site seems to be doing okay.  He did start taking Levaquin.  He has been applying gentamicin to site daily.  He has been ambulating in cam boot.  Denies any nausea, vomiting, fever, chills.      Allergies: Heparin   Current Outpatient Medications   Medication Sig Dispense Refill    apixaban 5 MG Oral Tab Take 1 tablet (5 mg total) by mouth 2 (two) times daily. 60 tablet 0    levoFLOXacin (LEVAQUIN) 250 MG Oral Tab Take 2x 250 mg tablets after dialysis followed by one tablet after dialysis every 48 hours 7 tablet 0    gentamicin 0.1 % External Cream Apply topically to the ulcer site twice daily 30 g 1    clindamycin 300 MG Oral Cap Take 1 capsule (300 mg total) by mouth 3 (three) times daily. 30 capsule 0    torsemide 20 MG Oral Tab Take 1 tablet (20 mg total) by mouth 2 (two) times daily. 60 tablet 5    cloNIDine 0.3 MG Oral Tab Take 1 tablet (0.3 mg total) by mouth 3 (three) times daily. 90 tablet 5    atorvastatin 40 MG Oral Tab Take 1 tablet (40 mg total) by mouth daily.      lidocaine-prilocaine 2.5-2.5 % External Cream Apply 1 Application topically as needed with dialysis (apply to L wrist AVF prior to dialysis). 1 each 11    sodium bicarbonate 650 MG Oral Tab Take 1 tablet (650 mg total) by mouth 2 (two) times daily. 60 tablet 0    Insulin Pen Needle (BD PEN NEEDLE ODALIS 2ND GEN) 32G X 4 MM Does not apply Misc USE FOUR TIMES DAILY AS DIRECTED 400 each 2    insulin detemir 100 UNIT/ML Subcutaneous Solution Pen-injector Inject 22 Units into the skin every morning.      aspirin (ASPIRIN EC LOW DOSE) 81 MG Oral Tab EC Take 1  tablet (81 mg total) by mouth daily. 90 tablet 0    amLODIPine 10 MG Oral Tab Take 1 tablet (10 mg total) by mouth daily. 90 tablet 0    HUMALOG KWIKPEN 100 UNIT/ML Subcutaneous Solution Pen-injector SLIDING SCALE  TID with meals MDD 15u (Patient taking differently: SLIDING SCALE  TID with meals MDD 15u) 15 mL 0    Dulaglutide (TRULICITY) 0.75 MG/0.5ML Subcutaneous Solution Pen-injector Inject 0.75 mg into the skin once a week. 7 mL 3      Past Medical History:    CKD (chronic kidney disease)    Diabetes (HCC)    Elevated liver enzymes    Elevated serum GGT level    High blood pressure    High cholesterol    History of blood transfusion    2023 November    Hyperlipidemia    HYPERTRIGLYCERIDEMIA    Intractable nausea and vomiting    OBESITY    Obesity    Pneumonia due to organism    40 years ago    PONV (postoperative nausea and vomiting)    Renal disorder    Visual impairment    GLASSES      Past Surgical History:   Procedure Laterality Date    Arthrotomy/explore/treat knee joint Left     DONE TWICE    Cholecystectomy      Colonoscopy N/A 1/23/2024    Procedure: COLONOSCOPY with hot snare polypectomy;  Surgeon: Albino Hernandez MD;  Location:  ENDOSCOPY    Debride wound Left     HEEL    Foot surgery      Other surgical history  Knee & Foot      Family History   Problem Relation Age of Onset    Diabetes Father     Heart Disorder Father     Diabetes Mother     Hypertension Sister       Social History     Socioeconomic History    Marital status:    Tobacco Use    Smoking status: Never    Smokeless tobacco: Never   Vaping Use    Vaping status: Never Used   Substance and Sexual Activity    Alcohol use: Yes     Alcohol/week: 1.0 standard drink of alcohol     Types: 1 Standard drinks or equivalent per week     Comment: socially    Drug use: No   Other Topics Concern    Caffeine Concern No    Exercise No    Seat Belt Yes    Special Diet Yes    Stress Concern No    Weight Concern No           REVIEW OF SYSTEMS:      No n/v/f/c.      EXAM:   There were no vitals taken for this visit.  GENERAL: well developed, well nourished, in no apparent distress  EXTREMITIES:       1. Integument: Normal skin temperature and turgor.  Site of prior ulceration to left plantar lateral calcaneus remains well-healed.  There is new full thickness ulcer to posterior heel measuring 1.5 x 0.5 x 0.2 cm with granular base.  There is more maceration to site today and slight malodor.  No probing to bone appreciated.  Improved appearance from last visit.  2. Vascular: Dorsalis pedis and posterior tibial pulses are lightly palpable.  CFT intact digits.     3. Musculoskeletal: Muscle strength intact to left lower extremity with decreased plantar flexion which is patient's baseline.  There is no longer plantar prominence of calcaneus that was noted preoperatively.  Compartments are soft and compressible.  Flexion contracture of digits bilaterally.   4. Neurological: Normal sharp dull sensation; reflexes normal.  Decreased protective sensation noted to lower extremities.        ASSESSMENT AND PLAN:   Diagnoses and all orders for this visit:    Foot ulcer, left, with fat layer exposed (HCC)    Diabetic polyneuropathy associated with type 2 diabetes mellitus (HCC)    Exostosis of bone of foot    Stage 4 chronic kidney disease (HCC)              Plan:   -Patient examined, chart history reviewed.  -Ulcer to patient's left foot has unfortunately worsened a little bit this week despite local wound care.  -X-rays obtained reviewed.  No erosions or other concerns.  Prior postsurgical changes appreciated.  -Wound cultures obtained.  -Excisional debridement formed site 15 blade into through subcutaneous tissue to healthy bleeding base.  -Site dressed with bacitracin and Band-Aid.  Continue dressing with gentamicin [place] twice daily.  Can finish Levaquin as prescribed.  Continue ambulate with cam boot.  Site does appear to be back on track/improved from last week.   Will continue to closely monitor.  -Educated patient on acute signs of infection and symptoms of DVT and advised patient to seek immediate medical attention if any concerns arise.  Patient expressed understanding.     The patient indicates understanding of these issues and agrees to the plan.    RTC 1 week or sooner if other concerns arise.    Royce Lewis DPM

## 2024-12-23 ENCOUNTER — OFFICE VISIT (OUTPATIENT)
Dept: PODIATRY CLINIC | Facility: CLINIC | Age: 44
End: 2024-12-23
Payer: COMMERCIAL

## 2024-12-23 DIAGNOSIS — L84 PRE-ULCERATIVE CALLUSES: ICD-10-CM

## 2024-12-23 DIAGNOSIS — E11.42 DIABETIC POLYNEUROPATHY ASSOCIATED WITH TYPE 2 DIABETES MELLITUS (HCC): ICD-10-CM

## 2024-12-23 DIAGNOSIS — M89.8X7 EXOSTOSIS OF BONE OF FOOT: ICD-10-CM

## 2024-12-23 DIAGNOSIS — N18.4 STAGE 4 CHRONIC KIDNEY DISEASE (HCC): ICD-10-CM

## 2024-12-23 DIAGNOSIS — L97.522 FOOT ULCER, LEFT, WITH FAT LAYER EXPOSED (HCC): Primary | ICD-10-CM

## 2024-12-23 PROCEDURE — 99213 OFFICE O/P EST LOW 20 MIN: CPT | Performed by: STUDENT IN AN ORGANIZED HEALTH CARE EDUCATION/TRAINING PROGRAM

## 2024-12-23 RX ORDER — LEVOFLOXACIN 250 MG/1
TABLET, FILM COATED ORAL
Qty: 7 TABLET | Refills: 0 | Status: SHIPPED | OUTPATIENT
Start: 2024-12-23

## 2024-12-23 NOTE — PROGRESS NOTES
[facility] Podiatry  Progress Note    Adrien Tobin is a 44 year old male.   Chief Complaint   Patient presents with    Follow - Up     Left foot ulcer- denies pain         HPI:     Patient is a pleasant 44-year-old male with past medical history of recurrent heel ulcerations, diabetes, and CKD returns to clinic for follow-up of left heel ulcer.  He relates that site seems to be doing okay.  He has been taking Levaquin.  He has been applying gentamicin to site daily.  He has been ambulating in cam boot.  Denies any nausea, vomiting, fever, chills.      Allergies: Heparin   Current Outpatient Medications   Medication Sig Dispense Refill    levoFLOXacin (LEVAQUIN) 250 MG Oral Tab Take one pill every other day after dialysis 7 tablet 0    apixaban 5 MG Oral Tab Take 1 tablet (5 mg total) by mouth 2 (two) times daily. 60 tablet 0    levoFLOXacin (LEVAQUIN) 250 MG Oral Tab Take 2x 250 mg tablets after dialysis followed by one tablet after dialysis every 48 hours 7 tablet 0    gentamicin 0.1 % External Cream Apply topically to the ulcer site twice daily 30 g 1    clindamycin 300 MG Oral Cap Take 1 capsule (300 mg total) by mouth 3 (three) times daily. 30 capsule 0    torsemide 20 MG Oral Tab Take 1 tablet (20 mg total) by mouth 2 (two) times daily. 60 tablet 5    cloNIDine 0.3 MG Oral Tab Take 1 tablet (0.3 mg total) by mouth 3 (three) times daily. 90 tablet 5    atorvastatin 40 MG Oral Tab Take 1 tablet (40 mg total) by mouth daily.      lidocaine-prilocaine 2.5-2.5 % External Cream Apply 1 Application topically as needed with dialysis (apply to L wrist AVF prior to dialysis). 1 each 11    sodium bicarbonate 650 MG Oral Tab Take 1 tablet (650 mg total) by mouth 2 (two) times daily. 60 tablet 0    Insulin Pen Needle (BD PEN NEEDLE ODALIS 2ND GEN) 32G X 4 MM Does not apply Misc USE FOUR TIMES DAILY AS DIRECTED 400 each 2    insulin detemir 100 UNIT/ML Subcutaneous Solution Pen-injector Inject 22 Units into the  skin every morning.      aspirin (ASPIRIN EC LOW DOSE) 81 MG Oral Tab EC Take 1 tablet (81 mg total) by mouth daily. 90 tablet 0    amLODIPine 10 MG Oral Tab Take 1 tablet (10 mg total) by mouth daily. 90 tablet 0    HUMALOG KWIKPEN 100 UNIT/ML Subcutaneous Solution Pen-injector SLIDING SCALE  TID with meals MDD 15u (Patient taking differently: SLIDING SCALE  TID with meals MDD 15u) 15 mL 0    Dulaglutide (TRULICITY) 0.75 MG/0.5ML Subcutaneous Solution Pen-injector Inject 0.75 mg into the skin once a week. 7 mL 3      Past Medical History:    CKD (chronic kidney disease)    Diabetes (HCC)    Elevated liver enzymes    Elevated serum GGT level    High blood pressure    High cholesterol    History of blood transfusion    2023 November    Hyperlipidemia    HYPERTRIGLYCERIDEMIA    Intractable nausea and vomiting    OBESITY    Obesity    Pneumonia due to organism    40 years ago    PONV (postoperative nausea and vomiting)    Renal disorder    Visual impairment    GLASSES      Past Surgical History:   Procedure Laterality Date    Arthrotomy/explore/treat knee joint Left     DONE TWICE    Cholecystectomy      Colonoscopy N/A 1/23/2024    Procedure: COLONOSCOPY with hot snare polypectomy;  Surgeon: Albino Hernandez MD;  Location:  ENDOSCOPY    Debride wound Left     HEEL    Foot surgery      Other surgical history  Knee & Foot      Family History   Problem Relation Age of Onset    Diabetes Father     Heart Disorder Father     Diabetes Mother     Hypertension Sister       Social History     Socioeconomic History    Marital status:    Tobacco Use    Smoking status: Never    Smokeless tobacco: Never   Vaping Use    Vaping status: Never Used   Substance and Sexual Activity    Alcohol use: Yes     Alcohol/week: 1.0 standard drink of alcohol     Types: 1 Standard drinks or equivalent per week     Comment: socially    Drug use: No   Other Topics Concern    Caffeine Concern No    Exercise No    Seat Belt Yes    Special  Diet Yes    Stress Concern No    Weight Concern No           REVIEW OF SYSTEMS:     No n/v/f/c.      EXAM:   There were no vitals taken for this visit.  GENERAL: well developed, well nourished, in no apparent distress  EXTREMITIES:       1. Integument: Normal skin temperature and turgor.  Site of prior ulceration to left plantar lateral calcaneus remains well-healed.  There is new full thickness ulcer to posterior heel measuring 1.5 x 0.5 x 0.2 cm with granular base.  There is also some blistering to plantar foot that was lifted off to reveal superficial wound.   2. Vascular: Dorsalis pedis and posterior tibial pulses are lightly palpable.  CFT intact digits.     3. Musculoskeletal: Muscle strength intact to left lower extremity with decreased plantar flexion which is patient's baseline.  There is no longer plantar prominence of calcaneus that was noted preoperatively.  Compartments are soft and compressible.  Flexion contracture of digits bilaterally.   4. Neurological: Normal sharp dull sensation; reflexes normal.  Decreased protective sensation noted to lower extremities.        ASSESSMENT AND PLAN:   Diagnoses and all orders for this visit:    Foot ulcer, left, with fat layer exposed (HCC)  -     levoFLOXacin (LEVAQUIN) 250 MG Oral Tab; Take one pill every other day after dialysis    Diabetic polyneuropathy associated with type 2 diabetes mellitus (HCC)    Exostosis of bone of foot    Stage 4 chronic kidney disease (HCC)    Pre-ulcerative calluses                Plan:   -Patient examined, chart history reviewed.  -Ulcer to posterior heel does appear improved but there is new blister to plantar foot.  Roof  lifted off to reveal superficial wound.    -Continue to dress site with gentamicin 1-2 times daily.  -Will refill Levaquin for another week.  -Recent x-rays reviewed.  No erosions or other concerns.  Prior postsurgical changes appreciated.  -Wound cultures obtained.  -Excisional debridement formed site 15  blade into through subcutaneous tissue to healthy bleeding base.  -Site dressed with bacitracin and Band-Aid.  Continue dressing with gentamicin ointment twice daily. Continue ambulate with cam boot.    -Educated patient on acute signs of infection and symptoms of DVT and advised patient to seek immediate medical attention if any concerns arise.  Patient expressed understanding.   -If symptoms worsen or fail to improve follow-up with low threshold for ordering MRI.  Will have patient see Dr. Rizo next Monday as I will be out of town.      RTC 1 week or sooner if other concerns arise.    Royce Lewis DPM

## 2024-12-30 ENCOUNTER — OFFICE VISIT (OUTPATIENT)
Dept: PODIATRY CLINIC | Facility: CLINIC | Age: 44
End: 2024-12-30

## 2024-12-30 DIAGNOSIS — R26.81 GAIT INSTABILITY: ICD-10-CM

## 2024-12-30 DIAGNOSIS — I73.89 OTHER SPECIFIED PERIPHERAL VASCULAR DISEASES (HCC): ICD-10-CM

## 2024-12-30 DIAGNOSIS — L97.522 FOOT ULCER, LEFT, WITH FAT LAYER EXPOSED (HCC): Primary | ICD-10-CM

## 2024-12-30 DIAGNOSIS — E11.42 DIABETIC POLYNEUROPATHY ASSOCIATED WITH TYPE 2 DIABETES MELLITUS (HCC): ICD-10-CM

## 2024-12-30 DIAGNOSIS — N18.4 STAGE 4 CHRONIC KIDNEY DISEASE (HCC): ICD-10-CM

## 2024-12-30 PROCEDURE — 99213 OFFICE O/P EST LOW 20 MIN: CPT | Performed by: PODIATRIST

## 2024-12-31 NOTE — PROGRESS NOTES
Adrien Tobin is a 44 year old male.   Chief Complaint   Patient presents with    Follow - Up     Left foot- patient denies pain          HPI:   Patient returns to clinic as a second opinion from Dr. Lewis.  He was concerned over a blister on the posterior plantar aspect of his left heel.  This patient has a longstanding history of ulcerations partial calcanectomy's.  At today's visit reviewed nurse's history as taken above, allergies medications and medical history as documented below.  All changes duly noted  Allergies: Heparin   Current Outpatient Medications   Medication Sig Dispense Refill    levoFLOXacin (LEVAQUIN) 250 MG Oral Tab Take one pill every other day after dialysis 7 tablet 0    apixaban 5 MG Oral Tab Take 1 tablet (5 mg total) by mouth 2 (two) times daily. 60 tablet 0    levoFLOXacin (LEVAQUIN) 250 MG Oral Tab Take 2x 250 mg tablets after dialysis followed by one tablet after dialysis every 48 hours 7 tablet 0    gentamicin 0.1 % External Cream Apply topically to the ulcer site twice daily 30 g 1    clindamycin 300 MG Oral Cap Take 1 capsule (300 mg total) by mouth 3 (three) times daily. 30 capsule 0    torsemide 20 MG Oral Tab Take 1 tablet (20 mg total) by mouth 2 (two) times daily. 60 tablet 5    cloNIDine 0.3 MG Oral Tab Take 1 tablet (0.3 mg total) by mouth 3 (three) times daily. 90 tablet 5    atorvastatin 40 MG Oral Tab Take 1 tablet (40 mg total) by mouth daily.      lidocaine-prilocaine 2.5-2.5 % External Cream Apply 1 Application topically as needed with dialysis (apply to L wrist AVF prior to dialysis). 1 each 11    sodium bicarbonate 650 MG Oral Tab Take 1 tablet (650 mg total) by mouth 2 (two) times daily. 60 tablet 0    Insulin Pen Needle (BD PEN NEEDLE ODALIS 2ND GEN) 32G X 4 MM Does not apply Misc USE FOUR TIMES DAILY AS DIRECTED 400 each 2    insulin detemir 100 UNIT/ML Subcutaneous Solution Pen-injector Inject 22 Units into the skin every morning.      aspirin (ASPIRIN EC LOW  DOSE) 81 MG Oral Tab EC Take 1 tablet (81 mg total) by mouth daily. 90 tablet 0    amLODIPine 10 MG Oral Tab Take 1 tablet (10 mg total) by mouth daily. 90 tablet 0    HUMALOG KWIKPEN 100 UNIT/ML Subcutaneous Solution Pen-injector SLIDING SCALE  TID with meals MDD 15u (Patient taking differently: SLIDING SCALE  TID with meals MDD 15u) 15 mL 0    Dulaglutide (TRULICITY) 0.75 MG/0.5ML Subcutaneous Solution Pen-injector Inject 0.75 mg into the skin once a week. 7 mL 3      Past Medical History:    CKD (chronic kidney disease)    Diabetes (HCC)    Elevated liver enzymes    Elevated serum GGT level    High blood pressure    High cholesterol    History of blood transfusion    2023 November    Hyperlipidemia    HYPERTRIGLYCERIDEMIA    Intractable nausea and vomiting    OBESITY    Obesity    Pneumonia due to organism    40 years ago    PONV (postoperative nausea and vomiting)    Renal disorder    Visual impairment    GLASSES      Past Surgical History:   Procedure Laterality Date    Arthrotomy/explore/treat knee joint Left     DONE TWICE    Cholecystectomy      Colonoscopy N/A 1/23/2024    Procedure: COLONOSCOPY with hot snare polypectomy;  Surgeon: Albino Hernandez MD;  Location:  ENDOSCOPY    Debride wound Left     HEEL    Foot surgery      Other surgical history  Knee & Foot      Family History   Problem Relation Age of Onset    Diabetes Father     Heart Disorder Father     Diabetes Mother     Hypertension Sister       Social History     Socioeconomic History    Marital status:    Tobacco Use    Smoking status: Never    Smokeless tobacco: Never   Vaping Use    Vaping status: Never Used   Substance and Sexual Activity    Alcohol use: Yes     Alcohol/week: 1.0 standard drink of alcohol     Types: 1 Standard drinks or equivalent per week     Comment: socially    Drug use: No   Other Topics Concern    Caffeine Concern No    Exercise No    Seat Belt Yes    Special Diet Yes    Stress Concern No    Weight Concern No            REVIEW OF SYSTEMS:   Today reviewed systens as documented below  GENERAL HEALTH: feels well otherwise  SKIN: Refer to exam below  RESPIRATORY: denies shortness of breath with exertion  CARDIOVASCULAR: denies chest pain on exertion  GI: denies abdominal pain and denies heartburn  NEURO: denies headaches    EXAM:   There were no vitals taken for this visit.  GENERAL: well developed, well nourished, in no apparent distress  EXTREMITIES:   1. Integument: Skin on his left foot was evaluated he has surgical scars around the heel from previous surgeries.  He has hyperkeratosis around the periphery he has an open ulceration which is approximately 1.5 cm in diameter has a granular base has no exposed fat layer and has white macerated hyperkeratotic tissue there is no purulence I could not express purulence on compression of the periwound tissues.   2. Vascular: The patient has nonpalpable pulses with his foot in an elevated position is slightly prolonged capillary return   3. Neurologic: Patient has loss of protective and pain sensation in his left foot   4. Musculoskeletal: No calcanectomy on his left heel.  He has lost the ability to plantarflex at the ankle joint secondary Achilles tendon being detached.    ASSESSMENT AND PLAN:   Diagnoses and all orders for this visit:    Foot ulcer, left, with fat layer exposed (HCC)  -     US ARTERIAL DUPLEX LOWER EXTREMITY LEFT (CPT=93926); Future    Diabetic polyneuropathy associated with type 2 diabetes mellitus (HCC)    Stage 4 chronic kidney disease (HCC)    Gait instability    Other specified peripheral vascular diseases (HCC)        Plan: At today's office visit using a sterile 15 blade trim down debrided devitalized tissue from around the periphery of the ulceration there was slight hemorrhaging treated with Lumicain gauze [place] and an Ace wrap.  They can resume normal wound care.  Because they could not feel strong pulses and it has been a while since he has had his  vascular status checked I ordered an arterial duplex pending that we can get him back to see Dr. Pagan who did his interventions and Dr. Everett.    The patient indicates understanding of these issues and agrees to the plan.    Michael Rizo DPM

## 2025-01-06 ENCOUNTER — OFFICE VISIT (OUTPATIENT)
Dept: PODIATRY CLINIC | Facility: CLINIC | Age: 45
End: 2025-01-06

## 2025-01-06 DIAGNOSIS — L97.522 FOOT ULCER, LEFT, WITH FAT LAYER EXPOSED (HCC): Primary | ICD-10-CM

## 2025-01-06 DIAGNOSIS — R26.81 GAIT INSTABILITY: ICD-10-CM

## 2025-01-06 DIAGNOSIS — M89.8X7 EXOSTOSIS OF BONE OF FOOT: ICD-10-CM

## 2025-01-06 DIAGNOSIS — E11.42 DIABETIC POLYNEUROPATHY ASSOCIATED WITH TYPE 2 DIABETES MELLITUS (HCC): ICD-10-CM

## 2025-01-06 DIAGNOSIS — N18.4 STAGE 4 CHRONIC KIDNEY DISEASE (HCC): ICD-10-CM

## 2025-01-06 DIAGNOSIS — I73.89 OTHER SPECIFIED PERIPHERAL VASCULAR DISEASES (HCC): ICD-10-CM

## 2025-01-06 PROCEDURE — 99213 OFFICE O/P EST LOW 20 MIN: CPT | Performed by: STUDENT IN AN ORGANIZED HEALTH CARE EDUCATION/TRAINING PROGRAM

## 2025-01-11 NOTE — PROGRESS NOTES
[facility] Podiatry  Progress Note    Adrien Tobin is a 44 year old male.   Chief Complaint   Patient presents with    Diabetic Ulcer     For diabetic foot ulcer. Did not check blood sugar today. A1C 7.5, April 2024         HPI:     Patient is a pleasant 44-year-old male with past medical history of recurrent heel ulcerations, diabetes, and CKD returns to clinic for follow-up of left heel ulcer.  He relates that site seems to be doing okay.  He has been taking Levaquin.  He has been applying gentamicin to site daily.  He has been ambulating in cam boot.  He does have arterial duplex pending. His last HGB-A1c was 7.5% in April 2025. Denies any nausea, vomiting, fever, chills.      Allergies: Heparin   Current Outpatient Medications   Medication Sig Dispense Refill    levoFLOXacin (LEVAQUIN) 250 MG Oral Tab Take one pill every other day after dialysis 7 tablet 0    apixaban 5 MG Oral Tab Take 1 tablet (5 mg total) by mouth 2 (two) times daily. 60 tablet 0    levoFLOXacin (LEVAQUIN) 250 MG Oral Tab Take 2x 250 mg tablets after dialysis followed by one tablet after dialysis every 48 hours 7 tablet 0    gentamicin 0.1 % External Cream Apply topically to the ulcer site twice daily 30 g 1    clindamycin 300 MG Oral Cap Take 1 capsule (300 mg total) by mouth 3 (three) times daily. 30 capsule 0    torsemide 20 MG Oral Tab Take 1 tablet (20 mg total) by mouth 2 (two) times daily. 60 tablet 5    cloNIDine 0.3 MG Oral Tab Take 1 tablet (0.3 mg total) by mouth 3 (three) times daily. 90 tablet 5    atorvastatin 40 MG Oral Tab Take 1 tablet (40 mg total) by mouth daily.      lidocaine-prilocaine 2.5-2.5 % External Cream Apply 1 Application topically as needed with dialysis (apply to L wrist AVF prior to dialysis). 1 each 11    sodium bicarbonate 650 MG Oral Tab Take 1 tablet (650 mg total) by mouth 2 (two) times daily. 60 tablet 0    Insulin Pen Needle (BD PEN NEEDLE ODALIS 2ND GEN) 32G X 4 MM Does not apply Misc USE  FOUR TIMES DAILY AS DIRECTED 400 each 2    insulin detemir 100 UNIT/ML Subcutaneous Solution Pen-injector Inject 22 Units into the skin every morning.      aspirin (ASPIRIN EC LOW DOSE) 81 MG Oral Tab EC Take 1 tablet (81 mg total) by mouth daily. 90 tablet 0    amLODIPine 10 MG Oral Tab Take 1 tablet (10 mg total) by mouth daily. 90 tablet 0    HUMALOG KWIKPEN 100 UNIT/ML Subcutaneous Solution Pen-injector SLIDING SCALE  TID with meals MDD 15u (Patient taking differently: SLIDING SCALE  TID with meals MDD 15u) 15 mL 0    Dulaglutide (TRULICITY) 0.75 MG/0.5ML Subcutaneous Solution Pen-injector Inject 0.75 mg into the skin once a week. 7 mL 3      Past Medical History:    CKD (chronic kidney disease)    Diabetes (HCC)    Elevated liver enzymes    Elevated serum GGT level    High blood pressure    High cholesterol    History of blood transfusion    2023 November    Hyperlipidemia    HYPERTRIGLYCERIDEMIA    Intractable nausea and vomiting    OBESITY    Obesity    Pneumonia due to organism    40 years ago    PONV (postoperative nausea and vomiting)    Renal disorder    Visual impairment    GLASSES      Past Surgical History:   Procedure Laterality Date    Arthrotomy/explore/treat knee joint Left     DONE TWICE    Cholecystectomy      Colonoscopy N/A 1/23/2024    Procedure: COLONOSCOPY with hot snare polypectomy;  Surgeon: Albino Hernandez MD;  Location:  ENDOSCOPY    Debride wound Left     HEEL    Foot surgery      Other surgical history  Knee & Foot      Family History   Problem Relation Age of Onset    Diabetes Father     Heart Disorder Father     Diabetes Mother     Hypertension Sister       Social History     Socioeconomic History    Marital status:    Tobacco Use    Smoking status: Never    Smokeless tobacco: Never   Vaping Use    Vaping status: Never Used   Substance and Sexual Activity    Alcohol use: Yes     Alcohol/week: 1.0 standard drink of alcohol     Types: 1 Standard drinks or equivalent per  week     Comment: socially    Drug use: No   Other Topics Concern    Caffeine Concern No    Exercise No    Seat Belt Yes    Special Diet Yes    Stress Concern No    Weight Concern No           REVIEW OF SYSTEMS:     No n/v/f/c.      EXAM:   There were no vitals taken for this visit.  GENERAL: well developed, well nourished, in no apparent distress  EXTREMITIES:       1. Integument: Normal skin temperature and turgor. Original wound to posterior heel is nearly closed with some pre ulcerative breakdown. There is full thickness wound to plantar heel measuring 2 cm x 1 cm x 0.2 cm. Base is fibrogranular. No probing or acute SOI noted.  2. Vascular: Dorsalis pedis and posterior tibial pulses are lightly palpable.  CFT intact digits.     3. Musculoskeletal: Muscle strength intact to left lower extremity with decreased plantar flexion which is patient's baseline.  There is no longer plantar prominence of calcaneus that was noted preoperatively.  Compartments are soft and compressible.  Flexion contracture of digits bilaterally.   4. Neurological: Normal sharp dull sensation; reflexes normal.  Decreased protective sensation noted to lower extremities.        ASSESSMENT AND PLAN:   Diagnoses and all orders for this visit:    Foot ulcer, left, with fat layer exposed (HCC)  -     [facility] AMB POD XR - LT FOOT 3 VIEWS(AP,LAT,OBLIQUE) WT BEARING    Diabetic polyneuropathy associated with type 2 diabetes mellitus (HCC)    Stage 4 chronic kidney disease (HCC)    Gait instability    Other specified peripheral vascular diseases (HCC)    Exostosis of bone of foot                Plan:   -Patient examined, chart history reviewed.  -X-rays obtained reviewed.  Stable postsurgical findings.  No acute erosions or other concerns.  -Original wound to posterior heel is doing well/nearly closed.  There is wound from blister to the plantar heel that is improving nicely.  Base is fibrogranular.  -Excisional debridement performed with #15 blade into  through subcutaneous tissue to healthy bleeding base.  -Continue dressing site with ciprofloxacin drops twice daily.  -Today site dressed with Betadine and dry dressing.    -Continue staying off foot/using cam boot.  -Complete arterial duplex as prescribed.  -Educated patient on acute signs of infection and symptoms of DVT and advised patient to seek immediate medical attention if any concerns arise.  Patient expressed understanding.         RTC 1 week or sooner if other concerns arise.    Royce Lewis DPM

## 2025-01-13 ENCOUNTER — OFFICE VISIT (OUTPATIENT)
Dept: PODIATRY CLINIC | Facility: CLINIC | Age: 45
End: 2025-01-13
Payer: COMMERCIAL

## 2025-01-13 DIAGNOSIS — E11.42 DIABETIC POLYNEUROPATHY ASSOCIATED WITH TYPE 2 DIABETES MELLITUS (HCC): ICD-10-CM

## 2025-01-13 DIAGNOSIS — N18.4 STAGE 4 CHRONIC KIDNEY DISEASE (HCC): ICD-10-CM

## 2025-01-13 DIAGNOSIS — R26.81 GAIT INSTABILITY: ICD-10-CM

## 2025-01-13 DIAGNOSIS — L97.522 FOOT ULCER, LEFT, WITH FAT LAYER EXPOSED (HCC): Primary | ICD-10-CM

## 2025-01-13 DIAGNOSIS — I73.9 PAD (PERIPHERAL ARTERY DISEASE) (HCC): ICD-10-CM

## 2025-01-13 PROCEDURE — 99213 OFFICE O/P EST LOW 20 MIN: CPT | Performed by: STUDENT IN AN ORGANIZED HEALTH CARE EDUCATION/TRAINING PROGRAM

## 2025-01-13 NOTE — PROGRESS NOTES
[facility]  Progress Note    Adrien Tobin is a 44 year old male.   Chief Complaint   Patient presents with    Follow - Up     Left foot diabetic wound, denies pain, wearing cam boot         HPI:     Patient is a pleasant 44-year-old male with past medical history of recurrent heel ulcerations, diabetes, and CKD returns to clinic for follow-up of left heel ulcer.  He relates that site seems to be doing okay.  He has been gentamicin to site topically most days.  He has been ambulating in cam boot.  He does have arterial duplex pending. His last HGB-A1c was 7.5% in April 2025. Denies any nausea, vomiting, fever, chills.      Allergies: Heparin   Current Outpatient Medications   Medication Sig Dispense Refill    levoFLOXacin (LEVAQUIN) 250 MG Oral Tab Take one pill every other day after dialysis 7 tablet 0    apixaban 5 MG Oral Tab Take 1 tablet (5 mg total) by mouth 2 (two) times daily. 60 tablet 0    levoFLOXacin (LEVAQUIN) 250 MG Oral Tab Take 2x 250 mg tablets after dialysis followed by one tablet after dialysis every 48 hours 7 tablet 0    gentamicin 0.1 % External Cream Apply topically to the ulcer site twice daily 30 g 1    clindamycin 300 MG Oral Cap Take 1 capsule (300 mg total) by mouth 3 (three) times daily. 30 capsule 0    torsemide 20 MG Oral Tab Take 1 tablet (20 mg total) by mouth 2 (two) times daily. 60 tablet 5    cloNIDine 0.3 MG Oral Tab Take 1 tablet (0.3 mg total) by mouth 3 (three) times daily. 90 tablet 5    atorvastatin 40 MG Oral Tab Take 1 tablet (40 mg total) by mouth daily.      lidocaine-prilocaine 2.5-2.5 % External Cream Apply 1 Application topically as needed with dialysis (apply to L wrist AVF prior to dialysis). 1 each 11    sodium bicarbonate 650 MG Oral Tab Take 1 tablet (650 mg total) by mouth 2 (two) times daily. 60 tablet 0    Insulin Pen Needle (BD PEN NEEDLE ODALIS 2ND GEN) 32G X 4 MM Does not apply Misc USE FOUR TIMES DAILY AS DIRECTED 400 each 2    insulin  detemir 100 UNIT/ML Subcutaneous Solution Pen-injector Inject 22 Units into the skin every morning.      aspirin (ASPIRIN EC LOW DOSE) 81 MG Oral Tab EC Take 1 tablet (81 mg total) by mouth daily. 90 tablet 0    amLODIPine 10 MG Oral Tab Take 1 tablet (10 mg total) by mouth daily. 90 tablet 0    HUMALOG KWIKPEN 100 UNIT/ML Subcutaneous Solution Pen-injector SLIDING SCALE  TID with meals MDD 15u (Patient taking differently: SLIDING SCALE  TID with meals MDD 15u) 15 mL 0    Dulaglutide (TRULICITY) 0.75 MG/0.5ML Subcutaneous Solution Pen-injector Inject 0.75 mg into the skin once a week. 7 mL 3      Past Medical History:    CKD (chronic kidney disease)    Diabetes (HCC)    Elevated liver enzymes    Elevated serum GGT level    High blood pressure    High cholesterol    History of blood transfusion    2023 November    Hyperlipidemia    HYPERTRIGLYCERIDEMIA    Intractable nausea and vomiting    OBESITY    Obesity    Pneumonia due to organism    40 years ago    PONV (postoperative nausea and vomiting)    Renal disorder    Visual impairment    GLASSES      Past Surgical History:   Procedure Laterality Date    Arthrotomy/explore/treat knee joint Left     DONE TWICE    Cholecystectomy      Colonoscopy N/A 1/23/2024    Procedure: COLONOSCOPY with hot snare polypectomy;  Surgeon: Albino Hernandez MD;  Location:  ENDOSCOPY    Debride wound Left     HEEL    Foot surgery      Other surgical history  Knee & Foot      Family History   Problem Relation Age of Onset    Diabetes Father     Heart Disorder Father     Diabetes Mother     Hypertension Sister       Social History     Socioeconomic History    Marital status:    Tobacco Use    Smoking status: Never    Smokeless tobacco: Never   Vaping Use    Vaping status: Never Used   Substance and Sexual Activity    Alcohol use: Yes     Alcohol/week: 1.0 standard drink of alcohol     Types: 1 Standard drinks or equivalent per week     Comment: socially    Drug use: No   Other  Topics Concern    Caffeine Concern No    Exercise No    Seat Belt Yes    Special Diet Yes    Stress Concern No    Weight Concern No           REVIEW OF SYSTEMS:     No n/v/f/c.      EXAM:   There were no vitals taken for this visit.  GENERAL: well developed, well nourished, in no apparent distress  EXTREMITIES:       1. Integument: Normal skin temperature and turgor. Original wound to posterior heel is nearly closed with some pre ulcerative breakdown. There is full thickness wound to plantar heel measuring 2 cm x 0.8 cm x 0.2 cm. Base is fibrogranular. No probing or acute SOI noted.  2. Vascular: Dorsalis pedis and posterior tibial pulses are lightly palpable.  CFT intact digits.     3. Musculoskeletal: Muscle strength intact to left lower extremity with decreased plantar flexion which is patient's baseline.  There is no longer plantar prominence of calcaneus that was noted preoperatively.  Compartments are soft and compressible.  Flexion contracture of digits bilaterally.   4. Neurological: Normal sharp dull sensation; reflexes normal.  Decreased protective sensation noted to lower extremities.        ASSESSMENT AND PLAN:   Diagnoses and all orders for this visit:    Foot ulcer, left, with fat layer exposed ([facility])    Diabetic polyneuropathy associated with type 2 diabetes mellitus ([facility])    Stage 4 chronic kidney disease ([facility])    Gait instability    PAD (peripheral artery disease) ([facility])                  Plan:   -Patient examined, chart history reviewed.  -Recent x-rays reviewed.  Stable postsurgical findings.  No acute erosions or other concerns.  -Original wound to posterior heel is doing well/nearly closed.  There is wound from blister to the plantar heel that is improving nicely.  Base is fibrogranular.  -Excisional debridement performed with #15 blade into through subcutaneous tissue to healthy bleeding base.  -Today site dressed with Betadine and dry dressing.   Should receive similar dressing changes  daily.  -Continue staying off foot/using cam boot.  -Complete arterial duplex as prescribed.  -Educated patient on acute signs of infection and symptoms of DVT and advised patient to seek immediate medical attention if any concerns arise.  Patient expressed understanding.       RTC 1 week or sooner if other concerns arise.    Royce Lewis DPM

## 2025-01-20 ENCOUNTER — OFFICE VISIT (OUTPATIENT)
Dept: PODIATRY CLINIC | Facility: CLINIC | Age: 45
End: 2025-01-20

## 2025-01-20 DIAGNOSIS — R26.81 GAIT INSTABILITY: ICD-10-CM

## 2025-01-20 DIAGNOSIS — E11.42 DIABETIC POLYNEUROPATHY ASSOCIATED WITH TYPE 2 DIABETES MELLITUS (HCC): ICD-10-CM

## 2025-01-20 DIAGNOSIS — N18.4 STAGE 4 CHRONIC KIDNEY DISEASE (HCC): ICD-10-CM

## 2025-01-20 DIAGNOSIS — I73.9 PAD (PERIPHERAL ARTERY DISEASE) (HCC): ICD-10-CM

## 2025-01-20 DIAGNOSIS — L97.522 FOOT ULCER, LEFT, WITH FAT LAYER EXPOSED (HCC): Primary | ICD-10-CM

## 2025-01-20 PROCEDURE — 99213 OFFICE O/P EST LOW 20 MIN: CPT | Performed by: STUDENT IN AN ORGANIZED HEALTH CARE EDUCATION/TRAINING PROGRAM

## 2025-01-21 NOTE — PROGRESS NOTES
[facility]  Progress Note    Adrien Tobin is a 44 year old male.   Chief Complaint   Patient presents with    Follow - Up     Left foot ulcer- patient denies pain          HPI:     Patient is a pleasant 44-year-old male with past medical history of recurrent heel ulcerations, diabetes, and CKD returns to clinic for follow-up of left heel ulcer.  He relates that site seems to be doing okay.  He has been applying Betadine to site.  He has been ambulating in cam boot.  He does have arterial duplex pending. His last HGB-A1c was 7.5% in April 2025. Denies any nausea, vomiting, fever, chills.      Allergies: Heparin   Current Outpatient Medications   Medication Sig Dispense Refill    levoFLOXacin (LEVAQUIN) 250 MG Oral Tab Take one pill every other day after dialysis 7 tablet 0    apixaban 5 MG Oral Tab Take 1 tablet (5 mg total) by mouth 2 (two) times daily. 60 tablet 0    levoFLOXacin (LEVAQUIN) 250 MG Oral Tab Take 2x 250 mg tablets after dialysis followed by one tablet after dialysis every 48 hours 7 tablet 0    gentamicin 0.1 % External Cream Apply topically to the ulcer site twice daily 30 g 1    clindamycin 300 MG Oral Cap Take 1 capsule (300 mg total) by mouth 3 (three) times daily. 30 capsule 0    torsemide 20 MG Oral Tab Take 1 tablet (20 mg total) by mouth 2 (two) times daily. 60 tablet 5    cloNIDine 0.3 MG Oral Tab Take 1 tablet (0.3 mg total) by mouth 3 (three) times daily. 90 tablet 5    atorvastatin 40 MG Oral Tab Take 1 tablet (40 mg total) by mouth daily.      lidocaine-prilocaine 2.5-2.5 % External Cream Apply 1 Application topically as needed with dialysis (apply to L wrist AVF prior to dialysis). 1 each 11    sodium bicarbonate 650 MG Oral Tab Take 1 tablet (650 mg total) by mouth 2 (two) times daily. 60 tablet 0    Insulin Pen Needle (BD PEN NEEDLE ODALIS 2ND GEN) 32G X 4 MM Does not apply Misc USE FOUR TIMES DAILY AS DIRECTED 400 each 2    insulin detemir 100 UNIT/ML Subcutaneous  Solution Pen-injector Inject 22 Units into the skin every morning.      aspirin (ASPIRIN EC LOW DOSE) 81 MG Oral Tab EC Take 1 tablet (81 mg total) by mouth daily. 90 tablet 0    amLODIPine 10 MG Oral Tab Take 1 tablet (10 mg total) by mouth daily. 90 tablet 0    HUMALOG KWIKPEN 100 UNIT/ML Subcutaneous Solution Pen-injector SLIDING SCALE  TID with meals MDD 15u (Patient taking differently: SLIDING SCALE  TID with meals MDD 15u) 15 mL 0    Dulaglutide (TRULICITY) 0.75 MG/0.5ML Subcutaneous Solution Pen-injector Inject 0.75 mg into the skin once a week. 7 mL 3      Past Medical History:    CKD (chronic kidney disease)    Diabetes (HCC)    Elevated liver enzymes    Elevated serum GGT level    High blood pressure    High cholesterol    History of blood transfusion    2023 November    Hyperlipidemia    HYPERTRIGLYCERIDEMIA    Intractable nausea and vomiting    OBESITY    Obesity    Pneumonia due to organism    40 years ago    PONV (postoperative nausea and vomiting)    Renal disorder    Visual impairment    GLASSES      Past Surgical History:   Procedure Laterality Date    Arthrotomy/explore/treat knee joint Left     DONE TWICE    Cholecystectomy      Colonoscopy N/A 1/23/2024    Procedure: COLONOSCOPY with hot snare polypectomy;  Surgeon: Albino Hernandez MD;  Location:  ENDOSCOPY    Debride wound Left     HEEL    Foot surgery      Other surgical history  Knee & Foot      Family History   Problem Relation Age of Onset    Diabetes Father     Heart Disorder Father     Diabetes Mother     Hypertension Sister       Social History     Socioeconomic History    Marital status:    Tobacco Use    Smoking status: Never    Smokeless tobacco: Never   Vaping Use    Vaping status: Never Used   Substance and Sexual Activity    Alcohol use: Yes     Alcohol/week: 1.0 standard drink of alcohol     Types: 1 Standard drinks or equivalent per week     Comment: socially    Drug use: No   Other Topics Concern    Caffeine Concern  No    Exercise No    Seat Belt Yes    Special Diet Yes    Stress Concern No    Weight Concern No           REVIEW OF SYSTEMS:     No n/v/f/c.      EXAM:   There were no vitals taken for this visit.  GENERAL: well developed, well nourished, in no apparent distress  EXTREMITIES:       1. Integument: Normal skin temperature and turgor. Original wound to posterior heel is nearly closed with some pre ulcerative breakdown. There is full thickness wound to plantar heel measuring 1 cm x 0.8 cm x 0.2 cm. Base is fibrogranular. No probing or acute SOI noted.  2. Vascular: Dorsalis pedis and posterior tibial pulses are lightly palpable.  CFT intact digits.     3. Musculoskeletal: Muscle strength intact to left lower extremity with decreased plantar flexion which is patient's baseline.  There is no longer plantar prominence of calcaneus that was noted preoperatively.  Compartments are soft and compressible.  Flexion contracture of digits bilaterally.   4. Neurological: Normal sharp dull sensation; reflexes normal.  Decreased protective sensation noted to lower extremities.        ASSESSMENT AND PLAN:   Diagnoses and all orders for this visit:    Foot ulcer, left, with fat layer exposed ([facility])    Diabetic polyneuropathy associated with type 2 diabetes mellitus ([facility])    Stage 4 chronic kidney disease ([facility])    Gait instability    PAD (peripheral artery disease) ([facility])                  Plan:   -Patient examined, chart history reviewed.  -Recent x-rays reviewed.  Stable postsurgical findings.  No acute erosions or other concerns.  -Original wound to posterior heel is doing well/nearly closed.  There is wound from blister to the plantar heel that is improving nicely.  Base is fibrogranular.  -Excisional debridement performed with #15 blade into through subcutaneous tissue to healthy bleeding base.  -Today site dressed with Citlaly and dry dressing.   Should receive similar dressing changes every other day. Supplies dispensed in  clinic.  -Continue staying off foot/using cam boot.  -Complete arterial duplex as prescribed.  -Educated patient on acute signs of infection and symptoms of DVT and advised patient to seek immediate medical attention if any concerns arise.  Patient expressed understanding.       RTC 1 week or sooner if other concerns arise.    Royce Lewis DPM

## 2025-01-27 ENCOUNTER — OFFICE VISIT (OUTPATIENT)
Dept: PODIATRY CLINIC | Facility: CLINIC | Age: 45
End: 2025-01-27

## 2025-01-27 DIAGNOSIS — N18.4 STAGE 4 CHRONIC KIDNEY DISEASE (HCC): ICD-10-CM

## 2025-01-27 DIAGNOSIS — R26.81 GAIT INSTABILITY: ICD-10-CM

## 2025-01-27 DIAGNOSIS — L97.522 FOOT ULCER, LEFT, WITH FAT LAYER EXPOSED (HCC): Primary | ICD-10-CM

## 2025-01-27 DIAGNOSIS — E11.42 DIABETIC POLYNEUROPATHY ASSOCIATED WITH TYPE 2 DIABETES MELLITUS (HCC): ICD-10-CM

## 2025-01-27 DIAGNOSIS — I73.9 PAD (PERIPHERAL ARTERY DISEASE): ICD-10-CM

## 2025-01-27 PROCEDURE — 99213 OFFICE O/P EST LOW 20 MIN: CPT

## 2025-01-28 NOTE — PROGRESS NOTES
[facility]  Progress Note    Adrien Tobin is a 44 year old male.   Chief Complaint   Patient presents with    Diabetic Ulcer     FBS this am was not checked. Here to follow up on the left foot ulcer. He is in a surgical shoe. Denies any pain, fever or chills.         HPI:     Patient is a pleasant 44-year-old male who presents for follow-up visit with his wife. Pt has a past medical history of recurrent heel ulcerations, diabetes, and CKD.  Patient states that he is feeling well. Has been dressing left heel with Betadine, and ambulating in a CAM boot. Arterial duplex is still pending.- patient mentions his appointment is the beginnning of February. His last HGB-A1c was 7.5% in April 2025. Denies any nausea, vomiting, fever, chills.      Allergies: Heparin   Current Outpatient Medications   Medication Sig Dispense Refill    apixaban 5 MG Oral Tab Take 1 tablet (5 mg total) by mouth 2 (two) times daily. 60 tablet 0    gentamicin 0.1 % External Cream Apply topically to the ulcer site twice daily 30 g 1    clindamycin 300 MG Oral Cap Take 1 capsule (300 mg total) by mouth 3 (three) times daily. 30 capsule 0    torsemide 20 MG Oral Tab Take 1 tablet (20 mg total) by mouth 2 (two) times daily. 60 tablet 5    cloNIDine 0.3 MG Oral Tab Take 1 tablet (0.3 mg total) by mouth 3 (three) times daily. 90 tablet 5    atorvastatin 40 MG Oral Tab Take 1 tablet (40 mg total) by mouth daily.      lidocaine-prilocaine 2.5-2.5 % External Cream Apply 1 Application topically as needed with dialysis (apply to L wrist AVF prior to dialysis). 1 each 11    sodium bicarbonate 650 MG Oral Tab Take 1 tablet (650 mg total) by mouth 2 (two) times daily. 60 tablet 0    Insulin Pen Needle (BD PEN NEEDLE ODALIS 2ND GEN) 32G X 4 MM Does not apply Misc USE FOUR TIMES DAILY AS DIRECTED 400 each 2    insulin detemir 100 UNIT/ML Subcutaneous Solution Pen-injector Inject 22 Units into the skin every morning.      aspirin (ASPIRIN EC LOW  DOSE) 81 MG Oral Tab EC Take 1 tablet (81 mg total) by mouth daily. 90 tablet 0    amLODIPine 10 MG Oral Tab Take 1 tablet (10 mg total) by mouth daily. 90 tablet 0    HUMALOG KWIKPEN 100 UNIT/ML Subcutaneous Solution Pen-injector SLIDING SCALE  TID with meals MDD 15u (Patient taking differently: SLIDING SCALE  TID with meals MDD 15u) 15 mL 0    Dulaglutide (TRULICITY) 0.75 MG/0.5ML Subcutaneous Solution Pen-injector Inject 0.75 mg into the skin once a week. 7 mL 3      Past Medical History:    CKD (chronic kidney disease)    Diabetes (HCC)    Elevated liver enzymes    Elevated serum GGT level    High blood pressure    High cholesterol    History of blood transfusion    2023 November    Hyperlipidemia    HYPERTRIGLYCERIDEMIA    Intractable nausea and vomiting    OBESITY    Obesity    Pneumonia due to organism    40 years ago    PONV (postoperative nausea and vomiting)    Renal disorder    Visual impairment    GLASSES      Past Surgical History:   Procedure Laterality Date    Arthrotomy/explore/treat knee joint Left     DONE TWICE    Cholecystectomy      Colonoscopy N/A 1/23/2024    Procedure: COLONOSCOPY with hot snare polypectomy;  Surgeon: Albino Hernandez MD;  Location:  ENDOSCOPY    Debride wound Left     HEEL    Foot surgery      Other surgical history  Knee & Foot      Family History   Problem Relation Age of Onset    Diabetes Father     Heart Disorder Father     Diabetes Mother     Hypertension Sister       Social History     Socioeconomic History    Marital status:    Tobacco Use    Smoking status: Never    Smokeless tobacco: Never   Vaping Use    Vaping status: Never Used   Substance and Sexual Activity    Alcohol use: Yes     Alcohol/week: 1.0 standard drink of alcohol     Types: 1 Standard drinks or equivalent per week     Comment: socially    Drug use: No   Other Topics Concern    Caffeine Concern No    Exercise No    Seat Belt Yes    Special Diet Yes    Stress Concern No    Weight Concern No            REVIEW OF SYSTEMS:     No n/v/f/c.      EXAM:   There were no vitals taken for this visit.  GENERAL: well developed, well nourished, in no apparent distress  EXTREMITIES:   1. Integument: Normal skin temperature and turgor. Original wound to posterior heel is closed. There is full thickness wound to plantar heel measuring with fibro granular base. There is also a superficial wound to lateral side of foot that patient mentions was from bandages used previously. There is no drainage, or signs of infection.              2. Vascular: Dorsalis pedis and posterior tibial pulses are lightly palpable. Capillary refill normal.  3. Musculoskeletal: Muscle strength intact to left lower extremity with decreased plantar flexion which is patient's baseline. Compartments are soft and compressible.  Flexion contracture of digits bilaterally.   4. Neurological: Normal sharp dull sensation; reflexes normal.  Decreased protective sensation noted to lower extremities.        ASSESSMENT AND PLAN:   Diagnoses and all orders for this visit:    Foot ulcer, left, with fat layer exposed (HCC)    Diabetic polyneuropathy associated with type 2 diabetes mellitus (HCC)    Gait instability    PAD (peripheral artery disease)    Stage 4 chronic kidney disease ([facility])        Plan:   -Patient examined, chart history reviewed.    -Original wound to posterior heel is closed. Wound to the plantar heel is improving nicely.  Base is fibrogranular.  -Excisional debridement performed with #15 blade into through subcutaneous tissue to healthy bleeding base.  -Today site dressed with betadine, Citlaly, and covered with a band-aid.  Should receive similar dressing changes every other day. Supplies dispensed in clinic.  -Continue to be mostly non-weightbearing in CAM boot.  -Complete arterial duplex as prescribed.  -Educated patient on acute signs of infection and symptoms of DVT and advised patient to seek immediate medical attention if any concerns  arise.  Patient expressed understanding.       RTC 1 week or sooner if other concerns arise.    ARNOL Hawk      [facility]            Dragon speech recognition software was used to prepare this note.  Errors in word recognition may occur.  Please contact me with any questions/concerns with this note.

## 2025-02-03 ENCOUNTER — OFFICE VISIT (OUTPATIENT)
Dept: PODIATRY CLINIC | Facility: CLINIC | Age: 45
End: 2025-02-03

## 2025-02-03 DIAGNOSIS — I73.9 PAD (PERIPHERAL ARTERY DISEASE): ICD-10-CM

## 2025-02-03 DIAGNOSIS — N18.4 STAGE 4 CHRONIC KIDNEY DISEASE (HCC): ICD-10-CM

## 2025-02-03 DIAGNOSIS — M89.8X7 EXOSTOSIS OF BONE OF FOOT: ICD-10-CM

## 2025-02-03 DIAGNOSIS — E11.42 DIABETIC POLYNEUROPATHY ASSOCIATED WITH TYPE 2 DIABETES MELLITUS (HCC): ICD-10-CM

## 2025-02-03 DIAGNOSIS — L97.522 FOOT ULCER, LEFT, WITH FAT LAYER EXPOSED (HCC): Primary | ICD-10-CM

## 2025-02-03 DIAGNOSIS — R26.81 GAIT INSTABILITY: ICD-10-CM

## 2025-02-03 PROCEDURE — 99213 OFFICE O/P EST LOW 20 MIN: CPT | Performed by: STUDENT IN AN ORGANIZED HEALTH CARE EDUCATION/TRAINING PROGRAM

## 2025-02-03 RX ORDER — LEVOFLOXACIN 250 MG/1
250 TABLET, FILM COATED ORAL
Qty: 7 TABLET | Refills: 0 | Status: SHIPPED | OUTPATIENT
Start: 2025-02-03

## 2025-02-03 NOTE — PROGRESS NOTES
[facility]  Progress Note    Adrien Tobin is a 44 year old male.   Chief Complaint   Patient presents with    Diabetic Ulcer     Pt in for f/u, denies any pain today, does not check FBS, last A1c was 7.5 on 4/8/24         HPI:     Patient is a pleasant 44-year-old male with past medical history of recurrent heel ulcerations, diabetes, and CKD returns to clinic for follow-up of left heel ulcer.  He relates that site seems to be doing okay.  He has been applying madyson to site every other day.  He has been ambulating in cam boot.  He does have arterial duplex pending. His last HGB-A1c was 7.5% in April 2025. Denies any nausea, vomiting, fever, chills.      Allergies: Heparin   Current Outpatient Medications   Medication Sig Dispense Refill    apixaban 5 MG Oral Tab Take 1 tablet (5 mg total) by mouth 2 (two) times daily. 60 tablet 0    gentamicin 0.1 % External Cream Apply topically to the ulcer site twice daily 30 g 1    clindamycin 300 MG Oral Cap Take 1 capsule (300 mg total) by mouth 3 (three) times daily. 30 capsule 0    torsemide 20 MG Oral Tab Take 1 tablet (20 mg total) by mouth 2 (two) times daily. 60 tablet 5    cloNIDine 0.3 MG Oral Tab Take 1 tablet (0.3 mg total) by mouth 3 (three) times daily. 90 tablet 5    atorvastatin 40 MG Oral Tab Take 1 tablet (40 mg total) by mouth daily.      lidocaine-prilocaine 2.5-2.5 % External Cream Apply 1 Application topically as needed with dialysis (apply to L wrist AVF prior to dialysis). 1 each 11    sodium bicarbonate 650 MG Oral Tab Take 1 tablet (650 mg total) by mouth 2 (two) times daily. 60 tablet 0    Insulin Pen Needle (BD PEN NEEDLE ODALIS 2ND GEN) 32G X 4 MM Does not apply Misc USE FOUR TIMES DAILY AS DIRECTED 400 each 2    insulin detemir 100 UNIT/ML Subcutaneous Solution Pen-injector Inject 22 Units into the skin every morning.      aspirin (ASPIRIN EC LOW DOSE) 81 MG Oral Tab EC Take 1 tablet (81 mg total) by mouth daily. 90 tablet 0     amLODIPine 10 MG Oral Tab Take 1 tablet (10 mg total) by mouth daily. 90 tablet 0    HUMALOG KWIKPEN 100 UNIT/ML Subcutaneous Solution Pen-injector SLIDING SCALE  TID with meals MDD 15u (Patient taking differently: SLIDING SCALE  TID with meals MDD 15u) 15 mL 0    Dulaglutide (TRULICITY) 0.75 MG/0.5ML Subcutaneous Solution Pen-injector Inject 0.75 mg into the skin once a week. 7 mL 3      Past Medical History:    CKD (chronic kidney disease)    Diabetes (HCC)    Elevated liver enzymes    Elevated serum GGT level    High blood pressure    High cholesterol    History of blood transfusion    2023 November    Hyperlipidemia    HYPERTRIGLYCERIDEMIA    Intractable nausea and vomiting    OBESITY    Obesity    Pneumonia due to organism    40 years ago    PONV (postoperative nausea and vomiting)    Renal disorder    Visual impairment    GLASSES      Past Surgical History:   Procedure Laterality Date    Arthrotomy/explore/treat knee joint Left     DONE TWICE    Cholecystectomy      Colonoscopy N/A 1/23/2024    Procedure: COLONOSCOPY with hot snare polypectomy;  Surgeon: Albino Hernandez MD;  Location:  ENDOSCOPY    Debride wound Left     HEEL    Foot surgery      Other surgical history  Knee & Foot      Family History   Problem Relation Age of Onset    Diabetes Father     Heart Disorder Father     Diabetes Mother     Hypertension Sister       Social History     Socioeconomic History    Marital status:    Tobacco Use    Smoking status: Never    Smokeless tobacco: Never   Vaping Use    Vaping status: Never Used   Substance and Sexual Activity    Alcohol use: Yes     Alcohol/week: 1.0 standard drink of alcohol     Types: 1 Standard drinks or equivalent per week     Comment: socially    Drug use: No   Other Topics Concern    Caffeine Concern No    Exercise No    Seat Belt Yes    Special Diet Yes    Stress Concern No    Weight Concern No           REVIEW OF SYSTEMS:     No n/v/f/c.      EXAM:   There were no vitals  taken for this visit.  GENERAL: well developed, well nourished, in no apparent distress  EXTREMITIES:       1. Integument: Normal skin temperature and turgor. Full thickness wound to plantar heel has again increased in size. No PTB or acute SOI. See images above. Skin eschar at 5th met base region is dry and stable. See images above.  2. Vascular: Dorsalis pedis and posterior tibial pulses are lightly palpable.  CFT intact digits.     3. Musculoskeletal: Muscle strength intact to left lower extremity with decreased plantar flexion which is patient's baseline.  There is no longer plantar prominence of calcaneus that was noted preoperatively.  Compartments are soft and compressible.  Flexion contracture of digits bilaterally.   4. Neurological: Normal sharp dull sensation; reflexes normal.  Decreased protective sensation noted to lower extremities.        ASSESSMENT AND PLAN:   There are no diagnoses linked to this encounter.                Plan:   -Patient examined, chart history reviewed.  -Recent x-rays reviewed.  Stable postsurgical findings.  No acute erosions or other concerns.  -Original wound to posterior heel is continues to cycle and almost heel/then get worse.  -Will order MRI to rule out bone infection or other concern to site.  -Arterial duplex pending--should complete ASAP--will likely require vascular referral.  -Excisional debridement performed with #15 blade into through subcutaneous tissue to healthy bleeding base.  -Today site dressed with betadine and dry dressing.   Should receive similar dressing changes every  day. Supplies dispensed in clinic.  -Continue staying off foot/using cam boot.  -Will arrange wound care visit.  -Educated patient on acute signs of infection and symptoms of DVT and advised patient to seek immediate medical attention if any concerns arise.  Patient expressed understanding.       RTC 1 week or sooner if other concerns arise.    Royce Lewis DPM

## 2025-02-05 ENCOUNTER — HOSPITAL ENCOUNTER (OUTPATIENT)
Dept: ULTRASOUND IMAGING | Facility: HOSPITAL | Age: 45
Discharge: HOME | End: 2025-02-05
Attending: STUDENT IN AN ORGANIZED HEALTH CARE EDUCATION/TRAINING PROGRAM
Payer: COMMERCIAL

## 2025-02-05 DIAGNOSIS — E11.42 DIABETIC POLYNEUROPATHY ASSOCIATED WITH TYPE 2 DIABETES MELLITUS (HCC): ICD-10-CM

## 2025-02-05 DIAGNOSIS — L97.522 FOOT ULCER, LEFT, WITH FAT LAYER EXPOSED (HCC): ICD-10-CM

## 2025-02-05 PROCEDURE — 93925 LOWER EXTREMITY STUDY: CPT | Performed by: STUDENT IN AN ORGANIZED HEALTH CARE EDUCATION/TRAINING PROGRAM

## 2025-02-07 DIAGNOSIS — I73.9 PAD (PERIPHERAL ARTERY DISEASE): ICD-10-CM

## 2025-02-07 DIAGNOSIS — L97.522 FOOT ULCER, LEFT, WITH FAT LAYER EXPOSED (HCC): Primary | ICD-10-CM

## 2025-02-10 ENCOUNTER — OFFICE VISIT (OUTPATIENT)
Dept: PODIATRY CLINIC | Facility: CLINIC | Age: 45
End: 2025-02-10

## 2025-02-10 ENCOUNTER — HOSPITAL ENCOUNTER (OUTPATIENT)
Dept: MRI IMAGING | Facility: HOSPITAL | Age: 45
Discharge: HOME | End: 2025-02-10
Attending: STUDENT IN AN ORGANIZED HEALTH CARE EDUCATION/TRAINING PROGRAM
Payer: COMMERCIAL

## 2025-02-10 DIAGNOSIS — N18.4 STAGE 4 CHRONIC KIDNEY DISEASE (HCC): ICD-10-CM

## 2025-02-10 DIAGNOSIS — E11.42 DIABETIC POLYNEUROPATHY ASSOCIATED WITH TYPE 2 DIABETES MELLITUS (HCC): ICD-10-CM

## 2025-02-10 DIAGNOSIS — M89.8X7 EXOSTOSIS OF BONE OF FOOT: ICD-10-CM

## 2025-02-10 DIAGNOSIS — L97.522 FOOT ULCER, LEFT, WITH FAT LAYER EXPOSED (HCC): Primary | ICD-10-CM

## 2025-02-10 DIAGNOSIS — I73.9 PAD (PERIPHERAL ARTERY DISEASE): ICD-10-CM

## 2025-02-10 DIAGNOSIS — L97.522 FOOT ULCER, LEFT, WITH FAT LAYER EXPOSED (HCC): ICD-10-CM

## 2025-02-10 DIAGNOSIS — R26.81 GAIT INSTABILITY: ICD-10-CM

## 2025-02-10 PROCEDURE — 73721 MRI JNT OF LWR EXTRE W/O DYE: CPT | Performed by: STUDENT IN AN ORGANIZED HEALTH CARE EDUCATION/TRAINING PROGRAM

## 2025-02-10 PROCEDURE — 99213 OFFICE O/P EST LOW 20 MIN: CPT | Performed by: STUDENT IN AN ORGANIZED HEALTH CARE EDUCATION/TRAINING PROGRAM

## 2025-02-10 NOTE — PROGRESS NOTES
[facility] Podiatry  Progress Note    Adrien Tobin is a 44 year old male.   Chief Complaint   Patient presents with    Diabetic Ulcer     Left foot f/u - Pt is not healing. No pain today         HPI:     Patient is a pleasant 44-year-old male with past medical history of recurrent heel ulcerations, diabetes, and CKD returns to clinic for follow-up of left heel ulcer.  He relates that site seems to be doing okay but has not made significant progress.  He has been applying betadine to site daily. He has been ambulating in cam boot.  He did complete arterial duplex.  He is scheduled to complete MRI tonight.  His last HGB-A1c was 7.5% in April 2025. Denies any nausea, vomiting, fever, chills.      Allergies: Heparin   Current Outpatient Medications   Medication Sig Dispense Refill    levoFLOXacin (LEVAQUIN) 250 MG Oral Tab Take 1 tablet (250 mg total) by mouth every other day. 7 tablet 0    apixaban 5 MG Oral Tab Take 1 tablet (5 mg total) by mouth 2 (two) times daily. 60 tablet 0    gentamicin 0.1 % External Cream Apply topically to the ulcer site twice daily 30 g 1    clindamycin 300 MG Oral Cap Take 1 capsule (300 mg total) by mouth 3 (three) times daily. 30 capsule 0    torsemide 20 MG Oral Tab Take 1 tablet (20 mg total) by mouth 2 (two) times daily. 60 tablet 5    cloNIDine 0.3 MG Oral Tab Take 1 tablet (0.3 mg total) by mouth 3 (three) times daily. 90 tablet 5    atorvastatin 40 MG Oral Tab Take 1 tablet (40 mg total) by mouth daily.      lidocaine-prilocaine 2.5-2.5 % External Cream Apply 1 Application topically as needed with dialysis (apply to L wrist AVF prior to dialysis). 1 each 11    sodium bicarbonate 650 MG Oral Tab Take 1 tablet (650 mg total) by mouth 2 (two) times daily. 60 tablet 0    Insulin Pen Needle (BD PEN NEEDLE ODALIS 2ND GEN) 32G X 4 MM Does not apply Misc USE FOUR TIMES DAILY AS DIRECTED 400 each 2    insulin detemir 100 UNIT/ML Subcutaneous Solution Pen-injector Inject 22 Units  into the skin every morning.      aspirin (ASPIRIN EC LOW DOSE) 81 MG Oral Tab EC Take 1 tablet (81 mg total) by mouth daily. 90 tablet 0    amLODIPine 10 MG Oral Tab Take 1 tablet (10 mg total) by mouth daily. 90 tablet 0    HUMALOG KWIKPEN 100 UNIT/ML Subcutaneous Solution Pen-injector SLIDING SCALE  TID with meals MDD 15u (Patient taking differently: SLIDING SCALE  TID with meals MDD 15u) 15 mL 0    Dulaglutide (TRULICITY) 0.75 MG/0.5ML Subcutaneous Solution Pen-injector Inject 0.75 mg into the skin once a week. 7 mL 3      Past Medical History:    CKD (chronic kidney disease)    Diabetes (HCC)    Elevated liver enzymes    Elevated serum GGT level    High blood pressure    High cholesterol    History of blood transfusion    2023 November    Hyperlipidemia    HYPERTRIGLYCERIDEMIA    Intractable nausea and vomiting    OBESITY    Obesity    Pneumonia due to organism    40 years ago    PONV (postoperative nausea and vomiting)    Renal disorder    Visual impairment    GLASSES      Past Surgical History:   Procedure Laterality Date    Arthrotomy/explore/treat knee joint Left     DONE TWICE    Cholecystectomy      Colonoscopy N/A 1/23/2024    Procedure: COLONOSCOPY with hot snare polypectomy;  Surgeon: Albino Hernandez MD;  Location:  ENDOSCOPY    Debride wound Left     HEEL    Foot surgery      Other surgical history  Knee & Foot      Family History   Problem Relation Age of Onset    Diabetes Father     Heart Disorder Father     Diabetes Mother     Hypertension Sister       Social History     Socioeconomic History    Marital status:    Tobacco Use    Smoking status: Never    Smokeless tobacco: Never   Vaping Use    Vaping status: Never Used   Substance and Sexual Activity    Alcohol use: Yes     Alcohol/week: 1.0 standard drink of alcohol     Types: 1 Standard drinks or equivalent per week     Comment: socially    Drug use: No   Other Topics Concern    Caffeine Concern No    Exercise No    Seat Belt Yes     Special Diet Yes    Stress Concern No    Weight Concern No           REVIEW OF SYSTEMS:     No n/v/f/c.      EXAM:   There were no vitals taken for this visit.  GENERAL: well developed, well nourished, in no apparent distress  EXTREMITIES:       1. Integument: Normal skin temperature and turgor. Full thickness wound to plantar heel has again increased in size. No PTB or acute SOI. See images above. Skin eschar at 5th met base region is dry and stable. See images above.  2. Vascular: Dorsalis pedis and posterior tibial pulses are lightly palpable.  CFT intact digits.     3. Musculoskeletal: Muscle strength intact to left lower extremity with decreased plantar flexion which is patient's baseline.  There is no longer plantar prominence of calcaneus that was noted preoperatively.  Compartments are soft and compressible.  Flexion contracture of digits bilaterally.   4. Neurological: Normal sharp dull sensation; reflexes normal.  Decreased protective sensation noted to lower extremities.        ASSESSMENT AND PLAN:   Diagnoses and all orders for this visit:    Foot ulcer, left, with fat layer exposed (HCC)    Diabetic polyneuropathy associated with type 2 diabetes mellitus (HCC)    Gait instability    PAD (peripheral artery disease)    Stage 4 chronic kidney disease (HCC)    Exostosis of bone of foot          Plan:   -Patient examined, chart history reviewed.  -Recent x-rays reviewed.  Stable postsurgical findings.  No acute erosions or other concerns.  -Original wound to posterior heel is continues to cycle and almost heal/then get worse.  -Arterial duplex reviewed.  Referral placed to Dr. Najjar.  Also reached out to Dr. Najjar who will work to get patient in his clinic in a timely manner.  Appreciate vascular evaluation.  -MRI is pending for tonight.  Will reach out with MRI results.  -Patient has wound care visit scheduled on 2/20 with Dennise.  -Today excisional debridement performed site with #15 blade into and  through subcutaneous tissue to healthy bleeding base.  Continue daily dressing changes with Betadine and dry dressing.  Finish Levaquin as prescribed.  -Patient should try to stay off foot is much as possible/use cam boot as needed for ambulation.  -Educated patient on acute signs of infection and symptoms of DVT and advised patient to seek immediate medical attention if any concerns arise.  Patient expressed understanding.       RTC 1 week or sooner if other concerns arise.    Royce Lewis DPM

## 2025-02-13 ENCOUNTER — OFFICE VISIT (OUTPATIENT)
Facility: CLINIC | Age: 45
End: 2025-02-13
Payer: COMMERCIAL

## 2025-02-13 VITALS — BODY MASS INDEX: 33.04 KG/M2 | HEIGHT: 68 IN | WEIGHT: 218 LBS

## 2025-02-13 DIAGNOSIS — I70.244 ATHEROSCLEROSIS OF NATIVE ARTERY OF LEFT LOWER EXTREMITY WITH ULCERATION OF HEEL (HCC): Primary | ICD-10-CM

## 2025-02-13 PROCEDURE — 99213 OFFICE O/P EST LOW 20 MIN: CPT | Performed by: SURGERY

## 2025-02-13 PROCEDURE — 3008F BODY MASS INDEX DOCD: CPT | Performed by: SURGERY

## 2025-02-13 NOTE — PROGRESS NOTES
Samer F. Najjar, MD  Vascular Surgery  [facility]       VASCULAR SURGERY OFFICE NOTE        Name: Adrien Tobin   : 1980  [id number]     REASON FOR VISIT:   Patient is a 44 year old male who is here to discuss management of perfusion to his left lower extremity.   The patient has a history of end-stage renal disease and diabetes and I had created a left radiocephalic arteriovenous fistula that is being used for dialysis.  The ulcer has been present since December and has waxed and waned.  Then currently the patient is in compression and is being followed at the wound care center at [place] along with his podiatrist, Dr. Lewis.  He did undergo arterial flow testing at [place] that revealed mild to moderate in both superficial femoral arteries and popliteal arteries toe pressure on the left was 40 mmHg.  He  PAST MEDICAL HISTORY:     Past Medical History:    CKD (chronic kidney disease)    Diabetes (HCC)    Elevated liver enzymes    Elevated serum GGT level    High blood pressure    High cholesterol    History of blood transfusion    2023    Hyperlipidemia    HYPERTRIGLYCERIDEMIA    Intractable nausea and vomiting    OBESITY    Obesity    Pneumonia due to organism    40 years ago    PONV (postoperative nausea and vomiting)    Renal disorder    Visual impairment    GLASSES       PAST SURGICAL HISTORY:     Past Surgical History:   Procedure Laterality Date    Arthrotomy/explore/treat knee joint Left     DONE TWICE    Cholecystectomy      Colonoscopy N/A 2024    Procedure: COLONOSCOPY with hot snare polypectomy;  Surgeon: Albino Hernandez MD;  Location:  ENDOSCOPY    Debride wound Left     HEEL    Foot surgery      Other surgical history  Knee & Foot        MEDICATIONS:     Current Outpatient Medications:     levoFLOXacin (LEVAQUIN) 250 MG Oral Tab, Take 1 tablet (250 mg total) by mouth every other day., Disp: 7 tablet, Rfl: 0    gentamicin 0.1 % External Cream, Apply  topically to the ulcer site twice daily, Disp: 30 g, Rfl: 1    clindamycin 300 MG Oral Cap, Take 1 capsule (300 mg total) by mouth 3 (three) times daily., Disp: 30 capsule, Rfl: 0    torsemide 20 MG Oral Tab, Take 1 tablet (20 mg total) by mouth 2 (two) times daily., Disp: 60 tablet, Rfl: 5    cloNIDine 0.3 MG Oral Tab, Take 1 tablet (0.3 mg total) by mouth 3 (three) times daily., Disp: 90 tablet, Rfl: 5    atorvastatin 40 MG Oral Tab, Take 1 tablet (40 mg total) by mouth daily., Disp: , Rfl:     lidocaine-prilocaine 2.5-2.5 % External Cream, Apply 1 Application topically as needed with dialysis (apply to L wrist AVF prior to dialysis)., Disp: 1 each, Rfl: 11    sodium bicarbonate 650 MG Oral Tab, Take 1 tablet (650 mg total) by mouth 2 (two) times daily., Disp: 60 tablet, Rfl: 0    Insulin Pen Needle (BD PEN NEEDLE ODALIS 2ND GEN) 32G X 4 MM Does not apply Misc, USE FOUR TIMES DAILY AS DIRECTED, Disp: 400 each, Rfl: 2    insulin detemir 100 UNIT/ML Subcutaneous Solution Pen-injector, Inject 22 Units into the skin every morning., Disp: , Rfl:     amLODIPine 10 MG Oral Tab, Take 1 tablet (10 mg total) by mouth daily., Disp: 90 tablet, Rfl: 0    HUMALOG KWIKPEN 100 UNIT/ML Subcutaneous Solution Pen-injector, SLIDING SCALE  TID with meals MDD 15u (Patient taking differently: SLIDING SCALE  TID with meals MDD 15u), Disp: 15 mL, Rfl: 0    Dulaglutide (TRULICITY) 0.75 MG/0.5ML Subcutaneous Solution Pen-injector, Inject 0.75 mg into the skin once a week., Disp: 7 mL, Rfl: 3    apixaban 5 MG Oral Tab, Take 1 tablet (5 mg total) by mouth 2 (two) times daily. (Patient not taking: Reported on 2/13/2025), Disp: 60 tablet, Rfl: 0    aspirin (ASPIRIN EC LOW DOSE) 81 MG Oral Tab EC, Take 1 tablet (81 mg total) by mouth daily. (Patient not taking: Reported on 2/13/2025), Disp: 90 tablet, Rfl: 0    LABSS:     Lab Results   Component Value Date     10/18/2023    A1C 7.5 04/08/2024      Lab Results   Component Value Date    GLU  82 06/25/2024    BUN 39 (H) 06/25/2024    CREATSERUM 7.07 (H) 06/25/2024    BUNCREA 5.5 (L) 06/25/2024    ANIONGAP 5 06/25/2024    GFRAA 30 (L) 06/17/2022    GFRNAA 26 (L) 06/17/2022    CA 9.5 06/25/2024     06/25/2024    K 4.4 06/25/2024     06/25/2024    CO2 33.0 (H) 06/25/2024    OSMOCALC 302 (H) 06/25/2024      Lab Results   Component Value Date    WBC 5.6 12/11/2024    RBC 3.13 (L) 12/11/2024    HGB 10.4 (L) 12/11/2024    HCT 31.3 (L) 12/11/2024    .0 12/11/2024    MCH 33.2 12/11/2024    MCHC 33.2 12/11/2024    RDW 17.0 12/11/2024    .0 (L) 12/11/2024        Lab Results   Component Value Date    HGB 10.4 (L) 12/11/2024    HGB 7.4 (L) 06/25/2024    HGB 8.4 (L) 05/21/2024    HGB 8.0 (L) 04/27/2024    HGB 8.0 (L) 03/08/2024    HGB 10.5 (L) 01/04/2024    CREATSERUM 7.07 (H) 06/25/2024    CREATSERUM 8.98 (H) 05/21/2024    CREATSERUM 8.58 (H) 04/27/2024    CREATSERUM 5.22 (H) 03/08/2024    CREATSERUM 6.14 (H) 01/05/2024    CREATSERUM 6.35 (H) 01/04/2024       EXAM:   Ht 5' 8\" (1.727 m)   Wt 218 lb (98.9 kg)   BMI 33.15 kg/m²   The patient was not examined today but the images and the media section of his chart reveal a relatively superficial ulcer affecting the heel with healthy granulation tissue present     ASSESSMENT    Diagnoses and all orders for this visit:    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  The images do suggest healthy granulation tissue and I do agree with continued offloading perhaps with a cast.  If he does not make any progress over the next 2 to 3 weeks then I think it would be reasonable for him to undergo a left lower extremity angiogram with intervention given his multiple risk factors for PAD and evidence of some degree of stenosis on his arterial flow testing.  The patient will  call me if this is the case      PLAN:     Continued wound care therapy for the next 2 to 3 weeks and if there is no progress then he will require left lower extremity angiogram with likely intervention        Samer F. Najjar MD  Division of Vascular Surgery

## 2025-02-17 ENCOUNTER — OFFICE VISIT (OUTPATIENT)
Dept: PODIATRY CLINIC | Facility: CLINIC | Age: 45
End: 2025-02-17

## 2025-02-17 DIAGNOSIS — I73.9 PAD (PERIPHERAL ARTERY DISEASE): ICD-10-CM

## 2025-02-17 DIAGNOSIS — M89.8X7 EXOSTOSIS OF BONE OF FOOT: ICD-10-CM

## 2025-02-17 DIAGNOSIS — N18.4 STAGE 4 CHRONIC KIDNEY DISEASE (HCC): ICD-10-CM

## 2025-02-17 DIAGNOSIS — L97.522 FOOT ULCER, LEFT, WITH FAT LAYER EXPOSED (HCC): Primary | ICD-10-CM

## 2025-02-17 DIAGNOSIS — R26.81 GAIT INSTABILITY: ICD-10-CM

## 2025-02-17 DIAGNOSIS — E11.42 DIABETIC POLYNEUROPATHY ASSOCIATED WITH TYPE 2 DIABETES MELLITUS (HCC): ICD-10-CM

## 2025-02-17 PROCEDURE — 99213 OFFICE O/P EST LOW 20 MIN: CPT | Performed by: STUDENT IN AN ORGANIZED HEALTH CARE EDUCATION/TRAINING PROGRAM

## 2025-02-19 NOTE — PROGRESS NOTES
CHIEF COMPLAINT:     Chief Complaint   Patient presents with    Wound Care     Patient arrives for a wound care initial visit. Patient has been here before. Patient first noticed the wound first in December; he arrives in a boot to the left foot. Patient reports no pain in the wound. Patient arrives with an Ace wrap to the wound. There is gauze to the foot,      HPI:   Information obtained from Patient and chart  2-19-25 RE-EVALUATION: 45yo male with hx of diabetes (a1c 7.5 4-8-24), htn, dyslipidemia, pad, esrd on hd who is well known to the wound clinic for diabetic foot ulcerations.  He presents with his spouse.  He has been most recently following with dr. block for a left heel wound which has been waxing and waning.  Most recent mri showed improvement in the reactive marrow edema of the calcaneous. Patient saw Dr. Najjar 2-12-25 who recommended “If he does not make any progress over the next 2 to 3 weeks then I think it would be reasonable for him to undergo a left lower extremity angiogram with intervention given his multiple risk factors for PAD and evidence of some degree of stenosis on his arterial flow testing.”  Patient has been in a cam boot and been treating with betadine. Will place in TCC for gold standard offloading, if the area does not improve in 3 weeks will get vascular involved again  MEDICATIONS:     Current Outpatient Medications:     levoFLOXacin (LEVAQUIN) 250 MG Oral Tab, Take 1 tablet (250 mg total) by mouth every other day., Disp: 7 tablet, Rfl: 0    apixaban 5 MG Oral Tab, Take 1 tablet (5 mg total) by mouth 2 (two) times daily., Disp: 60 tablet, Rfl: 0    gentamicin 0.1 % External Cream, Apply topically to the ulcer site twice daily, Disp: 30 g, Rfl: 1    clindamycin 300 MG Oral Cap, Take 1 capsule (300 mg total) by mouth 3 (three) times daily., Disp: 30 capsule, Rfl: 0    torsemide 20 MG Oral Tab, Take 1 tablet (20 mg total) by mouth 2 (two) times daily., Disp: 60 tablet, Rfl: 5     cloNIDine 0.3 MG Oral Tab, Take 1 tablet (0.3 mg total) by mouth 3 (three) times daily., Disp: 90 tablet, Rfl: 5    atorvastatin 40 MG Oral Tab, Take 1 tablet (40 mg total) by mouth daily., Disp: , Rfl:     lidocaine-prilocaine 2.5-2.5 % External Cream, Apply 1 Application topically as needed with dialysis (apply to L wrist AVF prior to dialysis)., Disp: 1 each, Rfl: 11    sodium bicarbonate 650 MG Oral Tab, Take 1 tablet (650 mg total) by mouth 2 (two) times daily., Disp: 60 tablet, Rfl: 0    Insulin Pen Needle (BD PEN NEEDLE ODALIS 2ND GEN) 32G X 4 MM Does not apply Misc, USE FOUR TIMES DAILY AS DIRECTED, Disp: 400 each, Rfl: 2    insulin detemir 100 UNIT/ML Subcutaneous Solution Pen-injector, Inject 22 Units into the skin every morning., Disp: , Rfl:     aspirin (ASPIRIN EC LOW DOSE) 81 MG Oral Tab EC, Take 1 tablet (81 mg total) by mouth daily., Disp: 90 tablet, Rfl: 0    amLODIPine 10 MG Oral Tab, Take 1 tablet (10 mg total) by mouth daily., Disp: 90 tablet, Rfl: 0    HUMALOG KWIKPEN 100 UNIT/ML Subcutaneous Solution Pen-injector, SLIDING SCALE  TID with meals MDD 15u (Patient taking differently: SLIDING SCALE  TID with meals MDD 15u), Disp: 15 mL, Rfl: 0    Dulaglutide (TRULICITY) 0.75 MG/0.5ML Subcutaneous Solution Pen-injector, Inject 0.75 mg into the skin once a week., Disp: 7 mL, Rfl: 3  ALLERGIES:   Allergies[1]   REVIEW OF SYSTEMS:   This information was obtained from the patient/family and chart.    See HPI for pertinent positives, otherwise 10 pt ROS negative.    HISTORY:     Past Medical History:    CKD (chronic kidney disease)    Diabetes (HCC)    Elevated liver enzymes    Elevated serum GGT level    High blood pressure    High cholesterol    History of blood transfusion    2023 November    Hyperlipidemia    HYPERTRIGLYCERIDEMIA    Intractable nausea and vomiting    OBESITY    Obesity    Pneumonia due to organism    40 years ago    PONV (postoperative nausea and vomiting)    Renal disorder    Visual  impairment    GLASSES     Past Surgical History:   Procedure Laterality Date    Arthrotomy/explore/treat knee joint Left     DONE TWICE    Cholecystectomy      Colonoscopy N/A 1/23/2024    Procedure: COLONOSCOPY with hot snare polypectomy;  Surgeon: Albino Hernandez MD;  Location:  ENDOSCOPY    Debride wound Left     HEEL    Foot surgery      Other surgical history  Knee & Foot      Social History     Socioeconomic History    Marital status:    Tobacco Use    Smoking status: Never    Smokeless tobacco: Never   Vaping Use    Vaping status: Never Used   Substance and Sexual Activity    Alcohol use: Yes     Alcohol/week: 1.0 standard drink of alcohol     Types: 1 Standard drinks or equivalent per week     Comment: socially    Drug use: No   Other Topics Concern    Caffeine Concern No    Exercise No    Seat Belt Yes    Special Diet Yes    Stress Concern No    Weight Concern No     Social Drivers of Health     Food Insecurity: No Food Insecurity (11/5/2024)    Received from [facility]    Food Insecurity     Currently or in the past 3 months, have you worried your food would run out before you had money to buy more?: No     In the past 12 months, have you run out of food or been unable to get more?: No   Transportation Needs: No Transportation Needs (11/5/2024)    Received from [facility]    Transportation Needs     Currently or in the past 3 months, has lack of transportation kept you from medical appointments, getting food or medicine, or providing care to a family member?: Unrecognized value     Medical Transportation Needs?: No   Housing Stability: Low Risk  (1/4/2024)    Housing Stability     Housing Instability: No     PHYSICAL EXAM:     Vitals:    02/20/25 0832   BP: 120/67  Comment: blood sugar 244   Pulse: 80   Resp: 17   Temp: 98 °F (36.7 °C)   Weight: 210 lb (95.3 kg)      Estimated body mass index is 31.93 kg/m² as calculated from the following:    Height as  of 2/13/25: 68\".    Weight as of this encounter: 210 lb (95.3 kg).   POC Glucose   Date Value Ref Range Status   02/20/2025 244 (H) 70 - 99 mg/dL Final   01/23/2024 144 (H) 70 - 99 mg/dL Final   01/05/2024 232 (H) 70 - 99 mg/dL Final     POC Glucose    Date Value Ref Range Status   06/25/2024 112 (H) 70 - 99 mg/dL Final   02/09/2024 166 (H) 70 - 99 mg/dL Final   02/09/2024 67 (L) 70 - 99 mg/dL Final       Vital signs reviewed.Appears stated age, well groomed.    Constitutional:  Bp wnl for patient. Pulse Regular and wnl for patient. Respirations easy and unlabored. Temperature wnl. Elevated bmi. Appearance neat and clean. Appears in no acute distress. Well nourished and well developed.    Lower extremity:  dp/pt palpable bilaterally. Patient with pulsatils signals at the left dp/pt/distal hallux.  Extremities free of varicosities, edema. Capillary refill < 3 seconds. Digits are warm, but contracted L>R. toenails are wnl for color, thickness and hygeine. Skin is dry. no hairgrowth on legs.  Left  heel contour consistent with s/p multiple surgeries to calcaneus  Musculoskeletal:  Gait and station stable   Integumentary:  refer to wound characteristics and images   Psychiatric:  Judgment and insight intact. Alert and oriented times 3. No evidence of depression, anxiety, or agitation. Calm, cooperative, and communicative.   EDEMA:   Calf  Point of Measurement - Left Calf: 34  Point of Measurement - Right Calf: 34  Left Calf from:: Heel  Calf Left cm:: 36.2  Right Calf from:: Heel  Right Calf cm:: 40.5  Ankle  Point of Measurement - Left Ankle: 10  Point of Measurement - Right Ankle: 10  Left Ankle from:: Heel  Left Ankle cm:: 21     Right Ankle from:: Heel  Right Ankle cm:: 21.9     DIAGNOSTICS:     Lab Results   Component Value Date    BUN 39 (H) 06/25/2024    CREATSERUM 7.07 (H) 06/25/2024    ALB 3.4 05/21/2024    TP 6.9 05/21/2024    A1C 7.5 04/08/2024   2-10-25 MRI left ankle  CONCLUSION:    1. There is no specific  evidence of osteomyelitis allowing for signal alteration from susceptibility artifact.  Reactive marrow edema at the plantar aspect of the calcaneus has improved since prior study.  There is no specific evidence of cortical   erosion.   2. Remaining ligamentous and tendinous structures are otherwise intact.       2-5-25 arterial duplex  RIGHT EXTREMITY:  Monophasic waveforms within the posterior tibial artery  LEFT EXTREMITY:  Monophasic to biphasic waveforms  OTHER:  None.  SYSTOLIC VELOCITIES (CM/S):  RIGHT COMMON FEMORAL:      135    RIGHT PROFUNDA FEMORAL:      94      RIGHT SUPERFICIAL FEMORAL PROX:    76  RIGHT SUPERFICIAL FEMORAL MID:    113  RIGHT SUPERFICIAL FEMORAL DISTAL:    101  RIGHT POPLITEAL PROX:      69  RIGHT POPLITEAL DISTAL:      60  TIBIOPER TRUNK:        RIGHT PTA PROX:      9  RIGHT PTA MID:      9  RIGHT PTA DISTAL:      18  RIGHT BRANDON PROX:      55  RIGHT BRANDON MID:      59    RIGHT DORSALIS PEDIS:      30  RIGHT GREAT TOE PRESSURE:      62 mmHg  SYSTOLIC VELOCITIES (CM./S):  LEFT COMMON FEMORAL:      126    LEFT PROFUNDA FEMORAL:      73      LEFT SUPERFICIAL FEMORAL PROX:    119  LEFT SUPERFICIAL FEMORAL MID:      91  LEFT SUPERFICIAL FEMORAL DISTAL:    103  LEFT POPLITEAL PROX:      142  LEFT POPLITEAL DISTAL:      89  TIBIOPER TRUNK:        LEFT PTA PROX:      18  LEFT PTA MID:      35  LEFT PTA DISTAL:      16  LEFT BRANDON PROX:      118  LEFT BRANDON MID:      130    LEFT DORSALIS PEDIS::      42  LEFT GREAT TOE PRESSURE:      40 mmHg   CONCLUSION:    1. Patent vessels with velocities as described above.  2. Limited flow within the right posterior tibial artery likely demonstrating at least moderate to severe stenosis.  3. At least mild to moderate stenosis within the bilateral superficial femoral arteries.  4. Moderate stenosis within the proximal popliteal arteries bilaterally.  5. If further evaluation is needed, consider CTA or MRA.       WOUND ASSESSMENT:     Wound 02/09/24 Arm  Anterior;Left;Lower (Active)   Date First Assessed/Time First Assessed: 02/09/24 0839   Primary Wound Type: Incision  Location: Arm  Wound Location Orientation: Anterior;Left;Lower      Assessments 2/9/2024  9:29 AM 2/9/2024 10:38 AM   Site Assessment -- Unable to assess   Closure Approximated;Sutures;Skin glue --   Drainage Amount -- None   Dressing 4x4s;Tegaderm;Dermabond 4x4s;Tegaderm   Dressing Changed New --   Dressing Status Clean;Dry;Intact Clean;Dry;Intact       No associated orders.       Wound 02/20/25 #3 Heel Left;Plantar (Active)   Date First Assessed/Time First Assessed: 02/20/25 0822    Wound Number (Wound Clinic Only): #3  Primary Wound Type: Diabetic Ulcer  Location: Heel  Wound Location Orientation: Left;Plantar      Assessments 2/20/2025  8:24 AM   Wound Image      Drainage Amount Small   Drainage Description Serosanguineous   Wound Length (cm) 1.1 cm   Wound Width (cm) 1.4 cm   Wound Surface Area (cm^2) 1.54 cm^2   Wound Depth (cm) 0.1 cm   Wound Volume (cm^3) 0.154 cm^3   Margins Well-defined edges   Non-staged Wound Description Full thickness   Dagmar-wound Assessment Callous;Moist;Maceration;Dry   Wound Granulation Tissue Red;Pink;Firm   Wound Bed Granulation (%) 100 %   Wound Odor None   Tunneling? No   Undermining? No   Sinus Tracts? No   Posadas Scale Grade 2       Active Orders   Date Order Priority Status Authorizing Provider   02/20/25 0945 Debridement Diabetic Ulcer Left;Plantar Heel Routine Active Dennise Sloan APRN       Wound 02/20/25 #4 Foot Left;Lateral (Active)   Date First Assessed/Time First Assessed: 02/20/25 0822    Wound Number (Wound Clinic Only): #4  Primary Wound Type: Diabetic Ulcer  Location: Foot  Wound Location Orientation: Left;Lateral      Assessments 2/20/2025  8:25 AM   Wound Image      Drainage Amount Scant   Drainage Description Serous;Clear   Wound Length (cm) 0.4 cm   Wound Width (cm) 1.4 cm   Wound Surface Area (cm^2) 0.56 cm^2   Wound Depth (cm) 0.1 cm   Wound  Volume (cm^3) 0.056 cm^3   Margins Well-defined edges   Non-staged Wound Description Full thickness   Dagmar-wound Assessment Dry;Callous   Wound Bed Epithelium (%) 10 %   Wound Bed Eschar (%) 90 %   Wound Odor None   Tunneling? No   Undermining? No   Sinus Tracts? No   Posadas Scale Grade 2       Active Orders   Date Order Priority Status Authorizing Provider   02/20/25 0946 Debridement Diabetic Ulcer Left;Lateral Foot Routine Active Dennise Sloan APRN        PROCEDURE:      This procedure was medically necessary to promote wound healing by removing nonviable tissue, decrease chance of infection, and return the wound to an acute state.  See rn px note      ASSESSMENT AND PLAN:    1. 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 Diabetes mellitus with peripheral artery disease (HCC)    3. Diabetic polyneuropathy associated with type 2 diabetes mellitus (HCC)          Risks, benefits, and alternatives of current treatment plan discussed in detail.  Questions and concerns addressed. Red flags to RTC or ED reviewed.  Patient (or parent) agrees to plan.      NOTE TO PATIENT: The 21st Century Cures Act makes clinical notes like these available to patients in the interest of transparency. Clinical notes are medical documents used by physicians and care providers to communicate with each other. These documents include medical language and terminology, abbreviations, and treatment information that may sound technical and at times possibly unfamiliar. In addition, at times, the verbiage may appear blunt or direct. These documents are one tool providers use to communicate relevant information and clinical opinions of the care providers in a way that allows common understanding of the clinical context.   I spent   40   minutes with the patient. This time included:    preparing to see the patient (eg, review notes and recent diagnostics),   seeing the patient, obtaining and/or reviewing separately obtained history, performing a medically appropriate examination and/or evaluation, counseling and educating the patient, documenting in the record. Bill debridement only  DISCHARGE:      Patient Instructions   Please return on: Friday afternoon for RN visit for wound assessment, edema management, and if applicable reapplication of total contact cast    Next Tuesday, Wednesday, or Thursday        Patient discharge and wound care instructions  Adrien Tobin  2/20/2025           Cleansing/dressing:  In clinic, with each dressing change:    please cleanse the limb, foot, and between toes with soap, water and washcloth.   Dry thoroughly.    Cleanse/soak the wound with VASHE (or other hypochlorous wound cleanser), dab dry.    Apply the following dressings: madyson>silver foam to heel and lateral foot  Moisturizer and xeroform to plantar foot     Offloading:  We have applied a Total Contact cast. If the cast is on your right foot do NOT drive while in cast. Do not get cast wet. If foot becomes painful or numb please call the wound clinic or report to the nearest emergency room to have cast removed.   >PAD ANTERIOR TIBIA>with extra layer of fiberglass on the heel and interdry weaved between the digits    Nutrition and blood sugar control:  Focus on the following:  Protein: Meats, beans, eggs, milk and yogurt particularly Greek yogurt), tofu, soy nuts, soy protein products (Follow the protein handout in your welcome folder)  Vitamin C: Citrus fruits and juices, strawberries, tomatoes, tomato juice, peppers, baked potatoes, spinach, broccoli, cauliflower, Standard sprouts, cabbage  Vitamin A: Dark green, leafy vegetables, orange or yellow vegetables, cantaloupe, fortified dairy products, liver, fortified cereals  Zinc: Fortified cereals, red meats, seafood  Consider supplementing with Adalberto by [employer] (These are essential branch chain amino acids that help  with tissue building and wound healing) and take 2 packets/day. you can order online at [employer] or [employer]    If your blood sugar is consistently elevated your body can't heal and can't fight infection.    Concerns:  Signs of infection may include the following:  Increase in redness  Red \"streaks\" from wound  Increase in swelling  Fever  Unusual odor  Change in the amount of wound drainage     Should you experience any significant changes in your wound(s) or have any questions regarding your home care instructions please contact the [facility] @ [phone] If after regular business hours, please call your family doctor or local emergency room. The treatment plan has been discussed at length between you and your provider. Follow all instructions carefully, it is very important. If you do not follow all instructions you are at risk of your wound not healing, infection, possible loss of limb and even loss of life.   Dennise Sloan FNP-C, CWCN-AP, CFCN, CSWS, WCC, DWC  2/20/2025            [1]   Allergies  Allergen Reactions    Heparin OTHER (SEE COMMENTS)     Per pt platelets is low every time was given to him

## 2025-02-20 ENCOUNTER — OFFICE VISIT (OUTPATIENT)
Dept: WOUND CARE | Facility: HOSPITAL | Age: 45
End: 2025-02-20
Attending: NURSE PRACTITIONER
Payer: COMMERCIAL

## 2025-02-20 VITALS
BODY MASS INDEX: 32 KG/M2 | TEMPERATURE: 98 F | SYSTOLIC BLOOD PRESSURE: 120 MMHG | RESPIRATION RATE: 17 BRPM | DIASTOLIC BLOOD PRESSURE: 67 MMHG | WEIGHT: 210 LBS | HEART RATE: 80 BPM

## 2025-02-20 DIAGNOSIS — E11.51 DIABETES MELLITUS WITH PERIPHERAL ARTERY DISEASE (HCC): ICD-10-CM

## 2025-02-20 DIAGNOSIS — E11.621 DIABETIC ULCER OF LEFT HEEL ASSOCIATED WITH TYPE 2 DIABETES MELLITUS, WITH BONE INVOLVEMENT WITHOUT EVIDENCE OF NECROSIS (HCC): Primary | ICD-10-CM

## 2025-02-20 DIAGNOSIS — L97.426 DIABETIC ULCER OF LEFT HEEL ASSOCIATED WITH TYPE 2 DIABETES MELLITUS, WITH BONE INVOLVEMENT WITHOUT EVIDENCE OF NECROSIS (HCC): Primary | ICD-10-CM

## 2025-02-20 DIAGNOSIS — E11.42 DIABETIC POLYNEUROPATHY ASSOCIATED WITH TYPE 2 DIABETES MELLITUS (HCC): ICD-10-CM

## 2025-02-20 LAB — GLUCOSE BLD-MCNC: 244 MG/DL (ref 70–99)

## 2025-02-20 PROCEDURE — 11042 DBRDMT SUBQ TIS 1ST 20SQCM/<: CPT | Performed by: NURSE PRACTITIONER

## 2025-02-20 NOTE — PROGRESS NOTES
Patient ID: Adrien Tobin is a 44 year old male.    Debridement Diabetic Ulcer Left;Plantar Heel   Wound 02/20/25 #3 Heel Left;Plantar    Performed by: Dennise Sloan APRN  Authorized by: Dennise Sloan APRN      Consent   Consent obtained? verbal  Consent given by: patient    Debridement Details  Performed by: NP  Debridement type: surgical  Level of debridement: subcutaneous tissue    Pre-debridement measurements  Length (cm): 1.1  Width (cm): 1.4  Depth (cm): 0.1  Surface Area (cm^2): 1.54    Post-debridement measurements  Length (cm): 3.4  Width (cm): 1.4  Depth (cm): 0.2  Percent debrided: 100%  Surface Area (cm^2): 4.76  Area Debrided (cm^2): 4.76  Volume (cm^3): 0.95    Tissue and other material debrided: subcutaneous tissue  Devitalized tissue debrided: biofilm, callus and slough  Instrument(s) utilized: blade and nippers  Bleeding: medium  Hemostasis obtained with: pressure  Procedural pain (0-10): insensate  Post-procedural pain: insensate     Debridement Diabetic Ulcer Left;Lateral Foot   Wound 02/20/25 #4 Foot Left;Lateral    Performed by: Dennise Sloan APRN  Authorized by: Dennise Sloan APRN      Consent   Consent obtained? verbal  Consent given by: patient    Debridement Details  Performed by: NP  Debridement type: surgical  Level of debridement: subcutaneous tissue    Pre-debridement measurements  Length (cm): 0.4  Width (cm): 1.4  Depth (cm): 0.1  Surface Area (cm^2): 0.56    Post-debridement measurements  Length (cm): 0.4  Width (cm): 1.4  Depth (cm): 0.2  Percent debrided: 100%  Surface Area (cm^2): 0.56  Area Debrided (cm^2): 0.56  Volume (cm^3): 0.11    Tissue and other material debrided: subcutaneous tissue  Devitalized tissue debrided: biofilm, callus and slough  Instrument(s) utilized: blade  Bleeding: medium  Hemostasis obtained with: pressure  Procedural pain (0-10): insensate  Post-procedural pain: insensate   Response to treatment: procedure was tolerated well

## 2025-02-20 NOTE — PROGRESS NOTES
Patient ID: Adrien Tobin is a 44 year old male.    Cast application   Date/Time: 2/20/2025 2:46 PM   Performed by: Eileen Keenan RN   Authorized by: Dennise Sloan APRN   Consent given by: patient  Injury  Location details: left foot  Procedure  Immobilization: cast  Cast type: total contact  Post-procedure assessment  Patient tolerance: patient tolerated the procedure well with no immediate complications  Comments  Wound dressed with madyson, and silver foam.

## 2025-02-20 NOTE — PROGRESS NOTES
.Weekly Wound Education Note    Teaching Provided To: Patient  Training Topics: Dressing;Cleasing and general instructions;Discharge instructions;Off-loading  Training Method: Explain/Verbal;Written  Training Response: Patient responds and understands        Notes: Initla visit for left foot wound. Start madyson, and silver foam to wounds. Xeroform to dry areas on foot. Interdry between toes and well padded TCC applied with extra fiberglass layer.

## 2025-02-20 NOTE — PATIENT INSTRUCTIONS
Please return on: Friday afternoon for RN visit for wound assessment, edema management, and if applicable reapplication of total contact cast    Next Tuesday, Wednesday, or Thursday        Patient discharge and wound care instructions  Adrien Grimm Ervinlizeth  2/20/2025           Cleansing/dressing:  In clinic, with each dressing change:    please cleanse the limb, foot, and between toes with soap, water and washcloth.   Dry thoroughly.    Cleanse/soak the wound with VASHE (or other hypochlorous wound cleanser), dab dry.    Apply the following dressings: madyson>silver foam to heel and lateral foot  Moisturizer and xeroform to plantar foot     Offloading:  We have applied a Total Contact cast. If the cast is on your right foot do NOT drive while in cast. Do not get cast wet. If foot becomes painful or numb please call the wound clinic or report to the nearest emergency room to have cast removed.   >PAD ANTERIOR TIBIA>with extra layer of fiberglass on the heel and interdry weaved between the digits    Nutrition and blood sugar control:  Focus on the following:  Protein: Meats, beans, eggs, milk and yogurt particularly Greek yogurt), tofu, soy nuts, soy protein products (Follow the protein handout in your welcome folder)  Vitamin C: Citrus fruits and juices, strawberries, tomatoes, tomato juice, peppers, baked potatoes, spinach, broccoli, cauliflower, [place] sprouts, cabbage  Vitamin A: Dark green, leafy vegetables, orange or yellow vegetables, cantaloupe, fortified dairy products, liver, fortified cereals  Zinc: Fortified cereals, red meats, seafood  Consider supplementing with Adalberto by [employer] (These are essential branch chain amino acids that help with tissue building and wound healing) and take 2 packets/day. you can order online at [employer] or [employer]    If your blood sugar is consistently elevated your body can't heal and can't fight infection.    Concerns:  Signs of infection may include the following:  Increase  in redness  Red \"streaks\" from wound  Increase in swelling  Fever  Unusual odor  Change in the amount of wound drainage     Should you experience any significant changes in your wound(s) or have any questions regarding your home care instructions please contact the [facility] @ [phone] If after regular business hours, please call your family doctor or local emergency room. The treatment plan has been discussed at length between you and your provider. Follow all instructions carefully, it is very important. If you do not follow all instructions you are at risk of your wound not healing, infection, possible loss of limb and even loss of life.

## 2025-02-21 ENCOUNTER — OFFICE VISIT (OUTPATIENT)
Dept: WOUND CARE | Facility: HOSPITAL | Age: 45
End: 2025-02-21
Attending: NURSE PRACTITIONER
Payer: COMMERCIAL

## 2025-02-21 VITALS
DIASTOLIC BLOOD PRESSURE: 68 MMHG | SYSTOLIC BLOOD PRESSURE: 108 MMHG | TEMPERATURE: 98 F | HEART RATE: 70 BPM | RESPIRATION RATE: 17 BRPM

## 2025-02-21 DIAGNOSIS — L97.426 DIABETIC ULCER OF LEFT HEEL ASSOCIATED WITH TYPE 2 DIABETES MELLITUS, WITH BONE INVOLVEMENT WITHOUT EVIDENCE OF NECROSIS (HCC): Primary | ICD-10-CM

## 2025-02-21 DIAGNOSIS — E11.42 DIABETIC POLYNEUROPATHY ASSOCIATED WITH TYPE 2 DIABETES MELLITUS (HCC): ICD-10-CM

## 2025-02-21 DIAGNOSIS — E11.621 DIABETIC ULCER OF LEFT HEEL ASSOCIATED WITH TYPE 2 DIABETES MELLITUS, WITH BONE INVOLVEMENT WITHOUT EVIDENCE OF NECROSIS (HCC): Primary | ICD-10-CM

## 2025-02-21 DIAGNOSIS — E11.51 DIABETES MELLITUS WITH PERIPHERAL ARTERY DISEASE (HCC): ICD-10-CM

## 2025-02-21 LAB — GLUCOSE BLD-MCNC: 298 MG/DL (ref 70–99)

## 2025-02-21 PROCEDURE — 29445 APPL RIGID TOT CNTC LEG CAST: CPT

## 2025-02-21 PROCEDURE — 82962 GLUCOSE BLOOD TEST: CPT

## 2025-02-21 NOTE — PROGRESS NOTES
Chief Complaint   Patient presents with    Wound Care     Patient arrives for a wound care nurse visit. Patient arrives with a Total Contact Cast to the left. Patient reports there were no issues with the cast.            Current Outpatient Medications:     levoFLOXacin (LEVAQUIN) 250 MG Oral Tab, Take 1 tablet (250 mg total) by mouth every other day., Disp: 7 tablet, Rfl: 0    apixaban 5 MG Oral Tab, Take 1 tablet (5 mg total) by mouth 2 (two) times daily., Disp: 60 tablet, Rfl: 0    gentamicin 0.1 % External Cream, Apply topically to the ulcer site twice daily, Disp: 30 g, Rfl: 1    clindamycin 300 MG Oral Cap, Take 1 capsule (300 mg total) by mouth 3 (three) times daily., Disp: 30 capsule, Rfl: 0    torsemide 20 MG Oral Tab, Take 1 tablet (20 mg total) by mouth 2 (two) times daily., Disp: 60 tablet, Rfl: 5    cloNIDine 0.3 MG Oral Tab, Take 1 tablet (0.3 mg total) by mouth 3 (three) times daily., Disp: 90 tablet, Rfl: 5    atorvastatin 40 MG Oral Tab, Take 1 tablet (40 mg total) by mouth daily., Disp: , Rfl:     lidocaine-prilocaine 2.5-2.5 % External Cream, Apply 1 Application topically as needed with dialysis (apply to L wrist AVF prior to dialysis)., Disp: 1 each, Rfl: 11    sodium bicarbonate 650 MG Oral Tab, Take 1 tablet (650 mg total) by mouth 2 (two) times daily., Disp: 60 tablet, Rfl: 0    Insulin Pen Needle (BD PEN NEEDLE ODALIS 2ND GEN) 32G X 4 MM Does not apply Misc, USE FOUR TIMES DAILY AS DIRECTED, Disp: 400 each, Rfl: 2    insulin detemir 100 UNIT/ML Subcutaneous Solution Pen-injector, Inject 22 Units into the skin every morning., Disp: , Rfl:     aspirin (ASPIRIN EC LOW DOSE) 81 MG Oral Tab EC, Take 1 tablet (81 mg total) by mouth daily., Disp: 90 tablet, Rfl: 0    amLODIPine 10 MG Oral Tab, Take 1 tablet (10 mg total) by mouth daily., Disp: 90 tablet, Rfl: 0    HUMALOG KWIKPEN 100 UNIT/ML Subcutaneous Solution Pen-injector, SLIDING SCALE  TID with meals MDD 15u (Patient taking differently: SLIDING  SCALE  TID with meals MDD 15u), Disp: 15 mL, Rfl: 0    Dulaglutide (TRULICITY) 0.75 MG/0.5ML Subcutaneous Solution Pen-injector, Inject 0.75 mg into the skin once a week. (Patient not taking: Reported on 2/21/2025), Disp: 7 mL, Rfl: 3    Allergies[1]       HISTORY:     Past medical, surgical, family and social history updated where appropriate.    PHYSICAL EXAM:   /68   Pulse 70   Temp 97.8 °F (36.6 °C)   Resp 17        Vital signs reviewed.      Calf  Point of Measurement - Left Calf: 34     Left Calf from:: Heel  Calf Left cm:: 33.9          Ankle     Point of Measurement - Right Ankle: 10     Left Ankle cm:: 20.5     Right Ankle from:: Heel          Wound 02/09/24 Arm Anterior;Left;Lower (Active)   Date First Assessed/Time First Assessed: 02/09/24 0839   Primary Wound Type: Incision  Location: Arm  Wound Location Orientation: Anterior;Left;Lower      Assessments 2/9/2024  9:29 AM 2/9/2024 10:38 AM   Site Assessment -- Unable to assess   Closure Approximated;Sutures;Skin glue --   Drainage Amount -- None   Dressing 4x4s;Tegaderm;Dermabond 4x4s;Tegaderm   Dressing Changed New --   Dressing Status Clean;Dry;Intact Clean;Dry;Intact       No associated orders.       Wound 02/20/25 #3 Heel Left;Plantar (Active)   Date First Assessed/Time First Assessed: 02/20/25 0822    Wound Number (Wound Clinic Only): #3  Primary Wound Type: Diabetic Ulcer  Location: Heel  Wound Location Orientation: Left;Plantar      Assessments 2/20/2025  8:24 AM 2/21/2025  1:48 PM   Wound Image        Drainage Amount Small Moderate   Drainage Description Serosanguineous Serosanguineous   Treatments Synthetic cast Synthetic cast   Wound Length (cm) 1.1 cm 1.1 cm   Wound Width (cm) 1.4 cm 1.4 cm   Wound Surface Area (cm^2) 1.54 cm^2 1.54 cm^2   Wound Depth (cm) 0.1 cm 0.1 cm   Wound Volume (cm^3) 0.154 cm^3 0.154 cm^3   Wound Healing % -- 0   Margins Well-defined edges Well-defined edges   Non-staged Wound Description Full thickness Full  thickness   Dagmar-wound Assessment Callous;Moist;Maceration;Dry Callous;Dry   Wound Granulation Tissue Red;Pink;Firm Red;Pink;Firm   Wound Bed Granulation (%) 100 % 100 %   Wound Odor None None   Tunneling? No No   Undermining? No No   Sinus Tracts? No No   Posadas Scale Grade 2 Grade 2       Inactive Orders   Date Order Priority Status Authorizing Provider   02/20/25 1446 Cast application Routine Completed Dennise Sloan APRN   02/20/25 0945 Debridement Diabetic Ulcer Left;Plantar Heel Routine Completed Dennise Sloan APRN       Wound 02/20/25 #4 Foot Left;Lateral (Active)   Date First Assessed/Time First Assessed: 02/20/25 0822    Wound Number (Wound Clinic Only): #4  Primary Wound Type: Diabetic Ulcer  Location: Foot  Wound Location Orientation: Left;Lateral      Assessments 2/20/2025  8:25 AM 2/21/2025  1:47 PM   Wound Image        Drainage Amount Scant Small   Drainage Description Serous;Clear Serosanguineous   Treatments Synthetic cast Synthetic cast   Wound Length (cm) 0.4 cm 0.1 cm   Wound Width (cm) 1.4 cm 1.4 cm   Wound Surface Area (cm^2) 0.56 cm^2 0.14 cm^2   Wound Depth (cm) 0.1 cm 0.1 cm   Wound Volume (cm^3) 0.056 cm^3 0.014 cm^3   Wound Healing % -- 75   Margins Well-defined edges Well-defined edges   Non-staged Wound Description Full thickness Full thickness   Dagmar-wound Assessment Dry;Callous Dry;Callous   Wound Granulation Tissue -- Red;Pink;Firm   Wound Bed Granulation (%) -- 100 %   Wound Bed Epithelium (%) 10 % --   Wound Bed Eschar (%) 90 % --   Wound Odor None None   Tunneling? No No   Undermining? No No   Sinus Tracts? No No   Posadas Scale Grade 2 Grade 2       Inactive Orders   Date Order Priority Status Authorizing Provider   02/20/25 1446 Cast application Routine Completed Dennise Sloan APRN   02/20/25 0946 Debridement Diabetic Ulcer Left;Lateral Foot Routine Completed Dennise Sloan APRN          ASSESSMENT AND PLAN:        Risks, benefits, and alternatives of current treatment plan  discussed in detail.  Questions and concerns addressed. Red flags to RTC or ED reviewed.  Patient (or parent) agrees to plan.      No follow-ups on file.  Weekly Wound Education Note    Teaching Provided To: Patient;Family  Training Topics: Cleasing and general instructions;Discharge instructions;Dressing;Off-loading  Training Method: Explain/Verbal  Training Response: Patient responds and understands;Reinforcement needed        Notes: Here for RN visit.    Here for RN visit, wounds stable. TCC tolerated well, edema measurements slightly decreased. Continue with madyson and silver foam to wounds - patient brought in own dressings. Xeroform to plantar dry foot. Interdry between toes. TCC applied, ABD pads to pad anterior tibia, anterior ankle, and heel. Spouse present at visit. Patient scheduled to return on 2/27/25 for a provider visit.     Cyndi CALLEJAS RN   2/21/2025  2:29 PM             [1]   Allergies  Allergen Reactions    Heparin OTHER (SEE COMMENTS)     Per pt platelets is low every time was given to him

## 2025-02-23 NOTE — PROGRESS NOTES
[facility]  Progress Note    Adrien Tobin is a 44 year old male.   Chief Complaint   Patient presents with    Diabetic Ulcer     Left foot- denies pain         HPI:     Patient is a pleasant 44-year-old male with past medical history of recurrent heel ulcerations, diabetes, and CKD returns to clinic for follow-up of left heel ulcer.  He relates that site seems to be doing okay but has not made significant progress.  He did complete MRI which was negative for osteomyelitis.  He will also follow-up with Dr. Najjar who is considering possible vascular intervention.  He is without pain at this time.  He is scheduled to see Dennise in wound care center later this week. his last HGB-A1c was 7.5% in April 2025. Denies any nausea, vomiting, fever, chills.      Allergies: Heparin   Current Outpatient Medications   Medication Sig Dispense Refill    levoFLOXacin (LEVAQUIN) 250 MG Oral Tab Take 1 tablet (250 mg total) by mouth every other day. 7 tablet 0    apixaban 5 MG Oral Tab Take 1 tablet (5 mg total) by mouth 2 (two) times daily. 60 tablet 0    gentamicin 0.1 % External Cream Apply topically to the ulcer site twice daily 30 g 1    clindamycin 300 MG Oral Cap Take 1 capsule (300 mg total) by mouth 3 (three) times daily. 30 capsule 0    torsemide 20 MG Oral Tab Take 1 tablet (20 mg total) by mouth 2 (two) times daily. 60 tablet 5    cloNIDine 0.3 MG Oral Tab Take 1 tablet (0.3 mg total) by mouth 3 (three) times daily. 90 tablet 5    atorvastatin 40 MG Oral Tab Take 1 tablet (40 mg total) by mouth daily.      lidocaine-prilocaine 2.5-2.5 % External Cream Apply 1 Application topically as needed with dialysis (apply to L wrist AVF prior to dialysis). 1 each 11    sodium bicarbonate 650 MG Oral Tab Take 1 tablet (650 mg total) by mouth 2 (two) times daily. 60 tablet 0    Insulin Pen Needle (BD PEN NEEDLE ODALIS 2ND GEN) 32G X 4 MM Does not apply Misc USE FOUR TIMES DAILY AS DIRECTED 400 each 2    insulin  detemir 100 UNIT/ML Subcutaneous Solution Pen-injector Inject 22 Units into the skin every morning.      aspirin (ASPIRIN EC LOW DOSE) 81 MG Oral Tab EC Take 1 tablet (81 mg total) by mouth daily. 90 tablet 0    amLODIPine 10 MG Oral Tab Take 1 tablet (10 mg total) by mouth daily. 90 tablet 0    HUMALOG KWIKPEN 100 UNIT/ML Subcutaneous Solution Pen-injector SLIDING SCALE  TID with meals MDD 15u (Patient taking differently: SLIDING SCALE  TID with meals MDD 15u) 15 mL 0    Dulaglutide (TRULICITY) 0.75 MG/0.5ML Subcutaneous Solution Pen-injector Inject 0.75 mg into the skin once a week. (Patient not taking: Reported on 2/21/2025) 7 mL 3      Past Medical History:    CKD (chronic kidney disease)    Diabetes (HCC)    Elevated liver enzymes    Elevated serum GGT level    High blood pressure    High cholesterol    History of blood transfusion    2023 November    Hyperlipidemia    HYPERTRIGLYCERIDEMIA    Intractable nausea and vomiting    OBESITY    Obesity    Pneumonia due to organism    40 years ago    PONV (postoperative nausea and vomiting)    Renal disorder    Visual impairment    GLASSES      Past Surgical History:   Procedure Laterality Date    Arthrotomy/explore/treat knee joint Left     DONE TWICE    Cholecystectomy      Colonoscopy N/A 1/23/2024    Procedure: COLONOSCOPY with hot snare polypectomy;  Surgeon: Albino Hernandez MD;  Location:  ENDOSCOPY    Debride wound Left     HEEL    Foot surgery      Other surgical history  Knee & Foot      Family History   Problem Relation Age of Onset    Diabetes Father     Heart Disorder Father     Diabetes Mother     Hypertension Sister       Social History     Socioeconomic History    Marital status:    Tobacco Use    Smoking status: Never    Smokeless tobacco: Never   Vaping Use    Vaping status: Never Used   Substance and Sexual Activity    Alcohol use: Yes     Alcohol/week: 1.0 standard drink of alcohol     Types: 1 Standard drinks or equivalent per week      Comment: socially    Drug use: No   Other Topics Concern    Caffeine Concern No    Exercise No    Seat Belt Yes    Special Diet Yes    Stress Concern No    Weight Concern No           REVIEW OF SYSTEMS:     No n/v/f/c.      EXAM:   There were no vitals taken for this visit.  GENERAL: well developed, well nourished, in no apparent distress  EXTREMITIES:       1. Integument: Normal skin temperature and turgor. Full thickness wound to plantar heel is stable and without acute SOI. Granular base.  No PTB or acute SOI. Skin eschar at 5th met base region is dry and stable.   2. Vascular: Dorsalis pedis and posterior tibial pulses are lightly palpable.  CFT intact digits.     3. Musculoskeletal: Muscle strength intact to left lower extremity with decreased plantar flexion which is patient's baseline.  There is no longer plantar prominence of calcaneus that was noted preoperatively.  Compartments are soft and compressible.  Flexion contracture of digits bilaterally.   4. Neurological: Normal sharp dull sensation; reflexes normal.  Decreased protective sensation noted to lower extremities.        ASSESSMENT AND PLAN:   Diagnoses and all orders for this visit:    Foot ulcer, left, with fat layer exposed (HCC)    Diabetic polyneuropathy associated with type 2 diabetes mellitus (HCC)    Gait instability    PAD (peripheral artery disease)    Stage 4 chronic kidney disease (HCC)    Exostosis of bone of foot          Plan:   -Patient examined, chart history reviewed.  -Recent x-rays reviewed.  Stable postsurgical findings.  No acute erosions or other concerns.  -Original wound to posterior heel is continues to cycle and almost heal/then get worse.  -Arterial duplex reviewed.  Dr. Najjar has evaluated.  Possible vascular invention in the future pending clinical course.  Appreciate vascular eval.   -MRI reviewed.  No evidence of osteomyelitis.  -Patient has wound care visit scheduled on 2/20 with Dennise.  -Today excisional debridement  performed site with #15 blade into and through subcutaneous tissue to healthy bleeding base.  Continue daily dressing changes with Betadine and dry dressing.    -Patient should try to stay off foot is much as possible/use cam boot as needed for ambulation.  -Educated patient on acute signs of infection and symptoms of DVT and advised patient to seek immediate medical attention if any concerns arise.  Patient expressed understanding.     Patient will follow-up at wound care center for now.  I will monitor his progress and work up in the clinic as needed.  All questions answered to satisfaction.  Appreciate the opportunity participate in patient's care.    Royce Lewis DPM

## 2025-02-26 NOTE — PROGRESS NOTES
CHIEF COMPLAINT:     Chief Complaint   Patient presents with    Wound Care     Follow-up or wounds to left foot. Denies pain or concerns at this time. TCC tolerated well.      HPI:   Information obtained from Patient and chart  2-19-25 RE-EVALUATION: 43yo male with hx of diabetes (a1c 7.5 4-8-24), htn, dyslipidemia, pad, esrd on hd who is well known to the wound clinic for diabetic foot ulcerations.  He presents with his spouse.  He has been most recently following with dr. block for a left heel wound which has been waxing and waning.  Most recent mri showed improvement in the reactive marrow edema of the calcaneous. Patient saw Dr. Najjar 2-12-25 who recommended “If he does not make any progress over the next 2 to 3 weeks then I think it would be reasonable for him to undergo a left lower extremity angiogram with intervention given his multiple risk factors for PAD and evidence of some degree of stenosis on his arterial flow testing.”  Patient has been in a cam boot and been treating with betadine. Will place in TCC for gold standard offloading, if the area does not improve in 3 weeks will get vascular involved again    2-27-25 patient returns.  Patient is tolerating the tcc (week 1). Wounds are improving. The dry skin on plantar foot is improving, he still has built up hyperkeratosis on the posterior heel. Will treat this with ammonium lactate and xeroform as well.  We discussed post healing foot wear. He states that he was doing well with custom shoes/inserts for 4 years.  He was arranging for new inserts/shoes and in that time the heel developed a wound-so he is thinking it was most likely related to his shoes/inserts breaking down.  He does have a \"new\" insert that he has but has not been wearing due to the wound.  I asked him to bring it to clinic next week and we will check it with lipstick to see if it could accommodate the injured foot after healing until he would get new custom ones made.  No s/s of  infection or c/o pain. Continue with poc including tcc.  MEDICATIONS:     Current Outpatient Medications:     levoFLOXacin (LEVAQUIN) 250 MG Oral Tab, Take 1 tablet (250 mg total) by mouth every other day., Disp: 7 tablet, Rfl: 0    apixaban 5 MG Oral Tab, Take 1 tablet (5 mg total) by mouth 2 (two) times daily., Disp: 60 tablet, Rfl: 0    gentamicin 0.1 % External Cream, Apply topically to the ulcer site twice daily, Disp: 30 g, Rfl: 1    clindamycin 300 MG Oral Cap, Take 1 capsule (300 mg total) by mouth 3 (three) times daily., Disp: 30 capsule, Rfl: 0    torsemide 20 MG Oral Tab, Take 1 tablet (20 mg total) by mouth 2 (two) times daily., Disp: 60 tablet, Rfl: 5    cloNIDine 0.3 MG Oral Tab, Take 1 tablet (0.3 mg total) by mouth 3 (three) times daily., Disp: 90 tablet, Rfl: 5    atorvastatin 40 MG Oral Tab, Take 1 tablet (40 mg total) by mouth daily., Disp: , Rfl:     lidocaine-prilocaine 2.5-2.5 % External Cream, Apply 1 Application topically as needed with dialysis (apply to L wrist AVF prior to dialysis)., Disp: 1 each, Rfl: 11    sodium bicarbonate 650 MG Oral Tab, Take 1 tablet (650 mg total) by mouth 2 (two) times daily., Disp: 60 tablet, Rfl: 0    Insulin Pen Needle (BD PEN NEEDLE ODALIS 2ND GEN) 32G X 4 MM Does not apply Misc, USE FOUR TIMES DAILY AS DIRECTED, Disp: 400 each, Rfl: 2    insulin detemir 100 UNIT/ML Subcutaneous Solution Pen-injector, Inject 22 Units into the skin every morning., Disp: , Rfl:     aspirin (ASPIRIN EC LOW DOSE) 81 MG Oral Tab EC, Take 1 tablet (81 mg total) by mouth daily., Disp: 90 tablet, Rfl: 0    amLODIPine 10 MG Oral Tab, Take 1 tablet (10 mg total) by mouth daily., Disp: 90 tablet, Rfl: 0    HUMALOG KWIKPEN 100 UNIT/ML Subcutaneous Solution Pen-injector, SLIDING SCALE  TID with meals MDD 15u (Patient taking differently: SLIDING SCALE  TID with meals MDD 15u), Disp: 15 mL, Rfl: 0    Dulaglutide (TRULICITY) 0.75 MG/0.5ML Subcutaneous Solution Pen-injector, Inject 0.75 mg into  the skin once a week. (Patient not taking: Reported on 2/21/2025), Disp: 7 mL, Rfl: 3  ALLERGIES:   Allergies[1]   REVIEW OF SYSTEMS:   This information was obtained from the patient/family and chart.    See HPI for pertinent positives, otherwise 10 pt ROS negative.  HISTORY:   Past medical, surgical, family and social history updated where appropriate.    PHYSICAL EXAM:     Vitals:    02/27/25 0737   BP: 134/74  Comment: blood glucose: 178   Pulse: 109   Resp: 14   Temp: 98.6 °F (37 °C)        Estimated body mass index is 31.93 kg/m² as calculated from the following:    Height as of 2/13/25: 68\".    Weight as of 2/20/25: 210 lb (95.3 kg).   POC Glucose   Date Value Ref Range Status   02/27/2025 178 (H) 70 - 99 mg/dL Final   02/21/2025 298 (H) 70 - 99 mg/dL Final   02/20/2025 244 (H) 70 - 99 mg/dL Final       Vital signs reviewed.Appears stated age, well groomed.    Constitutional:  Bp wnl for patient. Pulse Regular and wnl for patient. Respirations easy and unlabored. Temperature wnl. Elevated bmi. Appearance neat and clean. Appears in no acute distress. Well nourished and well developed.    Lower extremity:  dp/pt palpable left. Left lower extremity free of varicosities, stable edema. Capillary refill < 3 seconds. Digits are warm, but contracted L>R. toenails are wnl for color, thickness and hygeine. Skin is dry. no hairgrowth on legs.  Left  heel contour consistent with s/p multiple surgeries to calcaneus  Musculoskeletal:  Gait and station stable   Integumentary:  refer to wound characteristics and images   Psychiatric:  Judgment and insight intact. Alert and oriented times 3. No evidence of depression, anxiety, or agitation. Calm, cooperative, and communicative.   EDEMA:   Calf  Point of Measurement - Left Calf: 34     Left Calf from:: Heel  Calf Left cm:: 34        Ankle  Point of Measurement - Left Ankle: 10     Left Ankle from:: Heel  Left Ankle cm:: 21.4              DIAGNOSTICS:     Lab Results   Component  Value Date    BUN 39 (H) 06/25/2024    CREATSERUM 7.07 (H) 06/25/2024    ALB 3.4 05/21/2024    TP 6.9 05/21/2024    A1C 7.5 04/08/2024   2-10-25 MRI left ankle  CONCLUSION:    1. There is no specific evidence of osteomyelitis allowing for signal alteration from susceptibility artifact.  Reactive marrow edema at the plantar aspect of the calcaneus has improved since prior study.  There is no specific evidence of cortical   erosion.   2. Remaining ligamentous and tendinous structures are otherwise intact.       2-5-25 arterial duplex  RIGHT EXTREMITY:  Monophasic waveforms within the posterior tibial artery  LEFT EXTREMITY:  Monophasic to biphasic waveforms  OTHER:  None.  SYSTOLIC VELOCITIES (CM/S):  RIGHT COMMON FEMORAL:      135    RIGHT PROFUNDA FEMORAL:      94      RIGHT SUPERFICIAL FEMORAL PROX:    76  RIGHT SUPERFICIAL FEMORAL MID:    113  RIGHT SUPERFICIAL FEMORAL DISTAL:    101  RIGHT POPLITEAL PROX:      69  RIGHT POPLITEAL DISTAL:      60  TIBIOPER TRUNK:        RIGHT PTA PROX:      9  RIGHT PTA MID:      9  RIGHT PTA DISTAL:      18  RIGHT BRANDON PROX:      55  RIGHT BRANDON MID:      59    RIGHT DORSALIS PEDIS:      30  RIGHT GREAT TOE PRESSURE:      62 mmHg  SYSTOLIC VELOCITIES (CM./S):  LEFT COMMON FEMORAL:      126    LEFT PROFUNDA FEMORAL:      73      LEFT SUPERFICIAL FEMORAL PROX:    119  LEFT SUPERFICIAL FEMORAL MID:      91  LEFT SUPERFICIAL FEMORAL DISTAL:    103  LEFT POPLITEAL PROX:      142  LEFT POPLITEAL DISTAL:      89  TIBIOPER TRUNK:        LEFT PTA PROX:      18  LEFT PTA MID:      35  LEFT PTA DISTAL:      16  LEFT BRANDON PROX:      118  LEFT BRANDON MID:      130    LEFT DORSALIS PEDIS::      42  LEFT GREAT TOE PRESSURE:      40 mmHg   CONCLUSION:    1. Patent vessels with velocities as described above.  2. Limited flow within the right posterior tibial artery likely demonstrating at least moderate to severe stenosis.  3. At least mild to moderate stenosis within the bilateral superficial femoral  arteries.  4. Moderate stenosis within the proximal popliteal arteries bilaterally.  5. If further evaluation is needed, consider CTA or MRA.       WOUND ASSESSMENT:     Wound 02/09/24 Arm Anterior;Left;Lower (Active)   Date First Assessed/Time First Assessed: 02/09/24 0839   Primary Wound Type: Incision  Location: Arm  Wound Location Orientation: Anterior;Left;Lower      Assessments 2/9/2024  9:29 AM 2/9/2024 10:38 AM   Site Assessment -- Unable to assess   Closure Approximated;Sutures;Skin glue --   Drainage Amount -- None   Dressing 4x4s;Tegaderm;Dermabond 4x4s;Tegaderm   Dressing Changed New --   Dressing Status Clean;Dry;Intact Clean;Dry;Intact       No associated orders.       Wound 02/20/25 #3 Heel Left;Plantar (Active)   Date First Assessed/Time First Assessed: 02/20/25 0822    Wound Number (Wound Clinic Only): #3  Primary Wound Type: Diabetic Ulcer  Location: Heel  Wound Location Orientation: Left;Plantar      Assessments 2/20/2025  8:24 AM 2/27/2025  7:43 AM   Wound Image         Drainage Amount Small Small   Drainage Description Serosanguineous Serous;Yellow   Treatments Synthetic cast --   Wound Length (cm) 1.1 cm 0.7 cm   Wound Width (cm) 1.4 cm 1.6 cm   Wound Surface Area (cm^2) 1.54 cm^2 1.12 cm^2   Wound Depth (cm) 0.1 cm 0.1 cm   Wound Volume (cm^3) 0.154 cm^3 0.112 cm^3   Wound Healing % -- 27   Margins Well-defined edges Well-defined edges   Non-staged Wound Description Full thickness Full thickness   Dagmar-wound Assessment Callous;Moist;Maceration;Dry Dry   Wound Granulation Tissue Red;Pink;Firm Red;Pink;Firm   Wound Bed Granulation (%) 100 % 95 %   Wound Bed Epithelium (%) -- 5 %   Wound Odor None None   Shape -- bridged   Tunneling? No No   Undermining? No No   Sinus Tracts? No No   Posadas Scale Grade 2 Grade 2       Inactive Orders   Date Order Priority Status Authorizing Provider   02/20/25 1446 Cast application Routine Completed Dennise Sloan APRN   02/20/25 0945 Debridement Diabetic Ulcer  Left;Plantar Heel Routine Completed Dennise Sloan APRN       Wound 02/20/25 #4 Foot Left;Lateral (Active)   Date First Assessed/Time First Assessed: 02/20/25 0822    Wound Number (Wound Clinic Only): #4  Primary Wound Type: Diabetic Ulcer  Location: Foot  Wound Location Orientation: Left;Lateral      Assessments 2/20/2025  8:25 AM 2/27/2025  7:45 AM   Wound Image         Drainage Amount Scant Scant   Drainage Description Serous;Clear Serosanguineous   Treatments Synthetic cast --   Wound Length (cm) 0.4 cm 0.2 cm   Wound Width (cm) 1.4 cm 1.4 cm   Wound Surface Area (cm^2) 0.56 cm^2 0.28 cm^2   Wound Depth (cm) 0.1 cm 0.1 cm   Wound Volume (cm^3) 0.056 cm^3 0.028 cm^3   Wound Healing % -- 50   Margins Well-defined edges Well-defined edges   Non-staged Wound Description Full thickness Full thickness   Dagmar-wound Assessment Dry;Callous Dry   Wound Granulation Tissue -- Pink;Firm;Pale Grey   Wound Bed Granulation (%) -- 100 %   Wound Bed Epithelium (%) 10 % --   Wound Bed Eschar (%) 90 % --   Wound Odor None None   Tunneling? No No   Undermining? No No   Sinus Tracts? No No   Posadas Scale Grade 2 Grade 2       Inactive Orders   Date Order Priority Status Authorizing Provider   02/20/25 1446 Cast application Routine Completed Dennise Sloan APRN   02/20/25 0946 Debridement Diabetic Ulcer Left;Lateral Foot Routine Completed Dennise Sloan APRN            ASSESSMENT AND PLAN:    1. 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 Diabetes mellitus with peripheral artery disease (HCC)    3. Diabetic polyneuropathy associated with type 2 diabetes mellitus (HCC)            Risks, benefits, and alternatives of current treatment plan discussed in detail.  Questions and concerns addressed. Red flags to RTC or ED reviewed.  Patient (or parent) agrees to plan.      NOTE TO PATIENT: The 21st Century Cures Act makes clinical notes like these  available to patients in the interest of transparency. Clinical notes are medical documents used by physicians and care providers to communicate with each other. These documents include medical language and terminology, abbreviations, and treatment information that may sound technical and at times possibly unfamiliar. In addition, at times, the verbiage may appear blunt or direct. These documents are one tool providers use to communicate relevant information and clinical opinions of the care providers in a way that allows common understanding of the clinical context.   I spent 39minutes with the patient. This time included:    preparing to see the patient (eg, review notes and recent diagnostics),  seeing the patient, obtaining and/or reviewing separately obtained history, performing a medically appropriate examination and/or evaluation, counseling and educating the patient, documenting in the record. Discussing post healing foot wear  DISCHARGE:      Patient Instructions   Please return on: 1 week - bring your \"new\" insert with to next appointment      Patient discharge and wound care instructions  Adrien Grimm Crista  2/27/2025       Cleansing/dressing:  In clinic, with each dressing change:    please cleanse the limb, foot, and between toes with soap, water and washcloth.   Dry thoroughly.    Cleanse/soak the wound with VASHE (or other hypochlorous wound cleanser), dab dry.    Apply the following dressings:  madyson>silver foam to heel and lateral foot    Moisturizer and xeroform to plantar foot and posterior heel    Offloading:  We have applied a Total Contact cast. If the cast is on your right foot do NOT drive while in cast. Do not get cast wet. If foot becomes painful or numb please call the wound clinic or report to the nearest emergency room to have cast removed.   >PAD ANTERIOR TIBIA  >extra layer of fiberglass on the heel and interdry weaved between the digits    Nutrition and blood sugar control:  Focus  on the following:  Protein: Meats, beans, eggs, milk and yogurt particularly Greek yogurt), tofu, soy nuts, soy protein products (Follow the protein handout in your welcome folder)  Vitamin C: Citrus fruits and juices, strawberries, tomatoes, tomato juice, peppers, baked potatoes, spinach, broccoli, cauliflower, [place] sprouts, cabbage  Vitamin A: Dark green, leafy vegetables, orange or yellow vegetables, cantaloupe, fortified dairy products, liver, fortified cereals  Zinc: Fortified cereals, red meats, seafood  Consider supplementing with Adalberto by [employer] (These are essential branch chain amino acids that help with tissue building and wound healing) and take 2 packets/day. you can order online at [employer] or [employer]    If your blood sugar is consistently elevated your body can't heal and can't fight infection.    Concerns:  Signs of infection may include the following:  Increase in redness  Red \"streaks\" from wound  Increase in swelling  Fever  Unusual odor  Change in the amount of wound drainage     Should you experience any significant changes in your wound(s) or have any questions regarding your home care instructions please contact the wound center [facility] @ [phone] If after regular business hours, please call your family doctor or local emergency room. The treatment plan has been discussed at length between you and your provider. Follow all instructions carefully, it is very important. If you do not follow all instructions you are at risk of your wound not healing, infection, possible loss of limb and even loss of life.   Dennise Sloan FNP-C, CWCN-AP, CFCN, CSWS, WCC, DWC  2/27/2025            [1]   Allergies  Allergen Reactions    Heparin OTHER (SEE COMMENTS)     Per pt platelets is low every time was given to him

## 2025-02-27 ENCOUNTER — OFFICE VISIT (OUTPATIENT)
Dept: WOUND CARE | Facility: HOSPITAL | Age: 45
End: 2025-02-27
Attending: NURSE PRACTITIONER
Payer: COMMERCIAL

## 2025-02-27 VITALS
HEART RATE: 109 BPM | SYSTOLIC BLOOD PRESSURE: 134 MMHG | TEMPERATURE: 99 F | DIASTOLIC BLOOD PRESSURE: 74 MMHG | RESPIRATION RATE: 14 BRPM

## 2025-02-27 DIAGNOSIS — L97.426 DIABETIC ULCER OF LEFT HEEL ASSOCIATED WITH TYPE 2 DIABETES MELLITUS, WITH BONE INVOLVEMENT WITHOUT EVIDENCE OF NECROSIS (HCC): Primary | ICD-10-CM

## 2025-02-27 DIAGNOSIS — E11.42 DIABETIC POLYNEUROPATHY ASSOCIATED WITH TYPE 2 DIABETES MELLITUS (HCC): ICD-10-CM

## 2025-02-27 DIAGNOSIS — E11.51 DIABETES MELLITUS WITH PERIPHERAL ARTERY DISEASE (HCC): ICD-10-CM

## 2025-02-27 DIAGNOSIS — E11.621 DIABETIC ULCER OF LEFT HEEL ASSOCIATED WITH TYPE 2 DIABETES MELLITUS, WITH BONE INVOLVEMENT WITHOUT EVIDENCE OF NECROSIS (HCC): Primary | ICD-10-CM

## 2025-02-27 LAB — GLUCOSE BLD-MCNC: 178 MG/DL (ref 70–99)

## 2025-02-27 PROCEDURE — 99214 OFFICE O/P EST MOD 30 MIN: CPT | Performed by: NURSE PRACTITIONER

## 2025-02-27 NOTE — PATIENT INSTRUCTIONS
Please return on: 1 week - bring your \"new\" insert with to next appointment      Patient discharge and wound care instructions  Adrien Tobin  2/27/2025       Cleansing/dressing:  In clinic, with each dressing change:    please cleanse the limb, foot, and between toes with soap, water and washcloth.   Dry thoroughly.    Cleanse/soak the wound with VASHE (or other hypochlorous wound cleanser), dab dry.    Apply the following dressings:  madyson>silver foam to heel and lateral foot    Moisturizer and xeroform to plantar foot and posterior heel    Offloading:  We have applied a Total Contact cast. If the cast is on your right foot do NOT drive while in cast. Do not get cast wet. If foot becomes painful or numb please call the wound clinic or report to the nearest emergency room to have cast removed.   >PAD ANTERIOR TIBIA  >extra layer of fiberglass on the heel and interdry weaved between the digits    Nutrition and blood sugar control:  Focus on the following:  Protein: Meats, beans, eggs, milk and yogurt particularly Greek yogurt), tofu, soy nuts, soy protein products (Follow the protein handout in your welcome folder)  Vitamin C: Citrus fruits and juices, strawberries, tomatoes, tomato juice, peppers, baked potatoes, spinach, broccoli, cauliflower, [place] sprouts, cabbage  Vitamin A: Dark green, leafy vegetables, orange or yellow vegetables, cantaloupe, fortified dairy products, liver, fortified cereals  Zinc: Fortified cereals, red meats, seafood  Consider supplementing with Adalberto by [employer] (These are essential branch chain amino acids that help with tissue building and wound healing) and take 2 packets/day. you can order online at [employer] or [employer]    If your blood sugar is consistently elevated your body can't heal and can't fight infection.    Concerns:  Signs of infection may include the following:  Increase in redness  Red \"streaks\" from wound  Increase in swelling  Fever  Unusual odor  Change in  the amount of wound drainage     Should you experience any significant changes in your wound(s) or have any questions regarding your home care instructions please contact the [facility] center [facility] @ [phone] If after regular business hours, please call your family doctor or local emergency room. The treatment plan has been discussed at length between you and your provider. Follow all instructions carefully, it is very important. If you do not follow all instructions you are at risk of your wound not healing, infection, possible loss of limb and even loss of life.

## 2025-02-27 NOTE — PROGRESS NOTES
Patient ID: Adrien Tobin is a 44 year old male.    Cast application   Date/Time: 2/27/2025 8:50 AM   Performed by: Dennise Sloan APRN   Authorized by: Dennise Sloan APRN   Consent given by: patient  Site marked: site marked  Injury  Location details: right foot  Procedure  Immobilization: cast  Cast type: total contact  Post-procedure assessment  Patient tolerance: patient tolerated the procedure well with no immediate complications  Comments  Wound dressed with madyson, and silver foam

## 2025-02-27 NOTE — PROGRESS NOTES
.Weekly Wound Education Note    Teaching Provided To: Patient;Family  Training Topics: Dressing;Cleasing and general instructions;Discharge instructions;Off-loading  Training Method: Explain/Verbal;Written  Training Response: Patient responds and understands        Notes: Wounds improving. Continue madyson and silver foam to wounds. Amlactin and xeroform to heel and plantar foot dry areas. Interdry strips between toes. Well padded TCC applied with extra fiberglass layer. Pt supplied madyson.

## 2025-03-05 NOTE — PROGRESS NOTES
CHIEF COMPLAINT:     Chief Complaint   Patient presents with    Wound Care     Arrives for wound care follow-up. TCC in place. Denies new concerns regarding wound or TCC.     HPI:   Information obtained from Patient and chart  2-19-25 RE-EVALUATION: 43yo male with hx of diabetes (a1c 7.5 4-8-24), htn, dyslipidemia, pad, esrd on hd who is well known to the wound clinic for diabetic foot ulcerations.  He presents with his spouse.  He has been most recently following with dr. block for a left heel wound which has been waxing and waning.  Most recent mri showed improvement in the reactive marrow edema of the calcaneous. Patient saw Dr. Najjar 2-12-25 who recommended “If he does not make any progress over the next 2 to 3 weeks then I think it would be reasonable for him to undergo a left lower extremity angiogram with intervention given his multiple risk factors for PAD and evidence of some degree of stenosis on his arterial flow testing.”  Patient has been in a cam boot and been treating with betadine. Will place in TCC for gold standard offloading, if the area does not improve in 3 weeks will get vascular involved again    2-27-25 patient returns.  Patient is tolerating the tcc (week 1). Wounds are improving. The dry skin on plantar foot is improving, he still has built up hyperkeratosis on the posterior heel. Will treat this with ammonium lactate and xeroform as well.  We discussed post healing foot wear. He states that he was doing well with custom shoes/inserts for 4 years.  He was arranging for new inserts/shoes and in that time the heel developed a wound-so he is thinking it was most likely related to his shoes/inserts breaking down.  He does have a \"new\" insert that he has but has not been wearing due to the wound.  I asked him to bring it to clinic next week and we will check it with lipstick to see if it could accommodate the injured foot after healing until he would get new custom ones made.  No s/s of  infection or c/o pain. Continue with poc including tcc.    3-6-25 patient returns. He did bring his shoes/inserts.  We marked both where the wound is as well as the area on the posterior heel that has significant callus build up.  The area on the posterio/plantar heel is not hitting anywhere in the shoe.  The area of the wound (in fact there is not any \"cut out\" offloading noted on the heel of the insole.  We discussed that once we do reach healing will have to monitor closely with transition to his shoe/insert. The plantar wound is slightly smaller-will utilize endoform. The lateral wound is essentially resolved, but dry.  The dry skin posterior heel is improving. No acute s/s of infection of c/o pain (keracis is not approved, epifix still pending. Theraskin is approved)  MEDICATIONS:     Current Outpatient Medications:     levoFLOXacin (LEVAQUIN) 250 MG Oral Tab, Take 1 tablet (250 mg total) by mouth every other day., Disp: 7 tablet, Rfl: 0    apixaban 5 MG Oral Tab, Take 1 tablet (5 mg total) by mouth 2 (two) times daily., Disp: 60 tablet, Rfl: 0    gentamicin 0.1 % External Cream, Apply topically to the ulcer site twice daily, Disp: 30 g, Rfl: 1    clindamycin 300 MG Oral Cap, Take 1 capsule (300 mg total) by mouth 3 (three) times daily., Disp: 30 capsule, Rfl: 0    torsemide 20 MG Oral Tab, Take 1 tablet (20 mg total) by mouth 2 (two) times daily., Disp: 60 tablet, Rfl: 5    cloNIDine 0.3 MG Oral Tab, Take 1 tablet (0.3 mg total) by mouth 3 (three) times daily., Disp: 90 tablet, Rfl: 5    atorvastatin 40 MG Oral Tab, Take 1 tablet (40 mg total) by mouth daily., Disp: , Rfl:     lidocaine-prilocaine 2.5-2.5 % External Cream, Apply 1 Application topically as needed with dialysis (apply to L wrist AVF prior to dialysis)., Disp: 1 each, Rfl: 11    sodium bicarbonate 650 MG Oral Tab, Take 1 tablet (650 mg total) by mouth 2 (two) times daily., Disp: 60 tablet, Rfl: 0    Insulin Pen Needle (BD PEN NEEDLE ODALIS 2ND GEN)  32G X 4 MM Does not apply Misc, USE FOUR TIMES DAILY AS DIRECTED, Disp: 400 each, Rfl: 2    insulin detemir 100 UNIT/ML Subcutaneous Solution Pen-injector, Inject 22 Units into the skin every morning., Disp: , Rfl:     aspirin (ASPIRIN EC LOW DOSE) 81 MG Oral Tab EC, Take 1 tablet (81 mg total) by mouth daily., Disp: 90 tablet, Rfl: 0    amLODIPine 10 MG Oral Tab, Take 1 tablet (10 mg total) by mouth daily., Disp: 90 tablet, Rfl: 0    HUMALOG KWIKPEN 100 UNIT/ML Subcutaneous Solution Pen-injector, SLIDING SCALE  TID with meals MDD 15u (Patient taking differently: SLIDING SCALE  TID with meals MDD 15u), Disp: 15 mL, Rfl: 0    Dulaglutide (TRULICITY) 0.75 MG/0.5ML Subcutaneous Solution Pen-injector, Inject 0.75 mg into the skin once a week. (Patient not taking: Reported on 2/21/2025), Disp: 7 mL, Rfl: 3  ALLERGIES:   Allergies[1]   REVIEW OF SYSTEMS:   This information was obtained from the patient/family and chart.    See HPI for pertinent positives, otherwise 10 pt ROS negative.  HISTORY:   Past medical, surgical, family and social history updated where appropriate.    PHYSICAL EXAM:     Vitals:    03/06/25 0733   BP: 113/72  Comment: blood sugar: 286   Pulse: 103   Resp: 16   Temp: 97.7 °F (36.5 °C)          Estimated body mass index is 31.93 kg/m² as calculated from the following:    Height as of 2/13/25: 68\".    Weight as of 2/20/25: 210 lb (95.3 kg).   POC Glucose   Date Value Ref Range Status   03/06/2025 286 (H) 70 - 99 mg/dL Final   02/27/2025 178 (H) 70 - 99 mg/dL Final   02/21/2025 298 (H) 70 - 99 mg/dL Final       Vital signs reviewed.Appears stated age, well groomed.    Constitutional:  Bp wnl for patient. Pulse Regular and wnl for patient. Respirations easy and unlabored. Temperature wnl. Elevated bmi. Appearance neat and clean. Appears in no acute distress. Well nourished and well developed.    Lower extremity:  dp/pt palpable left. Left lower extremity free of varicosities, stable edema. Capillary  refill < 3 seconds. Digits are warm, but contracted L>R. toenails are wnl for color, thickness and hygeine. Skin is dry. no hairgrowth on legs.  Left  heel contour consistent with s/p multiple surgeries to calcaneus  Musculoskeletal:  Gait and station stable   Integumentary:  refer to wound characteristics and images   Psychiatric:  Judgment and insight intact. Alert and oriented times 3. No evidence of depression, anxiety, or agitation. Calm, cooperative, and communicative.   EDEMA:   Calf  Point of Measurement - Left Calf: 34     Left Calf from:: Heel  Calf Left cm:: 35.6        Ankle  Point of Measurement - Left Ankle: 10     Left Ankle from:: Heel  Left Ankle cm:: 21.5              DIAGNOSTICS:     Lab Results   Component Value Date    BUN 39 (H) 06/25/2024    CREATSERUM 7.07 (H) 06/25/2024    ALB 3.4 05/21/2024    TP 6.9 05/21/2024    A1C 7.5 04/08/2024   2-10-25 MRI left ankle  CONCLUSION:    1. There is no specific evidence of osteomyelitis allowing for signal alteration from susceptibility artifact.  Reactive marrow edema at the plantar aspect of the calcaneus has improved since prior study.  There is no specific evidence of cortical   erosion.   2. Remaining ligamentous and tendinous structures are otherwise intact.       2-5-25 arterial duplex  RIGHT EXTREMITY:  Monophasic waveforms within the posterior tibial artery  LEFT EXTREMITY:  Monophasic to biphasic waveforms  OTHER:  None.  SYSTOLIC VELOCITIES (CM/S):  RIGHT COMMON FEMORAL:      135    RIGHT PROFUNDA FEMORAL:      94      RIGHT SUPERFICIAL FEMORAL PROX:    76  RIGHT SUPERFICIAL FEMORAL MID:    113  RIGHT SUPERFICIAL FEMORAL DISTAL:    101  RIGHT POPLITEAL PROX:      69  RIGHT POPLITEAL DISTAL:      60  TIBIOPER TRUNK:        RIGHT PTA PROX:      9  RIGHT PTA MID:      9  RIGHT PTA DISTAL:      18  RIGHT BRANDON PROX:      55  RIGHT BRANDON MID:      59    RIGHT DORSALIS PEDIS:      30  RIGHT GREAT TOE PRESSURE:      62 mmHg  SYSTOLIC VELOCITIES  (CM./S):  LEFT COMMON FEMORAL:      126    LEFT PROFUNDA FEMORAL:      73      LEFT SUPERFICIAL FEMORAL PROX:    119  LEFT SUPERFICIAL FEMORAL MID:      91  LEFT SUPERFICIAL FEMORAL DISTAL:    103  LEFT POPLITEAL PROX:      142  LEFT POPLITEAL DISTAL:      89  TIBIOPER TRUNK:        LEFT PTA PROX:      18  LEFT PTA MID:      35  LEFT PTA DISTAL:      16  LEFT BRANDON PROX:      118  LEFT BRANDON MID:      130    LEFT DORSALIS PEDIS::      42  LEFT GREAT TOE PRESSURE:      40 mmHg   CONCLUSION:    1. Patent vessels with velocities as described above.  2. Limited flow within the right posterior tibial artery likely demonstrating at least moderate to severe stenosis.  3. At least mild to moderate stenosis within the bilateral superficial femoral arteries.  4. Moderate stenosis within the proximal popliteal arteries bilaterally.  5. If further evaluation is needed, consider CTA or MRA.       WOUND ASSESSMENT:     Wound 02/09/24 Arm Anterior;Left;Lower (Active)   Date First Assessed/Time First Assessed: 02/09/24 0839   Primary Wound Type: Incision  Location: Arm  Wound Location Orientation: Anterior;Left;Lower      Assessments 2/9/2024  9:29 AM 2/9/2024 10:38 AM   Site Assessment -- Unable to assess   Closure Approximated;Sutures;Skin glue --   Drainage Amount -- None   Dressing 4x4s;Tegaderm;Dermabond 4x4s;Tegaderm   Dressing Changed New --   Dressing Status Clean;Dry;Intact Clean;Dry;Intact       No associated orders.       Wound 02/20/25 #3 Heel Left;Plantar (Active)   Date First Assessed/Time First Assessed: 02/20/25 0822    Wound Number (Wound Clinic Only): #3  Primary Wound Type: Diabetic Ulcer  Location: Heel  Wound Location Orientation: Left;Plantar      Assessments 2/20/2025  8:24 AM 3/6/2025  7:52 AM   Wound Image        Drainage Amount Small Small   Drainage Description Serosanguineous Serosanguineous   Treatments Synthetic cast --   Wound Length (cm) 1.1 cm 0.5 cm   Wound Width (cm) 1.4 cm 0.7 cm   Wound Surface Area  (cm^2) 1.54 cm^2 0.35 cm^2   Wound Depth (cm) 0.1 cm 0.1 cm   Wound Volume (cm^3) 0.154 cm^3 0.035 cm^3   Wound Healing % -- 77   Margins Well-defined edges Well-defined edges   Non-staged Wound Description Full thickness Full thickness   Dagmar-wound Assessment Callous;Moist;Maceration;Dry Dry;Moist   Wound Granulation Tissue Red;Pink;Firm Red;Firm   Wound Bed Granulation (%) 100 % 100 %   Wound Odor None None   Tunneling? No No   Undermining? No No   Sinus Tracts? No No   Posadas Scale Grade 2 Grade 2       Inactive Orders   Date Order Priority Status Authorizing Provider   02/27/25 0850 Cast application Routine Completed Dennise Sloan APRN   02/20/25 1446 Cast application Routine Completed Dennise Sloan APRN   02/20/25 0945 Debridement Diabetic Ulcer Left;Plantar Heel Routine Completed Dennise Sloan APRN       Wound 02/20/25 #4 Foot Left;Lateral (Active)   Date First Assessed/Time First Assessed: 02/20/25 0822    Wound Number (Wound Clinic Only): #4  Primary Wound Type: Diabetic Ulcer  Location: Foot  Wound Location Orientation: Left;Lateral      Assessments 2/20/2025  8:25 AM 3/6/2025  7:51 AM   Wound Image        Drainage Amount Scant Small   Drainage Description Serous;Clear Serosanguineous   Treatments Synthetic cast --   Wound Length (cm) 0.4 cm 0.2 cm   Wound Width (cm) 1.4 cm 1 cm   Wound Surface Area (cm^2) 0.56 cm^2 0.2 cm^2   Wound Depth (cm) 0.1 cm 0.1 cm   Wound Volume (cm^3) 0.056 cm^3 0.02 cm^3   Wound Healing % -- 64   Margins Well-defined edges Well-defined edges   Non-staged Wound Description Full thickness Full thickness   Dagmar-wound Assessment Dry;Callous Dry;Moist   Wound Granulation Tissue -- Pink;Firm;Pale Grey   Wound Bed Granulation (%) -- 100 %   Wound Bed Epithelium (%) 10 % --   Wound Bed Eschar (%) 90 % --   Wound Odor None None   Tunneling? No No   Undermining? No No   Sinus Tracts? No No   Posadas Scale Grade 2 Grade 2       Inactive Orders   Date Order Priority Status Authorizing  Provider   02/20/25 1446 Cast application Routine Completed Dennise Sloan APRN   02/20/25 0946 Debridement Diabetic Ulcer Left;Lateral Foot Routine Completed Dennise Sloan APRN            ASSESSMENT AND PLAN:    1. 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 Diabetes mellitus with peripheral artery disease (HCC)    3. Diabetic polyneuropathy associated with type 2 diabetes mellitus (HCC)      Risks, benefits, and alternatives of current treatment plan discussed in detail.  Questions and concerns addressed. Red flags to RTC or ED reviewed.  Patient (or parent) agrees to plan.      NOTE TO PATIENT: The 21st Century Cures Act makes clinical notes like these available to patients in the interest of transparency. Clinical notes are medical documents used by physicians and care providers to communicate with each other. These documents include medical language and terminology, abbreviations, and treatment information that may sound technical and at times possibly unfamiliar. In addition, at times, the verbiage may appear blunt or direct. These documents are one tool providers use to communicate relevant information and clinical opinions of the care providers in a way that allows common understanding of the clinical context.   I spent 40 minutes with the patient. This time included:    preparing to see the patient (eg, review notes and recent diagnostics),  seeing the patient, obtaining and/or reviewing separately obtained history, performing a medically appropriate examination and/or evaluation, counseling and educating the patient, documenting in the record. Marking his insert.  DISCHARGE:      Patient Instructions   Please return on: 1 week       Patient discharge and wound care instructions  Adrien Tobin  3/6/2025          Cleansing/dressing:  In clinic, with each dressing change:    please cleanse the limb, foot, and  between toes with soap, water and washcloth.   Dry thoroughly.    Cleanse/soak the wound with VASHE (or other hypochlorous wound cleanser), dab dry.    Apply the following dressings:  endoform>silver foam to heel      Moisturizer and xeroform to plantar and lateral foot and posterior heel    Offloading:  We have applied a Total Contact cast. If the cast is on your right foot do NOT drive while in cast. Do not get cast wet. If foot becomes painful or numb please call the wound clinic or report to the nearest emergency room to have cast removed.   >PAD ANTERIOR TIBIA  >extra layer of fiberglass on the heel and interdry weaved between the digits    Nutrition and blood sugar control:  Focus on the following:  Protein: Meats, beans, eggs, milk and yogurt particularly Greek yogurt), tofu, soy nuts, soy protein products (Follow the protein handout in your welcome folder)  Vitamin C: Citrus fruits and juices, strawberries, tomatoes, tomato juice, peppers, baked potatoes, spinach, broccoli, cauliflower, [place] sprouts, cabbage  Vitamin A: Dark green, leafy vegetables, orange or yellow vegetables, cantaloupe, fortified dairy products, liver, fortified cereals  Zinc: Fortified cereals, red meats, seafood  Consider supplementing with Adalberto by [employer] (These are essential branch chain amino acids that help with tissue building and wound healing) and take 2 packets/day. you can order online at [employer] or [employer]    If your blood sugar is consistently elevated your body can't heal and can't fight infection.    Concerns:  Signs of infection may include the following:  Increase in redness  Red \"streaks\" from wound  Increase in swelling  Fever  Unusual odor  Change in the amount of wound drainage     Should you experience any significant changes in your wound(s) or have any questions regarding your home care instructions please contact the [facility] @ [phone] If after regular business hours, please call  your family doctor or local emergency room. The treatment plan has been discussed at length between you and your provider. Follow all instructions carefully, it is very important. If you do not follow all instructions you are at risk of your wound not healing, infection, possible loss of limb and even loss of life.   Dennise Sloan FNP-C, CWCN-AP, CFCN, CSWS, WCC, DWC  3/6/2025            [1]   Allergies  Allergen Reactions    Heparin OTHER (SEE COMMENTS)     Per pt platelets is low every time was given to him

## 2025-03-06 ENCOUNTER — OFFICE VISIT (OUTPATIENT)
Dept: WOUND CARE | Facility: HOSPITAL | Age: 45
End: 2025-03-06
Attending: NURSE PRACTITIONER
Payer: COMMERCIAL

## 2025-03-06 VITALS
SYSTOLIC BLOOD PRESSURE: 113 MMHG | HEART RATE: 103 BPM | RESPIRATION RATE: 16 BRPM | DIASTOLIC BLOOD PRESSURE: 72 MMHG | TEMPERATURE: 98 F

## 2025-03-06 DIAGNOSIS — E11.621 DIABETIC ULCER OF LEFT HEEL ASSOCIATED WITH TYPE 2 DIABETES MELLITUS, WITH BONE INVOLVEMENT WITHOUT EVIDENCE OF NECROSIS (HCC): Primary | ICD-10-CM

## 2025-03-06 DIAGNOSIS — E11.42 DIABETIC POLYNEUROPATHY ASSOCIATED WITH TYPE 2 DIABETES MELLITUS (HCC): ICD-10-CM

## 2025-03-06 DIAGNOSIS — L97.426 DIABETIC ULCER OF LEFT HEEL ASSOCIATED WITH TYPE 2 DIABETES MELLITUS, WITH BONE INVOLVEMENT WITHOUT EVIDENCE OF NECROSIS (HCC): Primary | ICD-10-CM

## 2025-03-06 DIAGNOSIS — E11.51 DIABETES MELLITUS WITH PERIPHERAL ARTERY DISEASE (HCC): ICD-10-CM

## 2025-03-06 LAB — GLUCOSE BLD-MCNC: 286 MG/DL (ref 70–99)

## 2025-03-06 PROCEDURE — 99215 OFFICE O/P EST HI 40 MIN: CPT | Performed by: NURSE PRACTITIONER

## 2025-03-06 NOTE — PROGRESS NOTES
Patient ID: Adrien Tobin is a 44 year old male.    Cast application   Date/Time: 3/6/2025 8:56 AM   Performed by: Eileen Keenan RN   Authorized by: Dennise Sloan APRN   Consent given by: patient  Injury  Location details: left foot  Procedure  Immobilization: cast  Cast type: total contact  Post-procedure assessment  Patient tolerance: patient tolerated the procedure well with no immediate complications

## 2025-03-06 NOTE — PATIENT INSTRUCTIONS
Please return on: 1 week       Patient discharge and wound care instructions  Adrien Tobin  3/6/2025          Cleansing/dressing:  In clinic, with each dressing change:    please cleanse the limb, foot, and between toes with soap, water and washcloth.   Dry thoroughly.    Cleanse/soak the wound with VASHE (or other hypochlorous wound cleanser), dab dry.    Apply the following dressings:  endoform>silver foam to heel      Moisturizer and xeroform to plantar and lateral foot and posterior heel    Offloading:  We have applied a Total Contact cast. If the cast is on your right foot do NOT drive while in cast. Do not get cast wet. If foot becomes painful or numb please call the wound clinic or report to the nearest emergency room to have cast removed.   >PAD ANTERIOR TIBIA  >extra layer of fiberglass on the heel and interdry weaved between the digits    Nutrition and blood sugar control:  Focus on the following:  Protein: Meats, beans, eggs, milk and yogurt particularly Greek yogurt), tofu, soy nuts, soy protein products (Follow the protein handout in your welcome folder)  Vitamin C: Citrus fruits and juices, strawberries, tomatoes, tomato juice, peppers, baked potatoes, spinach, broccoli, cauliflower, [place] sprouts, cabbage  Vitamin A: Dark green, leafy vegetables, orange or yellow vegetables, cantaloupe, fortified dairy products, liver, fortified cereals  Zinc: Fortified cereals, red meats, seafood  Consider supplementing with Adalberto by [employer] (These are essential branch chain amino acids that help with tissue building and wound healing) and take 2 packets/day. you can order online at [employer] or [employer]    If your blood sugar is consistently elevated your body can't heal and can't fight infection.    Concerns:  Signs of infection may include the following:  Increase in redness  Red \"streaks\" from wound  Increase in swelling  Fever  Unusual odor  Change in the amount of wound drainage     Should you  experience any significant changes in your wound(s) or have any questions regarding your home care instructions please contact the wound center [facility] @ [phone] If after regular business hours, please call your family doctor or local emergency room. The treatment plan has been discussed at length between you and your provider. Follow all instructions carefully, it is very important. If you do not follow all instructions you are at risk of your wound not healing, infection, possible loss of limb and even loss of life.

## 2025-03-06 NOTE — PROGRESS NOTES
.Weekly Wound Education Note    Teaching Provided To: Patient, Family  Training Topics: Dressing, Cleasing and general instructions, Discharge instructions  Training Method: Explain/Verbal, Written  Training Response: Patient responds and understands        Notes: Wounds improving. Dressing changed to endoform, silver foam and medipore tape to plantar heel. Folded xeroform to lateral foot. Amlactin and xeroform to posterior heel and plantart foot dry areas. Well padded TCC applied with extra fiberglass roll to foot.

## 2025-03-09 PROBLEM — E87.1 HYPONATREMIA: Status: RESOLVED | Noted: 2021-01-25 | Resolved: 2025-03-09

## 2025-03-09 PROBLEM — L97.509 DIABETIC FOOT ULCER (HCC): Status: ACTIVE | Noted: 2018-02-12

## 2025-03-09 PROBLEM — N52.1 ERECTILE DISORDER DUE TO MEDICAL CONDITION IN MALE: Status: RESOLVED | Noted: 2021-01-15 | Resolved: 2025-03-09

## 2025-03-09 PROBLEM — R11.2 INTRACTABLE NAUSEA AND VOMITING: Status: RESOLVED | Noted: 2024-01-04 | Resolved: 2025-03-09

## 2025-03-09 PROBLEM — R03.0 ELEVATED BLOOD PRESSURE READING: Status: RESOLVED | Noted: 2023-11-12 | Resolved: 2025-03-09

## 2025-03-09 PROBLEM — Z79.4 CURRENT USE OF INSULIN (HCC): Status: RESOLVED | Noted: 2021-08-03 | Resolved: 2025-03-09

## 2025-03-09 PROBLEM — N18.4 ANEMIA IN STAGE 4 CHRONIC KIDNEY DISEASE (HCC): Status: RESOLVED | Noted: 2023-11-14 | Resolved: 2025-03-09

## 2025-03-09 PROBLEM — E87.5 HYPERKALEMIA: Status: RESOLVED | Noted: 2021-10-12 | Resolved: 2025-03-09

## 2025-03-09 PROBLEM — N17.9 AKI (ACUTE KIDNEY INJURY): Status: RESOLVED | Noted: 2023-11-12 | Resolved: 2025-03-09

## 2025-03-09 PROBLEM — D63.1 ANEMIA IN STAGE 4 CHRONIC KIDNEY DISEASE (HCC): Status: RESOLVED | Noted: 2023-11-14 | Resolved: 2025-03-09

## 2025-03-09 PROBLEM — E87.29 RESPIRATORY ACIDOSIS: Status: RESOLVED | Noted: 2021-01-25 | Resolved: 2025-03-09

## 2025-03-09 PROBLEM — E11.8 CONTROLLED TYPE 2 DIABETES MELLITUS WITH COMPLICATION, WITHOUT LONG-TERM CURRENT USE OF INSULIN (HCC): Status: RESOLVED | Noted: 2021-10-21 | Resolved: 2025-03-09

## 2025-03-09 PROBLEM — E87.70 HYPERVOLEMIA: Status: RESOLVED | Noted: 2023-11-14 | Resolved: 2025-03-09

## 2025-03-09 PROBLEM — I15.9 SECONDARY HYPERTENSION: Status: RESOLVED | Noted: 2021-01-25 | Resolved: 2025-03-09

## 2025-03-09 PROBLEM — E11.621 DIABETIC FOOT ULCER (HCC): Status: ACTIVE | Noted: 2018-02-12

## 2025-03-09 PROBLEM — E87.20 METABOLIC ACIDOSIS: Status: RESOLVED | Noted: 2024-01-04 | Resolved: 2025-03-09

## 2025-03-09 PROBLEM — R19.7 DIARRHEA, UNSPECIFIED TYPE: Status: RESOLVED | Noted: 2024-01-04 | Resolved: 2025-03-09

## 2025-03-09 PROBLEM — R73.9 HYPERGLYCEMIA: Status: RESOLVED | Noted: 2021-01-25 | Resolved: 2025-03-09

## 2025-03-09 NOTE — PROGRESS NOTES
Chief Complaint   Patient presents with    Diabetes     Follow up        HPI:    43-year-old male with a history of type 2 diabetes- insulin dependent, osteomyelitis of left foot s/p left calcanectomy,  PAD, chronic kidney disease stage V on HD, hypertension presenting today for follow up on his chronic conditions. He has been lost to f/u with me.      In office A1c value was 9.9% done 3/10/2025.     Since his last visit he has declining renal function and is on HD now since June 2024 , HD is MWF.      Specialists:   Ophtho at [facility] retina-  Sees Dr. Dao , will be seeing Dr. Oppenheim this appointment is scheduled.    Nephrology - Dr. Leon office.   Podiatry- Dr. Lewis.   Vascular- Dr. Najjar- possible plan for angiogram depending on wound heeling of left heel.   -- patient has chornic left heel wound due to DM and PAD. MRI recently showed improvement in reactive marrow. He has seen Vascular who may pursue angiogram. He was in cam boot. He has cast now.   DM eye exam: scheduled with Dr. Oppenheim   Morning blood sugars: pt got a new monitor. His previous one was reading low in the 100s. At wound care glucose >250. He now has a new monitor and checking BS in the AM and mealtime.   Current rx: he ran out of levemir since 1 week. Humalog he is taking 5-6 u TID mealtime, levemir he was taking 11u AM and 11u PM. He ran out of trulicity- he can't remmeber when he ran out and never followed up. He says he did not tolerate 1.5mg of trulicity well but did well with 0.75mg trulicity. He says blood sugars at home were in the low 100s so he never requested trulicity refill. Patient was seeing Dr. Waller then stopped.        Lab Results   Component Value Date    A1C 9.9 (A) 03/10/2025    A1C 7.5 04/08/2024    A1C 5.5 10/18/2023    A1C 6.4 (H) 06/17/2022    A1C 5.8 (A) 01/26/2022          Hx of multiple surgeries due to left foot osteomyelitis, sees podiatrist regulary. He develoepd a blister while using boot. Now he is  cast for 1.5 weeks. Seeing wound care.last week when cast was removed and that helped. Denies pain.       Pertinent negatives for diabetes include no blurred vision, no chest pain, no fatigue, n no polydipsia, no polyphagia, no polyuria     HTN- Patient presents for follow-up of elevated blood pressure. He is no longer on antihypertensives due to low bp. Of note, he was on eliquis due to positive HIT- he took it for a month but no longer on it.  Patient denies: chest pain, dyspnea, exertional chest pressure/discomfort, orthopnea and paroxysmal nocturnal dyspnea.      Hyperlipidemia: patient has history of high cholesterol and is taking lipitor 40mg daily.  he denies chest pain, abdominal pain, muscle soreness, weakness, fatigue, history of cardiac disease.     hx of anemia due to CKD V.    Colonscopy Jan 2024- adenomas noted repeat due in 2027.          Review of Systems   Constitutional: Negative for fever, chills . No distress.  HENT: Negative for hearing loss, congestion, sore throat   Eyes: Negative for pain and visual disturbance.   Respiratory: Negative for cough, chest tightness, shortness of breath and wheezing.    Cardiovascular: Negative for chest pain, palpitations   Gastrointestinal: Negative for nausea, vomiting, abdominal pain, diarrhea  Genitourinary: Negative for dysuria, hematuria and difficulty urinating.       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, left leg (HCC)    PAD (peripheral artery disease)    Diabetic foot ulcer (HCC)    Anemia    Acute on chronic congestive heart failure, unspecified heart failure type (HCC)    Other fatigue    Stage 5 chronic kidney  disease not on chronic dialysis (HCC)    Long-term insulin use (HCC)       Past Medical History:    CKD (chronic kidney disease)    Diabetes (HCC)    Elevated liver enzymes    Elevated serum GGT level    High blood pressure    High cholesterol    History of blood transfusion    2023 November    Hyperlipidemia    HYPERTRIGLYCERIDEMIA    Intractable nausea and vomiting    OBESITY    Obesity    Pneumonia due to organism    40 years ago    PONV (postoperative nausea and vomiting)    Renal disorder    Visual impairment    GLASSES     Past Surgical History:   Procedure Laterality Date    Arthrotomy/explore/treat knee joint Left     DONE TWICE    Cholecystectomy      Colonoscopy N/A 1/23/2024    Procedure: COLONOSCOPY with hot snare polypectomy;  Surgeon: Albino Hernandez MD;  Location:  ENDOSCOPY    Debride wound Left     HEEL    Foot surgery      Other surgical history  Knee & Foot     Family History   Problem Relation Age of Onset    Diabetes Father     Heart Disorder Father     Diabetes Mother     Hypertension Sister      Social History     Socioeconomic History    Marital status:    Tobacco Use    Smoking status: Never     Passive exposure: Never    Smokeless tobacco: Never   Vaping Use    Vaping status: Never Used   Substance and Sexual Activity    Alcohol use: Yes     Alcohol/week: 1.0 standard drink of alcohol     Types: 1 Standard drinks or equivalent per week     Comment: socially    Drug use: No   Other Topics Concern    Caffeine Concern No    Exercise No    Seat Belt Yes    Special Diet Yes    Stress Concern No    Weight Concern No     Social Drivers of Health     Food Insecurity: No Food Insecurity (11/5/2024)    Received from [facility]    Food Insecurity     Currently or in the past 3 months, have you worried your food would run out before you had money to buy more?: No     In the past 12 months, have you run out of food or been unable to get more?: No   Transportation Needs:  No Transportation Needs (11/5/2024)    Received from [facility]    Transportation Needs     Currently or in the past 3 months, has lack of transportation kept you from medical appointments, getting food or medicine, or providing care to a family member?: Unrecognized value     Medical Transportation Needs?: No   Housing Stability: Low Risk  (1/4/2024)    Housing Stability     Housing Instability: No       Current Outpatient Medications   Medication Sig Dispense Refill    Insulin Lispro, 1 Unit Dial, (HUMALOG KWIKPEN) 100 UNIT/ML Subcutaneous Solution Pen-injector Test blood glucose 3 times daily BEFORE meals Inject 2 unit if blood glucose is between 141-180 mg/dL Inject 3 units if blood glucose is between 181-220 mg/dL Inject 6 units if blood glucose is between 221-260 mg/dL Inject 8 units if blood glucose is between 261-300 mg/dL Inject 10 units if blood glucose is between 301-350 mg/dL Call your physician if blood glucose is greater than 351 mg/dL, 5 each 3    Dulaglutide (TRULICITY) 0.75 MG/0.5ML Subcutaneous Solution Auto-injector Inject 0.75 mg into the skin once a week. 6 mL 0    insulin detemir 100 UNIT/ML Subcutaneous Solution Pen-injector Inject 22 Units into the skin nightly. 19.8 mL 0    Continuous Glucose  (DEXCOM G7 ) Does not apply Device USE DAILY. 1 each 0    Continuous Glucose Sensor (DEXCOM G7 SENSOR) Does not apply Misc 1 each Every 10 days. Use as directed every 10 days 3 each 11    atorvastatin 40 MG Oral Tab Take 1 tablet (40 mg total) by mouth daily.      Insulin Pen Needle (BD PEN NEEDLE ODALIS 2ND GEN) 32G X 4 MM Does not apply Misc USE FOUR TIMES DAILY AS DIRECTED 400 each 2    aspirin (ASPIRIN EC LOW DOSE) 81 MG Oral Tab EC Take 1 tablet (81 mg total) by mouth daily. 90 tablet 0    Dulaglutide (TRULICITY) 0.75 MG/0.5ML Subcutaneous Solution Pen-injector Inject 0.75 mg into the skin once a week. (Patient not taking: Reported on 3/10/2025) 7 mL 3        Allergies  Allergies   Allergen Reactions    Heparin OTHER (SEE COMMENTS)     Per pt platelets is low every time was given to him        Health Maintenance  Immunizations:  Immunization History   Administered Date(s) Administered    Covid-19 Vaccine Pfizer 30 mcg/0.3 ml 03/01/2021, 03/27/2021, 04/17/2021, 11/09/2021    FLULAVAL 6 months & older 0.5 ml Prefilled syringe (64211) 11/17/2017, 10/31/2018, 01/15/2021, 10/22/2021, 11/03/2022    FLUZONE 6 months and older PFS 0.5 ml (38397) 10/20/2023    Flucelvax Influenza vaccine, trivalent (ccIIV3), 0.5mL IM 10/11/2024    HEP B, Adult 06/20/2024, 07/17/2024, 08/21/2024, 10/16/2024    Influenza 11/12/2010, 10/28/2011, 12/06/2012    Influenza Vaccine, Preserv Free 11/21/2012    Pneumococcal Conjugate PCV20 10/20/2023    Pneumovax 23 12/08/2017    TDAP 11/17/2017         Physical Exam  /70   Pulse 100   Temp 97.2 °F (36.2 °C) (Temporal)   Resp 16   Ht 5' 8\" (1.727 m)   Wt 216 lb 0.8 oz (98 kg)   SpO2 100%   BMI 32.85 kg/m²   Constitutional: Oriented to person, place, and time. No distress.   HEENT:  Normocephalic and atraumatic.    Cardiovascular: Normal rate, regular rhythm and intact distal pulses.  No murmur, rubs or gallops.   Pulmonary/Chest: Effort normal and breath sounds normal. No respiratory distress. No wheezes, rhonchi or rales  Abdominal: Soft. Bowel sounds are normal. Non tender, no masses, no organomegaly or hernias.  Psychiatric: Normal mood and affect.   Foot exam deferred- following podiatry and wound clinic.          A/P:     1. Essential hypertension  Resolved. No longer on antihypertensive.     2. PAD (peripheral artery disease)  Following Dr. Najjar.   Patient on asa and statin.   He possibly may undergo angiogram LLE due to slow wound healing and PAD    3. Uncontrolled type 2 diabetes mellitus with hyperglycemia (HCC)  Diabetes: On Insulin.   A1c is 9.9 done 3/10/2025 which shows frequent hyperglycemia and poor control. Emphasized  compliance with 3 month follow ups, medication compliance.  Encouraged better dietary choices and portion control, and increased activity and med changes as listed.  Diabetic Medications: DM Meds: insulin detemir Sopn - 100 UNIT/ML; Insulin Lispro (1 Unit Dial) Sopn - 100 UNIT/ML; Trulicity Soaj - 0.75 MG/0.5ML; Trulicity Sopn - 0.75 MG/0.5ML  I have resumed his trulicity at 0.75mg weekly  Levemir 22u at night. Titration instructionx exaplined and written on AVS.   Establish with Endocrine. Referral given  Placed order for CGM and teaching for CGM   Diabetic Complication(s): Hyperglycemia: A1c 9.9% 3/10/2025, GLU 82.  ESRD in problem list/past history.  Kidney complications: CKD select appropriate stage: ESRD <15 (ADD Z99.2 DIALYSIS STATUS) and Ophthalmic complications: Background retinopathy Diabetes is improving/stable/worsening : worsening Reminded to bring in blood sugar diary at next visit.  Reminded to get yearly retinal exam. Reassess Diabetes in : in 3 months    - CBC With Differential With Platelet; Future  - Comp Metabolic Panel (14); Future  - Lipid Panel; Future  - TSH W Reflex To Free T4; Future  - Vitamin B12; Future  - Vitamin D; Future  - POC Hemoglobin A1C  - Microalb/Creat Ratio, Random Urine [E]; Future  - CMP in 3 months; Future  - Lipid in 3 months; Future  - Hemoglobin A1C in 3 months; Future  - Microalb/Creat Ratio, Random Urine in 3 months; Future  - Diabetes education - Continuous Glucose Monitoring  - Insulin Lispro, 1 Unit Dial, (HUMALOG KWIKPEN) 100 UNIT/ML Subcutaneous Solution Pen-injector; Test blood glucose 3 times daily BEFORE meals Inject 2 unit if blood glucose is between 141-180 mg/dL Inject 3 units if blood glucose is between 181-220 mg/dL Inject 6 units if blood glucose is between 221-260 mg/dL Inject 8 units if blood glucose is between 261-300 mg/dL Inject 10 units if blood glucose is between 301-350 mg/dL Call your physician if blood glucose is greater than 351 mg/dL,   Dispense: 5 each; Refill: 3  - Dulaglutide (TRULICITY) 0.75 MG/0.5ML Subcutaneous Solution Auto-injector; Inject 0.75 mg into the skin once a week.  Dispense: 6 mL; Refill: 0    4. Stage 5 chronic kidney disease not on chronic dialysis ([facility])    - Endocrine Referral - In Network    5. Dyslipidemia Long term heart-healthy diet and lifestyle discussed and encouraged to reduce risk of cardiovascular disease.  10/18/2023: Cholesterol, Total 202 (H); HDL Cholesterol 29 (L); Triglycerides 110; LDL Cholesterol 153 (H)  Cholesterol Meds: atorvastatin Tabs - 40 MG        6. Vitamin D deficiency  Vit d level ordered.     7. Elevated liver enzymes  Cmp ordered.     8. Anemia due to chronic kidney disease, on chronic dialysis ([facility])    9. HIT (heparin-induced thrombocytopenia) ([facility])  Pt is off eliquis now due to increased risk of bleeding.     10. Long-term insulin use ([facility])  - insulin detemir 100 UNIT/ML Subcutaneous Solution Pen-injector; Inject 22 Units into the skin nightly.  Dispense: 19.8 mL; Refill: 0    11. Severe nonproliferative diabetic retinopathy of both eyes associated with type 2 diabetes mellitus, macular edema presence unspecified ([facility])   Ophtho appt scheduled.          Orders Placed This Encounter   Procedures    CBC With Differential With Platelet    Comp Metabolic Panel (14)    Lipid Panel    TSH W Reflex To Free T4    Vitamin B12    Vitamin D    POC Hemoglobin A1C    Microalb/Creat Ratio, Random Urine [E]    CMP in 3 months    Lipid in 3 months    Hemoglobin A1C in 3 months    Microalb/Creat Ratio, Random Urine in 3 months       Meds & Refills for this Visit:  Requested Prescriptions     Signed Prescriptions Disp Refills    Insulin Lispro, 1 Unit Dial, (HUMALOG KWIKPEN) 100 UNIT/ML Subcutaneous Solution Pen-injector 5 each 3     Sig: Test blood glucose 3 times daily BEFORE meals Inject 2 unit if blood glucose is between 141-180 mg/dL Inject 3 units if blood glucose is between 181-220 mg/dL Inject 6 units if  blood glucose is between 221-260 mg/dL Inject 8 units if blood glucose is between 261-300 mg/dL Inject 10 units if blood glucose is between 301-350 mg/dL Call your physician if blood glucose is greater than 351 mg/dL,    Dulaglutide (TRULICITY) 0.75 MG/0.5ML Subcutaneous Solution Auto-injector 6 mL 0     Sig: Inject 0.75 mg into the skin once a week.    insulin detemir 100 UNIT/ML Subcutaneous Solution Pen-injector 19.8 mL 0     Sig: Inject 22 Units into the skin nightly.    Continuous Glucose  (DEXCOM G7 ) Does not apply Device 1 each 0     Sig: USE DAILY.    Continuous Glucose Sensor (DEXCOM G7 SENSOR) Does not apply Misc 3 each 11     Si each Every 10 days. Use as directed every 10 days       Imaging & Consults:  ENDOCRINOLOGY - INTERNAL  OP REFERRAL CONTINUOUS GLUCOSE MONITORING 2 HRS      No follow-ups on file.    Patient Instructions   START TAKING TRULICITY WEEKLY INJECTION     Using LEVEMIR Insulin, CHANGE DOSING EVERY 2-3 DAYS   Check blood sugar every morning  -if bs is greater than 200, increase by 5u  -if bs is 151-200, increase by 3u  -if bs is 100-150, no change in dose  -if bs is less than 100, lower dose by 5u              All questions were answered and the patient understands the plan.       40 minutes were spent face to face with the patient in which time was spent  preparing to see patient (including chart review and preparation), obtaining and or reviewing additional medical history, performing a physical exam and evaluation, documenting clinical information in the electronic health record, independently interpreting results, communicating results to family or caregiver, and/or coordinating care.

## 2025-03-10 ENCOUNTER — OFFICE VISIT (OUTPATIENT)
Dept: FAMILY MEDICINE CLINIC | Facility: CLINIC | Age: 45
End: 2025-03-10
Payer: COMMERCIAL

## 2025-03-10 VITALS
OXYGEN SATURATION: 100 % | HEIGHT: 68 IN | BODY MASS INDEX: 32.74 KG/M2 | RESPIRATION RATE: 16 BRPM | DIASTOLIC BLOOD PRESSURE: 70 MMHG | HEART RATE: 100 BPM | TEMPERATURE: 97 F | SYSTOLIC BLOOD PRESSURE: 120 MMHG | WEIGHT: 216.06 LBS

## 2025-03-10 DIAGNOSIS — R74.8 ELEVATED LIVER ENZYMES: ICD-10-CM

## 2025-03-10 DIAGNOSIS — E55.9 VITAMIN D DEFICIENCY: ICD-10-CM

## 2025-03-10 DIAGNOSIS — E11.65 UNCONTROLLED TYPE 2 DIABETES MELLITUS WITH HYPERGLYCEMIA (HCC): Primary | ICD-10-CM

## 2025-03-10 DIAGNOSIS — N18.5 STAGE 5 CHRONIC KIDNEY DISEASE NOT ON CHRONIC DIALYSIS (HCC): ICD-10-CM

## 2025-03-10 DIAGNOSIS — Z79.4 LONG-TERM INSULIN USE (HCC): ICD-10-CM

## 2025-03-10 DIAGNOSIS — Z99.2 ANEMIA DUE TO CHRONIC KIDNEY DISEASE, ON CHRONIC DIALYSIS (HCC): ICD-10-CM

## 2025-03-10 DIAGNOSIS — I10 ESSENTIAL HYPERTENSION: ICD-10-CM

## 2025-03-10 DIAGNOSIS — D75.829 HIT (HEPARIN-INDUCED THROMBOCYTOPENIA) (HCC): ICD-10-CM

## 2025-03-10 DIAGNOSIS — E78.5 DYSLIPIDEMIA: ICD-10-CM

## 2025-03-10 DIAGNOSIS — N18.6 ANEMIA DUE TO CHRONIC KIDNEY DISEASE, ON CHRONIC DIALYSIS (HCC): ICD-10-CM

## 2025-03-10 DIAGNOSIS — E11.3493 SEVERE NONPROLIFERATIVE DIABETIC RETINOPATHY OF BOTH EYES ASSOCIATED WITH TYPE 2 DIABETES MELLITUS, MACULAR EDEMA PRESENCE UNSPECIFIED (HCC): ICD-10-CM

## 2025-03-10 DIAGNOSIS — I73.9 PAD (PERIPHERAL ARTERY DISEASE): ICD-10-CM

## 2025-03-10 DIAGNOSIS — D63.1 ANEMIA DUE TO CHRONIC KIDNEY DISEASE, ON CHRONIC DIALYSIS (HCC): ICD-10-CM

## 2025-03-10 LAB — HEMOGLOBIN A1C: 9.9 % (ref 4.3–5.6)

## 2025-03-10 RX ORDER — DULAGLUTIDE 0.75 MG/.5ML
0.75 INJECTION, SOLUTION SUBCUTANEOUS WEEKLY
Qty: 6 ML | Refills: 0 | Status: SHIPPED | OUTPATIENT
Start: 2025-03-10 | End: 2025-09-06

## 2025-03-10 RX ORDER — ACYCLOVIR 400 MG/1
1 TABLET ORAL
Qty: 3 EACH | Refills: 11 | Status: SHIPPED | OUTPATIENT
Start: 2025-03-10

## 2025-03-10 RX ORDER — INSULIN LISPRO 100 [IU]/ML
INJECTION, SOLUTION INTRAVENOUS; SUBCUTANEOUS
Qty: 5 EACH | Refills: 3 | Status: SHIPPED | OUTPATIENT
Start: 2025-03-10

## 2025-03-10 RX ORDER — ACYCLOVIR 400 MG/1
TABLET ORAL
Qty: 1 EACH | Refills: 0 | Status: SHIPPED | OUTPATIENT
Start: 2025-03-10

## 2025-03-10 NOTE — PATIENT INSTRUCTIONS
START TAKING TRULICITY WEEKLY INJECTION     Using LEVEMIR Insulin, CHANGE DOSING EVERY 2-3 DAYS   Check blood sugar every morning  -if bs is greater than 200, increase by 5u  -if bs is 151-200, increase by 3u  -if bs is 100-150, no change in dose  -if bs is less than 100, lower dose by 5u

## 2025-03-11 ENCOUNTER — TELEPHONE (OUTPATIENT)
Dept: FAMILY MEDICINE CLINIC | Facility: CLINIC | Age: 45
End: 2025-03-11

## 2025-03-11 RX ORDER — INSULIN GLARGINE 100 [IU]/ML
22 INJECTION, SOLUTION SUBCUTANEOUS NIGHTLY
Qty: 39.6 ML | Refills: 0 | Status: SHIPPED | OUTPATIENT
Start: 2025-03-11 | End: 2025-09-07

## 2025-03-11 NOTE — TELEPHONE ENCOUNTER
[facility] sent. Notify me if this goes through. It is imperative that he gets his insulin as soon as possible.

## 2025-03-11 NOTE — TELEPHONE ENCOUNTER
Pharmacy sent fax stating Levemir Flex Pen is discontinued - need alternative.     To pcp for review

## 2025-03-12 ENCOUNTER — PATIENT MESSAGE (OUTPATIENT)
Dept: FAMILY MEDICINE CLINIC | Facility: CLINIC | Age: 45
End: 2025-03-12

## 2025-03-12 NOTE — PROGRESS NOTES
CHIEF COMPLAINT:     Chief Complaint   Patient presents with    Wound Care     Patient arrives for a wound care follow up appointment. Patient arrives with a Total Contact Cast. Patient reports no issues with the cast and no pain at this time. There is xeroform, endoform and silver foam to the wounds. Patient expresses he has no concerns at this time.      HPI:   Information obtained from Patient and chart  2-19-25 RE-EVALUATION: 43yo male with hx of diabetes (a1c 7.5 4-8-24), htn, dyslipidemia, pad, esrd on hd who is well known to the wound clinic for diabetic foot ulcerations.  He presents with his spouse.  He has been most recently following with dr. block for a left heel wound which has been waxing and waning.  Most recent mri showed improvement in the reactive marrow edema of the calcaneous. Patient saw Dr. Najjar 2-12-25 who recommended “If he does not make any progress over the next 2 to 3 weeks then I think it would be reasonable for him to undergo a left lower extremity angiogram with intervention given his multiple risk factors for PAD and evidence of some degree of stenosis on his arterial flow testing.”  Patient has been in a cam boot and been treating with betadine. Will place in TCC for gold standard offloading, if the area does not improve in 3 weeks will get vascular involved again    2-27-25 patient returns.  Patient is tolerating the tcc (week 1). Wounds are improving. The dry skin on plantar foot is improving, he still has built up hyperkeratosis on the posterior heel. Will treat this with ammonium lactate and xeroform as well.  We discussed post healing foot wear. He states that he was doing well with custom shoes/inserts for 4 years.  He was arranging for new inserts/shoes and in that time the heel developed a wound-so he is thinking it was most likely related to his shoes/inserts breaking down.  He does have a \"new\" insert that he has but has not been wearing due to the wound.  I asked him to  bring it to clinic next week and we will check it with lipstick to see if it could accommodate the injured foot after healing until he would get new custom ones made.  No s/s of infection or c/o pain. Continue with poc including tcc.    3-6-25 patient returns. He did bring his shoes/inserts.  We marked both where the wound is as well as the area on the posterior heel that has significant callus build up.  The area on the posterio/plantar heel is not hitting anywhere in the shoe.  The area of the wound (in fact there is not any \"cut out\" offloading noted on the heel of the insole.  We discussed that once we do reach healing will have to monitor closely with transition to his shoe/insert. The plantar wound is slightly smaller-will utilize endoform. The lateral wound is essentially resolved, but dry.  The dry skin posterior heel is improving. No acute s/s of infection of c/o pain (keracis is not approved, epifix still pending. Theraskin is approved)    3-13-25 patient returns.  Patient followed up with primary it was noted that his A1c was now 9.9 done on 3-10-25, patient given a referral for endocrine and he was to resume his trulicity and order placed for cgm. He states this this at home moniitor was not working and he did not realize it until he started coming to wound clinic.  He has continued to be in tcc.  Epifix request got cancelled, will rerun.  Patients wound on plantar aspect is slightly smaller, the lateral fooot is healed, but there is a wound on the posterior heel (which was under the thick dry callus).  No s/s of infection or c/o pain.  Continue with tcc  MEDICATIONS:     Current Outpatient Medications:     insulin glargine (LANTUS SOLOSTAR) 100 UNIT/ML Subcutaneous Solution Pen-injector, Inject 22 Units into the skin nightly., Disp: 39.6 mL, Rfl: 0    Insulin Lispro, 1 Unit Dial, (HUMALOG KWIKPEN) 100 UNIT/ML Subcutaneous Solution Pen-injector, Test blood glucose 3 times daily BEFORE meals Inject 2 unit  if blood glucose is between 141-180 mg/dL Inject 3 units if blood glucose is between 181-220 mg/dL Inject 6 units if blood glucose is between 221-260 mg/dL Inject 8 units if blood glucose is between 261-300 mg/dL Inject 10 units if blood glucose is between 301-350 mg/dL Call your physician if blood glucose is greater than 351 mg/dL,, Disp: 5 each, Rfl: 3    Dulaglutide (TRULICITY) 0.75 MG/0.5ML Subcutaneous Solution Auto-injector, Inject 0.75 mg into the skin once a week., Disp: 6 mL, Rfl: 0    insulin detemir 100 UNIT/ML Subcutaneous Solution Pen-injector, Inject 22 Units into the skin nightly., Disp: 19.8 mL, Rfl: 0    Continuous Glucose  (DEXCOM G7 ) Does not apply Device, USE DAILY., Disp: 1 each, Rfl: 0    Continuous Glucose Sensor (DEXCOM G7 SENSOR) Does not apply Misc, 1 each Every 10 days. Use as directed every 10 days, Disp: 3 each, Rfl: 11    atorvastatin 40 MG Oral Tab, Take 1 tablet (40 mg total) by mouth daily., Disp: , Rfl:     Insulin Pen Needle (BD PEN NEEDLE ODALIS 2ND GEN) 32G X 4 MM Does not apply Misc, USE FOUR TIMES DAILY AS DIRECTED, Disp: 400 each, Rfl: 2    aspirin (ASPIRIN EC LOW DOSE) 81 MG Oral Tab EC, Take 1 tablet (81 mg total) by mouth daily., Disp: 90 tablet, Rfl: 0    Dulaglutide (TRULICITY) 0.75 MG/0.5ML Subcutaneous Solution Pen-injector, Inject 0.75 mg into the skin once a week. (Patient not taking: Reported on 3/10/2025), Disp: 7 mL, Rfl: 3  ALLERGIES:   Allergies[1]   REVIEW OF SYSTEMS:   This information was obtained from the patient/family and chart.    See HPI for pertinent positives, otherwise 10 pt ROS negative.  HISTORY:   Past medical, surgical, family and social history updated where appropriate.    PHYSICAL EXAM:     Vitals:    03/13/25 0729   BP: 97/59  Comment: blood sugar 285   Pulse: 112   Resp: 14   Temp: 97.7 °F (36.5 °C)            Estimated body mass index is 32.85 kg/m² as calculated from the following:    Height as of 3/10/25: 68\".    Weight as of  3/10/25: 216 lb 0.8 oz (98 kg).   POC Glucose   Date Value Ref Range Status   03/13/2025 285 (H) 70 - 99 mg/dL Final   03/06/2025 286 (H) 70 - 99 mg/dL Final   02/27/2025 178 (H) 70 - 99 mg/dL Final       Vital signs reviewed.Appears stated age, well groomed.    Constitutional:  Bp wnl for patient. Pulse Regular and wnl for patient. Respirations easy and unlabored. Temperature wnl. Elevated bmi. Appearance neat and clean. Appears in no acute distress. Well nourished and well developed.    Lower extremity:  dp/pt palpable left. Left lower extremity free of varicosities, stable edema. Capillary refill < 3 seconds. Digits are warm, but contracted L>R. toenails are wnl for color, thickness and hygeine. Skin is dry. no hairgrowth on legs.  Left  heel contour consistent with s/p multiple surgeries to calcaneus  Musculoskeletal:  Gait and station stable   Integumentary:  refer to wound characteristics and images   Psychiatric:  Judgment and insight intact. Alert and oriented times 3. No evidence of depression, anxiety, or agitation. Calm, cooperative, and communicative.   EDEMA:   Calf  Point of Measurement - Left Calf: 34     Left Calf from:: Heel  Calf Left cm:: 34.5        Ankle  Point of Measurement - Left Ankle: 10     Left Ankle from:: Heel  Left Ankle cm:: 21              DIAGNOSTICS:     Lab Results   Component Value Date    BUN 39 (H) 06/25/2024    CREATSERUM 7.07 (H) 06/25/2024    ALB 3.4 05/21/2024    TP 6.9 05/21/2024    A1C 9.9 (A) 03/10/2025   2-10-25 MRI left ankle  CONCLUSION:    1. There is no specific evidence of osteomyelitis allowing for signal alteration from susceptibility artifact.  Reactive marrow edema at the plantar aspect of the calcaneus has improved since prior study.  There is no specific evidence of cortical   erosion.   2. Remaining ligamentous and tendinous structures are otherwise intact.       2-5-25 arterial duplex  RIGHT EXTREMITY:  Monophasic waveforms within the posterior tibial  artery  LEFT EXTREMITY:  Monophasic to biphasic waveforms  OTHER:  None.  SYSTOLIC VELOCITIES (CM/S):  RIGHT COMMON FEMORAL:      135    RIGHT PROFUNDA FEMORAL:      94      RIGHT SUPERFICIAL FEMORAL PROX:    76  RIGHT SUPERFICIAL FEMORAL MID:    113  RIGHT SUPERFICIAL FEMORAL DISTAL:    101  RIGHT POPLITEAL PROX:      69  RIGHT POPLITEAL DISTAL:      60  TIBIOPER TRUNK:        RIGHT PTA PROX:      9  RIGHT PTA MID:      9  RIGHT PTA DISTAL:      18  RIGHT BRANDON PROX:      55  RIGHT BRANDON MID:      59    RIGHT DORSALIS PEDIS:      30  RIGHT GREAT TOE PRESSURE:      62 mmHg  SYSTOLIC VELOCITIES (CM./S):  LEFT COMMON FEMORAL:      126    LEFT PROFUNDA FEMORAL:      73      LEFT SUPERFICIAL FEMORAL PROX:    119  LEFT SUPERFICIAL FEMORAL MID:      91  LEFT SUPERFICIAL FEMORAL DISTAL:    103  LEFT POPLITEAL PROX:      142  LEFT POPLITEAL DISTAL:      89  TIBIOPER TRUNK:        LEFT PTA PROX:      18  LEFT PTA MID:      35  LEFT PTA DISTAL:      16  LEFT BRANDON PROX:      118  LEFT BRANDON MID:      130    LEFT DORSALIS PEDIS::      42  LEFT GREAT TOE PRESSURE:      40 mmHg   CONCLUSION:    1. Patent vessels with velocities as described above.  2. Limited flow within the right posterior tibial artery likely demonstrating at least moderate to severe stenosis.  3. At least mild to moderate stenosis within the bilateral superficial femoral arteries.  4. Moderate stenosis within the proximal popliteal arteries bilaterally.  5. If further evaluation is needed, consider CTA or MRA.       WOUND ASSESSMENT:     Wound 02/09/24 Arm Anterior;Left;Lower (Active)   Date First Assessed/Time First Assessed: 02/09/24 0839   Primary Wound Type: Incision  Location: Arm  Wound Location Orientation: Anterior;Left;Lower      Assessments 2/9/2024  9:29 AM 2/9/2024 10:38 AM   Site Assessment -- Unable to assess   Closure Approximated;Sutures;Skin glue --   Drainage Amount -- None   Dressing 4x4s;Tegaderm;Dermabond 4x4s;Tegaderm   Dressing Changed New --    Dressing Status Clean;Dry;Intact Clean;Dry;Intact       No associated orders.       Wound 02/20/25 #3 Heel Left;Plantar (Active)   Date First Assessed/Time First Assessed: 02/20/25 0822    Wound Number (Wound Clinic Only): #3  Primary Wound Type: Diabetic Ulcer  Location: Heel  Wound Location Orientation: Left;Plantar      Assessments 2/20/2025  8:24 AM 3/13/2025  7:36 AM   Wound Image        Drainage Amount Small Moderate   Drainage Description Serosanguineous Serosanguineous   Treatments Synthetic cast --   Wound Length (cm) 1.1 cm 0.4 cm   Wound Width (cm) 1.4 cm 0.6 cm   Wound Surface Area (cm^2) 1.54 cm^2 0.24 cm^2   Wound Depth (cm) 0.1 cm 0.2 cm   Wound Volume (cm^3) 0.154 cm^3 0.048 cm^3   Wound Healing % -- 69   Margins Well-defined edges Well-defined edges   Non-staged Wound Description Full thickness Full thickness   Dagmar-wound Assessment Callous;Moist;Maceration;Dry Dry   Wound Granulation Tissue Red;Pink;Firm Firm;Pink   Wound Bed Granulation (%) 100 % 100 %   Wound Odor None None   Tunneling? No No   Undermining? No No   Sinus Tracts? No No   Posadas Scale Grade 2 Grade 2       Inactive Orders   Date Order Priority Status Authorizing Provider   03/06/25 0856 Cast application Routine Completed Dennise Sloan APRN   02/27/25 0850 Cast application Routine Completed Dennise Sloan APRN   02/20/25 1446 Cast application Routine Completed Dennise Sloan APRN   02/20/25 0945 Debridement Diabetic Ulcer Left;Plantar Heel Routine Completed Dennise Sloan APRN       Wound 02/20/25 #4 Foot Left;Lateral (Active)   Date First Assessed/Time First Assessed: 02/20/25 0822    Wound Number (Wound Clinic Only): #4  Primary Wound Type: Diabetic Ulcer  Location: Foot  Wound Location Orientation: Left;Lateral      Assessments 2/20/2025  8:25 AM 3/13/2025  7:34 AM   Wound Image        Drainage Amount Scant None   Drainage Description Serous;Clear --   Treatments Synthetic cast --   Wound Length (cm) 0.4 cm 0.1 cm   Wound  Width (cm) 1.4 cm 0.1 cm   Wound Surface Area (cm^2) 0.56 cm^2 0.01 cm^2   Wound Depth (cm) 0.1 cm 0.1 cm   Wound Volume (cm^3) 0.056 cm^3 0.001 cm^3   Wound Healing % -- 98   Margins Well-defined edges Well-defined edges   Non-staged Wound Description Full thickness Full thickness   Dagmar-wound Assessment Dry;Callous Callous   Wound Granulation Tissue -- Pink;Firm   Wound Bed Granulation (%) -- 100 %   Wound Bed Epithelium (%) 10 % --   Wound Bed Eschar (%) 90 % --   Wound Odor None None   Tunneling? No No   Undermining? No No   Sinus Tracts? No No   Posadas Scale Grade 2 Grade 2       Inactive Orders   Date Order Priority Status Authorizing Provider   03/06/25 0856 Cast application Routine Completed Dennise Sloan APRN   02/20/25 1446 Cast application Routine Completed Dennise Sloan APRN   02/20/25 0946 Debridement Diabetic Ulcer Left;Lateral Foot Routine Completed Dennise Sloan APRN       Wound 03/13/25 #5 Left Posterior Heel Heel Left;Posterior (Active)   Date First Assessed/Time First Assessed: 03/13/25 0755    Wound Number (Wound Clinic Only): #5 Left Posterior Heel  Primary Wound Type: Diabetic Ulcer  Location: Heel  Wound Location Orientation: Left;Posterior      Assessments 3/13/2025  7:58 AM   Wound Image     Drainage Amount Small   Drainage Description Serosanguineous   Wound Length (cm) 0.5 cm   Wound Width (cm) 1 cm   Wound Surface Area (cm^2) 0.5 cm^2   Wound Depth (cm) 0.1 cm   Wound Volume (cm^3) 0.05 cm^3   Margins Well-defined edges   Non-staged Wound Description Full thickness   Dagmar-wound Assessment Dry;Callous   Wound Granulation Tissue Red;Firm   Wound Bed Granulation (%) 100 %   Wound Odor None   Posadas Scale Grade 2       No associated orders.            ASSESSMENT AND PLAN:    1. 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 Diabetes mellitus with peripheral artery disease (HCC)    3. Diabetic  polyneuropathy associated with type 2 diabetes mellitus (HCC)        Risks, benefits, and alternatives of current treatment plan discussed in detail.  Questions and concerns addressed. Red flags to RTC or ED reviewed.  Patient (or parent) agrees to plan.      NOTE TO PATIENT: The 21st Century Cures Act makes clinical notes like these available to patients in the interest of transparency. Clinical notes are medical documents used by physicians and care providers to communicate with each other. These documents include medical language and terminology, abbreviations, and treatment information that may sound technical and at times possibly unfamiliar. In addition, at times, the verbiage may appear blunt or direct. These documents are one tool providers use to communicate relevant information and clinical opinions of the care providers in a way that allows common understanding of the clinical context.   I spent 40 minutes with the patient. This time included:    preparing to see the patient (eg, review notes and recent diagnostics),  seeing the patient, obtaining and/or reviewing separately obtained history, performing a medically appropriate examination and/or evaluation, counseling and educating the patient, documenting in the record. New wound.  DISCHARGE:      Patient Instructions   Please return on: 1 week        Patient discharge and wound care instructions  Adrien Tobin  3/13/2025        Cleansing/dressing:  In clinic, with each dressing change:    please cleanse the limb, foot, and between toes with soap, water and washcloth.   Dry thoroughly.    Cleanse/soak the wound with VASHE (or other hypochlorous wound cleanser), dab dry.    Apply the following dressings:   endoform>xeroform    Offloading:  We have applied a Total Contact cast. If the cast is on your right foot do NOT drive while in cast. Do not get cast wet. If foot becomes painful or numb please call the wound clinic or report to the nearest  emergency room to have cast removed.   >PAD ANTERIOR TIBIA  >extra layer of fiberglass on the heel and interdry weaved between the digits    Nutrition and blood sugar control:  Focus on the following:  Protein: Meats, beans, eggs, milk and yogurt particularly Greek yogurt), tofu, soy nuts, soy protein products (Follow the protein handout in your welcome folder)  Vitamin C: Citrus fruits and juices, strawberries, tomatoes, tomato juice, peppers, baked potatoes, spinach, broccoli, cauliflower, [place] sprouts, cabbage  Vitamin A: Dark green, leafy vegetables, orange or yellow vegetables, cantaloupe, fortified dairy products, liver, fortified cereals  Zinc: Fortified cereals, red meats, seafood  Consider supplementing with Adalberto by [employer] (These are essential branch chain amino acids that help with tissue building and wound healing) and take 2 packets/day. you can order online at [employer] or [employer]    If your blood sugar is consistently elevated your body can't heal and can't fight infection.    Concerns:  Signs of infection may include the following:  Increase in redness  Red \"streaks\" from wound  Increase in swelling  Fever  Unusual odor  Change in the amount of wound drainage     Should you experience any significant changes in your wound(s) or have any questions regarding your home care instructions please contact the [facility] center [facility] @ [phone] If after regular business hours, please call your family doctor or local emergency room. The treatment plan has been discussed at length between you and your provider. Follow all instructions carefully, it is very important. If you do not follow all instructions you are at risk of your wound not healing, infection, possible loss of limb and even loss of life.   Dennise Sloan FNP-C, CWCN-AP, CFCN, CSWS, WCC, DWC  3/13/2025            [1]   Allergies  Allergen Reactions    Heparin OTHER (SEE COMMENTS)     Per pt platelets is low every time was given to  him

## 2025-03-13 ENCOUNTER — OFFICE VISIT (OUTPATIENT)
Dept: WOUND CARE | Facility: HOSPITAL | Age: 45
End: 2025-03-13
Attending: NURSE PRACTITIONER
Payer: COMMERCIAL

## 2025-03-13 VITALS
TEMPERATURE: 98 F | HEART RATE: 112 BPM | RESPIRATION RATE: 14 BRPM | DIASTOLIC BLOOD PRESSURE: 59 MMHG | SYSTOLIC BLOOD PRESSURE: 97 MMHG

## 2025-03-13 DIAGNOSIS — E11.42 DIABETIC POLYNEUROPATHY ASSOCIATED WITH TYPE 2 DIABETES MELLITUS (HCC): ICD-10-CM

## 2025-03-13 DIAGNOSIS — L97.426 DIABETIC ULCER OF LEFT HEEL ASSOCIATED WITH TYPE 2 DIABETES MELLITUS, WITH BONE INVOLVEMENT WITHOUT EVIDENCE OF NECROSIS (HCC): Primary | ICD-10-CM

## 2025-03-13 DIAGNOSIS — E11.621 DIABETIC ULCER OF LEFT HEEL ASSOCIATED WITH TYPE 2 DIABETES MELLITUS, WITH BONE INVOLVEMENT WITHOUT EVIDENCE OF NECROSIS (HCC): Primary | ICD-10-CM

## 2025-03-13 DIAGNOSIS — E11.51 DIABETES MELLITUS WITH PERIPHERAL ARTERY DISEASE (HCC): ICD-10-CM

## 2025-03-13 LAB — GLUCOSE BLD-MCNC: 285 MG/DL (ref 70–99)

## 2025-03-13 PROCEDURE — 99215 OFFICE O/P EST HI 40 MIN: CPT | Performed by: NURSE PRACTITIONER

## 2025-03-13 NOTE — PROGRESS NOTES
.Weekly Wound Education Note    Teaching Provided To: Patient  Training Topics: Dressing, Cleasing and general instructions, Discharge instructions  Training Method: Explain/Verbal, Written  Training Response: Patient responds and understands        Notes: Plantar heel wound stable. New wound to posterior heel. Lateral foot wound is healed. Amlactin applied to dry areas, wounds dressed with endoform, and xeroform. Well padded TCC applied with additional fiberglass piece to foot.

## 2025-03-13 NOTE — PATIENT INSTRUCTIONS
Please return on: 1 week        Patient discharge and wound care instructions  Adrien Tobin  3/13/2025        Cleansing/dressing:  In clinic, with each dressing change:    please cleanse the limb, foot, and between toes with soap, water and washcloth.   Dry thoroughly.    Cleanse/soak the wound with VASHE (or other hypochlorous wound cleanser), dab dry.    Apply the following dressings:   endoform>xeroform    Offloading:  We have applied a Total Contact cast. If the cast is on your right foot do NOT drive while in cast. Do not get cast wet. If foot becomes painful or numb please call the wound clinic or report to the nearest emergency room to have cast removed.   >PAD ANTERIOR TIBIA  >extra layer of fiberglass on the heel and interdry weaved between the digits    Nutrition and blood sugar control:  Focus on the following:  Protein: Meats, beans, eggs, milk and yogurt particularly Greek yogurt), tofu, soy nuts, soy protein products (Follow the protein handout in your welcome folder)  Vitamin C: Citrus fruits and juices, strawberries, tomatoes, tomato juice, peppers, baked potatoes, spinach, broccoli, cauliflower, [place] sprouts, cabbage  Vitamin A: Dark green, leafy vegetables, orange or yellow vegetables, cantaloupe, fortified dairy products, liver, fortified cereals  Zinc: Fortified cereals, red meats, seafood  Consider supplementing with Adalberto by [employer] (These are essential branch chain amino acids that help with tissue building and wound healing) and take 2 packets/day. you can order online at [employer] or [employer]    If your blood sugar is consistently elevated your body can't heal and can't fight infection.    Concerns:  Signs of infection may include the following:  Increase in redness  Red \"streaks\" from wound  Increase in swelling  Fever  Unusual odor  Change in the amount of wound drainage     Should you experience any significant changes in your wound(s) or have any questions regarding your home  care instructions please contact the wound center [facility] @ [phone] If after regular business hours, please call your family doctor or local emergency room. The treatment plan has been discussed at length between you and your provider. Follow all instructions carefully, it is very important. If you do not follow all instructions you are at risk of your wound not healing, infection, possible loss of limb and even loss of life.

## 2025-03-14 NOTE — TELEPHONE ENCOUNTER
Prior authorization approved  Payer: [employer] Case ID: [id number]    [phone]    [phone]  Note from payer: CaseId:[id number];Status:Approved;Review Type:Prior Auth;Coverage Start Date:02/12/2025;Coverage End Date:03/14/2026;  Approval Details    Authorized from February 12, 2025 to March 14, 2026

## 2025-03-15 NOTE — PROGRESS NOTES
Patient ID: Adrien Tobin is a 44 year old male.    Cast application   Date/Time: 3/15/2025 6:50 AM   Performed by: Eileen Keenan RN   Authorized by: Dennise Sloan APRN   Consent given by: patient  Injury  Location details: left foot  Procedure  Immobilization: cast  Cast type: total contact  Post-procedure assessment  Patient tolerance: patient tolerated the procedure well with no immediate complications

## 2025-03-19 NOTE — PROGRESS NOTES
CHIEF COMPLAINT:     Chief Complaint   Patient presents with    Wound Care     Patient arrives for a wound care follow up appointment. Patient arrives with a Total Contact Cast-he says there were no issues with the cast. He reports no pain. There is endoform, xeroform, and an ABD pad to the wound.      HPI:   Information obtained from Patient and chart  2-19-25 RE-EVALUATION: 45yo male with hx of diabetes (a1c 7.5 4-8-24), htn, dyslipidemia, pad, esrd on hd who is well known to the wound clinic for diabetic foot ulcerations.  He presents with his spouse.  He has been most recently following with dr. block for a left heel wound which has been waxing and waning.  Most recent mri showed improvement in the reactive marrow edema of the calcaneous. Patient saw Dr. Najjar 2-12-25 who recommended “If he does not make any progress over the next 2 to 3 weeks then I think it would be reasonable for him to undergo a left lower extremity angiogram with intervention given his multiple risk factors for PAD and evidence of some degree of stenosis on his arterial flow testing.”  Patient has been in a cam boot and been treating with betadine. Will place in TCC for gold standard offloading, if the area does not improve in 3 weeks will get vascular involved again    2-27-25 patient returns.  Patient is tolerating the tcc (week 1). Wounds are improving. The dry skin on plantar foot is improving, he still has built up hyperkeratosis on the posterior heel. Will treat this with ammonium lactate and xeroform as well.  We discussed post healing foot wear. He states that he was doing well with custom shoes/inserts for 4 years.  He was arranging for new inserts/shoes and in that time the heel developed a wound-so he is thinking it was most likely related to his shoes/inserts breaking down.  He does have a \"new\" insert that he has but has not been wearing due to the wound.  I asked him to bring it to clinic next week and we will check it with  lyle to see if it could accommodate the injured foot after healing until he would get new custom ones made.  No s/s of infection or c/o pain. Continue with poc including tcc.    3-6-25 patient returns. He did bring his shoes/inserts.  We marked both where the wound is as well as the area on the posterior heel that has significant callus build up.  The area on the posterio/plantar heel is not hitting anywhere in the shoe.  The area of the wound (in fact there is not any \"cut out\" offloading noted on the heel of the insole.  We discussed that once we do reach healing will have to monitor closely with transition to his shoe/insert. The plantar wound is slightly smaller-will utilize endoform. The lateral wound is essentially resolved, but dry.  The dry skin posterior heel is improving. No acute s/s of infection of c/o pain (keracis is not approved, epifix still pending. Theraskin is approved)    3-13-25 patient returns.  Patient followed up with primary it was noted that his A1c was now 9.9 done on 3-10-25, patient given a referral for endocrine and he was to resume his trulicity and order placed for cgm. He states this this at home moniitor was not working and he did not realize it until he started coming to wound clinic.  He has continued to be in tcc.  Epifix request got cancelled, will rerun.  Patients wound on plantar aspect is slightly smaller, the lateral fooot is healed, but there is a wound on the posterior heel (which was under the thick dry callus).  No s/s of infection or c/o pain.  Continue with tcc    3-20-25 patient returns. Epifix still pending and was rerun for insurance approval.  continues with tcc. Wounds are improved. Further Dry hyperkeratotic skin removed-continue with xeroform for moistening. Wounds are granular, no s/s of infection or c/o pain.  MEDICATIONS:     Current Outpatient Medications:     insulin glargine (LANTUS SOLOSTAR) 100 UNIT/ML Subcutaneous Solution Pen-injector, Inject 22  Units into the skin nightly., Disp: 39.6 mL, Rfl: 0    Insulin Lispro, 1 Unit Dial, (HUMALOG KWIKPEN) 100 UNIT/ML Subcutaneous Solution Pen-injector, Test blood glucose 3 times daily BEFORE meals Inject 2 unit if blood glucose is between 141-180 mg/dL Inject 3 units if blood glucose is between 181-220 mg/dL Inject 6 units if blood glucose is between 221-260 mg/dL Inject 8 units if blood glucose is between 261-300 mg/dL Inject 10 units if blood glucose is between 301-350 mg/dL Call your physician if blood glucose is greater than 351 mg/dL,, Disp: 5 each, Rfl: 3    Dulaglutide (TRULICITY) 0.75 MG/0.5ML Subcutaneous Solution Auto-injector, Inject 0.75 mg into the skin once a week., Disp: 6 mL, Rfl: 0    insulin detemir 100 UNIT/ML Subcutaneous Solution Pen-injector, Inject 22 Units into the skin nightly., Disp: 19.8 mL, Rfl: 0    Continuous Glucose  (DEXCOM G7 ) Does not apply Device, USE DAILY., Disp: 1 each, Rfl: 0    Continuous Glucose Sensor (DEXCOM G7 SENSOR) Does not apply Misc, 1 each Every 10 days. Use as directed every 10 days, Disp: 3 each, Rfl: 11    atorvastatin 40 MG Oral Tab, Take 1 tablet (40 mg total) by mouth daily., Disp: , Rfl:     Insulin Pen Needle (BD PEN NEEDLE ODALIS 2ND GEN) 32G X 4 MM Does not apply Misc, USE FOUR TIMES DAILY AS DIRECTED, Disp: 400 each, Rfl: 2    aspirin (ASPIRIN EC LOW DOSE) 81 MG Oral Tab EC, Take 1 tablet (81 mg total) by mouth daily., Disp: 90 tablet, Rfl: 0    Dulaglutide (TRULICITY) 0.75 MG/0.5ML Subcutaneous Solution Pen-injector, Inject 0.75 mg into the skin once a week. (Patient not taking: Reported on 3/10/2025), Disp: 7 mL, Rfl: 3  ALLERGIES:   Allergies[1]   REVIEW OF SYSTEMS:   This information was obtained from the patient/family and chart.    See HPI for pertinent positives, otherwise 10 pt ROS negative.  HISTORY:   Past medical, surgical, family and social history updated where appropriate.    PHYSICAL EXAM:     Vitals:    03/20/25 0806   BP:  120/83  Comment: blood sugar 173   Pulse: 94   Resp: 15   Temp: 97.9 °F (36.6 °C)     Estimated body mass index is 32.85 kg/m² as calculated from the following:    Height as of 3/10/25: 68\".    Weight as of 3/10/25: 216 lb 0.8 oz (98 kg).   POC Glucose   Date Value Ref Range Status   03/20/2025 173 (H) 70 - 99 mg/dL Final   03/13/2025 285 (H) 70 - 99 mg/dL Final   03/06/2025 286 (H) 70 - 99 mg/dL Final       Vital signs reviewed.Appears stated age, well groomed.    Constitutional:  Bp wnl for patient. Pulse Regular and wnl for patient. Respirations easy and unlabored. Temperature wnl. Elevated bmi. Appearance neat and clean. Appears in no acute distress. Well nourished and well developed.    Lower extremity:  dp/pt palpable left. Left lower extremity free of varicosities, stable edema. Capillary refill < 3 seconds. Digits are warm, but contracted L>R. toenails are wnl for color, thickness and hygeine. Skin is dry. no hairgrowth on legs.  Left  heel contour consistent with s/p multiple surgeries to calcaneus  Musculoskeletal:  Gait and station stable   Integumentary:  refer to wound characteristics and images   Psychiatric:  Judgment and insight intact. Alert and oriented times 3. No evidence of depression, anxiety, or agitation. Calm, cooperative, and communicative.   EDEMA:   Calf  Point of Measurement - Left Calf: 34     Left Calf from:: Heel  Calf Left cm:: 34.3        Ankle  Point of Measurement - Left Ankle: 10     Left Ankle from:: Heel  Left Ankle cm:: 21.5              DIAGNOSTICS:     Lab Results   Component Value Date    BUN 39 (H) 06/25/2024    CREATSERUM 7.07 (H) 06/25/2024    ALB 3.4 05/21/2024    TP 6.9 05/21/2024    A1C 9.9 (A) 03/10/2025   2-10-25 MRI left ankle  CONCLUSION:    1. There is no specific evidence of osteomyelitis allowing for signal alteration from susceptibility artifact.  Reactive marrow edema at the plantar aspect of the calcaneus has improved since prior study.  There is no specific  evidence of cortical   erosion.   2. Remaining ligamentous and tendinous structures are otherwise intact.       2-5-25 arterial duplex  RIGHT EXTREMITY:  Monophasic waveforms within the posterior tibial artery  LEFT EXTREMITY:  Monophasic to biphasic waveforms  OTHER:  None.  SYSTOLIC VELOCITIES (CM/S):  RIGHT COMMON FEMORAL:      135    RIGHT PROFUNDA FEMORAL:      94      RIGHT SUPERFICIAL FEMORAL PROX:    76  RIGHT SUPERFICIAL FEMORAL MID:    113  RIGHT SUPERFICIAL FEMORAL DISTAL:    101  RIGHT POPLITEAL PROX:      69  RIGHT POPLITEAL DISTAL:      60  TIBIOPER TRUNK:        RIGHT PTA PROX:      9  RIGHT PTA MID:      9  RIGHT PTA DISTAL:      18  RIGHT BRANDON PROX:      55  RIGHT BRANDON MID:      59    RIGHT DORSALIS PEDIS:      30  RIGHT GREAT TOE PRESSURE:      62 mmHg  SYSTOLIC VELOCITIES (CM./S):  LEFT COMMON FEMORAL:      126    LEFT PROFUNDA FEMORAL:      73      LEFT SUPERFICIAL FEMORAL PROX:    119  LEFT SUPERFICIAL FEMORAL MID:      91  LEFT SUPERFICIAL FEMORAL DISTAL:    103  LEFT POPLITEAL PROX:      142  LEFT POPLITEAL DISTAL:      89  TIBIOPER TRUNK:        LEFT PTA PROX:      18  LEFT PTA MID:      35  LEFT PTA DISTAL:      16  LEFT BRANDON PROX:      118  LEFT BRANDON MID:      130    LEFT DORSALIS PEDIS::      42  LEFT GREAT TOE PRESSURE:      40 mmHg   CONCLUSION:    1. Patent vessels with velocities as described above.  2. Limited flow within the right posterior tibial artery likely demonstrating at least moderate to severe stenosis.  3. At least mild to moderate stenosis within the bilateral superficial femoral arteries.  4. Moderate stenosis within the proximal popliteal arteries bilaterally.  5. If further evaluation is needed, consider CTA or MRA.       WOUND ASSESSMENT:     Wound 02/09/24 Arm Anterior;Left;Lower (Active)   Date First Assessed/Time First Assessed: 02/09/24 0839   Primary Wound Type: Incision  Location: Arm  Wound Location Orientation: Anterior;Left;Lower      Assessments 2/9/2024  9:29 AM  2/9/2024 10:38 AM   Site Assessment -- Unable to assess   Closure Approximated;Sutures;Skin glue --   Drainage Amount -- None   Dressing 4x4s;Tegaderm;Dermabond 4x4s;Tegaderm   Dressing Changed New --   Dressing Status Clean;Dry;Intact Clean;Dry;Intact       No associated orders.       Wound 02/20/25 #3 Heel Left;Plantar (Active)   Date First Assessed/Time First Assessed: 02/20/25 0822    Wound Number (Wound Clinic Only): #3  Primary Wound Type: Diabetic Ulcer  Location: Heel  Wound Location Orientation: Left;Plantar      Assessments 2/20/2025  8:24 AM 3/20/2025  8:14 AM   Wound Image         Drainage Amount Small Moderate   Drainage Description Serosanguineous Serosanguineous   Treatments Synthetic cast --   Wound Length (cm) 1.1 cm 0.2 cm   Wound Width (cm) 1.4 cm 0.4 cm   Wound Surface Area (cm^2) 1.54 cm^2 0.08 cm^2   Wound Depth (cm) 0.1 cm 0.2 cm   Wound Volume (cm^3) 0.154 cm^3 0.016 cm^3   Wound Healing % -- 90   Margins Well-defined edges Well-defined edges   Non-staged Wound Description Full thickness Full thickness   Dagmar-wound Assessment Callous;Moist;Maceration;Dry Dry   Wound Granulation Tissue Red;Pink;Firm Pink;Firm   Wound Bed Granulation (%) 100 % 100 %   Wound Odor None None   Tunneling? No No   Undermining? No No   Sinus Tracts? No No   Posadas Scale Grade 2 Grade 2       Inactive Orders   Date Order Priority Status Authorizing Provider   03/15/25 0650 Cast application Routine Completed Dennise Sloan APRN   03/06/25 0856 Cast application Routine Completed Dennise Sloan APRN   02/27/25 0850 Cast application Routine Completed Dennise Sloan APRN   02/20/25 1446 Cast application Routine Completed Dennise Sloan APRN   02/20/25 0945 Debridement Diabetic Ulcer Left;Plantar Heel Routine Completed Dennise Sloan APRN       Wound 03/13/25 #5 Left Posterior Heel Heel Left;Posterior (Active)   Date First Assessed/Time First Assessed: 03/13/25 0755    Wound Number (Wound Clinic Only): #5 Left  Posterior Heel  Primary Wound Type: Diabetic Ulcer  Location: Heel  Wound Location Orientation: Left;Posterior      Assessments 3/13/2025  7:58 AM 3/20/2025  8:13 AM   Wound Image        Drainage Amount Small Moderate   Drainage Description Serosanguineous Serosanguineous   Treatments Synthetic cast --   Wound Length (cm) 0.5 cm 0.3 cm   Wound Width (cm) 1 cm 0.5 cm   Wound Surface Area (cm^2) 0.5 cm^2 0.15 cm^2   Wound Depth (cm) 0.1 cm 0.1 cm   Wound Volume (cm^3) 0.05 cm^3 0.015 cm^3   Wound Healing % -- 70   Margins Well-defined edges Well-defined edges   Non-staged Wound Description Full thickness Full thickness   Dagmar-wound Assessment Dry;Callous Dry   Wound Granulation Tissue Red;Firm Pink;Firm   Wound Bed Granulation (%) 100 % 100 %   Wound Odor None None   Tunneling? -- No   Undermining? -- No   Sinus Tracts? -- No   Posadas Scale Grade 2 Grade 2       Inactive Orders   Date Order Priority Status Authorizing Provider   03/15/25 0650 Cast application Routine Completed Dennise Sloan APRN            ASSESSMENT AND PLAN:    1. 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 Diabetes mellitus with peripheral artery disease (HCC)    3. Diabetic polyneuropathy associated with type 2 diabetes mellitus (HCC)          Risks, benefits, and alternatives of current treatment plan discussed in detail.  Questions and concerns addressed. Red flags to RTC or ED reviewed.  Patient (or parent) agrees to plan.      NOTE TO PATIENT: The 21st Century Cures Act makes clinical notes like these available to patients in the interest of transparency. Clinical notes are medical documents used by physicians and care providers to communicate with each other. These documents include medical language and terminology, abbreviations, and treatment information that may sound technical and at times possibly unfamiliar. In addition, at times, the verbiage may  appear blunt or direct. These documents are one tool providers use to communicate relevant information and clinical opinions of the care providers in a way that allows common understanding of the clinical context.   I spent 35 minutes with the patient. This time included:    preparing to see the patient (eg, review notes and recent diagnostics),  seeing the patient, obtaining and/or reviewing separately obtained history, performing a medically appropriate examination and/or evaluation, counseling and educating the patient, documenting in the record.  DISCHARGE:      Patient Instructions   Please return on: 1 week        Patient discharge and wound care instructions  Adrien Grimm Crista  3/20/2025          Cleansing/dressing:  In clinic, with each dressing change:    please cleanse the limb, foot, and between toes with soap, water and washcloth.   Dry thoroughly.    Cleanse/soak the wound with VASHE (or other hypochlorous wound cleanser), dab dry.    Apply the following dressings:   endoform>xeroform    Offloading:  We have applied a Total Contact cast. If the cast is on your right foot do NOT drive while in cast. Do not get cast wet. If foot becomes painful or numb please call the wound clinic or report to the nearest emergency room to have cast removed.   >PAD ANTERIOR TIBIA  >extra layer of fiberglass on the heel and interdry weaved between the digits    Nutrition and blood sugar control:  Focus on the following:  Protein: Meats, beans, eggs, milk and yogurt particularly Greek yogurt), tofu, soy nuts, soy protein products (Follow the protein handout in your welcome folder)  Vitamin C: Citrus fruits and juices, strawberries, tomatoes, tomato juice, peppers, baked potatoes, spinach, broccoli, cauliflower, [place] sprouts, cabbage  Vitamin A: Dark green, leafy vegetables, orange or yellow vegetables, cantaloupe, fortified dairy products, liver, fortified cereals  Zinc: Fortified cereals, red meats,  seafood  Consider supplementing with Adalberto by [employer] (These are essential branch chain amino acids that help with tissue building and wound healing) and take 2 packets/day. you can order online at [employer] or [employer]    If your blood sugar is consistently elevated your body can't heal and can't fight infection.    Concerns:  Signs of infection may include the following:  Increase in redness  Red \"streaks\" from wound  Increase in swelling  Fever  Unusual odor  Change in the amount of wound drainage     Should you experience any significant changes in your wound(s) or have any questions regarding your home care instructions please contact the [facility] center [facility] @ [phone] If after regular business hours, please call your family doctor or local emergency room. The treatment plan has been discussed at length between you and your provider. Follow all instructions carefully, it is very important. If you do not follow all instructions you are at risk of your wound not healing, infection, possible loss of limb and even loss of life.   Dennise Sloan FNP-C, CWCN-AP, CFCN, CSWS, WCC, DWC  3/20/2025            [1]   Allergies  Allergen Reactions    Heparin OTHER (SEE COMMENTS)     Per pt platelets is low every time was given to him

## 2025-03-20 ENCOUNTER — OFFICE VISIT (OUTPATIENT)
Dept: WOUND CARE | Facility: HOSPITAL | Age: 45
End: 2025-03-20
Attending: NURSE PRACTITIONER
Payer: COMMERCIAL

## 2025-03-20 VITALS
DIASTOLIC BLOOD PRESSURE: 83 MMHG | SYSTOLIC BLOOD PRESSURE: 120 MMHG | RESPIRATION RATE: 15 BRPM | HEART RATE: 94 BPM | TEMPERATURE: 98 F

## 2025-03-20 DIAGNOSIS — L97.426 DIABETIC ULCER OF LEFT HEEL ASSOCIATED WITH TYPE 2 DIABETES MELLITUS, WITH BONE INVOLVEMENT WITHOUT EVIDENCE OF NECROSIS (HCC): Primary | ICD-10-CM

## 2025-03-20 DIAGNOSIS — E11.621 DIABETIC ULCER OF LEFT HEEL ASSOCIATED WITH TYPE 2 DIABETES MELLITUS, WITH BONE INVOLVEMENT WITHOUT EVIDENCE OF NECROSIS (HCC): Primary | ICD-10-CM

## 2025-03-20 DIAGNOSIS — E11.42 DIABETIC POLYNEUROPATHY ASSOCIATED WITH TYPE 2 DIABETES MELLITUS (HCC): ICD-10-CM

## 2025-03-20 DIAGNOSIS — E11.51 DIABETES MELLITUS WITH PERIPHERAL ARTERY DISEASE (HCC): ICD-10-CM

## 2025-03-20 LAB — GLUCOSE BLD-MCNC: 173 MG/DL (ref 70–99)

## 2025-03-20 PROCEDURE — 99214 OFFICE O/P EST MOD 30 MIN: CPT | Performed by: NURSE PRACTITIONER

## 2025-03-20 NOTE — PROGRESS NOTES
.Weekly Wound Education Note    Teaching Provided To: Patient  Training Topics: Dressing, Cleasing and general instructions, Discharge instructions  Training Method: Written, Explain/Verbal  Training Response: Patient responds and understands        Notes: Wounds improving. Continue endoform, xeroform, ABD pad and well padded TCC with extra fiberglass layer to foot.

## 2025-03-20 NOTE — PROGRESS NOTES
Patient ID: Adrien Tobin is a 44 year old male.    Cast application   Date/Time: 3/20/2025 8:56 AM   Performed by: Eileen Keenan RN   Authorized by: Dennise Sloan APRN   Consent given by: patient  Injury  Location details: left foot  Procedure  Immobilization: cast  Cast type: total contact  Post-procedure assessment  Patient tolerance: patient tolerated the procedure well with no immediate complications  Comments  Wounds dressed with endoform, xeroform and ABD pads.

## 2025-03-20 NOTE — PATIENT INSTRUCTIONS
Please return on: 1 week        Patient discharge and wound care instructions  Adrien Tobin  3/20/2025          Cleansing/dressing:  In clinic, with each dressing change:    please cleanse the limb, foot, and between toes with soap, water and washcloth.   Dry thoroughly.    Cleanse/soak the wound with VASHE (or other hypochlorous wound cleanser), dab dry.    Apply the following dressings:   endoform>xeroform    Offloading:  We have applied a Total Contact cast. If the cast is on your right foot do NOT drive while in cast. Do not get cast wet. If foot becomes painful or numb please call the wound clinic or report to the nearest emergency room to have cast removed.   >PAD ANTERIOR TIBIA  >extra layer of fiberglass on the heel and interdry weaved between the digits    Nutrition and blood sugar control:  Focus on the following:  Protein: Meats, beans, eggs, milk and yogurt particularly Greek yogurt), tofu, soy nuts, soy protein products (Follow the protein handout in your welcome folder)  Vitamin C: Citrus fruits and juices, strawberries, tomatoes, tomato juice, peppers, baked potatoes, spinach, broccoli, cauliflower, [place] sprouts, cabbage  Vitamin A: Dark green, leafy vegetables, orange or yellow vegetables, cantaloupe, fortified dairy products, liver, fortified cereals  Zinc: Fortified cereals, red meats, seafood  Consider supplementing with Adalberto by [employer] (These are essential branch chain amino acids that help with tissue building and wound healing) and take 2 packets/day. you can order online at [employer] or [employer]    If your blood sugar is consistently elevated your body can't heal and can't fight infection.    Concerns:  Signs of infection may include the following:  Increase in redness  Red \"streaks\" from wound  Increase in swelling  Fever  Unusual odor  Change in the amount of wound drainage     Should you experience any significant changes in your wound(s) or have any questions regarding your  home care instructions please contact the wound center [facility] @ [phone] If after regular business hours, please call your family doctor or local emergency room. The treatment plan has been discussed at length between you and your provider. Follow all instructions carefully, it is very important. If you do not follow all instructions you are at risk of your wound not healing, infection, possible loss of limb and even loss of life.

## 2025-03-25 NOTE — CM/SW NOTE
05/01/18 1510   CM/SW Referral Data   Referral Source Physician   Reason for Referral Discharge planning  (home anitbiotics)   Informant Patient   Pertinent Medical Hx   Primary Care Physician Name Mihai Swenson   Patient Info   Patient's Mental Status Alert Pt is a 60y F, AOx4, presenting in [facility] w/HTN and nausea. Pt found BP to be 170/100, pt is on BP med at home. Pt states she started having the sensation of nausea about 0830 today. Pt came to ED d/t BP. Pt denies chest pain, SOB, abd pain, back pain, headaches, dizziness, lightheadedness, fevers, chills, vomiting, diarrhea, constipation and dysuria. PMH HTN. Resp even and unlabored. Bed locked and in lowest position.

## 2025-03-26 NOTE — PROGRESS NOTES
CHIEF COMPLAINT:     Chief Complaint   Patient presents with    Wound Recheck     Pt arrives for wound care follow-up appointment, LLE in TCC. Denies issues with cast. Denies pain to wounds. No new questions or concerns this visit.     HPI:   Information obtained from Patient and chart  2-19-25 RE-EVALUATION: 43yo male with hx of diabetes (a1c 7.5 4-8-24), htn, dyslipidemia, pad, esrd on hd who is well known to the wound clinic for diabetic foot ulcerations.  He presents with his spouse.  He has been most recently following with dr. block for a left heel wound which has been waxing and waning.  Most recent mri showed improvement in the reactive marrow edema of the calcaneous. Patient saw Dr. Najjar 2-12-25 who recommended “If he does not make any progress over the next 2 to 3 weeks then I think it would be reasonable for him to undergo a left lower extremity angiogram with intervention given his multiple risk factors for PAD and evidence of some degree of stenosis on his arterial flow testing.”  Patient has been in a cam boot and been treating with betadine. Will place in TCC for gold standard offloading, if the area does not improve in 3 weeks will get vascular involved again    2-27-25 patient returns.  Patient is tolerating the tcc (week 1). Wounds are improving. The dry skin on plantar foot is improving, he still has built up hyperkeratosis on the posterior heel. Will treat this with ammonium lactate and xeroform as well.  We discussed post healing foot wear. He states that he was doing well with custom shoes/inserts for 4 years.  He was arranging for new inserts/shoes and in that time the heel developed a wound-so he is thinking it was most likely related to his shoes/inserts breaking down.  He does have a \"new\" insert that he has but has not been wearing due to the wound.  I asked him to bring it to clinic next week and we will check it with lipstick to see if it could accommodate the injured foot after  healing until he would get new custom ones made.  No s/s of infection or c/o pain. Continue with poc including tcc.    3-6-25 patient returns. He did bring his shoes/inserts.  We marked both where the wound is as well as the area on the posterior heel that has significant callus build up.  The area on the posterio/plantar heel is not hitting anywhere in the shoe.  The area of the wound (in fact there is not any \"cut out\" offloading noted on the heel of the insole.  We discussed that once we do reach healing will have to monitor closely with transition to his shoe/insert. The plantar wound is slightly smaller-will utilize endoform. The lateral wound is essentially resolved, but dry.  The dry skin posterior heel is improving. No acute s/s of infection of c/o pain (keracis is not approved, epifix still pending. Theraskin is approved)    3-13-25 patient returns.  Patient followed up with primary it was noted that his A1c was now 9.9 done on 3-10-25, patient given a referral for endocrine and he was to resume his trulicity and order placed for cgm. He states this this at home moniitor was not working and he did not realize it until he started coming to wound clinic.  He has continued to be in tcc.  Epifix request got cancelled, will rerun.  Patients wound on plantar aspect is slightly smaller, the lateral fooot is healed, but there is a wound on the posterior heel (which was under the thick dry callus).  No s/s of infection or c/o pain.  Continue with tcc    3-20-25 patient returns. Epifix still pending and was rerun for insurance approval.  continues with tcc. Wounds are improved. Further Dry hyperkeratotic skin removed-continue with xeroform for moistening. Wounds are granular, no s/s of infection or c/o pain.    3-27-25 patient returns. It appears epifix is approved, but may need prior auth, will reach out to epifix rep.  He continues in the tcc and we have continued with xeroform for moisture retention.  Both areas  are improved.  Patient has disability claim form to be filled out.  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 There are not any s/s of infection. Will continue with tcc and urea/xeroform for the hyperkeratotic tissue management.  MEDICATIONS:     Current Outpatient Medications:     insulin glargine (LANTUS SOLOSTAR) 100 UNIT/ML Subcutaneous Solution Pen-injector, Inject 22 Units into the skin nightly., Disp: 39.6 mL, Rfl: 0    Insulin Lispro, 1 Unit Dial, (HUMALOG KWIKPEN) 100 UNIT/ML Subcutaneous Solution Pen-injector, Test blood glucose 3 times daily BEFORE meals Inject 2 unit if blood glucose is between 141-180 mg/dL Inject 3 units if blood glucose is between 181-220 mg/dL Inject 6 units if blood glucose is between 221-260 mg/dL Inject 8 units if blood glucose is between 261-300 mg/dL Inject 10 units if blood glucose is between 301-350 mg/dL Call your physician if blood glucose is greater than 351 mg/dL,, Disp: 5 each, Rfl: 3    Dulaglutide (TRULICITY) 0.75 MG/0.5ML Subcutaneous Solution Auto-injector, Inject 0.75 mg into the skin once a week., Disp: 6 mL, Rfl: 0    insulin detemir 100 UNIT/ML Subcutaneous Solution Pen-injector, Inject 22 Units into the skin nightly., Disp: 19.8 mL, Rfl: 0    Continuous Glucose  (DEXCOM G7 ) Does not apply Device, USE DAILY., Disp: 1 each, Rfl: 0    Continuous Glucose Sensor (DEXCOM G7 SENSOR) Does not apply Misc, 1 each Every 10 days. Use as directed every 10 days, Disp: 3 each, Rfl: 11    atorvastatin 40 MG Oral Tab, Take 1 tablet (40 mg total) by mouth daily., Disp: , Rfl:     Insulin Pen Needle (BD PEN NEEDLE ODALIS 2ND GEN) 32G X 4 MM Does not apply Misc, USE FOUR TIMES DAILY AS DIRECTED, Disp: 400 each, Rfl: 2    aspirin (ASPIRIN EC LOW DOSE) 81 MG Oral Tab EC, Take 1 tablet (81 mg total) by mouth daily., Disp: 90 tablet, Rfl: 0    Dulaglutide (TRULICITY) 0.75  MG/0.5ML Subcutaneous Solution Pen-injector, Inject 0.75 mg into the skin once a week. (Patient not taking: Reported on 3/10/2025), Disp: 7 mL, Rfl: 3  ALLERGIES:   Allergies[1]   REVIEW OF SYSTEMS:   This information was obtained from the patient/family and chart.    See HPI for pertinent positives, otherwise 10 pt ROS negative.  HISTORY:   Past medical, surgical, family and social history updated where appropriate.    PHYSICAL EXAM:     Vitals:    03/27/25 0759   BP: 110/75  Comment: bg 121   Pulse: 105   Resp: 16   Temp: 98.3 °F (36.8 °C)       Estimated body mass index is 32.85 kg/m² as calculated from the following:    Height as of 3/10/25: 68\".    Weight as of 3/10/25: 216 lb 0.8 oz (98 kg).   POC Glucose   Date Value Ref Range Status   03/27/2025 121 (H) 70 - 99 mg/dL Final   03/20/2025 173 (H) 70 - 99 mg/dL Final   03/13/2025 285 (H) 70 - 99 mg/dL Final       Vital signs reviewed.Appears stated age, well groomed.    Constitutional:  Bp wnl for patient. Pulse Regular and wnl for patient. Respirations easy and unlabored. Temperature wnl. Elevated bmi. Appearance neat and clean. Appears in no acute distress. Well nourished and well developed.    Lower extremity:  dp/pt palpable left. Left lower extremity free of varicosities, stable edema. Capillary refill < 3 seconds. Digits are warm, but contracted L>R. toenails are wnl for color, thickness and hygeine. Skin is dry. no hairgrowth on legs.  Left  heel contour consistent with s/p multiple surgeries to calcaneus  Musculoskeletal:  Gait and station stable   Integumentary:  refer to wound characteristics and images   Psychiatric:  Judgment and insight intact. Alert and oriented times 3. No evidence of depression, anxiety, or agitation. Calm, cooperative, and communicative.   EDEMA:   Calf  Point of Measurement - Left Calf: 34     Left Calf from:: Heel  Calf Left cm:: 34.4        Ankle  Point of Measurement - Left Ankle: 10     Left Ankle from:: Heel  Left Ankle  cm:: 21              DIAGNOSTICS:     Lab Results   Component Value Date    BUN 39 (H) 06/25/2024    CREATSERUM 7.07 (H) 06/25/2024    ALB 3.4 05/21/2024    TP 6.9 05/21/2024    A1C 9.9 (A) 03/10/2025   2-10-25 MRI left ankle  CONCLUSION:    1. There is no specific evidence of osteomyelitis allowing for signal alteration from susceptibility artifact.  Reactive marrow edema at the plantar aspect of the calcaneus has improved since prior study.  There is no specific evidence of cortical   erosion.   2. Remaining ligamentous and tendinous structures are otherwise intact.       2-5-25 arterial duplex  RIGHT EXTREMITY:  Monophasic waveforms within the posterior tibial artery  LEFT EXTREMITY:  Monophasic to biphasic waveforms  OTHER:  None.  SYSTOLIC VELOCITIES (CM/S):  RIGHT COMMON FEMORAL:      135    RIGHT PROFUNDA FEMORAL:      94      RIGHT SUPERFICIAL FEMORAL PROX:    76  RIGHT SUPERFICIAL FEMORAL MID:    113  RIGHT SUPERFICIAL FEMORAL DISTAL:    101  RIGHT POPLITEAL PROX:      69  RIGHT POPLITEAL DISTAL:      60  TIBIOPER TRUNK:        RIGHT PTA PROX:      9  RIGHT PTA MID:      9  RIGHT PTA DISTAL:      18  RIGHT BRANDON PROX:      55  RIGHT BRANDON MID:      59    RIGHT DORSALIS PEDIS:      30  RIGHT GREAT TOE PRESSURE:      62 mmHg  SYSTOLIC VELOCITIES (CM./S):  LEFT COMMON FEMORAL:      126    LEFT PROFUNDA FEMORAL:      73      LEFT SUPERFICIAL FEMORAL PROX:    119  LEFT SUPERFICIAL FEMORAL MID:      91  LEFT SUPERFICIAL FEMORAL DISTAL:    103  LEFT POPLITEAL PROX:      142  LEFT POPLITEAL DISTAL:      89  TIBIOPER TRUNK:        LEFT PTA PROX:      18  LEFT PTA MID:      35  LEFT PTA DISTAL:      16  LEFT BRANDON PROX:      118  LEFT BRANDON MID:      130    LEFT DORSALIS PEDIS::      42  LEFT GREAT TOE PRESSURE:      40 mmHg   CONCLUSION:    1. Patent vessels with velocities as described above.  2. Limited flow within the right posterior tibial artery likely demonstrating at least moderate to severe stenosis.  3. At least  mild to moderate stenosis within the bilateral superficial femoral arteries.  4. Moderate stenosis within the proximal popliteal arteries bilaterally.  5. If further evaluation is needed, consider CTA or MRA.       WOUND ASSESSMENT:     Wound 02/20/25 #3 Heel Left;Plantar (Active)   Date First Assessed/Time First Assessed: 02/20/25 0822    Wound Number (Wound Clinic Only): #3  Primary Wound Type: Diabetic Ulcer  Location: Heel  Wound Location Orientation: Left;Plantar      Assessments 2/20/2025  8:24 AM 3/27/2025  8:05 AM   Wound Image        Drainage Amount Small Small   Drainage Description Serosanguineous Serosanguineous   Treatments Synthetic cast --   Wound Length (cm) 1.1 cm 0.2 cm   Wound Width (cm) 1.4 cm 0.3 cm   Wound Surface Area (cm^2) 1.54 cm^2 0.06 cm^2   Wound Depth (cm) 0.1 cm 0.1 cm   Wound Volume (cm^3) 0.154 cm^3 0.006 cm^3   Wound Healing % -- 96   Margins Well-defined edges Well-defined edges   Non-staged Wound Description Full thickness Full thickness   Dagmar-wound Assessment Callous;Moist;Maceration;Dry Dry   Wound Granulation Tissue Red;Pink;Firm Pink;Firm   Wound Bed Granulation (%) 100 % 100 %   Wound Odor None None   Tunneling? No No   Undermining? No No   Sinus Tracts? No No   Posadas Scale Grade 2 Grade 2       Inactive Orders   Date Order Priority Status Authorizing Provider   03/20/25 0856 Cast application Routine Completed Dennise Sloan APRN   03/15/25 0650 Cast application Routine Completed Dennise Sloan APRN   03/06/25 0856 Cast application Routine Completed Dennise Sloan APRN   02/27/25 0850 Cast application Routine Completed Dennise Sloan, ARNOL   02/20/25 1446 Cast application Routine Completed Dennise Sloan APRN   02/20/25 0945 Debridement Diabetic Ulcer Left;Plantar Heel Routine Completed Dennise Sloan APRN       Wound 03/13/25 #5 Left Posterior Heel Heel Left;Posterior (Active)   Date First Assessed/Time First Assessed: 03/13/25 0755    Wound Number (Wound Clinic  Only): #5 Left Posterior Heel  Primary Wound Type: Diabetic Ulcer  Location: Heel  Wound Location Orientation: Left;Posterior      Assessments 3/13/2025  7:58 AM 3/27/2025  8:04 AM   Wound Image       Drainage Amount Small Small   Drainage Description Serosanguineous Serosanguineous   Treatments Synthetic cast --   Wound Length (cm) 0.5 cm 0.2 cm   Wound Width (cm) 1 cm 0.3 cm   Wound Surface Area (cm^2) 0.5 cm^2 0.06 cm^2   Wound Depth (cm) 0.1 cm 0.1 cm   Wound Volume (cm^3) 0.05 cm^3 0.006 cm^3   Wound Healing % -- 88   Margins Well-defined edges Well-defined edges   Non-staged Wound Description Full thickness Full thickness   Dagmar-wound Assessment Dry;Callous Dry   Wound Granulation Tissue Red;Firm Pink;Firm   Wound Bed Granulation (%) 100 % 100 %   Wound Odor None None   Tunneling? -- No   Undermining? -- No   Sinus Tracts? -- No   Posadas Scale Grade 2 Grade 2       Inactive Orders   Date Order Priority Status Authorizing Provider   03/20/25 0856 Cast application Routine Completed Dennise Sloan APRN   03/15/25 0650 Cast application Routine Completed Dennise Sloan, ARNOL            ASSESSMENT AND PLAN:    1. 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 Diabetes mellitus with peripheral artery disease (HCC)    3. Diabetic polyneuropathy associated with type 2 diabetes mellitus (HCC)            Risks, benefits, and alternatives of current treatment plan discussed in detail.  Questions and concerns addressed. Red flags to RTC or ED reviewed.  Patient (or parent) agrees to plan.      NOTE TO PATIENT: The 21st Century Cures Act makes clinical notes like these available to patients in the interest of transparency. Clinical notes are medical documents used by physicians and care providers to communicate with each other. These documents include medical language and terminology, abbreviations, and treatment information that may  sound technical and at times possibly unfamiliar. In addition, at times, the verbiage may appear blunt or direct. These documents are one tool providers use to communicate relevant information and clinical opinions of the care providers in a way that allows common understanding of the clinical context.   I spent 40 minutes with the patient. This time included:    preparing to see the patient (eg, review notes and recent diagnostics),  seeing the patient, obtaining and/or reviewing separately obtained history, performing a medically appropriate examination and/or evaluation, counseling and educating the patient, documenting in the record, disability forms  DISCHARGE:      Patient Instructions   Please return on: 1 week         Patient discharge and wound care instructions  Adrien Grimm Ervinlizeth  3/27/2025             Cleansing/dressing:  In clinic, with each dressing change:    please cleanse the limb, foot, and between toes with soap, water and washcloth.   Dry thoroughly.    Cleanse/soak the wound with VASHE (or other hypochlorous wound cleanser), dab dry.    Apply the following dressings:   urea cream>xeroform    Offloading:  We have applied a Total Contact cast. If the cast is on your right foot do NOT drive while in cast. Do not get cast wet. If foot becomes painful or numb please call the wound clinic or report to the nearest emergency room to have cast removed.   >PAD ANTERIOR TIBIA  >extra layer of fiberglass on the heel and interdry weaved between the digits    Nutrition and blood sugar control:  Focus on the following:  Protein: Meats, beans, eggs, milk and yogurt particularly Greek yogurt), tofu, soy nuts, soy protein products (Follow the protein handout in your welcome folder)  Vitamin C: Citrus fruits and juices, strawberries, tomatoes, tomato juice, peppers, baked potatoes, spinach, broccoli, cauliflower, [place] sprouts, cabbage  Vitamin A: Dark green, leafy vegetables, orange or yellow vegetables,  cantaloupe, fortified dairy products, liver, fortified cereals  Zinc: Fortified cereals, red meats, seafood  Consider supplementing with Adalberto by [employer] (These are essential branch chain amino acids that help with tissue building and wound healing) and take 2 packets/day. you can order online at [employer] or [employer]    If your blood sugar is consistently elevated your body can't heal and can't fight infection.    Concerns:  Signs of infection may include the following:  Increase in redness  Red \"streaks\" from wound  Increase in swelling  Fever  Unusual odor  Change in the amount of wound drainage     Should you experience any significant changes in your wound(s) or have any questions regarding your home care instructions please contact the [facility] @ [phone] If after regular business hours, please call your family doctor or local emergency room. The treatment plan has been discussed at length between you and your provider. Follow all instructions carefully, it is very important. If you do not follow all instructions you are at risk of your wound not healing, infection, possible loss of limb and even loss of life.   Dennise Sloan FNP-C, CWCN-AP, CFCN, CSWS, WCC, DWC  3/27/2025            [1]   Allergies  Allergen Reactions    Heparin OTHER (SEE COMMENTS)     Per pt platelets is low every time was given to him

## 2025-03-27 ENCOUNTER — OFFICE VISIT (OUTPATIENT)
Dept: WOUND CARE | Facility: HOSPITAL | Age: 45
End: 2025-03-27
Attending: NURSE PRACTITIONER
Payer: COMMERCIAL

## 2025-03-27 VITALS
HEART RATE: 105 BPM | DIASTOLIC BLOOD PRESSURE: 75 MMHG | TEMPERATURE: 98 F | SYSTOLIC BLOOD PRESSURE: 110 MMHG | RESPIRATION RATE: 16 BRPM

## 2025-03-27 DIAGNOSIS — E11.51 DIABETES MELLITUS WITH PERIPHERAL ARTERY DISEASE (HCC): ICD-10-CM

## 2025-03-27 DIAGNOSIS — E11.621 DIABETIC ULCER OF LEFT HEEL ASSOCIATED WITH TYPE 2 DIABETES MELLITUS, WITH BONE INVOLVEMENT WITHOUT EVIDENCE OF NECROSIS (HCC): Primary | ICD-10-CM

## 2025-03-27 DIAGNOSIS — L97.426 DIABETIC ULCER OF LEFT HEEL ASSOCIATED WITH TYPE 2 DIABETES MELLITUS, WITH BONE INVOLVEMENT WITHOUT EVIDENCE OF NECROSIS (HCC): Primary | ICD-10-CM

## 2025-03-27 DIAGNOSIS — E11.42 DIABETIC POLYNEUROPATHY ASSOCIATED WITH TYPE 2 DIABETES MELLITUS (HCC): ICD-10-CM

## 2025-03-27 LAB — GLUCOSE BLD-MCNC: 121 MG/DL (ref 70–99)

## 2025-03-27 PROCEDURE — 99215 OFFICE O/P EST HI 40 MIN: CPT | Performed by: NURSE PRACTITIONER

## 2025-03-27 NOTE — PATIENT INSTRUCTIONS
Please return on: 1 week         Patient discharge and wound care instructions  Adrien Tobin  3/27/2025             Cleansing/dressing:  In clinic, with each dressing change:    please cleanse the limb, foot, and between toes with soap, water and washcloth.   Dry thoroughly.    Cleanse/soak the wound with VASHE (or other hypochlorous wound cleanser), dab dry.    Apply the following dressings:   urea cream>xeroform    Offloading:  We have applied a Total Contact cast. If the cast is on your right foot do NOT drive while in cast. Do not get cast wet. If foot becomes painful or numb please call the wound clinic or report to the nearest emergency room to have cast removed.   >PAD ANTERIOR TIBIA  >extra layer of fiberglass on the heel and interdry weaved between the digits    Nutrition and blood sugar control:  Focus on the following:  Protein: Meats, beans, eggs, milk and yogurt particularly Greek yogurt), tofu, soy nuts, soy protein products (Follow the protein handout in your welcome folder)  Vitamin C: Citrus fruits and juices, strawberries, tomatoes, tomato juice, peppers, baked potatoes, spinach, broccoli, cauliflower, [place] sprouts, cabbage  Vitamin A: Dark green, leafy vegetables, orange or yellow vegetables, cantaloupe, fortified dairy products, liver, fortified cereals  Zinc: Fortified cereals, red meats, seafood  Consider supplementing with Adalberto by [employer] (These are essential branch chain amino acids that help with tissue building and wound healing) and take 2 packets/day. you can order online at [employer] or [employer]    If your blood sugar is consistently elevated your body can't heal and can't fight infection.    Concerns:  Signs of infection may include the following:  Increase in redness  Red \"streaks\" from wound  Increase in swelling  Fever  Unusual odor  Change in the amount of wound drainage     Should you experience any significant changes in your wound(s) or have any questions regarding  your home care instructions please contact the wound center [facility] @ [phone] If after regular business hours, please call your family doctor or local emergency room. The treatment plan has been discussed at length between you and your provider. Follow all instructions carefully, it is very important. If you do not follow all instructions you are at risk of your wound not healing, infection, possible loss of limb and even loss of life.

## 2025-03-27 NOTE — PROGRESS NOTES
Patient ID: Adrien Tobin is a 44 year old male.    Cast application   Date/Time: 3/27/2025 9:17 AM   Performed by: Eileen Keenan RN   Authorized by: Dennise Sloan APRN   Consent given by: patient  Injury  Location details: left foot  Procedure  Immobilization: cast  Cast type: total contact  Post-procedure assessment  Patient tolerance: patient tolerated the procedure well with no immediate complications  Comments  Wounds dressed with xeroform and ABD pad. Extra fiberglass layer to foot.

## 2025-03-27 NOTE — PROGRESS NOTES
.Weekly Wound Education Note    Teaching Provided To: Patient  Training Topics: Dressing, Cleasing and general instructions, Discharge instructions  Training Method: Explain/Verbal, Written  Training Response: Patient responds and understands        Notes: Wounds improving. Urea applied, wounds dressed with folded xeroform ABD pad and well padded TCC with extra fiberglass roll to foot.

## 2025-04-02 NOTE — PROGRESS NOTES
CHIEF COMPLAINT:     Chief Complaint   Patient presents with    Wound Care     Arrives for follow-up. Denies new concerns. Arrives in TCC.     HPI:   Information obtained from Patient and chart  2-19-25 RE-EVALUATION: 43yo male with hx of diabetes (a1c 7.5 4-8-24), htn, dyslipidemia, pad, esrd on hd who is well known to the wound clinic for diabetic foot ulcerations.  He presents with his spouse.  He has been most recently following with dr. block for a left heel wound which has been waxing and waning.  Most recent mri showed improvement in the reactive marrow edema of the calcaneous. Patient saw Dr. Najjar 2-12-25 who recommended “If he does not make any progress over the next 2 to 3 weeks then I think it would be reasonable for him to undergo a left lower extremity angiogram with intervention given his multiple risk factors for PAD and evidence of some degree of stenosis on his arterial flow testing.”  Patient has been in a cam boot and been treating with betadine. Will place in TCC for gold standard offloading, if the area does not improve in 3 weeks will get vascular involved again    2-27-25 patient returns.  Patient is tolerating the tcc (week 1). Wounds are improving. The dry skin on plantar foot is improving, he still has built up hyperkeratosis on the posterior heel. Will treat this with ammonium lactate and xeroform as well.  We discussed post healing foot wear. He states that he was doing well with custom shoes/inserts for 4 years.  He was arranging for new inserts/shoes and in that time the heel developed a wound-so he is thinking it was most likely related to his shoes/inserts breaking down.  He does have a \"new\" insert that he has but has not been wearing due to the wound.  I asked him to bring it to clinic next week and we will check it with lipstick to see if it could accommodate the injured foot after healing until he would get new custom ones made.  No s/s of infection or c/o pain. Continue with  poc including tcc.    3-6-25 patient returns. He did bring his shoes/inserts.  We marked both where the wound is as well as the area on the posterior heel that has significant callus build up.  The area on the posterio/plantar heel is not hitting anywhere in the shoe.  The area of the wound (in fact there is not any \"cut out\" offloading noted on the heel of the insole.  We discussed that once we do reach healing will have to monitor closely with transition to his shoe/insert. The plantar wound is slightly smaller-will utilize endoform. The lateral wound is essentially resolved, but dry.  The dry skin posterior heel is improving. No acute s/s of infection of c/o pain (keracis is not approved, epifix still pending. Theraskin is approved)    3-13-25 patient returns.  Patient followed up with primary it was noted that his A1c was now 9.9 done on 3-10-25, patient given a referral for endocrine and he was to resume his trulicity and order placed for cgm. He states this this at home moniitor was not working and he did not realize it until he started coming to wound clinic.  He has continued to be in tcc.  Epifix request got cancelled, will rerun.  Patients wound on plantar aspect is slightly smaller, the lateral fooot is healed, but there is a wound on the posterior heel (which was under the thick dry callus).  No s/s of infection or c/o pain.  Continue with tcc    3-20-25 patient returns. Epifix still pending and was rerun for insurance approval.  continues with tcc. Wounds are improved. Further Dry hyperkeratotic skin removed-continue with xeroform for moistening. Wounds are granular, no s/s of infection or c/o pain.    3-27-25 patient returns. It appears epifix is approved, but may need prior auth, will reach out to epifix rep.  He continues in the tcc and we have continued with xeroform for moisture retention.  Both areas are improved.  Patient has disability claim form to be filled out.  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 There are not any s/s of infection. Will continue with tcc and urea/xeroform for the hyperkeratotic tissue management.    4-3-25 patient returns. He has continued in TCC. He followed up with [facility] and his A1c was 9.2 (4-1-25) and had gained 10# since last visit, patient was placed on inactive transplant list until foot wounds healed and A1c improved (needs to be less than 8 for transplant).  He has not made the Endo appointment yet.  We discussed not only healing related to the increased A1c, but also transplant list.  Patient states he will make the appointment asap.  Both wounds are improved, callus continues to reduce. No s/s of infection, continue current poc.  MEDICATIONS:     Current Outpatient Medications:     insulin glargine (LANTUS SOLOSTAR) 100 UNIT/ML Subcutaneous Solution Pen-injector, Inject 22 Units into the skin nightly., Disp: 39.6 mL, Rfl: 0    Insulin Lispro, 1 Unit Dial, (HUMALOG KWIKPEN) 100 UNIT/ML Subcutaneous Solution Pen-injector, Test blood glucose 3 times daily BEFORE meals Inject 2 unit if blood glucose is between 141-180 mg/dL Inject 3 units if blood glucose is between 181-220 mg/dL Inject 6 units if blood glucose is between 221-260 mg/dL Inject 8 units if blood glucose is between 261-300 mg/dL Inject 10 units if blood glucose is between 301-350 mg/dL Call your physician if blood glucose is greater than 351 mg/dL,, Disp: 5 each, Rfl: 3    Dulaglutide (TRULICITY) 0.75 MG/0.5ML Subcutaneous Solution Auto-injector, Inject 0.75 mg into the skin once a week., Disp: 6 mL, Rfl: 0    insulin detemir 100 UNIT/ML Subcutaneous Solution Pen-injector, Inject 22 Units into the skin nightly., Disp: 19.8 mL, Rfl: 0    Continuous Glucose  (DEXCOM G7 ) Does not apply Device, USE DAILY., Disp: 1 each, Rfl: 0    Continuous Glucose Sensor (DEXCOM G7 SENSOR) Does not apply  Misc, 1 each Every 10 days. Use as directed every 10 days, Disp: 3 each, Rfl: 11    atorvastatin 40 MG Oral Tab, Take 1 tablet (40 mg total) by mouth daily., Disp: , Rfl:     Insulin Pen Needle (BD PEN NEEDLE ODALIS 2ND GEN) 32G X 4 MM Does not apply Misc, USE FOUR TIMES DAILY AS DIRECTED, Disp: 400 each, Rfl: 2    aspirin (ASPIRIN EC LOW DOSE) 81 MG Oral Tab EC, Take 1 tablet (81 mg total) by mouth daily., Disp: 90 tablet, Rfl: 0    Dulaglutide (TRULICITY) 0.75 MG/0.5ML Subcutaneous Solution Pen-injector, Inject 0.75 mg into the skin once a week. (Patient not taking: Reported on 3/10/2025), Disp: 7 mL, Rfl: 3  ALLERGIES:   Allergies[1]   REVIEW OF SYSTEMS:   This information was obtained from the patient/family and chart.    See HPI for pertinent positives, otherwise 10 pt ROS negative.  HISTORY:   Past medical, surgical, family and social history updated where appropriate.    PHYSICAL EXAM:     Vitals:    04/03/25 0729   BP: 108/74  Comment: blood sugar: 126   Pulse: 97   Resp: 18   Temp: 98.4 °F (36.9 °C)         Estimated body mass index is 32.85 kg/m² as calculated from the following:    Height as of 3/10/25: 68\".    Weight as of 3/10/25: 216 lb 0.8 oz (98 kg).   POC Glucose   Date Value Ref Range Status   04/03/2025 126 (H) 70 - 99 mg/dL Final   03/27/2025 121 (H) 70 - 99 mg/dL Final   03/20/2025 173 (H) 70 - 99 mg/dL Final       Vital signs reviewed.Appears stated age, well groomed.    Constitutional:  Bp wnl for patient. Pulse Regular and wnl for patient. Respirations easy and unlabored. Temperature wnl. Elevated bmi. Appearance neat and clean. Appears in no acute distress. Well nourished and well developed.    Lower extremity:  dp/pt palpable left. Left lower extremity free of varicosities, stable edema. Capillary refill < 3 seconds. Digits are warm, but contracted L>R. toenails are wnl for color, thickness and hygeine. Skin is dry. no hairgrowth on legs.  Left heel contour consistent with s/p multiple  surgeries to calcaneus  Musculoskeletal:  Gait and station stable   Integumentary:  refer to wound characteristics and images   Psychiatric:  Judgment and insight intact. Alert and oriented times 3. No evidence of depression, anxiety, or agitation. Calm, cooperative, and communicative.   EDEMA:   Calf  Point of Measurement - Left Calf: 34     Left Calf from:: Heel  Calf Left cm:: 33.2        Ankle  Point of Measurement - Left Ankle: 10     Left Ankle from:: Heel  Left Ankle cm:: 21.2              DIAGNOSTICS:     Lab Results   Component Value Date    BUN 39 (H) 06/25/2024    CREATSERUM 7.07 (H) 06/25/2024    ALB 3.4 05/21/2024    TP 6.9 05/21/2024    A1C 9.9 (A) 03/10/2025   2-10-25 MRI left ankle  CONCLUSION:    1. There is no specific evidence of osteomyelitis allowing for signal alteration from susceptibility artifact.  Reactive marrow edema at the plantar aspect of the calcaneus has improved since prior study.  There is no specific evidence of cortical   erosion.   2. Remaining ligamentous and tendinous structures are otherwise intact.       2-5-25 arterial duplex  RIGHT EXTREMITY:  Monophasic waveforms within the posterior tibial artery  LEFT EXTREMITY:  Monophasic to biphasic waveforms  OTHER:  None.  SYSTOLIC VELOCITIES (CM/S):  RIGHT COMMON FEMORAL:      135    RIGHT PROFUNDA FEMORAL:      94      RIGHT SUPERFICIAL FEMORAL PROX:    76  RIGHT SUPERFICIAL FEMORAL MID:    113  RIGHT SUPERFICIAL FEMORAL DISTAL:    101  RIGHT POPLITEAL PROX:      69  RIGHT POPLITEAL DISTAL:      60  TIBIOPER TRUNK:        RIGHT PTA PROX:      9  RIGHT PTA MID:      9  RIGHT PTA DISTAL:      18  RIGHT BRANDON PROX:      55  RIGHT BRANDON MID:      59    RIGHT DORSALIS PEDIS:      30  RIGHT GREAT TOE PRESSURE:      62 mmHg  SYSTOLIC VELOCITIES (CM./S):  LEFT COMMON FEMORAL:      126    LEFT PROFUNDA FEMORAL:      73      LEFT SUPERFICIAL FEMORAL PROX:    119  LEFT SUPERFICIAL FEMORAL MID:      91  LEFT SUPERFICIAL FEMORAL DISTAL:     103  LEFT POPLITEAL PROX:      142  LEFT POPLITEAL DISTAL:      89  TIBIOPER TRUNK:        LEFT PTA PROX:      18  LEFT PTA MID:      35  LEFT PTA DISTAL:      16  LEFT BRANDON PROX:      118  LEFT BRANDON MID:      130    LEFT DORSALIS PEDIS::      42  LEFT GREAT TOE PRESSURE:      40 mmHg   CONCLUSION:    1. Patent vessels with velocities as described above.  2. Limited flow within the right posterior tibial artery likely demonstrating at least moderate to severe stenosis.  3. At least mild to moderate stenosis within the bilateral superficial femoral arteries.  4. Moderate stenosis within the proximal popliteal arteries bilaterally.  5. If further evaluation is needed, consider CTA or MRA.       WOUND ASSESSMENT:     Wound 02/20/25 #3 Heel Left;Plantar (Active)   Date First Assessed/Time First Assessed: 02/20/25 0822    Wound Number (Wound Clinic Only): #3  Primary Wound Type: Diabetic Ulcer  Location: Heel  Wound Location Orientation: Left;Plantar      Assessments 2/20/2025  8:24 AM 4/3/2025  7:42 AM   Wound Image         Drainage Amount Small Moderate   Drainage Description Serosanguineous Serosanguineous   Treatments Synthetic cast --   Wound Length (cm) 1.1 cm 0.2 cm   Wound Width (cm) 1.4 cm 0.2 cm   Wound Surface Area (cm^2) 1.54 cm^2 0.04 cm^2   Wound Depth (cm) 0.1 cm 0.1 cm   Wound Volume (cm^3) 0.154 cm^3 0.004 cm^3   Wound Healing % -- 97   Margins Well-defined edges Well-defined edges   Non-staged Wound Description Full thickness Full thickness   Dagmar-wound Assessment Callous;Moist;Maceration;Dry Dry   Wound Granulation Tissue Red;Pink;Firm --   Wound Bed Granulation (%) 100 % --   Wound Odor None None   Shape -- 100% callous   Tunneling? No No   Undermining? No No   Sinus Tracts? No No   Posadas Scale Grade 2 Grade 2       Inactive Orders   Date Order Priority Status Authorizing Provider   03/27/25 0917 Cast application Routine Completed Dennise Sloan APRN   03/20/25 0856 Cast application Routine Completed  Dennise Sloan APRN   03/15/25 0650 Cast application Routine Completed Dennise Sloan APRN   03/06/25 0856 Cast application Routine Completed Dennise Sloan APRN   02/27/25 0850 Cast application Routine Completed Dennise Sloan, APRAVINASH   02/20/25 1446 Cast application Routine Completed Dennise Sloan APRAVINASH   02/20/25 0945 Debridement Diabetic Ulcer Left;Plantar Heel Routine Completed Dennise Sloan APRN       Wound 03/13/25 #5 Left Posterior Heel Heel Left;Posterior (Active)   Date First Assessed/Time First Assessed: 03/13/25 0755    Wound Number (Wound Clinic Only): #5 Left Posterior Heel  Primary Wound Type: Diabetic Ulcer  Location: Heel  Wound Location Orientation: Left;Posterior      Assessments 3/13/2025  7:58 AM 4/3/2025  7:44 AM   Wound Image        Drainage Amount Small Moderate   Drainage Description Serosanguineous Serosanguineous   Treatments Synthetic cast --   Wound Length (cm) 0.5 cm 0.2 cm   Wound Width (cm) 1 cm 0.5 cm   Wound Surface Area (cm^2) 0.5 cm^2 0.1 cm^2   Wound Depth (cm) 0.1 cm 0.1 cm   Wound Volume (cm^3) 0.05 cm^3 0.01 cm^3   Wound Healing % -- 80   Margins Well-defined edges Well-defined edges   Non-staged Wound Description Full thickness Full thickness   Dagmar-wound Assessment Dry;Callous Dry   Wound Granulation Tissue Red;Firm --   Wound Bed Granulation (%) 100 % --   Wound Odor None None   Shape -- 100% callous   Tunneling? -- No   Undermining? -- No   Sinus Tracts? -- No   Posadas Scale Grade 2 Grade 2       Inactive Orders   Date Order Priority Status Authorizing Provider   03/27/25 0917 Cast application Routine Completed Dennise Sloan APRN   03/20/25 0856 Cast application Routine Completed Dennise Sloan APRN   03/15/25 0650 Cast application Routine Completed Dennise Slona APRN            ASSESSMENT AND PLAN:    1. 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 Diabetes mellitus with  peripheral artery disease (HCC)    3. Diabetic polyneuropathy associated with type 2 diabetes mellitus (HCC)              Risks, benefits, and alternatives of current treatment plan discussed in detail.  Questions and concerns addressed. Red flags to RTC or ED reviewed.  Patient (or parent) agrees to plan.      NOTE TO PATIENT: The 21st Century Cures Act makes clinical notes like these available to patients in the interest of transparency. Clinical notes are medical documents used by physicians and care providers to communicate with each other. These documents include medical language and terminology, abbreviations, and treatment information that may sound technical and at times possibly unfamiliar. In addition, at times, the verbiage may appear blunt or direct. These documents are one tool providers use to communicate relevant information and clinical opinions of the care providers in a way that allows common understanding of the clinical context.   I spent 30minutes with the patient. This time included:    preparing to see the patient (eg, review notes and recent diagnostics),  seeing the patient, obtaining and/or reviewing separately obtained history, performing a medically appropriate examination and/or evaluation, counseling and educating the patient, documenting in the record,   DISCHARGE:      Patient Instructions   Please return on: 1 week       !RUDOLPH! Make an appointment with endocrine    Patient discharge and wound care instructions  Adrien Tobin  4/3/2025               Cleansing/dressing:  In clinic, with each dressing change:    please cleanse the limb, foot, and between toes with soap, water and washcloth.   Dry thoroughly.    Cleanse/soak the wound with VASHE (or other hypochlorous wound cleanser), dab dry.    Apply the following dressings:   urea cream>xeroform    Offloading:  We have applied a Total Contact cast. If the cast is on your right foot do NOT drive while in cast. Do not get cast  wet. If foot becomes painful or numb please call the wound clinic or report to the nearest emergency room to have cast removed.   >PAD ANTERIOR TIBIA  >extra layer of fiberglass on the heel and interdry weaved between the digits    Nutrition and blood sugar control:  Focus on the following:  Protein: Meats, beans, eggs, milk and yogurt particularly Greek yogurt), tofu, soy nuts, soy protein products (Follow the protein handout in your welcome folder)  Vitamin C: Citrus fruits and juices, strawberries, tomatoes, tomato juice, peppers, baked potatoes, spinach, broccoli, cauliflower, [place] sprouts, cabbage  Vitamin A: Dark green, leafy vegetables, orange or yellow vegetables, cantaloupe, fortified dairy products, liver, fortified cereals  Zinc: Fortified cereals, red meats, seafood  Consider supplementing with Adalberto by [employer] (These are essential branch chain amino acids that help with tissue building and wound healing) and take 2 packets/day. you can order online at [employer] or [employer]    If your blood sugar is consistently elevated your body can't heal and can't fight infection.    Concerns:  Signs of infection may include the following:  Increase in redness  Red \"streaks\" from wound  Increase in swelling  Fever  Unusual odor  Change in the amount of wound drainage     Should you experience any significant changes in your wound(s) or have any questions regarding your home care instructions please contact the wound center [facility] @ [phone] If after regular business hours, please call your family doctor or local emergency room. The treatment plan has been discussed at length between you and your provider. Follow all instructions carefully, it is very important. If you do not follow all instructions you are at risk of your wound not healing, infection, possible loss of limb and even loss of life.   Dennise Sloan FNP-C, CWCN-AP, CFCN, CSWS, WCC, DWC  4/3/2025            [1]   Allergies  Allergen  Reactions    Heparin OTHER (SEE COMMENTS)     Per pt platelets is low every time was given to him

## 2025-04-03 ENCOUNTER — OFFICE VISIT (OUTPATIENT)
Dept: WOUND CARE | Facility: HOSPITAL | Age: 45
End: 2025-04-03
Attending: NURSE PRACTITIONER
Payer: COMMERCIAL

## 2025-04-03 VITALS
DIASTOLIC BLOOD PRESSURE: 74 MMHG | SYSTOLIC BLOOD PRESSURE: 108 MMHG | HEART RATE: 97 BPM | TEMPERATURE: 98 F | RESPIRATION RATE: 18 BRPM

## 2025-04-03 DIAGNOSIS — L97.426 DIABETIC ULCER OF LEFT HEEL ASSOCIATED WITH TYPE 2 DIABETES MELLITUS, WITH BONE INVOLVEMENT WITHOUT EVIDENCE OF NECROSIS (HCC): Primary | ICD-10-CM

## 2025-04-03 DIAGNOSIS — E11.621 DIABETIC ULCER OF LEFT HEEL ASSOCIATED WITH TYPE 2 DIABETES MELLITUS, WITH BONE INVOLVEMENT WITHOUT EVIDENCE OF NECROSIS (HCC): Primary | ICD-10-CM

## 2025-04-03 DIAGNOSIS — E11.42 DIABETIC POLYNEUROPATHY ASSOCIATED WITH TYPE 2 DIABETES MELLITUS (HCC): ICD-10-CM

## 2025-04-03 DIAGNOSIS — E11.51 DIABETES MELLITUS WITH PERIPHERAL ARTERY DISEASE (HCC): ICD-10-CM

## 2025-04-03 LAB — GLUCOSE BLD-MCNC: 126 MG/DL (ref 70–99)

## 2025-04-03 PROCEDURE — 99214 OFFICE O/P EST MOD 30 MIN: CPT | Performed by: NURSE PRACTITIONER

## 2025-04-03 NOTE — PROGRESS NOTES
Patient ID: Adrien Tobin is a 44 year old male.    Cast application   Date/Time: 4/3/2025 8:36 AM   Performed by: Eileen Keenan RN   Authorized by: Dennise Sloan APRN   Consent given by: patient  Injury  Location details: left foot  Procedure  Immobilization: cast  Cast type: total contact  Post-procedure assessment  Patient tolerance: patient tolerated the procedure well with no immediate complications  Comments  Wounds dressed with folded xeroform and ABD pad.

## 2025-04-03 NOTE — PATIENT INSTRUCTIONS
Please return on: 1 week       !RUDOLPH! Make an appointment with endocrine    Patient discharge and wound care instructions  Adrien Tobin  4/3/2025               Cleansing/dressing:  In clinic, with each dressing change:    please cleanse the limb, foot, and between toes with soap, water and washcloth.   Dry thoroughly.    Cleanse/soak the wound with VASHE (or other hypochlorous wound cleanser), dab dry.    Apply the following dressings:   urea cream>xeroform    Offloading:  We have applied a Total Contact cast. If the cast is on your right foot do NOT drive while in cast. Do not get cast wet. If foot becomes painful or numb please call the wound clinic or report to the nearest emergency room to have cast removed.   >PAD ANTERIOR TIBIA  >extra layer of fiberglass on the heel and interdry weaved between the digits    Nutrition and blood sugar control:  Focus on the following:  Protein: Meats, beans, eggs, milk and yogurt particularly Greek yogurt), tofu, soy nuts, soy protein products (Follow the protein handout in your welcome folder)  Vitamin C: Citrus fruits and juices, strawberries, tomatoes, tomato juice, peppers, baked potatoes, spinach, broccoli, cauliflower, [place] sprouts, cabbage  Vitamin A: Dark green, leafy vegetables, orange or yellow vegetables, cantaloupe, fortified dairy products, liver, fortified cereals  Zinc: Fortified cereals, red meats, seafood  Consider supplementing with Adalberto by "[employer] (These are essential branch chain amino acids that help with tissue building and wound healing) and take 2 packets/day. you can order online at [employer] or [employer]    If your blood sugar is consistently elevated your body can't heal and can't fight infection.    Concerns:  Signs of infection may include the following:  Increase in redness  Red \"streaks\" from wound  Increase in swelling  Fever  Unusual odor  Change in the amount of wound drainage     Should you experience any significant changes in your  wound(s) or have any questions regarding your home care instructions please contact the wound center [facility] @ [phone] If after regular business hours, please call your family doctor or local emergency room. The treatment plan has been discussed at length between you and your provider. Follow all instructions carefully, it is very important. If you do not follow all instructions you are at risk of your wound not healing, infection, possible loss of limb and even loss of life.

## 2025-04-03 NOTE — PROGRESS NOTES
.Weekly Wound Education Note    Teaching Provided To: Patient  Training Topics: Dressing, Cleasing and general instructions, Discharge instructions, Off-loading  Training Method: Explain/Verbal, Written  Training Response: Patient responds and understands        Notes: Wounds improving. Continue folded xeroform, ABD pad and well padded TCC with extra fiberglass roll to foot. Provided pt with information regarding his endocrinology referral and the provider info.

## 2025-04-09 NOTE — PROGRESS NOTES
CHIEF COMPLAINT:     Chief Complaint   Patient presents with    Wound Recheck     Pt here for follow up arrives with TCC to left foot. No concerns with TCC     HPI:   Information obtained from Patient and chart  2-19-25 RE-EVALUATION: 45yo male with hx of diabetes (a1c 7.5 4-8-24), htn, dyslipidemia, pad, esrd on hd who is well known to the wound clinic for diabetic foot ulcerations.  He presents with his spouse.  He has been most recently following with dr. block for a left heel wound which has been waxing and waning.  Most recent mri showed improvement in the reactive marrow edema of the calcaneous. Patient saw Dr. Najjar 2-12-25 who recommended “If he does not make any progress over the next 2 to 3 weeks then I think it would be reasonable for him to undergo a left lower extremity angiogram with intervention given his multiple risk factors for PAD and evidence of some degree of stenosis on his arterial flow testing.”  Patient has been in a cam boot and been treating with betadine. Will place in TCC for gold standard offloading, if the area does not improve in 3 weeks will get vascular involved again    HPI PRIOR TO APRIL 2025 SEE INDIVIDUAL PROGRESS NOTE  3-20-25 patient returns. Epifix still pending and was rerun for insurance approval.  continues with tcc. Wounds are improved. Further Dry hyperkeratotic skin removed-continue with xeroform for moistening. Wounds are granular, no s/s of infection or c/o pain.    3-27-25 patient returns. It appears epifix is approved, but may need prior auth, will reach out to epifix rep.  He continues in the tcc and we have continued with xeroform for moisture retention.  Both areas are improved.  Patient has disability claim form to be filled out.  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 There are not any s/s of infection. Will continue with tcc and urea/xeroform for the  hyperkeratotic tissue management.    4-3-25 patient returns. He has continued in TCC. He followed up with [facility] and his A1c was 9.2 (4-1-25) and had gained 10# since last visit, patient was placed on inactive transplant list until foot wounds healed and A1c improved (needs to be less than 8 for transplant).  He has not made the Endo appointment yet.  We discussed not only healing related to the increased A1c, but also transplant list.  Patient states he will make the appointment asap.  Both wounds are improved, callus continues to reduce. No s/s of infection, continue current poc.    4-10-25 patient returns.  His appointment with endocrine is 5-1. The plantar wound appears fragilly resolved s/p debridement, the posterior heel has a ss filled blister. Patient denies feeling like the tcc was allowing more movement over the last week.  No s/s of infection or c/o pain.  MEDICATIONS:     Current Outpatient Medications:     insulin glargine (LANTUS SOLOSTAR) 100 UNIT/ML Subcutaneous Solution Pen-injector, Inject 22 Units into the skin nightly., Disp: 39.6 mL, Rfl: 0    Insulin Lispro, 1 Unit Dial, (HUMALOG KWIKPEN) 100 UNIT/ML Subcutaneous Solution Pen-injector, Test blood glucose 3 times daily BEFORE meals Inject 2 unit if blood glucose is between 141-180 mg/dL Inject 3 units if blood glucose is between 181-220 mg/dL Inject 6 units if blood glucose is between 221-260 mg/dL Inject 8 units if blood glucose is between 261-300 mg/dL Inject 10 units if blood glucose is between 301-350 mg/dL Call your physician if blood glucose is greater than 351 mg/dL,, Disp: 5 each, Rfl: 3    Dulaglutide (TRULICITY) 0.75 MG/0.5ML Subcutaneous Solution Auto-injector, Inject 0.75 mg into the skin once a week., Disp: 6 mL, Rfl: 0    insulin detemir 100 UNIT/ML Subcutaneous Solution Pen-injector, Inject 22 Units into the skin nightly., Disp: 19.8 mL, Rfl: 0    Continuous Glucose  (DEXCOM G7 ) Does not apply Device, USE  DAILY., Disp: 1 each, Rfl: 0    Continuous Glucose Sensor (DEXCOM G7 SENSOR) Does not apply Misc, 1 each Every 10 days. Use as directed every 10 days, Disp: 3 each, Rfl: 11    atorvastatin 40 MG Oral Tab, Take 1 tablet (40 mg total) by mouth daily., Disp: , Rfl:     Insulin Pen Needle (BD PEN NEEDLE ODALIS 2ND GEN) 32G X 4 MM Does not apply Misc, USE FOUR TIMES DAILY AS DIRECTED, Disp: 400 each, Rfl: 2    aspirin (ASPIRIN EC LOW DOSE) 81 MG Oral Tab EC, Take 1 tablet (81 mg total) by mouth daily., Disp: 90 tablet, Rfl: 0    Dulaglutide (TRULICITY) 0.75 MG/0.5ML Subcutaneous Solution Pen-injector, Inject 0.75 mg into the skin once a week. (Patient not taking: Reported on 3/10/2025), Disp: 7 mL, Rfl: 3  ALLERGIES:   Allergies[1]   REVIEW OF SYSTEMS:   This information was obtained from the patient/family and chart.    See HPI for pertinent positives, otherwise 10 pt ROS negative.  HISTORY:   Past medical, surgical, family and social history updated where appropriate.    PHYSICAL EXAM:     Vitals:    04/10/25 0739 04/10/25 0832   BP: 129/86  Comment: blood sugar 130 113/77   Pulse: 107 109   Resp: 15    Temp: 97.6 °F (36.4 °C)            Estimated body mass index is 32.85 kg/m² as calculated from the following:    Height as of 3/10/25: 68\".    Weight as of 3/10/25: 216 lb 0.8 oz (98 kg).   POC Glucose   Date Value Ref Range Status   04/03/2025 126 (H) 70 - 99 mg/dL Final   03/27/2025 121 (H) 70 - 99 mg/dL Final   03/20/2025 173 (H) 70 - 99 mg/dL Final       Vital signs reviewed.Appears stated age, well groomed.    Constitutional:  Bp slightly elevated on initial check, recheck improved, no symptoms, patient reports they are giving him a medication (started yesterday) to raise his bp as he was dropping too low at dialysis. Pulse Regular and wnl for patient. Respirations easy and unlabored. Temperature wnl. Elevated bmi. Appearance neat and clean. Appears in no acute distress. Well nourished and well developed.    Lower  extremity:  dp/pt palpable left. Left lower extremity free of varicosities, stable edema. Capillary refill < 3 seconds. Digits are warm, but contracted L>R. toenails are wnl for color, thickness and hygeine. Skin is dry. no hairgrowth on legs.  Left heel contour consistent with s/p multiple surgeries to calcaneus  Musculoskeletal:  Gait and station stable   Integumentary:  refer to wound characteristics and images   Psychiatric:  Judgment and insight intact. Alert and oriented times 3. No evidence of depression, anxiety, or agitation. Calm, cooperative, and communicative.   EDEMA:   Calf  Point of Measurement - Left Calf: 34     Left Calf from:: Heel  Calf Left cm:: 33.6        Ankle  Point of Measurement - Left Ankle: 10     Left Ankle from:: Heel  Left Ankle cm:: 19.9              DIAGNOSTICS:     Lab Results   Component Value Date    BUN 39 (H) 06/25/2024    CREATSERUM 7.07 (H) 06/25/2024    ALB 3.4 05/21/2024    TP 6.9 05/21/2024    A1C 9.9 (A) 03/10/2025   2-10-25 MRI left ankle  CONCLUSION:    1. There is no specific evidence of osteomyelitis allowing for signal alteration from susceptibility artifact.  Reactive marrow edema at the plantar aspect of the calcaneus has improved since prior study.  There is no specific evidence of cortical   erosion.   2. Remaining ligamentous and tendinous structures are otherwise intact.       2-5-25 arterial duplex  RIGHT EXTREMITY:  Monophasic waveforms within the posterior tibial artery  LEFT EXTREMITY:  Monophasic to biphasic waveforms  OTHER:  None.  SYSTOLIC VELOCITIES (CM/S):  RIGHT COMMON FEMORAL:      135    RIGHT PROFUNDA FEMORAL:      94      RIGHT SUPERFICIAL FEMORAL PROX:    76  RIGHT SUPERFICIAL FEMORAL MID:    113  RIGHT SUPERFICIAL FEMORAL DISTAL:    101  RIGHT POPLITEAL PROX:      69  RIGHT POPLITEAL DISTAL:      60  TIBIOPER TRUNK:        RIGHT PTA PROX:      9  RIGHT PTA MID:      9  RIGHT PTA DISTAL:      18  RIGHT BRANDON PROX:      55  RIGHT BRANDON MID:      59     RIGHT DORSALIS PEDIS:      30  RIGHT GREAT TOE PRESSURE:      62 mmHg  SYSTOLIC VELOCITIES (CM./S):  LEFT COMMON FEMORAL:      126    LEFT PROFUNDA FEMORAL:      73      LEFT SUPERFICIAL FEMORAL PROX:    119  LEFT SUPERFICIAL FEMORAL MID:      91  LEFT SUPERFICIAL FEMORAL DISTAL:    103  LEFT POPLITEAL PROX:      142  LEFT POPLITEAL DISTAL:      89  TIBIOPER TRUNK:        LEFT PTA PROX:      18  LEFT PTA MID:      35  LEFT PTA DISTAL:      16  LEFT BRANDON PROX:      118  LEFT BRANDON MID:      130    LEFT DORSALIS PEDIS::      42  LEFT GREAT TOE PRESSURE:      40 mmHg   CONCLUSION:    1. Patent vessels with velocities as described above.  2. Limited flow within the right posterior tibial artery likely demonstrating at least moderate to severe stenosis.  3. At least mild to moderate stenosis within the bilateral superficial femoral arteries.  4. Moderate stenosis within the proximal popliteal arteries bilaterally.  5. If further evaluation is needed, consider CTA or MRA.       WOUND ASSESSMENT:     Wound 02/20/25 #3 Heel Left;Plantar (Active)   Date First Assessed/Time First Assessed: 02/20/25 0822    Wound Number (Wound Clinic Only): #3  Primary Wound Type: Diabetic Ulcer  Location: Heel  Wound Location Orientation: Left;Plantar      Assessments 2/20/2025  8:24 AM 4/10/2025  7:55 AM   Wound Image         Drainage Amount Small None   Drainage Description Serosanguineous --   Treatments Synthetic cast Synthetic cast   Wound Length (cm) 1.1 cm 0.1 cm   Wound Width (cm) 1.4 cm 0.1 cm   Wound Surface Area (cm^2) 1.54 cm^2 0.01 cm^2   Wound Depth (cm) 0.1 cm 0 cm   Wound Volume (cm^3) 0.154 cm^3 0 cm^3   Wound Healing % -- 100   Margins Well-defined edges Well-defined edges   Non-staged Wound Description Full thickness Full thickness   Dagmar-wound Assessment Callous;Moist;Maceration;Dry Callous   Wound Granulation Tissue Red;Pink;Firm --   Wound Bed Granulation (%) 100 % --   Wound Bed Epithelium (%) -- 100 %   Wound Odor None  None   Tunneling? No --   Undermining? No --   Sinus Tracts? No --   Posadas Scale Grade 2 Grade 2       Inactive Orders   Date Order Priority Status Authorizing Provider   04/10/25 0824 Debridement Diabetic Ulcer Left;Plantar Heel Routine Completed Jiongco, Dennise, APRN   04/03/25 0836 Cast application Routine Completed Jiongco, Dennise, APRN   03/27/25 0917 Cast application Routine Completed Jiongco, Dennise, APRN   03/20/25 0856 Cast application Routine Completed Jiongco, Dennise, APRN   03/15/25 0650 Cast application Routine Completed Jiongco, Dennise, APRN   03/06/25 0856 Cast application Routine Completed Jiongco, Dennise, APRN   02/27/25 0850 Cast application Routine Completed Jiongco Dennise, APRN   02/20/25 1446 Cast application Routine Completed Jiongco Dennise, APRN   02/20/25 0945 Debridement Diabetic Ulcer Left;Plantar Heel Routine Completed Dennise Sloan, APRN       Wound 03/13/25 #5 Heel Left;Posterior (Active)   Date First Assessed/Time First Assessed: 03/13/25 0755    Wound Number (Wound Clinic Only): #5  Primary Wound Type: Diabetic Ulcer  Location: Heel  Wound Location Orientation: Left;Posterior      Assessments 3/13/2025  7:58 AM 4/10/2025  7:53 AM   Wound Image        Drainage Amount Small Small   Drainage Description Serosanguineous Serosanguineous   Treatments Synthetic cast Synthetic cast   Wound Length (cm) 0.5 cm 0.2 cm   Wound Width (cm) 1 cm 0.6 cm   Wound Surface Area (cm^2) 0.5 cm^2 0.09 cm^2   Wound Depth (cm) 0.1 cm 0.1 cm   Wound Volume (cm^3) 0.05 cm^3 0.006 cm^3   Wound Healing % -- 88   Margins Well-defined edges Well-defined edges   Non-staged Wound Description Full thickness Full thickness   Dagmar-wound Assessment Dry;Callous Moist;Callous   Wound Granulation Tissue Red;Firm --   Wound Bed Granulation (%) 100 % --   Wound Bed Epithelium (%) -- 100 %   Wound Odor None None   Tunneling? -- No   Undermining? -- No   Sinus Tracts? -- No   Posadas Scale Grade 2 Grade 2       Inactive Orders   Date  Order Priority Status Authorizing Provider   04/10/25 0825 Debridement Diabetic Ulcer Left;Posterior Heel Routine Completed Dennise Sloan, APRN   04/03/25 0836 Cast application Routine Completed Dennise Sloan, APRN   03/27/25 0917 Cast application Routine Completed Dennise Sloan, APRN   03/20/25 0856 Cast application Routine Completed Dennise Sloan, APRN   03/15/25 0650 Cast application Routine Completed Dennise Sloan, APRN        PROCEDURE:      This procedure was medically necessary to promote wound healing by removing nonviable tissue, decrease chance of infection, and return the wound to an acute state.  See rn px note    ASSESSMENT AND PLAN:    1. 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 Diabetes mellitus with peripheral artery disease (HCC)    3. Diabetic polyneuropathy associated with type 2 diabetes mellitus (HCC)                Risks, benefits, and alternatives of current treatment plan discussed in detail.  Questions and concerns addressed. Red flags to RTC or ED reviewed.  Patient (or parent) agrees to plan.      NOTE TO PATIENT: The 21st Century Cures Act makes clinical notes like these available to patients in the interest of transparency. Clinical notes are medical documents used by physicians and care providers to communicate with each other. These documents include medical language and terminology, abbreviations, and treatment information that may sound technical and at times possibly unfamiliar. In addition, at times, the verbiage may appear blunt or direct. These documents are one tool providers use to communicate relevant information and clinical opinions of the care providers in a way that allows common understanding of the clinical context.   I spent 30 total minutes with the patient. This time included:     preparing to see the patient (eg, review notes and recent diagnostics),  seeing the patient, obtaining and/or reviewing separately obtained history, performing a medically appropriate examination and/or evaluation, counseling and educating the patient, documenting in the record, bill selective debridements & E&M 10 min r/t bp monitoring  DISCHARGE:      Patient Instructions   Please return on: 1 week          Patient discharge and wound care instructions  Adrien Tobin  4/10/2025           Cleansing/dressing:  In clinic, with each dressing change:    please cleanse the limb, foot, and between toes with soap, water and washcloth.   Dry thoroughly.    Cleanse/soak the wound with VASHE (or other hypochlorous wound cleanser), dab dry.    Apply the following dressings:   plantar: silver border foam         Posterior:  madyson>silver border foam    Offloading:  We have applied a Total Contact cast. If the cast is on your right foot do NOT drive while in cast. Do not get cast wet. If foot becomes painful or numb please call the wound clinic or report to the nearest emergency room to have cast removed.   >PAD ANTERIOR TIBIA  >extra layer of fiberglass on the heel and interdry weaved between the digits    Nutrition and blood sugar control:  Focus on the following:  Protein: Meats, beans, eggs, milk and yogurt particularly Greek yogurt), tofu, soy nuts, soy protein products (Follow the protein handout in your welcome folder)  Vitamin C: Citrus fruits and juices, strawberries, tomatoes, tomato juice, peppers, baked potatoes, spinach, broccoli, cauliflower, [place] sprouts, cabbage  Vitamin A: Dark green, leafy vegetables, orange or yellow vegetables, cantaloupe, fortified dairy products, liver, fortified cereals  Zinc: Fortified cereals, red meats, seafood  Consider supplementing with Adalberto by [employer] (These are essential branch chain amino acids that help with tissue building and wound healing) and take 2  packets/day. you can order online at [employer] or [employer]    If your blood sugar is consistently elevated your body can't heal and can't fight infection.    Concerns:  Signs of infection may include the following:  Increase in redness  Red \"streaks\" from wound  Increase in swelling  Fever  Unusual odor  Change in the amount of wound drainage     Should you experience any significant changes in your wound(s) or have any questions regarding your home care instructions please contact the [facility] @ [phone] If after regular business hours, please call your family doctor or local emergency room. The treatment plan has been discussed at length between you and your provider. Follow all instructions carefully, it is very important. If you do not follow all instructions you are at risk of your wound not healing, infection, possible loss of limb and even loss of life.   Dennise Sloan FNP-C, CWCN-AP, CFCN, CSWS, WCC, DWC  4/10/2025            [1]   Allergies  Allergen Reactions    Heparin OTHER (SEE COMMENTS)     Per pt platelets is low every time was given to him

## 2025-04-10 ENCOUNTER — OFFICE VISIT (OUTPATIENT)
Dept: WOUND CARE | Facility: HOSPITAL | Age: 45
End: 2025-04-10
Attending: NURSE PRACTITIONER
Payer: COMMERCIAL

## 2025-04-10 VITALS
HEART RATE: 109 BPM | TEMPERATURE: 98 F | SYSTOLIC BLOOD PRESSURE: 113 MMHG | DIASTOLIC BLOOD PRESSURE: 77 MMHG | RESPIRATION RATE: 15 BRPM

## 2025-04-10 DIAGNOSIS — E11.51 DIABETES MELLITUS WITH PERIPHERAL ARTERY DISEASE (HCC): ICD-10-CM

## 2025-04-10 DIAGNOSIS — L97.426 DIABETIC ULCER OF LEFT HEEL ASSOCIATED WITH TYPE 2 DIABETES MELLITUS, WITH BONE INVOLVEMENT WITHOUT EVIDENCE OF NECROSIS (HCC): Primary | ICD-10-CM

## 2025-04-10 DIAGNOSIS — E11.621 DIABETIC ULCER OF LEFT HEEL ASSOCIATED WITH TYPE 2 DIABETES MELLITUS, WITH BONE INVOLVEMENT WITHOUT EVIDENCE OF NECROSIS (HCC): Primary | ICD-10-CM

## 2025-04-10 DIAGNOSIS — E11.42 DIABETIC POLYNEUROPATHY ASSOCIATED WITH TYPE 2 DIABETES MELLITUS (HCC): ICD-10-CM

## 2025-04-10 LAB — GLUCOSE BLD-MCNC: 130 MG/DL (ref 70–99)

## 2025-04-10 PROCEDURE — 97597 DBRDMT OPN WND 1ST 20 CM/<: CPT | Performed by: NURSE PRACTITIONER

## 2025-04-10 PROCEDURE — 99212 OFFICE O/P EST SF 10 MIN: CPT | Performed by: NURSE PRACTITIONER

## 2025-04-10 NOTE — PROGRESS NOTES
Patient ID: Adrien Tobin is a 44 year old male.    Debridement Diabetic Ulcer Left;Plantar Heel   Wound 02/20/25 #3 Heel Left;Plantar    Performed by: Dennise Sloan APRN  Authorized by: Dennise Sloan APRN      Consent   Consent obtained? verbal  Consent given by: patient    Debridement Details  Performed by: NP  Debridement type: conservative sharp    Pre-debridement measurements  Length (cm): 0.1  Width (cm): 0.1  Depth (cm): 0  Surface Area (cm^2): 0.01    Post-debridement measurements  Length (cm): 0.1  Width (cm): 0.1  Depth (cm): 0.1  Percent debrided: 100%  Surface Area (cm^2): 0.01  Area Debrided (cm^2): 0.01  Volume (cm^3): 0    Devitalized tissue debrided: biofilm and callus  Instrument(s) utilized: blade  Bleeding: none  Procedural pain (0-10): insensate  Post-procedural pain: insensate   Response to treatment: procedure was tolerated well    Debridement Diabetic Ulcer Left;Posterior Heel   Wound 03/13/25 #5 Heel Left;Posterior    Performed by: Dennise Sloan APRN  Authorized by: Dennise Sloan APRN      Consent   Consent obtained? verbal  Consent given by: patient  Risks discussed? procedural risks discussed    Debridement Details  Performed by: NP  Debridement type: conservative sharp    Pre-debridement measurements  Length (cm): 0.2  Width (cm): 0.6  Depth (cm): 0.1  Surface Area (cm^2): 0.09    Post-debridement measurements  Length (cm): 0.4  Width (cm): 0.6  Depth (cm): 0.2  Percent debrided: 100%  Surface Area (cm^2): 0.19  Area Debrided (cm^2): 0.19  Volume (cm^3): 0.03    Devitalized tissue debrided: biofilm, callus and Blister Roof  Instrument(s) utilized: blade  Bleeding: small  Hemostasis obtained with: pressure  Procedural pain (0-10): insensate  Post-procedural pain: insensate   Response to treatment: procedure was tolerated well

## 2025-04-10 NOTE — PROGRESS NOTES
.Weekly Wound Education Note    Teaching Provided To: Patient  Training Topics: Dressing, Cleasing and general instructions, Discharge instructions, Off-loading  Training Method: Explain/Verbal, Written  Training Response: Patient responds and understands        Notes: Wounds improving. Dressing changed to madyson and bordered silver foam to posterior heel, bordered silver foam to plantar heel. Well padded TCC applied with extra fiberglass layer to foot.

## 2025-04-10 NOTE — PATIENT INSTRUCTIONS
Please return on: 1 week          Patient discharge and wound care instructions  Adrien Tobin  4/10/2025           Cleansing/dressing:  In clinic, with each dressing change:    please cleanse the limb, foot, and between toes with soap, water and washcloth.   Dry thoroughly.    Cleanse/soak the wound with VASHE (or other hypochlorous wound cleanser), dab dry.    Apply the following dressings:   plantar: silver border foam         Posterior:  madyson>silver border foam    Offloading:  We have applied a Total Contact cast. If the cast is on your right foot do NOT drive while in cast. Do not get cast wet. If foot becomes painful or numb please call the wound clinic or report to the nearest emergency room to have cast removed.   >PAD ANTERIOR TIBIA  >extra layer of fiberglass on the heel and interdry weaved between the digits    Nutrition and blood sugar control:  Focus on the following:  Protein: Meats, beans, eggs, milk and yogurt particularly Greek yogurt), tofu, soy nuts, soy protein products (Follow the protein handout in your welcome folder)  Vitamin C: Citrus fruits and juices, strawberries, tomatoes, tomato juice, peppers, baked potatoes, spinach, broccoli, cauliflower, [place] sprouts, cabbage  Vitamin A: Dark green, leafy vegetables, orange or yellow vegetables, cantaloupe, fortified dairy products, liver, fortified cereals  Zinc: Fortified cereals, red meats, seafood  Consider supplementing with Adalberto by [employer] (These are essential branch chain amino acids that help with tissue building and wound healing) and take 2 packets/day. you can order online at [employer] or [employer]    If your blood sugar is consistently elevated your body can't heal and can't fight infection.    Concerns:  Signs of infection may include the following:  Increase in redness  Red \"streaks\" from wound  Increase in swelling  Fever  Unusual odor  Change in the amount of wound drainage     Should you experience any significant  changes in your wound(s) or have any questions regarding your home care instructions please contact the wound center [facility] @ [phone] If after regular business hours, please call your family doctor or local emergency room. The treatment plan has been discussed at length between you and your provider. Follow all instructions carefully, it is very important. If you do not follow all instructions you are at risk of your wound not healing, infection, possible loss of limb and even loss of life.

## 2025-04-16 NOTE — PROGRESS NOTES
CHIEF COMPLAINT:     Chief Complaint   Patient presents with    Wound Care     Patients is here for a follow up. Patients stated no issue at this moment      HPI:   Information obtained from Patient and chart  2-19-25 RE-EVALUATION: 45yo male with hx of diabetes (a1c 7.5 4-8-24), htn, dyslipidemia, pad, esrd on hd who is well known to the wound clinic for diabetic foot ulcerations.  He presents with his spouse.  He has been most recently following with dr. block for a left heel wound which has been waxing and waning.  Most recent mri showed improvement in the reactive marrow edema of the calcaneous. Patient saw Dr. Najjar 2-12-25 who recommended “If he does not make any progress over the next 2 to 3 weeks then I think it would be reasonable for him to undergo a left lower extremity angiogram with intervention given his multiple risk factors for PAD and evidence of some degree of stenosis on his arterial flow testing.”  Patient has been in a cam boot and been treating with betadine. Will place in TCC for gold standard offloading, if the area does not improve in 3 weeks will get vascular involved again    HPI PRIOR TO APRIL 2025 SEE INDIVIDUAL PROGRESS NOTE  3-20-25 patient returns. Epifix still pending and was rerun for insurance approval.  continues with tcc. Wounds are improved. Further Dry hyperkeratotic skin removed-continue with xeroform for moistening. Wounds are granular, no s/s of infection or c/o pain.    3-27-25 patient returns. It appears epifix is approved, but may need prior auth, will reach out to epifix rep.  He continues in the tcc and we have continued with xeroform for moisture retention.  Both areas are improved.  Patient has disability claim form to be filled out.  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 There are not any s/s of infection. Will continue with tcc and urea/xeroform for the hyperkeratotic  tissue management.    4-3-25 patient returns. He has continued in TCC. He followed up with [facility] and his A1c was 9.2 (4-1-25) and had gained 10# since last visit, patient was placed on inactive transplant list until foot wounds healed and A1c improved (needs to be less than 8 for transplant).  He has not made the Endo appointment yet.  We discussed not only healing related to the increased A1c, but also transplant list.  Patient states he will make the appointment asap.  Both wounds are improved, callus continues to reduce. No s/s of infection, continue current poc.    4-10-25 patient returns.  His appointment with endocrine is 5-1. The plantar wound appears fragilly resolved s/p debridement, the posterior heel has a ss filled blister. Patient denies feeling like the tcc was allowing more movement over the last week.  No s/s of infection or c/o pain.    4-17-25 patient returns . last week the plantar wound appeared fragilly healed however patient had a new blister noted to his posterior heel.  Today both wounds are resolved. No new blistering noted.  Patient instructed to bring shoes to next appointment and tomake an appointment for f/u with dr. Lewis.  I will also update dr block on patient's progress. No c/o pain or s/s of infection.  MEDICATIONS:     Current Outpatient Medications:     insulin glargine (LANTUS SOLOSTAR) 100 UNIT/ML Subcutaneous Solution Pen-injector, Inject 22 Units into the skin nightly., Disp: 39.6 mL, Rfl: 0    Insulin Lispro, 1 Unit Dial, (HUMALOG KWIKPEN) 100 UNIT/ML Subcutaneous Solution Pen-injector, Test blood glucose 3 times daily BEFORE meals Inject 2 unit if blood glucose is between 141-180 mg/dL Inject 3 units if blood glucose is between 181-220 mg/dL Inject 6 units if blood glucose is between 221-260 mg/dL Inject 8 units if blood glucose is between 261-300 mg/dL Inject 10 units if blood glucose is between 301-350 mg/dL Call your physician if blood glucose is greater than  351 mg/dL,, Disp: 5 each, Rfl: 3    Dulaglutide (TRULICITY) 0.75 MG/0.5ML Subcutaneous Solution Auto-injector, Inject 0.75 mg into the skin once a week., Disp: 6 mL, Rfl: 0    insulin detemir 100 UNIT/ML Subcutaneous Solution Pen-injector, Inject 22 Units into the skin nightly., Disp: 19.8 mL, Rfl: 0    Continuous Glucose  (DEXCOM G7 ) Does not apply Device, USE DAILY., Disp: 1 each, Rfl: 0    Continuous Glucose Sensor (DEXCOM G7 SENSOR) Does not apply Misc, 1 each Every 10 days. Use as directed every 10 days, Disp: 3 each, Rfl: 11    atorvastatin 40 MG Oral Tab, Take 1 tablet (40 mg total) by mouth daily., Disp: , Rfl:     Insulin Pen Needle (BD PEN NEEDLE ODALIS 2ND GEN) 32G X 4 MM Does not apply Misc, USE FOUR TIMES DAILY AS DIRECTED, Disp: 400 each, Rfl: 2    aspirin (ASPIRIN EC LOW DOSE) 81 MG Oral Tab EC, Take 1 tablet (81 mg total) by mouth daily., Disp: 90 tablet, Rfl: 0    Dulaglutide (TRULICITY) 0.75 MG/0.5ML Subcutaneous Solution Pen-injector, Inject 0.75 mg into the skin once a week. (Patient not taking: Reported on 3/10/2025), Disp: 7 mL, Rfl: 3  ALLERGIES:   Allergies[1]   REVIEW OF SYSTEMS:   This information was obtained from the patient/family and chart.    See HPI for pertinent positives, otherwise 10 pt ROS negative.  HISTORY:   Past medical, surgical, family and social history updated where appropriate.    PHYSICAL EXAM:     Vitals:    04/17/25 0700   BP: 133/83  Comment: 146 glucose   Pulse: 102   Resp: 16   Temp: 98 °F (36.7 °C)     Estimated body mass index is 32.85 kg/m² as calculated from the following:    Height as of 3/10/25: 68\".    Weight as of 3/10/25: 216 lb 0.8 oz (98 kg).   POC Glucose   Date Value Ref Range Status   04/17/2025 146 (H) 70 - 99 mg/dL Final   04/10/2025 130 (H) 70 - 99 mg/dL Final   04/03/2025 126 (H) 70 - 99 mg/dL Final       Vital signs reviewed.Appears stated age, well groomed.    Constitutional:  Bp wnl for patient. Pulse Regular and wnl for patient.  Respirations easy and unlabored. Temperature wnl. Elevated bmi. Appearance neat and clean. Appears in no acute distress. Well nourished and well developed.    Lower extremity:  dp/pt palpable left. Left lower extremity free of varicosities, stable edema. Capillary refill < 3 seconds. Digits are warm, but contracted L>R. toenails are wnl for color, thickness and hygeine. Skin is dry. no hairgrowth on legs.  Left heel contour consistent with s/p multiple surgeries to calcaneus  Musculoskeletal:  Gait and station stable   Integumentary:  refer to wound characteristics and images   Psychiatric:  Judgment and insight intact. Alert and oriented times 3. No evidence of depression, anxiety, or agitation. Calm, cooperative, and communicative.   EDEMA:   Calf  Point of Measurement - Left Calf: 34     Left Calf from:: Heel  Calf Left cm:: 33.9        Ankle  Point of Measurement - Left Ankle: 10     Left Ankle from:: Heel  Left Ankle cm:: 20              DIAGNOSTICS:     Lab Results   Component Value Date    BUN 39 (H) 06/25/2024    CREATSERUM 7.07 (H) 06/25/2024    ALB 3.4 05/21/2024    TP 6.9 05/21/2024    A1C 9.9 (A) 03/10/2025   2-10-25 MRI left ankle  CONCLUSION:    1. There is no specific evidence of osteomyelitis allowing for signal alteration from susceptibility artifact.  Reactive marrow edema at the plantar aspect of the calcaneus has improved since prior study.  There is no specific evidence of cortical   erosion.   2. Remaining ligamentous and tendinous structures are otherwise intact.       2-5-25 arterial duplex  RIGHT EXTREMITY:  Monophasic waveforms within the posterior tibial artery  LEFT EXTREMITY:  Monophasic to biphasic waveforms  OTHER:  None.  SYSTOLIC VELOCITIES (CM/S):  RIGHT COMMON FEMORAL:      135    RIGHT PROFUNDA FEMORAL:      94      RIGHT SUPERFICIAL FEMORAL PROX:    76  RIGHT SUPERFICIAL FEMORAL MID:    113  RIGHT SUPERFICIAL FEMORAL DISTAL:    101  RIGHT POPLITEAL PROX:      69  RIGHT POPLITEAL  DISTAL:      60  TIBIOPER TRUNK:        RIGHT PTA PROX:      9  RIGHT PTA MID:      9  RIGHT PTA DISTAL:      18  RIGHT BRANDON PROX:      55  RIGHT BRANDON MID:      59    RIGHT DORSALIS PEDIS:      30  RIGHT GREAT TOE PRESSURE:      62 mmHg  SYSTOLIC VELOCITIES (CM./S):  LEFT COMMON FEMORAL:      126    LEFT PROFUNDA FEMORAL:      73      LEFT SUPERFICIAL FEMORAL PROX:    119  LEFT SUPERFICIAL FEMORAL MID:      91  LEFT SUPERFICIAL FEMORAL DISTAL:    103  LEFT POPLITEAL PROX:      142  LEFT POPLITEAL DISTAL:      89  TIBIOPER TRUNK:        LEFT PTA PROX:      18  LEFT PTA MID:      35  LEFT PTA DISTAL:      16  LEFT BRANDON PROX:      118  LEFT BRANDON MID:      130    LEFT DORSALIS PEDIS::      42  LEFT GREAT TOE PRESSURE:      40 mmHg   CONCLUSION:    1. Patent vessels with velocities as described above.  2. Limited flow within the right posterior tibial artery likely demonstrating at least moderate to severe stenosis.  3. At least mild to moderate stenosis within the bilateral superficial femoral arteries.  4. Moderate stenosis within the proximal popliteal arteries bilaterally.  5. If further evaluation is needed, consider CTA or MRA.       WOUND ASSESSMENT:     Wound 02/20/25 #3 Heel Left;Plantar (Active)   Date First Assessed/Time First Assessed: 02/20/25 0822    Wound Number (Wound Clinic Only): #3  Primary Wound Type: Diabetic Ulcer  Location: Heel  Wound Location Orientation: Left;Plantar      Assessments 2/20/2025  8:24 AM 4/17/2025  8:14 AM   Wound Image        Drainage Amount Small None   Drainage Description Serosanguineous --   Treatments Synthetic cast --   Wound Length (cm) 1.1 cm 0.1 cm   Wound Width (cm) 1.4 cm 0.1 cm   Wound Surface Area (cm^2) 1.54 cm^2 0.01 cm^2   Wound Depth (cm) 0.1 cm 0 cm   Wound Volume (cm^3) 0.154 cm^3 0 cm^3   Wound Healing % -- 100   Margins Well-defined edges Well-defined edges   Non-staged Wound Description Full thickness Full thickness   Dagmar-wound Assessment  Callous;Moist;Maceration;Dry Callous   Wound Granulation Tissue Red;Pink;Firm --   Wound Bed Granulation (%) 100 % --   Wound Bed Epithelium (%) -- 100 %   Wound Odor None None   Tunneling? No No   Undermining? No No   Sinus Tracts? No No   Posadas Scale Grade 2 Grade 2       Inactive Orders   Date Order Priority Status Authorizing Provider   04/10/25 0824 Debridement Diabetic Ulcer Left;Plantar Heel Routine Completed Jiongco, Dennise, APRN   04/03/25 0836 Cast application Routine Completed Jiongco, Dennise, APRN   03/27/25 0917 Cast application Routine Completed Jiongco, Dennise, APRN   03/20/25 0856 Cast application Routine Completed Jiongco, Dennise, APRN   03/15/25 0650 Cast application Routine Completed Jiongco, Dennise, APRN   03/06/25 0856 Cast application Routine Completed Jiongco, Dennise, APRN   02/27/25 0850 Cast application Routine Completed Jiongco, Dennise, APRN   02/20/25 1446 Cast application Routine Completed Jiongco, Dennise, APRN   02/20/25 0945 Debridement Diabetic Ulcer Left;Plantar Heel Routine Completed Jiongco, Dennise, APRN       Wound 03/13/25 #5 Heel Left;Posterior (Active)   Date First Assessed/Time First Assessed: 03/13/25 0755    Wound Number (Wound Clinic Only): #5  Primary Wound Type: Diabetic Ulcer  Location: Heel  Wound Location Orientation: Left;Posterior      Assessments 3/13/2025  7:58 AM 4/17/2025  8:15 AM   Wound Image        Drainage Amount Small Scant   Drainage Description Serosanguineous Serosanguineous   Treatments Synthetic cast --   Wound Length (cm) 0.5 cm 0.1 cm   Wound Width (cm) 1 cm 0.1 cm   Wound Surface Area (cm^2) 0.5 cm^2 0.01 cm^2   Wound Depth (cm) 0.1 cm 0.1 cm   Wound Volume (cm^3) 0.05 cm^3 0.001 cm^3   Wound Healing % -- 98   Margins Well-defined edges Well-defined edges   Non-staged Wound Description Full thickness Full thickness   Dagmar-wound Assessment Dry;Callous Callous   Wound Granulation Tissue Red;Firm --   Wound Bed Granulation (%) 100 % --   Wound Odor None None    Shape -- all scab   Tunneling? -- No   Undermining? -- No   Sinus Tracts? -- No   Posadas Scale Grade 2 Grade 2       Inactive Orders   Date Order Priority Status Authorizing Provider   04/10/25 0825 Debridement Diabetic Ulcer Left;Posterior Heel Routine Completed Dennise Sloan, ARNOL   04/03/25 0836 Cast application Routine Completed Dennise Sloan, APRN   03/27/25 0917 Cast application Routine Completed Dennise Sloan, APRN   03/20/25 0856 Cast application Routine Completed Dennise Sloan, APRN   03/15/25 0650 Cast application Routine Completed Dennise Sloan, APRN            ASSESSMENT AND PLAN:    1. 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 Diabetes mellitus with peripheral artery disease (HCC)    3. Diabetic polyneuropathy associated with type 2 diabetes mellitus (HCC)        Risks, benefits, and alternatives of current treatment plan discussed in detail.  Questions and concerns addressed. Red flags to RTC or ED reviewed.  Patient (or parent) agrees to plan.      NOTE TO PATIENT: The 21st Century Cures Act makes clinical notes like these available to patients in the interest of transparency. Clinical notes are medical documents used by physicians and care providers to communicate with each other. These documents include medical language and terminology, abbreviations, and treatment information that may sound technical and at times possibly unfamiliar. In addition, at times, the verbiage may appear blunt or direct. These documents are one tool providers use to communicate relevant information and clinical opinions of the care providers in a way that allows common understanding of the clinical context.   I spent 39 total minutes with the patient. This time included:    preparing to see the patient (eg, review notes and recent diagnostics),  seeing the patient, obtaining and/or reviewing separately obtained history,  performing a medically appropriate examination and/or evaluation, counseling and educating the patient, documenting in the record, update to podiatry  DISCHARGE:      Patient Instructions   Please return on: 1 week    - bring your shoe      Patient discharge and wound care instructions  Adrien Grimm Ervinlizeth  4/17/2025             Cleansing/dressing:  In clinic, with each dressing change:    please cleanse the limb, foot, and between toes with soap, water and washcloth.   Dry thoroughly.    Cleanse/soak the wound with VASHE (or other hypochlorous wound cleanser), dab dry.    Apply the following dressings:   plantar: silver border foam         Posterior: silver border foam    Offloading:  We have applied a Total Contact cast. If the cast is on your right foot do NOT drive while in cast. Do not get cast wet. If foot becomes painful or numb please call the wound clinic or report to the nearest emergency room to have cast removed.   >PAD ANTERIOR TIBIA  >extra layer of fiberglass on the heel and interdry weaved between the digits    Nutrition and blood sugar control:  Focus on the following:  Protein: Meats, beans, eggs, milk and yogurt particularly Greek yogurt), tofu, soy nuts, soy protein products (Follow the protein handout in your welcome folder)  Vitamin C: Citrus fruits and juices, strawberries, tomatoes, tomato juice, peppers, baked potatoes, spinach, broccoli, cauliflower, [place] sprouts, cabbage  Vitamin A: Dark green, leafy vegetables, orange or yellow vegetables, cantaloupe, fortified dairy products, liver, fortified cereals  Zinc: Fortified cereals, red meats, seafood  Consider supplementing with Adalberto by [employer] (These are essential branch chain amino acids that help with tissue building and wound healing) and take 2 packets/day. you can order online at [employer] or [employer]    If your blood sugar is consistently elevated your body can't heal and can't fight infection.    Concerns:  Signs of infection may  include the following:  Increase in redness  Red \"streaks\" from wound  Increase in swelling  Fever  Unusual odor  Change in the amount of wound drainage     Should you experience any significant changes in your wound(s) or have any questions regarding your home care instructions please contact the [facility] @ [phone] If after regular business hours, please call your family doctor or local emergency room. The treatment plan has been discussed at length between you and your provider. Follow all instructions carefully, it is very important. If you do not follow all instructions you are at risk of your wound not healing, infection, possible loss of limb and even loss of life.   Dennise Sloan FNP-C, CWCN-AP, CFCN, CSWS, WCC, DWC  4/17/2025            [1]   Allergies  Allergen Reactions    Heparin OTHER (SEE COMMENTS)     Per pt platelets is low every time was given to him

## 2025-04-17 ENCOUNTER — OFFICE VISIT (OUTPATIENT)
Dept: WOUND CARE | Facility: HOSPITAL | Age: 45
End: 2025-04-17
Attending: NURSE PRACTITIONER
Payer: COMMERCIAL

## 2025-04-17 ENCOUNTER — HOSPITAL ENCOUNTER (OUTPATIENT)
Dept: MRI IMAGING | Age: 45
Discharge: HOME | End: 2025-04-17
Attending: OPHTHALMOLOGY
Payer: COMMERCIAL

## 2025-04-17 VITALS
TEMPERATURE: 98 F | RESPIRATION RATE: 16 BRPM | SYSTOLIC BLOOD PRESSURE: 133 MMHG | HEART RATE: 102 BPM | DIASTOLIC BLOOD PRESSURE: 83 MMHG

## 2025-04-17 DIAGNOSIS — G58.9 NERVE PALSY: ICD-10-CM

## 2025-04-17 DIAGNOSIS — L97.426 DIABETIC ULCER OF LEFT HEEL ASSOCIATED WITH TYPE 2 DIABETES MELLITUS, WITH BONE INVOLVEMENT WITHOUT EVIDENCE OF NECROSIS (HCC): Primary | ICD-10-CM

## 2025-04-17 DIAGNOSIS — E11.51 DIABETES MELLITUS WITH PERIPHERAL ARTERY DISEASE (HCC): ICD-10-CM

## 2025-04-17 DIAGNOSIS — E11.42 DIABETIC POLYNEUROPATHY ASSOCIATED WITH TYPE 2 DIABETES MELLITUS (HCC): ICD-10-CM

## 2025-04-17 DIAGNOSIS — E11.621 DIABETIC ULCER OF LEFT HEEL ASSOCIATED WITH TYPE 2 DIABETES MELLITUS, WITH BONE INVOLVEMENT WITHOUT EVIDENCE OF NECROSIS (HCC): Primary | ICD-10-CM

## 2025-04-17 LAB — GLUCOSE BLD-MCNC: 146 MG/DL (ref 70–99)

## 2025-04-17 PROCEDURE — A9575 INJ GADOTERATE MEGLUMI 0.1ML: HCPCS | Performed by: OPHTHALMOLOGY

## 2025-04-17 PROCEDURE — 70543 MRI ORBT/FAC/NCK W/O &W/DYE: CPT | Performed by: OPHTHALMOLOGY

## 2025-04-17 PROCEDURE — 99214 OFFICE O/P EST MOD 30 MIN: CPT | Performed by: NURSE PRACTITIONER

## 2025-04-17 PROCEDURE — 70553 MRI BRAIN STEM W/O & W/DYE: CPT | Performed by: OPHTHALMOLOGY

## 2025-04-17 RX ORDER — GADOTERATE MEGLUMINE 376.9 MG/ML
19 INJECTION INTRAVENOUS
Status: COMPLETED | OUTPATIENT
Start: 2025-04-17 | End: 2025-04-17

## 2025-04-17 RX ADMIN — GADOTERATE MEGLUMINE 19 ML: 376.9 INJECTION INTRAVENOUS at 11:54:00

## 2025-04-17 NOTE — PATIENT INSTRUCTIONS
Please return on: 1 week    - bring your shoe      Patient discharge and wound care instructions  Adrien Tobin  4/17/2025             Cleansing/dressing:  In clinic, with each dressing change:    please cleanse the limb, foot, and between toes with soap, water and washcloth.   Dry thoroughly.    Cleanse/soak the wound with VASHE (or other hypochlorous wound cleanser), dab dry.    Apply the following dressings:   plantar: silver border foam         Posterior: silver border foam    Offloading:  We have applied a Total Contact cast. If the cast is on your right foot do NOT drive while in cast. Do not get cast wet. If foot becomes painful or numb please call the wound clinic or report to the nearest emergency room to have cast removed.   >PAD ANTERIOR TIBIA  >extra layer of fiberglass on the heel and interdry weaved between the digits    Nutrition and blood sugar control:  Focus on the following:  Protein: Meats, beans, eggs, milk and yogurt particularly Greek yogurt), tofu, soy nuts, soy protein products (Follow the protein handout in your welcome folder)  Vitamin C: Citrus fruits and juices, strawberries, tomatoes, tomato juice, peppers, baked potatoes, spinach, broccoli, cauliflower, [place] sprouts, cabbage  Vitamin A: Dark green, leafy vegetables, orange or yellow vegetables, cantaloupe, fortified dairy products, liver, fortified cereals  Zinc: Fortified cereals, red meats, seafood  Consider supplementing with Adalberto by [employer] (These are essential branch chain amino acids that help with tissue building and wound healing) and take 2 packets/day. you can order online at [employer] or [employer]    If your blood sugar is consistently elevated your body can't heal and can't fight infection.    Concerns:  Signs of infection may include the following:  Increase in redness  Red \"streaks\" from wound  Increase in swelling  Fever  Unusual odor  Change in the amount of wound drainage     Should you experience any  significant changes in your wound(s) or have any questions regarding your home care instructions please contact the wound center [facility] @ [phone] If after regular business hours, please call your family doctor or local emergency room. The treatment plan has been discussed at length between you and your provider. Follow all instructions carefully, it is very important. If you do not follow all instructions you are at risk of your wound not healing, infection, possible loss of limb and even loss of life.

## 2025-04-17 NOTE — PROGRESS NOTES
.Weekly Wound Education Note    Teaching Provided To: Patient, Family  Training Topics: Discharge instructions, Off-loading, Dressing  Training Method: Explain/Verbal, Written  Training Response: Patient responds and understands            Wounds are fragilely healed.  Silver foam applied to both areas.  Continue TCC with abd pads for extra padding and one extra 3\" cast wrap applied to foot.  Patient instructed to bring his shoe next week.

## 2025-04-17 NOTE — PROGRESS NOTES
Patient ID: Adrien Tobin is a 45 year old male.    Cast application   Date/Time: 4/17/2025 9:10 AM   Performed by: Josy Coronel RN   Authorized by: Dennise Sloan APRN   Consent given by: patient  Timeout: Immediately prior to procedure a time out was called to verify the correct patient, procedure, equipment, support staff and site/side marked as required  Injury  Location details: left foot  Pre-procedure assessment  Distal perfusion: normal    Post-procedure assessment  Patient tolerance: patient tolerated the procedure well with no immediate complications

## 2025-04-22 RX ORDER — PEN NEEDLE, DIABETIC 32GX 5/32"
1 NEEDLE, DISPOSABLE MISCELLANEOUS 4 TIMES DAILY
Qty: 400 EACH | Refills: 2 | Status: SHIPPED | OUTPATIENT
Start: 2025-04-22

## 2025-04-23 NOTE — PROGRESS NOTES
CHIEF COMPLAINT:     Chief Complaint   Patient presents with    Wound Recheck     Pt here for follow up arrives with TCC to left foot intact.      HPI:   Information obtained from Patient and chart  2-19-25 RE-EVALUATION: 45yo male with hx of diabetes (a1c 7.5 4-8-24), htn, dyslipidemia, pad, esrd on hd who is well known to the wound clinic for diabetic foot ulcerations.  He presents with his spouse.  He has been most recently following with dr. block for a left heel wound which has been waxing and waning.  Most recent mri showed improvement in the reactive marrow edema of the calcaneous. Patient saw Dr. Najjar 2-12-25 who recommended “If he does not make any progress over the next 2 to 3 weeks then I think it would be reasonable for him to undergo a left lower extremity angiogram with intervention given his multiple risk factors for PAD and evidence of some degree of stenosis on his arterial flow testing.”  Patient has been in a cam boot and been treating with betadine. Will place in TCC for gold standard offloading, if the area does not improve in 3 weeks will get vascular involved again    HPI PRIOR TO APRIL 2025 SEE INDIVIDUAL PROGRESS NOTE  3-20-25 patient returns. Epifix still pending and was rerun for insurance approval.  continues with tcc. Wounds are improved. Further Dry hyperkeratotic skin removed-continue with xeroform for moistening. Wounds are granular, no s/s of infection or c/o pain.    3-27-25 patient returns. It appears epifix is approved, but may need prior auth, will reach out to epifix rep.  He continues in the tcc and we have continued with xeroform for moisture retention.  Both areas are improved.  Patient has disability claim form to be filled out.  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 There are not any s/s of infection. Will continue with tcc and urea/xeroform for the hyperkeratotic tissue  management.    4-3-25 patient returns. He has continued in TCC. He followed up with [facility] and his A1c was 9.2 (4-1-25) and had gained 10# since last visit, patient was placed on inactive transplant list until foot wounds healed and A1c improved (needs to be less than 8 for transplant).  He has not made the Endo appointment yet.  We discussed not only healing related to the increased A1c, but also transplant list.  Patient states he will make the appointment asap.  Both wounds are improved, callus continues to reduce. No s/s of infection, continue current poc.    4-10-25 patient returns.  His appointment with endocrine is 5-1. The plantar wound appears fragilly resolved s/p debridement, the posterior heel has a ss filled blister. Patient denies feeling like the tcc was allowing more movement over the last week.  No s/s of infection or c/o pain.    4-17-25 patient returns . last week the plantar wound appeared fragilly healed however patient had a new blister noted to his posterior heel.  Today both wounds are resolved. No new blistering noted.  Patient instructed to bring shoes to next appointment and tomake an appointment for f/u with dr. Lewis.  I will also update dr block on patient's progress. No c/o pain or s/s of infection.    4-24-25 patient returns.  Last week both wounds were fragilly resolved. We continued with tcc and plan is to transition patient to his personal shoes today.  He has an endocrine appointment on may 1 and follow-up with podiatry on may 5. Wounds remain healed.  We discussed routine daily care, monitoring and regular follow-up with podiatry. Patient dc'd from clinic.  MEDICATIONS:     Current Outpatient Medications:     ELIQUIS 2.5 MG Oral Tab, Take 1 tablet (2.5 mg total) by mouth., Disp: , Rfl:     cloNIDine 0.2 MG Oral Tab, Take 1 tablet (0.2 mg total) by mouth 3 (three) times daily., Disp: , Rfl:     latanoprost 0.005 % Ophthalmic Solution, Place 1 drop into the left eye  nightly., Disp: , Rfl:     MIDODRINE HCL OR, Take 5 mg by mouth., Disp: , Rfl:     sevelamer carbonate 800 MG Oral Tab, , Disp: , Rfl:     VELPHORO 500 MG Oral Chew Tab, 1 tablet., Disp: , Rfl:     BD PEN NEEDLE ODALIS 2ND GEN 32G X 4 MM Does not apply Misc, USE TO INJECT INSULIN FOUR TIMES DAILY, Disp: 400 each, Rfl: 2    insulin glargine (LANTUS SOLOSTAR) 100 UNIT/ML Subcutaneous Solution Pen-injector, Inject 22 Units into the skin nightly., Disp: 39.6 mL, Rfl: 0    Insulin Lispro, 1 Unit Dial, (HUMALOG KWIKPEN) 100 UNIT/ML Subcutaneous Solution Pen-injector, Test blood glucose 3 times daily BEFORE meals Inject 2 unit if blood glucose is between 141-180 mg/dL Inject 3 units if blood glucose is between 181-220 mg/dL Inject 6 units if blood glucose is between 221-260 mg/dL Inject 8 units if blood glucose is between 261-300 mg/dL Inject 10 units if blood glucose is between 301-350 mg/dL Call your physician if blood glucose is greater than 351 mg/dL,, Disp: 5 each, Rfl: 3    Dulaglutide (TRULICITY) 0.75 MG/0.5ML Subcutaneous Solution Auto-injector, Inject 0.75 mg into the skin once a week., Disp: 6 mL, Rfl: 0    insulin detemir 100 UNIT/ML Subcutaneous Solution Pen-injector, Inject 22 Units into the skin nightly., Disp: 19.8 mL, Rfl: 0    Continuous Glucose  (DEXCOM G7 ) Does not apply Device, USE DAILY., Disp: 1 each, Rfl: 0    Continuous Glucose Sensor (DEXCOM G7 SENSOR) Does not apply Misc, 1 each Every 10 days. Use as directed every 10 days, Disp: 3 each, Rfl: 11    atorvastatin 40 MG Oral Tab, Take 1 tablet (40 mg total) by mouth daily., Disp: , Rfl:     aspirin (ASPIRIN EC LOW DOSE) 81 MG Oral Tab EC, Take 1 tablet (81 mg total) by mouth daily., Disp: 90 tablet, Rfl: 0    Dulaglutide (TRULICITY) 0.75 MG/0.5ML Subcutaneous Solution Pen-injector, Inject 0.75 mg into the skin once a week. (Patient not taking: Reported on 3/10/2025), Disp: 7 mL, Rfl: 3  ALLERGIES:   Allergies[1]   REVIEW OF SYSTEMS:    This information was obtained from the patient/family and chart.    See HPI for pertinent positives, otherwise 10 pt ROS negative.  HISTORY:   Past medical, surgical, family and social history updated where appropriate.    PHYSICAL EXAM:     Vitals:    04/24/25 0700   BP: 108/73  Comment: glucose 198   Pulse: 88   Resp: 14   Temp: 98.3 °F (36.8 °C)       Estimated body mass index is 32.85 kg/m² as calculated from the following:    Height as of 3/10/25: 68\".    Weight as of 3/10/25: 216 lb 0.8 oz (98 kg).   POC Glucose   Date Value Ref Range Status   04/24/2025 198 (H) 70 - 99 mg/dL Final   04/17/2025 146 (H) 70 - 99 mg/dL Final   04/10/2025 130 (H) 70 - 99 mg/dL Final       Vital signs reviewed.Appears stated age, well groomed.    Constitutional:  Bp wnl for patient. Pulse Regular and wnl for patient. Respirations easy and unlabored. Temperature wnl. Elevated bmi. Appearance neat and clean. Appears in no acute distress. Well nourished and well developed.    Lower extremity:  dp/pt palpable left. Left lower extremity free of varicosities, stable edema. Capillary refill < 3 seconds. Digits are warm, but contracted L>R. toenails are wnl for color, thickness and hygeine. Skin is dry. no hairgrowth on legs.  Left heel contour consistent with s/p multiple surgeries to calcaneus  Musculoskeletal:  Gait and station stable   Integumentary:  refer to wound characteristics and images   Psychiatric:  Judgment and insight intact. Alert and oriented times 3. No evidence of depression, anxiety, or agitation. Calm, cooperative, and communicative.   EDEMA:   Calf  Point of Measurement - Left Calf: 34     Left Calf from:: Heel  Calf Left cm:: 35.3        Ankle  Point of Measurement - Left Ankle: 10     Left Ankle from:: Heel  Left Ankle cm:: 21              DIAGNOSTICS:     Lab Results   Component Value Date    BUN 39 (H) 06/25/2024    CREATSERUM 7.07 (H) 06/25/2024    ALB 3.4 05/21/2024    TP 6.9 05/21/2024    A1C 9.9 (A)  03/10/2025   2-10-25 MRI left ankle  CONCLUSION:    1. There is no specific evidence of osteomyelitis allowing for signal alteration from susceptibility artifact.  Reactive marrow edema at the plantar aspect of the calcaneus has improved since prior study.  There is no specific evidence of cortical   erosion.   2. Remaining ligamentous and tendinous structures are otherwise intact.       2-5-25 arterial duplex  RIGHT EXTREMITY:  Monophasic waveforms within the posterior tibial artery  LEFT EXTREMITY:  Monophasic to biphasic waveforms  OTHER:  None.  SYSTOLIC VELOCITIES (CM/S):  RIGHT COMMON FEMORAL:      135    RIGHT PROFUNDA FEMORAL:      94      RIGHT SUPERFICIAL FEMORAL PROX:    76  RIGHT SUPERFICIAL FEMORAL MID:    113  RIGHT SUPERFICIAL FEMORAL DISTAL:    101  RIGHT POPLITEAL PROX:      69  RIGHT POPLITEAL DISTAL:      60  TIBIOPER TRUNK:        RIGHT PTA PROX:      9  RIGHT PTA MID:      9  RIGHT PTA DISTAL:      18  RIGHT BRANDON PROX:      55  RIGHT BRANDON MID:      59    RIGHT DORSALIS PEDIS:      30  RIGHT GREAT TOE PRESSURE:      62 mmHg  SYSTOLIC VELOCITIES (CM./S):  LEFT COMMON FEMORAL:      126    LEFT PROFUNDA FEMORAL:      73      LEFT SUPERFICIAL FEMORAL PROX:    119  LEFT SUPERFICIAL FEMORAL MID:      91  LEFT SUPERFICIAL FEMORAL DISTAL:    103  LEFT POPLITEAL PROX:      142  LEFT POPLITEAL DISTAL:      89  TIBIOPER TRUNK:        LEFT PTA PROX:      18  LEFT PTA MID:      35  LEFT PTA DISTAL:      16  LEFT BRANDON PROX:      118  LEFT BRANDON MID:      130    LEFT DORSALIS PEDIS::      42  LEFT GREAT TOE PRESSURE:      40 mmHg   CONCLUSION:    1. Patent vessels with velocities as described above.  2. Limited flow within the right posterior tibial artery likely demonstrating at least moderate to severe stenosis.  3. At least mild to moderate stenosis within the bilateral superficial femoral arteries.  4. Moderate stenosis within the proximal popliteal arteries bilaterally.  5. If further evaluation is needed,  consider CTA or MRA.       WOUND ASSESSMENT:     Wound 02/20/25 #3 Heel Left;Plantar (Active)   Date First Assessed/Time First Assessed: 02/20/25 0822    Wound Number (Wound Clinic Only): #3  Primary Wound Type: Diabetic Ulcer  Location: Heel  Wound Location Orientation: Left;Plantar      Assessments 2/20/2025  8:24 AM 4/24/2025  8:19 AM   Wound Image        Drainage Amount Small None   Drainage Description Serosanguineous --   Treatments Synthetic cast --   Wound Length (cm) 1.1 cm 0 cm   Wound Width (cm) 1.4 cm 0 cm   Wound Surface Area (cm^2) 1.54 cm^2 0 cm^2   Wound Depth (cm) 0.1 cm 0 cm   Wound Volume (cm^3) 0.154 cm^3 0 cm^3   Wound Healing % -- 100   Margins Well-defined edges Flat and Intact   Non-staged Wound Description Full thickness Full thickness   Dagmar-wound Assessment Callous;Moist;Maceration;Dry --   Wound Granulation Tissue Red;Pink;Firm --   Wound Bed Granulation (%) 100 % --   Wound Bed Epithelium (%) -- 100 %   Wound Odor None None   Tunneling? No No   Undermining? No No   Sinus Tracts? No No   Posadas Scale Grade 2 --       Inactive Orders   Date Order Priority Status Authorizing Provider   04/10/25 0824 Debridement Diabetic Ulcer Left;Plantar Heel Routine Completed Dennise Sloan, APRN   04/03/25 0836 Cast application Routine Completed Dennise Sloan, APRN   03/27/25 0917 Cast application Routine Completed GastonongAnne maryi, APRN   03/20/25 0856 Cast application Routine Completed GastonongcoDennise, APRN   03/15/25 0650 Cast application Routine Completed Jiongco, Dennise, APRN   03/06/25 0856 Cast application Routine Completed Jiongco Dennise, APRN   02/27/25 0850 Cast application Routine Completed Dennise Sloan, APRN   02/20/25 1446 Cast application Routine Completed Dennise Sloan, APRN   02/20/25 0945 Debridement Diabetic Ulcer Left;Plantar Heel Routine Completed Dennise Sloan, APRN       Wound 03/13/25 #5 Heel Left;Posterior (Active)   Date First Assessed/Time First Assessed: 03/13/25 0755    Wound  Number (Wound Clinic Only): #5  Primary Wound Type: Diabetic Ulcer  Location: Heel  Wound Location Orientation: Left;Posterior      Assessments 3/13/2025  7:58 AM 4/24/2025  8:20 AM   Wound Image       Drainage Amount Small None   Drainage Description Serosanguineous --   Treatments Synthetic cast --   Wound Length (cm) 0.5 cm 0 cm   Wound Width (cm) 1 cm 0 cm   Wound Surface Area (cm^2) 0.5 cm^2 0 cm^2   Wound Depth (cm) 0.1 cm 0 cm   Wound Volume (cm^3) 0.05 cm^3 0 cm^3   Wound Healing % -- 100   Margins Well-defined edges Flat and Intact   Non-staged Wound Description Full thickness Full thickness   Dagmar-wound Assessment Dry;Callous --   Wound Granulation Tissue Red;Firm --   Wound Bed Granulation (%) 100 % --   Wound Bed Epithelium (%) -- 100 %   Wound Odor None None   Tunneling? -- No   Undermining? -- No   Sinus Tracts? -- No   Posadas Scale Grade 2 Grade 2       Inactive Orders   Date Order Priority Status Authorizing Provider   04/17/25 0910 Cast application Routine Completed Dennise Sloan, APRAVINASH   04/10/25 0825 Debridement Diabetic Ulcer Left;Posterior Heel Routine Completed Dennise Sloan, APRAVINASH   04/03/25 0836 Cast application Routine Completed Dennise Sloan APRAVINASH   03/27/25 0917 Cast application Routine Completed Dennise Sloan, APRN   03/20/25 0856 Cast application Routine Completed Dennise Sloan, APRAVINASH   03/15/25 0650 Cast application Routine Completed Dennise Sloan, APRAVINASH            ASSESSMENT AND PLAN:    1. 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 Diabetes mellitus with peripheral artery disease (HCC)    3. Diabetic polyneuropathy associated with type 2 diabetes mellitus (HCC)          Risks, benefits, and alternatives of current treatment plan discussed in detail.  Questions and concerns addressed. Red flags to RTC or ED reviewed.  Patient (or parent) agrees to plan.      NOTE TO PATIENT: The 21st Century  Cures Act makes clinical notes like these available to patients in the interest of transparency. Clinical notes are medical documents used by physicians and care providers to communicate with each other. These documents include medical language and terminology, abbreviations, and treatment information that may sound technical and at times possibly unfamiliar. In addition, at times, the verbiage may appear blunt or direct. These documents are one tool providers use to communicate relevant information and clinical opinions of the care providers in a way that allows common understanding of the clinical context.   I spent 25 total minutes with the patient. This time included:    preparing to see the patient (eg, review notes and recent diagnostics),  seeing the patient, obtaining and/or reviewing separately obtained history, performing a medically appropriate examination and/or evaluation, counseling and educating the patient, documenting in the record, update to podiatry  DISCHARGE:      Patient Instructions   Patient discharge and wound care instructions  Adrien Tobin  4/24/2025     Discharge from clinic, follow-up as needed or directed by podiatry    Follow-up at least every quarter with dr. Lewis for monitoring    Moisturize feet (except between the toes)    Twice daily foot inspection, call with any callus, blistering, or redness    Wear white socks    Always where shoes   Dennise Sloan FNP-C, CWCN-AP, CFCN, CSWS, WCC, DWC  4/24/2025            [1]   Allergies  Allergen Reactions    Heparin OTHER (SEE COMMENTS)     Per pt platelets is low every time was given to him

## 2025-04-24 ENCOUNTER — OFFICE VISIT (OUTPATIENT)
Dept: WOUND CARE | Facility: HOSPITAL | Age: 45
End: 2025-04-24
Attending: NURSE PRACTITIONER
Payer: COMMERCIAL

## 2025-04-24 VITALS
TEMPERATURE: 98 F | SYSTOLIC BLOOD PRESSURE: 108 MMHG | HEART RATE: 88 BPM | RESPIRATION RATE: 14 BRPM | DIASTOLIC BLOOD PRESSURE: 73 MMHG

## 2025-04-24 DIAGNOSIS — E11.51 DIABETES MELLITUS WITH PERIPHERAL ARTERY DISEASE (HCC): ICD-10-CM

## 2025-04-24 DIAGNOSIS — E11.42 DIABETIC POLYNEUROPATHY ASSOCIATED WITH TYPE 2 DIABETES MELLITUS (HCC): ICD-10-CM

## 2025-04-24 DIAGNOSIS — L97.426 DIABETIC ULCER OF LEFT HEEL ASSOCIATED WITH TYPE 2 DIABETES MELLITUS, WITH BONE INVOLVEMENT WITHOUT EVIDENCE OF NECROSIS (HCC): Primary | ICD-10-CM

## 2025-04-24 DIAGNOSIS — E11.621 DIABETIC ULCER OF LEFT HEEL ASSOCIATED WITH TYPE 2 DIABETES MELLITUS, WITH BONE INVOLVEMENT WITHOUT EVIDENCE OF NECROSIS (HCC): Primary | ICD-10-CM

## 2025-04-24 LAB — GLUCOSE BLD-MCNC: 198 MG/DL (ref 70–99)

## 2025-04-24 PROCEDURE — 99213 OFFICE O/P EST LOW 20 MIN: CPT | Performed by: NURSE PRACTITIONER

## 2025-04-24 RX ORDER — SUCROFERRIC OXYHYDROXIDE 500 MG/1
1 TABLET, CHEWABLE ORAL
COMMUNITY
Start: 2025-03-26 | End: 2026-03-26

## 2025-04-24 RX ORDER — LATANOPROST 50 UG/ML
1 SOLUTION/ DROPS OPHTHALMIC NIGHTLY
COMMUNITY
Start: 2025-03-19

## 2025-04-24 RX ORDER — APIXABAN 2.5 MG/1
1 TABLET, FILM COATED ORAL
COMMUNITY
Start: 2025-03-13

## 2025-04-24 RX ORDER — SEVELAMER CARBONATE 800 MG/1
TABLET, FILM COATED ORAL
COMMUNITY
Start: 2025-04-07

## 2025-04-24 RX ORDER — CLONIDINE HYDROCHLORIDE 0.2 MG/1
0.2 TABLET ORAL 3 TIMES DAILY
COMMUNITY
Start: 2025-01-24

## 2025-04-24 NOTE — PROGRESS NOTES
.Weekly Wound Education Note    Teaching Provided To: Patient  Training Topics: Off-loading, Skin care, Discharge instructions  Training Method: Explain/Verbal, Written  Training Response: Patient responds and understands        Notes: Per provider, wounds are healed. Pt transitioned into his regular shoe. Pt instructed to keep foot moisturized and inspect foot multiple times per day. Pt is to keep appointments with the diabetic educator and podiatry. Pt discharged from clinic at this time. Pt instructed to call clinic with any issues.

## 2025-05-01 ENCOUNTER — OFFICE VISIT (OUTPATIENT)
Facility: CLINIC | Age: 45
End: 2025-05-01

## 2025-05-01 ENCOUNTER — APPOINTMENT (OUTPATIENT)
Dept: WOUND CARE | Facility: HOSPITAL | Age: 45
End: 2025-05-01
Attending: NURSE PRACTITIONER
Payer: COMMERCIAL

## 2025-05-01 ENCOUNTER — TELEPHONE (OUTPATIENT)
Dept: PODIATRY CLINIC | Facility: CLINIC | Age: 45
End: 2025-05-01

## 2025-05-01 VITALS
BODY MASS INDEX: 34.71 KG/M2 | SYSTOLIC BLOOD PRESSURE: 120 MMHG | HEIGHT: 68 IN | OXYGEN SATURATION: 100 % | HEART RATE: 109 BPM | DIASTOLIC BLOOD PRESSURE: 78 MMHG | RESPIRATION RATE: 16 BRPM | WEIGHT: 229 LBS

## 2025-05-01 DIAGNOSIS — M20.42 HAMMER TOES OF BOTH FEET: ICD-10-CM

## 2025-05-01 DIAGNOSIS — Z79.4 CONTROLLED TYPE 2 DIABETES MELLITUS WITH OTHER CIRCULATORY COMPLICATION, WITH LONG-TERM CURRENT USE OF INSULIN (HCC): ICD-10-CM

## 2025-05-01 DIAGNOSIS — E11.59 CONTROLLED TYPE 2 DIABETES MELLITUS WITH OTHER CIRCULATORY COMPLICATION, WITH LONG-TERM CURRENT USE OF INSULIN (HCC): ICD-10-CM

## 2025-05-01 DIAGNOSIS — M89.8X7 EXOSTOSIS OF BONE OF FOOT: ICD-10-CM

## 2025-05-01 DIAGNOSIS — E11.65 CONTROLLED TYPE 2 DIABETES MELLITUS WITH HYPERGLYCEMIA, WITH LONG-TERM CURRENT USE OF INSULIN (HCC): ICD-10-CM

## 2025-05-01 DIAGNOSIS — E11.65 TYPE 2 DIABETES MELLITUS WITH HYPERGLYCEMIA, WITHOUT LONG-TERM CURRENT USE OF INSULIN (HCC): Primary | ICD-10-CM

## 2025-05-01 DIAGNOSIS — I73.9 PAD (PERIPHERAL ARTERY DISEASE): ICD-10-CM

## 2025-05-01 DIAGNOSIS — N18.5 CONTROLLED TYPE 2 DIABETES MELLITUS WITH STAGE 5 CHRONIC KIDNEY DISEASE NOT ON CHRONIC DIALYSIS, WITH LONG-TERM CURRENT USE OF INSULIN (HCC): ICD-10-CM

## 2025-05-01 DIAGNOSIS — M20.41 HAMMER TOES OF BOTH FEET: ICD-10-CM

## 2025-05-01 DIAGNOSIS — N18.4 STAGE 4 CHRONIC KIDNEY DISEASE (HCC): ICD-10-CM

## 2025-05-01 DIAGNOSIS — E11.22 CONTROLLED TYPE 2 DIABETES MELLITUS WITH STAGE 5 CHRONIC KIDNEY DISEASE NOT ON CHRONIC DIALYSIS, WITH LONG-TERM CURRENT USE OF INSULIN (HCC): ICD-10-CM

## 2025-05-01 DIAGNOSIS — E11.42 DIABETIC POLYNEUROPATHY ASSOCIATED WITH TYPE 2 DIABETES MELLITUS (HCC): Primary | ICD-10-CM

## 2025-05-01 DIAGNOSIS — Z79.4 CONTROLLED TYPE 2 DIABETES MELLITUS WITH HYPERGLYCEMIA, WITH LONG-TERM CURRENT USE OF INSULIN (HCC): ICD-10-CM

## 2025-05-01 DIAGNOSIS — E11.65 UNCONTROLLED TYPE 2 DIABETES MELLITUS WITH HYPERGLYCEMIA (HCC): ICD-10-CM

## 2025-05-01 DIAGNOSIS — Z79.4 CONTROLLED TYPE 2 DIABETES MELLITUS WITH STAGE 5 CHRONIC KIDNEY DISEASE NOT ON CHRONIC DIALYSIS, WITH LONG-TERM CURRENT USE OF INSULIN (HCC): ICD-10-CM

## 2025-05-01 DIAGNOSIS — E05.90 SUBCLINICAL HYPERTHYROIDISM: ICD-10-CM

## 2025-05-01 PROCEDURE — 3078F DIAST BP <80 MM HG: CPT | Performed by: STUDENT IN AN ORGANIZED HEALTH CARE EDUCATION/TRAINING PROGRAM

## 2025-05-01 PROCEDURE — 99205 OFFICE O/P NEW HI 60 MIN: CPT | Performed by: STUDENT IN AN ORGANIZED HEALTH CARE EDUCATION/TRAINING PROGRAM

## 2025-05-01 PROCEDURE — 3008F BODY MASS INDEX DOCD: CPT | Performed by: STUDENT IN AN ORGANIZED HEALTH CARE EDUCATION/TRAINING PROGRAM

## 2025-05-01 PROCEDURE — 3074F SYST BP LT 130 MM HG: CPT | Performed by: STUDENT IN AN ORGANIZED HEALTH CARE EDUCATION/TRAINING PROGRAM

## 2025-05-01 RX ORDER — INSULIN LISPRO 100 [IU]/ML
INJECTION, SOLUTION INTRAVENOUS; SUBCUTANEOUS
Qty: 15 ML | Refills: 1 | Status: SHIPPED | OUTPATIENT
Start: 2025-05-01

## 2025-05-01 RX ORDER — INSULIN GLARGINE-YFGN 100 [IU]/ML
INJECTION, SOLUTION SUBCUTANEOUS
Qty: 30 ML | Refills: 1 | Status: SHIPPED | OUTPATIENT
Start: 2025-05-01

## 2025-05-01 RX ORDER — GLUCAGON INJECTION, SOLUTION 1 MG/.2ML
1 INJECTION, SOLUTION SUBCUTANEOUS AS NEEDED
Qty: 0.4 ML | Refills: 1 | Status: SHIPPED | OUTPATIENT
Start: 2025-05-01

## 2025-05-08 ENCOUNTER — OFFICE VISIT (OUTPATIENT)
Dept: PODIATRY CLINIC | Facility: CLINIC | Age: 45
End: 2025-05-08

## 2025-05-08 DIAGNOSIS — M20.41 HAMMER TOES OF BOTH FEET: ICD-10-CM

## 2025-05-08 DIAGNOSIS — M89.8X7 EXOSTOSIS OF BONE OF FOOT: ICD-10-CM

## 2025-05-08 DIAGNOSIS — E11.42 DIABETIC POLYNEUROPATHY ASSOCIATED WITH TYPE 2 DIABETES MELLITUS (HCC): Primary | ICD-10-CM

## 2025-05-08 DIAGNOSIS — I73.9 PAD (PERIPHERAL ARTERY DISEASE): ICD-10-CM

## 2025-05-08 DIAGNOSIS — M20.42 HAMMER TOES OF BOTH FEET: ICD-10-CM

## 2025-05-08 DIAGNOSIS — R26.81 GAIT INSTABILITY: ICD-10-CM

## 2025-05-08 PROCEDURE — 99213 OFFICE O/P EST LOW 20 MIN: CPT | Performed by: STUDENT IN AN ORGANIZED HEALTH CARE EDUCATION/TRAINING PROGRAM

## 2025-05-08 NOTE — PROGRESS NOTES
Continue same med.   [facility] Podiatry  Progress Note    Adrien Tobin is a 45 year old male.   Chief Complaint   Patient presents with    Follow - Up     Left foot ulcer- patient denies pain- fbg was not check- A1c 9.2 04/01/25          HPI:     Patient is a pleasant 45-year-old male with past medical history of recurrent heel ulcerations, diabetes, and CKD returns to clinic for foot exam.  He relates that his left heel ulcer is now closed with the help of Dennise in wound care center.  He does have elongated and thickened nails he needs help trimming.  He also has minor callus buildup on his heel.  He does ambulate with accommodative shoes and inserts and is in need of new inserts.  His last hemoglobin A1c was 9.2% on 4/1/25.  No other complaints are mentioned.  Past medical history, medications, and allergies reviewed.      Allergies: Heparin   Current Outpatient Medications   Medication Sig Dispense Refill    Insulin Glargine-yfgn (SEMGLEE, YFGN,) 100 UNIT/ML Subcutaneous Solution Pen-injector Please take 22 units every 24 hrs 30 mL 1    Insulin Lispro, 1 Unit Dial, (HUMALOG KWIKPEN) 100 UNIT/ML Subcutaneous Solution Pen-injector Test blood glucose 3 times daily BEFORE meals Inject 2 unit if blood glucose is between 141-180 mg/dL Inject 3 units if blood glucose is between 181-220 mg/dL Inject 6 units if blood glucose is between 221-260 mg/dL Inject 8 units if blood glucose is between 261-300 mg/dL Inject 10 units if blood glucose is between 301-350 mg/dL Call your physician if blood glucose is greater than 351 mg/dL, 15 mL 1    glucagon (GVOKE HYPOPEN 2-PACK) 1 MG/0.2ML Subcutaneous injection Inject 0.2 mL (1 mg total) into the skin as needed. Patient trained on Gvoke. No substitutions 0.4 mL 1    ELIQUIS 2.5 MG Oral Tab Take 1 tablet (2.5 mg total) by mouth.      latanoprost 0.005 % Ophthalmic Solution Place 1 drop into the left eye nightly.      MIDODRINE HCL OR Take 5 mg by mouth.      sevelamer carbonate 800 MG Oral  Tab       VELPHORO 500 MG Oral Chew Tab 1 tablet.      BD PEN NEEDLE ODALIS 2ND GEN 32G X 4 MM Does not apply Misc USE TO INJECT INSULIN FOUR TIMES DAILY 400 each 2    Dulaglutide (TRULICITY) 0.75 MG/0.5ML Subcutaneous Solution Auto-injector Inject 0.75 mg into the skin once a week. 6 mL 0    Continuous Glucose  (DEXCOM G7 ) Does not apply Device USE DAILY. 1 each 0    Continuous Glucose Sensor (DEXCOM G7 SENSOR) Does not apply Misc 1 each Every 10 days. Use as directed every 10 days 3 each 11    atorvastatin 40 MG Oral Tab Take 1 tablet (40 mg total) by mouth daily.      aspirin (ASPIRIN EC LOW DOSE) 81 MG Oral Tab EC Take 1 tablet (81 mg total) by mouth daily. 90 tablet 0    Dulaglutide (TRULICITY) 0.75 MG/0.5ML Subcutaneous Solution Pen-injector Inject 0.75 mg into the skin once a week. 7 mL 3    cloNIDine 0.2 MG Oral Tab Take 1 tablet (0.2 mg total) by mouth 3 (three) times daily. (Patient not taking: Reported on 5/8/2025)        Past Medical History:    CKD (chronic kidney disease)    Diabetes (HCC)    Elevated liver enzymes    Elevated serum GGT level    High blood pressure    High cholesterol    History of blood transfusion    2023 November    Hyperlipidemia    HYPERTRIGLYCERIDEMIA    Intractable nausea and vomiting    OBESITY    Obesity    Pneumonia due to organism    40 years ago    PONV (postoperative nausea and vomiting)    Renal disorder    Visual impairment    GLASSES      Past Surgical History:   Procedure Laterality Date    Arthrotomy/explore/treat knee joint Left     DONE TWICE    Cholecystectomy      Colonoscopy N/A 1/23/2024    Procedure: COLONOSCOPY with hot snare polypectomy;  Surgeon: Albino Hernandez MD;  Location:  ENDOSCOPY    Debride wound Left     HEEL    Foot surgery      Other surgical history  Knee & Foot      Family History   Problem Relation Age of Onset    Diabetes Father     Heart Disorder Father     Heart Disease Father     Diabetes Mother     Hypertension Sister        Social History     Socioeconomic History    Marital status:    Tobacco Use    Smoking status: Never     Passive exposure: Never    Smokeless tobacco: Never   Vaping Use    Vaping status: Never Used   Substance and Sexual Activity    Alcohol use: Yes     Alcohol/week: 1.0 standard drink of alcohol     Types: 1 Standard drinks or equivalent per week     Comment: socially    Drug use: No   Other Topics Concern    Caffeine Concern No    Exercise No    Seat Belt Yes    Special Diet Yes    Stress Concern No    Weight Concern No           REVIEW OF SYSTEMS:     No n/v/f/c.      EXAM:   There were no vitals taken for this visit.  GENERAL: well developed, well nourished, in no apparent distress  EXTREMITIES:       1. Integument: Normal skin temperature and turgor.  Site of prior wound is fully closed.  Minor HPK present.  Nails x 10 are elongated and thickened.  2. Vascular: Dorsalis pedis and posterior tibial pulses are lightly palpable.  CFT intact digits.     3. Musculoskeletal: Muscle strength intact to left lower extremity with decreased plantar flexion which is patient's baseline.  There is no longer plantar prominence of calcaneus that was noted preoperatively.  Compartments are soft and compressible.  Flexion contracture of digits bilaterally.   4. Neurological: Normal sharp dull sensation; reflexes normal.  Decreased protective sensation noted to lower extremities.        ASSESSMENT AND PLAN:   Diagnoses and all orders for this visit:    Diabetic polyneuropathy associated with type 2 diabetes mellitus (HCC)  -     DME - External    Exostosis of bone of foot  -     DME - External    PAD (peripheral artery disease)  -     DME - External    Hammer toes of both feet  -     DME - External    Gait instability  -     DME - External          Plan:   -Patient examined, chart history reviewed.  -Inspected site of prior ulcer to left plantar heel.  This site remains fully closed.  Minor HPK trimmed to healthy  tissue without incident.  Can apply urea cream to site nightly.  Ambulate with accommodative shoes and inserts.  New order placed for accommodative inserts.  -Sharply debrided nails x 10 with nail nipper.  Nails smoothed with Dremel.  -Patient to work on getting blood glucose in better range.  -Continue daily foot checks and seek immediate medical attention any concerns arise.  -Educated patient on acute signs of infection and symptoms of DVT and advised patient to seek immediate medical attention if any concerns arise.  Patient expressed understanding.     RTC 3-4 weeks.    Royce Lewis DPM

## 2025-05-29 ENCOUNTER — OFFICE VISIT (OUTPATIENT)
Dept: PODIATRY CLINIC | Facility: CLINIC | Age: 45
End: 2025-05-29

## 2025-05-29 DIAGNOSIS — R26.81 GAIT INSTABILITY: ICD-10-CM

## 2025-05-29 DIAGNOSIS — E11.42 DIABETIC POLYNEUROPATHY ASSOCIATED WITH TYPE 2 DIABETES MELLITUS (HCC): Primary | ICD-10-CM

## 2025-05-29 DIAGNOSIS — N18.4 STAGE 4 CHRONIC KIDNEY DISEASE (HCC): ICD-10-CM

## 2025-05-29 DIAGNOSIS — M20.42 HAMMER TOES OF BOTH FEET: ICD-10-CM

## 2025-05-29 DIAGNOSIS — M20.41 HAMMER TOES OF BOTH FEET: ICD-10-CM

## 2025-05-29 DIAGNOSIS — M89.8X7 EXOSTOSIS OF BONE OF FOOT: ICD-10-CM

## 2025-05-29 DIAGNOSIS — L84 CALLUS OF FOOT: ICD-10-CM

## 2025-05-29 DIAGNOSIS — I73.9 PAD (PERIPHERAL ARTERY DISEASE): ICD-10-CM

## 2025-05-29 PROCEDURE — 99213 OFFICE O/P EST LOW 20 MIN: CPT | Performed by: STUDENT IN AN ORGANIZED HEALTH CARE EDUCATION/TRAINING PROGRAM

## 2025-05-29 NOTE — PROGRESS NOTES
[facility] Podiatry  Progress Note    Adrien Tobin is a 45 year old male.   Chief Complaint   Patient presents with    Follow - Up     Left foot wound, denies fever, chills, drainage or bleeding, denies pain          HPI:     Patient is a pleasant 45-year-old male with past medical history of recurrent heel ulcerations, diabetes, and CKD returns to clinic for foot exam.  He relates that his left heel ulcer is now closed with the help of [facility] in wound care center.  He does have elongated and thickened nails he needs help trimming.  He also has minor callus buildup on his heel.  He does ambulate with accommodative shoes and inserts and is in process of acquiring new inserts.  His last hemoglobin A1c was 9.2% on 4/1/25.  No other complaints are mentioned.  Past medical history, medications, and allergies reviewed.      Allergies: Heparin   Current Outpatient Medications   Medication Sig Dispense Refill    Insulin Glargine-yfgn (SEMGLEE, YFGN,) 100 UNIT/ML Subcutaneous Solution Pen-injector Please take 22 units every 24 hrs 30 mL 1    Insulin Lispro, 1 Unit Dial, (HUMALOG KWIKPEN) 100 UNIT/ML Subcutaneous Solution Pen-injector Test blood glucose 3 times daily BEFORE meals Inject 2 unit if blood glucose is between 141-180 mg/dL Inject 3 units if blood glucose is between 181-220 mg/dL Inject 6 units if blood glucose is between 221-260 mg/dL Inject 8 units if blood glucose is between 261-300 mg/dL Inject 10 units if blood glucose is between 301-350 mg/dL Call your physician if blood glucose is greater than 351 mg/dL, 15 mL 1    glucagon (GVOKE HYPOPEN 2-PACK) 1 MG/0.2ML Subcutaneous injection Inject 0.2 mL (1 mg total) into the skin as needed. Patient trained on Gvoke. No substitutions 0.4 mL 1    ELIQUIS 2.5 MG Oral Tab Take 1 tablet (2.5 mg total) by mouth.      latanoprost 0.005 % Ophthalmic Solution Place 1 drop into the left eye nightly.      MIDODRINE HCL OR Take 5 mg by mouth.      sevelamer carbonate  800 MG Oral Tab       VELPHORO 500 MG Oral Chew Tab 1 tablet.      BD PEN NEEDLE ODALIS 2ND GEN 32G X 4 MM Does not apply Misc USE TO INJECT INSULIN FOUR TIMES DAILY 400 each 2    Dulaglutide (TRULICITY) 0.75 MG/0.5ML Subcutaneous Solution Auto-injector Inject 0.75 mg into the skin once a week. 6 mL 0    Continuous Glucose  (DEXCOM G7 ) Does not apply Device USE DAILY. 1 each 0    Continuous Glucose Sensor (DEXCOM G7 SENSOR) Does not apply Misc 1 each Every 10 days. Use as directed every 10 days 3 each 11    atorvastatin 40 MG Oral Tab Take 1 tablet (40 mg total) by mouth daily.      aspirin (ASPIRIN EC LOW DOSE) 81 MG Oral Tab EC Take 1 tablet (81 mg total) by mouth daily. 90 tablet 0    Dulaglutide (TRULICITY) 0.75 MG/0.5ML Subcutaneous Solution Pen-injector Inject 0.75 mg into the skin once a week. 7 mL 3    cloNIDine 0.2 MG Oral Tab Take 1 tablet (0.2 mg total) by mouth 3 (three) times daily. (Patient not taking: Reported on 5/29/2025)        Past Medical History:    CKD (chronic kidney disease)    Diabetes (HCC)    Elevated liver enzymes    Elevated serum GGT level    High blood pressure    High cholesterol    History of blood transfusion    2023 November    Hyperlipidemia    HYPERTRIGLYCERIDEMIA    Intractable nausea and vomiting    OBESITY    Obesity    Pneumonia due to organism    40 years ago    PONV (postoperative nausea and vomiting)    Renal disorder    Visual impairment    GLASSES      Past Surgical History:   Procedure Laterality Date    Arthrotomy/explore/treat knee joint Left     DONE TWICE    Cholecystectomy      Colonoscopy N/A 1/23/2024    Procedure: COLONOSCOPY with hot snare polypectomy;  Surgeon: Albino Hernandez MD;  Location:  ENDOSCOPY    Debride wound Left     HEEL    Foot surgery      Other surgical history  Knee & Foot      Family History   Problem Relation Age of Onset    Diabetes Father     Heart Disorder Father     Heart Disease Father     Diabetes Mother      Hypertension Sister       Social History     Socioeconomic History    Marital status:    Tobacco Use    Smoking status: Never     Passive exposure: Never    Smokeless tobacco: Never   Vaping Use    Vaping status: Never Used   Substance and Sexual Activity    Alcohol use: Yes     Alcohol/week: 1.0 standard drink of alcohol     Types: 1 Standard drinks or equivalent per week     Comment: socially    Drug use: No   Other Topics Concern    Caffeine Concern No    Exercise No    Seat Belt Yes    Special Diet Yes    Stress Concern No    Weight Concern No           REVIEW OF SYSTEMS:     No n/v/f/c.      EXAM:   There were no vitals taken for this visit.  GENERAL: well developed, well nourished, in no apparent distress  EXTREMITIES:       1. Integument: Normal skin temperature and turgor.  Site of prior wound is fully closed.  Minor HPK present.  Nails x 10 are elongated and thickened.  There is minor skin breakdown on the inside of the left fifth toe.  No probing or acute signs of infection noted.  2. Vascular: Dorsalis pedis and posterior tibial pulses are lightly palpable.  CFT intact digits.     3. Musculoskeletal: Muscle strength intact to left lower extremity with decreased plantar flexion which is patient's baseline.  There is no longer plantar prominence of calcaneus that was noted preoperatively.  Compartments are soft and compressible.  Flexion contracture of digits bilaterally.   4. Neurological: Normal sharp dull sensation; reflexes normal.  Decreased protective sensation noted to lower extremities.        ASSESSMENT AND PLAN:   Diagnoses and all orders for this visit:    Diabetic polyneuropathy associated with type 2 diabetes mellitus (HCC)    Exostosis of bone of foot    PAD (peripheral artery disease)    Hammer toes of both feet    Gait instability    Stage 4 chronic kidney disease (HCC)    Callus of foot          Plan:   -Patient examined, chart history reviewed.  -Inspected site of prior ulcer to  left plantar heel.  This site remains fully closed.  Minor HPK trimmed to healthy tissue without incident.  Can apply urea cream to site nightly.  Ambulate with accommodative shoes and inserts.  New order placed for accommodative inserts.  -Sharply debrided nails x 10 with nail nipper.  Nails smoothed with Dremel.  -Patient to work on getting blood glucose in better range.  -Patient does have new superficial sore on the inside of his left fifth toe.  Continue to ambulate with accommodative shoes and inserts.  Can paint site with Betadine for the next couple days until symptoms resolve.  Should follow-up immediately if any worsening skin breakdown or other concerns arise.  -Continue daily foot checks and seek immediate medical attention any concerns arise.  -Educated patient on acute signs of infection and symptoms of DVT and advised patient to seek immediate medical attention if any concerns arise.  Patient expressed understanding.     RTC 4 weeks.    Royce Lewis DPM

## 2025-06-12 ENCOUNTER — LAB ENCOUNTER (OUTPATIENT)
Dept: LAB | Age: 45
End: 2025-06-12
Attending: STUDENT IN AN ORGANIZED HEALTH CARE EDUCATION/TRAINING PROGRAM
Payer: COMMERCIAL

## 2025-06-12 DIAGNOSIS — Z79.4 CONTROLLED TYPE 2 DIABETES MELLITUS WITH HYPERGLYCEMIA, WITH LONG-TERM CURRENT USE OF INSULIN (HCC): ICD-10-CM

## 2025-06-12 DIAGNOSIS — N18.5 STAGE 5 CHRONIC KIDNEY DISEASE NOT ON CHRONIC DIALYSIS (HCC): ICD-10-CM

## 2025-06-12 DIAGNOSIS — Z79.4 CONTROLLED TYPE 2 DIABETES MELLITUS WITH STAGE 5 CHRONIC KIDNEY DISEASE NOT ON CHRONIC DIALYSIS, WITH LONG-TERM CURRENT USE OF INSULIN (HCC): ICD-10-CM

## 2025-06-12 DIAGNOSIS — E11.65 CONTROLLED TYPE 2 DIABETES MELLITUS WITH HYPERGLYCEMIA, WITH LONG-TERM CURRENT USE OF INSULIN (HCC): ICD-10-CM

## 2025-06-12 DIAGNOSIS — E05.90 SUBCLINICAL HYPERTHYROIDISM: ICD-10-CM

## 2025-06-12 DIAGNOSIS — E11.65 UNCONTROLLED TYPE 2 DIABETES MELLITUS WITH HYPERGLYCEMIA (HCC): ICD-10-CM

## 2025-06-12 DIAGNOSIS — N18.5 CONTROLLED TYPE 2 DIABETES MELLITUS WITH STAGE 5 CHRONIC KIDNEY DISEASE NOT ON CHRONIC DIALYSIS, WITH LONG-TERM CURRENT USE OF INSULIN (HCC): ICD-10-CM

## 2025-06-12 DIAGNOSIS — E55.9 VITAMIN D DEFICIENCY: ICD-10-CM

## 2025-06-12 DIAGNOSIS — E11.22 CONTROLLED TYPE 2 DIABETES MELLITUS WITH STAGE 5 CHRONIC KIDNEY DISEASE NOT ON CHRONIC DIALYSIS, WITH LONG-TERM CURRENT USE OF INSULIN (HCC): ICD-10-CM

## 2025-06-12 LAB
ALBUMIN SERPL-MCNC: 4.7 G/DL (ref 3.2–4.8)
ALBUMIN/GLOB SERPL: 1.5 {RATIO} (ref 1–2)
ALP LIVER SERPL-CCNC: 93 U/L (ref 45–117)
ALT SERPL-CCNC: 12 U/L (ref 10–49)
ANION GAP SERPL CALC-SCNC: 15 MMOL/L (ref 0–18)
AST SERPL-CCNC: 34 U/L (ref ?–34)
BASOPHILS # BLD AUTO: 0.06 X10(3) UL (ref 0–0.2)
BASOPHILS NFR BLD AUTO: 0.7 %
BILIRUB SERPL-MCNC: 0.6 MG/DL (ref 0.3–1.2)
BUN BLD-MCNC: 24 MG/DL (ref 9–23)
CALCIUM BLD-MCNC: 9.7 MG/DL (ref 8.7–10.6)
CHLORIDE SERPL-SCNC: 98 MMOL/L (ref 98–112)
CHOLEST SERPL-MCNC: 237 MG/DL (ref ?–200)
CO2 SERPL-SCNC: 26 MMOL/L (ref 21–32)
CREAT BLD-MCNC: 7.28 MG/DL (ref 0.7–1.3)
CREAT UR-SCNC: 397.1 MG/DL
EGFRCR SERPLBLD CKD-EPI 2021: 9 ML/MIN/1.73M2 (ref 60–?)
EOSINOPHIL # BLD AUTO: 0.26 X10(3) UL (ref 0–0.7)
EOSINOPHIL NFR BLD AUTO: 3.2 %
ERYTHROCYTE [DISTWIDTH] IN BLOOD BY AUTOMATED COUNT: 15.5 %
EST. AVERAGE GLUCOSE BLD GHB EST-MCNC: 266 MG/DL (ref 68–126)
FASTING PATIENT LIPID ANSWER: YES
FASTING STATUS PATIENT QL REPORTED: YES
GLOBULIN PLAS-MCNC: 3.2 G/DL (ref 2–3.5)
GLUCOSE BLD-MCNC: 184 MG/DL (ref 70–99)
HBA1C MFR BLD: 10.9 % (ref ?–5.7)
HCT VFR BLD AUTO: 37.6 % (ref 39–53)
HDLC SERPL-MCNC: 35 MG/DL (ref 40–59)
HGB BLD-MCNC: 12.6 G/DL (ref 13–17.5)
IMM GRANULOCYTES # BLD AUTO: 0.03 X10(3) UL (ref 0–1)
IMM GRANULOCYTES NFR BLD: 0.4 %
LDLC SERPL CALC-MCNC: 157 MG/DL (ref ?–100)
LYMPHOCYTES # BLD AUTO: 2.15 X10(3) UL (ref 1–4)
LYMPHOCYTES NFR BLD AUTO: 26.2 %
MCH RBC QN AUTO: 34.3 PG (ref 26–34)
MCHC RBC AUTO-ENTMCNC: 33.5 G/DL (ref 31–37)
MCV RBC AUTO: 102.5 FL (ref 80–100)
MICROALBUMIN UR-MCNC: 30.9 MG/DL
MICROALBUMIN/CREAT 24H UR-RTO: 77.8 UG/MG (ref ?–30)
MONOCYTES # BLD AUTO: 0.61 X10(3) UL (ref 0.1–1)
MONOCYTES NFR BLD AUTO: 7.4 %
NEUTROPHILS # BLD AUTO: 5.11 X10 (3) UL (ref 1.5–7.7)
NEUTROPHILS # BLD AUTO: 5.11 X10(3) UL (ref 1.5–7.7)
NEUTROPHILS NFR BLD AUTO: 62.1 %
NONHDLC SERPL-MCNC: 202 MG/DL (ref ?–130)
OSMOLALITY SERPL CALC.SUM OF ELEC: 297 MOSM/KG (ref 275–295)
PLATELET # BLD AUTO: 179 10(3)UL (ref 150–450)
POTASSIUM SERPL-SCNC: 4.4 MMOL/L (ref 3.5–5.1)
PROT SERPL-MCNC: 7.9 G/DL (ref 5.7–8.2)
RBC # BLD AUTO: 3.67 X10(6)UL (ref 4.3–5.7)
SODIUM SERPL-SCNC: 139 MMOL/L (ref 136–145)
T3FREE SERPL-MCNC: 2.37 PG/ML (ref 2.4–4.2)
T4 FREE SERPL-MCNC: 1.4 NG/DL (ref 0.8–1.7)
TRIGL SERPL-MCNC: 244 MG/DL (ref 30–149)
TSI SER-ACNC: 0.91 UIU/ML (ref 0.55–4.78)
VIT B12 SERPL-MCNC: 684 PG/ML (ref 211–911)
VIT D+METAB SERPL-MCNC: 10.2 NG/ML (ref 30–100)
VLDLC SERPL CALC-MCNC: 48 MG/DL (ref 0–30)
WBC # BLD AUTO: 8.2 X10(3) UL (ref 4–11)

## 2025-06-12 PROCEDURE — 84445 ASSAY OF TSI GLOBULIN: CPT | Performed by: STUDENT IN AN ORGANIZED HEALTH CARE EDUCATION/TRAINING PROGRAM

## 2025-06-12 PROCEDURE — 84681 ASSAY OF C-PEPTIDE: CPT | Performed by: STUDENT IN AN ORGANIZED HEALTH CARE EDUCATION/TRAINING PROGRAM

## 2025-06-12 PROCEDURE — 82043 UR ALBUMIN QUANTITATIVE: CPT | Performed by: FAMILY MEDICINE

## 2025-06-12 PROCEDURE — 84481 FREE ASSAY (FT-3): CPT | Performed by: STUDENT IN AN ORGANIZED HEALTH CARE EDUCATION/TRAINING PROGRAM

## 2025-06-12 PROCEDURE — 80061 LIPID PANEL: CPT | Performed by: FAMILY MEDICINE

## 2025-06-12 PROCEDURE — 83520 IMMUNOASSAY QUANT NOS NONAB: CPT | Performed by: STUDENT IN AN ORGANIZED HEALTH CARE EDUCATION/TRAINING PROGRAM

## 2025-06-12 PROCEDURE — 84439 ASSAY OF FREE THYROXINE: CPT | Performed by: STUDENT IN AN ORGANIZED HEALTH CARE EDUCATION/TRAINING PROGRAM

## 2025-06-12 PROCEDURE — 82306 VITAMIN D 25 HYDROXY: CPT | Performed by: FAMILY MEDICINE

## 2025-06-12 PROCEDURE — 82570 ASSAY OF URINE CREATININE: CPT | Performed by: FAMILY MEDICINE

## 2025-06-12 PROCEDURE — 80050 GENERAL HEALTH PANEL: CPT | Performed by: FAMILY MEDICINE

## 2025-06-12 PROCEDURE — 83036 HEMOGLOBIN GLYCOSYLATED A1C: CPT | Performed by: FAMILY MEDICINE

## 2025-06-12 PROCEDURE — 82607 VITAMIN B-12: CPT | Performed by: FAMILY MEDICINE

## 2025-06-13 ENCOUNTER — OFFICE VISIT (OUTPATIENT)
Dept: FAMILY MEDICINE CLINIC | Facility: CLINIC | Age: 45
End: 2025-06-13
Payer: COMMERCIAL

## 2025-06-13 VITALS
DIASTOLIC BLOOD PRESSURE: 62 MMHG | OXYGEN SATURATION: 94 % | TEMPERATURE: 97 F | HEIGHT: 68 IN | WEIGHT: 222.19 LBS | HEART RATE: 106 BPM | SYSTOLIC BLOOD PRESSURE: 102 MMHG | RESPIRATION RATE: 16 BRPM | BODY MASS INDEX: 33.67 KG/M2

## 2025-06-13 DIAGNOSIS — E78.5 DYSLIPIDEMIA: ICD-10-CM

## 2025-06-13 DIAGNOSIS — Z00.00 WELL ADULT EXAM: Primary | ICD-10-CM

## 2025-06-13 DIAGNOSIS — I73.9 PAD (PERIPHERAL ARTERY DISEASE): ICD-10-CM

## 2025-06-13 DIAGNOSIS — D75.829 HIT (HEPARIN-INDUCED THROMBOCYTOPENIA) (HCC): ICD-10-CM

## 2025-06-13 DIAGNOSIS — E11.65 UNCONTROLLED TYPE 2 DIABETES MELLITUS WITH HYPERGLYCEMIA (HCC): ICD-10-CM

## 2025-06-13 DIAGNOSIS — I10 ESSENTIAL HYPERTENSION: ICD-10-CM

## 2025-06-13 LAB — C-PEPTIDE: 11.9 NG/ML

## 2025-06-13 RX ORDER — ERGOCALCIFEROL 1.25 MG/1
50000 CAPSULE, LIQUID FILLED ORAL WEEKLY
Qty: 12 CAPSULE | Refills: 0 | Status: CANCELLED | OUTPATIENT
Start: 2025-06-13 | End: 2025-09-11

## 2025-06-13 RX ORDER — BLOOD-GLUCOSE METER
EACH MISCELLANEOUS
Qty: 1 KIT | Refills: 0 | Status: SHIPPED | OUTPATIENT
Start: 2025-06-13

## 2025-06-13 RX ORDER — LANCETS
EACH MISCELLANEOUS
Qty: 100 EACH | Refills: 6 | Status: SHIPPED | OUTPATIENT
Start: 2025-06-13

## 2025-06-13 RX ORDER — ATORVASTATIN CALCIUM 80 MG/1
80 TABLET, FILM COATED ORAL DAILY
Qty: 90 TABLET | Refills: 0 | Status: SHIPPED | OUTPATIENT
Start: 2025-06-13

## 2025-06-13 RX ORDER — CHOLECALCIFEROL (VITAMIN D3) 50 MCG
TABLET ORAL
COMMUNITY

## 2025-06-13 RX ORDER — BLOOD SUGAR DIAGNOSTIC
STRIP MISCELLANEOUS
Qty: 100 STRIP | Refills: 6 | Status: SHIPPED | OUTPATIENT
Start: 2025-06-13

## 2025-06-13 RX ORDER — DULAGLUTIDE 0.75 MG/.5ML
0.75 INJECTION, SOLUTION SUBCUTANEOUS WEEKLY
Qty: 6 ML | Refills: 0 | Status: SHIPPED | OUTPATIENT
Start: 2025-06-13 | End: 2025-09-11

## 2025-06-13 NOTE — PROGRESS NOTES
Subjective:   Adrien Tobin is a 45 year old male who presents for Follow - Up and Physical     - PT denies any worsening of sx  - glucose monitor feels like it is broken. Hasn't felt that they have been getting accurate numbers; They have an order for a dexcom but keep rescheduling b/c they have been feeling sick during  - pt denies any change to diet    History/Other:   History of Present Illness  Lab Results   Component Value Date    A1C 10.9 (H) 06/12/2025    A1C 9.9 (A) 03/10/2025    A1C 7.5 04/08/2024    A1C 5.5 10/18/2023    A1C 6.4 (H) 06/17/2022       45-year-old male with a history of type 2 diabetes- insulin dependent, osteomyelitis of left foot s/p left calcanectomy,  PAD, chronic kidney disease stage V on HD, hypertension presenting today for follow up on his chronic conditions.      Last A1c value was 10.9% done 6/12/2025.  Current regimen includes Lantus 22 units daily, lispro SSI, Trulicity 0.75 mg/week  Labs reviewed: Microalbumin 77.8, vitamin D 10.2, B12 684, triglyceride 244, HDL 35, , glucose 184, creatinine 7.28, 12.6, .5    ESRD- on HD now since June 2024 , HD is MWF.      Specialists:   Ophtho at [facility] retina-patient has retinopathy and is status post a Avastin injections, sees Dr. Oppenheim  Nephrology - Dr. Meyers   Podiatry- Dr. Lewis.   Vascular- Dr. Najjar- patient has chornic left heel wound due to DM and PAD. MRI showed improvement in reactive marrow. He has seen Vascular who may pursue angiogram.   Endo- Dr. Samaniego.     He is experiencing issues with glucose monitoring, suspecting a malfunction in his current device. He has not received the Dexcom device due to missed appointments, which were caused by feeling unwell after dialysis treatments. His hemoglobin A1c has increased to 10.9 from 5.5 in 2023. He has been on Trulicity for a few months and takes Lantus, currently 22 units daily, but has missed some doses and has not been using short-acting insulin  regularly due to perceived good sugar levels.    He has chronic kidney disease with a GFR of 9. His labs show significant abnormalities, including low vitamin D at 10.2, anemia with a hemoglobin of 12.6, and dyslipidemia with lipids at 157 and total cholesterol at 237. He is on dialysis and receives vitamin D supplementation during treatments. He has a history of peripheral arterial disease and is on atorvastatin, which is being increased from 40 to 80 mg due to suboptimal cholesterol levels.    He has a history of congestive heart failure and has been on Eliquis due to a history of clots. No recent vision changes and he is up to date on his colonoscopy. He is not currently seeing a nephrologist regularly but is followed by a nurse practitioner during dialysis sessions.    He takes Lantus in the morning after dialysis to improve sleep and has missed some doses. He is also on aspirin 81 mg. He has a son with a job and a daughter who plays soccer. No recent vision changes or new diabetes symptoms attributed to frequent dialysis.    [place] CRC screening      Chief Complaint Reviewed and Verified  No Further Nursing Notes to   Review  Tobacco Reviewed  Allergies Reviewed  Medications Reviewed    Problem List Reviewed  Medical History Reviewed  Surgical History   Reviewed  Family History Reviewed  Social History Reviewed         Tobacco:  He has never smoked tobacco.    Current Medications[1]    PHQ-2 SCORE: 1  , done 6/13/2025   Feeling down, depressed, or hopeless: 1    Last [place] Suicide Screening on 6/13/2025 was No Risk.       Review of Systems:  Pertinent items are noted in HPI.      Objective:   /62   Pulse 106   Temp 97.1 °F (36.2 °C) (Temporal)   Resp 16   Ht 5' 8\" (1.727 m)   Wt 222 lb 3.2 oz (100.8 kg)   SpO2 94%   BMI 33.79 kg/m²  Estimated body mass index is 33.79 kg/m² as calculated from the following:    Height as of this encounter: 5' 8\" (1.727 m).    Weight as of this encounter:  222 lb 3.2 oz (100.8 kg).  Results  LABS  GFR: 9 mL/min/1.73m²  Vitamin D: 10.2 ng/mL  Total Cholesterol: 237 mg/dL  Hemoglobin: 12.6 g/dL  HbA1c: 10.9%  C-peptide: 11.9 ng/mL     Physical Exam  GENERAL: Alert, cooperative, well developed, no acute distress  HEENT: Normocephalic, normal oropharynx, moist mucous membranes  CHEST: Clear to auscultation bilaterally, No wheezes, rhonchi, or crackles  CARDIOVASCULAR: Normal heart rate and rhythm, S1 and S2 normal without murmurs  ABDOMEN: Soft, non-tender, non-distended, without organomegaly, Normal bowel sounds  EXTREMITIES: No cyanosis or edema  NEUROLOGICAL: Cranial nerves grossly intact, Moves all extremities without gross motor or sensory deficit        Assessment & Plan:   1. Well adult exam (Primary)  2. Uncontrolled type 2 diabetes mellitus with hyperglycemia ([facility])  -     Trulicity; Inject 0.75 mg into the skin once a week.  Dispense: 6 mL; Refill: 0  -     Endocrine Referral - In Network  -     OneTouch Verio Flex System; Test blood sugar in the morning and with meals.  Dispense: 1 kit; Refill: 0  -     OneTouch Verio; Test blood sugar in the morning and with meals.  Dispense: 100 strip; Refill: 6  -     Fingerstix Lancets; Test blood sugar in the morning and with meals.  Dispense: 100 each; Refill: 6  3. Essential hypertension  4. Dyslipidemia  -     Atorvastatin Calcium; Take 1 tablet (80 mg total) by mouth daily.  Dispense: 90 tablet; Refill: 0  5. PAD (peripheral artery disease)  6. HIT (heparin-induced thrombocytopenia) ([facility])  -     Nephrology Referral - In Network    Assessment & Plan  Chronic Kidney Disease Stage 5 (End-Stage Renal Disease)  Severe diabetic nephropathy with GFR of 9. On dialysis, non-compliant with nephrologist follow-up. Vitamin D deficiency due to renal failure.  - Ensure follow-up with nephrologist Dr. Meyers.  - Continue dialysis treatments.  - Discuss vitamin D levels with dialysis team and ensure appropriate  supplementation.    Type 2 Diabetes Mellitus with Poor Glycemic Control  A1c increased to 10.9. Issues with glucose monitoring and medication adherence. Lantus and Trulicity use inconsistent.  - Provide a new glucose meter- check BS QID.  - Increase Lantus to 25 units daily.  - Ensure he follows sliding scale insulin instructions.  - Encourage consistent use of Trulicity and insulin.  - Follow up with endocrinologist Dr. Samaniego at the end of the month.    Peripheral Arterial Disease  Suboptimal lipid control. Current atorvastatin dose insufficient.  - Increase atorvastatin to 80 mg daily.  - Continue aspirin 81 mg daily.    Anemia  Mild anemia, hemoglobin 12.6, likely related to chronic kidney disease and dialysis.  - Monitor hemoglobin levels regularly.    Thromboembolic Events  On Eliquis, no recent events.  - Continue Eliquis as prescribed.    General Health Maintenance  Up to date on colonoscopy, regular ophthalmology follow-ups, declined foot exam today.  - Continue regular ophthalmology follow-ups.  - Ensure routine health screenings are up to date.  - Schedule foot exam.    Follow-up  Seeing multiple specialists for comprehensive management.  - Follow up with nephrologist Dr. Meyers.  - Follow up with endocrinologist Dr. Samaniego at the end of the month.  - Schedule next family practice visit in six months.        No follow-ups on file.        Jimbo Sullivan DO, 6/12/2025, 11:22 PM           The following individual(s) verbally consented to be recorded using ambient AI listening technology and understand that they can each withdraw their consent to this listening technology at any point by asking the clinician to turn off or pause the recording:    Patient name: Adrien Tobin    Jimbo Sullivan DO        This note was prepared using Dragon Medical voice recognition dictation software. As a result errors may occur. When identified these errors have been corrected. While every attempt is made to  correct errors during dictation discrepancies may still exist.      Note to patient: The 21st Century Cures Act makes medical notes like these available to patients in the interest of transparency. However, be advised this is a medical document. It is intended as peer to peer communication. It is written in medical language and may contain abbreviations or verbiage that are unfamiliar. It may appear blunt or direct. Medical documents are intended to carry relevant information, facts as evident, and the clinical opinion of the practitioner.               [1]   Current Outpatient Medications   Medication Sig Dispense Refill    Cholecalciferol (VITAMIN D) 50 MCG (2000 UT) Oral Tab Take by mouth.      Dulaglutide (TRULICITY) 0.75 MG/0.5ML Subcutaneous Solution Auto-injector Inject 0.75 mg into the skin once a week. 6 mL 0    atorvastatin 80 MG Oral Tab Take 1 tablet (80 mg total) by mouth daily. 90 tablet 0    Blood Glucose Monitoring Suppl (ONETOUCH VERIO FLEX SYSTEM) w/Device Does not apply Kit Test blood sugar in the morning and with meals. 1 kit 0    Glucose Blood (ONETOUCH VERIO) In Vitro Strip Test blood sugar in the morning and with meals. 100 strip 6    Fingerstix Lancets Does not apply Misc Test blood sugar in the morning and with meals. 100 each 6    Insulin Glargine-yfgn (SEMGLEE, YFGN,) 100 UNIT/ML Subcutaneous Solution Pen-injector Please take 22 units every 24 hrs 30 mL 1    Insulin Lispro, 1 Unit Dial, (HUMALOG KWIKPEN) 100 UNIT/ML Subcutaneous Solution Pen-injector Test blood glucose 3 times daily BEFORE meals Inject 2 unit if blood glucose is between 141-180 mg/dL Inject 3 units if blood glucose is between 181-220 mg/dL Inject 6 units if blood glucose is between 221-260 mg/dL Inject 8 units if blood glucose is between 261-300 mg/dL Inject 10 units if blood glucose is between 301-350 mg/dL Call your physician if blood glucose is greater than 351 mg/dL, 15 mL 1    glucagon (GVOKE HYPOPEN 2-PACK) 1  MG/0.2ML Subcutaneous injection Inject 0.2 mL (1 mg total) into the skin as needed. Patient trained on Gvoke. No substitutions 0.4 mL 1    ELIQUIS 2.5 MG Oral Tab Take 1 tablet (2.5 mg total) by mouth.      latanoprost 0.005 % Ophthalmic Solution Place 1 drop into the left eye nightly.      MIDODRINE HCL OR Take 5 mg by mouth.      sevelamer carbonate 800 MG Oral Tab       VELPHORO 500 MG Oral Chew Tab 1 tablet.      BD PEN NEEDLE ODALIS 2ND GEN 32G X 4 MM Does not apply Misc USE TO INJECT INSULIN FOUR TIMES DAILY 400 each 2    Continuous Glucose  (DEXCOM G7 ) Does not apply Device USE DAILY. 1 each 0    Continuous Glucose Sensor (DEXCOM G7 SENSOR) Does not apply Misc 1 each Every 10 days. Use as directed every 10 days 3 each 11    aspirin (ASPIRIN EC LOW DOSE) 81 MG Oral Tab EC Take 1 tablet (81 mg total) by mouth daily. 90 tablet 0    Dulaglutide (TRULICITY) 0.75 MG/0.5ML Subcutaneous Solution Pen-injector Inject 0.75 mg into the skin once a week. 7 mL 3    cloNIDine 0.2 MG Oral Tab Take 1 tablet (0.2 mg total) by mouth 3 (three) times daily. (Patient not taking: Reported on 6/13/2025)

## 2025-06-14 LAB — THY STIM IMMUNO: 1.09 IU/L

## 2025-06-16 ENCOUNTER — TELEPHONE (OUTPATIENT)
Dept: ORTHOPEDICS CLINIC | Facility: CLINIC | Age: 45
End: 2025-06-16

## 2025-06-16 NOTE — TELEPHONE ENCOUNTER
Patient called stating he had forms sent for FMLA about 2 weeks ago and would like to know the status. Please advise

## 2025-06-17 ENCOUNTER — TELEPHONE (OUTPATIENT)
Dept: PODIATRY CLINIC | Facility: CLINIC | Age: 45
End: 2025-06-17

## 2025-06-17 NOTE — TELEPHONE ENCOUNTER
Pt came into the [place] office to drop off forms for Short Term Disability. Pt Completed LITA, HIPAA, and paid the $25 fee. Forms emailed to forms department and originals sent to corporate center via interoffice envelope.

## 2025-06-17 NOTE — TELEPHONE ENCOUNTER
Per TE from 6/17/25, patient dropped off forms at [facility]

## 2025-06-18 LAB
HUMAN ANTI-MOUSE ANTIBODIES: <56 NG/ML
HUMAN ANTI-MOUSE ANTIBODIES: <56 NG/ML

## 2025-06-21 LAB — T4 FREE DIALYSIS/MS: 1.5 NG/DL

## 2025-06-24 NOTE — TELEPHONE ENCOUNTER
Dr. Lewis    Patient is request disability for Diabetic polyneuropathy.  Start: 3/10/25  End: 7/15/25    Do you support?    Thanks  Judah Simons Dept.

## 2025-06-24 NOTE — TELEPHONE ENCOUNTER
Patient called and provided details. Informed patient I will task provider to ask if request is supported. Patient verbalized understanding. Processing agent informed.    Type of Leave: disability   Reason for Leave: Diabetic polyneuropathy    Start date of leave: 3/10/25  End date of leave: 7/15/25  How many flare ups per month/length?:  How many appts per month/length?:   Was Fee and Turnaround info Given?: Yes

## 2025-06-26 ENCOUNTER — OFFICE VISIT (OUTPATIENT)
Dept: PODIATRY CLINIC | Facility: CLINIC | Age: 45
End: 2025-06-26

## 2025-06-26 DIAGNOSIS — M20.41 HAMMER TOES OF BOTH FEET: ICD-10-CM

## 2025-06-26 DIAGNOSIS — R26.81 GAIT INSTABILITY: ICD-10-CM

## 2025-06-26 DIAGNOSIS — N18.4 STAGE 4 CHRONIC KIDNEY DISEASE (HCC): ICD-10-CM

## 2025-06-26 DIAGNOSIS — M20.42 HAMMER TOES OF BOTH FEET: ICD-10-CM

## 2025-06-26 DIAGNOSIS — M89.8X7 EXOSTOSIS OF BONE OF FOOT: ICD-10-CM

## 2025-06-26 DIAGNOSIS — I73.9 PAD (PERIPHERAL ARTERY DISEASE): ICD-10-CM

## 2025-06-26 DIAGNOSIS — E11.42 DIABETIC POLYNEUROPATHY ASSOCIATED WITH TYPE 2 DIABETES MELLITUS (HCC): Primary | ICD-10-CM

## 2025-06-26 DIAGNOSIS — L84 CALLUS OF FOOT: ICD-10-CM

## 2025-06-26 PROCEDURE — 99213 OFFICE O/P EST LOW 20 MIN: CPT | Performed by: STUDENT IN AN ORGANIZED HEALTH CARE EDUCATION/TRAINING PROGRAM

## 2025-06-26 NOTE — TELEPHONE ENCOUNTER
Additional disability forms received via fax. Paperwork emailed to forms department and originals sent to corporate center.

## 2025-06-27 NOTE — TELEPHONE ENCOUNTER
Patient called for status, informed patient we received support from provider and form will now be completed and sent for signature. Patient would like phone call to inform him of completion.  Rep informed.

## 2025-06-28 NOTE — PROGRESS NOTES
[facility] Podiatry  Progress Note    Adrien Tobin is a 45 year old male.   Chief Complaint   Patient presents with    Wound Care     F/u Left foot wound, declines pain, or  fever,   yesterday am.         HPI:     Patient is a pleasant 45-year-old male with past medical history of recurrent heel ulcerations, diabetes, and CKD returns to clinic for foot exam.  He relates that his left heel ulcer is now closed with the help of [facility] in wound care center.  He does have elongated and thickened nails he needs help trimming.  He also has minor callus buildup on his heel.  He does ambulate with accommodative shoes and inserts and is in process of acquiring new inserts.  His last hemoglobin A1c was 10.9% on 6/12/25. His blood sugars were 136 mg/dL when last checked. No other complaints are mentioned.  Past medical history, medications, and allergies reviewed.      Allergies: Heparin   Current Outpatient Medications   Medication Sig Dispense Refill    Cholecalciferol (VITAMIN D) 50 MCG (2000 UT) Oral Tab Take by mouth.      Dulaglutide (TRULICITY) 0.75 MG/0.5ML Subcutaneous Solution Auto-injector Inject 0.75 mg into the skin once a week. 6 mL 0    atorvastatin 80 MG Oral Tab Take 1 tablet (80 mg total) by mouth daily. 90 tablet 0    Blood Glucose Monitoring Suppl (ONETOUCH VERIO FLEX SYSTEM) w/Device Does not apply Kit Test blood sugar in the morning and with meals. 1 kit 0    Glucose Blood (ONETOUCH VERIO) In Vitro Strip Test blood sugar in the morning and with meals. 100 strip 6    Fingerstix Lancets Does not apply Misc Test blood sugar in the morning and with meals. 100 each 6    Insulin Glargine-yfgn (SEMGLEE, YFGN,) 100 UNIT/ML Subcutaneous Solution Pen-injector Please take 22 units every 24 hrs 30 mL 1    Insulin Lispro, 1 Unit Dial, (HUMALOG KWIKPEN) 100 UNIT/ML Subcutaneous Solution Pen-injector Test blood glucose 3 times daily BEFORE meals Inject 2 unit if blood glucose is between 141-180 mg/dL  Inject 3 units if blood glucose is between 181-220 mg/dL Inject 6 units if blood glucose is between 221-260 mg/dL Inject 8 units if blood glucose is between 261-300 mg/dL Inject 10 units if blood glucose is between 301-350 mg/dL Call your physician if blood glucose is greater than 351 mg/dL, 15 mL 1    glucagon (GVOKE HYPOPEN 2-PACK) 1 MG/0.2ML Subcutaneous injection Inject 0.2 mL (1 mg total) into the skin as needed. Patient trained on Gvoke. No substitutions 0.4 mL 1    ELIQUIS 2.5 MG Oral Tab Take 1 tablet (2.5 mg total) by mouth.      latanoprost 0.005 % Ophthalmic Solution Place 1 drop into the left eye nightly.      MIDODRINE HCL OR Take 5 mg by mouth.      sevelamer carbonate 800 MG Oral Tab       VELPHORO 500 MG Oral Chew Tab 1 tablet.      BD PEN NEEDLE ODALIS 2ND GEN 32G X 4 MM Does not apply Misc USE TO INJECT INSULIN FOUR TIMES DAILY 400 each 2    Continuous Glucose  (DEXCOM G7 ) Does not apply Device USE DAILY. 1 each 0    Continuous Glucose Sensor (DEXCOM G7 SENSOR) Does not apply Misc 1 each Every 10 days. Use as directed every 10 days 3 each 11    aspirin (ASPIRIN EC LOW DOSE) 81 MG Oral Tab EC Take 1 tablet (81 mg total) by mouth daily. 90 tablet 0    Dulaglutide (TRULICITY) 0.75 MG/0.5ML Subcutaneous Solution Pen-injector Inject 0.75 mg into the skin once a week. 7 mL 3      Past Medical History:    CKD (chronic kidney disease)    Diabetes (HCC)    Elevated liver enzymes    Elevated serum GGT level    High blood pressure    High cholesterol    History of blood transfusion    2023 November    Hyperlipidemia    HYPERTRIGLYCERIDEMIA    Intractable nausea and vomiting    OBESITY    Obesity    Pneumonia due to organism    40 years ago    PONV (postoperative nausea and vomiting)    Renal disorder    Visual impairment    GLASSES      Past Surgical History:   Procedure Laterality Date    Arthrotomy/explore/treat knee joint Left     DONE TWICE    Cholecystectomy      Colonoscopy N/A 1/23/2024     Procedure: COLONOSCOPY with hot snare polypectomy;  Surgeon: Albino Hernandez MD;  Location:  ENDOSCOPY    Debride wound Left     HEEL    Foot surgery      Other surgical history  Knee & Foot      Family History   Problem Relation Age of Onset    Diabetes Father     Heart Disorder Father     Heart Disease Father     Diabetes Mother     Hypertension Sister       Social History     Socioeconomic History    Marital status:    Tobacco Use    Smoking status: Never     Passive exposure: Never    Smokeless tobacco: Never   Vaping Use    Vaping status: Never Used   Substance and Sexual Activity    Alcohol use: Yes     Alcohol/week: 1.0 standard drink of alcohol     Types: 1 Standard drinks or equivalent per week     Comment: socially    Drug use: No   Other Topics Concern    Caffeine Concern No    Exercise No    Seat Belt Yes    Special Diet Yes    Stress Concern No    Weight Concern No           REVIEW OF SYSTEMS:     No n/v/f/c.      EXAM:   There were no vitals taken for this visit.  GENERAL: well developed, well nourished, in no apparent distress  EXTREMITIES:       1. Integument: Normal skin temperature and turgor.  Site of prior wound is fully closed.  Minor HPK present.  Nails x 10 are elongated and thickened.    2. Vascular: Dorsalis pedis and posterior tibial pulses are lightly palpable.  CFT intact digits.     3. Musculoskeletal: Muscle strength intact to left lower extremity with decreased plantar flexion which is patient's baseline.  There is no longer plantar prominence of calcaneus that was noted preoperatively.  Compartments are soft and compressible.  Flexion contracture of digits bilaterally.   4. Neurological: Normal sharp dull sensation; reflexes normal.  Decreased protective sensation noted to lower extremities.        ASSESSMENT AND PLAN:   Diagnoses and all orders for this visit:    Diabetic polyneuropathy associated with type 2 diabetes mellitus (HCC)    Exostosis of bone of foot    PAD  (peripheral artery disease)    Hammer toes of both feet    Gait instability    Stage 4 chronic kidney disease (HCC)    Callus of foot          Plan:   -Patient examined, chart history reviewed.  -Inspected site of prior ulcer to left plantar heel.  This site remains fully closed.  Minor HPK trimmed to healthy tissue without incident.  Can apply urea cream to site nightly.  Ambulate with accommodative shoes and inserts.  New order placed for accommodative inserts.  -Sharply debrided nails x 10 with nail nipper.  Nails smoothed with Dremel.  -Patient to work on getting blood glucose in better range.  -Continue daily foot checks and seek immediate medical attention any concerns arise.  -Educated patient on acute signs of infection and symptoms of DVT and advised patient to seek immediate medical attention if any concerns arise.  Patient expressed understanding.     RTC 4 weeks.    Royce Lewis DPM

## 2025-06-30 NOTE — TELEPHONE ENCOUNTER
Dr. Lewis,                                       2 FORMS    Please sign off on form if you agree to: disability/Work status for Diabetic polyneuropathy     -Signature page will be the first page scanned  -From your Inbasket, Highlight the patient and click Chart   -Double click the 6/17/25 Forms Completion telephone encounter  -Scroll down to the Media section   -Click the blue Hyperlink: disability Dr. Lewis 6/30/25 AND Work Status Dr. Lewis 6/30/25  -Click Acknowledge located in the top right ribbon/menu   -Drag the mouse into the blank space of the document and a + sign will appear. Left click to   electronically sign the document.  -Once signed, simply exit out of the screen and you signature will be saved.     Thank you,  Marcie

## 2025-07-17 ENCOUNTER — OFFICE VISIT (OUTPATIENT)
Dept: PODIATRY CLINIC | Facility: CLINIC | Age: 45
End: 2025-07-17
Payer: COMMERCIAL

## 2025-07-17 DIAGNOSIS — I73.9 PAD (PERIPHERAL ARTERY DISEASE): ICD-10-CM

## 2025-07-17 DIAGNOSIS — R26.81 GAIT INSTABILITY: ICD-10-CM

## 2025-07-17 DIAGNOSIS — M20.41 HAMMER TOES OF BOTH FEET: ICD-10-CM

## 2025-07-17 DIAGNOSIS — M20.42 HAMMER TOES OF BOTH FEET: ICD-10-CM

## 2025-07-17 DIAGNOSIS — M89.8X7 EXOSTOSIS OF BONE OF FOOT: ICD-10-CM

## 2025-07-17 DIAGNOSIS — N18.4 STAGE 4 CHRONIC KIDNEY DISEASE (HCC): ICD-10-CM

## 2025-07-17 DIAGNOSIS — E11.42 DIABETIC POLYNEUROPATHY ASSOCIATED WITH TYPE 2 DIABETES MELLITUS (HCC): Primary | ICD-10-CM

## 2025-07-17 PROCEDURE — 99213 OFFICE O/P EST LOW 20 MIN: CPT | Performed by: STUDENT IN AN ORGANIZED HEALTH CARE EDUCATION/TRAINING PROGRAM

## 2025-07-17 NOTE — PROGRESS NOTES
[facility] Podiatry  Progress Note    Adrien Tobin is a 45 year old male.   Chief Complaint   Patient presents with    Foot Pain     L foot/heel f/u-         HPI:     Patient is a pleasant 45-year-old male with past medical history of recurrent heel ulcerations, diabetes, and CKD returns to clinic for foot exam.  He relates that his left heel ulcer is now closed with the help of [facility] in wound care center.  He does have elongated and thickened nails he needs help trimming.  He also has minor callus buildup on his heel.  He does ambulate with accommodative shoes and inserts and is in process of acquiring new inserts.  His last hemoglobin A1c was 10.9% on 6/12/25.  No other complaints are mentioned.  Past medical history, medications, and allergies reviewed.      Allergies: Heparin   Current Outpatient Medications   Medication Sig Dispense Refill    Cholecalciferol (VITAMIN D) 50 MCG (2000 UT) Oral Tab Take by mouth.      Dulaglutide (TRULICITY) 0.75 MG/0.5ML Subcutaneous Solution Auto-injector Inject 0.75 mg into the skin once a week. 6 mL 0    atorvastatin 80 MG Oral Tab Take 1 tablet (80 mg total) by mouth daily. 90 tablet 0    Blood Glucose Monitoring Suppl (ONETOUCH VERIO FLEX SYSTEM) w/Device Does not apply Kit Test blood sugar in the morning and with meals. 1 kit 0    Glucose Blood (ONETOUCH VERIO) In Vitro Strip Test blood sugar in the morning and with meals. 100 strip 6    Fingerstix Lancets Does not apply Misc Test blood sugar in the morning and with meals. 100 each 6    Insulin Glargine-yfgn (SEMGLEE, YFGN,) 100 UNIT/ML Subcutaneous Solution Pen-injector Please take 22 units every 24 hrs 30 mL 1    Insulin Lispro, 1 Unit Dial, (HUMALOG KWIKPEN) 100 UNIT/ML Subcutaneous Solution Pen-injector Test blood glucose 3 times daily BEFORE meals Inject 2 unit if blood glucose is between 141-180 mg/dL Inject 3 units if blood glucose is between 181-220 mg/dL Inject 6 units if blood glucose is between  221-260 mg/dL Inject 8 units if blood glucose is between 261-300 mg/dL Inject 10 units if blood glucose is between 301-350 mg/dL Call your physician if blood glucose is greater than 351 mg/dL, 15 mL 1    glucagon (GVOKE HYPOPEN 2-PACK) 1 MG/0.2ML Subcutaneous injection Inject 0.2 mL (1 mg total) into the skin as needed. Patient trained on Gvoke. No substitutions 0.4 mL 1    ELIQUIS 2.5 MG Oral Tab Take 1 tablet (2.5 mg total) by mouth.      latanoprost 0.005 % Ophthalmic Solution Place 1 drop into the left eye nightly.      MIDODRINE HCL OR Take 5 mg by mouth.      sevelamer carbonate 800 MG Oral Tab       VELPHORO 500 MG Oral Chew Tab 1 tablet.      BD PEN NEEDLE ODALIS 2ND GEN 32G X 4 MM Does not apply Misc USE TO INJECT INSULIN FOUR TIMES DAILY 400 each 2    Continuous Glucose  (DEXCOM G7 ) Does not apply Device USE DAILY. 1 each 0    Continuous Glucose Sensor (DEXCOM G7 SENSOR) Does not apply Misc 1 each Every 10 days. Use as directed every 10 days 3 each 11    aspirin (ASPIRIN EC LOW DOSE) 81 MG Oral Tab EC Take 1 tablet (81 mg total) by mouth daily. 90 tablet 0    Dulaglutide (TRULICITY) 0.75 MG/0.5ML Subcutaneous Solution Pen-injector Inject 0.75 mg into the skin once a week. 7 mL 3      Past Medical History:    CKD (chronic kidney disease)    Diabetes (HCC)    Elevated liver enzymes    Elevated serum GGT level    High blood pressure    High cholesterol    History of blood transfusion    2023 November    Hyperlipidemia    HYPERTRIGLYCERIDEMIA    Intractable nausea and vomiting    OBESITY    Obesity    Pneumonia due to organism    40 years ago    PONV (postoperative nausea and vomiting)    Renal disorder    Visual impairment    GLASSES      Past Surgical History:   Procedure Laterality Date    Arthrotomy/explore/treat knee joint Left     DONE TWICE    Cholecystectomy      Colonoscopy N/A 1/23/2024    Procedure: COLONOSCOPY with hot snare polypectomy;  Surgeon: Albino Hernandez MD;  Location:   ENDOSCOPY    Debride wound Left     HEEL    Foot surgery      Other surgical history  Knee & Foot      Family History   Problem Relation Age of Onset    Diabetes Father     Heart Disorder Father     Heart Disease Father     Diabetes Mother     Hypertension Sister       Social History     Socioeconomic History    Marital status:    Tobacco Use    Smoking status: Never     Passive exposure: Never    Smokeless tobacco: Never   Vaping Use    Vaping status: Never Used   Substance and Sexual Activity    Alcohol use: Yes     Alcohol/week: 1.0 standard drink of alcohol     Types: 1 Standard drinks or equivalent per week     Comment: socially    Drug use: No   Other Topics Concern    Caffeine Concern No    Exercise No    Seat Belt Yes    Special Diet Yes    Stress Concern No    Weight Concern No           REVIEW OF SYSTEMS:     No n/v/f/c.      EXAM:   There were no vitals taken for this visit.  GENERAL: well developed, well nourished, in no apparent distress  EXTREMITIES:       1. Integument: Normal skin temperature and turgor.  Site of prior wound is fully closed.  Minor HPK present.  Nails x 10 are elongated and thickened.    2. Vascular: Dorsalis pedis and posterior tibial pulses are lightly palpable.  CFT intact digits.     3. Musculoskeletal: Muscle strength intact to left lower extremity with decreased plantar flexion which is patient's baseline.  There is no longer plantar prominence of calcaneus that was noted preoperatively.  Compartments are soft and compressible.  Flexion contracture of digits bilaterally.   4. Neurological: Normal sharp dull sensation; reflexes normal.  Decreased protective sensation noted to lower extremities.        ASSESSMENT AND PLAN:   Diagnoses and all orders for this visit:    Diabetic polyneuropathy associated with type 2 diabetes mellitus (HCC)    Exostosis of bone of foot    PAD (peripheral artery disease)    Hammer toes of both feet    Gait instability    Stage 4 chronic kidney  disease (HCC)            Plan:   -Patient examined, chart history reviewed.  -Inspected site of prior ulcer to left plantar heel.  This site remains fully closed.  Minor HPK trimmed to healthy tissue without incident.  Can apply urea cream to site nightly.  Ambulate with accommodative shoes and inserts.  Slowly break in new inserts.  -Sharply debrided nails x 10 with nail nipper.  Nails smoothed with Dremel.  -Patient to work on getting blood glucose in better range.  -Continue daily foot checks and seek immediate medical attention any concerns arise.  -Educated patient on acute signs of infection and symptoms of DVT and advised patient to seek immediate medical attention if any concerns arise.  Patient expressed understanding.     RTC 4 weeks.    Royce Lewis DPM

## 2025-07-18 ENCOUNTER — PATIENT MESSAGE (OUTPATIENT)
Dept: PODIATRY CLINIC | Facility: CLINIC | Age: 45
End: 2025-07-18

## 2025-07-18 ENCOUNTER — DIABETIC EDUCATION (OUTPATIENT)
Facility: CLINIC | Age: 45
End: 2025-07-18
Payer: COMMERCIAL

## 2025-07-18 DIAGNOSIS — Z79.4 CONTROLLED TYPE 2 DIABETES MELLITUS WITH HYPERGLYCEMIA, WITH LONG-TERM CURRENT USE OF INSULIN (HCC): Primary | ICD-10-CM

## 2025-07-18 DIAGNOSIS — E11.65 CONTROLLED TYPE 2 DIABETES MELLITUS WITH HYPERGLYCEMIA, WITH LONG-TERM CURRENT USE OF INSULIN (HCC): Primary | ICD-10-CM

## 2025-07-18 NOTE — PROGRESS NOTES
Adrien Tobin   4/14/1980 was seen for Personal Continuous Glucose Monitoring Training/Start:    Date: 7/18/2025  Referring Provider: Aden Roque Start time: 3:00pm End time: 3:30pm    Sensor Type: Dexcom G7    1. Differences in sensor glucose and blood glucose meter reading.  2. Always verify with a fingerstick if symptoms do not match sensor reading.    2. The sensor calibration process, if applicable  3. How to choose and rotate sensor sites/alternative sites. Injections should be given at least 3 inches away from where sensor is placed.    4. How to handle problems like MRI, CT scans, travel, etc.    5. Phone applications that may assist with using continuous glucose monitor.      Patient was able to set up Dexcom G7 jr on their phone. Patient was connected to Dexcom Clarityan d is streaming with diabetes clinic.      Patient demonstrated ability to insert and start the sensor successfully.    Patient placed sensor: yes    Sensor Settings:  High Alarm: 250   Low Alarm: 70        Written materials were provided for all the content areas covered.  Patient verbalized understanding and has no further questions at this time.  Routed to provider for review.     Plan: Patient is to follow up with MD office as instructed.      Patricia Quinonez, RD, , LD, CDCES

## 2025-07-21 NOTE — PATIENT INSTRUCTIONS
Here are some reminders about your CGM:     If your sensor falls off / disconnects / or has to be removed early, please contact your CGM company directly first for a replacement.  If they cannot accommodate, please reach out to our office and we will provide a sample if we have them in stock.     Please note that your new sensor can be UP TO 50 points different than your meter reading.  Remember: if you checked your blood sugar on 5 different meters, you would get 5 different readings.  If your CGM is consistently off or more than 50 points off, follow the instructions below for your specific CGM:  DEXCOM : calibrate your sensor.    With the jr, click the plus sign at the top right hand corner, select \"blood glucose\" and choose \"calibration\" enter the number from your meter and wait 5 minutes for the value to adjust.  It will not be exactly the same as your meter but should be much closer.  With the reader, hit the [place] button to go to the menu, choose \"events\", select \"blood glucose\" and choose \"calibration\" enter the number from your meter and wait 5 minutes for the value to adjust.  It will not be exactly the same as your meter but should be much closer.  DARBY: there is no option to calibrate, so if the sensor stays 50 points or more off for more than 1 hour without self correcting, remove the sensor and place a new one, then contact [employer] for a replacement.       Here are examples of appropriate differences in meter readings versus sensor readings:      Patricia Quinonez, RD, , LD, CDCES(she/her/hers)  Diabetes Educator  [facility]  [email or web address]  [phone] phone  [phone] Fax

## 2025-08-02 ENCOUNTER — LAB ENCOUNTER (OUTPATIENT)
Dept: LAB | Age: 45
End: 2025-08-02
Attending: STUDENT IN AN ORGANIZED HEALTH CARE EDUCATION/TRAINING PROGRAM

## 2025-08-02 DIAGNOSIS — Z79.4 CONTROLLED TYPE 2 DIABETES MELLITUS WITH STAGE 5 CHRONIC KIDNEY DISEASE NOT ON CHRONIC DIALYSIS, WITH LONG-TERM CURRENT USE OF INSULIN (HCC): ICD-10-CM

## 2025-08-02 DIAGNOSIS — E55.9 VITAMIN D DEFICIENCY: ICD-10-CM

## 2025-08-02 DIAGNOSIS — N18.5 CONTROLLED TYPE 2 DIABETES MELLITUS WITH STAGE 5 CHRONIC KIDNEY DISEASE NOT ON CHRONIC DIALYSIS, WITH LONG-TERM CURRENT USE OF INSULIN (HCC): ICD-10-CM

## 2025-08-02 DIAGNOSIS — Z79.4 CONTROLLED TYPE 2 DIABETES MELLITUS WITH HYPERGLYCEMIA, WITH LONG-TERM CURRENT USE OF INSULIN (HCC): ICD-10-CM

## 2025-08-02 DIAGNOSIS — E11.65 CONTROLLED TYPE 2 DIABETES MELLITUS WITH HYPERGLYCEMIA, WITH LONG-TERM CURRENT USE OF INSULIN (HCC): ICD-10-CM

## 2025-08-02 DIAGNOSIS — E05.90 SUBCLINICAL HYPERTHYROIDISM: ICD-10-CM

## 2025-08-02 DIAGNOSIS — E11.65 UNCONTROLLED TYPE 2 DIABETES MELLITUS WITH HYPERGLYCEMIA (HCC): ICD-10-CM

## 2025-08-02 DIAGNOSIS — N18.5 STAGE 5 CHRONIC KIDNEY DISEASE NOT ON CHRONIC DIALYSIS (HCC): ICD-10-CM

## 2025-08-02 DIAGNOSIS — E11.22 CONTROLLED TYPE 2 DIABETES MELLITUS WITH STAGE 5 CHRONIC KIDNEY DISEASE NOT ON CHRONIC DIALYSIS, WITH LONG-TERM CURRENT USE OF INSULIN (HCC): ICD-10-CM

## 2025-08-02 PROCEDURE — 83520 IMMUNOASSAY QUANT NOS NONAB: CPT

## 2025-08-02 PROCEDURE — 36415 COLL VENOUS BLD VENIPUNCTURE: CPT

## 2025-08-05 ENCOUNTER — OFFICE VISIT (OUTPATIENT)
Facility: CLINIC | Age: 45
End: 2025-08-05

## 2025-08-05 VITALS
RESPIRATION RATE: 16 BRPM | SYSTOLIC BLOOD PRESSURE: 112 MMHG | HEIGHT: 68 IN | WEIGHT: 225 LBS | DIASTOLIC BLOOD PRESSURE: 72 MMHG | OXYGEN SATURATION: 96 % | BODY MASS INDEX: 34.1 KG/M2 | HEART RATE: 106 BPM

## 2025-08-05 DIAGNOSIS — E11.65 UNCONTROLLED TYPE 2 DIABETES MELLITUS WITH HYPERGLYCEMIA (HCC): ICD-10-CM

## 2025-08-05 DIAGNOSIS — Z79.4 CONTROLLED TYPE 2 DIABETES MELLITUS WITH HYPERGLYCEMIA, WITH LONG-TERM CURRENT USE OF INSULIN (HCC): ICD-10-CM

## 2025-08-05 DIAGNOSIS — E11.65 CONTROLLED TYPE 2 DIABETES MELLITUS WITH HYPERGLYCEMIA, WITH LONG-TERM CURRENT USE OF INSULIN (HCC): ICD-10-CM

## 2025-08-05 DIAGNOSIS — E11.22 CONTROLLED TYPE 2 DIABETES MELLITUS WITH STAGE 5 CHRONIC KIDNEY DISEASE NOT ON CHRONIC DIALYSIS, WITH LONG-TERM CURRENT USE OF INSULIN (HCC): ICD-10-CM

## 2025-08-05 DIAGNOSIS — Z79.4 CONTROLLED TYPE 2 DIABETES MELLITUS WITH STAGE 5 CHRONIC KIDNEY DISEASE NOT ON CHRONIC DIALYSIS, WITH LONG-TERM CURRENT USE OF INSULIN (HCC): ICD-10-CM

## 2025-08-05 DIAGNOSIS — N18.5 CONTROLLED TYPE 2 DIABETES MELLITUS WITH STAGE 5 CHRONIC KIDNEY DISEASE NOT ON CHRONIC DIALYSIS, WITH LONG-TERM CURRENT USE OF INSULIN (HCC): ICD-10-CM

## 2025-08-05 LAB
AMB EXT GMI: 8.2 %
THYROTROPIN REC AB: 1.28 IU/L

## 2025-08-05 PROCEDURE — 3008F BODY MASS INDEX DOCD: CPT | Performed by: STUDENT IN AN ORGANIZED HEALTH CARE EDUCATION/TRAINING PROGRAM

## 2025-08-05 PROCEDURE — 95249 CONT GLUC MNTR PT PROV EQP: CPT | Performed by: STUDENT IN AN ORGANIZED HEALTH CARE EDUCATION/TRAINING PROGRAM

## 2025-08-05 PROCEDURE — 99215 OFFICE O/P EST HI 40 MIN: CPT | Performed by: STUDENT IN AN ORGANIZED HEALTH CARE EDUCATION/TRAINING PROGRAM

## 2025-08-05 PROCEDURE — 3074F SYST BP LT 130 MM HG: CPT | Performed by: STUDENT IN AN ORGANIZED HEALTH CARE EDUCATION/TRAINING PROGRAM

## 2025-08-05 PROCEDURE — 3078F DIAST BP <80 MM HG: CPT | Performed by: STUDENT IN AN ORGANIZED HEALTH CARE EDUCATION/TRAINING PROGRAM

## 2025-08-05 RX ORDER — INSULIN GLARGINE-YFGN 100 [IU]/ML
INJECTION, SOLUTION SUBCUTANEOUS
Qty: 30 ML | Refills: 1 | Status: SHIPPED | OUTPATIENT
Start: 2025-08-05

## 2025-08-05 RX ORDER — INSULIN LISPRO 100 [IU]/ML
INJECTION, SOLUTION INTRAVENOUS; SUBCUTANEOUS
Qty: 15 ML | Refills: 1 | Status: SHIPPED | OUTPATIENT
Start: 2025-08-05 | End: 2025-08-11

## 2025-08-07 ENCOUNTER — TELEPHONE (OUTPATIENT)
Facility: CLINIC | Age: 45
End: 2025-08-07

## 2025-08-07 DIAGNOSIS — Z79.4 CONTROLLED TYPE 2 DIABETES MELLITUS WITH HYPERGLYCEMIA, WITH LONG-TERM CURRENT USE OF INSULIN (HCC): ICD-10-CM

## 2025-08-07 DIAGNOSIS — E11.22 CONTROLLED TYPE 2 DIABETES MELLITUS WITH STAGE 5 CHRONIC KIDNEY DISEASE NOT ON CHRONIC DIALYSIS, WITH LONG-TERM CURRENT USE OF INSULIN (HCC): ICD-10-CM

## 2025-08-07 DIAGNOSIS — N18.5 CONTROLLED TYPE 2 DIABETES MELLITUS WITH STAGE 5 CHRONIC KIDNEY DISEASE NOT ON CHRONIC DIALYSIS, WITH LONG-TERM CURRENT USE OF INSULIN (HCC): ICD-10-CM

## 2025-08-07 DIAGNOSIS — Z79.4 CONTROLLED TYPE 2 DIABETES MELLITUS WITH STAGE 5 CHRONIC KIDNEY DISEASE NOT ON CHRONIC DIALYSIS, WITH LONG-TERM CURRENT USE OF INSULIN (HCC): ICD-10-CM

## 2025-08-07 DIAGNOSIS — E11.65 UNCONTROLLED TYPE 2 DIABETES MELLITUS WITH HYPERGLYCEMIA (HCC): ICD-10-CM

## 2025-08-07 DIAGNOSIS — E11.65 CONTROLLED TYPE 2 DIABETES MELLITUS WITH HYPERGLYCEMIA, WITH LONG-TERM CURRENT USE OF INSULIN (HCC): ICD-10-CM

## 2025-08-11 RX ORDER — INSULIN LISPRO 100 [IU]/ML
INJECTION, SOLUTION INTRAVENOUS; SUBCUTANEOUS
Qty: 18 ML | Refills: 1 | Status: SHIPPED | OUTPATIENT
Start: 2025-08-11

## 2025-08-12 ENCOUNTER — PATIENT MESSAGE (OUTPATIENT)
Dept: PODIATRY CLINIC | Facility: CLINIC | Age: 45
End: 2025-08-12

## 2025-08-25 ENCOUNTER — OFFICE VISIT (OUTPATIENT)
Dept: PODIATRY CLINIC | Facility: CLINIC | Age: 45
End: 2025-08-25

## 2025-08-25 DIAGNOSIS — R26.81 GAIT INSTABILITY: ICD-10-CM

## 2025-08-25 DIAGNOSIS — M20.42 HAMMER TOES OF BOTH FEET: ICD-10-CM

## 2025-08-25 DIAGNOSIS — L84 CALLUS OF FOOT: ICD-10-CM

## 2025-08-25 DIAGNOSIS — E11.42 DIABETIC POLYNEUROPATHY ASSOCIATED WITH TYPE 2 DIABETES MELLITUS (HCC): Primary | ICD-10-CM

## 2025-08-25 DIAGNOSIS — I73.9 PAD (PERIPHERAL ARTERY DISEASE): ICD-10-CM

## 2025-08-25 DIAGNOSIS — N18.4 STAGE 4 CHRONIC KIDNEY DISEASE (HCC): ICD-10-CM

## 2025-08-25 DIAGNOSIS — M89.8X7 EXOSTOSIS OF BONE OF FOOT: ICD-10-CM

## 2025-08-25 DIAGNOSIS — M20.41 HAMMER TOES OF BOTH FEET: ICD-10-CM

## 2025-08-25 PROCEDURE — 99213 OFFICE O/P EST LOW 20 MIN: CPT | Performed by: STUDENT IN AN ORGANIZED HEALTH CARE EDUCATION/TRAINING PROGRAM

## (undated) DIAGNOSIS — R61 UNEXPLAINED NIGHT SWEATS: Primary | ICD-10-CM

## (undated) DIAGNOSIS — E78.1 PURE HYPERGLYCERIDEMIA: ICD-10-CM

## (undated) DIAGNOSIS — E78.5 DYSLIPIDEMIA: ICD-10-CM

## (undated) DIAGNOSIS — E78.2 MIXED HYPERLIPIDEMIA: ICD-10-CM

## (undated) DIAGNOSIS — E78.5 DYSLIPIDEMIA ASSOCIATED WITH TYPE 2 DIABETES MELLITUS (HCC): ICD-10-CM

## (undated) DIAGNOSIS — E11.69 DYSLIPIDEMIA ASSOCIATED WITH TYPE 2 DIABETES MELLITUS (HCC): ICD-10-CM

## (undated) DEVICE — STERILE HOOK LOCK LATEX FREE ELASTIC BANDAGE 6INX5YD: Brand: HOOK LOCK™

## (undated) DEVICE — SCRUB EZ BETADINE 245

## (undated) DEVICE — Device

## (undated) DEVICE — SUTURE PROLENE 3-0 SH

## (undated) DEVICE — BLADE SAW KM33-11

## (undated) DEVICE — SOL  .9 1000ML BTL

## (undated) DEVICE — 3M™ RED DOT™ MONITORING ELECTRODE WITH FOAM TAPE AND STICKY GEL, 50/BAG, 20/CASE, 72/PLT 2570: Brand: RED DOT™

## (undated) DEVICE — #15 STERILE STAINLESS BLADE: Brand: STERILE STAINLESS BLADES

## (undated) DEVICE — SUTURE PROL 6-0 30IN NABSRB BLU 13MM C-1 3/8

## (undated) DEVICE — GOWN SURG AERO CHROME XXL

## (undated) DEVICE — CONVERTORS STOCKINETTE: Brand: CONVERTORS

## (undated) DEVICE — KENDALL SCD EXPRESS SLEEVES, KNEE LENGTH, MEDIUM: Brand: KENDALL SCD

## (undated) DEVICE — LOWER EXTREMITY CDS-LF: Brand: MEDLINE INDUSTRIES, INC.

## (undated) DEVICE — PADDING CAST COTTON STER 6

## (undated) DEVICE — KIT VLV 5 PC AIR H2O SUCT BX ENDOGATOR CONN

## (undated) DEVICE — GLOVE GAMMEX PI HYBRID LF 8.0

## (undated) DEVICE — BANDAGE ROLL,100% COTTON, 6 PLY, LARGE: Brand: KERLIX

## (undated) DEVICE — SUTURE VCRL 5-0 L18IN ABSRB UD PS-2 L19MM 1/2

## (undated) DEVICE — GOWN,SIRUS,FABRIC-REINFORCED,X-LARGE: Brand: MEDLINE

## (undated) DEVICE — FAN SPRAY KIT: Brand: PULSAVAC®

## (undated) DEVICE — VERSAJET II HYDROSURGERY SYSTEM HANDSET, 45DEG 8MM EXACT: Brand: VERSAJET

## (undated) DEVICE — REM POLYHESIVE ADULT PATIENT RETURN ELECTRODE: Brand: VALLEYLAB

## (undated) DEVICE — SUTURE ETHILON 3-0 PS-2

## (undated) DEVICE — NON-ADHERENT STRIPS,OIL EMULSION: Brand: CURITY

## (undated) DEVICE — SUT SILK 2-0 BLK 17-18 45CM

## (undated) DEVICE — SPECIMEN CONTAINER,POSITIVE SEAL INDICATOR, OR PACKAGED: Brand: PRECISION

## (undated) DEVICE — STERILE POLYISOPRENE POWDER-FREE SURGICAL GLOVES: Brand: PROTEXIS

## (undated) DEVICE — SUTURE VICRYL 2-0 FS-1

## (undated) DEVICE — 5.5MM ROUND FAST CUTTING BUR

## (undated) DEVICE — SUTURE SILK 3-0 SA64H

## (undated) DEVICE — BASIC DOUBLE BASIN 1-LF: Brand: MEDLINE INDUSTRIES, INC.

## (undated) DEVICE — CHLORAPREP 26ML APPLICATOR

## (undated) DEVICE — GLOVE SURG TRIUMPH SZ 71/2

## (undated) DEVICE — ABDOMINAL PAD: Brand: DERMACEA

## (undated) DEVICE — STANDARD HYPODERMIC NEEDLE,POLYPROPYLENE HUB: Brand: MONOJECT

## (undated) DEVICE — SUTURE MONOCRYL 4-0 PS-2

## (undated) DEVICE — GAUZE SPONGES,8 PLY: Brand: CURITY

## (undated) DEVICE — HOOK LOCK LATEX FREE ELASTIC BANDAGE 6INX5YD

## (undated) DEVICE — SOLUTION IV 1000ML 0.9% NACL PRESERVATIVE

## (undated) DEVICE — SUTURE ETHILON 4-0 PS-2

## (undated) DEVICE — SOL  .9 3000ML

## (undated) DEVICE — CONTAINER SPEC 4OZ POLYPR GRAD SCR LID LEAK

## (undated) DEVICE — ZIMMER® STERILE DISPOSABLE TOURNIQUET CUFF WITH PLC, DUAL PORT, SINGLE BLADDER, 18 IN. (46 CM)

## (undated) DEVICE — 10FT COMBINED O2 DELIVERY/CO2 MONITORING. FILTER WITH MICROSTREAM TYPE LUER: Brand: DUAL ADULT NASAL CANNULA

## (undated) DEVICE — GAMMEX® PI HYBRID SIZE 7.5, STERILE POWDER-FREE SURGICAL GLOVE, POLYISOPRENE AND NEOPRENE BLEND: Brand: GAMMEX

## (undated) DEVICE — DRAPE,U/SHT,SPLIT,FILM,60X84,STERILE: Brand: MEDLINE

## (undated) DEVICE — KIT CUSTOM ENDOPROCEDURE STERIS

## (undated) DEVICE — NEEDLE HYPO 25X1-1/2

## (undated) DEVICE — SCD SLEEVE KNEE HI BLEND

## (undated) DEVICE — SPLINT PRECUT SYNTH 5X30

## (undated) DEVICE — GAUZE PACKING IODOFORM 1/4X5

## (undated) DEVICE — GLOVE BIOGEL M SURG SZ 71/2

## (undated) DEVICE — PADDING CAST COTTON STER 4

## (undated) DEVICE — OCCLUSIVE GAUZE STRIP OVERWRAP,3% BISMUTH TRIBROMOPHENATE IN PETROLATUM BLEND: Brand: XEROFORM

## (undated) DEVICE — UNDYED BRAIDED (POLYGLACTIN 910), SYNTHETIC ABSORBABLE SUTURE: Brand: COATED VICRYL

## (undated) DEVICE — PREMIUM WET SKIN PREP TRAY: Brand: MEDLINE INDUSTRIES, INC.

## (undated) DEVICE — SHEET, DRAPE, SPLIT, STERILE: Brand: MEDLINE

## (undated) DEVICE — VIOLET BRAIDED (POLYGLACTIN 910), SYNTHETIC ABSORBABLE SUTURE: Brand: COATED VICRYL

## (undated) DEVICE — SYSTEM SEMI RIG LNR 3000CC W/ EL AND SELF

## (undated) DEVICE — SUTURE ETHILON 3-0 FS-1

## (undated) DEVICE — SUTURE ETHILON 2-0 FS

## (undated) DEVICE — SUTURE PROL 7-0 30IN NABSRB BLU 9.3MM BV-1

## (undated) DEVICE — PADDING CAST SOFT ROLL 4\" STER

## (undated) DEVICE — SUTURE PROLENE 2-0 CT-1

## (undated) DEVICE — 4.0MM OVAL CARBIDE BUR

## (undated) DEVICE — SUTURE VICRYL 1 CT-1

## (undated) DEVICE — APPLICATOR SKIN PREP 26ML HI LT ORNG 2% CHG

## (undated) DEVICE — GAMMEX® NON-LATEX PI TEXTURED SIZE 7.5, STERILE POLYISOPRENE POWDER-FREE SURGICAL GLOVE: Brand: GAMMEX

## (undated) DEVICE — GAUZE SPONGES,12 PLY: Brand: CURITY

## (undated) DEVICE — SUTURE PROL SZ 5-0 L36IN NONABSORBABLE BLU .

## (undated) DEVICE — SUTURE VICRYL 2-0 CT-2

## (undated) DEVICE — UNIVERSAL STERIBUMP® STERILE (5/CASE): Brand: UNIVERSAL STERIBUMP®

## (undated) DEVICE — DISPOSABLE TOURNIQUET CUFF SINGLE BLADDER, DUAL PORT AND QUICK CONNECT CONNECTOR: Brand: COLOR CUFF

## (undated) DEVICE — SUTURE SILK 4-0 SA63H

## (undated) DEVICE — 1200CC GUARDIAN II: Brand: GUARDIAN

## (undated) DEVICE — DRAPE MINI C-ARM

## (undated) DEVICE — TOWEL SURG OR 17X26IN WHITE

## (undated) DEVICE — C-ARM: Brand: UNBRANDED

## (undated) DEVICE — SUTURE VICRYL 3-0 SH

## (undated) DEVICE — DRAPE C-ARM UNIVERSAL

## (undated) NOTE — LETTER
9/12/2019          To Whom It May Concern:     Donny Martinez is currently under my medical care.     Activity is restricted as follows: sitting duties only.  Yulissa Guzman will be re-evaluated in 2 weeks in my office.     If you require additional info

## (undated) NOTE — LETTER
November 29, 2017    Nazia Jaquez  [address] [facility]      Dear [facility]:    The following are the results of your recent tests. Please review the list of test results.   Your result is the value on the left; we have also suppl Lymphocyte Absolute 1.86 0.90 - 4.00 x10(3) uL   Monocyte Absolute 0.40 0.10 - 0.60 x10(3) uL   Eosinophil Absolute 0.18 0.00 - 0.30 x10(3) uL   Basophil Absolute 0.04 0.00 - 0.10 x10(3) uL   Immature Granulocyte Absolute 0.01 0.00 - 1.00 x10(3) uL   Neutr

## (undated) NOTE — LETTER
Hellen Pete [address] [facility]     PICC LINE INSERTION CONSENT     I agree to have a Peripherally Inserted Central Catheter (PICC) placed in my arm.    1. The PICC insertion procedure, care, maintenance, risks, benefits, and c also discussed reasonable alternatives to the PICC, including risks, benefits, and side effects related to the alternatives and risks related to not receiving this procedure      _____________________ ___________ ____________________________________   Date

## (undated) NOTE — LETTER
01/02/19        Jenise Quach [address] [id number]      Dear Silvino [facility],    [address] [facility] records indicate that you have lab work and or testing that was ordered for you and has not yet been completed:  Orders Placed This Encounter

## (undated) NOTE — LETTER
5/27/2022          To Whom It May Concern:    Stephany Mccann is currently under my medical care and may return to work at this time. He may return to Work on 5/31/2022. Activity is restricted as follows: No Restrictions. If you require additional information please contact our office.         Sincerely,    Codi Sauer DPM

## (undated) NOTE — Clinical Note
DONNIEI. Patient completed TCM. Patient does not have HFU appt scheduled at this time. NCM attempted to schedule TCM/HFU however, no availability within the timeframe recommended.TE to PCP office re: appointment. Thank you.

## (undated) NOTE — LETTER
10/9/2018          To Whom It May Concern:    Shirin Phillips is currently under my medical care. Analisa Cox may return to work with 2 hours of standing and walking daily. If you require additional information please contact our office.         [facility]

## (undated) NOTE — Clinical Note
New pt from hospital, changed insulin doses and restarted trulicity, BG are improving seeing wound care will keep you posted thanks

## (undated) NOTE — Clinical Note
FYI, HFU call made, see notes. [facility] scheduled HFU appointment on 7/12/18. Patient currently in a TCM episode.  Therefore, HFU-No TCM

## (undated) NOTE — Clinical Note
DONNIEI. Patient completed TCM. Patient does not have HFU appt scheduled at this time. NCM attempted to schedule TCM/HFU however, no availability within the timeframe recommended. TE to PCP office re: appointment. Thank you.

## (undated) NOTE — IP AVS SNAPSHOT
Patient Demographics     Address  [address] [facility] [address] Phone  [phone] [facility])  [phone] (Work)  [phone] (Mobile) *Preferred* E-mail Address  [email or web address]      Emergency Contact(s)     Name Relation Home Work Mobile Specialty:  PODIATRIST  Why:  weekly  Contact information:  [address] [facility] [address] [facility]  [phone]                  Your medication list      TAKE these medications       Instructions Authorizing Provider Morning Afternoon Evening As Need 1 kit by Does not apply route as needed. Sildenafil Citrate 50 MG Tabs  Commonly known as:  VIAGRA      Take 1 tablet (50 mg total) by mouth daily as needed for Erectile Dysfunction.    MD RACHEL Ga 1.5 WW/8.5RN Sopn  Gen [id number] Insulin Aspart Pen (NOVOLOG) 100 UNIT/ML flexpen 1-5 Units 06/25/18 1918 Given      [id number] Insulin Aspart Pen (NOVOLOG) 100 UNIT/ML flexpen 1-5 Units 06/26/18 1303 Given      [id number] Insulin Aspart Pen (NOVOLOG) 100 UNIT/ML flexpen 1-68 Uni Location [facility] 3SW-A Attending Rigo Minor MD   Hosp Day # 0 PCP Preethi Shepherd MD     Chief Complaint: osteo    History of Present Illness: Mireya Mercado is a 34year old male with osteo of left foot.   Has had issues with infection Inject 1.5 Units into the skin once a week.  EVERY SUNDAY Disp:  Rfl:    BD AUTOSHIELD DUO 30G X 5 MM Does not apply Misc USE A new pen needle WITH EACH injection AS DIRECTED by your doctor Disp: 300 each Rfl: 3   LEVEMIR FLEXTOUCH 100 UNIT/ML Subcutaneous Respiratory: Clear to auscultation bilaterally. No wheezes. No rhonchi. Cardiovascular: S1, S2. Regular rate and rhythm. No murmurs, rubs or gallops. Equal pulses. Chest and Back: No tenderness or deformity. Abdomen: Soft, nontender, nondistended.   Pos Filed:  6/18/2018  9:49 PM Date of Service:  6/18/2018  9:40 PM Status:  Signed    :  Ana Beavers MD (Physician)     Consult Orders:    1.  IP Consult to Cardiology Once [[id number]] ordered by Yaw Staley DPM at 06/18/18 1539 •  TraZODone HCl (DESYREL) tab 50 mg, 50 mg, Oral, Nightly PRN  •  glucose (DEX4) oral liquid 15 g, 15 g, Oral, Q15 Min PRN **OR** Glucose-Vitamin C (DEX-4) 4-0.006 g chewable tab 4 tablet, 4 tablet, Oral, Q15 Min PRN **OR** dextrose 50 % injection 50 mL, Lungs: Clear without wheezes, rales, rhonchi or dullness. Normal excursions and effort. Abdomen: Soft, non-tender. Extremities: left leg wrapped and in protective boot  Neurologic: Alert and oriented, normal affect. Skin: Warm and dry.      Laboratorie Attribution Man    WS. 1 - Milton Allen MD on 6/18/2018  9:40 PM  WS. 2 - Milton Allen MD on 6/18/2018  9:41 PM                        Discharge Summaries - D/C Summary      Discharge Summaries signed by Naveen Sherman MD at 6/26/2018  1:49 PM  Vers seen by interventional cardiology as well due to peripheral vascular disease and the initial recommendation was to go ahead with partial calcanectomy of the left foot and reevaluate afterwards.   Patient had PICC line placed on June 20 to continue IV antibi BD AUTOSHIELD DUO 30G X 5 MM Misc  Generic drug:  Insulin Pen Needle      USE A new pen needle WITH EACH injection AS DIRECTED by your doctor   Quantity:  300 each  Refills:  3     LEVEMIR FLEXTOUCH 100 UNIT/ML Sopn  Generic drug:  insulin detemir      inj or guarding. Neurologic: No focal neurological deficits. Musculoskeletal: Moves all extremities. Extremities: No edema.   -----------------------------------------------------------------------------------------------  PATIENT DISCHARGE INSTRUCTIONS: Se

## (undated) NOTE — LETTER
Date: 11/3/2022    Patient Name: Ricardo Tobin          To Whom it may concern: This letter has been written at the patient's request. The above patient was seen at the [facility] for treatment of a medical condition.       The patient may return to work on Monday 11/07/2022 with the following limitations         Sincerely,    Ann Yañez DO

## (undated) NOTE — Clinical Note
FYI, TCM call made, see notes. Patient has an appointment on 7/3/18.  NCM changed visit type to TCM HFU

## (undated) NOTE — LETTER
07/13/21    Dx: diabetic ulcer left foot, s/p calcenectomy    Evaluate and treat for AFO to support and offload heel to prevent friction on posterior ankle and pressure on plantar heel

## (undated) NOTE — Clinical Note
Pt is coming for hospital follow up on 2/22/18. Pt stated she is doing good since d/c. Pt denies any pain or s/s of infection. Pt stated the wound vac and PICC line are in place with no concerns. Pt has continued Cefazolin IV antibiotics Q8H.  Pt stated Res

## (undated) NOTE — LETTER
[facility]  [address]emant[address] Rodgersrobbie [address] [employer], [address] [place] [address]    Consent for Operation    Date: __________________    Time: _______________    1.  I authorize the performance upon [email or web address] the following operation:    partial calcanectomy of the le procedure has been videotaped, the surgeon will obtain the original videotape. The hospital will not be responsible for storage or maintenance of this tape.     6. For the purpose of advancing medical education, I consent to the admittance of observers to t STATEMENTS REQUIRING INSERTION OR COMPLETION WERE FILLED IN.     Signature of Patient:   ___________________________    When the patient is a minor or mentally incompetent to give consent:  Signature of person authorized to consent for patient: ____________ supplements, and pills I can buy without a prescription (including street drugs/illegal medications). Failure to inform my anesthesiologist about these medicines may increase my risk of anesthetic complications.   · If I am allergic to anything or have had Anesthesiologist Signature     Date   Time  I have discussed the procedure and information above with the patient (or patient’s representative) and answered their questions. The patient or their representative has agreed to have anesthesia services.     ___

## (undated) NOTE — LETTER
Date: 10/23/2018    Patient Name: Julius Tobin          To Whom it may concern: The above patient was seen at the [facility] for treatment of a medical condition.         The patient may return to work with limitations of walking or

## (undated) NOTE — LETTER
7/21/2025          To Whom It May Concern:    Adrien Tobin is currently under my medical care and may return to work on July 28, 2025.    If you require additional information please contact our office.        Sincerely,    Royce Lewis DPM

## (undated) NOTE — LETTER
[facility] [address], [address]  Consent for Procedure/Sedation    Date:     Time:       1.  I authorize the performance upon Hiral Tobin the following: Peripheral angiography, atherectomy, percutaneous transluminal favio you may develop a skin reaction.       Signature of Patient: _______________________________________________________    Signature of person authorized                                           Relationship to  to consent for patient: _______________________

## (undated) NOTE — Clinical Note
Today a1c 7.3 was 12.4 2/12/18 doing a lot better, still at wound care, today stopped mealtime insulin added sglt2 goal if able will minimize or stop all insulin.  Still needs eye exam thanks

## (undated) NOTE — LETTER
[address] [facility] Testing Department  Phone: [phone]  Right Fax: [phone]  Pikk 20 Sandi Torres RN Date: 3/27/19    Patient Name: Juany Advent  Surgery Date: 3/29/2019    CSN: [id number]  Medical Record: [id number]

## (undated) NOTE — LETTER
11/27/2018          To Whom It May Concern:    Mary Catherine is currently under my medical care and may return to work with the restriction that he may not be up on his feet. If you require additional information please contact our office.

## (undated) NOTE — LETTER
November 29, 2017    Julius Jaquez  [address] [place]      Dear Mohini Hall:    The following are the results of your recent tests. Please review the list of test results.   Your result is the value on the left; we have also suppl .0 150.0 - 450.0 10(3)uL   MCV 87.9 80.0 - 99.0 fL   MCH 30.3 27.0 - 33.2 pg   MCHC 34.4 31.0 - 37.0 g/dL   RDW 12.1 11.5 - 16.0 %   RDW-SD 38.9 35.1 - 46.3 fL   Neutrophil Absolute Prelim 3.44 1.30 - 6.70 x10 (3) uL   Neutrophil Absolute 3.44 1.30

## (undated) NOTE — ED AVS SNAPSHOT
Mylene Gerald   MRN: [id number]    Department:  [facility] [place]   Date of Visit:  2/22/2018           Disclosure     Insurance plans vary and the physician(s) referred by the ER may not be covered by your plan.  Please c tell this physician (or your personal doctor if your instructions are to return to your personal doctor) about any new or lasting problems. The primary care or specialist physician will see patients referred from the [facility] Emergency Department.  Jacky Hogue

## (undated) NOTE — LETTER
[address] [facility] FOR BLOOD OR BLOOD COMPONENTS      In the course of your treatment, it may become necessary to administer a transfusion of blood or blood components. This form provides basic information concerning this procedure and, if signed by you, authorizes its performance by qualified medical personnel. DESCRIPTION OF PROCEDURE:  Blood is introduced into one of your veins, commonly in the arm, using a sterilized disposable needle. The amount of blood transfused, and whether the transfusion will be of blood or blood components is a judgment the physician will make based on your particular needs. RISKS:  The transfusion is a common procedure of low risk. MINOR AND TEMPORARY REACTIONS ARE NOT UNCOMMON, including a slight bruise, swelling or local reaction in the area where the needle pierces your skin, or a non-serious reaction to the transfused material itself, including headache, fever or a mild skin reaction, such as rash. Serious reactions are possible, though very unlikely and include severe allergic reaction (shock)  and destruction (hemolysis) of transfused blood cells. Infectious diseases which are known to be transmitted by blood transfusion include CERTAIN TYPES OF VIRAL HEPATITIS, a viral infection of the liver, HUMAN IMMUNODEFICIENCY VIRUS (HIV-1,2) infection, a viral infection known to cause ACQUIRED IMMUNODEFICIENCY SYNDROME (AIDS) AS WELL AS CERTAIN OTHER BACTERIAL, VIRAL AND PARASITIC DISEASES. While a minimal risk of acquiring an infectious disease from transfused blood exists, in accordance with Federal and State law all due care has been taken in donor selection and testing to avoid transmission of disease. ALTERNATIVES:  If loss of blood poses serious threats in the course of your treatment, THERE IS NO EFFECTIVE ALTERNATIVE TO BLOOD TRANSFUSION.  However, if you have any further questions on this matter, your physician will fully explain the alternatives to you if it has not already been done. I,Adrien Tobin, have read/had read to me the above. I understand the matters bearing on the decision whether or not to authorize a transfusion of blood or blood components. I have no questions which have not been answered to my full satisfaction.  I hereby consent to such transfusion as  my physician may deem necessary or advisable in the course of my treatment.        _______________   __________________________________________________  Date     Signature of Patient, Parent or Legal Guardian      ([facility])      __________________________________________  Witness to Signature (title or relationship to patient)    Patient Name: Lico Gordon     : 1980                 Printed: 2023     Medical Record #: [id number]                    Page 1 of 1

## (undated) NOTE — LETTER
04/28/19        [address] [address]      Dear Gaby Saez,    [address] records indicate that you have outstanding lab work and or testing that was ordered for you and has not yet been completed:  Orders Placed This E

## (undated) NOTE — LETTER
Date: 10/23/2018    Patient Name: Uzair Tobin          To Whom it may concern: The above patient was seen at the [facility] for treatment of a medical condition.         The patient may return to work with the following limitations:

## (undated) NOTE — LETTER
Your patient was recently seen at the [facility] for a hospital follow-up visit. The visit note is attached. Please contact the clinic with any questions at [phone].     Thank you,  ARNOL Pineda

## (undated) NOTE — LETTER
11/7/2019          To Whom It May Concern:    Gregory Pelaez is currently under my medical care and may return to work at this time with no restrictions. Activity is restricted as follows: none.     If you require additional information please cont

## (undated) NOTE — LETTER
[address] [address]  [place], [address] [facility]    Consent for Operation  Date: __________________                                Time: _______________    1.  I authorize the performance upon Kayleigh Tobin the following operation:  Left videotape. The [facility] will not be responsible for storage or maintenance of this tape. 6. For the purpose of advancing medical education, I consent to the admittance of observers to the Operating Room.     7. I authorize the use of any specimen, organs Signature of Patient:   ___________________________    When the patient is a minor or mentally incompetent to give consent:  Signature of person authorized to consent for patient: ___________________________   Relationship to patient: _____________________

## (undated) NOTE — LETTER
08/16/17        Uzair Tobin  [address]      Dear Maryann Schwarz,    [address] [facility] records indicate that you have outstanding lab work and or testing that was ordered for you and has not yet been completed:          PSA (Screening) [

## (undated) NOTE — LETTER
[facility] [address], [address] [place] [address]    Consent for Anesthesia   1.   IJenise agree to be cared for by an anesthesiologist, who is specially trained to monitor me and give me medicine to put me to sleep or keep me com vision, nerves, or muscles and in extremely rare instances death. 5. My doctor has explained to me other choices available to me for my care (alternatives).   6. Pregnant Patients (“epidural”):  I understand that the risks of having an epidural (medicine g

## (undated) NOTE — LETTER
5/2/2019          To Whom It May Concern:    Octavia Aguilar is currently under my medical care and may return to work on May 6, 2019 with no restrictions. If you require additional information please contact our office.         Sincerely,    Krishna De Paz

## (undated) NOTE — LETTER
[address] [facility] [address], [address] [facility]     PICC LINE INSERTION CONSENT     I agree to have a Peripherally Inserted Central Catheter (PICC) placed in my arm.    1. The PICC insertion procedure, care, maintenance, risks, benefits, and c goals; and potential problems that might occur during recuperation.  They also discussed reasonable alternatives to the PICC, including risks, benefits, and side effects related to the alternatives and risks related to not receiving this procedure      ____

## (undated) NOTE — LETTER
[facility]  [address] [facility], [address] [place] [address]    Consent for Operation    Date: __________________    Time: _______________    1.  I authorize the performance upon Gregory Pelaez the following operation:    Calcaneal Osteotomy Partial Ca videotape. The \[facility]\"" will not be responsible for storage or maintenance of this tape. 6. For the purpose of advancing medical education, I consent to the admittance of observers to the Operating Room.     7. I authorize the use of any specimen, organs Signature of Patient:   ___________________________    When the patient is a minor or mentally incompetent to give consent:  Signature of person authorized to consent for patient: ___________________________   Relationship to patient: _____________________ drugs/illegal medications). Failure to inform my anesthesiologist about these medicines may increase my risk of anesthetic complications. · If I am allergic to anything or have had a reaction to anesthesia before.     3. I understand how the anesthesia med I have discussed the procedure and information above with the patient (or patient’s representative) and answered their questions. The patient or their representative has agreed to have anesthesia services.     _______________________________________________